# Patient Record
Sex: MALE | Race: WHITE | NOT HISPANIC OR LATINO | Employment: FULL TIME | URBAN - METROPOLITAN AREA
[De-identification: names, ages, dates, MRNs, and addresses within clinical notes are randomized per-mention and may not be internally consistent; named-entity substitution may affect disease eponyms.]

---

## 2017-01-23 ENCOUNTER — GENERIC CONVERSION - ENCOUNTER (OUTPATIENT)
Dept: OTHER | Facility: OTHER | Age: 57
End: 2017-01-23

## 2017-02-21 ENCOUNTER — ALLSCRIPTS OFFICE VISIT (OUTPATIENT)
Dept: OTHER | Facility: OTHER | Age: 57
End: 2017-02-21

## 2017-03-13 ENCOUNTER — ALLSCRIPTS OFFICE VISIT (OUTPATIENT)
Dept: OTHER | Facility: OTHER | Age: 57
End: 2017-03-13

## 2017-03-20 ENCOUNTER — ALLSCRIPTS OFFICE VISIT (OUTPATIENT)
Dept: OTHER | Facility: OTHER | Age: 57
End: 2017-03-20

## 2017-03-20 ENCOUNTER — TRANSCRIBE ORDERS (OUTPATIENT)
Dept: ADMINISTRATIVE | Facility: HOSPITAL | Age: 57
End: 2017-03-20

## 2017-03-20 ENCOUNTER — HOSPITAL ENCOUNTER (OUTPATIENT)
Dept: RADIOLOGY | Facility: MEDICAL CENTER | Age: 57
Discharge: HOME | End: 2017-03-20
Payer: COMMERCIAL

## 2017-03-20 DIAGNOSIS — J20.9 ACUTE BRONCHITIS: ICD-10-CM

## 2017-03-20 PROCEDURE — 71020 HB CHEST X-RAY 2VW FRONTAL&LATL: CPT

## 2017-03-23 ENCOUNTER — GENERIC CONVERSION - ENCOUNTER (OUTPATIENT)
Dept: OTHER | Facility: OTHER | Age: 57
End: 2017-03-23

## 2017-07-11 ENCOUNTER — ALLSCRIPTS OFFICE VISIT (OUTPATIENT)
Dept: OTHER | Facility: OTHER | Age: 57
End: 2017-07-11

## 2017-09-11 ENCOUNTER — GENERIC CONVERSION - ENCOUNTER (OUTPATIENT)
Dept: OTHER | Facility: OTHER | Age: 57
End: 2017-09-11

## 2017-10-05 ENCOUNTER — GENERIC CONVERSION - ENCOUNTER (OUTPATIENT)
Dept: OTHER | Facility: OTHER | Age: 57
End: 2017-10-05

## 2017-11-07 ENCOUNTER — GENERIC CONVERSION - ENCOUNTER (OUTPATIENT)
Dept: OTHER | Facility: OTHER | Age: 57
End: 2017-11-07

## 2018-01-12 VITALS
HEART RATE: 63 BPM | DIASTOLIC BLOOD PRESSURE: 78 MMHG | RESPIRATION RATE: 18 BRPM | OXYGEN SATURATION: 95 % | HEIGHT: 69 IN | BODY MASS INDEX: 32.9 KG/M2 | TEMPERATURE: 96.9 F | WEIGHT: 222.13 LBS | SYSTOLIC BLOOD PRESSURE: 138 MMHG

## 2018-01-12 VITALS
BODY MASS INDEX: 32.15 KG/M2 | RESPIRATION RATE: 17 BRPM | HEART RATE: 53 BPM | SYSTOLIC BLOOD PRESSURE: 156 MMHG | HEIGHT: 69 IN | WEIGHT: 217.06 LBS | TEMPERATURE: 97.4 F | DIASTOLIC BLOOD PRESSURE: 90 MMHG | OXYGEN SATURATION: 98 %

## 2018-01-13 VITALS
HEIGHT: 70 IN | WEIGHT: 221.31 LBS | TEMPERATURE: 97.8 F | BODY MASS INDEX: 31.68 KG/M2 | SYSTOLIC BLOOD PRESSURE: 128 MMHG | OXYGEN SATURATION: 98 % | DIASTOLIC BLOOD PRESSURE: 80 MMHG | RESPIRATION RATE: 16 BRPM | HEART RATE: 68 BPM

## 2018-01-14 VITALS
WEIGHT: 221.13 LBS | HEIGHT: 69 IN | SYSTOLIC BLOOD PRESSURE: 174 MMHG | TEMPERATURE: 96.4 F | RESPIRATION RATE: 18 BRPM | OXYGEN SATURATION: 98 % | BODY MASS INDEX: 32.75 KG/M2 | HEART RATE: 71 BPM | DIASTOLIC BLOOD PRESSURE: 92 MMHG

## 2018-01-18 NOTE — RESULT NOTES
Verified Results  * XR CHEST PA & LATERAL [id number] 04:40PM Anupta Hiltonia Order Number: [id number]     Test Name Result Flag Reference   XR CHEST PA & LATERAL (Report)     CHEST DUAL ENERGY     INDICATION: Trauma cough  History of bronchitis  Smoker  COMPARISON: None     VIEWS: PA and lateral projections; 2 images     FINDINGS:        Cardiomediastinal silhouette appears unremarkable  The lungs are clear  No pneumothorax or pleural effusion  4 9 cm rounded calcified structure in the left upper quadrant likely a calcified splenic cyst      Visualized osseous structures appear within normal limits for the patient's age  IMPRESSION:     No active pulmonary disease  4 9 cm rounded calcified structure in the left upper quadrant likely a calcified splenic cyst  Findings most likely represent sequela of previous posttraumatic/infarct versus hydatid cyst        Workstation performed: [id number]     Signed by:    Shan Mcconnell MD   3/22/17

## 2018-01-24 ENCOUNTER — TELEPHONE (OUTPATIENT)
Dept: FAMILY MEDICINE CLINIC | Facility: CLINIC | Age: 58
End: 2018-01-24

## 2018-02-02 LAB
ALBUMIN SERPL-MCNC: 4.2 G/DL (ref 3.6–5.1)
ALBUMIN/GLOB SERPL: 1.6 (CALC) (ref 1–2.5)
ALP SERPL-CCNC: 62 U/L (ref 40–115)
AST SERPL-CCNC: 25 U/L (ref 10–35)
BILIRUB SERPL-MCNC: 1 MG/DL (ref 0.2–1.2)
BUN SERPL-MCNC: 16 MG/DL (ref 7–25)
BUN/CREAT SERPL: ABNORMAL (CALC) (ref 6–22)
CALCIUM SERPL-MCNC: 9.7 MG/DL (ref 8.6–10.3)
CHLORIDE SERPL-SCNC: 105 MMOL/L (ref 98–110)
CHOLEST SERPL-MCNC: 192 MG/DL
CHOLEST/HDLC SERPL: 6 (CALC)
CO2 SERPL-SCNC: 21 MMOL/L (ref 20–31)
CREAT SERPL-MCNC: 0.86 MG/DL (ref 0.7–1.33)
EST. AVERAGE GLUCOSE BLD GHB EST-MCNC: 128 (CALC)
EST. AVERAGE GLUCOSE BLD GHB EST-SCNC: 7.1 (CALC)
GLOBULIN SER CALC-MCNC: 2.7 G/DL (CALC) (ref 1.9–3.7)
GLUCOSE SERPL-MCNC: 142 MG/DL (ref 65–99)
HBA1C MFR BLD: 6.1 % OF TOTAL HGB
HDLC SERPL-MCNC: 32 MG/DL
LDLC SERPL DIRECT ASSAY-MCNC: 96 MG/DL
NONHDLC SERPL-MCNC: 160 MG/DL (CALC)
POTASSIUM SERPL-SCNC: 3.9 MMOL/L (ref 3.5–5.3)
PROT SERPL-MCNC: 6.9 G/DL (ref 6.1–8.1)
PSA SERPL-MCNC: 0.7 NG/ML
SL AMB EGFR AFRICAN AMERICAN: 112 ML/MIN/1.73M2
SL AMB EGFR NON AFRICAN AMERICAN: 96 ML/MIN/1.73M2
SODIUM SERPL-SCNC: 138 MMOL/L (ref 135–146)
TRIGL SERPL-MCNC: 414 MG/DL
TSH SERPL-ACNC: 1.73 MIU/L (ref 0.4–4.5)

## 2018-02-04 RX ORDER — FENOFIBRATE 145 MG/1
1 TABLET, COATED ORAL DAILY
COMMUNITY
Start: 2011-02-21 | End: 2018-02-07 | Stop reason: SDUPTHER

## 2018-02-04 RX ORDER — OMEGA-3-ACID ETHYL ESTERS 1 G/1
2 CAPSULE, LIQUID FILLED ORAL 2 TIMES DAILY
COMMUNITY
Start: 2011-03-04 | End: 2018-02-07 | Stop reason: SDUPTHER

## 2018-02-04 RX ORDER — BETAMETHASONE DIPROPIONATE 0.5 MG/G
CREAM TOPICAL 2 TIMES DAILY
COMMUNITY
Start: 2016-05-31 | End: 2018-10-22 | Stop reason: SDUPTHER

## 2018-02-04 RX ORDER — LISINOPRIL AND HYDROCHLOROTHIAZIDE 25; 20 MG/1; MG/1
1 TABLET ORAL DAILY
COMMUNITY
Start: 2011-02-21 | End: 2018-02-07 | Stop reason: SDUPTHER

## 2018-02-04 RX ORDER — METOPROLOL SUCCINATE 100 MG/1
1 TABLET, EXTENDED RELEASE ORAL DAILY
COMMUNITY
Start: 2011-02-21 | End: 2018-02-07 | Stop reason: SDUPTHER

## 2018-02-04 RX ORDER — OXYCODONE HYDROCHLORIDE 5 MG/1
TABLET ORAL
Refills: 0 | COMMUNITY
Start: 2018-01-18 | End: 2019-05-24

## 2018-02-07 ENCOUNTER — OFFICE VISIT (OUTPATIENT)
Dept: FAMILY MEDICINE CLINIC | Facility: CLINIC | Age: 58
End: 2018-02-07
Payer: COMMERCIAL

## 2018-02-07 VITALS
OXYGEN SATURATION: 97 % | DIASTOLIC BLOOD PRESSURE: 80 MMHG | RESPIRATION RATE: 16 BRPM | TEMPERATURE: 97.5 F | BODY MASS INDEX: 33.22 KG/M2 | WEIGHT: 224.31 LBS | HEIGHT: 69 IN | HEART RATE: 61 BPM | SYSTOLIC BLOOD PRESSURE: 156 MMHG

## 2018-02-07 DIAGNOSIS — L30.9 DERMATITIS: ICD-10-CM

## 2018-02-07 DIAGNOSIS — E78.5 HYPERLIPIDEMIA, UNSPECIFIED HYPERLIPIDEMIA TYPE: ICD-10-CM

## 2018-02-07 DIAGNOSIS — I10 HYPERTENSION, UNSPECIFIED TYPE: Primary | ICD-10-CM

## 2018-02-07 DIAGNOSIS — J45.20 MILD INTERMITTENT ASTHMA WITHOUT COMPLICATION: ICD-10-CM

## 2018-02-07 DIAGNOSIS — E11.8 TYPE 2 DIABETES MELLITUS WITH COMPLICATION, UNSPECIFIED LONG TERM INSULIN USE STATUS: ICD-10-CM

## 2018-02-07 PROBLEM — K38.8 MASS OF APPENDIX: Status: ACTIVE | Noted: 2017-11-22

## 2018-02-07 PROCEDURE — 99214 OFFICE O/P EST MOD 30 MIN: CPT | Performed by: FAMILY MEDICINE

## 2018-02-07 RX ORDER — DIPHENOXYLATE HYDROCHLORIDE AND ATROPINE SULFATE 2.5; .025 MG/1; MG/1
1 TABLET ORAL
COMMUNITY
End: 2022-02-21 | Stop reason: SDUPTHER

## 2018-02-07 RX ORDER — LISINOPRIL AND HYDROCHLOROTHIAZIDE 25; 20 MG/1; MG/1
1 TABLET ORAL DAILY
Qty: 90 TABLET | Refills: 3 | Status: SHIPPED | OUTPATIENT
Start: 2018-02-07 | End: 2018-10-31 | Stop reason: SDUPTHER

## 2018-02-07 RX ORDER — BETAMETHASONE DIPROPIONATE 0.5 MG/G
CREAM TOPICAL 2 TIMES DAILY
Qty: 30 G | Refills: 0 | Status: SHIPPED | OUTPATIENT
Start: 2018-02-07 | End: 2018-10-31 | Stop reason: SDUPTHER

## 2018-02-07 RX ORDER — OMEGA-3-ACID ETHYL ESTERS 1 G/1
2 CAPSULE, LIQUID FILLED ORAL 2 TIMES DAILY
Qty: 360 CAPSULE | Refills: 3 | Status: SHIPPED | OUTPATIENT
Start: 2018-02-07 | End: 2018-11-08 | Stop reason: SDUPTHER

## 2018-02-07 RX ORDER — METOPROLOL SUCCINATE 100 MG/1
100 TABLET, EXTENDED RELEASE ORAL DAILY
Qty: 90 TABLET | Refills: 3 | Status: SHIPPED | OUTPATIENT
Start: 2018-02-07 | End: 2018-10-31 | Stop reason: SDUPTHER

## 2018-02-07 RX ORDER — ALBUTEROL SULFATE 90 UG/1
2 AEROSOL, METERED RESPIRATORY (INHALATION) EVERY 6 HOURS PRN
Qty: 3 INHALER | Refills: 0 | Status: SHIPPED | OUTPATIENT
Start: 2018-02-07 | End: 2018-07-08 | Stop reason: SDUPTHER

## 2018-02-07 RX ORDER — FENOFIBRATE 145 MG/1
145 TABLET, COATED ORAL DAILY
Qty: 90 TABLET | Refills: 3 | Status: SHIPPED | OUTPATIENT
Start: 2018-02-07 | End: 2018-10-31 | Stop reason: SDUPTHER

## 2018-02-07 NOTE — ASSESSMENT & PLAN NOTE
Total total cholesterol acceptable though triglycerides elevated at greater than 400  Continue fenofibrate  Expect that with tighter glucose control triglycerides should improve somewhat

## 2018-02-07 NOTE — PROGRESS NOTES
Assessment/Plan:    DMII (diabetes mellitus, type 2) (Formerly [facility])  Metformin refilled at 1000 milligrams twice daily  Hemoglobin A1c 6 1  Continue with diet and exercise program     Hypertension  Well controlled on lisinopril hydrochlorothiazide  Continue current dosing    Hyperlipidemia  Total total cholesterol acceptable though triglycerides elevated at greater than 400  Continue fenofibrate  Expect that with tighter glucose control triglycerides should improve somewhat  Diagnoses and all orders for this visit:    Hypertension, unspecified type  -     lisinopril-hydrochlorothiazide (PRINZIDE,ZESTORETIC) 20-25 MG per tablet; Take 1 tablet by mouth daily  -     metoprolol succinate (TOPROL-XL) 100 mg 24 hr tablet; Take 1 tablet (100 mg total) by mouth daily  -     Comprehensive metabolic panel; Future  -     Comprehensive metabolic panel    Type 2 diabetes mellitus with complication, unspecified long term insulin use status (Formerly [facility])  -     metFORMIN (GLUCOPHAGE) 500 mg tablet; Take 2 tablets (1,000 mg total) by mouth 2 (two) times a day with meals  -     Hemoglobin A1c; Future  -     Hemoglobin A1c    Hyperlipidemia, unspecified hyperlipidemia type  -     omega-3-acid ethyl esters (LOVAZA) 1 g capsule; Take 2 capsules (2 g total) by mouth 2 (two) times a day  -     fenofibrate (TRICOR) 145 mg tablet; Take 1 tablet (145 mg total) by mouth daily  -     Lipid Panel with Direct LDL reflex; Future  -     TSH, 3rd generation with T4 reflex; Future  -     Lipid Panel with Direct LDL reflex  -     TSH, 3rd generation with T4 reflex    Mild intermittent asthma without complication  -     betamethasone dipropionate (DIPROSONE) 0 05 % cream; Apply topically 2 (two) times a day    Dermatitis  -     albuterol (PROAIR HFA) 90 mcg/act inhaler; Inhale 2 puffs every 6 (six) hours as needed for wheezing    Other orders  -     multivitamin (THERAGRAN) TABS; Take 1 tablet by mouth  -     co-enzyme Q-10 30 mg;  Take 30 mg by mouth Subjective:      Patient ID: Tere Kocher is a 62 y o  male  Patient presents for routine follow-up of chronic medical problems and review recent fasting blood work  He is being treated for type 2 diabetes, hypertension, hyperlipidemia  He is taking metformin 1000 milligrams twice daily  He increase this from 500 b i d  on his own approximately 3 weeks ago  During a routine screening colonoscopy he was found to have a lesion on his appendix which then necessitated a elective appendectomy  During that workup he was found to have blood sugar of greater than 200  Since then his blood sugars have improved with the higher dosage of metformin  He takes fenofibrate for his hyperlipidemia and lisinopril hydrochlorothiazide for his blood pressure  The following portions of the patient's history were reviewed and updated as appropriate: allergies, current medications, past family history, past medical history, past social history, past surgical history and problem list     Review of Systems   Constitutional: Negative  Respiratory: Negative  Cardiovascular: Negative  Gastrointestinal: Negative  Genitourinary: Negative  Musculoskeletal: Negative  Psychiatric/Behavioral: Negative  Objective:     Physical Exam   Constitutional: He is oriented to person, place, and time  He appears well-developed and well-nourished  No distress  HENT:   Head: Normocephalic and atraumatic  Eyes: Conjunctivae are normal  Pupils are equal, round, and reactive to light  Right eye exhibits no discharge  Neck: Normal range of motion  No thyromegaly present  Cardiovascular: Normal rate and regular rhythm  Pulmonary/Chest: Effort normal and breath sounds normal  No respiratory distress  Lymphadenopathy:     He has no cervical adenopathy  Neurological: He is alert and oriented to person, place, and time  Skin: Skin is warm and dry  He is not diaphoretic     Psychiatric: He has a normal mood and affect  His behavior is normal  Judgment and thought content normal    Nursing note and vitals reviewed

## 2018-02-07 NOTE — ASSESSMENT & PLAN NOTE
Metformin refilled at 1000 milligrams twice daily  Hemoglobin A1c 6 1    Continue with diet and exercise program

## 2018-02-15 DIAGNOSIS — E11.8 TYPE 2 DIABETES MELLITUS WITH COMPLICATION, UNSPECIFIED LONG TERM INSULIN USE STATUS: ICD-10-CM

## 2018-04-14 LAB
LEFT EYE DIABETIC RETINOPATHY: NORMAL
RIGHT EYE DIABETIC RETINOPATHY: NORMAL
SEVERITY (EYE EXAM): NORMAL

## 2018-05-14 DIAGNOSIS — E11.9 TYPE 2 DIABETES MELLITUS WITHOUT COMPLICATION, WITHOUT LONG-TERM CURRENT USE OF INSULIN (HCC): Primary | ICD-10-CM

## 2018-07-06 DIAGNOSIS — L30.9 DERMATITIS: ICD-10-CM

## 2018-07-06 DIAGNOSIS — E11.9 TYPE 2 DIABETES MELLITUS WITHOUT COMPLICATION, WITHOUT LONG-TERM CURRENT USE OF INSULIN (HCC): ICD-10-CM

## 2018-10-22 ENCOUNTER — OFFICE VISIT (OUTPATIENT)
Dept: FAMILY MEDICINE CLINIC | Facility: CLINIC | Age: 58
End: 2018-10-22
Payer: COMMERCIAL

## 2018-10-22 VITALS
OXYGEN SATURATION: 96 % | BODY MASS INDEX: 31.96 KG/M2 | SYSTOLIC BLOOD PRESSURE: 140 MMHG | WEIGHT: 216.4 LBS | TEMPERATURE: 98 F | HEART RATE: 84 BPM | DIASTOLIC BLOOD PRESSURE: 82 MMHG

## 2018-10-22 DIAGNOSIS — J40 BRONCHITIS: Primary | ICD-10-CM

## 2018-10-22 PROCEDURE — 99213 OFFICE O/P EST LOW 20 MIN: CPT | Performed by: FAMILY MEDICINE

## 2018-10-22 RX ORDER — PREDNISONE 10 MG/1
TABLET ORAL
Qty: 30 TABLET | Refills: 0 | Status: SHIPPED | OUTPATIENT
Start: 2018-10-22 | End: 2018-11-08 | Stop reason: SDUPTHER

## 2018-10-22 RX ORDER — LEVOFLOXACIN 500 MG/1
500 TABLET, FILM COATED ORAL EVERY 24 HOURS
Qty: 10 TABLET | Refills: 0 | Status: SHIPPED | OUTPATIENT
Start: 2018-10-22 | End: 2018-11-01

## 2018-10-23 NOTE — PROGRESS NOTES
Assessment/Plan:     Diagnoses and all orders for this visit:    Bronchitis  -     levofloxacin (LEVAQUIN) 500 mg tablet; Take 1 tablet (500 mg total) by mouth every 24 hours for 10 days  -     predniSONE 10 mg tablet; Five p o  for 2 days Four p o  for 2 Three p o  for 2 days Two p o  for 2 days One p o  for 2 days          -  Continue ProAir inhaler, 2 puffs q i d  Subjective:      Patient ID: Elvia Molina is a 62 y o  male  Three-week history of progressive cough chest congestion and wheezing  Patient had been using his Symbicort inhaler that he had had from a prior infection which she felt helped a little bit but ran out of that several days ago  He has been using a ProAir inhaler since then also with some degree of improvement  He denies fever or chills        The following portions of the patient's history were reviewed and updated as appropriate: allergies, current medications, past family history, past medical history, past social history, past surgical history and problem list     Review of Systems   Constitutional: Negative  HENT: Positive for congestion, postnasal drip, rhinorrhea and sinus pressure  Respiratory: Positive for cough and wheezing  Cardiovascular: Negative  Gastrointestinal: Negative  Genitourinary: Negative  Musculoskeletal: Negative  Psychiatric/Behavioral: Negative  Objective:      /82 (BP Location: Left arm, Patient Position: Sitting)   Pulse 84   Temp 98 °F (36 7 °C) (Tympanic)   Wt 98 2 kg (216 lb 6 4 oz)   SpO2 96%   BMI 31 96 kg/m²          Physical Exam   Constitutional: He is oriented to person, place, and time  He appears well-developed and well-nourished  No distress  HENT:   Head: Normocephalic and atraumatic  Postnasal drainage with mildly injected posterior pharynx   Eyes: Pupils are equal, round, and reactive to light  Conjunctivae are normal  Right eye exhibits no discharge  Neck: Normal range of motion   No thyromegaly present  Cardiovascular: Normal rate and regular rhythm  Pulmonary/Chest: Effort normal  No respiratory distress  He has wheezes  Lymphadenopathy:     He has no cervical adenopathy  Neurological: He is alert and oriented to person, place, and time  Skin: Skin is warm and dry  He is not diaphoretic  Psychiatric: He has a normal mood and affect  His behavior is normal  Judgment and thought content normal    Nursing note and vitals reviewed

## 2018-10-24 LAB
ALBUMIN SERPL-MCNC: 4.6 G/DL (ref 3.6–5.1)
ALBUMIN/GLOB SERPL: 1.6 (CALC) (ref 1–2.5)
ALP SERPL-CCNC: 65 U/L (ref 40–115)
ALT SERPL-CCNC: 41 U/L (ref 9–46)
AST SERPL-CCNC: 23 U/L (ref 10–35)
BILIRUB SERPL-MCNC: 1.1 MG/DL (ref 0.2–1.2)
BUN SERPL-MCNC: 14 MG/DL (ref 7–25)
BUN/CREAT SERPL: ABNORMAL (CALC) (ref 6–22)
CALCIUM SERPL-MCNC: 9.7 MG/DL (ref 8.6–10.3)
CHLORIDE SERPL-SCNC: 103 MMOL/L (ref 98–110)
CHOLEST SERPL-MCNC: 194 MG/DL
CHOLEST/HDLC SERPL: 4.4 (CALC)
CO2 SERPL-SCNC: 20 MMOL/L (ref 20–32)
CREAT SERPL-MCNC: 0.99 MG/DL (ref 0.7–1.33)
GLOBULIN SER CALC-MCNC: 2.8 G/DL (CALC) (ref 1.9–3.7)
GLUCOSE SERPL-MCNC: 203 MG/DL (ref 65–99)
HBA1C MFR BLD: 6.1 % OF TOTAL HGB
HDLC SERPL-MCNC: 44 MG/DL
LDLC SERPL CALC-MCNC: 114 MG/DL (CALC)
NONHDLC SERPL-MCNC: 150 MG/DL (CALC)
POTASSIUM SERPL-SCNC: 4.5 MMOL/L (ref 3.5–5.3)
PROT SERPL-MCNC: 7.4 G/DL (ref 6.1–8.1)
SL AMB EGFR AFRICAN AMERICAN: 98 ML/MIN/1.73M2
SL AMB EGFR NON AFRICAN AMERICAN: 84 ML/MIN/1.73M2
SODIUM SERPL-SCNC: 136 MMOL/L (ref 135–146)
TRIGL SERPL-MCNC: 254 MG/DL
TSH SERPL-ACNC: 0.95 MIU/L (ref 0.4–4.5)

## 2018-10-31 ENCOUNTER — OFFICE VISIT (OUTPATIENT)
Dept: FAMILY MEDICINE CLINIC | Facility: CLINIC | Age: 58
End: 2018-10-31
Payer: COMMERCIAL

## 2018-10-31 VITALS
OXYGEN SATURATION: 98 % | WEIGHT: 215.7 LBS | HEIGHT: 69 IN | BODY MASS INDEX: 31.95 KG/M2 | SYSTOLIC BLOOD PRESSURE: 130 MMHG | TEMPERATURE: 97.8 F | HEART RATE: 69 BPM | DIASTOLIC BLOOD PRESSURE: 86 MMHG | RESPIRATION RATE: 17 BRPM

## 2018-10-31 DIAGNOSIS — I10 HYPERTENSION, UNSPECIFIED TYPE: ICD-10-CM

## 2018-10-31 DIAGNOSIS — E78.5 HYPERLIPIDEMIA, UNSPECIFIED HYPERLIPIDEMIA TYPE: ICD-10-CM

## 2018-10-31 DIAGNOSIS — J45.20 MILD INTERMITTENT ASTHMA WITHOUT COMPLICATION: ICD-10-CM

## 2018-10-31 DIAGNOSIS — L30.9 DERMATITIS: ICD-10-CM

## 2018-10-31 DIAGNOSIS — E11.9 TYPE 2 DIABETES MELLITUS WITHOUT COMPLICATION, WITHOUT LONG-TERM CURRENT USE OF INSULIN (HCC): ICD-10-CM

## 2018-10-31 PROCEDURE — 3079F DIAST BP 80-89 MM HG: CPT | Performed by: FAMILY MEDICINE

## 2018-10-31 PROCEDURE — 3075F SYST BP GE 130 - 139MM HG: CPT | Performed by: FAMILY MEDICINE

## 2018-10-31 PROCEDURE — 99214 OFFICE O/P EST MOD 30 MIN: CPT | Performed by: FAMILY MEDICINE

## 2018-10-31 RX ORDER — FENOFIBRATE 145 MG/1
145 TABLET, COATED ORAL DAILY
Qty: 90 TABLET | Refills: 0 | Status: SHIPPED | OUTPATIENT
Start: 2018-10-31 | End: 2019-01-13 | Stop reason: SDUPTHER

## 2018-10-31 RX ORDER — ALBUTEROL SULFATE 90 UG/1
2 AEROSOL, METERED RESPIRATORY (INHALATION) EVERY 6 HOURS PRN
Qty: 25.5 G | Refills: 0 | Status: SHIPPED | OUTPATIENT
Start: 2018-10-31 | End: 2019-01-24 | Stop reason: SDUPTHER

## 2018-10-31 RX ORDER — BETAMETHASONE DIPROPIONATE 0.5 MG/G
CREAM TOPICAL 2 TIMES DAILY
Qty: 30 G | Refills: 0 | Status: SHIPPED | OUTPATIENT
Start: 2018-10-31 | End: 2019-11-27 | Stop reason: SDUPTHER

## 2018-10-31 RX ORDER — METOPROLOL SUCCINATE 100 MG/1
100 TABLET, EXTENDED RELEASE ORAL DAILY
Qty: 90 TABLET | Refills: 0 | Status: SHIPPED | OUTPATIENT
Start: 2018-10-31 | End: 2019-01-13 | Stop reason: SDUPTHER

## 2018-10-31 RX ORDER — LISINOPRIL AND HYDROCHLOROTHIAZIDE 25; 20 MG/1; MG/1
1 TABLET ORAL DAILY
Qty: 90 TABLET | Refills: 0 | Status: SHIPPED | OUTPATIENT
Start: 2018-10-31 | End: 2019-01-13 | Stop reason: SDUPTHER

## 2018-10-31 NOTE — PROGRESS NOTES
Assessment/Plan:    Type 2 diabetes stable with a hemoglobin A1c of 6 1  Continue metformin  hyperlipidemia stable with a total cholesterol of 194, LDL of 114 and triglycerides which have improved from 414 down to 254  Continue fenofibrate  Hypertension stable  Continue lisinopril hydrochlorothiazide and metoprolol  Diagnoses and all orders for this visit:    Dermatitis  -     albuterol (PROAIR HFA) 90 mcg/act inhaler; Inhale 2 puffs every 6 (six) hours as needed for wheezing    Hyperlipidemia, unspecified hyperlipidemia type  -     fenofibrate (TRICOR) 145 mg tablet; Take 1 tablet (145 mg total) by mouth daily  -     Comprehensive metabolic panel; Future  -     Lipid Panel with Direct LDL reflex; Future  -     TSH, 3rd generation; Future  -     Hemoglobin A1c (w/out EAG); Future  -     PSA, Total Screen; Future  -     Comprehensive metabolic panel  -     Lipid Panel with Direct LDL reflex  -     TSH, 3rd generation  -     Hemoglobin A1c (w/out EAG)  -     PSA, Total Screen    Hypertension, unspecified type  -     lisinopril-hydrochlorothiazide (PRINZIDE,ZESTORETIC) 20-25 MG per tablet; Take 1 tablet by mouth daily  -     metoprolol succinate (TOPROL-XL) 100 mg 24 hr tablet; Take 1 tablet (100 mg total) by mouth daily  -     Comprehensive metabolic panel; Future  -     Lipid Panel with Direct LDL reflex; Future  -     TSH, 3rd generation; Future  -     Hemoglobin A1c (w/out EAG); Future  -     PSA, Total Screen; Future  -     Comprehensive metabolic panel  -     Lipid Panel with Direct LDL reflex  -     TSH, 3rd generation  -     Hemoglobin A1c (w/out EAG)  -     PSA, Total Screen    Type 2 diabetes mellitus without complication, without long-term current use of insulin (HCC)  -     metFORMIN (GLUCOPHAGE) 1000 MG tablet; Take 1 tablet (1,000 mg total) by mouth 2 (two) times a day with meals  -     Comprehensive metabolic panel; Future  -     Lipid Panel with Direct LDL reflex;  Future  -     TSH, 3rd generation; Future  -     Hemoglobin A1c (w/out EAG); Future  -     PSA, Total Screen; Future  -     Comprehensive metabolic panel  -     Lipid Panel with Direct LDL reflex  -     TSH, 3rd generation  -     Hemoglobin A1c (w/out EAG)  -     PSA, Total Screen    Mild intermittent asthma without complication  -     betamethasone dipropionate (DIPROSONE) 0 05 % cream; Apply topically 2 (two) times a day          Subjective:      Patient ID: Jocelyn Bustamante is a 62 y o  male  Patient was seen for routine follow-up chronic medical problems and to review his recent blood work  He has no new complaints  He is getting over bronchitis for which she is taking levofloxacin  His regular chronic medications include metformin for diabetes, metoprolol and lisinopril hydrochlorothiazide for high blood pressure  Takes fenofibrate for hyperlipidemia  He is compliant with his medications and has no side effects related to them  The following portions of the patient's history were reviewed and updated as appropriate: allergies, current medications, past family history, past medical history, past social history, past surgical history and problem list     Review of Systems   Constitutional: Negative  Respiratory: Negative  Cardiovascular: Negative  Gastrointestinal: Negative  Genitourinary: Negative  Musculoskeletal: Negative  Psychiatric/Behavioral: Negative  Objective:      /86 (BP Location: Right arm, Patient Position: Sitting, Cuff Size: Adult)   Pulse 69   Temp 97 8 °F (36 6 °C) (Tympanic)   Resp 17   Ht 5' 9" (1 753 m)   Wt 97 8 kg (215 lb 11 2 oz)   SpO2 98%   BMI 31 85 kg/m²          Physical Exam   Constitutional: He is oriented to person, place, and time  He appears well-developed and well-nourished  No distress  HENT:   Head: Normocephalic and atraumatic  Eyes: Pupils are equal, round, and reactive to light   Conjunctivae are normal  Right eye exhibits no discharge  Neck: Normal range of motion  No thyromegaly present  Cardiovascular: Normal rate and regular rhythm  Pulmonary/Chest: Effort normal and breath sounds normal  No respiratory distress  Lymphadenopathy:     He has no cervical adenopathy  Neurological: He is alert and oriented to person, place, and time  Skin: Skin is warm and dry  He is not diaphoretic  Psychiatric: He has a normal mood and affect  His behavior is normal  Judgment and thought content normal    Nursing note and vitals reviewed

## 2018-11-08 ENCOUNTER — OFFICE VISIT (OUTPATIENT)
Dept: FAMILY MEDICINE CLINIC | Facility: CLINIC | Age: 58
End: 2018-11-08
Payer: COMMERCIAL

## 2018-11-08 VITALS
BODY MASS INDEX: 31.84 KG/M2 | DIASTOLIC BLOOD PRESSURE: 74 MMHG | HEART RATE: 74 BPM | SYSTOLIC BLOOD PRESSURE: 132 MMHG | TEMPERATURE: 97.8 F | HEIGHT: 69 IN | OXYGEN SATURATION: 96 % | WEIGHT: 215 LBS | RESPIRATION RATE: 14 BRPM

## 2018-11-08 DIAGNOSIS — J20.9 ACUTE BRONCHITIS, UNSPECIFIED ORGANISM: Primary | ICD-10-CM

## 2018-11-08 DIAGNOSIS — E78.5 HYPERLIPIDEMIA, UNSPECIFIED HYPERLIPIDEMIA TYPE: ICD-10-CM

## 2018-11-08 DIAGNOSIS — J40 BRONCHITIS: ICD-10-CM

## 2018-11-08 PROCEDURE — 99213 OFFICE O/P EST LOW 20 MIN: CPT | Performed by: PHYSICIAN ASSISTANT

## 2018-11-08 PROCEDURE — 3008F BODY MASS INDEX DOCD: CPT | Performed by: PHYSICIAN ASSISTANT

## 2018-11-08 RX ORDER — PREDNISONE 10 MG/1
TABLET ORAL
Qty: 30 TABLET | Refills: 0 | Status: SHIPPED | OUTPATIENT
Start: 2018-11-08 | End: 2019-05-24

## 2018-11-08 RX ORDER — BUDESONIDE AND FORMOTEROL FUMARATE DIHYDRATE 160; 4.5 UG/1; UG/1
2 AEROSOL RESPIRATORY (INHALATION) 2 TIMES DAILY
Qty: 10.2 G | Refills: 0 | Status: SHIPPED | OUTPATIENT
Start: 2018-11-08 | End: 2019-02-14 | Stop reason: SDUPTHER

## 2018-11-08 RX ORDER — OMEGA-3-ACID ETHYL ESTERS 1 G/1
2 CAPSULE, LIQUID FILLED ORAL 2 TIMES DAILY
Qty: 360 CAPSULE | Refills: 1 | Status: SHIPPED | OUTPATIENT
Start: 2018-11-08 | End: 2019-05-24 | Stop reason: SDUPTHER

## 2018-11-08 NOTE — PROGRESS NOTES
Assessment/Plan:         Diagnoses and all orders for this visit:    Acute bronchitis, unspecified organism  -     budesonide-formoterol (SYMBICORT) 160-4 5 mcg/act inhaler; Inhale 2 puffs 2 (two) times a day Rinse mouth after use  Bronchitis  -     predniSONE 10 mg tablet; Five p o  for 2 days Four p o  for 2 Three p o  for 2 days Two p o  for 2 days One p o  for 2 days    Hyperlipidemia, unspecified hyperlipidemia type  -     omega-3-acid ethyl esters (LOVAZA) 1 g capsule; Take 2 capsules (2 g total) by mouth 2 (two) times a day      discussed condition with patient  He appears to have persistent bronchitis but I do not suspect that he has any further bacterial infection  I will treat the residual inflammation of the airway with a 2nd 10 day course of prednisone and I will also give him a trial of Symbicort and recommended he continue with Mucinex and Robitussin DM  He is to follow up if symptoms continue to persist   I refilled his Lovaza for him today as well  Chief Complaint   Patient presents with    Follow-up     Pt presents for a f/u due to ongoing persistent coughing  Pt states that he has completed his rounds of abx and steriods  He states a few days after Tx all sxs returned but no new sxs  Subjective:      Patient ID: Samir Ortiz is a 62 y o  male  Pt presents for F/U from 10/22/18  He finished Levaquin and Prednisone and reports his symptoms were improving but once he stopped taking the medication, the symptoms came back  He reports cough, chest tightness, wheezing, SOB  He denies any other significant symptoms  He has also tried ProAir but reports [facility] was more effective for him in the past  He has also taken Mucinex and Robitussn DM           The following portions of the patient's history were reviewed and updated as appropriate: allergies, current medications, past family history, past medical history, past social history, past surgical history and problem list     Review of Systems   Constitutional: Negative  HENT: Negative  Respiratory: Positive for cough, chest tightness, shortness of breath and wheezing  Cardiovascular: Negative  Gastrointestinal: Negative  Genitourinary: Negative  Objective:      /74 (BP Location: Left arm, Patient Position: Sitting, Cuff Size: Adult)   Pulse 74   Temp 97 8 °F (36 6 °C) (Tympanic)   Resp 14   Ht 5' 9" (1 753 m)   Wt 97 5 kg (215 lb)   SpO2 96%   BMI 31 75 kg/m²          Physical Exam   Constitutional: He is oriented to person, place, and time  He appears well-developed and well-nourished  No distress  HENT:   Right Ear: Hearing, tympanic membrane, external ear and ear canal normal    Left Ear: Hearing, tympanic membrane, external ear and ear canal normal    Nose: Nose normal    Mouth/Throat: Mucous membranes are normal  Posterior oropharyngeal erythema (PND) present  No oropharyngeal exudate  Neck: Neck supple  Cardiovascular: Normal rate, regular rhythm and normal heart sounds  Pulmonary/Chest: Effort normal  He has no wheezes  He has rhonchi (B/L diffuse coarse rhonchi heard throughout)  Lymphadenopathy:     He has no cervical adenopathy  Neurological: He is alert and oriented to person, place, and time  Psychiatric: He has a normal mood and affect  Vitals reviewed

## 2019-01-13 DIAGNOSIS — I10 HYPERTENSION, UNSPECIFIED TYPE: ICD-10-CM

## 2019-01-13 DIAGNOSIS — E78.5 HYPERLIPIDEMIA, UNSPECIFIED HYPERLIPIDEMIA TYPE: ICD-10-CM

## 2019-01-13 DIAGNOSIS — E11.9 TYPE 2 DIABETES MELLITUS WITHOUT COMPLICATION, WITHOUT LONG-TERM CURRENT USE OF INSULIN (HCC): ICD-10-CM

## 2019-01-14 RX ORDER — FENOFIBRATE 145 MG/1
TABLET, COATED ORAL
Qty: 90 TABLET | Refills: 0 | Status: SHIPPED | OUTPATIENT
Start: 2019-01-14 | End: 2019-05-24 | Stop reason: SDUPTHER

## 2019-01-14 RX ORDER — METOPROLOL SUCCINATE 100 MG/1
TABLET, EXTENDED RELEASE ORAL
Qty: 90 TABLET | Refills: 0 | Status: SHIPPED | OUTPATIENT
Start: 2019-01-14 | End: 2019-05-24 | Stop reason: SDUPTHER

## 2019-01-14 RX ORDER — LISINOPRIL AND HYDROCHLOROTHIAZIDE 25; 20 MG/1; MG/1
TABLET ORAL
Qty: 90 TABLET | Refills: 0 | Status: SHIPPED | OUTPATIENT
Start: 2019-01-14 | End: 2019-05-24 | Stop reason: SDUPTHER

## 2019-01-24 DIAGNOSIS — L30.9 DERMATITIS: ICD-10-CM

## 2019-01-24 RX ORDER — ALBUTEROL SULFATE 90 UG/1
2 AEROSOL, METERED RESPIRATORY (INHALATION) EVERY 6 HOURS PRN
Qty: 5 INHALER | Refills: 5 | Status: SHIPPED | OUTPATIENT
Start: 2019-01-24 | End: 2019-11-27 | Stop reason: SDUPTHER

## 2019-02-14 ENCOUNTER — OFFICE VISIT (OUTPATIENT)
Dept: FAMILY MEDICINE CLINIC | Facility: CLINIC | Age: 59
End: 2019-02-14
Payer: COMMERCIAL

## 2019-02-14 VITALS
BODY MASS INDEX: 32.47 KG/M2 | HEART RATE: 60 BPM | HEIGHT: 69 IN | TEMPERATURE: 97 F | SYSTOLIC BLOOD PRESSURE: 142 MMHG | OXYGEN SATURATION: 95 % | WEIGHT: 219.2 LBS | RESPIRATION RATE: 18 BRPM | DIASTOLIC BLOOD PRESSURE: 80 MMHG

## 2019-02-14 DIAGNOSIS — J45.20 MILD INTERMITTENT ASTHMA WITHOUT COMPLICATION: ICD-10-CM

## 2019-02-14 DIAGNOSIS — J20.9 ACUTE BRONCHITIS, UNSPECIFIED ORGANISM: Primary | ICD-10-CM

## 2019-02-14 DIAGNOSIS — F17.200 SMOKER: ICD-10-CM

## 2019-02-14 PROCEDURE — 3008F BODY MASS INDEX DOCD: CPT | Performed by: FAMILY MEDICINE

## 2019-02-14 PROCEDURE — 99213 OFFICE O/P EST LOW 20 MIN: CPT | Performed by: FAMILY MEDICINE

## 2019-02-14 RX ORDER — VARENICLINE TARTRATE 25 MG
KIT ORAL
Qty: 53 TABLET | Refills: 0 | Status: SHIPPED | OUTPATIENT
Start: 2019-02-14 | End: 2019-06-12 | Stop reason: ALTCHOICE

## 2019-02-14 RX ORDER — LEVOFLOXACIN 500 MG/1
500 TABLET, FILM COATED ORAL EVERY 24 HOURS
Qty: 10 TABLET | Refills: 0 | Status: SHIPPED | OUTPATIENT
Start: 2019-02-14 | End: 2019-02-24

## 2019-02-14 RX ORDER — BUDESONIDE AND FORMOTEROL FUMARATE DIHYDRATE 160; 4.5 UG/1; UG/1
2 AEROSOL RESPIRATORY (INHALATION) 2 TIMES DAILY
Qty: 10.2 G | Refills: 0 | Status: SHIPPED | OUTPATIENT
Start: 2019-02-14 | End: 2019-08-09 | Stop reason: SDUPTHER

## 2019-02-14 NOTE — PROGRESS NOTES
[facility]      NAME: Aaron Monge  AGE: 62 y o  SEX: male  : 1960   MRN: [id number]    DATE: 2019  TIME: 2:35 PM    Assessment and Plan     Problem List Items Addressed This Visit     None      Visit Diagnoses     Acute bronchitis, unspecified organism    -  Primary    Relevant Medications    budesonide-formoterol (SYMBICORT) 160-4 5 mcg/act inhaler    levofloxacin (LEVAQUIN) 500 mg tablet    Mild intermittent asthma without complication        Relevant Medications    budesonide-formoterol (SYMBICORT) 160-4 5 mcg/act inhaler    Smoker        Relevant Medications    varenicline (CHANTIX ROSE) 0 5 MG X 11 & 1 MG X 42 tablet              Return to office in:  P r n  Chief Complaint     Chief Complaint   Patient presents with    URI     Pt c/o chest congestion with dry cough  Pt states he has chest tightness with pressure but denies any SOB  Pt has been taking OTC rubitussin and using a symbicort inhaler with minimal relief  History of Present Illness     One-week history of upper respiratory symptoms including productive cough chest congestion wheezing  Denies fever chills or shortness of breath  Has been using Symbicort inhaler but ran out this week  The following portions of the patient's history were reviewed and updated as appropriate: allergies, current medications, past family history, past medical history, past social history, past surgical history and problem list     Review of Systems   Review of Systems   Constitutional: Negative  HENT: Positive for postnasal drip  Respiratory: Positive for cough, chest tightness and wheezing  Cardiovascular: Negative  Gastrointestinal: Negative  Genitourinary: Negative  Musculoskeletal: Negative  Psychiatric/Behavioral: Negative          Active Problem List     Patient Active Problem List   Diagnosis    DMII (diabetes mellitus, type 2) ([facility] [address] )    Hyperlipidemia    Hypertension    Mass of appendix Objective   /80   Pulse 60   Temp (!) 97 °F (36 1 °C) (Tympanic)   Resp 18   Ht 5' 9" (1 753 m)   Wt 99 4 kg (219 lb 3 2 oz)   SpO2 95%   BMI 32 37 kg/m²     Physical Exam   Constitutional: He is oriented to person, place, and time  He appears well-developed and well-nourished  No distress  HENT:   Head: Normocephalic and atraumatic  Eyes: Pupils are equal, round, and reactive to light  Conjunctivae are normal  Right eye exhibits no discharge  Neck: Normal range of motion  No thyromegaly present  Cardiovascular: Normal rate and regular rhythm  Pulmonary/Chest: Effort normal  No respiratory distress  He has wheezes  Lymphadenopathy:     He has no cervical adenopathy  Neurological: He is alert and oriented to person, place, and time  Skin: Skin is warm and dry  He is not diaphoretic  Psychiatric: He has a normal mood and affect  His behavior is normal  Judgment and thought content normal    Nursing note and vitals reviewed          Current Medications     Current Outpatient Medications:     albuterol (PROAIR HFA) 90 mcg/act inhaler, Inhale 2 puffs every 6 (six) hours as needed for wheezing, Disp: 5 Inhaler, Rfl: 5    betamethasone dipropionate (DIPROSONE) 0 05 % cream, Apply topically 2 (two) times a day, Disp: 30 g, Rfl: 0    budesonide-formoterol (SYMBICORT) 160-4 5 mcg/act inhaler, Inhale 2 puffs 2 (two) times a day Rinse mouth after use , Disp: 10 2 g, Rfl: 0    co-enzyme Q-10 30 mg, Take 30 mg by mouth, Disp: , Rfl:     fenofibrate (TRICOR) 145 mg tablet, TAKE 1 TABLET DAILY, Disp: 90 tablet, Rfl: 0    lisinopril-hydrochlorothiazide (PRINZIDE,ZESTORETIC) 20-25 MG per tablet, TAKE 1 TABLET DAILY, Disp: 90 tablet, Rfl: 0    metFORMIN (GLUCOPHAGE) 1000 MG tablet, TAKE 1 TABLET TWICE A DAY  WITH MEALS, Disp: 180 tablet, Rfl: 0    metoprolol succinate (TOPROL-XL) 100 mg 24 hr tablet, TAKE 1 TABLET DAILY, Disp: 90 tablet, Rfl: 0    multivitamin (THERAGRAN) TABS, Take 1 tablet by mouth, Disp: , Rfl:     omega-3-acid ethyl esters (LOVAZA) 1 g capsule, Take 2 capsules (2 g total) by mouth 2 (two) times a day, Disp: 360 capsule, Rfl: 1    oxyCODONE (ROXICODONE) 5 mg immediate release tablet, TAKE 1 TABLET BY MOUTH EVERY 6 HOURS AS NEEDED FOR MODERATE OR SEVERE PAIN   MAX DAILY DOSE: 20MG, Disp: , Rfl: 0    levofloxacin (LEVAQUIN) 500 mg tablet, Take 1 tablet (500 mg total) by mouth every 24 hours for 10 days, Disp: 10 tablet, Rfl: 0    predniSONE 10 mg tablet, Five p o  for 2 days Four p o  for 2 Three p o  for 2 days Two p o  for 2 days One p o  for 2 days (Patient not taking: Reported on 2/14/2019), Disp: 30 tablet, Rfl: 0    varenicline (CHANTIX ROSE) 0 5 MG X 11 & 1 MG X 42 tablet, Take one 0 5mg tab by mouth 1x daily for 3 days, then increase to one 0 5mg tab 2x daily for 3 days, then increase to one 1mg tab 2x daily, Disp: 53 tablet, Rfl: 0    Health Maintenance     Health Maintenance   Topic Date Due    Hepatitis C Screening  1960    URINE MICROALBUMIN  11/21/1970    BMI: Followup Plan  11/21/1978    HEPATITIS B VACCINES (1 of 3 - Risk 3-dose series) 11/21/1979    DTaP,Tdap,and Td Vaccines (1 - Tdap) 11/21/1981    Diabetic Foot Exam  02/07/2019    DM Eye Exam  04/14/2019    HEMOGLOBIN A1C  04/23/2019    Depression Screening PHQ  10/22/2019    BMI: Adult  11/08/2019    CRC Screening: Colonoscopy  10/05/2022    INFLUENZA VACCINE  Completed    Pneumococcal PPSV23 Medium Risk Adult  Completed     Immunization History   Administered Date(s) Administered    INFLUENZA 11/01/2007, 10/20/2008, 10/15/2009, 10/30/2017, 10/17/2018    Influenza Quadrivalent, 6-35 Months IM 11/17/2015, 11/05/2016    Influenza TIV (IM) 11/20/2012, 10/01/2014    Pneumococcal Polysaccharide PPV23 07/31/2014       Max Maria DO  [facility]

## 2019-05-16 LAB
ALBUMIN SERPL-MCNC: 4.4 G/DL (ref 3.6–5.1)
ALBUMIN/GLOB SERPL: 1.8 (CALC) (ref 1–2.5)
ALP SERPL-CCNC: 59 U/L (ref 40–115)
ALT SERPL-CCNC: 45 U/L (ref 9–46)
AST SERPL-CCNC: 27 U/L (ref 10–35)
BILIRUB SERPL-MCNC: 1.3 MG/DL (ref 0.2–1.2)
BUN SERPL-MCNC: 16 MG/DL (ref 7–25)
BUN/CREAT SERPL: ABNORMAL (CALC) (ref 6–22)
CALCIUM SERPL-MCNC: 9.9 MG/DL (ref 8.6–10.3)
CHLORIDE SERPL-SCNC: 105 MMOL/L (ref 98–110)
CHOLEST SERPL-MCNC: 164 MG/DL
CHOLEST/HDLC SERPL: 4.6 (CALC)
CO2 SERPL-SCNC: 26 MMOL/L (ref 20–32)
CREAT SERPL-MCNC: 1 MG/DL (ref 0.7–1.33)
GLOBULIN SER CALC-MCNC: 2.5 G/DL (CALC) (ref 1.9–3.7)
GLUCOSE SERPL-MCNC: 128 MG/DL (ref 65–99)
HBA1C MFR BLD: 6.3 % OF TOTAL HGB
HDLC SERPL-MCNC: 36 MG/DL
LDLC SERPL CALC-MCNC: 82 MG/DL (CALC)
NONHDLC SERPL-MCNC: 128 MG/DL (CALC)
POTASSIUM SERPL-SCNC: 4.4 MMOL/L (ref 3.5–5.3)
PROT SERPL-MCNC: 6.9 G/DL (ref 6.1–8.1)
PSA SERPL-MCNC: 0.7 NG/ML
SL AMB EGFR AFRICAN AMERICAN: 96 ML/MIN/1.73M2
SL AMB EGFR NON AFRICAN AMERICAN: 83 ML/MIN/1.73M2
SODIUM SERPL-SCNC: 140 MMOL/L (ref 135–146)
TRIGL SERPL-MCNC: 383 MG/DL
TSH SERPL-ACNC: 2.01 MIU/L (ref 0.4–4.5)

## 2019-05-16 PROCEDURE — 3044F HG A1C LEVEL LT 7.0%: CPT | Performed by: FAMILY MEDICINE

## 2019-05-24 ENCOUNTER — OFFICE VISIT (OUTPATIENT)
Dept: FAMILY MEDICINE CLINIC | Facility: CLINIC | Age: 59
End: 2019-05-24
Payer: COMMERCIAL

## 2019-05-24 VITALS
SYSTOLIC BLOOD PRESSURE: 130 MMHG | BODY MASS INDEX: 31.58 KG/M2 | TEMPERATURE: 98.4 F | OXYGEN SATURATION: 97 % | WEIGHT: 220.6 LBS | HEIGHT: 70 IN | DIASTOLIC BLOOD PRESSURE: 70 MMHG | HEART RATE: 67 BPM

## 2019-05-24 DIAGNOSIS — I10 HYPERTENSION, UNSPECIFIED TYPE: ICD-10-CM

## 2019-05-24 DIAGNOSIS — E11.9 TYPE 2 DIABETES MELLITUS WITHOUT COMPLICATION, WITHOUT LONG-TERM CURRENT USE OF INSULIN (HCC): ICD-10-CM

## 2019-05-24 DIAGNOSIS — J20.9 ACUTE BRONCHITIS, UNSPECIFIED ORGANISM: ICD-10-CM

## 2019-05-24 DIAGNOSIS — E78.5 HYPERLIPIDEMIA, UNSPECIFIED HYPERLIPIDEMIA TYPE: ICD-10-CM

## 2019-05-24 PROCEDURE — 3078F DIAST BP <80 MM HG: CPT | Performed by: FAMILY MEDICINE

## 2019-05-24 PROCEDURE — 3008F BODY MASS INDEX DOCD: CPT | Performed by: FAMILY MEDICINE

## 2019-05-24 PROCEDURE — 3075F SYST BP GE 130 - 139MM HG: CPT | Performed by: FAMILY MEDICINE

## 2019-05-24 PROCEDURE — 99214 OFFICE O/P EST MOD 30 MIN: CPT | Performed by: FAMILY MEDICINE

## 2019-05-24 RX ORDER — OMEGA-3-ACID ETHYL ESTERS 1 G/1
2 CAPSULE, LIQUID FILLED ORAL 2 TIMES DAILY
Qty: 360 CAPSULE | Refills: 1 | Status: SHIPPED | OUTPATIENT
Start: 2019-05-24 | End: 2019-11-27 | Stop reason: SDUPTHER

## 2019-05-24 RX ORDER — METOPROLOL SUCCINATE 100 MG/1
100 TABLET, EXTENDED RELEASE ORAL DAILY
Qty: 90 TABLET | Refills: 1 | Status: SHIPPED | OUTPATIENT
Start: 2019-05-24 | End: 2019-11-27 | Stop reason: SDUPTHER

## 2019-05-24 RX ORDER — LISINOPRIL AND HYDROCHLOROTHIAZIDE 25; 20 MG/1; MG/1
1 TABLET ORAL DAILY
Qty: 90 TABLET | Refills: 1 | Status: SHIPPED | OUTPATIENT
Start: 2019-05-24 | End: 2019-11-27 | Stop reason: SDUPTHER

## 2019-05-24 RX ORDER — FENOFIBRATE 145 MG/1
145 TABLET, COATED ORAL DAILY
Qty: 90 TABLET | Refills: 1 | Status: SHIPPED | OUTPATIENT
Start: 2019-05-24 | End: 2019-11-27 | Stop reason: SDUPTHER

## 2019-06-12 DIAGNOSIS — F17.200 SMOKER: Primary | ICD-10-CM

## 2019-06-12 DIAGNOSIS — F17.200 SMOKER: ICD-10-CM

## 2019-06-12 RX ORDER — VARENICLINE TARTRATE 1 MG/1
1 TABLET, FILM COATED ORAL 2 TIMES DAILY
Qty: 60 TABLET | Refills: 4 | Status: SHIPPED | OUTPATIENT
Start: 2019-06-12 | End: 2019-07-16 | Stop reason: SDUPTHER

## 2019-06-12 RX ORDER — VARENICLINE TARTRATE 25 MG
KIT ORAL
Qty: 53 TABLET | Refills: 0 | OUTPATIENT
Start: 2019-06-12

## 2019-07-16 ENCOUNTER — TELEPHONE (OUTPATIENT)
Dept: OTHER | Facility: OTHER | Age: 59
End: 2019-07-16

## 2019-07-16 DIAGNOSIS — F17.200 SMOKER: ICD-10-CM

## 2019-07-21 RX ORDER — VARENICLINE TARTRATE 1 MG/1
1 TABLET, FILM COATED ORAL 2 TIMES DAILY
Qty: 60 TABLET | Refills: 0 | Status: SHIPPED | OUTPATIENT
Start: 2019-07-21 | End: 2019-11-14 | Stop reason: SDUPTHER

## 2019-08-09 DIAGNOSIS — J20.9 ACUTE BRONCHITIS, UNSPECIFIED ORGANISM: ICD-10-CM

## 2019-08-13 RX ORDER — BUDESONIDE AND FORMOTEROL FUMARATE DIHYDRATE 160; 4.5 UG/1; UG/1
AEROSOL RESPIRATORY (INHALATION)
Qty: 10.2 INHALER | Refills: 0 | Status: SHIPPED | OUTPATIENT
Start: 2019-08-13 | End: 2019-11-27 | Stop reason: SDUPTHER

## 2019-11-14 DIAGNOSIS — F17.200 SMOKER: ICD-10-CM

## 2019-11-14 RX ORDER — VARENICLINE TARTRATE 1 MG/1
1 TABLET, FILM COATED ORAL 2 TIMES DAILY
Qty: 60 TABLET | Refills: 0 | Status: CANCELLED | OUTPATIENT
Start: 2019-11-14

## 2019-11-14 RX ORDER — VARENICLINE TARTRATE 1 MG/1
1 TABLET, FILM COATED ORAL 2 TIMES DAILY
Qty: 60 TABLET | Refills: 3 | Status: SHIPPED | OUTPATIENT
Start: 2019-11-14 | End: 2021-08-09 | Stop reason: SDUPTHER

## 2019-11-20 LAB
ALBUMIN SERPL-MCNC: 4.5 G/DL (ref 3.6–5.1)
ALBUMIN/GLOB SERPL: 1.7 (CALC) (ref 1–2.5)
ALP SERPL-CCNC: 58 U/L (ref 40–115)
ALT SERPL-CCNC: 47 U/L (ref 9–46)
AST SERPL-CCNC: 32 U/L (ref 10–35)
BILIRUB SERPL-MCNC: 1.2 MG/DL (ref 0.2–1.2)
BUN SERPL-MCNC: 15 MG/DL (ref 7–25)
BUN/CREAT SERPL: ABNORMAL (CALC) (ref 6–22)
CALCIUM SERPL-MCNC: 10.1 MG/DL (ref 8.6–10.3)
CHLORIDE SERPL-SCNC: 104 MMOL/L (ref 98–110)
CHOLEST SERPL-MCNC: 185 MG/DL
CHOLEST/HDLC SERPL: 5 (CALC)
CO2 SERPL-SCNC: 26 MMOL/L (ref 20–32)
CREAT SERPL-MCNC: 0.92 MG/DL (ref 0.7–1.33)
GLOBULIN SER CALC-MCNC: 2.6 G/DL (CALC) (ref 1.9–3.7)
GLUCOSE SERPL-MCNC: 137 MG/DL (ref 65–99)
HBA1C MFR BLD: 6.7 % OF TOTAL HGB
HDLC SERPL-MCNC: 37 MG/DL
LDLC SERPL DIRECT ASSAY-MCNC: 96 MG/DL
NONHDLC SERPL-MCNC: 148 MG/DL (CALC)
POTASSIUM SERPL-SCNC: 4.4 MMOL/L (ref 3.5–5.3)
PROT SERPL-MCNC: 7.1 G/DL (ref 6.1–8.1)
SL AMB EGFR AFRICAN AMERICAN: 106 ML/MIN/1.73M2
SL AMB EGFR NON AFRICAN AMERICAN: 91 ML/MIN/1.73M2
SODIUM SERPL-SCNC: 142 MMOL/L (ref 135–146)
TRIGL SERPL-MCNC: 403 MG/DL
TSH SERPL-ACNC: 1.93 MIU/L (ref 0.4–4.5)

## 2019-11-26 NOTE — PROGRESS NOTES
[address] [facility]  Thelma Bustamante, PharmD      The following is per review of patient's pertinent medical/medication history:    Patient's insurance coverage: Payor: [address] [place] [address] / Plan: [id number] / Product Type: Blue Fee for Service /     Findings: Patient with diabetes however no previous documented trial with statin  Currently on fenofibrate and fish oil  The 10-year ASCVD risk score (Héctor Roy et al , 2013) is: 31 5%    Values used to calculate the score:      Age: 61 years      Sex: Male      Is Non- : No      Diabetic: Yes      Tobacco smoker: Yes      Systolic Blood Pressure: 080 mmHg      Is BP treated: Yes      HDL Cholesterol: 37 mg/dL      Total Cholesterol: 185 mg/dL    Recommendations: Would recommend switching fenofibrate to pravastatin 40mg daily (mod intensity) d/t increased risk for myalgia with concomitant fibrate/statin         Demographics  Interaction Method: Virtual    Topic(s) Addressed  Statin Use    Intervention(s) Made  Pharmacologic: Medication Initiation and Medication Discontinuation    Tool(s) Used  Other    Time Spent in Direct Patient Care Activities and Care Coordination: 16-30 Minutes    Recommendation(s) Accepted by the Patient/Caregiver: Not Applicable

## 2019-11-27 ENCOUNTER — OFFICE VISIT (OUTPATIENT)
Dept: FAMILY MEDICINE CLINIC | Facility: CLINIC | Age: 59
End: 2019-11-27
Payer: COMMERCIAL

## 2019-11-27 VITALS
SYSTOLIC BLOOD PRESSURE: 130 MMHG | RESPIRATION RATE: 16 BRPM | BODY MASS INDEX: 28.36 KG/M2 | WEIGHT: 202.6 LBS | TEMPERATURE: 97.1 F | DIASTOLIC BLOOD PRESSURE: 76 MMHG | HEIGHT: 71 IN | HEART RATE: 58 BPM | OXYGEN SATURATION: 97 %

## 2019-11-27 DIAGNOSIS — E78.5 HYPERLIPIDEMIA, UNSPECIFIED HYPERLIPIDEMIA TYPE: ICD-10-CM

## 2019-11-27 DIAGNOSIS — L30.9 DERMATITIS: ICD-10-CM

## 2019-11-27 DIAGNOSIS — I10 HYPERTENSION, UNSPECIFIED TYPE: ICD-10-CM

## 2019-11-27 DIAGNOSIS — J20.9 ACUTE BRONCHITIS, UNSPECIFIED ORGANISM: ICD-10-CM

## 2019-11-27 DIAGNOSIS — J45.20 MILD INTERMITTENT ASTHMA WITHOUT COMPLICATION: ICD-10-CM

## 2019-11-27 DIAGNOSIS — Z23 NEED FOR IMMUNIZATION AGAINST INFLUENZA: Primary | ICD-10-CM

## 2019-11-27 DIAGNOSIS — Z11.59 ENCOUNTER FOR HEPATITIS C SCREENING TEST FOR LOW RISK PATIENT: ICD-10-CM

## 2019-11-27 DIAGNOSIS — Z11.4 SCREENING FOR HIV (HUMAN IMMUNODEFICIENCY VIRUS): ICD-10-CM

## 2019-11-27 DIAGNOSIS — E11.9 TYPE 2 DIABETES MELLITUS WITHOUT COMPLICATION, WITHOUT LONG-TERM CURRENT USE OF INSULIN (HCC): ICD-10-CM

## 2019-11-27 DIAGNOSIS — Z12.5 SCREENING FOR PROSTATE CANCER: ICD-10-CM

## 2019-11-27 PROCEDURE — 90471 IMMUNIZATION ADMIN: CPT | Performed by: FAMILY MEDICINE

## 2019-11-27 PROCEDURE — 99214 OFFICE O/P EST MOD 30 MIN: CPT | Performed by: FAMILY MEDICINE

## 2019-11-27 PROCEDURE — 90682 RIV4 VACC RECOMBINANT DNA IM: CPT | Performed by: FAMILY MEDICINE

## 2019-11-27 RX ORDER — FENOFIBRATE 145 MG/1
145 TABLET, COATED ORAL DAILY
Qty: 90 TABLET | Refills: 1 | Status: SHIPPED | OUTPATIENT
Start: 2019-11-27 | End: 2020-04-22

## 2019-11-27 RX ORDER — BETAMETHASONE DIPROPIONATE 0.5 MG/G
CREAM TOPICAL 2 TIMES DAILY
Qty: 30 G | Refills: 0 | Status: SHIPPED | OUTPATIENT
Start: 2019-11-27 | End: 2022-03-21

## 2019-11-27 RX ORDER — METOPROLOL SUCCINATE 100 MG/1
100 TABLET, EXTENDED RELEASE ORAL DAILY
Qty: 90 TABLET | Refills: 1 | Status: SHIPPED | OUTPATIENT
Start: 2019-11-27 | End: 2020-04-22

## 2019-11-27 RX ORDER — BUDESONIDE AND FORMOTEROL FUMARATE DIHYDRATE 160; 4.5 UG/1; UG/1
2 AEROSOL RESPIRATORY (INHALATION) 2 TIMES DAILY
Qty: 3 INHALER | Refills: 1 | Status: SHIPPED | OUTPATIENT
Start: 2019-11-27 | End: 2020-07-22 | Stop reason: SDUPTHER

## 2019-11-27 RX ORDER — OMEGA-3-ACID ETHYL ESTERS 1 G/1
2 CAPSULE, LIQUID FILLED ORAL 2 TIMES DAILY
Qty: 360 CAPSULE | Refills: 1 | Status: SHIPPED | OUTPATIENT
Start: 2019-11-27 | End: 2020-04-22

## 2019-11-27 RX ORDER — LISINOPRIL AND HYDROCHLOROTHIAZIDE 25; 20 MG/1; MG/1
1 TABLET ORAL DAILY
Qty: 90 TABLET | Refills: 1 | Status: SHIPPED | OUTPATIENT
Start: 2019-11-27 | End: 2020-04-22

## 2019-11-27 RX ORDER — ALBUTEROL SULFATE 90 UG/1
2 AEROSOL, METERED RESPIRATORY (INHALATION) EVERY 6 HOURS PRN
Qty: 5 INHALER | Refills: 5 | Status: SHIPPED | OUTPATIENT
Start: 2019-11-27

## 2019-11-27 NOTE — ASSESSMENT & PLAN NOTE
Blood pressure well controlled on lisinopril hydrochlorothiazide and metoprolol  Continue current treatment

## 2019-11-27 NOTE — ASSESSMENT & PLAN NOTE
Lipids with total cholesterol less than 200 however HDL is low at 37 and triglycerides elevated at greater than 400 likely due to decrease in diabetic control  Continue fenofibrate    Will need to consider addition of statin or switch to statin

## 2019-11-27 NOTE — PROGRESS NOTES
[facility]      NAME: Lauryn Marks  AGE: 61 y o  SEX: male  : 1960   MRN: [id number]    DATE: 2019  TIME: 4:29 PM    Assessment and Plan     Problem List Items Addressed This Visit     Hypertension     Blood pressure well controlled on lisinopril hydrochlorothiazide and metoprolol  Continue current treatment  Relevant Medications    lisinopril-hydrochlorothiazide (PRINZIDE,ZESTORETIC) 20-25 MG per tablet    metoprolol succinate (TOPROL-XL) 100 mg 24 hr tablet    Hyperlipidemia     Lipids with total cholesterol less than 200 however HDL is low at 37 and triglycerides elevated at greater than 400 likely due to decrease in diabetic control  Continue fenofibrate  Will need to consider addition of statin or switch to statin         Relevant Medications    fenofibrate (TRICOR) 145 mg tablet    omega-3-acid ethyl esters (LOVAZA) 1 g capsule    Other Relevant Orders    Comprehensive metabolic panel    Lipid Panel with Direct LDL reflex    TSH, 3rd generation    DMII (diabetes mellitus, type 2) (HCC)       Lab Results   Component Value Date    HGBA1C 6 7 (H) 2019   Hemoglobin A1c up to 6 7  Discussed the need for tighter control with diet and improved exercise habits  Continue metformin 1000 mg b i d           Relevant Medications    metFORMIN (GLUCOPHAGE) 1000 MG tablet    Other Relevant Orders    Hemoglobin A1C      Other Visit Diagnoses     Need for immunization against influenza    -  Primary    Relevant Orders    influenza vaccine, 6699-2132, quadrivalent, recombinant, PF, 0 5 mL, for patients 18 yr+ (FLUBLOK)    Acute bronchitis, unspecified organism        Relevant Medications    budesonide-formoterol (SYMBICORT) 160-4 5 mcg/act inhaler    albuterol (PROAIR HFA) 90 mcg/act inhaler    Dermatitis        Relevant Medications    albuterol (PROAIR HFA) 90 mcg/act inhaler    betamethasone dipropionate (DIPROSONE) 0 05 % cream    Mild intermittent asthma without complication        Relevant Medications    budesonide-formoterol (SYMBICORT) 160-4 5 mcg/act inhaler    albuterol (PROAIR HFA) 90 mcg/act inhaler    betamethasone dipropionate (DIPROSONE) 0 05 % cream    Screening for prostate cancer        Relevant Orders    PSA, Total Screen    Encounter for hepatitis C screening test for low risk patient        Relevant Orders    Hepatitis C antibody    Screening for HIV (human immunodeficiency virus)        Relevant Orders    HIV 1/2 AG-AB combo        BMI Counseling: Body mass index is 28 66 kg/m²  The BMI is above normal  Nutrition recommendations include decreasing portion sizes, decreasing fast food intake, consuming healthier snacks, limiting drinks that contain sugar, moderation in carbohydrate intake, increasing intake of lean protein, reducing intake of saturated and trans fat and reducing intake of cholesterol  Exercise recommendations include exercising 3-5 times per week  No pharmacotherapy was ordered  Return to office in:  6 months    Chief Complaint     Chief Complaint   Patient presents with    Follow-up     6M & BW       History of Present Illness     Patient presents for routine follow-up chronic medical problems and review recent fasting blood work  He is being treated for type 2 diabetes, hypertension, hyperlipidemia  He is also being treated for COPD  He uses Symbicort inhaler and albuterol for rescue  He takes lisinopril hydrochlorothiazide for hypertension along with metoprolol  For his diabetes he takes metformin 1000 mg twice daily  He takes fenofibrate for his lipids  The following portions of the patient's history were reviewed and updated as appropriate: allergies, current medications, past family history, past medical history, past social history, past surgical history and problem list     Review of Systems   Review of Systems   Constitutional: Negative  Respiratory: Negative  Cardiovascular: Negative  Gastrointestinal: Negative  Genitourinary: Negative  Musculoskeletal: Negative  Psychiatric/Behavioral: Negative  Active Problem List     Patient Active Problem List   Diagnosis    DMII (diabetes mellitus, type 2) ([address] )    Hyperlipidemia    Hypertension    Mass of appendix       Objective   /76 (BP Location: Left arm, Patient Position: Sitting, Cuff Size: Large)   Pulse 58   Temp (!) 97 1 °F (36 2 °C) (Tympanic)   Resp 16   Ht 5' 10 5" (1 791 m)   Wt 91 9 kg (202 lb 9 6 oz)   SpO2 97%   BMI 28 66 kg/m²     Physical Exam   Constitutional: He is oriented to person, place, and time  He appears well-developed and well-nourished  No distress  HENT:   Head: Normocephalic and atraumatic  Eyes: Pupils are equal, round, and reactive to light  Conjunctivae are normal  Right eye exhibits no discharge  Neck: Normal range of motion  No thyromegaly present  Cardiovascular: Normal rate and regular rhythm  Pulses are no weak pulses  Pulses:       Dorsalis pedis pulses are 2+ on the right side, and 2+ on the left side  Posterior tibial pulses are 2+ on the right side, and 2+ on the left side  Pulmonary/Chest: Effort normal and breath sounds normal  No respiratory distress  Feet:   Right Foot:   Skin Integrity: Negative for ulcer, skin breakdown, erythema, warmth, callus or dry skin  Left Foot:   Skin Integrity: Negative for ulcer, skin breakdown, erythema, warmth, callus or dry skin  Lymphadenopathy:     He has no cervical adenopathy  Neurological: He is alert and oriented to person, place, and time  Skin: Skin is warm and dry  He is not diaphoretic  Psychiatric: He has a normal mood and affect  His behavior is normal  Judgment and thought content normal    Nursing note and vitals reviewed  Patient's shoes and socks removed  Right Foot/Ankle   Right Foot Inspection  Skin Exam: skin normal and skin intact no dry skin, no warmth, no callus, no erythema, no maceration, no abnormal color, no pre-ulcer, no ulcer and no callus                          Toe Exam: ROM and strength within normal limits  Sensory   Vibration: intact  Proprioception: intact   Monofilament testing: intact  Vascular  Capillary refills: < 3 seconds  The right DP pulse is 2+  The right PT pulse is 2+  Left Foot/Ankle  Left Foot Inspection  Skin Exam: skin normal and skin intactno dry skin, no warmth, no erythema, no maceration, normal color, no pre-ulcer, no ulcer and no callus                         Toe Exam: ROM and strength within normal limits                   Sensory   Vibration: intact  Proprioception: intact  Monofilament: intact  Vascular  Capillary refills: < 3 seconds  The left DP pulse is 2+  The left PT pulse is 2+  Assign Risk Category:  No deformity present; No loss of protective sensation;  No weak pulses       Risk: 0      Current Medications     Current Outpatient Medications:     albuterol (PROAIR HFA) 90 mcg/act inhaler, Inhale 2 puffs every 6 (six) hours as needed for wheezing, Disp: 5 Inhaler, Rfl: 5    betamethasone dipropionate (DIPROSONE) 0 05 % cream, Apply topically 2 (two) times a day, Disp: 30 g, Rfl: 0    budesonide-formoterol (SYMBICORT) 160-4 5 mcg/act inhaler, Inhale 2 puffs 2 (two) times a day Rinse mouth after use , Disp: 3 Inhaler, Rfl: 1    co-enzyme Q-10 30 mg, Take 30 mg by mouth, Disp: , Rfl:     fenofibrate (TRICOR) 145 mg tablet, Take 1 tablet (145 mg total) by mouth daily, Disp: 90 tablet, Rfl: 1    lisinopril-hydrochlorothiazide (PRINZIDE,ZESTORETIC) 20-25 MG per tablet, Take 1 tablet by mouth daily, Disp: 90 tablet, Rfl: 1    metFORMIN (GLUCOPHAGE) 1000 MG tablet, Take 1 tablet (1,000 mg total) by mouth 2 (two) times a day with meals, Disp: 180 tablet, Rfl: 1    metoprolol succinate (TOPROL-XL) 100 mg 24 hr tablet, Take 1 tablet (100 mg total) by mouth daily, Disp: 90 tablet, Rfl: 1    multivitamin (THERAGRAN) TABS, Take 1 tablet by mouth, Disp: , Rfl:   omega-3-acid ethyl esters (LOVAZA) 1 g capsule, Take 2 capsules (2 g total) by mouth 2 (two) times a day, Disp: 360 capsule, Rfl: 1    varenicline (CHANTIX) 1 mg tablet, Take 1 tablet (1 mg total) by mouth 2 (two) times a day, Disp: 60 tablet, Rfl: 3    Health Maintenance     Health Maintenance   Topic Date Due    Hepatitis C Screening  1960    DTaP,Tdap,and Td Vaccines (1 - Tdap) 11/21/1971    HIV Screening  11/21/1975    BMI: Followup Plan  11/21/1978    Diabetic Foot Exam  02/07/2019    DM Eye Exam  04/14/2019    Influenza Vaccine  07/01/2019    Hepatitis B Vaccine (1 of 3 - Risk 3-dose series) 12/27/2019 (Originally 11/21/1979)    HEMOGLOBIN A1C  05/19/2020    Depression Screening PHQ  11/27/2020    BMI: Adult  11/27/2020    CRC Screening: Colonoscopy  10/05/2022    Pneumococcal Vaccine: 65+ Years (1 of 2 - PCV13) 11/21/2025    Pneumococcal Vaccine: Pediatrics (0 to 5 Years) and At-Risk Patients (6 to 59 Years)  Completed    HIB Vaccine  Aged Out    IPV Vaccine  Aged Out    Hepatitis A Vaccine  Aged Out    Meningococcal ACWY Vaccine  Aged Out    HPV Vaccine  Aged [employer] History   Administered Date(s) Administered    INFLUENZA 11/01/2007, 10/20/2008, 10/15/2009, 10/30/2017, 10/17/2018    Influenza Quadrivalent, 6-35 Months IM 11/17/2015, 11/05/2016    Influenza TIV (IM) 11/20/2012, 10/01/2014    Pneumococcal Polysaccharide PPV23 07/31/2014       Jenn Herman DO  [facility]

## 2019-11-27 NOTE — ASSESSMENT & PLAN NOTE
Lab Results   Component Value Date    HGBA1C 6 7 (H) 11/19/2019   Hemoglobin A1c up to 6 7  Discussed the need for tighter control with diet and improved exercise habits  Continue metformin 1000 mg b i d

## 2020-02-25 ENCOUNTER — OFFICE VISIT (OUTPATIENT)
Dept: FAMILY MEDICINE CLINIC | Facility: CLINIC | Age: 60
End: 2020-02-25
Payer: COMMERCIAL

## 2020-02-25 VITALS
DIASTOLIC BLOOD PRESSURE: 80 MMHG | HEIGHT: 71 IN | RESPIRATION RATE: 16 BRPM | WEIGHT: 223 LBS | TEMPERATURE: 97.9 F | BODY MASS INDEX: 31.22 KG/M2 | OXYGEN SATURATION: 97 % | HEART RATE: 72 BPM | SYSTOLIC BLOOD PRESSURE: 158 MMHG

## 2020-02-25 DIAGNOSIS — N52.9 MALE ERECTILE DISORDER: Primary | ICD-10-CM

## 2020-02-25 DIAGNOSIS — N52.9 MALE ERECTILE DISORDER: ICD-10-CM

## 2020-02-25 PROCEDURE — 99213 OFFICE O/P EST LOW 20 MIN: CPT | Performed by: PHYSICIAN ASSISTANT

## 2020-02-25 PROCEDURE — 3079F DIAST BP 80-89 MM HG: CPT | Performed by: PHYSICIAN ASSISTANT

## 2020-02-25 PROCEDURE — 3077F SYST BP >= 140 MM HG: CPT | Performed by: PHYSICIAN ASSISTANT

## 2020-02-25 RX ORDER — VARDENAFIL HYDROCHLORIDE 5 MG/1
5 TABLET ORAL DAILY PRN
Qty: 10 TABLET | Refills: 0 | Status: SHIPPED | OUTPATIENT
Start: 2020-02-25 | End: 2021-09-14 | Stop reason: ALTCHOICE

## 2020-02-25 RX ORDER — SILDENAFIL 25 MG/1
25 TABLET, FILM COATED ORAL DAILY PRN
Qty: 10 TABLET | Refills: 0 | Status: SHIPPED | OUTPATIENT
Start: 2020-02-25 | End: 2020-02-25

## 2020-02-25 NOTE — PROGRESS NOTES
Assessment/Plan:    1  Male erectile disorder  Patient has several risk factors for erectile dysfunction  Patient has been a long time smoker  He is also a diabetic  Patient is also on blood pressure medications that may be contributing  Discussed with patient controlling his hypertension and diabetes as best as possible to prevent further issues  Discussed treatment options  Patient was agreeable to try Viagra  Discussed possible side effects  ER precaution for priapism  Patient's blood pressure is mildly elevated today  Follow-up in 1 month to recheck this and discussed any issues with Viagra  Patient Active Problem List   Diagnosis    DMII (diabetes mellitus, type 2) ([facility] [address] )    Hyperlipidemia    Hypertension    Mass of appendix       Subjective:      Patient ID: Jaylene Flanagan is a 61 y o  male  Patient is here complaining of difficulty getting an erection  It has been occurring for approximately 1 year  He did not want to admitted at first, but it would like to seek treatment options  States he is sometimes able to get partially erect, but not fully  Not able to penetrate  He is also not able to maintain an erection  Denies any possible causes  Denies any associated symptoms  Denies any scrotum or penile pain  Denies rashes  Denies penile discharge  Denies any urinary symptoms  The following portions of the patient's history were reviewed and updated as appropriate: allergies, current medications, past family history, past medical history, past social history, past surgical history and problem list     Review of Systems   Constitutional: Negative for chills, fatigue and fever  Eyes: Negative for visual disturbance  Respiratory: Negative for cough, chest tightness, shortness of breath and wheezing  Cardiovascular: Negative for chest pain, palpitations and leg swelling  Gastrointestinal: Negative for abdominal pain, constipation, diarrhea, nausea and vomiting  Genitourinary: Negative for decreased urine volume, difficulty urinating, discharge, dysuria, frequency, genital sores, hematuria, penile pain, penile swelling, scrotal swelling, testicular pain and urgency  Musculoskeletal: Negative for myalgias  Skin: Negative for rash  Neurological: Negative for dizziness, tremors, syncope, weakness, light-headedness, numbness and headaches  Hematological: Negative for adenopathy  Objective:      /80 (BP Location: Left arm, Patient Position: Sitting, Cuff Size: Large)   Pulse 72   Temp 97 9 °F (36 6 °C) (Tympanic)   Resp 16   Ht 5' 10 5" (1 791 m)   Wt 101 kg (223 lb)   SpO2 97%   BMI 31 54 kg/m²          Physical Exam   Constitutional: He is oriented to person, place, and time  He appears well-developed and well-nourished  HENT:   Head: Normocephalic and atraumatic  Eyes: Pupils are equal, round, and reactive to light  Conjunctivae are normal    Neck: Normal range of motion  Neck supple  Cardiovascular: Normal rate, regular rhythm, S1 normal, S2 normal, normal heart sounds and intact distal pulses  Pulmonary/Chest: Effort normal and breath sounds normal    Abdominal: Soft  Normal appearance and bowel sounds are normal  He exhibits no mass  There is no hepatosplenomegaly  There is no tenderness  Genitourinary:   Genitourinary Comments: Patient refuses exam   Musculoskeletal: Normal range of motion  Lymphadenopathy:     He has no cervical adenopathy  Neurological: He is alert and oriented to person, place, and time  Skin: Skin is warm, dry and intact  No rash noted  Psychiatric: He has a normal mood and affect  His behavior is normal  Judgment and thought content normal    Nursing note and vitals reviewed          Current Outpatient Medications on File Prior to Visit   Medication Sig Dispense Refill    albuterol (PROAIR HFA) 90 mcg/act inhaler Inhale 2 puffs every 6 (six) hours as needed for wheezing 5 Inhaler 5    betamethasone dipropionate (DIPROSONE) 0 05 % cream Apply topically 2 (two) times a day 30 g 0    budesonide-formoterol (SYMBICORT) 160-4 5 mcg/act inhaler Inhale 2 puffs 2 (two) times a day Rinse mouth after use  3 Inhaler 1    co-enzyme Q-10 30 mg Take 30 mg by mouth      fenofibrate (TRICOR) 145 mg tablet Take 1 tablet (145 mg total) by mouth daily 90 tablet 1    lisinopril-hydrochlorothiazide (PRINZIDE,ZESTORETIC) 20-25 MG per tablet Take 1 tablet by mouth daily 90 tablet 1    metFORMIN (GLUCOPHAGE) 1000 MG tablet Take 1 tablet (1,000 mg total) by mouth 2 (two) times a day with meals 180 tablet 1    metoprolol succinate (TOPROL-XL) 100 mg 24 hr tablet Take 1 tablet (100 mg total) by mouth daily 90 tablet 1    multivitamin (THERAGRAN) TABS Take 1 tablet by mouth      omega-3-acid ethyl esters (LOVAZA) 1 g capsule Take 2 capsules (2 g total) by mouth 2 (two) times a day 360 capsule 1    varenicline (CHANTIX) 1 mg tablet Take 1 tablet (1 mg total) by mouth 2 (two) times a day 60 tablet 3     No current facility-administered medications on file prior to visit

## 2020-02-26 DIAGNOSIS — N52.9 MALE ERECTILE DISORDER: Primary | ICD-10-CM

## 2020-02-26 RX ORDER — SILDENAFIL 50 MG/1
TABLET, FILM COATED ORAL
Qty: 20 TABLET | Refills: 0 | Status: SHIPPED | OUTPATIENT
Start: 2020-02-26 | End: 2020-07-06 | Stop reason: SDUPTHER

## 2020-02-27 RX ORDER — SILDENAFIL 25 MG/1
TABLET, FILM COATED ORAL
Refills: 0 | OUTPATIENT
Start: 2020-02-27

## 2020-04-21 DIAGNOSIS — I10 HYPERTENSION, UNSPECIFIED TYPE: ICD-10-CM

## 2020-04-21 DIAGNOSIS — E78.5 HYPERLIPIDEMIA, UNSPECIFIED HYPERLIPIDEMIA TYPE: ICD-10-CM

## 2020-04-21 DIAGNOSIS — E11.9 TYPE 2 DIABETES MELLITUS WITHOUT COMPLICATION, WITHOUT LONG-TERM CURRENT USE OF INSULIN (HCC): ICD-10-CM

## 2020-04-22 RX ORDER — METOPROLOL SUCCINATE 100 MG/1
TABLET, EXTENDED RELEASE ORAL
Qty: 90 TABLET | Refills: 1 | Status: SHIPPED | OUTPATIENT
Start: 2020-04-22 | End: 2020-11-15 | Stop reason: SDUPTHER

## 2020-04-22 RX ORDER — LISINOPRIL AND HYDROCHLOROTHIAZIDE 25; 20 MG/1; MG/1
TABLET ORAL
Qty: 90 TABLET | Refills: 1 | Status: SHIPPED | OUTPATIENT
Start: 2020-04-22 | End: 2020-11-15 | Stop reason: SDUPTHER

## 2020-04-22 RX ORDER — OMEGA-3-ACID ETHYL ESTERS 1 G/1
CAPSULE, LIQUID FILLED ORAL
Qty: 360 CAPSULE | Refills: 1 | Status: SHIPPED | OUTPATIENT
Start: 2020-04-22 | End: 2021-03-02 | Stop reason: SDUPTHER

## 2020-04-22 RX ORDER — FENOFIBRATE 145 MG/1
TABLET, COATED ORAL
Qty: 90 TABLET | Refills: 1 | Status: SHIPPED | OUTPATIENT
Start: 2020-04-22 | End: 2020-11-15 | Stop reason: SDUPTHER

## 2020-06-18 ENCOUNTER — APPOINTMENT (OUTPATIENT)
Dept: LAB | Facility: CLINIC | Age: 60
End: 2020-06-18
Payer: COMMERCIAL

## 2020-06-18 DIAGNOSIS — E11.9 TYPE 2 DIABETES MELLITUS WITHOUT COMPLICATION, WITHOUT LONG-TERM CURRENT USE OF INSULIN (HCC): ICD-10-CM

## 2020-06-18 DIAGNOSIS — E78.5 HYPERLIPIDEMIA, UNSPECIFIED HYPERLIPIDEMIA TYPE: ICD-10-CM

## 2020-06-18 DIAGNOSIS — Z11.59 ENCOUNTER FOR HEPATITIS C SCREENING TEST FOR LOW RISK PATIENT: ICD-10-CM

## 2020-06-18 DIAGNOSIS — Z12.5 SCREENING FOR PROSTATE CANCER: ICD-10-CM

## 2020-06-18 DIAGNOSIS — Z11.4 SCREENING FOR HIV (HUMAN IMMUNODEFICIENCY VIRUS): ICD-10-CM

## 2020-06-18 LAB
ALBUMIN SERPL BCP-MCNC: 4.1 G/DL (ref 3.5–5)
ALP SERPL-CCNC: 64 U/L (ref 46–116)
ALT SERPL W P-5'-P-CCNC: 38 U/L (ref 12–78)
ANION GAP SERPL CALCULATED.3IONS-SCNC: 7 MMOL/L (ref 4–13)
AST SERPL W P-5'-P-CCNC: 22 U/L (ref 5–45)
BILIRUB SERPL-MCNC: 1.17 MG/DL (ref 0.2–1)
BUN SERPL-MCNC: 15 MG/DL (ref 5–25)
CALCIUM SERPL-MCNC: 10 MG/DL (ref 8.3–10.1)
CHLORIDE SERPL-SCNC: 111 MMOL/L (ref 100–108)
CHOLEST SERPL-MCNC: 179 MG/DL (ref 50–200)
CO2 SERPL-SCNC: 22 MMOL/L (ref 21–32)
CREAT SERPL-MCNC: 0.9 MG/DL (ref 0.6–1.3)
EST. AVERAGE GLUCOSE BLD GHB EST-MCNC: 120 MG/DL
GFR SERPL CREATININE-BSD FRML MDRD: 93 ML/MIN/1.73SQ M
GLUCOSE P FAST SERPL-MCNC: 114 MG/DL (ref 65–99)
HBA1C MFR BLD: 5.8 %
HCV AB SER QL: NORMAL
HDLC SERPL-MCNC: 40 MG/DL
LDLC SERPL CALC-MCNC: 90 MG/DL (ref 0–100)
POTASSIUM SERPL-SCNC: 4.1 MMOL/L (ref 3.5–5.3)
PROT SERPL-MCNC: 7.8 G/DL (ref 6.4–8.2)
PSA SERPL-MCNC: 0.6 NG/ML (ref 0–4)
SODIUM SERPL-SCNC: 140 MMOL/L (ref 136–145)
TRIGL SERPL-MCNC: 244 MG/DL
TSH SERPL DL<=0.05 MIU/L-ACNC: 1.99 UIU/ML (ref 0.36–3.74)

## 2020-06-18 PROCEDURE — 87389 HIV-1 AG W/HIV-1&-2 AB AG IA: CPT

## 2020-06-18 PROCEDURE — 3044F HG A1C LEVEL LT 7.0%: CPT | Performed by: FAMILY MEDICINE

## 2020-06-18 PROCEDURE — 80053 COMPREHEN METABOLIC PANEL: CPT

## 2020-06-18 PROCEDURE — 80061 LIPID PANEL: CPT

## 2020-06-18 PROCEDURE — 83036 HEMOGLOBIN GLYCOSYLATED A1C: CPT

## 2020-06-18 PROCEDURE — 84443 ASSAY THYROID STIM HORMONE: CPT

## 2020-06-18 PROCEDURE — 86803 HEPATITIS C AB TEST: CPT

## 2020-06-18 PROCEDURE — G0103 PSA SCREENING: HCPCS

## 2020-06-18 PROCEDURE — 36415 COLL VENOUS BLD VENIPUNCTURE: CPT

## 2020-06-19 LAB — HIV 1+2 AB+HIV1 P24 AG SERPL QL IA: NORMAL

## 2020-07-06 DIAGNOSIS — N52.9 MALE ERECTILE DISORDER: ICD-10-CM

## 2020-07-06 RX ORDER — SILDENAFIL 50 MG/1
TABLET, FILM COATED ORAL
Qty: 20 TABLET | Refills: 0 | Status: SHIPPED | OUTPATIENT
Start: 2020-07-06 | End: 2020-07-22 | Stop reason: SDUPTHER

## 2020-07-22 ENCOUNTER — OFFICE VISIT (OUTPATIENT)
Dept: FAMILY MEDICINE CLINIC | Facility: CLINIC | Age: 60
End: 2020-07-22
Payer: COMMERCIAL

## 2020-07-22 VITALS
WEIGHT: 214.8 LBS | OXYGEN SATURATION: 96 % | BODY MASS INDEX: 31.81 KG/M2 | DIASTOLIC BLOOD PRESSURE: 78 MMHG | HEART RATE: 62 BPM | SYSTOLIC BLOOD PRESSURE: 136 MMHG | TEMPERATURE: 97.6 F | HEIGHT: 69 IN

## 2020-07-22 DIAGNOSIS — J41.0 SIMPLE CHRONIC BRONCHITIS (HCC): ICD-10-CM

## 2020-07-22 DIAGNOSIS — N52.9 MALE ERECTILE DISORDER: ICD-10-CM

## 2020-07-22 DIAGNOSIS — E78.5 HYPERLIPIDEMIA, UNSPECIFIED HYPERLIPIDEMIA TYPE: ICD-10-CM

## 2020-07-22 DIAGNOSIS — I10 ESSENTIAL HYPERTENSION: ICD-10-CM

## 2020-07-22 DIAGNOSIS — E11.9 TYPE 2 DIABETES MELLITUS WITHOUT COMPLICATION, WITHOUT LONG-TERM CURRENT USE OF INSULIN (HCC): Primary | ICD-10-CM

## 2020-07-22 PROCEDURE — 3044F HG A1C LEVEL LT 7.0%: CPT | Performed by: FAMILY MEDICINE

## 2020-07-22 PROCEDURE — 3075F SYST BP GE 130 - 139MM HG: CPT | Performed by: FAMILY MEDICINE

## 2020-07-22 PROCEDURE — 3078F DIAST BP <80 MM HG: CPT | Performed by: FAMILY MEDICINE

## 2020-07-22 PROCEDURE — 3008F BODY MASS INDEX DOCD: CPT | Performed by: FAMILY MEDICINE

## 2020-07-22 PROCEDURE — 99214 OFFICE O/P EST MOD 30 MIN: CPT | Performed by: FAMILY MEDICINE

## 2020-07-22 RX ORDER — SILDENAFIL 100 MG/1
TABLET, FILM COATED ORAL
Qty: 20 TABLET | Refills: 2 | Status: SHIPPED | OUTPATIENT
Start: 2020-07-22 | End: 2021-03-02 | Stop reason: SDUPTHER

## 2020-07-22 RX ORDER — BUDESONIDE AND FORMOTEROL FUMARATE DIHYDRATE 160; 4.5 UG/1; UG/1
2 AEROSOL RESPIRATORY (INHALATION) 2 TIMES DAILY
Qty: 3 INHALER | Refills: 1 | Status: SHIPPED | OUTPATIENT
Start: 2020-07-22 | End: 2021-03-02 | Stop reason: SDUPTHER

## 2020-07-22 NOTE — PROGRESS NOTES
[facility]      NAME: Westley Deutsch  AGE: 61 y o  SEX: male  : 1960   MRN: [id number]    DATE: 2020  TIME: 11:56 AM    Assessment and Plan     Problem List Items Addressed This Visit     Hypertension       Well controlled on lisinopril hydrochlorothiazide and metoprolol  Hyperlipidemia       Lipids stable with LDL less than 100 and HDL greater than 40  Continue fenofibrate  Relevant Orders    Comprehensive metabolic panel    Lipid Panel with Direct LDL reflex    TSH, 3rd generation    DMII (diabetes mellitus, type 2) ([facility] [address] ) - Primary       Lab Results   Component Value Date    HGBA1C 5 8 (H) 2020     Improvement in diabetic control with hemoglobin A1c down to 5 8  Continue current regimen of metformin  Relevant Orders    Hemoglobin A1c (w/out EAG)      Other Visit Diagnoses     Simple chronic bronchitis (HCC)        Relevant Medications    budesonide-formoterol (Symbicort) 160-4 5 mcg/act inhaler    Male erectile disorder        Relevant Medications    sildenafil (VIAGRA) 100 mg tablet              Return to office in:   Six-month    Chief Complaint     Chief Complaint   Patient presents with    Follow-up     DM2, hypertension, hyperlipidemia       History of Present Illness     Patient was seen for routine follow-up of chronic medical problems  He is being treated for hypertension, hyperlipidemia and type 2 diabetes  Medications include metformin for his diabetes  He takes fenofibrate for his lipids  He is on lisinopril hydrochlorothiazide and metoprolol for his blood pressure  He also uses Symbicort inhaler and albuterol for chronic bronchitis  The following portions of the patient's history were reviewed and updated as appropriate: allergies, current medications, past family history, past medical history, past social history, past surgical history and problem list     Review of Systems   Review of Systems   Constitutional: Negative  Respiratory: Negative  Cardiovascular: Negative  Gastrointestinal: Negative  Genitourinary: Negative  Musculoskeletal: Negative  Psychiatric/Behavioral: Negative  Active Problem List     Patient Active Problem List   Diagnosis    DMII (diabetes mellitus, type 2) ([facility] [address] )    Hyperlipidemia    Hypertension    Mass of appendix       Objective   /78 (BP Location: Left arm, Patient Position: Sitting, Cuff Size: Adult)   Pulse 62   Temp 97 6 °F (36 4 °C)   Ht 5' 9 29" (1 76 m)   Wt 97 4 kg (214 lb 12 8 oz)   SpO2 96%   BMI 31 45 kg/m²     Physical Exam   Constitutional: He is oriented to person, place, and time  He appears well-developed and well-nourished  No distress  HENT:   Head: Normocephalic and atraumatic  Eyes: Pupils are equal, round, and reactive to light  Conjunctivae are normal  Right eye exhibits no discharge  Neck: Normal range of motion  No thyromegaly present  Cardiovascular: Normal rate and regular rhythm  Pulmonary/Chest: Effort normal and breath sounds normal  No respiratory distress  Lymphadenopathy:     He has no cervical adenopathy  Neurological: He is alert and oriented to person, place, and time  Skin: Skin is warm and dry  He is not diaphoretic  Psychiatric: He has a normal mood and affect  His behavior is normal  Judgment and thought content normal    Nursing note and vitals reviewed          Current Medications     Current Outpatient Medications:     albuterol (PROAIR HFA) 90 mcg/act inhaler, Inhale 2 puffs every 6 (six) hours as needed for wheezing, Disp: 5 Inhaler, Rfl: 5    betamethasone dipropionate (DIPROSONE) 0 05 % cream, Apply topically 2 (two) times a day, Disp: 30 g, Rfl: 0    budesonide-formoterol (Symbicort) 160-4 5 mcg/act inhaler, Inhale 2 puffs 2 (two) times a day Rinse mouth after use , Disp: 3 Inhaler, Rfl: 1    co-enzyme Q-10 30 mg, Take 30 mg by mouth, Disp: , Rfl:     fenofibrate (TRICOR) 145 mg tablet, TAKE 1 TABLET DAILY, Disp: 90 tablet, Rfl: 1    lisinopril-hydrochlorothiazide (PRINZIDE,ZESTORETIC) 20-25 MG per tablet, TAKE 1 TABLET DAILY, Disp: 90 tablet, Rfl: 1    metFORMIN (GLUCOPHAGE) 1000 MG tablet, TAKE 1 TABLET TWICE DAILY  WITH MEALS, Disp: 180 tablet, Rfl: 1    metoprolol succinate (TOPROL-XL) 100 mg 24 hr tablet, TAKE 1 TABLET DAILY, Disp: 90 tablet, Rfl: 1    multivitamin (THERAGRAN) TABS, Take 1 tablet by mouth, Disp: , Rfl:     omega-3-acid ethyl esters (LOVAZA) 1 g capsule, TAKE 2 CAPSULES (2 GRAMS   TOTAL) TWICE DAILY, Disp: 360 capsule, Rfl: 1    sildenafil (VIAGRA) 100 mg tablet, Take one tablet daily as needed, Disp: 20 tablet, Rfl: 2    vardenafil (LEVITRA) 5 MG tablet, Take 1 tablet (5 mg total) by mouth daily as needed for erectile dysfunction (Take approximately 1 hour before activity) (Patient not taking: Reported on 7/22/2020), Disp: 10 tablet, Rfl: 0    varenicline (CHANTIX) 1 mg tablet, Take 1 tablet (1 mg total) by mouth 2 (two) times a day (Patient not taking: Reported on 7/22/2020), Disp: 60 tablet, Rfl: 3    Health Maintenance     Health Maintenance   Topic Date Due    DTaP,Tdap,and Td Vaccines (1 - Tdap) 11/21/1971    Annual Physical  11/21/1978    DM Eye Exam  04/14/2019    Influenza Vaccine  07/01/2020    Depression Screening PHQ  11/27/2020    BMI: Followup Plan  11/27/2020    Diabetic Foot Exam  11/27/2020    HEMOGLOBIN A1C  12/18/2020    BMI: Adult  07/22/2021    CRC Screening: Colonoscopy  10/05/2022    Pneumococcal Vaccine: 65+ Years (1 of 2 - PCV13) 11/21/2025    HIV Screening  Completed    Hepatitis C Screening  Completed    Pneumococcal Vaccine: Pediatrics (0 to 5 Years) and At-Risk Patients (6 to 59 Years)  Completed    HIB Vaccine  Aged Out    Hepatitis B Vaccine  Aged Out    IPV Vaccine  Aged Out    Hepatitis A Vaccine  Aged Out    Meningococcal ACWY Vaccine  Aged Out    HPV Vaccine  Aged [employer] History   Administered Date(s) Administered  INFLUENZA 11/01/2007, 10/20/2008, 10/15/2009, 10/30/2017, 10/17/2018    Influenza Quadrivalent, 6-35 Months IM 11/17/2015, 11/05/2016    Influenza TIV (IM) 11/20/2012, 10/01/2014    Influenza, recombinant, quadrivalent,injectable, preservative free 11/27/2019    Pneumococcal Polysaccharide PPV23 07/31/2014       Enriqueta Healy DO  [facility]

## 2020-07-22 NOTE — ASSESSMENT & PLAN NOTE
Lab Results   Component Value Date    HGBA1C 5 8 (H) 06/18/2020     Improvement in diabetic control with hemoglobin A1c down to 5 8  Continue current regimen of metformin

## 2020-11-14 DIAGNOSIS — I10 HYPERTENSION, UNSPECIFIED TYPE: ICD-10-CM

## 2020-11-14 DIAGNOSIS — E78.5 HYPERLIPIDEMIA, UNSPECIFIED HYPERLIPIDEMIA TYPE: ICD-10-CM

## 2020-11-14 DIAGNOSIS — E11.9 TYPE 2 DIABETES MELLITUS WITHOUT COMPLICATION, WITHOUT LONG-TERM CURRENT USE OF INSULIN (HCC): ICD-10-CM

## 2020-11-15 RX ORDER — LISINOPRIL AND HYDROCHLOROTHIAZIDE 25; 20 MG/1; MG/1
TABLET ORAL
Qty: 90 TABLET | Refills: 1 | Status: SHIPPED | OUTPATIENT
Start: 2020-11-15 | End: 2021-03-02 | Stop reason: SDUPTHER

## 2020-11-15 RX ORDER — FENOFIBRATE 145 MG/1
TABLET, COATED ORAL
Qty: 90 TABLET | Refills: 1 | Status: SHIPPED | OUTPATIENT
Start: 2020-11-15 | End: 2021-03-02 | Stop reason: SDUPTHER

## 2020-11-15 RX ORDER — METOPROLOL SUCCINATE 100 MG/1
TABLET, EXTENDED RELEASE ORAL
Qty: 90 TABLET | Refills: 1 | Status: SHIPPED | OUTPATIENT
Start: 2020-11-15 | End: 2021-03-02 | Stop reason: SDUPTHER

## 2020-11-16 ENCOUNTER — OFFICE VISIT (OUTPATIENT)
Dept: OBGYN CLINIC | Facility: MEDICAL CENTER | Age: 60
End: 2020-11-16
Payer: COMMERCIAL

## 2020-11-16 VITALS
HEIGHT: 70 IN | TEMPERATURE: 98.1 F | HEART RATE: 60 BPM | DIASTOLIC BLOOD PRESSURE: 79 MMHG | WEIGHT: 220 LBS | BODY MASS INDEX: 31.5 KG/M2 | SYSTOLIC BLOOD PRESSURE: 145 MMHG

## 2020-11-16 DIAGNOSIS — S83.242A OTHER TEAR OF MEDIAL MENISCUS, CURRENT INJURY, LEFT KNEE, INITIAL ENCOUNTER: Primary | ICD-10-CM

## 2020-11-16 PROCEDURE — 99243 OFF/OP CNSLTJ NEW/EST LOW 30: CPT | Performed by: ORTHOPAEDIC SURGERY

## 2020-11-23 ENCOUNTER — HOSPITAL ENCOUNTER (OUTPATIENT)
Dept: MRI IMAGING | Facility: HOSPITAL | Age: 60
Discharge: HOME | End: 2020-11-23
Payer: COMMERCIAL

## 2020-11-23 DIAGNOSIS — S83.242A OTHER TEAR OF MEDIAL MENISCUS, CURRENT INJURY, LEFT KNEE, INITIAL ENCOUNTER: ICD-10-CM

## 2020-11-23 PROCEDURE — 73721 MRI JNT OF LWR EXTRE W/O DYE: CPT

## 2020-11-23 PROCEDURE — G1004 CDSM NDSC: HCPCS

## 2020-11-30 ENCOUNTER — OFFICE VISIT (OUTPATIENT)
Dept: OBGYN CLINIC | Facility: MEDICAL CENTER | Age: 60
End: 2020-11-30
Payer: COMMERCIAL

## 2020-11-30 ENCOUNTER — APPOINTMENT (OUTPATIENT)
Dept: RADIOLOGY | Facility: MEDICAL CENTER | Age: 60
End: 2020-11-30
Payer: COMMERCIAL

## 2020-11-30 VITALS
HEIGHT: 70 IN | HEART RATE: 69 BPM | DIASTOLIC BLOOD PRESSURE: 69 MMHG | BODY MASS INDEX: 31.5 KG/M2 | SYSTOLIC BLOOD PRESSURE: 144 MMHG | WEIGHT: 220 LBS | TEMPERATURE: 98 F

## 2020-11-30 DIAGNOSIS — S83.242A OTHER TEAR OF MEDIAL MENISCUS, CURRENT INJURY, LEFT KNEE, INITIAL ENCOUNTER: ICD-10-CM

## 2020-11-30 DIAGNOSIS — Z01.89 ENCOUNTER FOR LOWER EXTREMITY COMPARISON IMAGING STUDY: ICD-10-CM

## 2020-11-30 DIAGNOSIS — M17.12 PRIMARY OSTEOARTHRITIS OF LEFT KNEE: Primary | ICD-10-CM

## 2020-11-30 PROCEDURE — 73564 X-RAY EXAM KNEE 4 OR MORE: CPT

## 2020-11-30 PROCEDURE — 3077F SYST BP >= 140 MM HG: CPT | Performed by: ORTHOPAEDIC SURGERY

## 2020-11-30 PROCEDURE — 99213 OFFICE O/P EST LOW 20 MIN: CPT | Performed by: ORTHOPAEDIC SURGERY

## 2020-11-30 PROCEDURE — 3078F DIAST BP <80 MM HG: CPT | Performed by: ORTHOPAEDIC SURGERY

## 2020-11-30 PROCEDURE — 20610 DRAIN/INJ JOINT/BURSA W/O US: CPT | Performed by: ORTHOPAEDIC SURGERY

## 2020-11-30 PROCEDURE — 3008F BODY MASS INDEX DOCD: CPT | Performed by: ORTHOPAEDIC SURGERY

## 2020-11-30 PROCEDURE — 73562 X-RAY EXAM OF KNEE 3: CPT

## 2020-11-30 RX ORDER — METHYLPREDNISOLONE ACETATE 40 MG/ML
1 INJECTION, SUSPENSION INTRA-ARTICULAR; INTRALESIONAL; INTRAMUSCULAR; SOFT TISSUE
Status: COMPLETED | OUTPATIENT
Start: 2020-11-30 | End: 2020-11-30

## 2020-11-30 RX ORDER — LIDOCAINE HYDROCHLORIDE 10 MG/ML
3 INJECTION, SOLUTION INFILTRATION; PERINEURAL
Status: COMPLETED | OUTPATIENT
Start: 2020-11-30 | End: 2020-11-30

## 2020-11-30 RX ADMIN — METHYLPREDNISOLONE ACETATE 1 ML: 40 INJECTION, SUSPENSION INTRA-ARTICULAR; INTRALESIONAL; INTRAMUSCULAR; SOFT TISSUE at 13:36

## 2020-11-30 RX ADMIN — LIDOCAINE HYDROCHLORIDE 3 ML: 10 INJECTION, SOLUTION INFILTRATION; PERINEURAL at 13:36

## 2020-12-01 ENCOUNTER — TELEPHONE (OUTPATIENT)
Dept: OBGYN CLINIC | Facility: HOSPITAL | Age: 60
End: 2020-12-01

## 2021-01-18 ENCOUNTER — OFFICE VISIT (OUTPATIENT)
Dept: OBGYN CLINIC | Facility: MEDICAL CENTER | Age: 61
End: 2021-01-18
Payer: COMMERCIAL

## 2021-01-18 VITALS
SYSTOLIC BLOOD PRESSURE: 158 MMHG | DIASTOLIC BLOOD PRESSURE: 84 MMHG | BODY MASS INDEX: 31.58 KG/M2 | HEIGHT: 70 IN | HEART RATE: 96 BPM | WEIGHT: 220.6 LBS

## 2021-01-18 DIAGNOSIS — S83.242A OTHER TEAR OF MEDIAL MENISCUS, CURRENT INJURY, LEFT KNEE, INITIAL ENCOUNTER: ICD-10-CM

## 2021-01-18 DIAGNOSIS — M17.12 PRIMARY OSTEOARTHRITIS OF LEFT KNEE: Primary | ICD-10-CM

## 2021-01-18 PROCEDURE — 3008F BODY MASS INDEX DOCD: CPT | Performed by: ORTHOPAEDIC SURGERY

## 2021-01-18 PROCEDURE — 3079F DIAST BP 80-89 MM HG: CPT | Performed by: ORTHOPAEDIC SURGERY

## 2021-01-18 PROCEDURE — 3077F SYST BP >= 140 MM HG: CPT | Performed by: ORTHOPAEDIC SURGERY

## 2021-01-18 PROCEDURE — 99213 OFFICE O/P EST LOW 20 MIN: CPT | Performed by: ORTHOPAEDIC SURGERY

## 2021-01-18 NOTE — PROGRESS NOTES
Ortho Sports Medicine Knee Follow Up Visit     Assesment:     61 y o  male left knee severe medial compartment OA    Plan:    Conservative treatment:    Ice to knee for 20 minutes at least 1-2 times daily  PT for left knee   Work note written to allow him to wear knee brace    Imaging: All imaging from today was reviewed by myself and explained to the patient  Injection:    A viscosupplementation injection was ordered and will be given at a future visit  Surgery:     No surgery is recommended at this point, continue with conservative treatment plan as noted  Follow up:    No follow-ups on file  Chief Complaint   Patient presents with    Left Knee - Follow-up     History of Present Illness: The patient is returns for follow up of  left knee severe medial compartment OA  Since the prior visit, He reports minimal improvement  He states that injections give him some relief and the un- brace helps him  Pain is located anterior, medial      Pain is improved by rest, ice, NSAIDS and injection  Pain is aggravated by stairs, squatting, weight bearing and walking  Symptoms include clicking, catching, popping, cracking and swelling  The patient has tried rest, ice, NSAIDS, bracing and injection          Knee Surgical History:  None    Past Medical, Social and Family History:  Past Medical History:   Diagnosis Date    Atopic dermatitis     Condition not found     Neoplasm of [place] D[place]    Hyperlipemia     Hypertension     Lateral epicondylitis, left elbow      Past Surgical History:   Procedure Laterality Date    KNEE ARTHROSCOPY       Allergies   Allergen Reactions    Sulfamethoxazole-Trimethoprim Nausea Only and GI Intolerance     Current Outpatient Medications on File Prior to Visit   Medication Sig Dispense Refill    albuterol (PROAIR HFA) 90 mcg/act inhaler Inhale 2 puffs every 6 (six) hours as needed for wheezing 5 Inhaler 5    betamethasone dipropionate (DIPROSONE) 0 05 % cream Apply topically 2 (two) times a day 30 g 0    budesonide-formoterol (Symbicort) 160-4 5 mcg/act inhaler Inhale 2 puffs 2 (two) times a day Rinse mouth after use  3 Inhaler 1    co-enzyme Q-10 30 mg Take 30 mg by mouth      fenofibrate (TRICOR) 145 mg tablet TAKE 1 TABLET DAILY 90 tablet 1    lisinopril-hydrochlorothiazide (PRINZIDE,ZESTORETIC) 20-25 MG per tablet TAKE 1 TABLET DAILY 90 tablet 1    metFORMIN (GLUCOPHAGE) 1000 MG tablet TAKE 1 TABLET TWICE DAILY  WITH MEALS 180 tablet 1    metoprolol succinate (TOPROL-XL) 100 mg 24 hr tablet TAKE 1 TABLET DAILY 90 tablet 1    multivitamin (THERAGRAN) TABS Take 1 tablet by mouth      omega-3-acid ethyl esters (LOVAZA) 1 g capsule TAKE 2 CAPSULES (2 GRAMS   TOTAL) TWICE DAILY 360 capsule 1    sildenafil (VIAGRA) 100 mg tablet Take one tablet daily as needed 20 tablet 2    vardenafil (LEVITRA) 5 MG tablet Take 1 tablet (5 mg total) by mouth daily as needed for erectile dysfunction (Take approximately 1 hour before activity) 10 tablet 0    varenicline (CHANTIX) 1 mg tablet Take 1 tablet (1 mg total) by mouth 2 (two) times a day 60 tablet 3    [DISCONTINUED] fenofibrate (TRICOR) 145 mg tablet TAKE 1 TABLET DAILY 90 tablet 1    [DISCONTINUED] lisinopril-hydrochlorothiazide (PRINZIDE,ZESTORETIC) 20-25 MG per tablet TAKE 1 TABLET DAILY 90 tablet 1    [DISCONTINUED] metFORMIN (GLUCOPHAGE) 1000 MG tablet TAKE 1 TABLET TWICE DAILY  WITH MEALS 180 tablet 1    [DISCONTINUED] metoprolol succinate (TOPROL-XL) 100 mg 24 hr tablet TAKE 1 TABLET DAILY 90 tablet 1     No current facility-administered medications on file prior to visit        Social History     Socioeconomic History    Marital status: /Civil Union     Spouse name: Not on file    Number of children: Not on file    Years of education: Not on file    Highest education level: Not on file   Occupational History    Not on file   Social Needs    Financial resource strain: Not on file   [place] insecurity     Worry: Not on file     Inability: Not on file    Transportation needs     Medical: Not on file     Non-medical: Not on file   Tobacco Use    Smoking status: Current Every Day Smoker     Packs/day: 0 25    Smokeless tobacco: Never Used   Substance and Sexual Activity    Alcohol use: Yes     Alcohol/week: 2 0 standard drinks     Types: 2 Cans of beer per week     Frequency: 4 or more times a week     Drinks per session: 1 or 2     Comment: Social    Drug use: Not on file    Sexual activity: Not on file   Lifestyle    Physical activity     Days per week: Not on file     Minutes per session: Not on file    Stress: Not on file   Relationships    Social connections     Talks on phone: Not on file     Gets together: Not on file     Attends [employer]ism service: Not on file     Active member of club or organization: Not on file     Attends meetings of clubs or organizations: Not on file     Relationship status: Not on file    Intimate partner violence     Fear of current or ex partner: Not on file     Emotionally abused: Not on file     Physically abused: Not on file     Forced sexual activity: Not on file   Other Topics Concern    Not on file   Social History Narrative    Not on file     I have reviewed the past medical, surgical, social and family history, medications and allergies as documented in the EMR  Review of systems: ROS is negative other than that noted in the HPI  Constitutional: Negative for fatigue and fever  Physical Exam:    Blood pressure 158/84, pulse 96, height 5' 10" (1 778 m), weight 100 kg (220 lb 9 6 oz)      General/Constitutional: NAD, well developed, well nourished  HENT: Normocephalic, atraumatic  CV: Intact distal pulses, regular rate  Resp: No respiratory distress or labored breathing  Lymphatic: No lymphadenopathy palpated  Neuro: Alert and Oriented x 3, no focal deficits  Psych: Normal mood, normal affect, normal judgement, normal behavior  Skin: Warm, dry, no rashes, no erythema    Knee Exam (focused): RIGHT LEFT   ROM:   0-130 0-130   Palpation: Effusion negative negative     MJL tenderness Negative Positive     LJL tenderness Negative Negative   Meniscus:  Mitesh Negative Negative    Apley's Compression Negative Negative   Instability: Varus stable stable     Valgus stable stable   Special Tests: Lachman Negative Negative     Posterior drawer Negative Negative     Anterior drawer Negative Negative     Pivot shift not tested not tested     Dial not tested not tested   Patella: Palpation no tenderness no tenderness     Mobility 1/4 1/4     Apprehension Negative Negative   Other: Single leg 1/4 squat not tested not tested      LE NV Exam: +2 DP/PT pulses bilaterally  Sensation intact to light touch L2-S1 bilaterally    No calf tenderness to palpation bilaterally      Knee Imaging    No imaging was performed today      Scribe Attestation    I,:  Nathaniel Wheat am acting as a scribe while in the presence of the attending physician :       I,:  Shantelle Mcconnell DO personally performed the services described in this documentation    as scribed in my presence :

## 2021-01-18 NOTE — LETTER
January 18, 2021     Patient: Krystina Kaye   YOB: 1960   Date of Visit: 1/18/2021       To Whom it May Concern: Flaco Sauer is under my professional care  He was seen in my office on 1/18/2021  Please let patient starting today 01/18/2021 to wear a knee brace on the left knee for the next year until 01/18/2022  If you have any questions or concerns, please don't hesitate to call           Sincerely,          Wendy Dupree DO        CC: Krystina Kaye

## 2021-01-18 NOTE — LETTER
January 18, 2021     Patient: Mirtha Rivera   YOB: 1960   Date of Visit: 1/18/2021       To Whom it May Concern: Jennifer Escamilla is under my professional care  He was seen in my office on 1/18/2021  Please allow patient to wear the knee brace on the left knee until 1/18/2022  If you have any questions or concerns, please don't hesitate to call           Sincerely,          Mick Mcgovern DO        CC: No Recipients

## 2021-01-25 ENCOUNTER — VBI (OUTPATIENT)
Dept: ADMINISTRATIVE | Facility: OTHER | Age: 61
End: 2021-01-25

## 2021-02-11 ENCOUNTER — TELEPHONE (OUTPATIENT)
Dept: FAMILY MEDICINE CLINIC | Facility: CLINIC | Age: 61
End: 2021-02-11

## 2021-02-11 NOTE — TELEPHONE ENCOUNTER
LMOM reminding him he is overdue for diabetic check up and physical   Asked him to let me know if he has had a diabetic eye exam since 11/11/19- last done at [facility]  Reminded him to have fasting labs done prior to appt  Let us know if he needs lab orders sent to him or faxed to [facility]  He will be due for diabetic foot exam at next OV  Ok per CC

## 2021-02-24 LAB
ALBUMIN SERPL-MCNC: 4.5 G/DL (ref 3.6–5.1)
ALBUMIN/GLOB SERPL: 1.7 (CALC) (ref 1–2.5)
ALP SERPL-CCNC: 55 U/L (ref 35–144)
ALT SERPL-CCNC: 24 U/L (ref 9–46)
AST SERPL-CCNC: 20 U/L (ref 10–35)
BILIRUB SERPL-MCNC: 1.2 MG/DL (ref 0.2–1.2)
BUN SERPL-MCNC: 20 MG/DL (ref 7–25)
BUN/CREAT SERPL: ABNORMAL (CALC) (ref 6–22)
CALCIUM SERPL-MCNC: 10.3 MG/DL (ref 8.6–10.3)
CHLORIDE SERPL-SCNC: 103 MMOL/L (ref 98–110)
CHOLEST SERPL-MCNC: 187 MG/DL
CHOLEST/HDLC SERPL: 4.5 (CALC)
CO2 SERPL-SCNC: 24 MMOL/L (ref 20–32)
CREAT SERPL-MCNC: 0.94 MG/DL (ref 0.7–1.25)
GLOBULIN SER CALC-MCNC: 2.6 G/DL (CALC) (ref 1.9–3.7)
GLUCOSE SERPL-MCNC: 141 MG/DL (ref 65–99)
HBA1C MFR BLD: 5.8 % OF TOTAL HGB
HDLC SERPL-MCNC: 42 MG/DL
LDLC SERPL CALC-MCNC: 105 MG/DL (CALC)
NONHDLC SERPL-MCNC: 145 MG/DL (CALC)
POTASSIUM SERPL-SCNC: 4 MMOL/L (ref 3.5–5.3)
PROT SERPL-MCNC: 7.1 G/DL (ref 6.1–8.1)
SL AMB EGFR AFRICAN AMERICAN: 102 ML/MIN/1.73M2
SL AMB EGFR NON AFRICAN AMERICAN: 88 ML/MIN/1.73M2
SODIUM SERPL-SCNC: 138 MMOL/L (ref 135–146)
TRIGL SERPL-MCNC: 298 MG/DL
TSH SERPL-ACNC: 1.61 MIU/L (ref 0.4–4.5)

## 2021-02-24 PROCEDURE — 3044F HG A1C LEVEL LT 7.0%: CPT | Performed by: FAMILY MEDICINE

## 2021-03-02 ENCOUNTER — OFFICE VISIT (OUTPATIENT)
Dept: FAMILY MEDICINE CLINIC | Facility: CLINIC | Age: 61
End: 2021-03-02
Payer: COMMERCIAL

## 2021-03-02 VITALS
WEIGHT: 215.2 LBS | SYSTOLIC BLOOD PRESSURE: 144 MMHG | HEART RATE: 58 BPM | TEMPERATURE: 96.9 F | BODY MASS INDEX: 30.81 KG/M2 | OXYGEN SATURATION: 98 % | DIASTOLIC BLOOD PRESSURE: 82 MMHG | HEIGHT: 70 IN

## 2021-03-02 DIAGNOSIS — E78.5 HYPERLIPIDEMIA, UNSPECIFIED HYPERLIPIDEMIA TYPE: ICD-10-CM

## 2021-03-02 DIAGNOSIS — N52.9 MALE ERECTILE DISORDER: ICD-10-CM

## 2021-03-02 DIAGNOSIS — I10 HYPERTENSION, UNSPECIFIED TYPE: ICD-10-CM

## 2021-03-02 DIAGNOSIS — J41.0 SIMPLE CHRONIC BRONCHITIS (HCC): ICD-10-CM

## 2021-03-02 DIAGNOSIS — E11.9 TYPE 2 DIABETES MELLITUS WITHOUT COMPLICATION, WITHOUT LONG-TERM CURRENT USE OF INSULIN (HCC): ICD-10-CM

## 2021-03-02 PROCEDURE — 3079F DIAST BP 80-89 MM HG: CPT | Performed by: FAMILY MEDICINE

## 2021-03-02 PROCEDURE — 99214 OFFICE O/P EST MOD 30 MIN: CPT | Performed by: FAMILY MEDICINE

## 2021-03-02 PROCEDURE — 3725F SCREEN DEPRESSION PERFORMED: CPT | Performed by: FAMILY MEDICINE

## 2021-03-02 PROCEDURE — 3077F SYST BP >= 140 MM HG: CPT | Performed by: FAMILY MEDICINE

## 2021-03-02 RX ORDER — OMEGA-3-ACID ETHYL ESTERS 1 G/1
2 CAPSULE, LIQUID FILLED ORAL 2 TIMES DAILY
Qty: 360 CAPSULE | Refills: 1 | Status: SHIPPED | OUTPATIENT
Start: 2021-03-02 | End: 2021-11-29

## 2021-03-02 RX ORDER — SILDENAFIL 100 MG/1
TABLET, FILM COATED ORAL
Qty: 20 TABLET | Refills: 2 | Status: SHIPPED | OUTPATIENT
Start: 2021-03-02 | End: 2021-03-02 | Stop reason: SDUPTHER

## 2021-03-02 RX ORDER — SILDENAFIL 100 MG/1
TABLET, FILM COATED ORAL
Qty: 20 TABLET | Refills: 2 | Status: SHIPPED | OUTPATIENT
Start: 2021-03-02 | End: 2021-03-03 | Stop reason: SDUPTHER

## 2021-03-02 RX ORDER — METOPROLOL SUCCINATE 100 MG/1
100 TABLET, EXTENDED RELEASE ORAL DAILY
Qty: 90 TABLET | Refills: 1 | Status: SHIPPED | OUTPATIENT
Start: 2021-03-02 | End: 2021-09-14 | Stop reason: SDUPTHER

## 2021-03-02 RX ORDER — BUDESONIDE AND FORMOTEROL FUMARATE DIHYDRATE 160; 4.5 UG/1; UG/1
2 AEROSOL RESPIRATORY (INHALATION) 2 TIMES DAILY
Qty: 3 INHALER | Refills: 1 | Status: SHIPPED | OUTPATIENT
Start: 2021-03-02 | End: 2021-09-14 | Stop reason: SDUPTHER

## 2021-03-02 RX ORDER — FENOFIBRATE 145 MG/1
145 TABLET, COATED ORAL DAILY
Qty: 90 TABLET | Refills: 1 | Status: SHIPPED | OUTPATIENT
Start: 2021-03-02 | End: 2021-09-14 | Stop reason: SDUPTHER

## 2021-03-02 RX ORDER — LISINOPRIL AND HYDROCHLOROTHIAZIDE 25; 20 MG/1; MG/1
1 TABLET ORAL DAILY
Qty: 90 TABLET | Refills: 1 | Status: SHIPPED | OUTPATIENT
Start: 2021-03-02 | End: 2021-09-14 | Stop reason: SDUPTHER

## 2021-03-02 NOTE — ASSESSMENT & PLAN NOTE
Lipids fairly well controlled with LDL of 101  Triglycerides elevated at greater than 200  Continue fenofibrate

## 2021-03-02 NOTE — ASSESSMENT & PLAN NOTE
Lab Results   Component Value Date    HGBA1C 5 8 (H) 02/23/2021   Diabetes well controlled with hemoglobin A1c of 5 8  Continue metformin 1000 mg b i d

## 2021-03-02 NOTE — PROGRESS NOTES
[facility]      NAME: Kaleb Reardon  AGE: 61 y o  SEX: male  : 1960   MRN: [id number]    DATE: 3/2/2021  TIME: 3:00 PM    Assessment and Plan     Problem List Items Addressed This Visit     Hypertension     Blood pressure well controlled on lisinopril hydrochlorothiazide and metoprolol  Relevant Medications    lisinopril-hydrochlorothiazide (PRINZIDE,ZESTORETIC) 20-25 MG per tablet    metoprolol succinate (TOPROL-XL) 100 mg 24 hr tablet    Hyperlipidemia     Lipids fairly well controlled with LDL of 101  Triglycerides elevated at greater than 200  Continue fenofibrate  Relevant Medications    fenofibrate (TRICOR) 145 mg tablet    Other Relevant Orders    Comprehensive metabolic panel    Lipid Panel with Direct LDL reflex    TSH, 3rd generation    DMII (diabetes mellitus, type 2) ([facility])       Lab Results   Component Value Date    HGBA1C 5 8 (H) 2021   Diabetes well controlled with hemoglobin A1c of 5 8  Continue metformin 1000 mg b i d  Relevant Medications    metFORMIN (GLUCOPHAGE) 1000 MG tablet    Other Relevant Orders    Hemoglobin A1c (w/out EAG)      Other Visit Diagnoses     Simple chronic bronchitis (HCC)        Relevant Medications    budesonide-formoterol (Symbicort) 160-4 5 mcg/act inhaler    Male erectile disorder        Relevant Medications    sildenafil (VIAGRA) 100 mg tablet              Return to office in:  6 months    Chief Complaint     Chief Complaint   Patient presents with    TB Test     lab review        History of Present Illness     Patient was seen for routine follow-up chronic medical problems  He is being treated for type 2 diabetes, hyperlipidemia and hypertension  Patient takes lisinopril hydrochlorothiazide and metoprolol for his blood pressure  He takes fenofibrate for lipids  He also has Symbicort inhaler for asthma COPD  Fortino Acosta His wife just passed away within the last 2 weeks due to metastatic bladder cancer    He is dealing with that fairly well  His sleep and eating habits are fine  The following portions of the patient's history were reviewed and updated as appropriate: allergies, current medications, past family history, past medical history, past social history, past surgical history and problem list     Review of Systems   Review of Systems   Constitutional: Negative  Respiratory: Negative  Cardiovascular: Negative  Gastrointestinal: Negative  Genitourinary: Negative  Musculoskeletal: Negative  Psychiatric/Behavioral: Negative  Active Problem List     Patient Active Problem List   Diagnosis    DMII (diabetes mellitus, type 2) ([address] )    Hyperlipidemia    Hypertension    Mass of appendix       Objective   /82 (BP Location: Right arm, Patient Position: Sitting, Cuff Size: Standard)   Pulse 58   Temp (!) 96 9 °F (36 1 °C) (Temporal)   Ht 5' 10" (1 778 m)   Wt 97 6 kg (215 lb 3 2 oz)   SpO2 98%   BMI 30 88 kg/m²     Physical Exam  Vitals signs and nursing note reviewed  Constitutional:       General: He is not in acute distress  Appearance: He is well-developed  He is not diaphoretic  HENT:      Head: Normocephalic and atraumatic  Eyes:      General:         Right eye: No discharge  Conjunctiva/sclera: Conjunctivae normal       Pupils: Pupils are equal, round, and reactive to light  Neck:      Musculoskeletal: Normal range of motion  Thyroid: No thyromegaly  Cardiovascular:      Rate and Rhythm: Normal rate and regular rhythm  Pulmonary:      Effort: Pulmonary effort is normal  No respiratory distress  Breath sounds: Normal breath sounds  Lymphadenopathy:      Cervical: No cervical adenopathy  Skin:     General: Skin is warm and dry  Neurological:      Mental Status: He is alert and oriented to person, place, and time  Psychiatric:         Behavior: Behavior normal          Thought Content:  Thought content normal          Judgment: Judgment normal            Current Medications     Current Outpatient Medications:     albuterol (PROAIR HFA) 90 mcg/act inhaler, Inhale 2 puffs every 6 (six) hours as needed for wheezing, Disp: 5 Inhaler, Rfl: 5    betamethasone dipropionate (DIPROSONE) 0 05 % cream, Apply topically 2 (two) times a day, Disp: 30 g, Rfl: 0    budesonide-formoterol (Symbicort) 160-4 5 mcg/act inhaler, Inhale 2 puffs 2 (two) times a day Rinse mouth after use , Disp: 3 Inhaler, Rfl: 1    co-enzyme Q-10 30 mg, Take 30 mg by mouth, Disp: , Rfl:     fenofibrate (TRICOR) 145 mg tablet, Take 1 tablet (145 mg total) by mouth daily, Disp: 90 tablet, Rfl: 1    lisinopril-hydrochlorothiazide (PRINZIDE,ZESTORETIC) 20-25 MG per tablet, Take 1 tablet by mouth daily, Disp: 90 tablet, Rfl: 1    metFORMIN (GLUCOPHAGE) 1000 MG tablet, Take 1 tablet (1,000 mg total) by mouth 2 (two) times a day with meals, Disp: 180 tablet, Rfl: 1    metoprolol succinate (TOPROL-XL) 100 mg 24 hr tablet, Take 1 tablet (100 mg total) by mouth daily, Disp: 90 tablet, Rfl: 1    multivitamin (THERAGRAN) TABS, Take 1 tablet by mouth, Disp: , Rfl:     omega-3-acid ethyl esters (LOVAZA) 1 g capsule, TAKE 2 CAPSULES (2 GRAMS   TOTAL) TWICE DAILY, Disp: 360 capsule, Rfl: 1    sildenafil (VIAGRA) 100 mg tablet, Take one tablet daily as needed, Disp: 20 tablet, Rfl: 2    vardenafil (LEVITRA) 5 MG tablet, Take 1 tablet (5 mg total) by mouth daily as needed for erectile dysfunction (Take approximately 1 hour before activity) (Patient not taking: Reported on 3/2/2021), Disp: 10 tablet, Rfl: 0    varenicline (CHANTIX) 1 mg tablet, Take 1 tablet (1 mg total) by mouth 2 (two) times a day (Patient not taking: Reported on 3/2/2021), Disp: 60 tablet, Rfl: 3    Health Maintenance     Health Maintenance   Topic Date Due    Annual Physical  11/21/1978    DTaP,Tdap,and Td Vaccines (1 - Tdap) 11/21/1981    DM Eye Exam  04/14/2019    Depression Screening PHQ  11/27/2020    BMI: Followup Plan  11/27/2020    Diabetic Foot Exam  11/27/2020    HEMOGLOBIN A1C  08/23/2021    BMI: Adult  03/02/2022    Colonoscopy Surveillance  10/05/2022    Colorectal Cancer Screening  10/05/2027    HIV Screening  Completed    Hepatitis C Screening  Completed    Pneumococcal Vaccine: Pediatrics (0 to 5 Years) and At-Risk Patients (6 to 59 Years)  Completed    Influenza Vaccine  Completed    HIB Vaccine  Aged Out    Hepatitis B Vaccine  Aged Out    IPV Vaccine  Aged Out    Hepatitis A Vaccine  Aged Out    Meningococcal ACWY Vaccine  Aged Out    HPV Vaccine  Aged Out     Immunization History   Administered Date(s) Administered    INFLUENZA 11/01/2007, 10/20/2008, 10/15/2009, 10/30/2017, 10/17/2018    Influenza Quadrivalent, 6-35 Months IM 11/17/2015, 11/05/2016    Influenza, injectable, quadrivalent, preservative free 0 5 mL 10/02/2020    Influenza, recombinant, quadrivalent,injectable, preservative free 11/27/2019    Influenza, seasonal, injectable 11/20/2012, 10/01/2014    Pneumococcal Polysaccharide PPV23 07/31/2014       Lulu Cesar DO  [facility]

## 2021-03-03 DIAGNOSIS — N52.9 MALE ERECTILE DISORDER: ICD-10-CM

## 2021-03-03 RX ORDER — SILDENAFIL 100 MG/1
TABLET, FILM COATED ORAL
Qty: 20 TABLET | Refills: 2 | Status: SHIPPED | OUTPATIENT
Start: 2021-03-03 | End: 2021-09-14 | Stop reason: SDUPTHER

## 2021-03-10 DIAGNOSIS — Z23 ENCOUNTER FOR IMMUNIZATION: ICD-10-CM

## 2021-03-24 ENCOUNTER — IMMUNIZATIONS (OUTPATIENT)
Dept: FAMILY MEDICINE CLINIC | Facility: HOSPITAL | Age: 61
End: 2021-03-24

## 2021-03-24 DIAGNOSIS — Z23 ENCOUNTER FOR IMMUNIZATION: Primary | ICD-10-CM

## 2021-03-24 PROCEDURE — 0001A SARS-COV-2 / COVID-19 MRNA VACCINE (PFIZER-BIONTECH) 30 MCG: CPT

## 2021-03-24 PROCEDURE — 91300 SARS-COV-2 / COVID-19 MRNA VACCINE (PFIZER-BIONTECH) 30 MCG: CPT

## 2021-04-01 ENCOUNTER — OFFICE VISIT (OUTPATIENT)
Dept: OBGYN CLINIC | Facility: MEDICAL CENTER | Age: 61
End: 2021-04-01
Payer: COMMERCIAL

## 2021-04-01 VITALS — HEIGHT: 70 IN | WEIGHT: 215 LBS | BODY MASS INDEX: 30.78 KG/M2

## 2021-04-01 DIAGNOSIS — M17.12 PRIMARY OSTEOARTHRITIS OF LEFT KNEE: Primary | ICD-10-CM

## 2021-04-01 PROCEDURE — 3008F BODY MASS INDEX DOCD: CPT | Performed by: FAMILY MEDICINE

## 2021-04-01 PROCEDURE — 20610 DRAIN/INJ JOINT/BURSA W/O US: CPT | Performed by: ORTHOPAEDIC SURGERY

## 2021-04-01 RX ORDER — HYALURONATE SODIUM 10 MG/ML
20 SYRINGE (ML) INTRAARTICULAR
Status: COMPLETED | OUTPATIENT
Start: 2021-04-01 | End: 2021-04-01

## 2021-04-01 RX ADMIN — Medication 20 MG: at 11:55

## 2021-04-01 NOTE — PROGRESS NOTES
Ortho Sports Medicine Knee Visco Injection Visit     Assesment:   61 y o  male left knee OA    Plan:    Injection:  The risks and benefits of the injection (which include but are not limited to: infection, bleeding,damage to nerve/artery, need for further intervention), as well as the risks and benefits of all alternative treatments were explained and understood  The patient elected to proceed with injection  The procedure was done with aseptic technique, and the patient tolerated the procedure well with no complications  A viscosupplementation injection was performed  Ice to the knee 1-2 times daily for 20 minutes, for next 24-48 hrs  Follow up:  Return for next week for euflexxa 2/3  Chief Complaint   Patient presents with    Left Knee - Follow-up       History of Present Illness: The patient is returns for  Euflexxa 1/3  visco supplementation injection  Since the prior visit, He reports no improvement  Patient denies fevers, chills, and sweats  Denies numbness and tingling  Denies chest pain, shortness of breath, calf pain, and warmth to the knee  I have reviewed the past medical, surgical, social and family history, medications and allergies as documented in the EMR  Review of systems: ROS is negative other than that noted in the HPI  Constitutional: Negative for fatigue and fever  Physical Exam:    Height 5' 10" (1 778 m), weight 97 5 kg (215 lb)      General/Constitutional: NAD, well developed, well nourished  HENT: Normocephalic, atraumatic  CV: Intact distal pulses, regular rate  Resp: No respiratory distress or labored breathing  Lymphatic: No lymphadenopathy palpated  Neuro: Alert and Oriented x 3, no focal deficits  Psych: Normal mood, normal affect, normal judgement, normal behavior  Skin: Warm, dry, no rashes, no erythema    Large joint arthrocentesis: L knee  Universal Protocol:  Consent given by: patient  Time out: Immediately prior to procedure a "time out" was called to verify the correct patient, procedure, equipment, support staff and site/side marked as required    Timeout called at: 4/1/2021 11:52 AM   Patient understanding: patient states understanding of the procedure being performed  Site marked: the operative site was marked  Patient identity confirmed: verbally with patient    Supporting Documentation  Indications: pain   Procedure Details  Location: knee - L knee  Preparation: Patient was prepped and draped in the usual sterile fashion  Needle size: 22 G  Ultrasound guidance: no  Approach: anterolateral  Medications administered: 20 mg Sodium Hyaluronate 20 MG/2ML    Patient tolerance: patient tolerated the procedure well with no immediate complications  Dressing:  Sterile dressing applied

## 2021-04-08 ENCOUNTER — OFFICE VISIT (OUTPATIENT)
Dept: OBGYN CLINIC | Facility: MEDICAL CENTER | Age: 61
End: 2021-04-08
Payer: COMMERCIAL

## 2021-04-08 VITALS
HEART RATE: 61 BPM | SYSTOLIC BLOOD PRESSURE: 160 MMHG | WEIGHT: 215 LBS | DIASTOLIC BLOOD PRESSURE: 79 MMHG | BODY MASS INDEX: 30.78 KG/M2 | HEIGHT: 70 IN

## 2021-04-08 DIAGNOSIS — M17.12 PRIMARY OSTEOARTHRITIS OF LEFT KNEE: Primary | ICD-10-CM

## 2021-04-08 PROCEDURE — 20610 DRAIN/INJ JOINT/BURSA W/O US: CPT | Performed by: FAMILY MEDICINE

## 2021-04-08 RX ORDER — HYALURONATE SODIUM 10 MG/ML
20 SYRINGE (ML) INTRAARTICULAR
Status: COMPLETED | OUTPATIENT
Start: 2021-04-08 | End: 2021-04-08

## 2021-04-08 RX ADMIN — Medication 20 MG: at 11:47

## 2021-04-08 NOTE — PROGRESS NOTES
Large joint arthrocentesis: L knee  Universal Protocol:  Consent: Verbal consent obtained  Risks and benefits: risks, benefits and alternatives were discussed  Consent given by: patient  Time out: Immediately prior to procedure a "time out" was called to verify the correct patient, procedure, equipment, support staff and site/side marked as required    Timeout called at: 4/8/2021 11:45 AM   Patient understanding: patient states understanding of the procedure being performed  Test results: test results available and properly labeled  Site marked: the operative site was marked  Patient identity confirmed: verbally with patient    Supporting Documentation  Indications: pain   Procedure Details  Location: knee - L knee  Preparation: Patient was prepped and draped in the usual sterile fashion  Needle size: 22 G  Ultrasound guidance: no  Approach: anterolateral  Medications administered: 20 mg Sodium Hyaluronate 20 MG/2ML    Aspirate amount: 0 mL    Patient tolerance: patient tolerated the procedure well with no immediate complications  Dressing:  Sterile dressing applied

## 2021-04-15 ENCOUNTER — OFFICE VISIT (OUTPATIENT)
Dept: OBGYN CLINIC | Facility: MEDICAL CENTER | Age: 61
End: 2021-04-15
Payer: COMMERCIAL

## 2021-04-15 VITALS
HEIGHT: 70 IN | BODY MASS INDEX: 30.78 KG/M2 | WEIGHT: 215 LBS | HEART RATE: 68 BPM | SYSTOLIC BLOOD PRESSURE: 183 MMHG | DIASTOLIC BLOOD PRESSURE: 71 MMHG

## 2021-04-15 DIAGNOSIS — M17.12 PRIMARY OSTEOARTHRITIS OF LEFT KNEE: Primary | ICD-10-CM

## 2021-04-15 PROCEDURE — 20610 DRAIN/INJ JOINT/BURSA W/O US: CPT | Performed by: ORTHOPAEDIC SURGERY

## 2021-04-15 PROCEDURE — 3008F BODY MASS INDEX DOCD: CPT | Performed by: FAMILY MEDICINE

## 2021-04-15 RX ORDER — HYALURONATE SODIUM 10 MG/ML
20 SYRINGE (ML) INTRAARTICULAR
Status: COMPLETED | OUTPATIENT
Start: 2021-04-15 | End: 2021-04-15

## 2021-04-15 RX ORDER — LIDOCAINE HYDROCHLORIDE 10 MG/ML
3 INJECTION, SOLUTION INFILTRATION; PERINEURAL
Status: COMPLETED | OUTPATIENT
Start: 2021-04-15 | End: 2021-04-15

## 2021-04-15 RX ADMIN — LIDOCAINE HYDROCHLORIDE 3 ML: 10 INJECTION, SOLUTION INFILTRATION; PERINEURAL at 11:47

## 2021-04-15 RX ADMIN — Medication 20 MG: at 11:47

## 2021-04-15 NOTE — PROGRESS NOTES
Ortho Sports Medicine Knee Visco Injection Visit     Assesment:   61 y o  male left knee OA    Plan:    Injection:  The risks and benefits of the injection (which include but are not limited to: infection, bleeding,damage to nerve/artery, need for further intervention), as well as the risks and benefits of all alternative treatments were explained and understood  The patient elected to proceed with injection  The procedure was done with aseptic technique, and the patient tolerated the procedure well with no complications  A viscosupplementation injection was performed  Ice to the knee 1-2 times daily for 20 minutes, for next 24-48 hrs  Follow up:  Return in about 3 months (around 7/15/2021) for Recheck  Chief Complaint   Patient presents with    Left Knee - Follow-up       History of Present Illness: The patient is returns for  Euflexxa 3/3 visco supplementation injection  Since the prior visit, He reports minimal improvement  Patient denies fevers, chills, and sweats  Denies numbness and tingling  Denies chest pain, shortness of breath, calf pain, and warmth to the knee  I have reviewed the past medical, surgical, social and family history, medications and allergies as documented in the EMR  Review of systems: ROS is negative other than that noted in the HPI  Constitutional: Negative for fatigue and fever  Physical Exam:    Blood pressure (!) 183/71, pulse 68, height 5' 10" (1 778 m), weight 97 5 kg (215 lb)      General/Constitutional: NAD, well developed, well nourished  HENT: Normocephalic, atraumatic  CV: Intact distal pulses, regular rate  Resp: No respiratory distress or labored breathing  Lymphatic: No lymphadenopathy palpated  Neuro: Alert and Oriented x 3, no focal deficits  Psych: Normal mood, normal affect, normal judgement, normal behavior  Skin: Warm, dry, no rashes, no erythema    Large joint arthrocentesis: L knee  Universal Protocol:  Consent given by: patient  Time out: Immediately prior to procedure a "time out" was called to verify the correct patient, procedure, equipment, support staff and site/side marked as required    Timeout called at: 4/15/2021 11:46 AM   Patient understanding: patient states understanding of the procedure being performed  Site marked: the operative site was marked  Patient identity confirmed: verbally with patient    Supporting Documentation  Indications: pain   Procedure Details  Location: knee - L knee  Preparation: Patient was prepped and draped in the usual sterile fashion  Needle size: 22 G  Ultrasound guidance: no  Approach: anterolateral  Medications administered: 3 mL lidocaine 1 %; 20 mg Sodium Hyaluronate 20 MG/2ML    Patient tolerance: patient tolerated the procedure well with no immediate complications  Dressing:  Sterile dressing applied

## 2021-04-18 ENCOUNTER — IMMUNIZATIONS (OUTPATIENT)
Dept: FAMILY MEDICINE CLINIC | Facility: HOSPITAL | Age: 61
End: 2021-04-18

## 2021-04-18 DIAGNOSIS — Z23 ENCOUNTER FOR IMMUNIZATION: Primary | ICD-10-CM

## 2021-04-18 PROCEDURE — 0002A SARS-COV-2 / COVID-19 MRNA VACCINE (PFIZER-BIONTECH) 30 MCG: CPT

## 2021-04-18 PROCEDURE — 91300 SARS-COV-2 / COVID-19 MRNA VACCINE (PFIZER-BIONTECH) 30 MCG: CPT

## 2021-05-06 ENCOUNTER — TELEPHONE (OUTPATIENT)
Dept: FAMILY MEDICINE CLINIC | Facility: CLINIC | Age: 61
End: 2021-05-06

## 2021-05-06 NOTE — TELEPHONE ENCOUNTER
LMOM for patient to call back to let me know if he has been to the eye doctor recently, and to set up 6 month follow up for September 2021  Please schedule patient for a diabetic foot exam at that time

## 2021-06-17 ENCOUNTER — OFFICE VISIT (OUTPATIENT)
Dept: OBGYN CLINIC | Facility: MEDICAL CENTER | Age: 61
End: 2021-06-17
Payer: COMMERCIAL

## 2021-06-17 VITALS
HEART RATE: 66 BPM | DIASTOLIC BLOOD PRESSURE: 88 MMHG | SYSTOLIC BLOOD PRESSURE: 174 MMHG | WEIGHT: 215 LBS | BODY MASS INDEX: 30.78 KG/M2 | HEIGHT: 70 IN

## 2021-06-17 DIAGNOSIS — M17.12 PRIMARY OSTEOARTHRITIS OF LEFT KNEE: Primary | ICD-10-CM

## 2021-06-17 PROCEDURE — 3077F SYST BP >= 140 MM HG: CPT | Performed by: ORTHOPAEDIC SURGERY

## 2021-06-17 PROCEDURE — 3008F BODY MASS INDEX DOCD: CPT | Performed by: ORTHOPAEDIC SURGERY

## 2021-06-17 PROCEDURE — 20610 DRAIN/INJ JOINT/BURSA W/O US: CPT | Performed by: ORTHOPAEDIC SURGERY

## 2021-06-17 PROCEDURE — 3079F DIAST BP 80-89 MM HG: CPT | Performed by: ORTHOPAEDIC SURGERY

## 2021-06-17 PROCEDURE — 99213 OFFICE O/P EST LOW 20 MIN: CPT | Performed by: ORTHOPAEDIC SURGERY

## 2021-06-17 RX ORDER — METHYLPREDNISOLONE ACETATE 40 MG/ML
2 INJECTION, SUSPENSION INTRA-ARTICULAR; INTRALESIONAL; INTRAMUSCULAR; SOFT TISSUE
Status: COMPLETED | OUTPATIENT
Start: 2021-06-17 | End: 2021-06-17

## 2021-06-17 RX ORDER — LIDOCAINE HYDROCHLORIDE 10 MG/ML
3 INJECTION, SOLUTION INFILTRATION; PERINEURAL
Status: COMPLETED | OUTPATIENT
Start: 2021-06-17 | End: 2021-06-17

## 2021-06-17 RX ADMIN — METHYLPREDNISOLONE ACETATE 2 ML: 40 INJECTION, SUSPENSION INTRA-ARTICULAR; INTRALESIONAL; INTRAMUSCULAR; SOFT TISSUE at 12:19

## 2021-06-17 RX ADMIN — LIDOCAINE HYDROCHLORIDE 3 ML: 10 INJECTION, SOLUTION INFILTRATION; PERINEURAL at 12:19

## 2021-08-09 ENCOUNTER — OFFICE VISIT (OUTPATIENT)
Dept: OBGYN CLINIC | Facility: MEDICAL CENTER | Age: 61
End: 2021-08-09
Payer: COMMERCIAL

## 2021-08-09 ENCOUNTER — APPOINTMENT (OUTPATIENT)
Dept: RADIOLOGY | Facility: MEDICAL CENTER | Age: 61
End: 2021-08-09
Payer: COMMERCIAL

## 2021-08-09 VITALS
WEIGHT: 220.8 LBS | HEIGHT: 70 IN | HEART RATE: 68 BPM | SYSTOLIC BLOOD PRESSURE: 129 MMHG | DIASTOLIC BLOOD PRESSURE: 69 MMHG | BODY MASS INDEX: 31.61 KG/M2

## 2021-08-09 DIAGNOSIS — M17.12 PRIMARY OSTEOARTHRITIS OF LEFT KNEE: ICD-10-CM

## 2021-08-09 DIAGNOSIS — F17.200 CURRENT EVERY DAY SMOKER: ICD-10-CM

## 2021-08-09 DIAGNOSIS — Z01.89 ENCOUNTER FOR LOWER EXTREMITY COMPARISON IMAGING STUDY: ICD-10-CM

## 2021-08-09 DIAGNOSIS — F17.200 SMOKER: ICD-10-CM

## 2021-08-09 DIAGNOSIS — M17.12 PRIMARY OSTEOARTHRITIS OF LEFT KNEE: Primary | ICD-10-CM

## 2021-08-09 PROCEDURE — 99214 OFFICE O/P EST MOD 30 MIN: CPT | Performed by: ORTHOPAEDIC SURGERY

## 2021-08-09 PROCEDURE — 73564 X-RAY EXAM KNEE 4 OR MORE: CPT

## 2021-08-09 PROCEDURE — 73560 X-RAY EXAM OF KNEE 1 OR 2: CPT

## 2021-08-09 PROCEDURE — 3008F BODY MASS INDEX DOCD: CPT | Performed by: ORTHOPAEDIC SURGERY

## 2021-08-09 RX ORDER — VARENICLINE TARTRATE 1 MG/1
1 TABLET, FILM COATED ORAL 2 TIMES DAILY
Qty: 60 TABLET | Refills: 3 | Status: SHIPPED | OUTPATIENT
Start: 2021-08-09 | End: 2021-09-14 | Stop reason: SDUPTHER

## 2021-08-09 NOTE — PROGRESS NOTES
Assessment/Plan     1  Primary osteoarthritis of left knee    2  Encounter for lower extremity comparison imaging study    3  Current every day smoker      Orders Placed This Encounter   Procedures    XR knee 4+ vw left injury    XR knee 1 or 2 vw right    Nicotine, Blood     Xrays of the left knee were obtained today and reviewed  Today the patient's risk factors of being a diabetic and a smoker were discussed in depth noting that he needs to be smoke- free prior to surgery  Explained in depth that the day the patient quits, he is to call for a 4 week follow up to discuss the results of his nicotine serum blood test  He is to have the blood serum blood test 2 weeks after his quits  These instructions were placed in the patient's AVS  Reviewed the risks, benefits and recovery of a left total knee and briefly reviewed the knee model  Patient shows understanding of this process and will follow up as instructed to schedule his total knee  Return for Patient is to call in the day he quits smoking to be seen 4 weeks after   I answered all of the patient's questions during the visit and provided education of the patient's condition during the visit  The patient verbalized understanding of the information given and agrees with the plan  This note was dictated using [employer] software  It may contain errors including improperly dictated words  Please contact physician directly for any questions  History of Present Illness   Chief complaint:   Chief Complaint   Patient presents with    Left Knee - Pain       HPI: Nikko Dear is a 61 y o  male that c/o left knee pain  Patient has treated in the past with Dr Vishal Marx for his left knee osteoarthritis  Patient has treated with a number of cortisone injections along with a  Euflexxa series completed on, 04/15/2021 with no benefit  He notes that all activities aggravate the knee  It is affecting him with sleep and with his ADLs    Most of his pain is when he is weight-bearing  He has a history of a meniscal surgery 10+ years ago  Patient states that he has tried an  brace with little relief  He notes that the brace does give him increased pain  Patient has been taking Aleve 3 tablets twice a day for his pain discomfort  Patient is a diabetic:  Last hemoglobin A1c 5 8 on 02/23/2021, current every day smoker quarter pack a day: currently trying to stop smoking  He is on Chantax, no history of DVT, MRSA  No hx of HIV or hep C, no hx of cardiac issues  Seen a dentist in the last 3 months  He has had his Covid vaccine     ROS:    See HPI for musculoskeletal review  All other systems reviewed are negative     Historical Information   Past Medical History:   Diagnosis Date    Atopic dermatitis     Condition not found     Neoplasm of [place] D[place]    Hyperlipemia     Hypertension     Lateral epicondylitis, left elbow      Past Surgical History:   Procedure Laterality Date    KNEE ARTHROSCOPY       Social History   Social History     Substance and Sexual Activity   Alcohol Use Yes    Alcohol/week: 2 0 standard drinks    Types: 2 Cans of beer per week    Comment: Social     Social History     Substance and Sexual Activity   Drug Use Not on file     Social History     Tobacco Use   Smoking Status Current Every Day Smoker    Packs/day: 0 25   Smokeless Tobacco Never Used     Family History:   Family History   Problem Relation Age of Onset    Colon polyps Mother         Inflammatory    Diabetes Father        Current Outpatient Medications on File Prior to Visit   Medication Sig Dispense Refill    albuterol (PROAIR HFA) 90 mcg/act inhaler Inhale 2 puffs every 6 (six) hours as needed for wheezing 5 Inhaler 5    betamethasone dipropionate (DIPROSONE) 0 05 % cream Apply topically 2 (two) times a day 30 g 0    budesonide-formoterol (Symbicort) 160-4 5 mcg/act inhaler Inhale 2 puffs 2 (two) times a day Rinse mouth after use   3 Inhaler 1    co-enzyme Q-10 30 mg Take 30 mg by mouth      fenofibrate (TRICOR) 145 mg tablet Take 1 tablet (145 mg total) by mouth daily 90 tablet 1    lisinopril-hydrochlorothiazide (PRINZIDE,ZESTORETIC) 20-25 MG per tablet Take 1 tablet by mouth daily 90 tablet 1    metFORMIN (GLUCOPHAGE) 1000 MG tablet Take 1 tablet (1,000 mg total) by mouth 2 (two) times a day with meals 180 tablet 1    metoprolol succinate (TOPROL-XL) 100 mg 24 hr tablet Take 1 tablet (100 mg total) by mouth daily 90 tablet 1    multivitamin (THERAGRAN) TABS Take 1 tablet by mouth      omega-3-acid ethyl esters (LOVAZA) 1 g capsule Take 2 capsules (2 g total) by mouth 2 (two) times a day 360 capsule 1    sildenafil (VIAGRA) 100 mg tablet Take one tablet daily as needed 20 tablet 2    vardenafil (LEVITRA) 5 MG tablet Take 1 tablet (5 mg total) by mouth daily as needed for erectile dysfunction (Take approximately 1 hour before activity) 10 tablet 0    varenicline (CHANTIX) 1 mg tablet Take 1 tablet (1 mg total) by mouth 2 (two) times a day 60 tablet 3    [DISCONTINUED] fenofibrate (TRICOR) 145 mg tablet TAKE 1 TABLET DAILY 90 tablet 1    [DISCONTINUED] lisinopril-hydrochlorothiazide (PRINZIDE,ZESTORETIC) 20-25 MG per tablet TAKE 1 TABLET DAILY 90 tablet 1    [DISCONTINUED] metFORMIN (GLUCOPHAGE) 1000 MG tablet TAKE 1 TABLET TWICE DAILY  WITH MEALS 180 tablet 1    [DISCONTINUED] metoprolol succinate (TOPROL-XL) 100 mg 24 hr tablet TAKE 1 TABLET DAILY 90 tablet 1     No current facility-administered medications on file prior to visit       Allergies   Allergen Reactions    Sulfamethoxazole-Trimethoprim Nausea Only and GI Intolerance       Current Outpatient Medications on File Prior to Visit   Medication Sig Dispense Refill    albuterol (PROAIR HFA) 90 mcg/act inhaler Inhale 2 puffs every 6 (six) hours as needed for wheezing 5 Inhaler 5    betamethasone dipropionate (DIPROSONE) 0 05 % cream Apply topically 2 (two) times a day 30 g 0    budesonide-formoterol (Symbicort) 160-4 5 mcg/act inhaler Inhale 2 puffs 2 (two) times a day Rinse mouth after use  3 Inhaler 1    co-enzyme Q-10 30 mg Take 30 mg by mouth      fenofibrate (TRICOR) 145 mg tablet Take 1 tablet (145 mg total) by mouth daily 90 tablet 1    lisinopril-hydrochlorothiazide (PRINZIDE,ZESTORETIC) 20-25 MG per tablet Take 1 tablet by mouth daily 90 tablet 1    metFORMIN (GLUCOPHAGE) 1000 MG tablet Take 1 tablet (1,000 mg total) by mouth 2 (two) times a day with meals 180 tablet 1    metoprolol succinate (TOPROL-XL) 100 mg 24 hr tablet Take 1 tablet (100 mg total) by mouth daily 90 tablet 1    multivitamin (THERAGRAN) TABS Take 1 tablet by mouth      omega-3-acid ethyl esters (LOVAZA) 1 g capsule Take 2 capsules (2 g total) by mouth 2 (two) times a day 360 capsule 1    sildenafil (VIAGRA) 100 mg tablet Take one tablet daily as needed 20 tablet 2    vardenafil (LEVITRA) 5 MG tablet Take 1 tablet (5 mg total) by mouth daily as needed for erectile dysfunction (Take approximately 1 hour before activity) 10 tablet 0    varenicline (CHANTIX) 1 mg tablet Take 1 tablet (1 mg total) by mouth 2 (two) times a day 60 tablet 3    [DISCONTINUED] fenofibrate (TRICOR) 145 mg tablet TAKE 1 TABLET DAILY 90 tablet 1    [DISCONTINUED] lisinopril-hydrochlorothiazide (PRINZIDE,ZESTORETIC) 20-25 MG per tablet TAKE 1 TABLET DAILY 90 tablet 1    [DISCONTINUED] metFORMIN (GLUCOPHAGE) 1000 MG tablet TAKE 1 TABLET TWICE DAILY  WITH MEALS 180 tablet 1    [DISCONTINUED] metoprolol succinate (TOPROL-XL) 100 mg 24 hr tablet TAKE 1 TABLET DAILY 90 tablet 1     No current facility-administered medications on file prior to visit  Objective   Vitals: Blood pressure 129/69, pulse 68, height 5' 10" (1 778 m), weight 100 kg (220 lb 12 8 oz)  ,Body mass index is 31 68 kg/m²      PE:  AAOx 3  WDWN  Hearing intact, no drainage from eyes  Regular rate  no audible wheezing  no abdominal distension  LE compartments soft, skin intact    leftknee:    Appearance:  Mild soft tissue swelling   No ecchymosis  no obvious joint deformity   No effusion  Palpation/Tenderness:  +TTP over medial joint line  No TTP over lateral joint line   No TTP over patella  No TTP over patellar tendon  No TTP over pes anserine bursa  Active Range of Motion:  AROM: full -3 -120 with a varus deformity    Special Tests:  Medial Mitesh's Test:  Negative  Lateral Mitesh's Test:  Negative  Apley's compression test:  Negative  Lachman's Test:  negative  Anterior Drawer Test:  Negative  Patellar grind:  Negative  Valgus Stress Test:  negative  Varus Stress Test:  negative     No ipsilateral hip pain with ROM    leftLE:    Sensation grossly intact  Palpable dorsal pedal pulse  AT/GS/EHL intact     Imaging Studies: I have personally reviewed pertinent films in PACS  leftknee:        Scribe Attestation    I,:  Simón Meraz am acting as a scribe while in the presence of the attending physician :       I,:  uDane Fernández DO personally performed the services described in this documentation    as scribed in my presence :

## 2021-08-09 NOTE — PATIENT INSTRUCTIONS
Take Tylenol 1,000mg every 8 hours with the Diclofenac as prescribed  Day he quits smoking (Call for a 4 week follow up), 2 weeks later take the nicotine serum blood test, then 2 weeks after the test follow up in the office  Then we can schedule surgery

## 2021-08-10 ENCOUNTER — TELEPHONE (OUTPATIENT)
Dept: OBGYN CLINIC | Facility: HOSPITAL | Age: 61
End: 2021-08-10

## 2021-08-10 RX ORDER — DICLOFENAC SODIUM 75 MG/1
75 TABLET, DELAYED RELEASE ORAL 2 TIMES DAILY
Qty: 30 TABLET | Refills: 1 | Status: SHIPPED | OUTPATIENT
Start: 2021-08-10 | End: 2022-02-21

## 2021-08-10 NOTE — TELEPHONE ENCOUNTER
Patient looking for update on Diclofenac discussed at yesterdays office visit  [facility] in [place]

## 2021-09-11 LAB
ALBUMIN SERPL-MCNC: 4.6 G/DL (ref 3.6–5.1)
ALBUMIN/GLOB SERPL: 1.8 (CALC) (ref 1–2.5)
ALP SERPL-CCNC: 62 U/L (ref 35–144)
ALT SERPL-CCNC: 26 U/L (ref 9–46)
AST SERPL-CCNC: 19 U/L (ref 10–35)
BILIRUB SERPL-MCNC: 1.1 MG/DL (ref 0.2–1.2)
BUN SERPL-MCNC: 23 MG/DL (ref 7–25)
BUN/CREAT SERPL: ABNORMAL (CALC) (ref 6–22)
CALCIUM SERPL-MCNC: 10.1 MG/DL (ref 8.6–10.3)
CHLORIDE SERPL-SCNC: 102 MMOL/L (ref 98–110)
CHOLEST SERPL-MCNC: 197 MG/DL
CHOLEST/HDLC SERPL: 5.2 (CALC)
CO2 SERPL-SCNC: 21 MMOL/L (ref 20–32)
CREAT SERPL-MCNC: 0.94 MG/DL (ref 0.7–1.25)
GLOBULIN SER CALC-MCNC: 2.5 G/DL (CALC) (ref 1.9–3.7)
GLUCOSE SERPL-MCNC: 135 MG/DL (ref 65–99)
HBA1C MFR BLD: 6.3 % OF TOTAL HGB
HDLC SERPL-MCNC: 38 MG/DL
LDLC SERPL DIRECT ASSAY-MCNC: 86 MG/DL
NONHDLC SERPL-MCNC: 159 MG/DL (CALC)
POTASSIUM SERPL-SCNC: 4.3 MMOL/L (ref 3.5–5.3)
PROT SERPL-MCNC: 7.1 G/DL (ref 6.1–8.1)
SL AMB EGFR AFRICAN AMERICAN: 102 ML/MIN/1.73M2
SL AMB EGFR NON AFRICAN AMERICAN: 88 ML/MIN/1.73M2
SODIUM SERPL-SCNC: 135 MMOL/L (ref 135–146)
TRIGL SERPL-MCNC: 624 MG/DL
TSH SERPL-ACNC: 1.46 MIU/L (ref 0.4–4.5)

## 2021-09-11 PROCEDURE — 3044F HG A1C LEVEL LT 7.0%: CPT | Performed by: ORTHOPAEDIC SURGERY

## 2021-09-14 ENCOUNTER — OFFICE VISIT (OUTPATIENT)
Dept: FAMILY MEDICINE CLINIC | Facility: CLINIC | Age: 61
End: 2021-09-14
Payer: COMMERCIAL

## 2021-09-14 VITALS
BODY MASS INDEX: 31.35 KG/M2 | HEIGHT: 70 IN | OXYGEN SATURATION: 98 % | DIASTOLIC BLOOD PRESSURE: 88 MMHG | SYSTOLIC BLOOD PRESSURE: 150 MMHG | TEMPERATURE: 97.6 F | HEART RATE: 64 BPM | WEIGHT: 219 LBS

## 2021-09-14 DIAGNOSIS — I10 HYPERTENSION, UNSPECIFIED TYPE: ICD-10-CM

## 2021-09-14 DIAGNOSIS — J41.0 SIMPLE CHRONIC BRONCHITIS (HCC): ICD-10-CM

## 2021-09-14 DIAGNOSIS — N52.9 MALE ERECTILE DISORDER: ICD-10-CM

## 2021-09-14 DIAGNOSIS — F17.200 SMOKER: ICD-10-CM

## 2021-09-14 DIAGNOSIS — F41.9 ANXIETY: Primary | ICD-10-CM

## 2021-09-14 DIAGNOSIS — Z23 NEED FOR VACCINATION: ICD-10-CM

## 2021-09-14 DIAGNOSIS — E78.5 HYPERLIPIDEMIA, UNSPECIFIED HYPERLIPIDEMIA TYPE: ICD-10-CM

## 2021-09-14 DIAGNOSIS — E11.9 TYPE 2 DIABETES MELLITUS WITHOUT COMPLICATION, WITHOUT LONG-TERM CURRENT USE OF INSULIN (HCC): ICD-10-CM

## 2021-09-14 PROCEDURE — 90682 RIV4 VACC RECOMBINANT DNA IM: CPT | Performed by: FAMILY MEDICINE

## 2021-09-14 PROCEDURE — 99214 OFFICE O/P EST MOD 30 MIN: CPT | Performed by: FAMILY MEDICINE

## 2021-09-14 PROCEDURE — 90471 IMMUNIZATION ADMIN: CPT | Performed by: FAMILY MEDICINE

## 2021-09-14 PROCEDURE — 3077F SYST BP >= 140 MM HG: CPT | Performed by: FAMILY MEDICINE

## 2021-09-14 PROCEDURE — 3079F DIAST BP 80-89 MM HG: CPT | Performed by: FAMILY MEDICINE

## 2021-09-14 RX ORDER — BUDESONIDE AND FORMOTEROL FUMARATE DIHYDRATE 160; 4.5 UG/1; UG/1
2 AEROSOL RESPIRATORY (INHALATION) 2 TIMES DAILY
Qty: 10.2 G | Refills: 5 | Status: SHIPPED | OUTPATIENT
Start: 2021-09-14

## 2021-09-14 RX ORDER — ALPRAZOLAM 0.5 MG/1
TABLET ORAL
Qty: 4 TABLET | Refills: 0 | Status: SHIPPED | OUTPATIENT
Start: 2021-09-14 | End: 2022-06-07

## 2021-09-14 RX ORDER — FENOFIBRATE 145 MG/1
145 TABLET, COATED ORAL DAILY
Qty: 90 TABLET | Refills: 1 | Status: SHIPPED | OUTPATIENT
Start: 2021-09-14 | End: 2022-03-07

## 2021-09-14 RX ORDER — LISINOPRIL AND HYDROCHLOROTHIAZIDE 25; 20 MG/1; MG/1
1 TABLET ORAL DAILY
Qty: 90 TABLET | Refills: 1 | Status: SHIPPED | OUTPATIENT
Start: 2021-09-14 | End: 2022-03-07

## 2021-09-14 RX ORDER — SILDENAFIL 100 MG/1
TABLET, FILM COATED ORAL
Qty: 20 TABLET | Refills: 2 | Status: SHIPPED | OUTPATIENT
Start: 2021-09-14 | End: 2022-06-07 | Stop reason: SDUPTHER

## 2021-09-14 RX ORDER — METOPROLOL SUCCINATE 100 MG/1
100 TABLET, EXTENDED RELEASE ORAL DAILY
Qty: 90 TABLET | Refills: 1 | Status: SHIPPED | OUTPATIENT
Start: 2021-09-14 | End: 2022-03-07

## 2021-09-14 RX ORDER — VARENICLINE TARTRATE 1 MG/1
1 TABLET, FILM COATED ORAL 2 TIMES DAILY
Qty: 60 TABLET | Refills: 3 | Status: SHIPPED | OUTPATIENT
Start: 2021-09-14 | End: 2022-02-21

## 2021-09-14 NOTE — PROGRESS NOTES
[facility]      NAME: Prem Nguyen  AGE: 61 y o  SEX: male  : 1960   MRN: [id number]    DATE: 2021  TIME: 7:23 PM    Assessment and Plan     Problem List Items Addressed This Visit     Hypertension       Blood pressure stable on lisinopril hydrochlorothiazide and metoprolol  Relevant Medications    metoprolol succinate (TOPROL-XL) 100 mg 24 hr tablet    lisinopril-hydrochlorothiazide (PRINZIDE,ZESTORETIC) 20-25 MG per tablet    Hyperlipidemia       Total cholesterol stable however triglycerides significantly elevated at greater than 600  Continue fenofibrate  Need to be sure to take full dose of Lovaza and control blood sugar, watch diet and get more exercise  Relevant Medications    fenofibrate (TRICOR) 145 mg tablet    Other Relevant Orders    Comprehensive metabolic panel    Lipid Panel with Direct LDL reflex    TSH, 3rd generation    DMII (diabetes mellitus, type 2) ([facility])       Lab Results   Component Value Date    HGBA1C 6 3 (H) 09/10/2021     Hemoglobin A1c has risen from 5 7-6 3  Likely due to noncompliance with metformin evening dosage  Discussed need to be vigilant about taking his medications as prescribed and to continue with diet and exercise           Relevant Medications    metFORMIN (GLUCOPHAGE) 1000 MG tablet    Other Relevant Orders    Hemoglobin A1c (w/out EAG)      Other Visit Diagnoses     Anxiety    -  Primary    Relevant Medications    ALPRAZolam (XANAX) 0 5 mg tablet    Simple chronic bronchitis (HCC)        Relevant Medications    budesonide-formoterol (Symbicort) 160-4 5 mcg/act inhaler    Smoker        Relevant Medications    varenicline (CHANTIX) 1 mg tablet    Male erectile disorder        Relevant Medications    sildenafil (VIAGRA) 100 mg tablet    Need for vaccination        Relevant Orders    influenza vaccine, quadrivalent, recombinant, PF, 0 5 mL, for patients 18 yr+ (FLUBLOK) (Completed)              Return to office in: P r n  /6 months    Chief Complaint     Chief Complaint   Patient presents with    Follow-up       History of Present Illness      Patient presented for for follow-up of chronic medical problems  He is being treated for hypertension, hyperlipidemia type 2 diabetes  Medications include fenofibrate and Lovaza for lipid  He takes lisinopril hydrochlorothiazide and metoprolol for high blood pressure  He is on metformin 1000 mg b i d  for his diabetes  He notes however that he frequently misses his 2nd dose of medication in the evening  He misses approximately 5/7 days of his evening metformin and Lovaza  The following portions of the patient's history were reviewed and updated as appropriate: allergies, current medications, past family history, past medical history, past social history, past surgical history and problem list     Review of Systems   Review of Systems   Constitutional: Negative  Respiratory: Negative  Cardiovascular: Negative  Gastrointestinal: Negative  Genitourinary: Negative  Musculoskeletal: Negative  Psychiatric/Behavioral: Negative  Active Problem List     Patient Active Problem List   Diagnosis    DMII (diabetes mellitus, type 2) ([facility] [address] )    Hyperlipidemia    Hypertension    Mass of appendix       Objective   /88 (BP Location: Right arm, Patient Position: Sitting, Cuff Size: Standard)   Pulse 64   Temp 97 6 °F (36 4 °C) (Temporal)   Ht 5' 10" (1 778 m)   Wt 99 3 kg (219 lb)   SpO2 98%   BMI 31 42 kg/m²     Physical Exam  Vitals and nursing note reviewed  Constitutional:       General: He is not in acute distress  Appearance: He is well-developed  He is not diaphoretic  HENT:      Head: Normocephalic and atraumatic  Eyes:      General:         Right eye: No discharge  Conjunctiva/sclera: Conjunctivae normal       Pupils: Pupils are equal, round, and reactive to light  Neck:      Thyroid: No thyromegaly     Cardiovascular:      Rate and Rhythm: Normal rate and regular rhythm  Pulmonary:      Effort: Pulmonary effort is normal  No respiratory distress  Breath sounds: Normal breath sounds  Musculoskeletal:      Cervical back: Normal range of motion  Lymphadenopathy:      Cervical: No cervical adenopathy  Skin:     General: Skin is warm and dry  Neurological:      Mental Status: He is alert and oriented to person, place, and time  Psychiatric:         Behavior: Behavior normal          Thought Content:  Thought content normal          Judgment: Judgment normal            Current Medications     Current Outpatient Medications:     albuterol (PROAIR HFA) 90 mcg/act inhaler, Inhale 2 puffs every 6 (six) hours as needed for wheezing, Disp: 5 Inhaler, Rfl: 5    betamethasone dipropionate (DIPROSONE) 0 05 % cream, Apply topically 2 (two) times a day, Disp: 30 g, Rfl: 0    budesonide-formoterol (Symbicort) 160-4 5 mcg/act inhaler, Inhale 2 puffs 2 (two) times a day Rinse mouth after use , Disp: 10 2 g, Rfl: 5    co-enzyme Q-10 30 mg, Take 30 mg by mouth, Disp: , Rfl:     diclofenac (VOLTAREN) 75 mg EC tablet, Take 1 tablet (75 mg total) by mouth 2 (two) times a day With food as needed, Disp: 30 tablet, Rfl: 1    fenofibrate (TRICOR) 145 mg tablet, Take 1 tablet (145 mg total) by mouth daily, Disp: 90 tablet, Rfl: 1    lisinopril-hydrochlorothiazide (PRINZIDE,ZESTORETIC) 20-25 MG per tablet, Take 1 tablet by mouth daily, Disp: 90 tablet, Rfl: 1    metoprolol succinate (TOPROL-XL) 100 mg 24 hr tablet, Take 1 tablet (100 mg total) by mouth daily, Disp: 90 tablet, Rfl: 1    multivitamin (THERAGRAN) TABS, Take 1 tablet by mouth, Disp: , Rfl:     omega-3-acid ethyl esters (LOVAZA) 1 g capsule, Take 2 capsules (2 g total) by mouth 2 (two) times a day, Disp: 360 capsule, Rfl: 1    sildenafil (VIAGRA) 100 mg tablet, Take one tablet daily as needed, Disp: 20 tablet, Rfl: 2    varenicline (CHANTIX) 1 mg tablet, Take 1 tablet (1 mg total) by mouth 2 (two) times a day, Disp: 60 tablet, Rfl: 3    ALPRAZolam (XANAX) 0 5 mg tablet, Take 1 tablet prior to flying as needed  , Disp: 4 tablet, Rfl: 0    metFORMIN (GLUCOPHAGE) 1000 MG tablet, Take 1 tablet (1,000 mg total) by mouth 2 (two) times a day with meals, Disp: 180 tablet, Rfl: 1    Health Maintenance     Health Maintenance   Topic Date Due    Annual Physical  Never done    DTaP,Tdap,and Td Vaccines (1 - Tdap) Never done    DM Eye Exam  04/14/2019    BMI: Followup Plan  11/27/2020    Diabetic Foot Exam  11/27/2020    Depression Screening  03/02/2022    HEMOGLOBIN A1C  03/10/2022    BMI: Adult  08/09/2022    Colorectal Cancer Screening  10/05/2022    Pneumococcal Vaccine: Pediatrics (0 to 5 Years) and At-Risk Patients (6 to 59 Years) (2 of 2 - PPSV23) 11/21/2025    HIV Screening  Completed    Hepatitis C Screening  Completed    Influenza Vaccine  Completed    COVID-19 Vaccine  Completed    HIB Vaccine  Aged Out    Hepatitis B Vaccine  Aged Out    IPV Vaccine  Aged Out    Hepatitis A Vaccine  Aged Out    Meningococcal ACWY Vaccine  Aged Out    HPV Vaccine  Aged Out     Immunization History   Administered Date(s) Administered    INFLUENZA 11/01/2007, 10/20/2008, 10/15/2009, 10/30/2017, 10/17/2018    Influenza Quadrivalent, 6-35 Months IM 11/17/2015, 11/05/2016    Influenza, injectable, quadrivalent, preservative free 0 5 mL 10/02/2020    Influenza, recombinant, quadrivalent,injectable, preservative free 11/27/2019, 09/14/2021    Influenza, seasonal, injectable 11/20/2012, 10/01/2014    Pneumococcal Polysaccharide PPV23 07/31/2014    SARS-CoV-2 / COVID-19 mRNA IM (Pfizer-BioNTech) 03/24/2021, 04/18/2021       Ibeth Allen DO  [facility]

## 2021-09-14 NOTE — ASSESSMENT & PLAN NOTE
Total cholesterol stable however triglycerides significantly elevated at greater than 600  Continue fenofibrate  Need to be sure to take full dose of Lovaza and control blood sugar, watch diet and get more exercise

## 2021-09-14 NOTE — ASSESSMENT & PLAN NOTE
Lab Results   Component Value Date    HGBA1C 6 3 (H) 09/10/2021     Hemoglobin A1c has risen from 5 7-6 3  Likely due to noncompliance with metformin evening dosage  Discussed need to be vigilant about taking his medications as prescribed and to continue with diet and exercise

## 2021-09-27 ENCOUNTER — OFFICE VISIT (OUTPATIENT)
Dept: OBGYN CLINIC | Facility: MEDICAL CENTER | Age: 61
End: 2021-09-27
Payer: COMMERCIAL

## 2021-09-27 VITALS
BODY MASS INDEX: 31.64 KG/M2 | WEIGHT: 221 LBS | SYSTOLIC BLOOD PRESSURE: 154 MMHG | DIASTOLIC BLOOD PRESSURE: 80 MMHG | HEIGHT: 70 IN | HEART RATE: 57 BPM

## 2021-09-27 DIAGNOSIS — M17.12 PRIMARY OSTEOARTHRITIS OF LEFT KNEE: Primary | ICD-10-CM

## 2021-09-27 PROCEDURE — 99213 OFFICE O/P EST LOW 20 MIN: CPT | Performed by: ORTHOPAEDIC SURGERY

## 2021-09-27 PROCEDURE — 3008F BODY MASS INDEX DOCD: CPT | Performed by: ORTHOPAEDIC SURGERY

## 2021-09-27 PROCEDURE — 20610 DRAIN/INJ JOINT/BURSA W/O US: CPT | Performed by: ORTHOPAEDIC SURGERY

## 2021-09-27 RX ORDER — METHYLPREDNISOLONE ACETATE 40 MG/ML
1 INJECTION, SUSPENSION INTRA-ARTICULAR; INTRALESIONAL; INTRAMUSCULAR; SOFT TISSUE
Status: COMPLETED | OUTPATIENT
Start: 2021-09-27 | End: 2021-09-27

## 2021-09-27 RX ORDER — LIDOCAINE HYDROCHLORIDE 10 MG/ML
3 INJECTION, SOLUTION EPIDURAL; INFILTRATION; INTRACAUDAL; PERINEURAL
Status: COMPLETED | OUTPATIENT
Start: 2021-09-27 | End: 2021-09-27

## 2021-09-27 RX ADMIN — METHYLPREDNISOLONE ACETATE 1 ML: 40 INJECTION, SUSPENSION INTRA-ARTICULAR; INTRALESIONAL; INTRAMUSCULAR; SOFT TISSUE at 12:37

## 2021-09-27 RX ADMIN — LIDOCAINE HYDROCHLORIDE 3 ML: 10 INJECTION, SOLUTION EPIDURAL; INFILTRATION; INTRACAUDAL; PERINEURAL at 12:37

## 2021-09-27 NOTE — PROGRESS NOTES
Ortho Sports Medicine Knee Follow Up Visit     Assesment:     61 y o  male left knee medial compartment osteoarthritis    Plan:    Conservative treatment:    Ice to knee for 20 minutes at least 1-2 times daily  PT for ROM/strengthening to knee, hip and core has been ordered  OTC NSAIDS prn for pain  Kev Nguyen will follow up in about 3 months for a possible cortisone injection again  Imaging:    No imaging was available for review today  Injection:    The risks and benefits of the injection (which include but are not limited to: infection, bleeding,damage to nerve/artery, need for further intervention), as well as the risks and benefits of all alternative treatments were explained and understood  The patient elected to proceed with injection  The procedure was done with aseptic technique, and the patient tolerated the procedure well with no complications  A corticosteroid injection was performed  The patient should take 1-2 days off of activity, with gradual return to activity as tolerated  Ice to the knee 1-2 times daily for 20 minutes, for next 24-48 hrs  May consider future corticosteroid injection depending on clinical exam/imaging  Surgery:     No surgery is recommended at this point, continue with conservative treatment plan as noted  Follow up:    Return in about 3 months (around 12/27/2021) for Recheck  Chief Complaint   Patient presents with    Left Knee - Follow-up       History of Present Illness: The patient is returns for follow up of left knee medial compartment osteoarthritis  Since the prior visit, He reports minimal improvement  He states that he received 8-9 weeks of pain relief from the last injection received on 6/17/2021, and no relief from the visco injections received on 4/15/2021  He is interested in another cortisone injection today  Pain is located medial      Pain is improved by rest, ice, NSAIDS and injection    Pain is aggravated by stairs, squatting, weight bearing and walking  Symptoms include clicking, catching, popping and swelling  The patient has tried rest, ice, NSAIDS, bracing and injection  He stopped wearing the brace after noticing it caused him more pain than relief  He has not yet attended physical therapy but is willing to go with a new script given today  Knee Surgical History:  None    Past Medical, Social and Family History:  Past Medical History:   Diagnosis Date    Atopic dermatitis     Condition not found     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    Hyperlipemia     Hypertension     Lateral epicondylitis, left elbow      Past Surgical History:   Procedure Laterality Date    KNEE ARTHROSCOPY       Allergies   Allergen Reactions    Sulfamethoxazole-Trimethoprim Nausea Only and GI Intolerance     Current Outpatient Medications on File Prior to Visit   Medication Sig Dispense Refill    albuterol (PROAIR HFA) 90 mcg/act inhaler Inhale 2 puffs every 6 (six) hours as needed for wheezing 5 Inhaler 5    ALPRAZolam (XANAX) 0 5 mg tablet Take 1 tablet prior to flying as needed  4 tablet 0    betamethasone dipropionate (DIPROSONE) 0 05 % cream Apply topically 2 (two) times a day 30 g 0    budesonide-formoterol (Symbicort) 160-4 5 mcg/act inhaler Inhale 2 puffs 2 (two) times a day Rinse mouth after use   10 2 g 5    co-enzyme Q-10 30 mg Take 30 mg by mouth      diclofenac (VOLTAREN) 75 mg EC tablet Take 1 tablet (75 mg total) by mouth 2 (two) times a day With food as needed 30 tablet 1    fenofibrate (TRICOR) 145 mg tablet Take 1 tablet (145 mg total) by mouth daily 90 tablet 1    lisinopril-hydrochlorothiazide (PRINZIDE,ZESTORETIC) 20-25 MG per tablet Take 1 tablet by mouth daily 90 tablet 1    metFORMIN (GLUCOPHAGE) 1000 MG tablet Take 1 tablet (1,000 mg total) by mouth 2 (two) times a day with meals 180 tablet 1    metoprolol succinate (TOPROL-XL) 100 mg 24 hr tablet Take 1 tablet (100 mg total) by mouth daily 90 tablet 1    multivitamin (THERAGRAN) TABS Take 1 tablet by mouth      omega-3-acid ethyl esters (LOVAZA) 1 g capsule Take 2 capsules (2 g total) by mouth 2 (two) times a day 360 capsule 1    sildenafil (VIAGRA) 100 mg tablet Take one tablet daily as needed 20 tablet 2    varenicline (CHANTIX) 1 mg tablet Take 1 tablet (1 mg total) by mouth 2 (two) times a day 60 tablet 3    [DISCONTINUED] fenofibrate (TRICOR) 145 mg tablet TAKE 1 TABLET DAILY 90 tablet 1    [DISCONTINUED] lisinopril-hydrochlorothiazide (PRINZIDE,ZESTORETIC) 20-25 MG per tablet TAKE 1 TABLET DAILY 90 tablet 1    [DISCONTINUED] metFORMIN (GLUCOPHAGE) 1000 MG tablet TAKE 1 TABLET TWICE DAILY  WITH MEALS 180 tablet 1    [DISCONTINUED] metoprolol succinate (TOPROL-XL) 100 mg 24 hr tablet TAKE 1 TABLET DAILY 90 tablet 1     No current facility-administered medications on file prior to visit  Social History     Socioeconomic History    Marital status:      Spouse name: Not on file    Number of children: Not on file    Years of education: Not on file    Highest education level: Not on file   Occupational History    Not on file   Tobacco Use    Smoking status: Current Every Day Smoker     Packs/day: 0 25    Smokeless tobacco: Never Used   Vaping Use    Vaping Use: Never used   Substance and Sexual Activity    Alcohol use: Yes     Alcohol/week: 2 0 standard drinks     Types: 2 Cans of beer per week     Comment: Social    Drug use: Not on file    Sexual activity: Not on file   Other Topics Concern    Not on file   Social History Narrative    Not on file     Social Determinants of Health     Financial Resource Strain:     Difficulty of Paying Living Expenses:    Food Insecurity:     Worried About Running Out of Food in the Last Year:     [address] in the Last Year:    Transportation Needs:     Lack of Transportation (Medical):      Lack of Transportation (Non-Medical):    Physical Activity:     Days of Exercise per Week:     Minutes of Exercise per Session:    Stress:     Feeling of Stress :    Social Connections:     Frequency of Communication with Friends and Family:     Frequency of Social Gatherings with Friends and Family:     Attends [employer] Services:     Active Member of Clubs or Organizations:     Attends Club or Organization Meetings:     Marital Status:    Intimate Partner Violence:     Fear of Current or Ex-Partner:     Emotionally Abused:     Physically Abused:     Sexually Abused:          I have reviewed the past medical, surgical, social and family history, medications and allergies as documented in the EMR  Review of systems: ROS is negative other than that noted in the HPI  Constitutional: Negative for fatigue and fever  Physical Exam:    Blood pressure 154/80, pulse 57, height 5' 10" (1 778 m), weight 100 kg (221 lb)  General/Constitutional: NAD, well developed, well nourished  HENT: Normocephalic, atraumatic  CV: Intact distal pulses, regular rate  Resp: No respiratory distress or labored breathing  Lymphatic: No lymphadenopathy palpated  Neuro: Alert and Oriented x 3, no focal deficits  Psych: Normal mood, normal affect, normal judgement, normal behavior  Skin: Warm, dry, no rashes, no erythema      Knee Exam (focused): RIGHT LEFT   ROM:   0-130 0-130   Palpation: Effusion negative negative     MJL tenderness Negative Negative     LJL tenderness Negative Negative   Meniscus:  Mitesh Negative Negative    Apley's Compression Negative Negative   Instability: Varus stable stable     Valgus stable stable   Special Tests: Lachman Negative Negative     Posterior drawer Negative Negative     Anterior drawer Negative Negative     Pivot shift not tested not tested     Dial not tested not tested   Patella: Palpation no tenderness no tenderness     Mobility 1/4 1/4     Apprehension Negative Negative   Other: Single leg 1/4 squat not tested not tested   Large joint arthrocentesis: L knee  Universal Protocol:  Consent: Verbal consent obtained    Risks and benefits: risks, benefits and alternatives were discussed  Consent given by: patient  Patient understanding: patient states understanding of the procedure being performed  Patient consent: the patient's understanding of the procedure matches consent given  Patient identity confirmed: verbally with patient    Supporting Documentation  Indications: pain and joint swelling   Procedure Details  Location: knee - L knee  Needle size: 22 G  Ultrasound guidance: no  Approach: anterolateral  Medications administered: 3 mL lidocaine (PF) 1 %; 1 mL methylPREDNISolone acetate 40 mg/mL    Aspirate amount: 50 mL  Aspirate: blood-tinged    Patient tolerance: patient tolerated the procedure well with no immediate complications  Dressing:  Sterile dressing applied              LE NV Exam: +2 DP/PT pulses bilaterally  Sensation intact to light touch L2-S1 bilaterally    No calf tenderness to palpation bilaterally      Knee Imaging    No imaging was performed today      Scribe Attestation    I,:  Neo Gonzalez am acting as a scribe while in the presence of the attending physician :       I,:  Chirag Mcconnell DO personally performed the services described in this documentation    as scribed in my presence :

## 2021-09-28 ENCOUNTER — TELEPHONE (OUTPATIENT)
Dept: FAMILY MEDICINE CLINIC | Facility: CLINIC | Age: 61
End: 2021-09-28

## 2021-09-28 NOTE — TELEPHONE ENCOUNTER
PT is 4 days early from getting his booster vaccine  Per Provider it is ok that is not exactly 6 months   PT can't wait to get the booster, he is going to [facility]

## 2021-10-06 ENCOUNTER — EVALUATION (OUTPATIENT)
Dept: PHYSICAL THERAPY | Facility: CLINIC | Age: 61
End: 2021-10-06
Payer: COMMERCIAL

## 2021-10-06 DIAGNOSIS — M25.562 LEFT MEDIAL KNEE PAIN: ICD-10-CM

## 2021-10-06 DIAGNOSIS — M17.12 PRIMARY OSTEOARTHRITIS OF LEFT KNEE: ICD-10-CM

## 2021-10-06 DIAGNOSIS — M17.12 ARTHRITIS OF LEFT KNEE: Primary | ICD-10-CM

## 2021-10-06 PROCEDURE — 97161 PT EVAL LOW COMPLEX 20 MIN: CPT | Performed by: PHYSICAL THERAPIST

## 2021-10-06 PROCEDURE — 97110 THERAPEUTIC EXERCISES: CPT | Performed by: PHYSICAL THERAPIST

## 2021-10-13 ENCOUNTER — OFFICE VISIT (OUTPATIENT)
Dept: PHYSICAL THERAPY | Facility: CLINIC | Age: 61
End: 2021-10-13
Payer: COMMERCIAL

## 2021-10-13 DIAGNOSIS — M25.562 LEFT MEDIAL KNEE PAIN: ICD-10-CM

## 2021-10-13 DIAGNOSIS — M17.12 ARTHRITIS OF LEFT KNEE: Primary | ICD-10-CM

## 2021-10-13 DIAGNOSIS — M17.12 PRIMARY OSTEOARTHRITIS OF LEFT KNEE: ICD-10-CM

## 2021-10-13 PROCEDURE — 97140 MANUAL THERAPY 1/> REGIONS: CPT | Performed by: PHYSICAL THERAPIST

## 2021-10-13 PROCEDURE — 97110 THERAPEUTIC EXERCISES: CPT | Performed by: PHYSICAL THERAPIST

## 2021-10-13 PROCEDURE — 97530 THERAPEUTIC ACTIVITIES: CPT | Performed by: PHYSICAL THERAPIST

## 2021-10-14 ENCOUNTER — OFFICE VISIT (OUTPATIENT)
Dept: PHYSICAL THERAPY | Facility: CLINIC | Age: 61
End: 2021-10-14
Payer: COMMERCIAL

## 2021-10-14 ENCOUNTER — IMMUNIZATIONS (OUTPATIENT)
Dept: FAMILY MEDICINE CLINIC | Facility: HOSPITAL | Age: 61
End: 2021-10-14

## 2021-10-14 DIAGNOSIS — M17.12 PRIMARY OSTEOARTHRITIS OF LEFT KNEE: ICD-10-CM

## 2021-10-14 DIAGNOSIS — M25.562 LEFT MEDIAL KNEE PAIN: ICD-10-CM

## 2021-10-14 DIAGNOSIS — Z23 ENCOUNTER FOR IMMUNIZATION: Primary | ICD-10-CM

## 2021-10-14 DIAGNOSIS — M17.12 ARTHRITIS OF LEFT KNEE: Primary | ICD-10-CM

## 2021-10-14 PROCEDURE — 97110 THERAPEUTIC EXERCISES: CPT | Performed by: PHYSICAL THERAPIST

## 2021-10-14 PROCEDURE — 0001A SARS-COV-2 / COVID-19 MRNA VACCINE (PFIZER-BIONTECH) 30 MCG: CPT

## 2021-10-14 PROCEDURE — 97530 THERAPEUTIC ACTIVITIES: CPT | Performed by: PHYSICAL THERAPIST

## 2021-10-14 PROCEDURE — 97140 MANUAL THERAPY 1/> REGIONS: CPT | Performed by: PHYSICAL THERAPIST

## 2021-10-14 PROCEDURE — 91300 SARS-COV-2 / COVID-19 MRNA VACCINE (PFIZER-BIONTECH) 30 MCG: CPT

## 2021-11-27 DIAGNOSIS — E78.5 HYPERLIPIDEMIA, UNSPECIFIED HYPERLIPIDEMIA TYPE: ICD-10-CM

## 2021-11-29 RX ORDER — OMEGA-3-ACID ETHYL ESTERS 1 G/1
CAPSULE, LIQUID FILLED ORAL
Qty: 360 CAPSULE | Refills: 1 | Status: SHIPPED | OUTPATIENT
Start: 2021-11-29 | End: 2022-05-05

## 2021-12-17 ENCOUNTER — OFFICE VISIT (OUTPATIENT)
Dept: OBGYN CLINIC | Facility: MEDICAL CENTER | Age: 61
End: 2021-12-17
Payer: COMMERCIAL

## 2021-12-17 ENCOUNTER — APPOINTMENT (OUTPATIENT)
Dept: LAB | Facility: MEDICAL CENTER | Age: 61
End: 2021-12-17
Payer: COMMERCIAL

## 2021-12-17 VITALS
SYSTOLIC BLOOD PRESSURE: 149 MMHG | HEIGHT: 70 IN | TEMPERATURE: 97.7 F | DIASTOLIC BLOOD PRESSURE: 71 MMHG | BODY MASS INDEX: 33.18 KG/M2 | HEART RATE: 64 BPM | WEIGHT: 231.8 LBS

## 2021-12-17 DIAGNOSIS — M17.12 PRIMARY OSTEOARTHRITIS OF LEFT KNEE: Primary | ICD-10-CM

## 2021-12-17 DIAGNOSIS — F17.200 CURRENT EVERY DAY SMOKER: ICD-10-CM

## 2021-12-17 PROCEDURE — 3078F DIAST BP <80 MM HG: CPT | Performed by: ORTHOPAEDIC SURGERY

## 2021-12-17 PROCEDURE — G0480 DRUG TEST DEF 1-7 CLASSES: HCPCS

## 2021-12-17 PROCEDURE — 3077F SYST BP >= 140 MM HG: CPT | Performed by: ORTHOPAEDIC SURGERY

## 2021-12-17 PROCEDURE — 99213 OFFICE O/P EST LOW 20 MIN: CPT | Performed by: ORTHOPAEDIC SURGERY

## 2021-12-17 PROCEDURE — 3008F BODY MASS INDEX DOCD: CPT | Performed by: ORTHOPAEDIC SURGERY

## 2021-12-17 PROCEDURE — 80323 ALKALOIDS NOS: CPT

## 2021-12-22 ENCOUNTER — VBI (OUTPATIENT)
Dept: ADMINISTRATIVE | Facility: OTHER | Age: 61
End: 2021-12-22

## 2021-12-23 LAB
COTININE SERPL-MCNC: <1 NG/ML
NICOTINE SERPL-MCNC: <1 NG/ML

## 2022-01-17 ENCOUNTER — OFFICE VISIT (OUTPATIENT)
Dept: OBGYN CLINIC | Facility: MEDICAL CENTER | Age: 62
End: 2022-01-17
Payer: COMMERCIAL

## 2022-01-17 ENCOUNTER — APPOINTMENT (OUTPATIENT)
Dept: RADIOLOGY | Facility: MEDICAL CENTER | Age: 62
End: 2022-01-17
Payer: COMMERCIAL

## 2022-01-17 VITALS
HEIGHT: 70 IN | TEMPERATURE: 97.6 F | BODY MASS INDEX: 33.96 KG/M2 | SYSTOLIC BLOOD PRESSURE: 150 MMHG | WEIGHT: 237.2 LBS | DIASTOLIC BLOOD PRESSURE: 80 MMHG | HEART RATE: 82 BPM

## 2022-01-17 DIAGNOSIS — M17.12 PRIMARY OSTEOARTHRITIS OF LEFT KNEE: ICD-10-CM

## 2022-01-17 DIAGNOSIS — M17.12 PRIMARY OSTEOARTHRITIS OF LEFT KNEE: Primary | ICD-10-CM

## 2022-01-17 DIAGNOSIS — Z01.818 PREOPERATIVE TESTING: ICD-10-CM

## 2022-01-17 DIAGNOSIS — Z01.818 ENCOUNTER FOR PREADMISSION TESTING: ICD-10-CM

## 2022-01-17 PROCEDURE — 3077F SYST BP >= 140 MM HG: CPT | Performed by: ORTHOPAEDIC SURGERY

## 2022-01-17 PROCEDURE — 3079F DIAST BP 80-89 MM HG: CPT | Performed by: ORTHOPAEDIC SURGERY

## 2022-01-17 PROCEDURE — 3008F BODY MASS INDEX DOCD: CPT | Performed by: ORTHOPAEDIC SURGERY

## 2022-01-17 PROCEDURE — 77073 BONE LENGTH STUDIES: CPT

## 2022-01-17 PROCEDURE — 99213 OFFICE O/P EST LOW 20 MIN: CPT | Performed by: ORTHOPAEDIC SURGERY

## 2022-01-17 RX ORDER — MULTIVITAMIN
1 TABLET ORAL DAILY
Qty: 30 TABLET | Refills: 1 | Status: SHIPPED | OUTPATIENT
Start: 2022-01-17

## 2022-01-17 RX ORDER — SODIUM CHLORIDE 9 MG/ML
75 INJECTION, SOLUTION INTRAVENOUS CONTINUOUS
Status: CANCELLED | OUTPATIENT
Start: 2022-06-28

## 2022-01-17 RX ORDER — FOLIC ACID 1 MG/1
1 TABLET ORAL DAILY
Qty: 30 TABLET | Refills: 1 | Status: SHIPPED | OUTPATIENT
Start: 2022-01-17 | End: 2022-03-21 | Stop reason: ALTCHOICE

## 2022-01-17 RX ORDER — ACETAMINOPHEN 325 MG/1
975 TABLET ORAL ONCE
Status: CANCELLED | OUTPATIENT
Start: 2022-06-28 | End: 2022-01-17

## 2022-01-17 RX ORDER — CHLORHEXIDINE GLUCONATE 0.12 MG/ML
15 RINSE ORAL ONCE
Status: CANCELLED | OUTPATIENT
Start: 2022-06-28 | End: 2022-01-17

## 2022-01-17 RX ORDER — CEFAZOLIN SODIUM 2 G/50ML
2000 SOLUTION INTRAVENOUS ONCE
Status: CANCELLED | OUTPATIENT
Start: 2022-06-28 | End: 2022-01-17

## 2022-01-17 RX ORDER — FERROUS SULFATE TAB EC 324 MG (65 MG FE EQUIVALENT) 324 (65 FE) MG
324 TABLET DELAYED RESPONSE ORAL DAILY
Qty: 30 TABLET | Refills: 1 | Status: SHIPPED | OUTPATIENT
Start: 2022-01-17

## 2022-01-17 RX ORDER — CHLORHEXIDINE GLUCONATE 4 G/100ML
SOLUTION TOPICAL DAILY PRN
Status: CANCELLED | OUTPATIENT
Start: 2022-06-28

## 2022-01-17 RX ORDER — ASCORBIC ACID 500 MG
500 TABLET ORAL DAILY
Qty: 30 TABLET | Refills: 1 | Status: SHIPPED | OUTPATIENT
Start: 2022-01-17 | End: 2022-02-21

## 2022-01-17 NOTE — PROGRESS NOTES
Assessment/Plan:  1  Primary osteoarthritis of left knee    2  Encounter for preadmission testing    3  Preoperative testing      Orders Placed This Encounter   Procedures    PAT Covid Screening    XR bone length (scanogram)    Comprehensive metabolic panel    Hemoglobin A1C W/EAG Estimation    CBC and differential    C-reactive protein    Anemia Panel w/Reflex    Protime-INR    APTT    Ambulatory Referral to Physical Therapy    Ambulatory Referral to Cardiology    [facility] Ambulatory referral to Physical Therapy    Ambulatory referral to Cardiology    Type and screen       Return for post operative visit after left TKA  Patient has severe left knee arthritis  he has tried conservative treatment without adequate relief  We discussed treatment options as well as risks and benefits of treatment options  The patient would like to proceed with a lefttotal knee arthroplasty  The risks of surgery include, but are not limited to infection, blood clot, wound healing problems, blood loss, damage to blood vessels and nerves, persistent pain and stiffness, fracture, need for additional surgery, need for revision surgery, failure of hardware, heart attack, stroke, death  The patient understood and agreed to by oral and written consent  I answered all questions regarding surgery  The patient will be on Lovenox for DVT prophylaxis  The patient will obtain clearance from their primary care doctor and cardiology due to past smoking history    History of MRSA: no  History of blood clots: no  Family history of blood clots: no  History of Hepatitis C:no  History of HIV: no  Are you a smoker:no  Quit 3 months ago  Negative nicotine test   Are you diabetic:yes   Last A1c was 6 3 on 9/10/2021  Do you see a cardiologist: no  Have you been vaccinated for COVID:yes  Have you seen a dentist in the past year: yes    Subjective   Chief Complaint:   Chief Complaint   Patient presents with   Pamela Potts is a 64 y o  male who presents for follow up due to continued left knee pain because of severe osteoarthritis  Patient reports that his pain is present on a daily basis, worse with ambulating and standing  He localizes his pain about the medial and lateral aspect of the knee  Patient received a cortisone injection on 9/27/2021 with minimal benefit  He continues to take Advil prn for pain relief  He states that he quit smoking about 3 months ago and had a nicotine test  No numbness or tingling  No fevers or chills  Review of Systems  ROS:    See HPI for musculoskeletal review     All other systems reviewed are negative     History:  Past Medical History:   Diagnosis Date    Atopic dermatitis     Condition not found     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    Hyperlipemia     Hypertension     Lateral epicondylitis, left elbow      Past Surgical History:   Procedure Laterality Date    KNEE ARTHROSCOPY       Social History   Social History     Substance and Sexual Activity   Alcohol Use Yes    Alcohol/week: 2 0 standard drinks    Types: 2 Cans of beer per week    Comment: Social     Social History     Substance and Sexual Activity   Drug Use Not on file     Social History     Tobacco Use   Smoking Status Current Every Day Smoker    Packs/day: 0 25   Smokeless Tobacco Never Used     Family History:   Family History   Problem Relation Age of Onset    Colon polyps Mother         Inflammatory    Diabetes Father        Meds/Allergies   (Not in a hospital admission)    Allergies   Allergen Reactions    Sulfamethoxazole-Trimethoprim Nausea Only and GI Intolerance          Objective     /80   Pulse 82   Temp 97 6 °F (36 4 °C)   Ht 5' 10" (1 778 m)   Wt 108 kg (237 lb 3 2 oz)   BMI 34 03 kg/m²      PE:  AAOx 3  WDWN  Hearing intact, no drainage from eyes  no audible wheezing  no abdominal distension  LE compartments soft, skin intact    Ortho Exam:  left knee:    No erythema, no warmth  +crepitus  AROM:  5-110  PROM: 5-120  No laxity with varus/valgus stress  Medial/lateral joint line tenderness  AT/GS intact    No ipsilateral hip pain with ROM      Imaging Studies: I have personally reviewed pertinent films in PACS     X Ray Left Knee 8/9/2021: X-ray demonstrates joint space narrowing, sclerosis, and osteophytes consistent with severe arthritis (Grade 4 changes)      Scribe Attestation    I,:  Byron Pollock am acting as a scribe while in the presence of the attending physician :       I,:  Precious Leal, DO personally performed the services described in this documentation    as scribed in my presence :

## 2022-02-08 ENCOUNTER — TELEPHONE (OUTPATIENT)
Dept: OBGYN CLINIC | Facility: HOSPITAL | Age: 62
End: 2022-02-08

## 2022-02-14 ENCOUNTER — APPOINTMENT (OUTPATIENT)
Dept: LAB | Facility: CLINIC | Age: 62
End: 2022-02-14
Payer: COMMERCIAL

## 2022-02-14 DIAGNOSIS — M17.12 PRIMARY OSTEOARTHRITIS OF LEFT KNEE: ICD-10-CM

## 2022-02-14 DIAGNOSIS — Z01.818 PREOPERATIVE TESTING: ICD-10-CM

## 2022-02-14 DIAGNOSIS — Z01.818 ENCOUNTER FOR PREADMISSION TESTING: ICD-10-CM

## 2022-02-14 LAB
ABO GROUP BLD: NORMAL
ALBUMIN SERPL BCP-MCNC: 3.8 G/DL (ref 3.5–5)
ALP SERPL-CCNC: 75 U/L (ref 46–116)
ALT SERPL W P-5'-P-CCNC: 48 U/L (ref 12–78)
ANION GAP SERPL CALCULATED.3IONS-SCNC: 9 MMOL/L (ref 4–13)
APTT PPP: 27 SECONDS (ref 23–37)
AST SERPL W P-5'-P-CCNC: 23 U/L (ref 5–45)
BASOPHILS # BLD AUTO: 0.04 THOUSANDS/ΜL (ref 0–0.1)
BASOPHILS NFR BLD AUTO: 1 % (ref 0–1)
BILIRUB SERPL-MCNC: 1.33 MG/DL (ref 0.2–1)
BLD GP AB SCN SERPL QL: NEGATIVE
BUN SERPL-MCNC: 24 MG/DL (ref 5–25)
CALCIUM SERPL-MCNC: 9.6 MG/DL (ref 8.3–10.1)
CHLORIDE SERPL-SCNC: 103 MMOL/L (ref 100–108)
CO2 SERPL-SCNC: 22 MMOL/L (ref 21–32)
CREAT SERPL-MCNC: 1 MG/DL (ref 0.6–1.3)
CRP SERPL QL: 62 MG/L
EOSINOPHIL # BLD AUTO: 0.4 THOUSAND/ΜL (ref 0–0.61)
EOSINOPHIL NFR BLD AUTO: 5 % (ref 0–6)
ERYTHROCYTE [DISTWIDTH] IN BLOOD BY AUTOMATED COUNT: 12.4 % (ref 11.6–15.1)
EST. AVERAGE GLUCOSE BLD GHB EST-MCNC: 180 MG/DL
FERRITIN SERPL-MCNC: 1012 NG/ML (ref 8–388)
GFR SERPL CREATININE-BSD FRML MDRD: 80 ML/MIN/1.73SQ M
GLUCOSE P FAST SERPL-MCNC: 183 MG/DL (ref 65–99)
HBA1C MFR BLD: 7.9 %
HCT VFR BLD AUTO: 46.4 % (ref 36.5–49.3)
HGB BLD-MCNC: 16 G/DL (ref 12–17)
IMM GRANULOCYTES # BLD AUTO: 0.04 THOUSAND/UL (ref 0–0.2)
IMM GRANULOCYTES NFR BLD AUTO: 1 % (ref 0–2)
INR PPP: 1 (ref 0.84–1.19)
IRON SATN MFR SERPL: 14 % (ref 20–50)
IRON SERPL-MCNC: 45 UG/DL (ref 65–175)
LYMPHOCYTES # BLD AUTO: 1.29 THOUSANDS/ΜL (ref 0.6–4.47)
LYMPHOCYTES NFR BLD AUTO: 15 % (ref 14–44)
MCH RBC QN AUTO: 32.7 PG (ref 26.8–34.3)
MCHC RBC AUTO-ENTMCNC: 34.5 G/DL (ref 31.4–37.4)
MCV RBC AUTO: 95 FL (ref 82–98)
MONOCYTES # BLD AUTO: 1.3 THOUSAND/ΜL (ref 0.17–1.22)
MONOCYTES NFR BLD AUTO: 15 % (ref 4–12)
NEUTROPHILS # BLD AUTO: 5.75 THOUSANDS/ΜL (ref 1.85–7.62)
NEUTS SEG NFR BLD AUTO: 63 % (ref 43–75)
NRBC BLD AUTO-RTO: 0 /100 WBCS
PLATELET # BLD AUTO: 189 THOUSANDS/UL (ref 149–390)
PMV BLD AUTO: 9.8 FL (ref 8.9–12.7)
POTASSIUM SERPL-SCNC: 3.8 MMOL/L (ref 3.5–5.3)
PROT SERPL-MCNC: 8 G/DL (ref 6.4–8.2)
PROTHROMBIN TIME: 12.8 SECONDS (ref 11.6–14.5)
RBC # BLD AUTO: 4.89 MILLION/UL (ref 3.88–5.62)
RH BLD: NEGATIVE
SODIUM SERPL-SCNC: 134 MMOL/L (ref 136–145)
SPECIMEN EXPIRATION DATE: NORMAL
TIBC SERPL-MCNC: 322 UG/DL (ref 250–450)
WBC # BLD AUTO: 8.82 THOUSAND/UL (ref 4.31–10.16)

## 2022-02-14 PROCEDURE — 86900 BLOOD TYPING SEROLOGIC ABO: CPT

## 2022-02-14 PROCEDURE — 83036 HEMOGLOBIN GLYCOSYLATED A1C: CPT

## 2022-02-14 PROCEDURE — 85610 PROTHROMBIN TIME: CPT

## 2022-02-14 PROCEDURE — 86901 BLOOD TYPING SEROLOGIC RH(D): CPT

## 2022-02-14 PROCEDURE — 36415 COLL VENOUS BLD VENIPUNCTURE: CPT

## 2022-02-14 PROCEDURE — 82728 ASSAY OF FERRITIN: CPT

## 2022-02-14 PROCEDURE — 80053 COMPREHEN METABOLIC PANEL: CPT

## 2022-02-14 PROCEDURE — 86850 RBC ANTIBODY SCREEN: CPT

## 2022-02-14 PROCEDURE — 83550 IRON BINDING TEST: CPT

## 2022-02-14 PROCEDURE — 3051F HG A1C>EQUAL 7.0%<8.0%: CPT | Performed by: INTERNAL MEDICINE

## 2022-02-14 PROCEDURE — 83540 ASSAY OF IRON: CPT

## 2022-02-14 PROCEDURE — 86140 C-REACTIVE PROTEIN: CPT

## 2022-02-14 PROCEDURE — 85730 THROMBOPLASTIN TIME PARTIAL: CPT

## 2022-02-14 PROCEDURE — 85025 COMPLETE CBC W/AUTO DIFF WBC: CPT

## 2022-02-16 NOTE — TELEPHONE ENCOUNTER
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

## 2022-02-21 ENCOUNTER — OFFICE VISIT (OUTPATIENT)
Dept: FAMILY MEDICINE CLINIC | Facility: CLINIC | Age: 62
End: 2022-02-21
Payer: COMMERCIAL

## 2022-02-21 ENCOUNTER — APPOINTMENT (OUTPATIENT)
Dept: PREADMISSION TESTING | Facility: HOSPITAL | Age: 62
End: 2022-02-21
Payer: COMMERCIAL

## 2022-02-21 VITALS
SYSTOLIC BLOOD PRESSURE: 140 MMHG | TEMPERATURE: 97.6 F | BODY MASS INDEX: 31.3 KG/M2 | DIASTOLIC BLOOD PRESSURE: 84 MMHG | WEIGHT: 218.6 LBS | HEART RATE: 64 BPM | RESPIRATION RATE: 18 BRPM | HEIGHT: 70 IN | OXYGEN SATURATION: 96 %

## 2022-02-21 DIAGNOSIS — I10 PRIMARY HYPERTENSION: ICD-10-CM

## 2022-02-21 DIAGNOSIS — E11.65 TYPE 2 DIABETES MELLITUS WITH HYPERGLYCEMIA, WITHOUT LONG-TERM CURRENT USE OF INSULIN (HCC): Primary | ICD-10-CM

## 2022-02-21 DIAGNOSIS — R01.1 MURMUR: ICD-10-CM

## 2022-02-21 DIAGNOSIS — F41.9 ANXIETY: ICD-10-CM

## 2022-02-21 PROCEDURE — 3079F DIAST BP 80-89 MM HG: CPT | Performed by: FAMILY MEDICINE

## 2022-02-21 PROCEDURE — 99214 OFFICE O/P EST MOD 30 MIN: CPT | Performed by: FAMILY MEDICINE

## 2022-02-21 PROCEDURE — 3077F SYST BP >= 140 MM HG: CPT | Performed by: FAMILY MEDICINE

## 2022-02-21 RX ORDER — LANCETS 33 GAUGE
EACH MISCELLANEOUS
Qty: 100 EACH | Refills: 3 | Status: SHIPPED | OUTPATIENT
Start: 2022-02-21

## 2022-02-21 RX ORDER — BLOOD-GLUCOSE METER
KIT MISCELLANEOUS
Qty: 1 KIT | Refills: 0 | Status: SHIPPED | OUTPATIENT
Start: 2022-02-21 | End: 2022-02-21 | Stop reason: SDUPTHER

## 2022-02-21 RX ORDER — BLOOD SUGAR DIAGNOSTIC
STRIP MISCELLANEOUS
Qty: 100 EACH | Refills: 3 | Status: SHIPPED | OUTPATIENT
Start: 2022-02-21

## 2022-02-21 RX ORDER — LANCETS 33 GAUGE
EACH MISCELLANEOUS
Qty: 100 EACH | Refills: 3 | Status: SHIPPED | OUTPATIENT
Start: 2022-02-21 | End: 2022-02-21 | Stop reason: SDUPTHER

## 2022-02-21 RX ORDER — BLOOD-GLUCOSE METER
KIT MISCELLANEOUS
Qty: 1 KIT | Refills: 0 | Status: SHIPPED | OUTPATIENT
Start: 2022-02-21

## 2022-02-21 NOTE — ASSESSMENT & PLAN NOTE
Advised need to change diet; I believe he would benefit from diabetes education to help better manage his diet on his own; add jardiance, reviewed medication and potential SE; recheck in 1 month; advised home monitoring; advised on diet modifications; pt to set up nurse visit for glucometer use  Lab Results   Component Value Date    HGBA1C 7 9 (H) 02/14/2022

## 2022-02-21 NOTE — PROGRESS NOTES
Assessment/Plan:     1  Type 2 diabetes mellitus with hyperglycemia, without long-term current use of insulin (HCC)  Assessment & Plan:  Advised need to change diet; I believe he would benefit from diabetes education to help better manage his diet on his own; add jardiance, reviewed medication and potential SE; recheck in 1 month; advised home monitoring; advised on diet modifications; pt to set up nurse visit for glucometer use  Lab Results   Component Value Date    HGBA1C 7 9 (H) 02/14/2022       Orders:  -     Empagliflozin 10 MG TABS; Take 1 tablet (10 mg total) by mouth every morning  -     Ambulatory referral to Diabetic Education; Future; Expected date: 02/21/2022  -     glucose blood (OneTouch Verio) test strip; Check blood sugars once daily  Please substitute with appropriate alternative as covered by patient's insurance  Dx: E11 65  -     Comprehensive metabolic panel; Future  -     OneTouch Delica Lancets 10U MISC; Check blood sugars once daily  Please substitute with appropriate alternative as covered by patient's insurance  Dx: E11 65  -     Blood Glucose Monitoring Suppl (OneTouch Verio Reflect) w/Device KIT; Check blood sugars once daily  Please substitute with appropriate alternative as covered by patient's insurance  Dx: E11 65    2  Murmur  Assessment & Plan:  No prior Echo; pt has f/u with cardiology tomorrow; he is asymptomatic      3  Primary hypertension  Assessment & Plan:  Improved from last check; if remains above goal will need to increase dosage; advised on lifestyle modifications including weight loss; recheck in 1 month      4  BMI 31 0-31 9,adult  Assessment & Plan:  Advised on diet modifications and weight loss      5  Anxiety  Assessment & Plan:  Declines counseling services at this time for loss of wife; advised to call if he changes his mind          Subjective:      Patient ID: Miguel A Cooney is a 64 y o  male  Patient states his knee surgery was delayed due to elevated A1C  DM: Pt admits to poor diet  He does take metformin every day and has been compliant with that  Sometimes will eat things like donuts for dinner or grab things he knows he should not  Admits diet has been worse since wife passed away and has been managing on his own  Anxiety: Denies wanting counseling services  Feels he is managing okay  HTN: Patient states BP is usually lower at home  No known history of murmur  The following portions of the patient's history were reviewed and updated as appropriate: allergies, current medications, past family history, past medical history, past social history, past surgical history, and problem list     Review of Systems   Constitutional: Negative for chills and fever  Respiratory: Negative  Cardiovascular: Negative  Musculoskeletal: Positive for arthralgias  Objective:      /84 (BP Location: Right arm, Patient Position: Sitting, Cuff Size: Adult)   Pulse 64   Temp 97 6 °F (36 4 °C) (Temporal)   Resp 18   Ht 5' 10" (1 778 m)   Wt 99 2 kg (218 lb 9 6 oz)   SpO2 96%   BMI 31 37 kg/m²          Physical Exam  Vitals reviewed  Constitutional:       General: He is not in acute distress  Appearance: Normal appearance  He is not ill-appearing, toxic-appearing or diaphoretic  HENT:      Head: Normocephalic and atraumatic  Eyes:      General: No scleral icterus  Right eye: No discharge  Left eye: No discharge  Conjunctiva/sclera: Conjunctivae normal    Cardiovascular:      Rate and Rhythm: Normal rate and regular rhythm  Pulses: Normal pulses  Heart sounds: Murmur heard  No gallop  Pulmonary:      Effort: Pulmonary effort is normal  No respiratory distress  Breath sounds: Normal breath sounds  No stridor  No wheezing, rhonchi or rales  Musculoskeletal:      Right lower leg: No edema  Left lower leg: No edema  Neurological:      General: No focal deficit present        Mental Status: He is alert and oriented to person, place, and time  Psychiatric:         Mood and Affect: Mood normal          Behavior: Behavior normal          Thought Content:  Thought content normal          Judgment: Judgment normal

## 2022-02-21 NOTE — TELEPHONE ENCOUNTER
Pt called NN this AM  Did make him aware of his   HbA1c result of 7 9 being above the protocol  He   reports he is taking metformin currently as   prescribed by his PCP, he is not established with   an endocrinologist but would be agreeable to seeing   one if Dr Dumont or PCP felt it would be helpful    He was educated goal to proceed to surgery is   generally HbA1c 7 or less but second threshold is   7 5 if tries to optimize, discussed HbA1c   optimization typically requires a 90 day   postponement  Pt is understanding of an OR date   postponement to allow him time to optimize his   HbA1c and blood sugar control  He plans to keep   his PCP appt today to discuss BG management,   potentially if endo needed etc   NN will send   message to PAT that will need to postpone appt   until closer to new OR date   He was advised to   keep PT and cardiology appts tomorrow   He is   expecting call back to discuss new OR date and   will update NN after his PCP appt today      AL Surgery coordinator-   are there any dates mid June we can move Carlton's   OR date to, to accommodate his HbA1c optimization  His post-op appts will also need to be moved

## 2022-02-21 NOTE — ASSESSMENT & PLAN NOTE
Improved from last check; if remains above goal will need to increase dosage; advised on lifestyle modifications including weight loss; recheck in 1 month

## 2022-02-21 NOTE — PROGRESS NOTES
PT Evaluation     Today's date: 2022  Patient name: Jaime Diop  : 1960  MRN: [id number]  Referring provider: Sylvester Lambert  Dx:   Encounter Diagnosis     ICD-10-CM    1  Primary osteoarthritis of left knee  M17 12 Ambulatory Referral to Physical Therapy     Ambulatory referral to Physical Therapy   2  Encounter for preadmission testing  Z01 [id number] Ambulatory referral to Physical Therapy   3  Preoperative testing  Z01 [id number] Ambulatory referral to Physical Therapy       Start Time: 0900  Stop Time: 1000  Total time in clinic (min): 60 minutes    Assessment  Assessment details: 65 yo male referred to PT with dx of L knee OA for Pre-hab PT prior to L TKR scheduled for ~ May 2022  Pt presents with 8-10/10 pain scale range, L knee deformity and gait deviations that impact his WBing mari with daily activities:  He has L knee -5 deg ext and 122 flex (<R knee)  He would benefit from 1-2 sessions of PT prior to surgery in order to educate pt about TKR and educate/ initiate HEP  Impairments: abnormal gait, abnormal or restricted ROM, activity intolerance, impaired physical strength, lacks appropriate home exercise program and weight-bearing intolerance    Symptom irritability: moderateUnderstanding of Dx/Px/POC: excellent  Goals  Pt will achieve the following goals in 2 weeks  STG/LTG  1  indep with HEP in order to maximize current function prior to L TKR  Surgery (~May 2022) and familiarize pt with exer to be performed following surgery    LTG to be established upon pt's post-op return    Plan  Plan details: POC including interventions and duration to be further assessed upon pt's return to PT post-op    Patient would benefit from: skilled physical therapy  Referral necessary: No  Planned therapy interventions: home exercise program and therapeutic exercise  Frequency: 1x week  Duration in visits: 2  Duration in weeks: 2  Plan of Care beginning date: 2022  Plan of Care expiration date: 3/8/2022  Treatment plan discussed with: patient        Subjective Evaluation    History of Present Illness  Onset date: ~ Oct  2020  Mechanism of injury: 2022 Eval-Pt reports onset of L knee pain ~Oct of 2020  Pt had x-rays L knee at that time and diagnosed with OA L knee  Pt has been getting cortisone  injections every 6 months and had course of Euflexa and had a short course of PT Fall   He is now scheduled for L TKR May 2022 and is referred to PT for pre-op visit  Sleep ok; able to work; avoids activities involving deep squat  Doesn't mari meds, so doesn't take; Not using ice/heat-no change  Avoids long distance walking  LEFS score 49/80  Quality of life: fair    Pain  Current pain ratin  At best pain ratin  At worst pain rating: 10  Location: L knee-all over  Aggravating factors: standing and walking  Progression: worsening    Social Support  Steps to enter house: yes  3  Stairs in house: yes (up-crawls or step to step;  down reciprocal; with railing)   Lives in: multiple-level home  Lives with: adult children (grown son)    Employment status: working ([employer]  sits-still working)  Patient Goals  Patient goal: establish HEP/TKR education (pt goals to be further assessed following TKR surgery)        Objective     Observations     Additional Observation Details  L knee varus deformity with enlarged bony prominences (femoral condyles) and edema present    Neurological Testing     Sensation     Knee   Left Knee   Intact: light touch    Right Knee   Intact: light touch     Active Range of Motion   Left Knee   Flexion: 122 (supine/KTC) degrees   Extension: -5 (supine) degrees     Right Knee   Flexion: 127 (supine/KTC) degrees   Extension: 0 (supine) degrees     Additional Active Range of Motion Details  No pain to palp of L thigh/knee/calf regions;   Pat mobility intact with some minor restriction with lat glides    Passive Range of Motion   Left Hip   Flexion: 65 (SLR) degrees     Right Hip   Flexion: 65 (SLR) degrees     Strength/Myotome Testing     Left Hip   Planes of Motion   Flexion: 4  Extension: 4  Abduction: 4+    Right Hip   Planes of Motion   Flexion: 4  Extension: 4  Abduction: 4    Left Knee   Flexion: 4  Prone flexion: 4  Extension: 4    Right Knee   Flexion: 4  Prone flexion: 4  Extension: 4+    Additional Strength Details  HT raise intact unsupported  Deep squat partial unsupported-weightshift R  SLS R 10"  L <2"    Ambulation     Comments   Amb indep with spc x clinic distances and parking lot to clinic  LLE limp:  L knee in flexed position, though with dec flex movement through gait cycle, pt with weight shift R and short RLE step length;   Pt instructed with proper use of spc (to R hand v  L)      Flowsheet Rows      Most Recent Value   PT/OT G-Codes    Current Score 50   Projected Score 67   Assessment Type Evaluation             Precautions: HTN, DM       2/22/20            Manuals #1 change 3/11 PO visit? Neuro Re-Ed             Pt educ (TKR; post op) AE                                                                                          Ther Ex             Stand:  -H/T raises  -Squats  -Knee flex (butt kicks)  -march -            Stand:   Alt hip AROM (flex, abd, ext)                                                    Supine:  QS/SAQ  SLR  bridging  HS flex (KTC)   x10ea            S/l hip abd  Prone hip ext            Knee flex x10  x10  -            Self SLR with strap (I/ER bias) 3x20"                                      Ther Activity                                       Gait Training                                       Modalities

## 2022-02-22 ENCOUNTER — EVALUATION (OUTPATIENT)
Dept: PHYSICAL THERAPY | Facility: CLINIC | Age: 62
End: 2022-02-22
Payer: COMMERCIAL

## 2022-02-22 ENCOUNTER — TELEPHONE (OUTPATIENT)
Dept: OBGYN CLINIC | Facility: MEDICAL CENTER | Age: 62
End: 2022-02-22

## 2022-02-22 ENCOUNTER — CONSULT (OUTPATIENT)
Dept: CARDIOLOGY CLINIC | Facility: CLINIC | Age: 62
End: 2022-02-22
Payer: COMMERCIAL

## 2022-02-22 VITALS
DIASTOLIC BLOOD PRESSURE: 70 MMHG | BODY MASS INDEX: 32.61 KG/M2 | WEIGHT: 227.8 LBS | HEIGHT: 70 IN | SYSTOLIC BLOOD PRESSURE: 124 MMHG | HEART RATE: 62 BPM

## 2022-02-22 DIAGNOSIS — E78.5 DYSLIPIDEMIA: ICD-10-CM

## 2022-02-22 DIAGNOSIS — M17.12 PRIMARY OSTEOARTHRITIS OF LEFT KNEE: Primary | ICD-10-CM

## 2022-02-22 DIAGNOSIS — M17.12 PRIMARY OSTEOARTHRITIS OF LEFT KNEE: ICD-10-CM

## 2022-02-22 DIAGNOSIS — I49.3 ASYMPTOMATIC PVCS: ICD-10-CM

## 2022-02-22 DIAGNOSIS — E66.9 OBESITY (BMI 30.0-34.9): ICD-10-CM

## 2022-02-22 DIAGNOSIS — Z01.818 PREOPERATIVE TESTING: ICD-10-CM

## 2022-02-22 DIAGNOSIS — Z01.810 PREOPERATIVE CARDIOVASCULAR EXAMINATION: Primary | ICD-10-CM

## 2022-02-22 DIAGNOSIS — Z01.818 ENCOUNTER FOR PREADMISSION TESTING: ICD-10-CM

## 2022-02-22 DIAGNOSIS — E11.65 TYPE 2 DIABETES MELLITUS WITH HYPERGLYCEMIA, WITHOUT LONG-TERM CURRENT USE OF INSULIN (HCC): ICD-10-CM

## 2022-02-22 DIAGNOSIS — I10 HYPERTENSION, ESSENTIAL: ICD-10-CM

## 2022-02-22 PROCEDURE — 97161 PT EVAL LOW COMPLEX 20 MIN: CPT

## 2022-02-22 PROCEDURE — 1036F TOBACCO NON-USER: CPT | Performed by: INTERNAL MEDICINE

## 2022-02-22 PROCEDURE — 97110 THERAPEUTIC EXERCISES: CPT

## 2022-02-22 PROCEDURE — 3008F BODY MASS INDEX DOCD: CPT | Performed by: INTERNAL MEDICINE

## 2022-02-22 PROCEDURE — 99204 OFFICE O/P NEW MOD 45 MIN: CPT | Performed by: INTERNAL MEDICINE

## 2022-02-22 PROCEDURE — 93000 ELECTROCARDIOGRAM COMPLETE: CPT | Performed by: INTERNAL MEDICINE

## 2022-02-22 NOTE — TELEPHONE ENCOUNTER
Left message for patient to call me  His A1C is high, we are changing his surgery date, June 14 is available  Asked for a call to my direct extension to discuss

## 2022-02-22 NOTE — PROGRESS NOTES
Cardiology Office Note  MD Lalita Fall MD Marvel Riis, DO, MD Luma Partida DO, Nata John DO, [facility] [place]  ----------------------------------------------------------------  [address] [place], [address] [place] [address] 64 y o  male MRN: [id number]  Unit/Bed#:  Encounter: [id number]      History of Present Illness: It was a pleasure to see Elvia Molina in the office today for initial CV evaluation  He has a past medical history of hypertension, dyslipidemia, type 2 diabetes mellitus and obesity  He has a family history of cardiovascular disease with his father having had coronary disease in his late 62s  He established care with us in February 2022  The patient was plan for surgical intervention in June 2022  He came to  in the office for preoperative CV risk assessment prior to the surgery  On a regular basis, the patient reported that he could climb greater than 2 flights of stairs without any exertional symptoms, but when he does this he walks stairs fairly slowly due to his knee discomfort  When seen in the office by his primary care provider in February 2022, he was noted to have a murmur  Overall, the patient had not been having any symptoms of chest pain, pressure, tightness or squeezing  Denies any lightheadedness, dizziness or palpitations  Denies lower extremity swelling, orthopnea or paroxysmal nocturnal dyspnea  Overall, he feels to be in his usual state of health aside from his left knee pain  Review of Systems:  Review of Systems   Constitutional: Negative for decreased appetite, fever, weight gain and weight loss  HENT: Negative for congestion and sore throat  Eyes: Negative for visual disturbance  Cardiovascular: Negative for chest pain, dyspnea on exertion, leg swelling, near-syncope and palpitations  Respiratory: Negative for cough and shortness of breath      Hematologic/Lymphatic: Negative for bleeding problem  Skin: Negative for rash  Musculoskeletal: Positive for joint pain  Negative for myalgias and neck pain  Gastrointestinal: Negative for abdominal pain and nausea  Neurological: Negative for light-headedness and weakness  Psychiatric/Behavioral: Negative for depression  Past Medical History:   Diagnosis Date    Atopic dermatitis     Condition not found     Neoplasm of Livingston's Dut    Diabetes mellitus ([address] )     Hyperlipemia     Hypertension     Lateral epicondylitis, left elbow        Past Surgical History:   Procedure Laterality Date    KNEE ARTHROSCOPY      menisectomy       Social History     Socioeconomic History    Marital status:      Spouse name: None    Number of children: None    Years of education: None    Highest education level: None   Occupational History    None   Tobacco Use    Smoking status: Former Smoker     Packs/day: 0 25    Smokeless tobacco: Never Used    Tobacco comment: per pt quit 4 mths ago   Vaping Use    Vaping Use: Never used   Substance and Sexual Activity    Alcohol use:  Yes     Alcohol/week: 2 0 standard drinks     Types: 2 Cans of beer per week     Comment: Social    Drug use: Never    Sexual activity: None     Comment: defer   Other Topics Concern    None   Social History Narrative    None     Social Determinants of Health     Financial Resource Strain: Not on file   Food Insecurity: Not on file   Transportation Needs: Not on file   Physical Activity: Not on file   Stress: Not on file   Social Connections: Not on file   Intimate Partner Violence: Not on file   Housing Stability: Not on file       Family History   Problem Relation Age of Onset    Colon polyps Mother         Inflammatory    Diabetes Father        Allergies   Allergen Reactions    Sulfamethoxazole-Trimethoprim Nausea Only and GI Intolerance         Current Outpatient Medications:     albuterol (PROAIR HFA) 90 mcg/act inhaler, Inhale 2 puffs every 6 (six) hours as needed for wheezing, Disp: 5 Inhaler, Rfl: 5    Blood Glucose Monitoring Suppl (OneTouch Verio Reflect) w/Device KIT, Check blood sugars once daily  Please substitute with appropriate alternative as covered by patient's insurance  Dx: E11 65, Disp: 1 kit, Rfl: 0    budesonide-formoterol (Symbicort) 160-4 5 mcg/act inhaler, Inhale 2 puffs 2 (two) times a day Rinse mouth after use , Disp: 10 2 g, Rfl: 5    co-enzyme Q-10 30 mg, Take 30 mg by mouth, Disp: , Rfl:     fenofibrate (TRICOR) 145 mg tablet, Take 1 tablet (145 mg total) by mouth daily, Disp: 90 tablet, Rfl: 1    ferrous sulfate 324 (65 Fe) mg, Take 1 tablet (324 mg total) by mouth in the morning, Disp: 30 tablet, Rfl: 1    glucose blood (OneTouch Verio) test strip, Check blood sugars once daily  Please substitute with appropriate alternative as covered by patient's insurance  Dx: E11 65, Disp: 100 each, Rfl: 3    lisinopril-hydrochlorothiazide (PRINZIDE,ZESTORETIC) 20-25 MG per tablet, Take 1 tablet by mouth daily, Disp: 90 tablet, Rfl: 1    metFORMIN (GLUCOPHAGE) 1000 MG tablet, Take 1 tablet (1,000 mg total) by mouth 2 (two) times a day with meals, Disp: 180 tablet, Rfl: 1    metoprolol succinate (TOPROL-XL) 100 mg 24 hr tablet, Take 1 tablet (100 mg total) by mouth daily, Disp: 90 tablet, Rfl: 1    Multiple Vitamin (multivitamin) tablet, Take 1 tablet by mouth daily, Disp: 30 tablet, Rfl: 1    omega-3-acid ethyl esters (LOVAZA) 1 g capsule, TAKE 2 CAPSULES TWICE DAILY, Disp: 360 capsule, Rfl: 1    OneTouch Delica Lancets 25Y MISC, Check blood sugars once daily  Please substitute with appropriate alternative as covered by patient's insurance  Dx: E11 65, Disp: 100 each, Rfl: 3    sildenafil (VIAGRA) 100 mg tablet, Take one tablet daily as needed, Disp: 20 tablet, Rfl: 2    ALPRAZolam (XANAX) 0 5 mg tablet, Take 1 tablet prior to flying as needed   (Patient not taking: Reported on 10/6/2021), Disp: 4 tablet, Rfl: 0   betamethasone dipropionate (DIPROSONE) 0 05 % cream, Apply topically 2 (two) times a day (Patient not taking: Reported on 2/22/2022 ), Disp: 30 g, Rfl: 0    Empagliflozin 10 MG TABS, Take 1 tablet (10 mg total) by mouth every morning (Patient not taking: Reported on 2/22/2022 ), Disp: 90 tablet, Rfl: 1    folic acid (FOLVITE) 1 mg tablet, Take 1 tablet (1 mg total) by mouth daily (Patient not taking: Reported on 2/22/2022 ), Disp: 30 tablet, Rfl: 1    Vitals:    02/22/22 1353   BP: 124/70   Pulse: 62   Weight: 103 kg (227 lb 12 8 oz)   Height: 5' 10" (1 778 m)       PHYSICAL EXAMINATION:  Gen: Awake, Alert, NAD   Head/eyes: AT/NC, pupils equal and round, Anicteric  ENT: mmm  Neck: Supple, No elevated JVP, trachea midline  Resp: CTA bilaterally no w/r/r  CV: RRR +S1, S2, II/VI EMRE @ RUSB  Abd: Soft, obese, NT/ND + BS  Ext: no LE edema bilaterally  Neuro: Follows commands, moves all extermities  Psych: Appropriate affect, normal mood, pleasant attitude, non-combative  Skin: warm; no rash, erythema or venous stasis changes on exposed skin    --------------------------------------------------------------------------------  TREADMILL STRESS  No results found for this or any previous visit      --------------------------------------------------------------------------------  NUCLEAR STRESS TEST: No results found for this or any previous visit  No results found for this or any previous visit       --------------------------------------------------------------------------------  CATH:  No results found for this or any previous visit     --------------------------------------------------------------------------------  ECHO:   No results found for this or any previous visit  No results found for this or any previous visit     --------------------------------------------------------------------------------  HOLTER  No results found for this or any previous visit      No results found for this or any previous visit     --------------------------------------------------------------------------------  CAROTIDS  No results found for this or any previous visit      --------------------------------------------------------------------------------  ECGs:  Results for orders placed or performed in visit on 02/22/22   POCT ECG    Impression    Sinus rhythm 62 bpm, frequent VPCs, iRBBB        Lab Results   Component Value Date    WBC 8 82 02/14/2022    HGB 16 0 02/14/2022    HCT 46 4 02/14/2022    MCV 95 02/14/2022     02/14/2022      Lab Results   Component Value Date    SODIUM 134 (L) 02/14/2022    K 3 8 02/14/2022     02/14/2022    CO2 22 02/14/2022    BUN 24 02/14/2022    CREATININE 1 00 02/14/2022    GLUC 135 (H) 09/10/2021    CALCIUM 9 6 02/14/2022      Lab Results   Component Value Date    HGBA1C 7 9 (H) 02/14/2022      Lab Results   Component Value Date    CHOL 179 07/03/2017    CHOL 189 11/18/2016    CHOL 186 05/14/2016     Lab Results   Component Value Date    HDL 38 (L) 09/10/2021    HDL 42 02/23/2021    HDL 40 06/18/2020     Lab Results   Component Value Date    LDLCALC  09/10/2021      Comment:         LDL cholesterol not calculated  Triglyceride levels  greater than 400 mg/dL invalidate calculated LDL results  Reference range: <100     Desirable range <100 mg/dL for primary prevention;    <70 mg/dL for patients with CHD or diabetic patients   with > or = 2 CHD risk factors  LDL-C is now calculated using the Lm-Lara   calculation, which is a validated novel method providing   better accuracy than the Friedewald equation in the   estimation of LDL-C  [facility]  7347;979(02): 0115-3163   (http://Milo/faq/OJI531)      LDLCALC 105 (H) 02/23/2021    LDLCALC 90 06/18/2020     Lab Results   Component Value Date    TRIG 624 (H) 09/10/2021    TRIG 298 (H) 02/23/2021    TRIG 244 (H) 06/18/2020     No results found for: [place], [place]   Lab Results   Component Value Date    INR 1 00 02/14/2022    PROTIME 12 8 02/14/2022        1  Preoperative cardiovascular examination  -     POCT ECG  -     NM myocardial perfusion spect (rx stress and/or rest); Future; Expected date: 02/22/2022    2  Primary osteoarthritis of left knee  -     Ambulatory Referral to Cardiology    3  Asymptomatic PVCs  -     Echo complete w/ contrast if indicated; Future; Expected date: 02/22/2022  -     NM myocardial perfusion spect (rx stress and/or rest); Future; Expected date: 02/22/2022  -     Holter monitor; Future; Expected date: 02/22/2022    4  Hypertension, essential  -     Echo complete w/ contrast if indicated; Future; Expected date: 02/22/2022    5  Dyslipidemia    6  Type 2 diabetes mellitus with hyperglycemia, without long-term current use of insulin (HCC)  -     NM myocardial perfusion spect (rx stress and/or rest); Future; Expected date: 02/22/2022    7  Obesity (BMI 30 0-34 9)        IMPRESSION:   Preoperative CV risk assessment for orthopedic surgery   Hypertension   Dyslipidemia   Type 2 diabetes mellitus   Frequent asymptomatic PVCs   Obesity   Former tobacco use, quit October 2021   Family history of CAD at age 77     PLAN:  It was a pleasure to see Gauravroya Miguel in the office today for initial CV evaluation  He is here today for preoperative CV risk assessment prior to his upcoming orthopedic knee surgery  He has no symptoms concerning for angina and no signs or symptoms of heart failure  He examines to be euvolemic in the office today  Blood pressure and heart rate are currently stable  ECG shows frequent PVCs  He also has a family history of cardiovascular disease with his father having had CAD in his 62s  He has multiple risk factors including hypertension, dyslipidemia and diabetes  He can climb 2 flights of stairs, but does this at a slower pace due to his significant knee discomfort for which she is undergoing surgical intervention in the near future    Physical exam demonstrates a murmur at the right upper sternal border  Based on his clinical presentation, I have the following recommendations:    1  Recommend checking a pharmacologic nuclear stress test to assess for any evidence of underlying myocardial ischemia  I would perform this as a pharmacologic test due to his frequent VPCs, history of diabetes and inability to ambulate from his significant left knee pain  2  Would check 2D echocardiogram to assess cardiac structure and function  3  Would obtain a 24 hour Holter monitor to assess for his burden of PVCs  4  Continue current antihypertensive regimen including lisinopril, hydrochlorothiazide and metoprolol  5  Continue Jardiance for cardioprotective effect with diabetes and other diabetic medications as prescribed by primary care  Follow-up with primary care for blood glucose management  6  Continue fenofibrate  Will discuss statin medication at our follow-up encounter  7  As always, I recommend a heart healthy diet low in sodium and carbohydrate  8  We will follow up with him after testing for further risk stratification prior to his upcoming surgery  As always, please do not hesitate to call with any questions  Portions of the record may have been created with voice recognition software  Occasional wrong word or "sound a like" substitutions may have occurred due to the inherent limitations of voice recognition software  Read the chart carefully and recognize, using context, where substitutions have occurred        Signed: Juliana Ritter DO, [facility]

## 2022-03-01 ENCOUNTER — OFFICE VISIT (OUTPATIENT)
Dept: PHYSICAL THERAPY | Facility: CLINIC | Age: 62
End: 2022-03-01
Payer: COMMERCIAL

## 2022-03-01 DIAGNOSIS — Z01.818 ENCOUNTER FOR PREADMISSION TESTING: ICD-10-CM

## 2022-03-01 DIAGNOSIS — M17.12 PRIMARY OSTEOARTHRITIS OF LEFT KNEE: Primary | ICD-10-CM

## 2022-03-01 PROCEDURE — 97110 THERAPEUTIC EXERCISES: CPT

## 2022-03-01 NOTE — PROGRESS NOTES
Daily Note     Today's date: 3/1/2022  Patient name: Tere Kocher  : 1960  MRN: [id number]  Referring provider: Kong Valverde  Dx:   Encounter Diagnosis     ICD-10-CM    1  Primary osteoarthritis of left knee  M17 12    2  Encounter for preadmission testing  Z01 818        Start Time: 1109  Stop Time: 1132  Total time in clinic (min): 23 minutes    Subjective: pt reports cont pain L knee-has surgery scheduled now for 22  Has to get more cardiology tests (echocardiogram, halter monitor and stress test)      Objective: See treatment diary below      Assessment: Reviewed previous HEP (primarily mat exercises) and introduced standing exer  Patient exhibited good technique with therapeutic exercises  Pt unable to mari LLE unistand to perform RLE reps and, therefore, only performed with LLE (RLE unistand)  Pt's L TKR surgery not scheduled until end of 2022 now (pending further cardiology testing)  Will resume PT following that pending no complications  Plan: pt to cont with HEP until L TKR surgery  D/c from formal PT     Precautions: HTN, DM       2/22/20 3/1/22           Manuals #1 #2                                                               Neuro Re-Ed             Pt educ (TKR; post op) AE AE                                                                                         Ther Ex             Stand:  -H/T raises  -Squats  -Knee flex (butt kicks)  -march -   x10 ea           Stand:   Alt hip AROM (flex, abd, ext)  No alt, only performed with LLE (due topain with LLE unistand)                                                  Supine:  QS/SAQ  SLR  bridging  HS flex (KTC)   x10ea review           S/l hip abd  Prone hip ext            Knee flex x10  x10  - review           Self SLR with strap (I/ER bias) 3x20" review                                     Ther Activity                                       Gait Training                                       Modalities

## 2022-03-05 DIAGNOSIS — I10 HYPERTENSION, UNSPECIFIED TYPE: ICD-10-CM

## 2022-03-05 DIAGNOSIS — E11.9 TYPE 2 DIABETES MELLITUS WITHOUT COMPLICATION, WITHOUT LONG-TERM CURRENT USE OF INSULIN (HCC): ICD-10-CM

## 2022-03-05 DIAGNOSIS — E78.5 HYPERLIPIDEMIA, UNSPECIFIED HYPERLIPIDEMIA TYPE: ICD-10-CM

## 2022-03-07 RX ORDER — METOPROLOL SUCCINATE 100 MG/1
TABLET, EXTENDED RELEASE ORAL
Qty: 90 TABLET | Refills: 1 | Status: SHIPPED | OUTPATIENT
Start: 2022-03-07 | End: 2022-08-08

## 2022-03-07 RX ORDER — LISINOPRIL AND HYDROCHLOROTHIAZIDE 25; 20 MG/1; MG/1
TABLET ORAL
Qty: 90 TABLET | Refills: 1 | Status: SHIPPED | OUTPATIENT
Start: 2022-03-07 | End: 2022-08-08

## 2022-03-07 RX ORDER — FENOFIBRATE 145 MG/1
TABLET, COATED ORAL
Qty: 90 TABLET | Refills: 1 | Status: SHIPPED | OUTPATIENT
Start: 2022-03-07 | End: 2022-08-08

## 2022-03-11 ENCOUNTER — APPOINTMENT (OUTPATIENT)
Dept: PHYSICAL THERAPY | Facility: CLINIC | Age: 62
End: 2022-03-11
Payer: COMMERCIAL

## 2022-03-17 ENCOUNTER — OFFICE VISIT (OUTPATIENT)
Dept: DIABETES SERVICES | Facility: HOSPITAL | Age: 62
End: 2022-03-17
Payer: COMMERCIAL

## 2022-03-17 VITALS — HEIGHT: 70 IN | WEIGHT: 227 LBS | BODY MASS INDEX: 32.5 KG/M2

## 2022-03-17 DIAGNOSIS — E11.65 TYPE 2 DIABETES MELLITUS WITH HYPERGLYCEMIA, WITHOUT LONG-TERM CURRENT USE OF INSULIN (HCC): Primary | ICD-10-CM

## 2022-03-17 PROCEDURE — 97803 MED NUTRITION INDIV SUBSEQ: CPT | Performed by: DIETITIAN, REGISTERED

## 2022-03-17 NOTE — PROGRESS NOTES
Medical Nutrition Therapy     Assessment    Visit Type: Initial visit  Chief complaint:  Type 2 Diabetes     HPI:  Met with Carlton for initial medical nutrition therapy  States diabetes about 7 years, states blood sugars had been pretty good but A1c has been rising over the past year  not testing, just got his meter, states his doctor told him that really only insulin users need to check blood sugars  Food recall shows primary issues:  Since getting his blood work done he has made significant changes  Primary change is he has stopped drinking  He admits to larger amounts of alcohol consumption since his wife  earlier this year  He recognizes that this has not been a good behavior for him and stopped drinking after his doctor's appointment  He also quit smoking a few months ago  These are both wonderful changes  In terms of diet, he has been working on reduced portions  Carbohydrate intake varies throughout the day, discussed the importance of consistent carbohydrate intake throughout the day for steady or blood sugars, he eats for large pieces of fruit for breakfast which has replaced his cigarettes on the drive to work, discussed this is too much fruit and came up with other examples of good breakfast options  Made adjustments to his other meals as well, sample meal plan created and given to him  Together we discussed what foods contain CHO, reading a food label, serving sizes, and set a carb goal of 30-45g CHO/meal to promote weight loss with 15g snacks  Put together sample meals for Carlton's reference and evaluated Carlton's current eating plan  Good understanding, Ju Molina will call with questions or for more education  Follow up as needed if not improving          Ht Readings from Last 1 Encounters:   22 5' 10" (1 778 m)     Wt Readings from Last 3 Encounters:   22 103 kg (227 lb 12 8 oz)   22 99 2 kg (218 lb 9 6 oz)   22 108 kg (237 lb 3 2 oz)        There is no height or weight on file to calculate BMI  Lab Results   Component Value Date    HGBA1C 7 9 (H) 02/14/2022    HGBA1C 6 3 (H) 09/10/2021    HGBA1C 5 8 (H) 02/23/2021       Lab Results   Component Value Date    CHOL 179 07/03/2017    CHOL 189 11/18/2016    CHOL 186 05/14/2016     Lab Results   Component Value Date    HDL 38 (L) 09/10/2021    HDL 42 02/23/2021    HDL 40 06/18/2020     Lab Results   Component Value Date    LDLCALC  09/10/2021      Comment:         LDL cholesterol not calculated  Triglyceride levels  greater than 400 mg/dL invalidate calculated LDL results  Reference range: <100     Desirable range <100 mg/dL for primary prevention;    <70 mg/dL for patients with CHD or diabetic patients   with > or = 2 CHD risk factors  LDL-C is now calculated using the Lm-Lara   calculation, which is a validated novel method providing   better accuracy than the Friedewald equation in the   estimation of LDL-C  Jaya Bradley al  [facility]  4441;786(90): 0550-8832   (http://[employer]/faq/MAJ130)      LDLCALC 105 (H) 02/23/2021    LDLCALC 90 06/18/2020     Lab Results   Component Value Date    TRIG 624 (H) 09/10/2021    TRIG 298 (H) 02/23/2021    TRIG 244 (H) 06/18/2020     No results found for: CHOLHDL    Weight Change: No    Medical Diagnosis/ICD 10 Code:  E11 65    Barriers to Learning: no barriers    Do you follow any special diet presently?: No  Who shops: patient  Who cooks: patient    Food Log: Completed via the method of food recall- works 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-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    Breakfast: up 5:30am, no breakfast, just coffee, takes something for the road  Apple, banana, 2 oranges sometimes PB bread  Days off usually nothing  Morning Snack:pistachios, PB crackers   Lunch:12pm: meal from work- salad and main dish  Before would be bigger portion     Afternoon Snack: none   Dinner:5:30/6: simple things: spaghetti, mom cooks once a week, meat starch veggie hot meal, out a few times a week sit down meal    Evening Snack:none used to drink, now doing lemon water  Bed 9:30pm was drinking a lot- beer 8, vodka 12oz  Beverages: water a lot, lemon in the water, no soda, seltzer, coffee black  Eating out/Take out:often   Exercise ADL, needs a knee replacement     Nutrition Diagnosis:  Food and nutrition related knowledge deficit  related to Lack of prior exposure to accurate nutrition related information as evidenced by Verbalizes inaccurate or incomplete information    Intervention: plate method, behavior modification strategies, carbohydrate counting, increased protein intake, individualized meal plan and monitoring portion control     Treatment Goals: Patient understands education and recommendations    Education Material Given  Vanessa Woodward was provided the Portion Booklet and Planning Healthy Meals     Monitoring and evaluation:    Term code indicator   1 6 3 Carbohydrate Intake Criteria: 30-45g CHO per meal, 15g CHO snacks    Patients Response to Instruction:  Zigmund Flow  Expected Compliancegood    Thank you for coming to the [facility] for education today  Please feel free to call with any questions or concerns      Leslye Jasmine, [address] [place] [address]  [address] [place] [address] 20  29 [facility] [id number]

## 2022-03-21 ENCOUNTER — OFFICE VISIT (OUTPATIENT)
Dept: FAMILY MEDICINE CLINIC | Facility: CLINIC | Age: 62
End: 2022-03-21
Payer: COMMERCIAL

## 2022-03-21 VITALS
OXYGEN SATURATION: 96 % | HEART RATE: 68 BPM | BODY MASS INDEX: 32.24 KG/M2 | TEMPERATURE: 98.1 F | DIASTOLIC BLOOD PRESSURE: 74 MMHG | RESPIRATION RATE: 16 BRPM | WEIGHT: 225.2 LBS | HEIGHT: 70 IN | SYSTOLIC BLOOD PRESSURE: 132 MMHG

## 2022-03-21 DIAGNOSIS — I10 PRIMARY HYPERTENSION: ICD-10-CM

## 2022-03-21 DIAGNOSIS — Z12.5 SCREENING PSA (PROSTATE SPECIFIC ANTIGEN): ICD-10-CM

## 2022-03-21 DIAGNOSIS — R21 RASH: ICD-10-CM

## 2022-03-21 DIAGNOSIS — E11.65 TYPE 2 DIABETES MELLITUS WITH HYPERGLYCEMIA, WITHOUT LONG-TERM CURRENT USE OF INSULIN (HCC): Primary | ICD-10-CM

## 2022-03-21 DIAGNOSIS — E78.5 HYPERLIPIDEMIA, UNSPECIFIED HYPERLIPIDEMIA TYPE: ICD-10-CM

## 2022-03-21 LAB
CREAT UR-MCNC: 46.7 MG/DL
MICROALBUMIN UR-MCNC: 9.8 MG/L (ref 0–20)
MICROALBUMIN/CREAT 24H UR: 21 MG/G CREATININE (ref 0–30)

## 2022-03-21 PROCEDURE — 3061F NEG MICROALBUMINURIA REV: CPT | Performed by: FAMILY MEDICINE

## 2022-03-21 PROCEDURE — 3078F DIAST BP <80 MM HG: CPT | Performed by: FAMILY MEDICINE

## 2022-03-21 PROCEDURE — 99214 OFFICE O/P EST MOD 30 MIN: CPT | Performed by: FAMILY MEDICINE

## 2022-03-21 PROCEDURE — 82570 ASSAY OF URINE CREATININE: CPT | Performed by: FAMILY MEDICINE

## 2022-03-21 PROCEDURE — 1036F TOBACCO NON-USER: CPT | Performed by: FAMILY MEDICINE

## 2022-03-21 PROCEDURE — 82043 UR ALBUMIN QUANTITATIVE: CPT | Performed by: FAMILY MEDICINE

## 2022-03-21 PROCEDURE — 3075F SYST BP GE 130 - 139MM HG: CPT | Performed by: FAMILY MEDICINE

## 2022-03-21 PROCEDURE — 3008F BODY MASS INDEX DOCD: CPT | Performed by: FAMILY MEDICINE

## 2022-03-21 RX ORDER — BETAMETHASONE DIPROPIONATE 0.5 MG/G
CREAM TOPICAL 2 TIMES DAILY
Qty: 30 G | Refills: 0 | Status: SHIPPED | OUTPATIENT
Start: 2022-03-21

## 2022-03-21 NOTE — PROGRESS NOTES
Assessment/Plan:     1  Type 2 diabetes mellitus with hyperglycemia, without long-term current use of insulin (HCC)  Assessment & Plan:  Recently added jardiance; pt now following with DM nutritionist and working on diet; repeat labs in 2 months; f/u guidance given; advised home monitoring of BS to help pt understand how diet choices affect his BS  Lab Results   Component Value Date    HGBA1C 7 9 (H) 02/14/2022       Orders:  -     CBC; Future; Expected date: 03/21/2022  -     Comprehensive metabolic panel; Future; Expected date: 04/21/2022  -     Lipid panel; Future; Expected date: 04/21/2022  -     TSH, 3rd generation; Future  -     Hemoglobin A1C With EAG; Future  -     Microalbumin,Urine  -     CBC  -     Comprehensive metabolic panel  -     Lipid panel  -     TSH, 3rd generation    2  Rash  -     betamethasone dipropionate (DIPROSONE) 0 05 % cream; Apply topically 2 (two) times a day    3  Hyperlipidemia, unspecified hyperlipidemia type  -     Comprehensive metabolic panel; Future; Expected date: 04/21/2022  -     Lipid panel; Future; Expected date: 04/21/2022  -     Comprehensive metabolic panel  -     Lipid panel    4  Primary hypertension  Assessment & Plan:  Stable; c/w current tx; complete testing and f/u up as per cardiology recommendations; no current sx      5  Screening PSA (prostate specific antigen)  -     PSA, Total Screen; Future        Subjective:      Patient ID: Lavern Garcia is a 64 y o  male  DM: Patient here for follow up on DM  Has not yet started to check his BS at home  Patient just started with meeting with nutritionist  Recently started [place]le [place] without any known SE  Plans on starting to check BS to help with diet changes  HTN: No CP or SOB  Has Echocardiogram and stress test scheduled as well as Holter  Following with cardiology  Pt requesting refill of steroid cream  Occasionally gets rash on back that resolves with use         The following portions of the patient's history were reviewed and updated as appropriate: allergies, current medications, past family history, past medical history, past social history, past surgical history, and problem list     Review of Systems   Constitutional: Negative for chills and fever  Respiratory: Negative for shortness of breath  Cardiovascular: Negative for chest pain  Skin: Negative for rash  Objective:      /74 (BP Location: Right arm, Patient Position: Sitting, Cuff Size: Adult)   Pulse 68   Temp 98 1 °F (36 7 °C) (Temporal)   Resp 16   Ht 5' 10" (1 778 m)   Wt 102 kg (225 lb 3 2 oz)   SpO2 96%   BMI 32 31 kg/m²          Physical Exam  Vitals reviewed  Constitutional:       General: He is not in acute distress  Appearance: Normal appearance  He is not ill-appearing, toxic-appearing or diaphoretic  HENT:      Head: Normocephalic and atraumatic  Eyes:      General: No scleral icterus  Right eye: No discharge  Left eye: No discharge  Conjunctiva/sclera: Conjunctivae normal    Cardiovascular:      Rate and Rhythm: Normal rate and regular rhythm  Pulses: Normal pulses  Heart sounds: Normal heart sounds  No murmur heard  No gallop  Pulmonary:      Effort: Pulmonary effort is normal  No respiratory distress  Breath sounds: Normal breath sounds  No stridor  No wheezing, rhonchi or rales  Musculoskeletal:      Right lower leg: No edema  Left lower leg: No edema  Neurological:      General: No focal deficit present  Mental Status: He is alert and oriented to person, place, and time  Psychiatric:         Mood and Affect: Mood normal          Behavior: Behavior normal          Thought Content:  Thought content normal          Judgment: Judgment normal

## 2022-03-21 NOTE — PROGRESS NOTES
Patient's shoes and socks removed  Right Foot/Ankle   Right Foot Inspection  Skin Exam: skin normal, skin intact, callus and callus  No dry skin, no warmth, no erythema, no maceration, no abnormal color, no pre-ulcer and no ulcer  Toe Exam: ROM and strength within normal limits  Sensory   Monofilament testing: intact    Vascular  Capillary refills: < 3 seconds  The right DP pulse is 2+  The right PT pulse is 2+  Left Foot/Ankle  Left Foot Inspection  Skin Exam: skin normal and skin intact  No dry skin, no warmth, no erythema, no maceration, normal color, no pre-ulcer, no ulcer and no callus  Toe Exam: ROM and strength within normal limits  Sensory   Monofilament testing: intact    Vascular  Capillary refills: < 3 seconds  The left DP pulse is 2+  The left PT pulse is 2+       Assign Risk Category  No deformity present  No loss of protective sensation  No weak pulses  Risk: 0

## 2022-03-22 NOTE — ASSESSMENT & PLAN NOTE
Stable; c/w current tx; complete testing and f/u up as per cardiology recommendations; no current sx

## 2022-03-22 NOTE — ASSESSMENT & PLAN NOTE
Recently added jardiance; pt now following with DM nutritionist and working on diet; repeat labs in 2 months; f/u guidance given; advised home monitoring of BS to help pt understand how diet choices affect his BS  Lab Results   Component Value Date    HGBA1C 7 9 (H) 02/14/2022

## 2022-03-24 NOTE — PROGRESS NOTES
Patient's shoes and socks removed  Right Foot/Ankle   Right Foot Inspection  Skin Exam: skin normal and skin intact  No dry skin, no warmth, no callus, no erythema, no maceration, no abnormal color, no pre-ulcer, no ulcer and no callus  Toe Exam: ROM and strength within normal limits  Sensory   Monofilament testing: intact    Vascular  Capillary refills: < 3 seconds  The right DP pulse is 2+  The right PT pulse is 2+  Left Foot/Ankle  Left Foot Inspection  Skin Exam: skin normal and skin intact  No dry skin, no warmth, no erythema, no maceration, normal color, no pre-ulcer, no ulcer and no callus  Toe Exam: ROM and strength within normal limits  Sensory   Monofilament testing: intact    Vascular  Capillary refills: < 3 seconds  The left DP pulse is 2+  The left PT pulse is 2+

## 2022-03-31 ENCOUNTER — HOSPITAL ENCOUNTER (OUTPATIENT)
Dept: NUCLEAR MEDICINE | Facility: HOSPITAL | Age: 62
Discharge: HOME | End: 2022-03-31
Attending: INTERNAL MEDICINE
Payer: COMMERCIAL

## 2022-03-31 ENCOUNTER — HOSPITAL ENCOUNTER (OUTPATIENT)
Dept: NON INVASIVE DIAGNOSTICS | Facility: HOSPITAL | Age: 62
Discharge: HOME | End: 2022-03-31
Attending: INTERNAL MEDICINE
Payer: COMMERCIAL

## 2022-03-31 VITALS — BODY MASS INDEX: 32.21 KG/M2 | WEIGHT: 225 LBS | HEIGHT: 70 IN

## 2022-03-31 VITALS
DIASTOLIC BLOOD PRESSURE: 74 MMHG | BODY MASS INDEX: 32.21 KG/M2 | HEIGHT: 70 IN | HEART RATE: 68 BPM | SYSTOLIC BLOOD PRESSURE: 132 MMHG | WEIGHT: 225 LBS

## 2022-03-31 DIAGNOSIS — Z01.810 PREOPERATIVE CARDIOVASCULAR EXAMINATION: ICD-10-CM

## 2022-03-31 DIAGNOSIS — I49.3 ASYMPTOMATIC PVCS: ICD-10-CM

## 2022-03-31 DIAGNOSIS — I10 HYPERTENSION, ESSENTIAL: ICD-10-CM

## 2022-03-31 DIAGNOSIS — E11.65 TYPE 2 DIABETES MELLITUS WITH HYPERGLYCEMIA, WITHOUT LONG-TERM CURRENT USE OF INSULIN (HCC): ICD-10-CM

## 2022-03-31 LAB
CHEST PAIN STATEMENT: NORMAL
MAX DIASTOLIC BP: 70 MMHG
MAX HEART RATE: 72 BPM
MAX PREDICTED HEART RATE: 159 BPM
MAX. SYSTOLIC BP: 108 MMHG
NUC STRESS EJECTION FRACTION: 65 %
PROTOCOL NAME: NORMAL
RATE PRESSURE PRODUCT: 6624
REASON FOR TERMINATION: NORMAL
SL CV REST NUCLEAR ISOTOPE DOSE: 10.2 MCI
SL CV STRESS NUCLEAR ISOTOPE DOSE: 31.5 MCI
SL CV STRESS RECOVERY BP: NORMAL MMHG
SL CV STRESS RECOVERY HR: 57 BPM
SL CV STRESS RECOVERY O2 SAT: 97 %
STRESS BASELINE BP: NORMAL MMHG
STRESS BASELINE HR: 59 BPM
STRESS O2 SAT REST: 96 %
STRESS PEAK HR: 72 BPM
STRESS POST O2 SAT PEAK: 98 %
STRESS POST PEAK BP: 92 MMHG
STRESS/REST PERFUSION RATIO: 1.08
TARGET HR FORMULA: NORMAL
TEST INDICATION: NORMAL
TIME IN EXERCISE PHASE: NORMAL

## 2022-03-31 PROCEDURE — 93016 CV STRESS TEST SUPVJ ONLY: CPT | Performed by: INTERNAL MEDICINE

## 2022-03-31 PROCEDURE — A9502 TC99M TETROFOSMIN: HCPCS

## 2022-03-31 PROCEDURE — 78452 HT MUSCLE IMAGE SPECT MULT: CPT | Performed by: INTERNAL MEDICINE

## 2022-03-31 PROCEDURE — 93306 TTE W/DOPPLER COMPLETE: CPT

## 2022-03-31 PROCEDURE — G1004 CDSM NDSC: HCPCS

## 2022-03-31 PROCEDURE — 93225 XTRNL ECG REC<48 HRS REC: CPT

## 2022-03-31 PROCEDURE — 78452 HT MUSCLE IMAGE SPECT MULT: CPT

## 2022-03-31 PROCEDURE — 93018 CV STRESS TEST I&R ONLY: CPT | Performed by: INTERNAL MEDICINE

## 2022-03-31 PROCEDURE — 93017 CV STRESS TEST TRACING ONLY: CPT

## 2022-03-31 PROCEDURE — 93226 XTRNL ECG REC<48 HR SCAN A/R: CPT

## 2022-03-31 RX ADMIN — REGADENOSON 0.4 MG: 0.08 INJECTION, SOLUTION INTRAVENOUS at 10:39

## 2022-04-01 LAB
AORTIC ROOT: 4 CM
AORTIC VALVE MEAN VELOCITY: 15.8 M/S
APICAL FOUR CHAMBER EJECTION FRACTION: 72 %
ASCENDING AORTA: 3.9 CM (ref 2.16–3.24)
AV AREA BY CONTINUOUS VTI: 2 CM2
AV AREA PEAK VELOCITY: 2 CM2
AV LVOT MEAN GRADIENT: 5 MMHG
AV LVOT PEAK GRADIENT: 9 MMHG
AV MEAN GRADIENT: 11 MMHG
AV PEAK GRADIENT: 22 MMHG
AV VALVE AREA: 2.01 CM2
AV VELOCITY RATIO: 0.65
DOP CALC AO PEAK VEL: 2.33 M/S
DOP CALC AO VTI: 50.61 CM
DOP CALC LVOT AREA: 3.14 CM2
DOP CALC LVOT DIAMETER: 2 CM
DOP CALC LVOT PEAK VEL VTI: 32.4 CM
DOP CALC LVOT PEAK VEL: 1.52 M/S
DOP CALC LVOT STROKE INDEX: 44.5 ML/M2
DOP CALC LVOT STROKE VOLUME: 101.74 CM3
E WAVE DECELERATION TIME: 329 MS
FRACTIONAL SHORTENING: 43 % (ref 28–44)
INTERVENTRICULAR SEPTUM IN DIASTOLE (PARASTERNAL SHORT AXIS VIEW): 1.2 CM
INTERVENTRICULAR SEPTUM: 1.2 CM (ref 0.56–1.05)
LAAS-AP4: 21.1 CM2
LEFT ATRIUM SIZE: 4.1 CM
LEFT INTERNAL DIMENSION IN SYSTOLE: 3.2 CM (ref 4.22–6.39)
LEFT VENTRICULAR INTERNAL DIMENSION IN DIASTOLE: 5.6 CM (ref 7.04–10.49)
LEFT VENTRICULAR POSTERIOR WALL IN END DIASTOLE: 1.1 CM (ref 0.55–1.04)
LEFT VENTRICULAR STROKE VOLUME: 111 ML
LVSV (TEICH): 111 ML
MV E'TISSUE VEL-SEP: 8 CM/S
MV PEAK A VEL: 1.08 M/S
MV PEAK E VEL: 63 CM/S
MV STENOSIS PRESSURE HALF TIME: 95 MS
MV VALVE AREA P 1/2 METHOD: 2.32 CM2
RIGHT VENTRICLE ID DIMENSION: 3.9 CM
SL CV LV EF: 68
SL CV PED ECHO LEFT VENTRICLE DIASTOLIC VOLUME (MOD BIPLANE) 2D: 153 ML
SL CV PED ECHO LEFT VENTRICLE SYSTOLIC VOLUME (MOD BIPLANE) 2D: 41 ML
Z-SCORE OF ASCENDING AORTA: 4.4
Z-SCORE OF INTERVENTRICULAR SEPTUM IN END DIASTOLE: 3.14
Z-SCORE OF LEFT VENTRICULAR DIMENSION IN END DIASTOLE: -4.28
Z-SCORE OF LEFT VENTRICULAR DIMENSION IN END SYSTOLE: -3.82
Z-SCORE OF LEFT VENTRICULAR POSTERIOR WALL IN END DIASTOLE: 2.46

## 2022-04-01 PROCEDURE — 93306 TTE W/DOPPLER COMPLETE: CPT | Performed by: INTERNAL MEDICINE

## 2022-04-07 ENCOUNTER — OFFICE VISIT (OUTPATIENT)
Dept: CARDIOLOGY CLINIC | Facility: CLINIC | Age: 62
End: 2022-04-07
Payer: COMMERCIAL

## 2022-04-07 VITALS
BODY MASS INDEX: 30.78 KG/M2 | WEIGHT: 215 LBS | HEART RATE: 62 BPM | DIASTOLIC BLOOD PRESSURE: 70 MMHG | SYSTOLIC BLOOD PRESSURE: 130 MMHG | HEIGHT: 70 IN

## 2022-04-07 DIAGNOSIS — M17.12 PRIMARY OSTEOARTHRITIS OF LEFT KNEE: ICD-10-CM

## 2022-04-07 DIAGNOSIS — I49.3 ASYMPTOMATIC PVCS: ICD-10-CM

## 2022-04-07 DIAGNOSIS — E78.5 DYSLIPIDEMIA: ICD-10-CM

## 2022-04-07 DIAGNOSIS — I10 HYPERTENSION, ESSENTIAL: ICD-10-CM

## 2022-04-07 DIAGNOSIS — E11.65 TYPE 2 DIABETES MELLITUS WITH HYPERGLYCEMIA, WITHOUT LONG-TERM CURRENT USE OF INSULIN (HCC): ICD-10-CM

## 2022-04-07 DIAGNOSIS — E66.9 OBESITY (BMI 30.0-34.9): ICD-10-CM

## 2022-04-07 DIAGNOSIS — Z01.810 PREOPERATIVE CARDIOVASCULAR EXAMINATION: Primary | ICD-10-CM

## 2022-04-07 PROCEDURE — 3078F DIAST BP <80 MM HG: CPT | Performed by: INTERNAL MEDICINE

## 2022-04-07 PROCEDURE — 3008F BODY MASS INDEX DOCD: CPT | Performed by: INTERNAL MEDICINE

## 2022-04-07 PROCEDURE — 1036F TOBACCO NON-USER: CPT | Performed by: INTERNAL MEDICINE

## 2022-04-07 PROCEDURE — 93227 XTRNL ECG REC<48 HR R&I: CPT | Performed by: INTERNAL MEDICINE

## 2022-04-07 PROCEDURE — 99214 OFFICE O/P EST MOD 30 MIN: CPT | Performed by: INTERNAL MEDICINE

## 2022-04-07 PROCEDURE — 3075F SYST BP GE 130 - 139MM HG: CPT | Performed by: INTERNAL MEDICINE

## 2022-04-07 RX ORDER — ATORVASTATIN CALCIUM 20 MG/1
20 TABLET, FILM COATED ORAL DAILY
Qty: 90 TABLET | Refills: 1 | Status: SHIPPED | OUTPATIENT
Start: 2022-04-07 | End: 2022-06-07 | Stop reason: ALTCHOICE

## 2022-04-07 NOTE — PROGRESS NOTES
Cardiology Office Note  Derry Bitters, MD Geneva Sires, MD Rockland Hatchet, DO, MD Jenn Nelson DO, Anabel Rhodes DO, [facility] [place]  ----------------------------------------------------------------  [address] [place], [address] [place] [address] 64 y o  male MRN: [id number]  Unit/Bed#:  Encounter: [id number]      History of Present Illness: It was a pleasure to see Jose J Graves in the office today for follow-up CV evaluation  He has a past medical history of hypertension, dyslipidemia, type 2 diabetes mellitus and obesity  He has a family history of cardiovascular disease with his father having had coronary disease in his late 62s  He established care with us in February 2022  The patient was plan for surgical intervention in June 2022  He came to see us in the office for preoperative CV risk assessment prior to the surgery  On a regular basis, the patient reported that he could climb greater than 2 flights of stairs without any exertional symptoms, but when he does this he walks stairs fairly slowly due to his knee discomfort  When seen in the office by his primary care provider in February 2022, he was noted to have a murmur  Due to his difficulty with climbing stairs and family history, he was sent for stress test, echocardiogram and due to his PVC burden, he was sent for Holter monitor  He is here today for further evaluation of his preoperative CV risk prior to his upcoming orthopedic surgery  The patient had not been having any symptoms of chest pain, pressure, tightness or squeezing  Denies any lightheadedness, dizziness or palpitations  Denies lower extremity swelling, orthopnea or paroxysmal nocturnal dyspnea  Review of Systems:  Review of Systems   Constitutional: Negative for decreased appetite, fever, weight gain and weight loss  HENT: Negative for congestion and sore throat  Eyes: Negative for visual disturbance  Cardiovascular: Negative for chest pain, dyspnea on exertion, leg swelling, near-syncope and palpitations  Respiratory: Negative for cough and shortness of breath  Hematologic/Lymphatic: Negative for bleeding problem  Skin: Negative for rash  Musculoskeletal: Positive for joint pain  Negative for myalgias and neck pain  Gastrointestinal: Negative for abdominal pain and nausea  Neurological: Negative for light-headedness and weakness  Psychiatric/Behavioral: Negative for depression  Past Medical History:   Diagnosis Date    Atopic dermatitis     Condition not found     Neoplasm of Archer's Dut    Diabetes mellitus ([facility] [address] )     Hyperlipemia     Hypertension     Lateral epicondylitis, left elbow        Past Surgical History:   Procedure Laterality Date    KNEE ARTHROSCOPY      menisectomy       Social History     Socioeconomic History    Marital status:       Spouse name: Not on file    Number of children: Not on file    Years of education: Not on file    Highest education level: Not on file   Occupational History    Not on file   Tobacco Use    Smoking status: Former Smoker     Packs/day: 0 25    Smokeless tobacco: Never Used    Tobacco comment: per pt quit 4 mths ago   Vaping Use    Vaping Use: Never used   Substance and Sexual Activity    Alcohol use: Not Currently     Alcohol/week: 2 0 standard drinks     Types: 2 Cans of beer per week     Comment: Social    Drug use: Never    Sexual activity: Not on file     Comment: defer   Other Topics Concern    Not on file   Social History Narrative    Not on file     Social Determinants of Health     Financial Resource Strain: Not on file   Food Insecurity: Not on file   Transportation Needs: Not on file   Physical Activity: Not on file   Stress: Not on file   Social Connections: Not on file   Intimate Partner Violence: Not on file   Housing Stability: Not on file       Family History   Problem Relation Age of Onset    Colon polyps Mother         Inflammatory    Diabetes Father        Allergies   Allergen Reactions    Sulfamethoxazole-Trimethoprim Nausea Only and GI Intolerance         Current Outpatient Medications:     albuterol (PROAIR HFA) 90 mcg/act inhaler, Inhale 2 puffs every 6 (six) hours as needed for wheezing, Disp: 5 Inhaler, Rfl: 5    betamethasone dipropionate (DIPROSONE) 0 05 % cream, Apply topically 2 (two) times a day, Disp: 30 g, Rfl: 0    Blood Glucose Monitoring Suppl (OneTouch Verio Reflect) w/Device KIT, Check blood sugars once daily  Please substitute with appropriate alternative as covered by patient's insurance  Dx: E11 65, Disp: 1 kit, Rfl: 0    budesonide-formoterol (Symbicort) 160-4 5 mcg/act inhaler, Inhale 2 puffs 2 (two) times a day Rinse mouth after use , Disp: 10 2 g, Rfl: 5    co-enzyme Q-10 30 mg, Take 30 mg by mouth, Disp: , Rfl:     Empagliflozin 10 MG TABS, Take 1 tablet (10 mg total) by mouth every morning, Disp: 90 tablet, Rfl: 1    fenofibrate (TRICOR) 145 mg tablet, TAKE 1 TABLET DAILY, Disp: 90 tablet, Rfl: 1    ferrous sulfate 324 (65 Fe) mg, Take 1 tablet (324 mg total) by mouth in the morning, Disp: 30 tablet, Rfl: 1    glucose blood (OneTouch Verio) test strip, Check blood sugars once daily  Please substitute with appropriate alternative as covered by patient's insurance   Dx: E11 65, Disp: 100 each, Rfl: 3    lisinopril-hydrochlorothiazide (PRINZIDE,ZESTORETIC) 20-25 MG per tablet, TAKE 1 TABLET DAILY, Disp: 90 tablet, Rfl: 1    metFORMIN (GLUCOPHAGE) 1000 MG tablet, TAKE 1 TABLET TWICE DAILY  WITH MEALS, Disp: 180 tablet, Rfl: 1    metoprolol succinate (TOPROL-XL) 100 mg 24 hr tablet, TAKE 1 TABLET DAILY, Disp: 90 tablet, Rfl: 1    Multiple Vitamin (multivitamin) tablet, Take 1 tablet by mouth daily, Disp: 30 tablet, Rfl: 1    omega-3-acid ethyl esters (LOVAZA) 1 g capsule, TAKE 2 CAPSULES TWICE DAILY, Disp: 360 capsule, Rfl: 1    OneTouch Delica Lancets A MISC, Check blood sugars once daily  Please substitute with appropriate alternative as covered by patient's insurance  Dx: E11 65, Disp: 100 each, Rfl: 3    sildenafil (VIAGRA) 100 mg tablet, Take one tablet daily as needed, Disp: 20 tablet, Rfl: 2    ALPRAZolam (XANAX) 0 5 mg tablet, Take 1 tablet prior to flying as needed  (Patient not taking: Reported on 10/6/2021), Disp: 4 tablet, Rfl: 0    Vitals:    04/07/22 1520   BP: 130/70   BP Location: Right arm   Patient Position: Sitting   Cuff Size: Adult   Pulse: 62   Weight: 97 5 kg (215 lb)   Height: 5' 10" (1 778 m)       PHYSICAL EXAMINATION:  Gen: Awake, Alert, NAD   Head/eyes: AT/NC, pupils equal and round, Anicteric  ENT: mmm  Neck: Supple, No elevated JVP, trachea midline  Resp: CTA bilaterally no w/r/r  CV: RRR +S1, S2, II/VI EMRE @ RUSB  Abd: Soft, obese, NT/ND + BS  Ext: no LE edema bilaterally  Neuro: Follows commands, moves all extermities  Psych: Appropriate affect, happy mood, pleasant attitude, non-combative  Skin: warm; no rash, erythema or venous stasis changes on exposed skin    --------------------------------------------------------------------------------  TREADMILL STRESS  No results found for this or any previous visit      --------------------------------------------------------------------------------  NUCLEAR STRESS TEST: No results found for this or any previous visit  No results found for this or any previous visit       --------------------------------------------------------------------------------  CATH:  No results found for this or any previous visit     --------------------------------------------------------------------------------  ECHO:   No results found for this or any previous visit  No results found for this or any previous visit     --------------------------------------------------------------------------------  HOLTER  No results found for this or any previous visit      No results found for this or any previous visit     --------------------------------------------------------------------------------  CAROTIDS  No results found for this or any previous visit      --------------------------------------------------------------------------------  ECGs:  No results found for this visit on 04/07/22  Lab Results   Component Value Date    WBC 8 82 02/14/2022    HGB 16 0 02/14/2022    HCT 46 4 02/14/2022    MCV 95 02/14/2022     02/14/2022      Lab Results   Component Value Date    SODIUM 134 (L) 02/14/2022    K 3 8 02/14/2022     02/14/2022    CO2 22 02/14/2022    BUN 24 02/14/2022    CREATININE 1 00 02/14/2022    GLUC 135 (H) 09/10/2021    CALCIUM 9 6 02/14/2022      Lab Results   Component Value Date    HGBA1C 7 9 (H) 02/14/2022      Lab Results   Component Value Date    CHOL 179 07/03/2017    CHOL 189 11/18/2016    CHOL 186 05/14/2016     Lab Results   Component Value Date    HDL 38 (L) 09/10/2021    HDL 42 02/23/2021    HDL 40 06/18/2020     Lab Results   Component Value Date    LDLCALC  09/10/2021      Comment:         LDL cholesterol not calculated  Triglyceride levels  greater than 400 mg/dL invalidate calculated LDL results  Reference range: <100     Desirable range <100 mg/dL for primary prevention;    <70 mg/dL for patients with CHD or diabetic patients   with > or = 2 CHD risk factors  LDL-C is now calculated using the Lm-Lara   calculation, which is a validated novel method providing   better accuracy than the Friedewald equation in the   estimation of LDL-C  Serafin Shankar al  [facility]  2739;053(64): 0508-8083   (http://[employer]/faq/IQH854)      LDLCALC 105 (H) 02/23/2021    LDLCALC 90 06/18/2020     Lab Results   Component Value Date    TRIG 624 (H) 09/10/2021    TRIG 298 (H) 02/23/2021    TRIG 244 (H) 06/18/2020     No results found for: [place], [place]   Lab Results   Component Value Date    INR 1 00 02/14/2022    PROTIME 12 8 02/14/2022        1   Preoperative cardiovascular examination    2  Primary osteoarthritis of left knee    3  Hypertension, essential    4  Dyslipidemia    5  Type 2 diabetes mellitus with hyperglycemia, without long-term current use of insulin (HCC)    6  Obesity (BMI 30 0-34 9)    7  Asymptomatic PVCs        IMPRESSION:   Preoperative CV risk assessment for orthopedic surgery   Hypertension   Dyslipidemia   Type 2 diabetes mellitus   Occasional asymptomatic PVCs   LVEF 70%, grade 1 diastolic dysfunction, mild LVH, mild LA dilatation, trace AR, very mild AS, mild MAC, trace MR, mild aortic root dilatation 4 0 cm, ascending aortic ectasia 3 9 cm, March 2022   Pharmacologic nuclear stress test appears negative for myocardial ischemia, gated EF 65%, March 2022   Holter w/ SR avg HR 62 bpm, occasional VPCs w/ 2 2% burden without VT, rare APCs, March 2022   Obesity   Former tobacco use, quit October 2021   Family history of CAD at age 77     PLAN:  It was a pleasure to see Al Fortune in the office today for follow up CV evaluation  He is here today for further evaluation of his preoperative CV risk and to review the results of his testing  His stress test was found to be negative for myocardial ischemia  Echocardiogram demonstrated normal left ventricular function with mild aortic stenosis and no severe valvular heart disease  Aortic root was found to be mildly dilated  Holter monitor showed sinus rhythm with occasional VPCs with a 2 2% burden  I have shared with him the stable news  He has no symptoms concerning for angina and no signs or symptoms of heart failure  He examines to be euvolemic in the office today  ECG previously showed frequent PVCs, but his burden has been further delineated based on his testing  Based on his clinical presentation, I have the following recommendations:    1   Due to his stress test that is negative for myocardial ischemia, lack of symptoms, good overall functional capacity, he is at a low CV risk for his upcoming left knee surgery  No further CV testing prior to the OR as it would not change our management  2  Would encourage a repeat echocardiogram in 3 years to reassess his mild aortic stenosis  3  Recommend 30 minutes a day, 5 days a week of moderate intensity activity to build cardiovascular endurance  4  Continue current antihypertensive regimen including lisinopril, hydrochlorothiazide and metoprolol  5  Continue Jardiance as per primary care  6  Continue fenofibrate  Start statin with atorvastatin 20 mg daily due to his elevated ASCVD risk score and history of diabetes  Repeat lipid panel in 6 months  7  Follow-up with primary care regarding blood glucose management  8  Would recommend a heart healthy diet low in sodium and carbohydrate  9  We will follow up with him in 6 months to reassess his progress  As always, please do not hesitate to call with any questions  Portions of the record may have been created with voice recognition software  Occasional wrong word or "sound a like" substitutions may have occurred due to the inherent limitations of voice recognition software  Read the chart carefully and recognize, using context, where substitutions have occurred        Signed: Nataliia Ward DO, Morrison Batten

## 2022-05-05 DIAGNOSIS — E78.5 HYPERLIPIDEMIA, UNSPECIFIED HYPERLIPIDEMIA TYPE: ICD-10-CM

## 2022-05-05 RX ORDER — OMEGA-3-ACID ETHYL ESTERS 1 G/1
CAPSULE, LIQUID FILLED ORAL
Qty: 360 CAPSULE | Refills: 1 | Status: SHIPPED | OUTPATIENT
Start: 2022-05-05

## 2022-05-16 ENCOUNTER — TELEPHONE (OUTPATIENT)
Dept: OBGYN CLINIC | Facility: HOSPITAL | Age: 62
End: 2022-05-16

## 2022-05-16 NOTE — TELEPHONE ENCOUNTER
Pt sees Dr Gretchen Kennedy is calling stating that he has surgery on 6-28 and he has a few questions about what needs to be done prior to surgery such as lab work    CB#[phone]

## 2022-05-26 ENCOUNTER — TELEPHONE (OUTPATIENT)
Dept: OBGYN CLINIC | Facility: HOSPITAL | Age: 62
End: 2022-05-26

## 2022-05-26 NOTE — TELEPHONE ENCOUNTER
Pt contacted to discuss PCP mgmt of DM, if repeat labs expected soon as PCP noted 3/21/22 med changes with repeat labs expected in 2 months- No PCP f/u appt noted in appt desk, no recent completed labs noted  Pt will need labs ordered from surgical team   Previously postponed for HbA1c 7 9  Will await pt call back to discuss pre-op needs

## 2022-05-27 NOTE — TELEPHONE ENCOUNTER
NN spoke to pt  Pt plans to get the labs ordered by his PCP drawn on 5/31  He plans to call PCP today to schedule a follow up with them to review his blood work and blood sugar control  NN discussed with Chris Cleveland that HbA1c goal is ultimately <7 if possible, but protocol does allow for a review of pts that fall <7 5  He expresses difficulties with exercise due to pain, but reports he has been dieting  He is aware our team will be looking for the lab results(especially HbA1c) and PCP note, to ensure blood sugars are optimized  He was advised that after new HbA1c result and PCP note is obtained that he will likely need additional pre-op labs ordered by surgeon (depending on results etc  )  Will await pt to get PCP ordered labs drawn, see PCP and evaluate readiness for surgery

## 2022-05-31 ENCOUNTER — TELEPHONE (OUTPATIENT)
Dept: OBGYN CLINIC | Facility: HOSPITAL | Age: 62
End: 2022-05-31

## 2022-05-31 ENCOUNTER — APPOINTMENT (OUTPATIENT)
Dept: LAB | Facility: CLINIC | Age: 62
End: 2022-05-31
Payer: COMMERCIAL

## 2022-05-31 DIAGNOSIS — Z12.5 SCREENING PSA (PROSTATE SPECIFIC ANTIGEN): ICD-10-CM

## 2022-05-31 DIAGNOSIS — E11.65 TYPE 2 DIABETES MELLITUS WITH HYPERGLYCEMIA, WITHOUT LONG-TERM CURRENT USE OF INSULIN (HCC): ICD-10-CM

## 2022-05-31 LAB
ALBUMIN SERPL BCP-MCNC: 3.8 G/DL (ref 3.5–5)
ALP SERPL-CCNC: 64 U/L (ref 46–116)
ALT SERPL W P-5'-P-CCNC: 34 U/L (ref 12–78)
ANION GAP SERPL CALCULATED.3IONS-SCNC: 7 MMOL/L (ref 4–13)
AST SERPL W P-5'-P-CCNC: 20 U/L (ref 5–45)
BILIRUB SERPL-MCNC: 1.8 MG/DL (ref 0.2–1)
BUN SERPL-MCNC: 26 MG/DL (ref 5–25)
CALCIUM SERPL-MCNC: 10.2 MG/DL (ref 8.3–10.1)
CHLORIDE SERPL-SCNC: 106 MMOL/L (ref 100–108)
CHOLEST SERPL-MCNC: 125 MG/DL
CO2 SERPL-SCNC: 24 MMOL/L (ref 21–32)
CREAT SERPL-MCNC: 1.24 MG/DL (ref 0.6–1.3)
ERYTHROCYTE [DISTWIDTH] IN BLOOD BY AUTOMATED COUNT: 12.6 % (ref 11.6–15.1)
EST. AVERAGE GLUCOSE BLD GHB EST-MCNC: 128 MG/DL
GFR SERPL CREATININE-BSD FRML MDRD: 62 ML/MIN/1.73SQ M
GLUCOSE P FAST SERPL-MCNC: 112 MG/DL (ref 65–99)
HBA1C MFR BLD: 6.1 %
HCT VFR BLD AUTO: 51.3 % (ref 36.5–49.3)
HDLC SERPL-MCNC: 40 MG/DL
HGB BLD-MCNC: 17.7 G/DL (ref 12–17)
LDLC SERPL CALC-MCNC: 49 MG/DL (ref 0–100)
MCH RBC QN AUTO: 31.6 PG (ref 26.8–34.3)
MCHC RBC AUTO-ENTMCNC: 34.5 G/DL (ref 31.4–37.4)
MCV RBC AUTO: 91 FL (ref 82–98)
NONHDLC SERPL-MCNC: 85 MG/DL
PLATELET # BLD AUTO: 194 THOUSANDS/UL (ref 149–390)
PMV BLD AUTO: 9.7 FL (ref 8.9–12.7)
POTASSIUM SERPL-SCNC: 4.1 MMOL/L (ref 3.5–5.3)
PROT SERPL-MCNC: 7.8 G/DL (ref 6.4–8.2)
PSA SERPL-MCNC: 0.6 NG/ML (ref 0–4)
RBC # BLD AUTO: 5.61 MILLION/UL (ref 3.88–5.62)
SODIUM SERPL-SCNC: 137 MMOL/L (ref 136–145)
TRIGL SERPL-MCNC: 180 MG/DL
TSH SERPL DL<=0.05 MIU/L-ACNC: 1.75 UIU/ML (ref 0.45–4.5)
WBC # BLD AUTO: 7.5 THOUSAND/UL (ref 4.31–10.16)

## 2022-05-31 PROCEDURE — 3044F HG A1C LEVEL LT 7.0%: CPT | Performed by: INTERNAL MEDICINE

## 2022-05-31 PROCEDURE — 80061 LIPID PANEL: CPT

## 2022-05-31 PROCEDURE — 85027 COMPLETE CBC AUTOMATED: CPT

## 2022-05-31 PROCEDURE — 80053 COMPREHEN METABOLIC PANEL: CPT

## 2022-05-31 PROCEDURE — G0103 PSA SCREENING: HCPCS

## 2022-05-31 PROCEDURE — 83036 HEMOGLOBIN GLYCOSYLATED A1C: CPT

## 2022-05-31 PROCEDURE — 36415 COLL VENOUS BLD VENIPUNCTURE: CPT

## 2022-05-31 PROCEDURE — 84443 ASSAY THYROID STIM HORMONE: CPT

## 2022-05-31 NOTE — TELEPHONE ENCOUNTER
Preoperative Elective Admission Assessment- spoke to patient    [facility] Situation:    Who does pt live with: Son   What kind of home: multi-level  How do they enter the home: front  How many levels in home: 2    # of steps to enter home: 4 to enter    # of steps to second floor: 12 to 2nd floor   Are there handrails: Yes  Are there landings: Yes  Sleeping arrangement: first/entry floor  Where is Bathroom: Second floor and basement  Where is the tub or shower:  Basement step in tub  First Floor Setup:   Is there a bathroom: No -  NEEDS BSC   Where would pt sleep: couch     DME: frame walker and cane     Patient's Current Level of Function: Ambulates: Independently and ADLs: Independent    Post-op Caregiver: child, mother and father  Caregiver Name and phone number for Inpatient discharge needs: Esequiel Amy, [phone]  Currently receive any HHC/aides/community supports: No     Post-op Transport: child  To/from hospital: child, mother and father   To/from PT 2-3x/week: child, and mother and father   Uses community transport now: No     Outpatient Physical Therapy Site:  Site: [facility]   pre and post-op appts scheduled? No- will send to PT team       Medication Management: self and out of bottle  Preferred Pharmacy for Post-op Medications: [facility]   Blood Management Vitamin Regimen: Pt confirms taking as prescribed  (OTC he states)  Post-op anticoagulant: to be determined by surgical team postoperatively     DC Plan: Pt plans to be discharged home  Pt educated that our goal, if at all possible, is to appropriately discharge patient based off their post-op function while striving to maintain maximal independence  If possible, the goal is to discharge patient to home and for them to attend outpatient physical therapy      Barriers to DC identified preoperatively: none identified    BMI: 30 85     [employer]: declined enrollment, enrolled in surveys     Patient Education:  Pt educated on post-op pain, early mobilization (POD0), Average inpt LOS, OP PT goal   Patient educated that our goal is to appropriately discharge patient based off their post-op function while striving to maintain maximal independence  The goal is to discharge patient to home and for them to attend outpatient physical therapy

## 2022-06-03 ENCOUNTER — ANESTHESIA EVENT (OUTPATIENT)
Dept: PERIOP | Facility: HOSPITAL | Age: 62
End: 2022-06-03
Payer: COMMERCIAL

## 2022-06-03 RX ORDER — LIDOCAINE HYDROCHLORIDE 10 MG/ML
0.5 INJECTION, SOLUTION EPIDURAL; INFILTRATION; INTRACAUDAL; PERINEURAL ONCE AS NEEDED
Status: CANCELLED | OUTPATIENT
Start: 2022-06-28

## 2022-06-06 ENCOUNTER — TELEPHONE (OUTPATIENT)
Dept: OBGYN CLINIC | Facility: HOSPITAL | Age: 62
End: 2022-06-06

## 2022-06-06 DIAGNOSIS — M17.12 PRIMARY OSTEOARTHRITIS OF LEFT KNEE: Primary | ICD-10-CM

## 2022-06-06 NOTE — TELEPHONE ENCOUNTER
Marianela Carrier, there is a PT referral placed on 2/22/22 with appropriate diagnosis  That should work for them  Let me know if not  Thanks!

## 2022-06-06 NOTE — TELEPHONE ENCOUNTER
Patient sees Dr Fabiola Kinney He is calling in from  Physical therapy wanting to get the script for PT for this patient as he is scheduled for surgery on 6/28 for a total left knee  She is asking if the Dr is able to place the order for PT for this patient in the chart as she had just scheduled him to be seen with them            Call back if needed# [phone]

## 2022-06-07 ENCOUNTER — OFFICE VISIT (OUTPATIENT)
Dept: FAMILY MEDICINE CLINIC | Facility: CLINIC | Age: 62
End: 2022-06-07
Payer: COMMERCIAL

## 2022-06-07 VITALS
DIASTOLIC BLOOD PRESSURE: 74 MMHG | SYSTOLIC BLOOD PRESSURE: 122 MMHG | HEART RATE: 61 BPM | OXYGEN SATURATION: 97 % | WEIGHT: 217 LBS | TEMPERATURE: 98 F | BODY MASS INDEX: 31.07 KG/M2 | HEIGHT: 70 IN

## 2022-06-07 DIAGNOSIS — M17.12 ARTHRITIS OF LEFT KNEE: ICD-10-CM

## 2022-06-07 DIAGNOSIS — E11.65 TYPE 2 DIABETES MELLITUS WITH HYPERGLYCEMIA, WITHOUT LONG-TERM CURRENT USE OF INSULIN (HCC): ICD-10-CM

## 2022-06-07 DIAGNOSIS — N52.9 MALE ERECTILE DISORDER: ICD-10-CM

## 2022-06-07 DIAGNOSIS — E78.5 HYPERLIPIDEMIA, UNSPECIFIED HYPERLIPIDEMIA TYPE: ICD-10-CM

## 2022-06-07 DIAGNOSIS — Z01.818 PRE-OP EXAMINATION: Primary | ICD-10-CM

## 2022-06-07 PROCEDURE — 3074F SYST BP LT 130 MM HG: CPT | Performed by: FAMILY MEDICINE

## 2022-06-07 PROCEDURE — 3078F DIAST BP <80 MM HG: CPT | Performed by: FAMILY MEDICINE

## 2022-06-07 PROCEDURE — 3725F SCREEN DEPRESSION PERFORMED: CPT | Performed by: FAMILY MEDICINE

## 2022-06-07 PROCEDURE — 99213 OFFICE O/P EST LOW 20 MIN: CPT | Performed by: FAMILY MEDICINE

## 2022-06-07 PROCEDURE — 1036F TOBACCO NON-USER: CPT | Performed by: FAMILY MEDICINE

## 2022-06-07 PROCEDURE — 3008F BODY MASS INDEX DOCD: CPT | Performed by: FAMILY MEDICINE

## 2022-06-07 RX ORDER — ROSUVASTATIN CALCIUM 10 MG/1
10 TABLET, COATED ORAL DAILY
Qty: 90 TABLET | Refills: 3 | Status: SHIPPED | OUTPATIENT
Start: 2022-06-07

## 2022-06-07 RX ORDER — SILDENAFIL 100 MG/1
TABLET, FILM COATED ORAL
Qty: 20 TABLET | Refills: 2 | Status: SHIPPED | OUTPATIENT
Start: 2022-06-07

## 2022-06-07 NOTE — PRE-PROCEDURE INSTRUCTIONS
Pre-Surgery Instructions:   Medication Instructions    albuterol (PROAIR HFA) 90 mcg/act inhaler Uses PRN- OK to take day of surgery    Ascorbic Acid (VITAMIN C PO) Hold day of surgery  Instructions provided by surgeon    betamethasone dipropionate (DIPROSONE) 0 05 % cream Hold day of surgery   budesonide-formoterol (Symbicort) 160-4 5 mcg/act inhaler Take day of surgery   co-enzyme Q-10 30 mg Stop taking 7 days prior to surgery   Empagliflozin 10 MG TABS Hold day of surgery   fenofibrate (TRICOR) 145 mg tablet Take day of surgery  with sips water only    ferrous sulfate 324 (65 Fe) mg Hold day of surgery  Instructions provided by surgeon    FOLIC ACID PO Hold day of surgery  Instructions provided by surgeon    lisinopril-hydrochlorothiazide (PRINZIDE,ZESTORETIC) 20-25 MG per tablet Hold day of surgery   metFORMIN (GLUCOPHAGE) 1000 MG tablet Hold day of surgery   metoprolol succinate (TOPROL-XL) 100 mg 24 hr tablet Take day of surgery  with sips water only    Multiple Vitamin (multivitamin) tablet Hold day of surgery  Instructions provided by surgeon    omega-3-acid ethyl esters (LOVAZA) 1 g capsule Stop taking 7 days prior to surgery   rosuvastatin (CRESTOR) 10 MG tablet Take day of surgery  with sips water only    sildenafil (VIAGRA) 100 mg tablet Hold day of surgery   [DISCONTINUED] atorvastatin (LIPITOR) 20 mg tablet D/c'd    [DISCONTINUED] sildenafil (VIAGRA) 100 mg tablet D/c'd    TML[facility] Patient Education reviewed with all questions answered  Pre op instructions per [facility] protocol,medications per surgeon/anesthesia guidelines and showering instructions per [facility] protocol reviewed-Patient has hibiclens soap and wipes  As of   1/17/22 patient taking Folic Acid,Vitamin C,Iron and a Multivitamin and will continue until 6/27/22  Pt   Verbalized understanding of current visitor restrictions due to Covid and will clarify with nurse DOS  Instructed to call surgeon with any illness or covid exposure 1 week prior to procedure till DOS  Instructed to avoid all ASA and OTC Vit/Supp 1 week prior to surgery and to avoid NSAIDs 3 days prior to surgery per anesthesia guidelines  Tylenol ok to take prn  No Alcohol 24 hours prior to surgery  Patient aware Lovenox or other Blood Thinner prescribed is for POST OP ONLY  Pt  Verbalized an understanding of all instructions reviewed and offers no concerns at this time

## 2022-06-07 NOTE — ASSESSMENT & PLAN NOTE
Lab Results   Component Value Date    HGBA1C 6 1 (H) 05/31/2022   Much improved with addition of Jardiance  Hemoglobin A1c down to 6 1    Continue metformin also

## 2022-06-07 NOTE — H&P (VIEW-ONLY)
[facility]      NAME: Miguel A Cooney  AGE: 64 y o  SEX: male  : 1960   MRN: [id number]    DATE: 2022  TIME: 2:52 PM    Assessment and Plan     Patient was seen for preop clearance for upcoming left total knee arthroplasty  Preop testing reviewed including blood work as well as recent cardiovascular clearance which included nuclear stress testing  Patient is medically stable and at low risk for the proposed procedure  He is medically cleared to proceed  Problem List Items Addressed This Visit     Hyperlipidemia     Significant improvement with addition of atorvastatin  Triglycerides which previously had been greater than 600 are now down to 180  LDL cholesterol 45  Patient is having some GI intolerance to his atorvastatin  Will switch to rosuvastatin 10 mg to see if he can tolerate that better  Relevant Medications    rosuvastatin (CRESTOR) 10 MG tablet    Other Relevant Orders    Comprehensive metabolic panel    Lipid Panel with Direct LDL reflex    TSH, 3rd generation    DMII (diabetes mellitus, type 2) (HCC)       Lab Results   Component Value Date    HGBA1C 6 1 (H) 2022   Much improved with addition of Jardiance  Hemoglobin A1c down to 6 1    Continue metformin also           Relevant Orders    Hemoglobin A1C      Other Visit Diagnoses     Pre-op examination    -  Primary    Arthritis of left knee        Male erectile disorder        Relevant Medications    sildenafil (VIAGRA) 100 mg tablet              Return to office in:  6 months    Chief Complaint     Chief Complaint   Patient presents with    Follow-up       History of Present Illness     Miguel A Cooney  64 y o   male    SURGEON:Julia    SURGERY/PROCEDURE:  Left total knee arthroplasty    DATE OF SURGERY:      PRIOR ANESTHESIA:yes    COMPLICATION: no    BLEEDING PROBLEM: no    PERTINENT PMH:  Type 2 diabetes, hypertension, hyperlipidemia, chronic bronchitis    EXERCISE CAPACITY:   CAN WALK 4 BLOCKS AND OR CLIMB 2 FLIGHTS: Yes    HOME LIVING SITUATION SAFE AND SECURE: Yes      TOBACCO: no     ETOH: yes, occasionally     ILLEGAL DRUGS: no    Patient was seen for routine follow-up of chronic medical problems  He was also seen for preop clearance for upcoming left total knee arthroplasty  He feels well and has no complaints  His chronic medical problems are listed above  Medications include metoprolol and lisinopril hydrochlorothiazide for his blood pressure  He takes metformin and Jardiance for his diabetes  He takes atorvastatin for his lipids along with fenofibrate though he notes that the atorvastatin seems to upset his stomach and he would like to switch medication  The following portions of the patient's history were reviewed and updated as appropriate: allergies, current medications, past family history, past medical history, past social history, past surgical history and problem list     Review of Systems   Review of Systems   Constitutional: Negative  Respiratory: Negative  Cardiovascular: Negative  Gastrointestinal: Negative  Genitourinary: Negative  Musculoskeletal: Positive for arthralgias and joint swelling  Psychiatric/Behavioral: Negative  Active Problem List     Patient Active Problem List   Diagnosis    DMII (diabetes mellitus, type 2) ([facility] [address] )    Hyperlipidemia    Primary hypertension    Mass of appendix    Murmur    BMI 31 0-31 9,adult    Anxiety    Rash       Objective   /74 (BP Location: Left arm, Patient Position: Sitting, Cuff Size: Standard)   Pulse 61   Temp 98 °F (36 7 °C) (Tympanic)   Ht 5' 10" (1 778 m)   Wt 98 4 kg (217 lb)   SpO2 97%   BMI 31 14 kg/m²     Physical Exam  Vitals and nursing note reviewed  Constitutional:       General: He is not in acute distress  Appearance: He is well-developed  He is not diaphoretic  HENT:      Head: Normocephalic and atraumatic  Eyes:      General:         Right eye: No discharge  Conjunctiva/sclera: Conjunctivae normal       Pupils: Pupils are equal, round, and reactive to light  Neck:      Thyroid: No thyromegaly  Cardiovascular:      Rate and Rhythm: Normal rate and regular rhythm  Pulmonary:      Effort: Pulmonary effort is normal  No respiratory distress  Breath sounds: Normal breath sounds  Musculoskeletal:      Cervical back: Normal range of motion  Lymphadenopathy:      Cervical: No cervical adenopathy  Skin:     General: Skin is warm and dry  Neurological:      Mental Status: He is alert and oriented to person, place, and time  Psychiatric:         Behavior: Behavior normal          Thought Content: Thought content normal          Judgment: Judgment normal            Current Medications     Current Outpatient Medications:     albuterol (PROAIR HFA) 90 mcg/act inhaler, Inhale 2 puffs every 6 (six) hours as needed for wheezing, Disp: 5 Inhaler, Rfl: 5    Ascorbic Acid (VITAMIN C PO), Take 1 capsule by mouth daily, Disp: , Rfl:     Blood Glucose Monitoring Suppl (OneTouch Verio Reflect) w/Device KIT, Check blood sugars once daily  Please substitute with appropriate alternative as covered by patient's insurance  Dx: E11 65, Disp: 1 kit, Rfl: 0    budesonide-formoterol (Symbicort) 160-4 5 mcg/act inhaler, Inhale 2 puffs 2 (two) times a day Rinse mouth after use , Disp: 10 2 g, Rfl: 5    co-enzyme Q-10 30 mg, Take 30 mg by mouth daily, Disp: , Rfl:     Empagliflozin 10 MG TABS, Take 1 tablet (10 mg total) by mouth every morning, Disp: 90 tablet, Rfl: 1    ferrous sulfate 324 (65 Fe) mg, Take 1 tablet (324 mg total) by mouth in the morning, Disp: 30 tablet, Rfl: 1    FOLIC ACID PO, Take 1 capsule by mouth daily, Disp: , Rfl:     glucose blood (OneTouch Verio) test strip, Check blood sugars once daily  Please substitute with appropriate alternative as covered by patient's insurance   Dx: E11 65, Disp: 100 each, Rfl: 3    lisinopril-hydrochlorothiazide (PRINZIDE,ZESTORETIC) 20-25 MG per tablet, TAKE 1 TABLET DAILY (Patient taking differently: Take 1 tablet by mouth daily in the early morning), Disp: 90 tablet, Rfl: 1    metFORMIN (GLUCOPHAGE) 1000 MG tablet, TAKE 1 TABLET TWICE DAILY  WITH MEALS (Patient taking differently: Take 1,000 mg by mouth 2 (two) times a day with meals), Disp: 180 tablet, Rfl: 1    metoprolol succinate (TOPROL-XL) 100 mg 24 hr tablet, TAKE 1 TABLET DAILY (Patient taking differently: Take 100 mg by mouth daily in the early morning), Disp: 90 tablet, Rfl: 1    Multiple Vitamin (multivitamin) tablet, Take 1 tablet by mouth daily, Disp: 30 tablet, Rfl: 1    omega-3-acid ethyl esters (LOVAZA) 1 g capsule, TAKE 2 CAPSULES TWICE DAILY (Patient taking differently: Take 2 g by mouth 2 (two) times a day), Disp: 360 capsule, Rfl: 1    OneTouch Delica Lancets T MISC, Check blood sugars once daily  Please substitute with appropriate alternative as covered by patient's insurance   Dx: E11 65, Disp: 100 each, Rfl: 3    rosuvastatin (CRESTOR) 10 MG tablet, Take 1 tablet (10 mg total) by mouth daily, Disp: 90 tablet, Rfl: 3    sildenafil (VIAGRA) 100 mg tablet, Take one tablet daily as needed, Disp: 20 tablet, Rfl: 2    betamethasone dipropionate (DIPROSONE) 0 05 % cream, Apply topically 2 (two) times a day (Patient taking differently: Apply 1 application topically 2 (two) times a day), Disp: 30 g, Rfl: 0    fenofibrate (TRICOR) 145 mg tablet, TAKE 1 TABLET DAILY (Patient taking differently: Take 145 mg by mouth daily in the early morning), Disp: 90 tablet, Rfl: 1    Health Maintenance     Health Maintenance   Topic Date Due    Annual Physical  Never done    DTaP,Tdap,and Td Vaccines (1 - Tdap) Never done    Pneumococcal Vaccine: Pediatrics (0 to 5 Years) and At-Risk Patients (6 to 64 Years) (2 - PCV) 07/31/2015    DM Eye Exam  04/14/2019    BMI: Followup Plan  11/27/2020    COVID-19 Vaccine (4 - Booster for Pfizer series) 02/14/2022    Colorectal Cancer Screening  10/05/2022    HEMOGLOBIN A1C  11/30/2022    Diabetic Foot Exam  03/28/2023    Depression Screening  06/07/2023    BMI: Adult  06/07/2023    HIV Screening  Completed    Hepatitis C Screening  Completed    Influenza Vaccine  Completed    HIB Vaccine  Aged Out    Hepatitis B Vaccine  Aged Out    IPV Vaccine  Aged Out    Hepatitis A Vaccine  Aged Out    Meningococcal ACWY Vaccine  Aged Out    HPV Vaccine  Aged Out     Immunization History   Administered Date(s) Administered    COVID-19 PFIZER VACCINE 0 3 ML IM 03/24/2021, 04/18/2021, 10/14/2021    INFLUENZA 11/01/2007, 10/20/2008, 10/15/2009, 10/30/2017, 10/17/2018    Influenza Quadrivalent, 6-35 Months IM 11/17/2015, 11/05/2016    Influenza, injectable, quadrivalent, preservative free 0 5 mL 10/02/2020    Influenza, recombinant, quadrivalent,injectable, preservative free 11/27/2019, 09/14/2021    Influenza, seasonal, injectable 11/20/2012, 10/01/2014    Pneumococcal Polysaccharide PPV23 07/31/2014       Chester Bower DO  [facility]

## 2022-06-07 NOTE — ASSESSMENT & PLAN NOTE
Significant improvement with addition of atorvastatin  Triglycerides which previously had been greater than 600 are now down to 180  LDL cholesterol 45  Patient is having some GI intolerance to his atorvastatin  Will switch to rosuvastatin 10 mg to see if he can tolerate that better

## 2022-06-07 NOTE — PROGRESS NOTES
[facility]      NAME: Vira Church  AGE: 64 y o  SEX: male  : 1960   MRN: [id number]    DATE: 2022  TIME: 2:52 PM    Assessment and Plan     Patient was seen for preop clearance for upcoming left total knee arthroplasty  Preop testing reviewed including blood work as well as recent cardiovascular clearance which included nuclear stress testing  Patient is medically stable and at low risk for the proposed procedure  He is medically cleared to proceed  Problem List Items Addressed This Visit     Hyperlipidemia     Significant improvement with addition of atorvastatin  Triglycerides which previously had been greater than 600 are now down to 180  LDL cholesterol 45  Patient is having some GI intolerance to his atorvastatin  Will switch to rosuvastatin 10 mg to see if he can tolerate that better  Relevant Medications    rosuvastatin (CRESTOR) 10 MG tablet    Other Relevant Orders    Comprehensive metabolic panel    Lipid Panel with Direct LDL reflex    TSH, 3rd generation    DMII (diabetes mellitus, type 2) (HCC)       Lab Results   Component Value Date    HGBA1C 6 1 (H) 2022   Much improved with addition of Jardiance  Hemoglobin A1c down to 6 1    Continue metformin also           Relevant Orders    Hemoglobin A1C      Other Visit Diagnoses     Pre-op examination    -  Primary    Arthritis of left knee        Male erectile disorder        Relevant Medications    sildenafil (VIAGRA) 100 mg tablet              Return to office in:  6 months    Chief Complaint     Chief Complaint   Patient presents with    Follow-up       History of Present Illness     Vira Church  64 y o   male    SURGEON:Julia    SURGERY/PROCEDURE:  Left total knee arthroplasty    DATE OF SURGERY:      PRIOR ANESTHESIA:yes    COMPLICATION: no    BLEEDING PROBLEM: no    PERTINENT PMH:  Type 2 diabetes, hypertension, hyperlipidemia, chronic bronchitis    EXERCISE CAPACITY:   CAN WALK 4 BLOCKS AND OR CLIMB 2 FLIGHTS: Yes    HOME LIVING SITUATION SAFE AND SECURE: Yes      TOBACCO: no     ETOH: yes, occasionally     ILLEGAL DRUGS: no    Patient was seen for routine follow-up of chronic medical problems  He was also seen for preop clearance for upcoming left total knee arthroplasty  He feels well and has no complaints  His chronic medical problems are listed above  Medications include metoprolol and lisinopril hydrochlorothiazide for his blood pressure  He takes metformin and Jardiance for his diabetes  He takes atorvastatin for his lipids along with fenofibrate though he notes that the atorvastatin seems to upset his stomach and he would like to switch medication  The following portions of the patient's history were reviewed and updated as appropriate: allergies, current medications, past family history, past medical history, past social history, past surgical history and problem list     Review of Systems   Review of Systems   Constitutional: Negative  Respiratory: Negative  Cardiovascular: Negative  Gastrointestinal: Negative  Genitourinary: Negative  Musculoskeletal: Positive for arthralgias and joint swelling  Psychiatric/Behavioral: Negative  Active Problem List     Patient Active Problem List   Diagnosis    DMII (diabetes mellitus, type 2) ([address] )    Hyperlipidemia    Primary hypertension    Mass of appendix    Murmur    BMI 31 0-31 9,adult    Anxiety    Rash       Objective   /74 (BP Location: Left arm, Patient Position: Sitting, Cuff Size: Standard)   Pulse 61   Temp 98 °F (36 7 °C) (Tympanic)   Ht 5' 10" (1 778 m)   Wt 98 4 kg (217 lb)   SpO2 97%   BMI 31 14 kg/m²     Physical Exam  Vitals and nursing note reviewed  Constitutional:       General: He is not in acute distress  Appearance: He is well-developed  He is not diaphoretic  HENT:      Head: Normocephalic and atraumatic  Eyes:      General:         Right eye: No discharge  Conjunctiva/sclera: Conjunctivae normal       Pupils: Pupils are equal, round, and reactive to light  Neck:      Thyroid: No thyromegaly  Cardiovascular:      Rate and Rhythm: Normal rate and regular rhythm  Pulmonary:      Effort: Pulmonary effort is normal  No respiratory distress  Breath sounds: Normal breath sounds  Musculoskeletal:      Cervical back: Normal range of motion  Lymphadenopathy:      Cervical: No cervical adenopathy  Skin:     General: Skin is warm and dry  Neurological:      Mental Status: He is alert and oriented to person, place, and time  Psychiatric:         Behavior: Behavior normal          Thought Content: Thought content normal          Judgment: Judgment normal            Current Medications     Current Outpatient Medications:     albuterol (PROAIR HFA) 90 mcg/act inhaler, Inhale 2 puffs every 6 (six) hours as needed for wheezing, Disp: 5 Inhaler, Rfl: 5    Ascorbic Acid (VITAMIN C PO), Take 1 capsule by mouth daily, Disp: , Rfl:     Blood Glucose Monitoring Suppl (OneTouch Verio Reflect) w/Device KIT, Check blood sugars once daily  Please substitute with appropriate alternative as covered by patient's insurance  Dx: E11 65, Disp: 1 kit, Rfl: 0    budesonide-formoterol (Symbicort) 160-4 5 mcg/act inhaler, Inhale 2 puffs 2 (two) times a day Rinse mouth after use , Disp: 10 2 g, Rfl: 5    co-enzyme Q-10 30 mg, Take 30 mg by mouth daily, Disp: , Rfl:     Empagliflozin 10 MG TABS, Take 1 tablet (10 mg total) by mouth every morning, Disp: 90 tablet, Rfl: 1    ferrous sulfate 324 (65 Fe) mg, Take 1 tablet (324 mg total) by mouth in the morning, Disp: 30 tablet, Rfl: 1    FOLIC ACID PO, Take 1 capsule by mouth daily, Disp: , Rfl:     glucose blood (OneTouch Verio) test strip, Check blood sugars once daily  Please substitute with appropriate alternative as covered by patient's insurance   Dx: E11 65, Disp: 100 each, Rfl: 3    lisinopril-hydrochlorothiazide (PRINZIDE,ZESTORETIC) 20-25 MG per tablet, TAKE 1 TABLET DAILY (Patient taking differently: Take 1 tablet by mouth daily in the early morning), Disp: 90 tablet, Rfl: 1    metFORMIN (GLUCOPHAGE) 1000 MG tablet, TAKE 1 TABLET TWICE DAILY  WITH MEALS (Patient taking differently: Take 1,000 mg by mouth 2 (two) times a day with meals), Disp: 180 tablet, Rfl: 1    metoprolol succinate (TOPROL-XL) 100 mg 24 hr tablet, TAKE 1 TABLET DAILY (Patient taking differently: Take 100 mg by mouth daily in the early morning), Disp: 90 tablet, Rfl: 1    Multiple Vitamin (multivitamin) tablet, Take 1 tablet by mouth daily, Disp: 30 tablet, Rfl: 1    omega-3-acid ethyl esters (LOVAZA) 1 g capsule, TAKE 2 CAPSULES TWICE DAILY (Patient taking differently: Take 2 g by mouth 2 (two) times a day), Disp: 360 capsule, Rfl: 1    OneTouch Delica Lancets 35P MISC, Check blood sugars once daily  Please substitute with appropriate alternative as covered by patient's insurance   Dx: E11 65, Disp: 100 each, Rfl: 3    rosuvastatin (CRESTOR) 10 MG tablet, Take 1 tablet (10 mg total) by mouth daily, Disp: 90 tablet, Rfl: 3    sildenafil (VIAGRA) 100 mg tablet, Take one tablet daily as needed, Disp: 20 tablet, Rfl: 2    betamethasone dipropionate (DIPROSONE) 0 05 % cream, Apply topically 2 (two) times a day (Patient taking differently: Apply 1 application topically 2 (two) times a day), Disp: 30 g, Rfl: 0    fenofibrate (TRICOR) 145 mg tablet, TAKE 1 TABLET DAILY (Patient taking differently: Take 145 mg by mouth daily in the early morning), Disp: 90 tablet, Rfl: 1    Health Maintenance     Health Maintenance   Topic Date Due    Annual Physical  Never done    DTaP,Tdap,and Td Vaccines (1 - Tdap) Never done    Pneumococcal Vaccine: Pediatrics (0 to 5 Years) and At-Risk Patients (6 to 64 Years) (2 - PCV) 07/31/2015    DM Eye Exam  04/14/2019    BMI: Followup Plan  11/27/2020    COVID-19 Vaccine (4 - Booster for Pfizer series) 02/14/2022    Colorectal Cancer Screening  10/05/2022    HEMOGLOBIN A1C  11/30/2022    Diabetic Foot Exam  03/28/2023    Depression Screening  06/07/2023    BMI: Adult  06/07/2023    HIV Screening  Completed    Hepatitis C Screening  Completed    Influenza Vaccine  Completed    HIB Vaccine  Aged Out    Hepatitis B Vaccine  Aged Out    IPV Vaccine  Aged Out    Hepatitis A Vaccine  Aged Out    Meningococcal ACWY Vaccine  Aged Out    HPV Vaccine  Aged Out     Immunization History   Administered Date(s) Administered    COVID-19 PFIZER VACCINE 0 3 ML IM 03/24/2021, 04/18/2021, 10/14/2021    INFLUENZA 11/01/2007, 10/20/2008, 10/15/2009, 10/30/2017, 10/17/2018    Influenza Quadrivalent, 6-35 Months IM 11/17/2015, 11/05/2016    Influenza, injectable, quadrivalent, preservative free 0 5 mL 10/02/2020    Influenza, recombinant, quadrivalent,injectable, preservative free 11/27/2019, 09/14/2021    Influenza, seasonal, injectable 11/20/2012, 10/01/2014    Pneumococcal Polysaccharide PPV23 07/31/2014       Guera Babcock DO  [facility]

## 2022-06-09 ENCOUNTER — TELEPHONE (OUTPATIENT)
Dept: OBGYN CLINIC | Facility: CLINIC | Age: 62
End: 2022-06-09

## 2022-06-09 NOTE — TELEPHONE ENCOUNTER
Pt was scheduled w/incorrect provider/lmom for pt that this appt is cancelled and to call in to reschedule w/correct provider

## 2022-06-21 ENCOUNTER — TELEPHONE (OUTPATIENT)
Dept: FAMILY MEDICINE CLINIC | Facility: CLINIC | Age: 62
End: 2022-06-21

## 2022-06-21 NOTE — TELEPHONE ENCOUNTER
Since his last blood work was so good, we can keep him off of the [facility] and just take metformin for his diabetes  We will recheck blood work with his next labs to see if I need to add a medication at that time

## 2022-06-21 NOTE — TELEPHONE ENCOUNTER
Pt left  regarding Jardiance 10 mg   He stated he took it for 90 days but had terrible stomach pains   Stop medication about 5 days ago and wanted to know if there is something else  He is also schedule for Knee surgery on 6/28/2022

## 2022-06-28 ENCOUNTER — APPOINTMENT (OUTPATIENT)
Dept: RADIOLOGY | Facility: HOSPITAL | Age: 62
End: 2022-06-28
Payer: COMMERCIAL

## 2022-06-28 ENCOUNTER — ANESTHESIA (OUTPATIENT)
Dept: PERIOP | Facility: HOSPITAL | Age: 62
End: 2022-06-28
Payer: COMMERCIAL

## 2022-06-28 ENCOUNTER — HOSPITAL ENCOUNTER (OUTPATIENT)
Facility: HOSPITAL | Age: 62
Setting detail: OUTPATIENT SURGERY
Discharge: HOME | End: 2022-06-29
Attending: ORTHOPAEDIC SURGERY | Admitting: ORTHOPAEDIC SURGERY
Payer: COMMERCIAL

## 2022-06-28 DIAGNOSIS — I10 PRIMARY HYPERTENSION: ICD-10-CM

## 2022-06-28 DIAGNOSIS — E78.5 HYPERLIPIDEMIA, UNSPECIFIED HYPERLIPIDEMIA TYPE: Primary | ICD-10-CM

## 2022-06-28 DIAGNOSIS — F41.9 ANXIETY: ICD-10-CM

## 2022-06-28 DIAGNOSIS — E11.65 TYPE 2 DIABETES MELLITUS WITH HYPERGLYCEMIA, WITHOUT LONG-TERM CURRENT USE OF INSULIN (HCC): ICD-10-CM

## 2022-06-28 DIAGNOSIS — Z96.652 STATUS POST TOTAL KNEE REPLACEMENT, LEFT: ICD-10-CM

## 2022-06-28 LAB
ABO GROUP BLD: NORMAL
BLD GP AB SCN SERPL QL: NEGATIVE
FLUAV RNA RESP QL NAA+PROBE: NEGATIVE
FLUBV RNA RESP QL NAA+PROBE: NEGATIVE
GLUCOSE SERPL-MCNC: 124 MG/DL (ref 65–140)
GLUCOSE SERPL-MCNC: 153 MG/DL (ref 65–140)
GLUCOSE SERPL-MCNC: 196 MG/DL (ref 65–140)
GLUCOSE SERPL-MCNC: 231 MG/DL (ref 65–140)
RH BLD: NEGATIVE
RSV RNA RESP QL NAA+PROBE: NEGATIVE
SARS-COV-2 RNA RESP QL NAA+PROBE: NEGATIVE
SPECIMEN EXPIRATION DATE: NORMAL

## 2022-06-28 PROCEDURE — C1776 JOINT DEVICE (IMPLANTABLE): HCPCS | Performed by: ORTHOPAEDIC SURGERY

## 2022-06-28 PROCEDURE — 97163 PT EVAL HIGH COMPLEX 45 MIN: CPT

## 2022-06-28 PROCEDURE — 86901 BLOOD TYPING SEROLOGIC RH(D): CPT | Performed by: ORTHOPAEDIC SURGERY

## 2022-06-28 PROCEDURE — 27447 TOTAL KNEE ARTHROPLASTY: CPT | Performed by: PHYSICIAN ASSISTANT

## 2022-06-28 PROCEDURE — 73560 X-RAY EXAM OF KNEE 1 OR 2: CPT

## 2022-06-28 PROCEDURE — C1713 ANCHOR/SCREW BN/BN,TIS/BN: HCPCS | Performed by: ORTHOPAEDIC SURGERY

## 2022-06-28 PROCEDURE — 86850 RBC ANTIBODY SCREEN: CPT | Performed by: ORTHOPAEDIC SURGERY

## 2022-06-28 PROCEDURE — 86900 BLOOD TYPING SEROLOGIC ABO: CPT | Performed by: ORTHOPAEDIC SURGERY

## 2022-06-28 PROCEDURE — 82948 REAGENT STRIP/BLOOD GLUCOSE: CPT

## 2022-06-28 PROCEDURE — 27447 TOTAL KNEE ARTHROPLASTY: CPT | Performed by: ORTHOPAEDIC SURGERY

## 2022-06-28 PROCEDURE — 0241U HB NFCT DS VIR RESP RNA 4 TRGT: CPT | Performed by: ORTHOPAEDIC SURGERY

## 2022-06-28 DEVICE — SMARTSET GMV HIGH PERFORMANCE GENTAMICIN MEDIUM VISCOSITY BONE CEMENT 40G
Type: IMPLANTABLE DEVICE | Site: KNEE | Status: FUNCTIONAL
Brand: SMARTSET

## 2022-06-28 DEVICE — ATTUNE KNEE SYSTEM FEMORAL CRUCIATE RETAINING SIZE 7 LEFT CEMENTED
Type: IMPLANTABLE DEVICE | Site: KNEE | Status: FUNCTIONAL
Brand: ATTUNE

## 2022-06-28 DEVICE — ATTUNE PATELLA MEDIALIZED DOME 38MM CEMENTED AOX
Type: IMPLANTABLE DEVICE | Site: KNEE | Status: FUNCTIONAL
Brand: ATTUNE

## 2022-06-28 DEVICE — ATTUNE KNEE SYSTEM TIBIAL INSERT FIXED BEARING CRUCIATE RETAINING 7 10MM AOX
Type: IMPLANTABLE DEVICE | Site: KNEE | Status: FUNCTIONAL
Brand: ATTUNE

## 2022-06-28 DEVICE — ATTUNE KNEE SYSTEM TIBIAL BASE FIXED BEARING SIZE 6 CEMENTED
Type: IMPLANTABLE DEVICE | Site: KNEE | Status: FUNCTIONAL
Brand: ATTUNE

## 2022-06-28 RX ORDER — PRAVASTATIN SODIUM 80 MG/1
80 TABLET ORAL DAILY
Status: DISCONTINUED | OUTPATIENT
Start: 2022-06-29 | End: 2022-06-29 | Stop reason: HOSPADM

## 2022-06-28 RX ORDER — FENTANYL CITRATE 50 UG/ML
INJECTION, SOLUTION INTRAMUSCULAR; INTRAVENOUS AS NEEDED
Status: DISCONTINUED | OUTPATIENT
Start: 2022-06-28 | End: 2022-06-28

## 2022-06-28 RX ORDER — CEFAZOLIN SODIUM 2 G/50ML
2000 SOLUTION INTRAVENOUS ONCE
Status: COMPLETED | OUTPATIENT
Start: 2022-06-28 | End: 2022-06-28

## 2022-06-28 RX ORDER — BUDESONIDE AND FORMOTEROL FUMARATE DIHYDRATE 160; 4.5 UG/1; UG/1
2 AEROSOL RESPIRATORY (INHALATION) 2 TIMES DAILY
Status: DISCONTINUED | OUTPATIENT
Start: 2022-06-28 | End: 2022-06-29 | Stop reason: HOSPADM

## 2022-06-28 RX ORDER — SENNOSIDES 8.6 MG
1 TABLET ORAL
Status: DISCONTINUED | OUTPATIENT
Start: 2022-06-28 | End: 2022-06-29 | Stop reason: HOSPADM

## 2022-06-28 RX ORDER — CHLORHEXIDINE GLUCONATE 4 G/100ML
SOLUTION TOPICAL DAILY PRN
Status: DISCONTINUED | OUTPATIENT
Start: 2022-06-28 | End: 2022-06-28 | Stop reason: HOSPADM

## 2022-06-28 RX ORDER — PROPOFOL 10 MG/ML
INJECTION, EMULSION INTRAVENOUS CONTINUOUS PRN
Status: DISCONTINUED | OUTPATIENT
Start: 2022-06-28 | End: 2022-06-28

## 2022-06-28 RX ORDER — PROPOFOL 10 MG/ML
INJECTION, EMULSION INTRAVENOUS AS NEEDED
Status: DISCONTINUED | OUTPATIENT
Start: 2022-06-28 | End: 2022-06-28

## 2022-06-28 RX ORDER — ALBUTEROL SULFATE 90 UG/1
2 AEROSOL, METERED RESPIRATORY (INHALATION) EVERY 6 HOURS PRN
Status: DISCONTINUED | OUTPATIENT
Start: 2022-06-28 | End: 2022-06-29 | Stop reason: HOSPADM

## 2022-06-28 RX ORDER — TRANEXAMIC ACID 100 MG/ML
INJECTION, SOLUTION INTRAVENOUS AS NEEDED
Status: DISCONTINUED | OUTPATIENT
Start: 2022-06-28 | End: 2022-06-28

## 2022-06-28 RX ORDER — GABAPENTIN 100 MG/1
100 CAPSULE ORAL EVERY 8 HOURS
Status: DISCONTINUED | OUTPATIENT
Start: 2022-06-28 | End: 2022-06-29 | Stop reason: HOSPADM

## 2022-06-28 RX ORDER — EPHEDRINE SULFATE 50 MG/ML
INJECTION INTRAVENOUS AS NEEDED
Status: DISCONTINUED | OUTPATIENT
Start: 2022-06-28 | End: 2022-06-28

## 2022-06-28 RX ORDER — ENOXAPARIN SODIUM 100 MG/ML
40 INJECTION SUBCUTANEOUS DAILY
Status: DISCONTINUED | OUTPATIENT
Start: 2022-06-28 | End: 2022-06-29 | Stop reason: HOSPADM

## 2022-06-28 RX ORDER — BUPIVACAINE HYDROCHLORIDE 7.5 MG/ML
INJECTION, SOLUTION INTRASPINAL AS NEEDED
Status: DISCONTINUED | OUTPATIENT
Start: 2022-06-28 | End: 2022-06-28

## 2022-06-28 RX ORDER — HYDROMORPHONE HCL/PF 1 MG/ML
0.5 SYRINGE (ML) INJECTION
Status: DISCONTINUED | OUTPATIENT
Start: 2022-06-28 | End: 2022-06-28 | Stop reason: HOSPADM

## 2022-06-28 RX ORDER — CEFAZOLIN SODIUM 2 G/50ML
2000 SOLUTION INTRAVENOUS EVERY 8 HOURS
Status: COMPLETED | OUTPATIENT
Start: 2022-06-28 | End: 2022-06-28

## 2022-06-28 RX ORDER — HYDROMORPHONE HCL/PF 1 MG/ML
0.5 SYRINGE (ML) INJECTION EVERY 4 HOURS PRN
Status: DISCONTINUED | OUTPATIENT
Start: 2022-06-28 | End: 2022-06-29 | Stop reason: HOSPADM

## 2022-06-28 RX ORDER — FENOFIBRATE 145 MG/1
145 TABLET, COATED ORAL DAILY
Status: DISCONTINUED | OUTPATIENT
Start: 2022-06-28 | End: 2022-06-29 | Stop reason: HOSPADM

## 2022-06-28 RX ORDER — OXYCODONE HYDROCHLORIDE 10 MG/1
10 TABLET ORAL EVERY 4 HOURS PRN
Status: DISCONTINUED | OUTPATIENT
Start: 2022-06-28 | End: 2022-06-29 | Stop reason: HOSPADM

## 2022-06-28 RX ORDER — MEPERIDINE HYDROCHLORIDE 25 MG/ML
12.5 INJECTION INTRAMUSCULAR; INTRAVENOUS; SUBCUTANEOUS
Status: DISCONTINUED | OUTPATIENT
Start: 2022-06-28 | End: 2022-06-28 | Stop reason: HOSPADM

## 2022-06-28 RX ORDER — PRAVASTATIN SODIUM 80 MG/1
80 TABLET ORAL
Status: DISCONTINUED | OUTPATIENT
Start: 2022-06-28 | End: 2022-06-28

## 2022-06-28 RX ORDER — ROPIVACAINE HYDROCHLORIDE 5 MG/ML
INJECTION, SOLUTION EPIDURAL; INFILTRATION; PERINEURAL AS NEEDED
Status: DISCONTINUED | OUTPATIENT
Start: 2022-06-28 | End: 2022-06-28

## 2022-06-28 RX ORDER — ONDANSETRON 2 MG/ML
4 INJECTION INTRAMUSCULAR; INTRAVENOUS EVERY 6 HOURS PRN
Status: DISCONTINUED | OUTPATIENT
Start: 2022-06-28 | End: 2022-06-29 | Stop reason: HOSPADM

## 2022-06-28 RX ORDER — CHLORHEXIDINE GLUCONATE 0.12 MG/ML
15 RINSE ORAL ONCE
Status: COMPLETED | OUTPATIENT
Start: 2022-06-28 | End: 2022-06-28

## 2022-06-28 RX ORDER — SODIUM CHLORIDE, SODIUM LACTATE, POTASSIUM CHLORIDE, CALCIUM CHLORIDE 600; 310; 30; 20 MG/100ML; MG/100ML; MG/100ML; MG/100ML
50 INJECTION, SOLUTION INTRAVENOUS CONTINUOUS
Status: DISCONTINUED | OUTPATIENT
Start: 2022-06-28 | End: 2022-06-28

## 2022-06-28 RX ORDER — ENOXAPARIN SODIUM 100 MG/ML
40 INJECTION SUBCUTANEOUS DAILY
Qty: 16.8 ML | Refills: 0 | Status: CANCELLED | OUTPATIENT
Start: 2022-06-28

## 2022-06-28 RX ORDER — OXYCODONE HYDROCHLORIDE 5 MG/1
5 TABLET ORAL EVERY 4 HOURS PRN
Status: DISCONTINUED | OUTPATIENT
Start: 2022-06-28 | End: 2022-06-29 | Stop reason: HOSPADM

## 2022-06-28 RX ORDER — DEXAMETHASONE SODIUM PHOSPHATE 10 MG/ML
INJECTION, SOLUTION INTRAMUSCULAR; INTRAVENOUS AS NEEDED
Status: DISCONTINUED | OUTPATIENT
Start: 2022-06-28 | End: 2022-06-28

## 2022-06-28 RX ORDER — ACETAMINOPHEN 325 MG/1
975 TABLET ORAL ONCE
Status: COMPLETED | OUTPATIENT
Start: 2022-06-28 | End: 2022-06-28

## 2022-06-28 RX ORDER — MIDAZOLAM HYDROCHLORIDE 2 MG/2ML
INJECTION, SOLUTION INTRAMUSCULAR; INTRAVENOUS AS NEEDED
Status: DISCONTINUED | OUTPATIENT
Start: 2022-06-28 | End: 2022-06-28

## 2022-06-28 RX ORDER — ONDANSETRON 2 MG/ML
INJECTION INTRAMUSCULAR; INTRAVENOUS AS NEEDED
Status: DISCONTINUED | OUTPATIENT
Start: 2022-06-28 | End: 2022-06-28

## 2022-06-28 RX ORDER — TRIAMCINOLONE ACETONIDE 1 MG/G
CREAM TOPICAL 2 TIMES DAILY
Status: DISCONTINUED | OUTPATIENT
Start: 2022-06-28 | End: 2022-06-29 | Stop reason: HOSPADM

## 2022-06-28 RX ORDER — ACETAMINOPHEN 325 MG/1
975 TABLET ORAL EVERY 8 HOURS SCHEDULED
Status: DISCONTINUED | OUTPATIENT
Start: 2022-06-28 | End: 2022-06-29 | Stop reason: HOSPADM

## 2022-06-28 RX ORDER — DOCUSATE SODIUM 100 MG/1
100 CAPSULE, LIQUID FILLED ORAL 2 TIMES DAILY
Status: DISCONTINUED | OUTPATIENT
Start: 2022-06-28 | End: 2022-06-29 | Stop reason: HOSPADM

## 2022-06-28 RX ORDER — SODIUM CHLORIDE 9 MG/ML
75 INJECTION, SOLUTION INTRAVENOUS CONTINUOUS
Status: DISCONTINUED | OUTPATIENT
Start: 2022-06-28 | End: 2022-06-28

## 2022-06-28 RX ORDER — CALCIUM CARBONATE 200(500)MG
1000 TABLET,CHEWABLE ORAL DAILY PRN
Status: DISCONTINUED | OUTPATIENT
Start: 2022-06-28 | End: 2022-06-29 | Stop reason: HOSPADM

## 2022-06-28 RX ORDER — MAGNESIUM HYDROXIDE 1200 MG/15ML
LIQUID ORAL AS NEEDED
Status: DISCONTINUED | OUTPATIENT
Start: 2022-06-28 | End: 2022-06-28 | Stop reason: HOSPADM

## 2022-06-28 RX ORDER — ONDANSETRON 2 MG/ML
4 INJECTION INTRAMUSCULAR; INTRAVENOUS ONCE AS NEEDED
Status: DISCONTINUED | OUTPATIENT
Start: 2022-06-28 | End: 2022-06-28 | Stop reason: HOSPADM

## 2022-06-28 RX ORDER — ASCORBIC ACID 500 MG
500 TABLET ORAL DAILY
Status: DISCONTINUED | OUTPATIENT
Start: 2022-06-28 | End: 2022-06-29 | Stop reason: HOSPADM

## 2022-06-28 RX ORDER — POLYETHYLENE GLYCOL 3350 17 G/17G
17 POWDER, FOR SOLUTION ORAL DAILY PRN
Status: DISCONTINUED | OUTPATIENT
Start: 2022-06-28 | End: 2022-06-29 | Stop reason: HOSPADM

## 2022-06-28 RX ORDER — FENTANYL CITRATE/PF 50 MCG/ML
25 SYRINGE (ML) INJECTION
Status: DISCONTINUED | OUTPATIENT
Start: 2022-06-28 | End: 2022-06-28 | Stop reason: HOSPADM

## 2022-06-28 RX ORDER — INSULIN LISPRO 100 [IU]/ML
1-5 INJECTION, SOLUTION INTRAVENOUS; SUBCUTANEOUS
Status: DISCONTINUED | OUTPATIENT
Start: 2022-06-28 | End: 2022-06-29 | Stop reason: HOSPADM

## 2022-06-28 RX ORDER — METOPROLOL SUCCINATE 100 MG/1
100 TABLET, EXTENDED RELEASE ORAL DAILY
Status: DISCONTINUED | OUTPATIENT
Start: 2022-06-29 | End: 2022-06-29 | Stop reason: HOSPADM

## 2022-06-28 RX ORDER — FOLIC ACID 1 MG/1
1 TABLET ORAL DAILY
Status: DISCONTINUED | OUTPATIENT
Start: 2022-06-28 | End: 2022-06-29 | Stop reason: HOSPADM

## 2022-06-28 RX ORDER — SODIUM CHLORIDE, SODIUM LACTATE, POTASSIUM CHLORIDE, CALCIUM CHLORIDE 600; 310; 30; 20 MG/100ML; MG/100ML; MG/100ML; MG/100ML
125 INJECTION, SOLUTION INTRAVENOUS CONTINUOUS
Status: DISCONTINUED | OUTPATIENT
Start: 2022-06-28 | End: 2022-06-29 | Stop reason: HOSPADM

## 2022-06-28 RX ORDER — CHLORAL HYDRATE 500 MG
1000 CAPSULE ORAL 2 TIMES DAILY
Status: DISCONTINUED | OUTPATIENT
Start: 2022-06-28 | End: 2022-06-29 | Stop reason: HOSPADM

## 2022-06-28 RX ADMIN — BUPIVACAINE HYDROCHLORIDE IN DEXTROSE 2 ML: 7.5 INJECTION, SOLUTION SUBARACHNOID at 07:50

## 2022-06-28 RX ADMIN — ONDANSETRON 4 MG: 2 INJECTION INTRAMUSCULAR; INTRAVENOUS at 09:35

## 2022-06-28 RX ADMIN — BUDESONIDE AND FORMOTEROL FUMARATE DIHYDRATE 2 PUFF: 160; 4.5 AEROSOL RESPIRATORY (INHALATION) at 12:05

## 2022-06-28 RX ADMIN — ACETAMINOPHEN 325MG 975 MG: 325 TABLET ORAL at 13:00

## 2022-06-28 RX ADMIN — EPHEDRINE SULFATE 10 MG: 50 INJECTION, SOLUTION INTRAVENOUS at 08:35

## 2022-06-28 RX ADMIN — DOCUSATE SODIUM 100 MG: 100 CAPSULE, LIQUID FILLED ORAL at 12:06

## 2022-06-28 RX ADMIN — CEFAZOLIN SODIUM 2000 MG: 2 SOLUTION INTRAVENOUS at 22:15

## 2022-06-28 RX ADMIN — HYDROMORPHONE HYDROCHLORIDE 0.5 MG: 1 INJECTION, SOLUTION INTRAMUSCULAR; INTRAVENOUS; SUBCUTANEOUS at 20:24

## 2022-06-28 RX ADMIN — DEXAMETHASONE SODIUM PHOSPHATE 5 MG: 10 INJECTION, SOLUTION INTRAMUSCULAR; INTRAVENOUS at 08:49

## 2022-06-28 RX ADMIN — OXYCODONE HYDROCHLORIDE 10 MG: 10 TABLET ORAL at 14:35

## 2022-06-28 RX ADMIN — SODIUM CHLORIDE: 0.9 INJECTION, SOLUTION INTRAVENOUS at 08:17

## 2022-06-28 RX ADMIN — GABAPENTIN 100 MG: 100 CAPSULE ORAL at 22:15

## 2022-06-28 RX ADMIN — ACETAMINOPHEN 325MG 975 MG: 325 TABLET ORAL at 22:15

## 2022-06-28 RX ADMIN — OXYCODONE HYDROCHLORIDE AND ACETAMINOPHEN 500 MG: 500 TABLET ORAL at 11:51

## 2022-06-28 RX ADMIN — ROPIVACAINE HYDROCHLORIDE 30 ML: 5 INJECTION, SOLUTION EPIDURAL; INFILTRATION; PERINEURAL at 07:18

## 2022-06-28 RX ADMIN — SENNOSIDES 8.6 MG: 8.6 TABLET, FILM COATED ORAL at 22:15

## 2022-06-28 RX ADMIN — OMEGA-3 FATTY ACIDS CAP 1000 MG 1000 MG: 1000 CAP at 12:06

## 2022-06-28 RX ADMIN — IRON SUCROSE 300 MG: 20 INJECTION, SOLUTION INTRAVENOUS at 12:31

## 2022-06-28 RX ADMIN — MIDAZOLAM 4 MG: 1 INJECTION INTRAMUSCULAR; INTRAVENOUS at 07:14

## 2022-06-28 RX ADMIN — BUDESONIDE AND FORMOTEROL FUMARATE DIHYDRATE 2 PUFF: 160; 4.5 AEROSOL RESPIRATORY (INHALATION) at 17:25

## 2022-06-28 RX ADMIN — SODIUM CHLORIDE: 0.9 INJECTION, SOLUTION INTRAVENOUS at 09:45

## 2022-06-28 RX ADMIN — ACETAMINOPHEN 325MG 975 MG: 325 TABLET ORAL at 05:46

## 2022-06-28 RX ADMIN — Medication 30 MG: at 12:06

## 2022-06-28 RX ADMIN — TRANEXAMIC ACID 1000 MG: 1 INJECTION, SOLUTION INTRAVENOUS at 08:01

## 2022-06-28 RX ADMIN — DOCUSATE SODIUM 100 MG: 100 CAPSULE, LIQUID FILLED ORAL at 17:25

## 2022-06-28 RX ADMIN — SODIUM CHLORIDE, SODIUM LACTATE, POTASSIUM CHLORIDE, AND CALCIUM CHLORIDE 125 ML/HR: .6; .31; .03; .02 INJECTION, SOLUTION INTRAVENOUS at 11:52

## 2022-06-28 RX ADMIN — SODIUM CHLORIDE, SODIUM LACTATE, POTASSIUM CHLORIDE, AND CALCIUM CHLORIDE 125 ML/HR: .6; .31; .03; .02 INJECTION, SOLUTION INTRAVENOUS at 22:16

## 2022-06-28 RX ADMIN — ENOXAPARIN SODIUM 40 MG: 40 INJECTION SUBCUTANEOUS at 22:15

## 2022-06-28 RX ADMIN — FENTANYL CITRATE 100 MCG: 50 INJECTION INTRAMUSCULAR; INTRAVENOUS at 07:14

## 2022-06-28 RX ADMIN — OXYCODONE HYDROCHLORIDE 10 MG: 10 TABLET ORAL at 22:58

## 2022-06-28 RX ADMIN — CHLORHEXIDINE GLUCONATE 0.12% ORAL RINSE 15 ML: 1.2 LIQUID ORAL at 05:56

## 2022-06-28 RX ADMIN — OMEGA-3 FATTY ACIDS CAP 1000 MG 1000 MG: 1000 CAP at 17:25

## 2022-06-28 RX ADMIN — OXYCODONE HYDROCHLORIDE 10 MG: 10 TABLET ORAL at 18:49

## 2022-06-28 RX ADMIN — FENOFIBRATE 145 MG: 145 TABLET, FILM COATED ORAL at 11:51

## 2022-06-28 RX ADMIN — PROPOFOL 30 MG: 10 INJECTION, EMULSION INTRAVENOUS at 07:57

## 2022-06-28 RX ADMIN — SODIUM CHLORIDE 75 ML/HR: 0.9 INJECTION, SOLUTION INTRAVENOUS at 06:07

## 2022-06-28 RX ADMIN — GABAPENTIN 100 MG: 100 CAPSULE ORAL at 13:00

## 2022-06-28 RX ADMIN — INSULIN LISPRO 2 UNITS: 100 INJECTION, SOLUTION INTRAVENOUS; SUBCUTANEOUS at 16:31

## 2022-06-28 RX ADMIN — FOLIC ACID 1 MG: 1 TABLET ORAL at 12:06

## 2022-06-28 RX ADMIN — PROPOFOL 120 MCG/KG/MIN: 10 INJECTION, EMULSION INTRAVENOUS at 07:56

## 2022-06-28 RX ADMIN — CEFAZOLIN SODIUM 2000 MG: 2 SOLUTION INTRAVENOUS at 07:33

## 2022-06-28 RX ADMIN — CEFAZOLIN SODIUM 2000 MG: 2 SOLUTION INTRAVENOUS at 14:35

## 2022-06-28 NOTE — ANESTHESIA POSTPROCEDURE EVALUATION
Post-Op Assessment Note    CV Status:  Stable  Pain Score: 0    Pain management: adequate     Mental Status:  Alert and awake   Hydration Status:  Euvolemic   PONV Controlled:  Controlled   Airway Patency:  Patent   Two or more mitigation strategies used for obstructive sleep apnea   Post Op Vitals Reviewed: Yes      Staff: Anesthesiologist         No complications documented      BP      Temp      Pulse    Resp      SpO2      /75 (BP Location: Right arm)   Pulse (!) 48   Temp 97 8 °F (36 6 °C) (Temporal)   Resp 18   Ht 5' 10" (1 778 m)   Wt 88 kg (194 lb 0 1 oz)   SpO2 97%   BMI 27 84 kg/m²

## 2022-06-28 NOTE — DISCHARGE INSTRUCTIONS
TOTAL KNEE REPLACEMENT DISCHARGE INSTRUCTIONS    Surgical Dressing:  Change your dressing daily until drainage stops  Do not put any lotions or creams on your incision  If there are any signs of infection such as drainage persisting beyond 7 days, unusual looking drainage (yellow, green), increased redness around the incision, or fever/chills let your doctor know  Do not get your incision wet  Medications:  Upon discharge you will be given a prescription for an anticoagulant (i e  Ecotrin (Aspirin), Coumadin (Warfarin), Lovenox (Enoxaparin))  Take as prescribed  Do not take any over the counter NSAIDs (i e  Ibuprofen, Motrin, Advil, Naprosyn, Aleve) while on your anticoagulation medication unless otherwise specified by your surgeon  You will also be given Gabapentin if appropriate and a narcotic pain reliever for pain  Please be mindful of the number of pills you have left  You can only get a refill Monday-Friday during business hours from your surgeon or the physician assistant  You will not be given any refills on nights and weekends  Call ahead if needed  Please include tylenol in your pain regimen as well  You will also be given Colace to prevent constipation  Please include other over- the- counter medications for constipation if needed  If you are on Coumadin (Warfarin) you will need your blood drawn every Monday and Thursday once you are home  Initially your visiting nurse will draw your blood  Later you will go to an outpatient lab  You will receive a phone call the next day and your Coumadin (warfarin) will be dosed based on these results  Take this dosage daily until you hear from us next  Walking:  Use two crutches or a walker for EVERY step  Gradually increase your walking daily  You can progress to a cane as tolerated once advised by your physical therapist   Be sure to work on advancing your range of motion daily        Bathing:  Do not get your incision wet until follow up in the office  No tub baths  Do not submerge your incision  You may shower but you must cover your incision so it does NOT get wet  Gauze and Tegaderm dressing can be used to cover your incision  These can be found at the drug store  Use your crutches/walker to get in and out of the shower  Use a chair if needed  Sexual Relations:  Resume according to your comfort  Swimming:  No swimming or hot tubs until approved by your physician  Swimming will be allowed once your incision is well healed  Driving: You may ride as a passenger now  No driving until your follow-up appointment  To get into the car use the front passenger seat with the seat pushed back as far as possible  Scoot yourself back in the seat  Use your hands to assist your legs into the car  Physical therapy:  Continue with physical therapy  If you are doing home physical therapy please contact the office for a prescription for outpatient physical therapy when you are ready or at your first postoperative appointment  Special considerations: To minimize swelling, stiffness, and decrease pain use cold as needed, but not heat  Ice 20 minutes at a time with a cloth between your skin and the ice  Ice after walking, when you have pain, or after you have completed your exercises  Limit your sitting to 60 minutes at one time  Continue to wear your LIOR stockings 2 weeks after surgery  You can remove at night them to wash and reapply in the morning after sponge bath or shower (do not get your incision wet)  You can wear them as needed after that  If the stockings are too tight at the top you can cut the elastic  Follow up:  Call [phone] to make an appointment to see your surgeon within 2 weeks of your surgery if you havent done so already  If you have any questions during business hours please call the direct office phone number [phone]  Otherwise please call [phone] for any concerns        For the future:  Prior to any dental work, including routine cleanings, call the office for a prescription for antibiotics to be taken prior to your dental appointment  For any invasive procedures (i e  endoscopy, colonoscopy, etc ) inform the doctor performing the procedure that you have a total knee replacement and will antibiotics just prior to the procedure to protect it

## 2022-06-28 NOTE — PLAN OF CARE
Problem: MOBILITY - ADULT  Goal: Maintain or return to baseline ADL function  Description: INTERVENTIONS:  -  Assess patient's ability to carry out ADLs; assess patient's baseline for ADL function and identify physical deficits which impact ability to perform ADLs (bathing, care of mouth/teeth, toileting, grooming, dressing, etc )  - Assess/evaluate cause of self-care deficits   - Assess range of motion  - Assess patient's mobility; develop plan if impaired  - Assess patient's need for assistive devices and provide as appropriate  - Encourage maximum independence but intervene and supervise when necessary  - Involve family in performance of ADLs  - Assess for home care needs following discharge   - Consider OT consult to assist with ADL evaluation and planning for discharge  - Provide patient education as appropriate  6/28/2022 1337 by Birdie Sullivan RN  Outcome: Progressing    Goal: Maintains/Returns to pre admission functional level  Description: INTERVENTIONS:  - Perform BMAT or MOVE assessment daily    - Set and communicate daily mobility goal to care team and patient/family/caregiver  - Collaborate with rehabilitation services on mobility goals if consulted  - Perform Range of Motion 3 times a day  - Reposition patient every 2 hours    - Dangle patient 3 times a day  - Stand patient 3 times a day  - Ambulate patient 3 times a day  - Out of bed to chair 3 times a day   - Out of bed for meals 3 times a day  - Out of bed for toileting  - Record patient progress and toleration of activity level   6/28/2022 1337 by Birdie Sullivan RN  Outcome: Progressing       Problem: Potential for Falls  Goal: Patient will remain free of falls  Description: INTERVENTIONS:  - Educate patient/family on patient safety including physical limitations  - Instruct patient to call for assistance with activity   - Consult OT/PT to assist with strengthening/mobility   - Keep Call bell within reach  - Keep bed low and locked with side rails adjusted as appropriate  - Keep care items and personal belongings within reach  - Initiate and maintain comfort rounds  - Make Fall Risk Sign visible to staff  - Offer Toileting every 2 Hours, in advance of need  - Initiate/Maintain bed alarm  - Obtain necessary fall risk management equipment: walker  - Apply yellow socks and bracelet for high fall risk patients  - Consider moving patient to room near nurses station  6/28/2022 1337 by Zachary Núñez RN  Outcome: Progressing       Problem: PAIN - ADULT  Goal: Verbalizes/displays adequate comfort level or baseline comfort level  Description: Interventions:  - Encourage patient to monitor pain and request assistance  - Assess pain using appropriate pain scale  - Administer analgesics based on type and severity of pain and evaluate response  - Implement non-pharmacological measures as appropriate and evaluate response  - Consider cultural and social influences on pain and pain management  - Notify physician/advanced practitioner if interventions unsuccessful or patient reports new pain  Outcome: Progressing     Problem: INFECTION - ADULT  Goal: Absence or prevention of progression during hospitalization  Description: INTERVENTIONS:  - Assess and monitor for signs and symptoms of infection  - Monitor lab/diagnostic results  - Monitor all insertion sites, i e  indwelling lines, tubes, and drains  - Monitor endotracheal if appropriate and nasal secretions for changes in amount and color  - [place] appropriate cooling/warming therapies per order  - Administer medications as ordered  - Instruct and encourage patient and family to use good hand hygiene technique  - Identify and instruct in appropriate isolation precautions for identified infection/condition  Outcome: Progressing  Goal: Absence of fever/infection during neutropenic period  Description: INTERVENTIONS:  - Monitor WBC    Outcome: Progressing     Problem: SAFETY ADULT  Goal: Maintain or return to baseline ADL function  Description: INTERVENTIONS:  -  Assess patient's ability to carry out ADLs; assess patient's baseline for ADL function and identify physical deficits which impact ability to perform ADLs (bathing, care of mouth/teeth, toileting, grooming, dressing, etc )  - Assess/evaluate cause of self-care deficits   - Assess range of motion  - Assess patient's mobility; develop plan if impaired  - Assess patient's need for assistive devices and provide as appropriate  - Encourage maximum independence but intervene and supervise when necessary  - Involve family in performance of ADLs  - Assess for home care needs following discharge   - Consider OT consult to assist with ADL evaluation and planning for discharge  - Provide patient education as appropriate  6/28/2022 1337 by Dannie De Paz RN  Outcome: Progressing    Goal: Maintains/Returns to pre admission functional level  Description: INTERVENTIONS:  - Perform BMAT or MOVE assessment daily    - Set and communicate daily mobility goal to care team and patient/family/caregiver     - Collaborate with rehabilitation services on mobility goals if consulted  - Record patient progress and toleration of activity level   6/28/2022 1337 by Dannie De Paz RN  Outcome: Progressing    Goal: Patient will remain free of falls  Description: INTERVENTIONS:  - Educate patient/family on patient safety including physical limitations  - Instruct patient to call for assistance with activity   - Consult OT/PT to assist with strengthening/mobility   - Keep Call bell within reach  - Keep bed low and locked with side rails adjusted as appropriate  - Keep care items and personal belongings within reach  - Initiate and maintain comfort rounds  - Make Fall Risk Sign visible to staff  - Apply yellow socks and bracelet for high fall risk patients  - Consider moving patient to room near nurses station  6/28/2022 1337 by Dannie De Paz RN  Outcome: Progressing       Problem: DISCHARGE PLANNING  Goal: Discharge to home or other facility with appropriate resources  Description: INTERVENTIONS:  - Identify barriers to discharge w/patient and caregiver  - Arrange for needed discharge resources and transportation as appropriate  - Identify discharge learning needs (meds, wound care, etc )  - Arrange for interpretive services to assist at discharge as needed  - Refer to Case Management Department for coordinating discharge planning if the patient needs post-hospital services based on physician/advanced practitioner order or complex needs related to functional status, cognitive ability, or social support system  Outcome: Progressing     Problem: Knowledge Deficit  Goal: Patient/family/caregiver demonstrates understanding of disease process, treatment plan, medications, and discharge instructions  Description: Complete learning assessment and assess knowledge base    Interventions:  - Provide teaching at level of understanding  - Provide teaching via preferred learning methods  Outcome: Progressing

## 2022-06-28 NOTE — ANESTHESIA PREPROCEDURE EVALUATION
Procedure:  ARTHROPLASTY KNEE TOTAL (Left Knee)    Relevant Problems   CARDIO   (+) Hyperlipidemia   (+) Murmur   (+) Primary hypertension      ENDO   (+) DMII (diabetes mellitus, type 2) (HCC)      NEURO/PSYCH   (+) Anxiety      ECHO 3/2022  Left Ventricle Left ventricular cavity size is normal  Wall thickness is increased  There is mild concentric hypertrophy  The left ventricular ejection fraction is 68% by visual estimation  Systolic function is normal   Wall motion is normal  Diastolic function is mildly abnormal, consistent with grade I (abnormal) relaxation  Right Ventricle Right ventricular cavity size is normal  Systolic function is normal    Left Atrium The atrium is mildly dilated  Right Atrium The atrium is normal in size  Atrial Septum The interatrial septum appears to be grossly normal without evidence of shunting by color-flow Doppler  Aortic Valve The leaflets are mildly thickened  The leaflets are mildly calcified  There is trace regurgitation  There is very mild stenosis with Vmax 2 41 m/s, RANDY 2 09 cm2, DVI 0 67  Mean PG 11 mmHg, LVOTd 2 0 cm, LVOT VTI 33 7, AV VTI 50 6  Mitral Valve There is mild calcification  The leaflets exhibit normal mobility  There is mild annular calcification  There is trace regurgitation  There is no evidence of stenosis  Tricuspid Valve Tricuspid valve structure is normal  There is no evidence of regurgitation  There is no evidence of stenosis  Pulmonary artery systolic pressures cannot be estimated due to lack of tricuspid regurgitation jet  Pulmonic Valve Pulmonic valve structure is normal  There is trace regurgitation  There is no evidence of stenosis  Ascending Aorta The aortic root is mildly dilated at 4 0 cm  The ascending aorta is ectatic at 3 9 cm  IVC/SVC The inferior vena cava is normal in size  Respirophasic changes were normal    Pericardium There is no pericardial effusion  The pericardium is normal in appearance         3/2022 Stress   Normal pharmacologic nuclear stress test    Stress ECG: The stress ECG is negative for ischemia after pharmacologic stress    Perfusion: There are no perfusion defects    Stress Function: Left ventricular function post-stress is normal  Post-stress ejection fraction is 65 %  Physical Exam    Airway    Mallampati score: II  TM Distance: >3 FB  Neck ROM: full     Dental   upper dentures,     Cardiovascular  Rhythm: regular, Rate: normal,     Pulmonary  Breath sounds clear to auscultation,     Other Findings        Anesthesia Plan  ASA Score- 2     Anesthesia Type- spinal with ASA Monitors  Additional Monitors:   Airway Plan:           Plan Factors-Exercise tolerance (METS): >4 METS  Chart reviewed  Existing labs reviewed  Patient did not smoke on day of surgery  Induction- intravenous  Postoperative Plan-     Informed Consent- Anesthetic plan and risks discussed with patient

## 2022-06-28 NOTE — PHYSICAL THERAPY NOTE
PHYSICAL THERAPY EVALUATION          Patient Name: Marion Recio  [id number] Date: 6/28/2022  PT EVALUATION    64 y o     [id number]    Primary osteoarthritis of left knee [M17 12]    Past Medical History:   Diagnosis Date    Arthritis     L knee   for L TKR today 6/28/2022    Atopic dermatitis     Condition not found     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    Diabetes mellitus ([address] )     Hyperlipemia     Hypertension     Lateral epicondylitis, left elbow     Wears partial dentures     upper     Past Surgical History:   Procedure Laterality Date    APPENDECTOMY      COLONOSCOPY      CYST REMOVAL      Neck    KNEE ARTHROSCOPY      menisectomy        06/28/22 1329   PT Last Visit   PT Visit Date 06/28/22   Note Type   Note type Evaluation   Pain Assessment   Pain Assessment Tool 0-10   Pain Score 2   Pain Location/Orientation Orientation: Left; Location: Knee   Effect of Pain on Daily Activities 3/10 post amb   Hospital Pain Intervention(s) Repositioned; Ambulation/increased activity; Emotional support; Rest   Restrictions/Precautions   LLE Weight Bearing Per Order WBAT   Other Precautions Multiple lines; Fall Risk;Pain   Home Living   Type of [address] [place] [address] Multi-level;Bed/bath upstairs;Stairs to enter without rails;Stairs to enter with rails   Bathroom Shower/Tub Walk-in shower   Bathroom Toilet Standard   Home Equipment Cane   Additional Comments MEI: 1+ 3 w midline HR + 1  FOS to second floor w RHR ascending  no bathroom main level   Prior Function   Level of [place] Independent with ADLs and functional mobility   Lives With Son   ADL Assistance Independent   IADLs Independent   Falls in the last 6 months 0   Vocational Full time employment  ([employer] at [employer])   Comments indep without an AD  +  lvies w son who works   General   Additional Pertinent History POD#0 L TKR  activity day #0 and wbat LLE per ortho   PMHx significant for arthritis, DM   Cognition   Overall Cognitive Status WFL   Arousal/Participation Cooperative   Orientation Level Oriented X4   Memory Within functional limits   Following Commands Follows all commands and directions without difficulty   Subjective   Subjective agreeable to therapy   RLE Assessment   RLE Assessment WFL   LLE Assessment   LLE Assessment WFL  (limited knee flexion ROM)   Coordination   Sensation WFL  (mild numbness in feet, improving per patient)   Bed Mobility   Supine to Sit 6  Modified independent   Additional items HOB elevated; Bedrails; Increased time required   Transfers   Sit to Stand 5  Supervision   Additional items Other;Verbal cues  (AD)   Stand to Sit 5  Supervision   Additional items Armrests; Increased time required; Other  (AD)   Ambulation/Elevation   Gait pattern Improper Weight shift; Short stride; Excessively slow   Gait Assistance 5  Supervision   Additional items Assist x 1   Assistive Device Rolling walker   Distance 180'   Stair Management Assistance 5  Supervision   Additional items Assist x 1;Verbal cues; Increased time required   Stair Management Technique Two rails; Step to pattern; Foreward   Number of Stairs 10   Ambulation/Elevation Additional Comments  steps   Balance   Static Standing Fair   Dynamic Standing Fair -   Ambulatory Fair -   Endurance Deficit   Endurance Deficit No  (vitals 168/80 EOB, 176/80 seated post amb)   Activity Tolerance   Activity Tolerance Patient tolerated treatment well   Nurse Made Aware Kamila RN   Assessment   Prognosis Good   Problem List Decreased range of motion; Impaired balance;Decreased mobility; Decreased skin integrity;Pain;Decreased safety awareness   Assessment Lavern Garcia is a 64 y o  male admitted to [facility] on 6/28/2022 for <principal problem not specified>  POD#0 L TKR  PT was consulted and pt was seen on 6/28/2022 for mobility assessment and d/c planning  Pt presents w high fall risk, multiple lines, acute pain L knee  At baseline is indep for ADLs and ambulation without an AD  Pt is currently functioning at a modified independent assistance level for bed mobility, supervision assistance x1 level for transfers, ambulation w RW and stair negotiation  Pt demonstrated ability to stand without external support  Ambulating limited community distances  Negotiate steps w ability to tolerate ascending w operative leg  Verbally reviewed stair negotiation technique w unilateral HR to simulate home environment  Educated on PT POC and dc recommendation, activity recommendations during hospital stay, optimal positioning for LLE, importance of pain management, fall risk precautions/ need for supervised mobility during hospital stay  Verbalize understanding  Pt will benefit from continued skilled IP PT to address the above mentioned impairments  in order to maximize recovery and increase functional independence when completing mobility and ADLs  Currently PT recommendations for DME include RW  At this time PT recommendations for d/c are home w OPPT as scheduled  Barriers to Discharge None   Goals   Patient Goals walk normal/without pain   STG Expiration Date 07/01/22   Short Term Goal #1 1)  Pt will perform bed mobility indep demonstrating appropriate technique 100% of the time in order to improve function  2)  Perform all transfers with Dominic demonstrating safe and appropriate technique 100% of the time in order to improve ability to negotiate safely in home environment  3) Amb with least restrictive AD > 300'x1 with mod I in order to demonstrate ability to negotiate in home environment  4)  Improve overall strength and balance 1/2 grade in order to optimize ability to perform functional tasks and reduce fall risk  5) Increase activity tolerance to 45 minutes in order to improve endurance to functional tasks  6)  Negotiate stairs using most appropriate technique and mod I in order to be able to negotiate safely in home environment   7) PT for ongoing patient and family/caregiver education, DME needs and d/c planning in order to promote highest level of function in least restrictive environment  PT Treatment Day 0   Plan   Treatment/Interventions Functional transfer training;LE strengthening/ROM; Elevations; Therapeutic exercise;Patient/family training;Equipment eval/education; Bed mobility;Gait training; Compensatory technique education;Spoke to nursing   PT Frequency Twice a day; Other (Comment)  (QID-BID)   Recommendation   PT Discharge Recommendation Home with outpatient rehabilitation  (scheduled for Thurs 6/30)   Equipment Recommended [facility] Recommended Wheeled walker   Change/add to 3LM? No   AM-PAC Basic Mobility Inpatient   Turning in Bed Without Bedrails 3   Lying on Back to Sitting on Edge of Flat Bed 3   Moving Bed to Chair 3   Standing Up From Chair 3   Walk in Room 3   Climb 3-5 Stairs 3   Basic Mobility Inpatient Raw Score 18   Basic Mobility Standardized Score 41 05   Highest Level Of Mobility   JH-HLM Goal 6: Walk 10 steps or more   JH-HLM Achieved 7: Walk 25 feet or more   End of Consult   Patient Position at End of Consult Bedside chair; All needs within reach   History: co - morbidities, social background, fall risk, multiple lines  Exam: impairments in systems including musculoskeletal (strength), neuromuscular (balance, gait, transfers, motor function and sensation), am-pac, cardiopulmonary, cognition  Clinical: unstable/unpredictable  Complexity:high      Mukund Pope, PT

## 2022-06-28 NOTE — PLAN OF CARE
Problem: PHYSICAL THERAPY ADULT  Goal: Performs mobility at highest level of function for planned discharge setting  See evaluation for individualized goals  Description: Treatment/Interventions: Functional transfer training, LE strengthening/ROM, Elevations, Therapeutic exercise, Patient/family training, Equipment eval/education, Bed mobility, Gait training, Compensatory technique education, Spoke to nursing  Equipment Recommended: Kip Méndez       See flowsheet documentation for full assessment, interventions and recommendations  6/28/2022 1417 by Annetta Vasquez, PT  Note: Prognosis: Good  Problem List: Decreased range of motion, Impaired balance, Decreased mobility, Decreased skin integrity, Pain, Decreased safety awareness  Assessment: Jocelyn Bustamante is a 64 y o  male admitted to [facility] on 6/28/2022 for <principal problem not specified>  POD#0 L TKR  PT was consulted and pt was seen on 6/28/2022 for mobility assessment and d/c planning  Pt presents w high fall risk, multiple lines, acute pain L knee  At baseline is indep for ADLs and ambulation without an AD  Pt is currently functioning at a modified independent assistance level for bed mobility, supervision assistance x1 level for transfers, ambulation w RW and stair negotiation  Pt demonstrated ability to stand without external support  Ambulating limited community distances  Negotiate steps w ability to tolerate ascending w operative leg  Verbally reviewed stair negotiation technique w unilateral HR to simulate home environment  Educated on PT POC and dc recommendation, activity recommendations during hospital stay, optimal positioning for LLE, importance of pain management, fall risk precautions/ need for supervised mobility during hospital stay  Verbalize understanding   Pt will benefit from continued skilled IP PT to address the above mentioned impairments  in order to maximize recovery and increase functional independence when completing mobility and ADLs  Currently PT recommendations for DME include RW  At this time PT recommendations for d/c are home w OPPT as scheduled  Barriers to Discharge: None        PT Discharge Recommendation: Home with outpatient rehabilitation (scheduled for Thurs 6/30)          See flowsheet documentation for full assessment  6/28/2022 1417 by Richard Bui, PT  Note: Prognosis: Good  Problem List: Decreased range of motion, Impaired balance, Decreased mobility, Decreased skin integrity, Pain, Decreased safety awareness  Assessment: Elvia Molina is a 64 y o  male admitted to [facility] on 6/28/2022 for <principal problem not specified>  POD#0 L TKR  PT was consulted and pt was seen on 6/28/2022 for mobility assessment and d/c planning  Pt presents w high fall risk, multiple lines, acute pain L knee  At baseline is indep for ADLs and ambulation without an AD  Pt is currently functioning at a modified independent assistance level for bed mobility, supervision assistance x1 level for transfers, ambulation w RW and stair negotiation  Pt demonstrated ability to stand without external support  Ambulating limited community distances  Negotiate steps w ability to tolerate ascending w operative leg  Verbally reviewed stair negotiation technique w unilateral HR to simulate home environment  Educated on PT POC and dc recommendation, activity recommendations during hospital stay, optimal positioning for LLE, importance of pain management, fall risk precautions/ need for supervised mobility during hospital stay  Verbalize understanding  Pt will benefit from continued skilled IP PT to address the above mentioned impairments  in order to maximize recovery and increase functional independence when completing mobility and ADLs  Currently PT recommendations for DME include RW  At this time PT recommendations for d/c are home w OPPT as scheduled    Barriers to Discharge: None        PT Discharge Recommendation: Home with outpatient rehabilitation (scheduled for Thurs 6/30)          See flowsheet documentation for full assessment

## 2022-06-28 NOTE — INTERVAL H&P NOTE
H&P reviewed  After examining the patient I find no changes in the patients condition since the H&P had been written      Vitals:    06/28/22 1033   BP: 118/70   Pulse: (!) 48   Resp: 18   Temp:    SpO2: 95%

## 2022-06-28 NOTE — ANESTHESIA PROCEDURE NOTES
Spinal Block    Patient location during procedure: OR  Start time: 6/28/2022 7:50 AM  Reason for block: primary anesthetic  Staffing  Performed: Anesthesiologist   Anesthesiologist: Iveth Pedroza DO  Preanesthetic Checklist  Completed: patient identified, IV checked, site marked, risks and benefits discussed, surgical consent, monitors and equipment checked, pre-op evaluation and timeout performed  Spinal Block  Patient position: sitting  Prep: Betadine and site prepped and draped  Patient monitoring: heart rate, cardiac monitor, continuous pulse ox and frequent blood pressure checks  Approach: midline  Location: L3-4  Injection technique: single-shot  Needle  Needle type: pencil-tip   Needle gauge: 24 G  Needle length: 10 cm  Assessment  Injection Assessment:  negative aspiration for heme, no paresthesia on injection and positive aspiration for clear CSF    Post-procedure:  site cleaned

## 2022-06-28 NOTE — OP NOTE
OPERATIVE REPORT  PATIENT NAME: Westley Deutsch    :  1960  MRN: [id number]  Pt Location: [facility] OR ROOM 01    SURGERY DATE: 2022    Surgeon(s) and Role:     * Reid Tobar, DO - Primary     * Abraham Sloan PA-C - Assisting     * Drew Ayon ATC - Assisting    Preop Diagnosis:  Primary osteoarthritis of left knee [M17 12]    Post-Op Diagnosis Codes:     * Primary osteoarthritis of left knee [M17 12]    Procedure(s) (LRB):  ARTHROPLASTY KNEE TOTAL (Left)    Specimen(s):  * No specimens in log *    Estimated Blood Loss:   Minimal    Drains:  Urethral Catheter Latex 16 Fr  (Active)   Site Assessment Clean;Skin intact 22 1018   Collection Container Standard drainage bag 22 1018   Securement Method Securing device (Describe) 22 1018   Number of days: 0       Anesthesia Type:   Spinal    Operative Indications:  Primary osteoarthritis of left knee [M17 12]      Operative Findings:  See below    Complications:   None    Procedure and Technique:  Implants:  Depuy Attune  CR femoral component size 7  Tibial base fixed bearing size 6  Tibial insert fixed bearing CR size 7, 10mm  Dome patella size 38mm    INDICATIONS FOR PROCEDURE:  The patients is a 64 y o  male who presented to the office with  knee OA  Conservative treatment was attempted for quite some time and ultimately failed  He has severe progressive pain, stiffness, and disability and now elects to proceed with left total knee replacement surgery  Extensive counseling in regards to the reasons for surgical intervention as well as the risks and benefits of surgery were reviewed  The risks include, but are not limited to infection, extensive blood loss, blood clots, wound healing problems, fracture, need for additional surgery, need for revision surgery, failure of hardware, persistent pain and stiffness, heart attack, stroke, death  The patient understood and agreed to by oral and written consent      OPERATIVE PROCEDURE: The patient was identified as Marion Recio by His ID bracelet by the surgical staff in the preoperative area at [address] [facility]   The patient was wheeled back to the surgical room and placed on the operative table  Anesthesia was administered  Preoperative antibiotics were given  The patient remained in the supine position and all bony prominences were carefully protected  A tourniquet was applied to the patients left upper thigh  The left leg was then prepped and draped in the usual sterile fashion  A timeout was performed where the patients name and surgical site were once again identified  The incision was marked out  The leg was elevated and the tourniquet was inflated to 300 mmHg  An incision was made over anterior aspect of the knee  Dissection was made through the skin and subcutaneous tissue  A medial parapatellar arthrotomy was made and the medial tissues were elevated while protecting the MCL  Remnants of the ACL, medial and lateral menisci were removed and retractors were placed  Severe osteoarthritis was noted  The femoral canal was opened with a drill and the contents were suctioned and the canal was irrigated  We used an intramedullary guide on the femoral side and resected 9mm of distal femur in 5 degrees of valgus  Osteophytes were removed  We then subluxated the tibia forward and opened the tibial canal with a drill  The contents of the canal were then suctioned and the canal was irrigated  We placed an intramedullary guide and resected 10mm of bone based on the highest point of the lateral tibial plateau perpendicular to the mechanical axis of the tibia  This gave us a skim cut of the tibia  Next, the flexion and extension gaps were analyzed with a spacer block and visualization  Findlays line and the epicondylar axis were then identified  The 4 in 1 cutting block was placed in 3 degrees of external rotation and resections were made    Posterior osteophytes and any remnants of the medial and lateral menisci were removed  Trials were then placed and the knee was felt to be stable and well balanced throughout the arc of motion  The patella was measured and 10mm of the articular portion was removed  The trial for the patella was placed and patellar tracking was checked and found to be adequate with the other components in place  Attention was directed back to the tibia  External rotation of the tibial guide was set and the final preparations of the tibia were performed  The components were removed and the knee was copiously irrigated with pulse lavage and then dried  The components were then cemented in place and excess cement was removed  Once the cement was dried the trial insert was trialed  A size 7, 10mm tibial insert was confirmed to be the correct size  The final polyethylene component was placed and the tourniquet was let down  Any bleeders were cauterized and then the knee was irrigated  An irrisept wash was performed and then the knee was once again copiously irrigated  Marcaine, morphine, and toradol was injected periarticularly throughout the case  The arthrotomy was closed with vicryl suture  The skin and subcutaneous tissues were closed with vicryl and then a running monocryl stitch  A sterile dressing was placed  The patient was awakened and transferred to a hospital bed and then to the recovery room in stable condition  I attest that I was present and performed this procedure  Aline Johnson PA-C and Parris Castillo ATC were present for the entire procedure and provided essential assistance with limb position, patient prepping, and retraction    A qualified resident physician was not available    Patient Disposition:  extubated and stable      SIGNATURE: Philip Black DO  DATE: June 28, 2022  TIME: 10:43 AM

## 2022-06-28 NOTE — ANESTHESIA PROCEDURE NOTES
Peripheral Block    Patient location during procedure: holding area  Start time: 6/28/2022 7:13 AM  Reason for block: at surgeon's request and post-op pain management  Staffing  Performed: Anesthesiologist   Anesthesiologist: Blank Dawson DO  Preanesthetic Checklist  Completed: patient identified, IV checked, site marked, risks and benefits discussed, surgical consent, monitors and equipment checked, pre-op evaluation and timeout performed  Peripheral Block  Patient position: supine  Prep: ChloraPrep  Patient monitoring: heart rate, continuous pulse ox, frequent blood pressure checks and cardiac monitor  Block type: adductor canal block  Laterality: left  Injection technique: single-shot  Procedures: ultrasound guided, Ultrasound guidance required for the procedure to increase accuracy and safety of medication placement and decrease risk of complications    Ultrasound permanent image saved  Needle  Needle type: Stimuplex   Needle gauge: 22 G  Needle length: 10 cm  Needle localization: anatomical landmarks and ultrasound guidance  Assessment  Injection assessment: incremental injection, local visualized surrounding nerve on ultrasound, negative aspiration for heme and no paresthesia on injection  Heart rate change: no  Slow fractionated injection: yes  Post-procedure:  site cleaned  patient tolerated the procedure well with no immediate complications

## 2022-06-29 ENCOUNTER — TELEPHONE (OUTPATIENT)
Dept: OBGYN CLINIC | Facility: HOSPITAL | Age: 62
End: 2022-06-29

## 2022-06-29 VITALS
WEIGHT: 194 LBS | HEIGHT: 70 IN | RESPIRATION RATE: 16 BRPM | OXYGEN SATURATION: 96 % | HEART RATE: 64 BPM | SYSTOLIC BLOOD PRESSURE: 162 MMHG | BODY MASS INDEX: 27.77 KG/M2 | DIASTOLIC BLOOD PRESSURE: 86 MMHG | TEMPERATURE: 98.5 F

## 2022-06-29 LAB
ANION GAP SERPL CALCULATED.3IONS-SCNC: 9 MMOL/L (ref 4–13)
BUN SERPL-MCNC: 17 MG/DL (ref 5–25)
CALCIUM SERPL-MCNC: 8.9 MG/DL (ref 8.3–10.1)
CHLORIDE SERPL-SCNC: 106 MMOL/L (ref 100–108)
CO2 SERPL-SCNC: 24 MMOL/L (ref 21–32)
CREAT SERPL-MCNC: 0.9 MG/DL (ref 0.6–1.3)
ERYTHROCYTE [DISTWIDTH] IN BLOOD BY AUTOMATED COUNT: 12 % (ref 11.6–15.1)
GFR SERPL CREATININE-BSD FRML MDRD: 91 ML/MIN/1.73SQ M
GLUCOSE SERPL-MCNC: 120 MG/DL (ref 65–140)
GLUCOSE SERPL-MCNC: 168 MG/DL (ref 65–140)
GLUCOSE SERPL-MCNC: 174 MG/DL (ref 65–140)
HCT VFR BLD AUTO: 41.7 % (ref 36.5–49.3)
HGB BLD-MCNC: 13.9 G/DL (ref 12–17)
MCH RBC QN AUTO: 30.1 PG (ref 26.8–34.3)
MCHC RBC AUTO-ENTMCNC: 33.3 G/DL (ref 31.4–37.4)
MCV RBC AUTO: 90 FL (ref 82–98)
PLATELET # BLD AUTO: 201 THOUSANDS/UL (ref 149–390)
PMV BLD AUTO: 9.8 FL (ref 8.9–12.7)
POTASSIUM SERPL-SCNC: 4 MMOL/L (ref 3.5–5.3)
RBC # BLD AUTO: 4.62 MILLION/UL (ref 3.88–5.62)
SODIUM SERPL-SCNC: 139 MMOL/L (ref 136–145)
WBC # BLD AUTO: 8.22 THOUSAND/UL (ref 4.31–10.16)

## 2022-06-29 PROCEDURE — 99219 PR INITIAL OBSERVATION CARE/DAY 50 MINUTES: CPT | Performed by: HOSPITALIST

## 2022-06-29 PROCEDURE — 85027 COMPLETE CBC AUTOMATED: CPT | Performed by: PHYSICIAN ASSISTANT

## 2022-06-29 PROCEDURE — 82948 REAGENT STRIP/BLOOD GLUCOSE: CPT

## 2022-06-29 PROCEDURE — 97530 THERAPEUTIC ACTIVITIES: CPT

## 2022-06-29 PROCEDURE — 97116 GAIT TRAINING THERAPY: CPT

## 2022-06-29 PROCEDURE — 4010F ACE/ARB THERAPY RXD/TAKEN: CPT | Performed by: FAMILY MEDICINE

## 2022-06-29 PROCEDURE — 97110 THERAPEUTIC EXERCISES: CPT

## 2022-06-29 PROCEDURE — 80048 BASIC METABOLIC PNL TOTAL CA: CPT | Performed by: PHYSICIAN ASSISTANT

## 2022-06-29 PROCEDURE — 99024 POSTOP FOLLOW-UP VISIT: CPT | Performed by: ORTHOPAEDIC SURGERY

## 2022-06-29 RX ORDER — ASPIRIN 325 MG
325 TABLET, DELAYED RELEASE (ENTERIC COATED) ORAL 2 TIMES DAILY
Qty: 84 TABLET | Refills: 0 | Status: SHIPPED | OUTPATIENT
Start: 2022-06-29

## 2022-06-29 RX ORDER — DOCUSATE SODIUM 100 MG/1
100 CAPSULE, LIQUID FILLED ORAL 2 TIMES DAILY
Qty: 20 CAPSULE | Refills: 0 | Status: SHIPPED | OUTPATIENT
Start: 2022-06-29

## 2022-06-29 RX ORDER — OXYCODONE HYDROCHLORIDE 5 MG/1
10 TABLET ORAL EVERY 4 HOURS PRN
Qty: 60 TABLET | Refills: 0 | Status: SHIPPED | OUTPATIENT
Start: 2022-06-29

## 2022-06-29 RX ORDER — ACETAMINOPHEN 325 MG/1
975 TABLET ORAL EVERY 8 HOURS SCHEDULED
Refills: 0
Start: 2022-06-29

## 2022-06-29 RX ORDER — LISINOPRIL 10 MG/1
10 TABLET ORAL ONCE
Status: COMPLETED | OUTPATIENT
Start: 2022-06-29 | End: 2022-06-29

## 2022-06-29 RX ORDER — LISINOPRIL 20 MG/1
20 TABLET ORAL DAILY
Status: DISCONTINUED | OUTPATIENT
Start: 2022-06-29 | End: 2022-06-29 | Stop reason: HOSPADM

## 2022-06-29 RX ORDER — GABAPENTIN 100 MG/1
100 CAPSULE ORAL EVERY 8 HOURS
Qty: 90 CAPSULE | Refills: 0 | Status: SHIPPED | OUTPATIENT
Start: 2022-06-29

## 2022-06-29 RX ADMIN — OXYCODONE HYDROCHLORIDE 10 MG: 10 TABLET ORAL at 12:21

## 2022-06-29 RX ADMIN — OXYCODONE HYDROCHLORIDE AND ACETAMINOPHEN 500 MG: 500 TABLET ORAL at 08:18

## 2022-06-29 RX ADMIN — FOLIC ACID 1 MG: 1 TABLET ORAL at 08:19

## 2022-06-29 RX ADMIN — GABAPENTIN 100 MG: 100 CAPSULE ORAL at 05:42

## 2022-06-29 RX ADMIN — PRAVASTATIN SODIUM 80 MG: 80 TABLET ORAL at 08:19

## 2022-06-29 RX ADMIN — ACETAMINOPHEN 325MG 975 MG: 325 TABLET ORAL at 05:41

## 2022-06-29 RX ADMIN — Medication 30 MG: at 08:19

## 2022-06-29 RX ADMIN — FENOFIBRATE 145 MG: 145 TABLET, FILM COATED ORAL at 08:19

## 2022-06-29 RX ADMIN — INSULIN LISPRO 1 UNITS: 100 INJECTION, SOLUTION INTRAVENOUS; SUBCUTANEOUS at 12:19

## 2022-06-29 RX ADMIN — LISINOPRIL 10 MG: 10 TABLET ORAL at 10:04

## 2022-06-29 RX ADMIN — HYDROMORPHONE HYDROCHLORIDE 0.5 MG: 1 INJECTION, SOLUTION INTRAMUSCULAR; INTRAVENOUS; SUBCUTANEOUS at 02:32

## 2022-06-29 RX ADMIN — OXYCODONE HYDROCHLORIDE 10 MG: 10 TABLET ORAL at 05:41

## 2022-06-29 RX ADMIN — IRON SUCROSE 300 MG: 20 INJECTION, SOLUTION INTRAVENOUS at 08:36

## 2022-06-29 RX ADMIN — METOPROLOL SUCCINATE 100 MG: 100 TABLET, EXTENDED RELEASE ORAL at 08:19

## 2022-06-29 RX ADMIN — OMEGA-3 FATTY ACIDS CAP 1000 MG 1000 MG: 1000 CAP at 08:19

## 2022-06-29 RX ADMIN — BUDESONIDE AND FORMOTEROL FUMARATE DIHYDRATE 2 PUFF: 160; 4.5 AEROSOL RESPIRATORY (INHALATION) at 08:20

## 2022-06-29 RX ADMIN — TRIAMCINOLONE ACETONIDE: 1 CREAM TOPICAL at 08:20

## 2022-06-29 RX ADMIN — DOCUSATE SODIUM 100 MG: 100 CAPSULE, LIQUID FILLED ORAL at 08:19

## 2022-06-29 NOTE — PROGRESS NOTES
PT Evaluation     Today's date: 2022  Patient name: Bharathi Gutierrez  : 1960  MRN: [id number]  Referring provider: Salbador Dakin  Dx:   Encounter Diagnosis     ICD-10-CM    1  S/P total knee arthroplasty, left  Z96 652    2  Primary osteoarthritis of left knee  M17 12 Ambulatory Referral to Physical Therapy       Start Time: 1200  Stop Time: 1300  Total time in clinic (min): 60 minutes    Assessment  Assessment details: 63 yo male referred to PT s/p L TKA  Pt presents with 6-7/10 pain scale range (taking a oxycodone, gabapentin and tylenol schedule)  Upon examination he has 3-/5 L knee/hip gross strength, ext lag of -25 and active L knee flex 91 degrees in sitting  Pt is amb with RW indep with short step length and no toe push off  He isn't driving or working at this time  He would benefit from PT intervention in order to address the above deficits and resume his daily activities & PLOF with less pain and limitation    Impairments: abnormal gait, abnormal or restricted ROM, activity intolerance, impaired physical strength, lacks appropriate home exercise program and pain with function    Symptom irritability: moderateUnderstanding of Dx/Px/POC: excellent  Goals  Pt will achieve the following goals in the next 2-4 weeks  STG 22  Indep with current HEP  Dec pain by 1-3 levels  Improve L knee AROM by 5-10 deg  Improve LLE strength by 1/2 grade      Pt will achieve the following goals by d/c (8-12 weeks, or as stated in plan)  LTG  indep and compliant with HEP in order to maximize gains achieved in therapy  0-2/10 pain scale in order to feel better and decrease/eliminate use of  pain &/or anti-inflammatory  meds  L knee/LE ROM WFL and  strength 4-5/5 in order to improve functional mobility   Amb safely level/unlevel x community distances with appropriate AD  Pt will demonstrate normalized gait quality on level surfaces as evidenced by longer step length and proper heel<-->toe strike and push off mechanics   Improve  FOTO score to 77 or more and LEFS score 65/80 to indicate inc functional ability of patient  Resume activities, including RTW, driving & biking/fitness walking, with less pain and limitation at premorbid intensity and duration    Plan  Patient would benefit from: skilled physical therapy  Planned modality interventions: cryotherapy  Planned therapy interventions: joint mobilization, manual therapy, massage, balance, neuromuscular re-education, body mechanics training, patient education, strengthening, stretching, therapeutic activities, therapeutic exercise, home exercise program, flexibility and gait training  Frequency: 2x week  Duration in visits: 20  Duration in weeks: 10  Plan of Care beginning date: 2022  Plan of Care expiration date: 2022  Treatment plan discussed with: patient        Subjective Evaluation    History of Present Illness  Date of surgery: 2022  Mechanism of injury: 22 Eval-pt with h/o L knee OA  Underwent L TKA on 22 and d/c'd home the next day  Pt now presents to outpatient PT    Prior to leaving hospital pt with inc bleeding from incision-PO dressing removed and steristrips applied to distal end of incision where bleeding occurred-also dressed with absorbant dressing pad and ace wrap       amb steps indep  Didn't sleep well last night  Constant pain  Taking oxycontin and gabapentin as well as tylenol  Aspirin for anti-coagulant  indep dressing, but shoe horn  Not driving or doing household chores  Hasn't showered yet  Wearing Edwin stockings     LEFS 16/80  Quality of life: fair    Pain  Current pain ratin  At best pain ratin  At worst pain ratin  Location: L knee  Relieving factors: ice and medications    Social Support  Steps to enter house: yes (3)  Stairs in house: yes (step to step pattern with railing and cane)   Lives in: multiple-level home  Lives with: adult children (son)    Employment status: not working ([employer] a lot)  Patient Goals  Patient goals for therapy: decreased pain, increased strength and decreased edema  Patient goal: resume walking, riding bike without pain  resume activities without pain        Objective     Observations     Additional Observation Details  L knee w/ bloody drainage/seeping from pinpoint spot located distal to incision  Present bandage and ace wrap with bright red bloody exudate  Area cleaned with sterile gauze pad and pinpoint spot dressed wtth steristrip  Gauze pad dressing placed and secured with 5" compressogrip stockinette  Entire knee/thigh edematous throughout    L knee, thigh, lower leg skin is bright red-pt relates this is from sun exposure on vacation just prior to surgery  Palpation     Additional Palpation Details  Tender L knee and surrounding area  Dec L knee pat mob  Active Range of Motion   Left Knee   Flexion: 91 (sitting; supine flex 85) degrees with pain  Extension: -15 degrees   Extensor lag: -25 degrees with pain    Passive Range of Motion   Left Knee   Flexion: 95 (sitting) degrees   Extension: -10 (supine) degrees     Strength/Myotome Testing     Left Hip   Planes of Motion   Flexion: 3-    Right Hip   Normal muscle strength    Left Knee   Flexion: 3-  Extension: 3-  Quadriceps contraction: fair    Right Knee   Flexion: WFL  Quadriceps contraction: good    Left Ankle/Foot   Dorsiflexion: 4  Plantar flexion: 4    Right Ankle/Foot   Normal strength    Additional Strength Details        Tests     Additional Tests Details  PSFS 0/10    Sit to stand without Assist  0/10  amb steps reciprocally 0/10  Drive 3/55  amb without AD 0/10    Ambulation     Comments   Amb indep with RW  x clinic distances and parking lot to clinic  Step through gait with no toe off, short step length        Flowsheet Rows    Flowsheet Row Most Recent Value   PT/OT G-Codes    Current Score 37   Projected Score 77   Assessment Type Evaluation Precautions:  HTN, DM       6/30/22            Manuals             PROM/MOB/STM L knee AE gentle                                                   Neuro Re-Ed                          Foam:  -Static  -Step f/b  -Step s/s -                                                                             Ther Ex                          Stand:  HT raises -            Stand:  Squats, knee flex, march -            Stand hip AROM flex, abd, ext -                         LAQ/HS flex x10            Supine:  QS, SAQ, hip add iso x10 ea            Supine:  HS flex x10            Supine: SLR   -            S/l hip abd -            Prone  Knee flex  Hip ext -                         Ther Activity             Xride seat -                         Gait Training                                       Modalities             Ice A-P w/ elev L knee home            Dressing change AE

## 2022-06-29 NOTE — PLAN OF CARE
Problem: MOBILITY - ADULT  Goal: Maintain or return to baseline ADL function  Description: INTERVENTIONS:  -  Assess patient's ability to carry out ADLs; assess patient's baseline for ADL function and identify physical deficits which impact ability to perform ADLs (bathing, care of mouth/teeth, toileting, grooming, dressing, etc )  - Assess/evaluate cause of self-care deficits   - Assess range of motion  - Assess patient's mobility; develop plan if impaired  - Assess patient's need for assistive devices and provide as appropriate  - Encourage maximum independence but intervene and supervise when necessary  - Involve family in performance of ADLs  - Assess for home care needs following discharge   - Consider OT consult to assist with ADL evaluation and planning for discharge  - Provide patient education as appropriate  Outcome: Progressing  Goal: Maintains/Returns to pre admission functional level  Description: INTERVENTIONS:  - Perform BMAT or MOVE assessment daily    - Set and communicate daily mobility goal to care team and patient/family/caregiver  - Collaborate with rehabilitation services on mobility goals if consulted  - Performs bed exercises after instructed by PT  - Reposition patient every 2 hours    - Dangle patient 3 times a day  - Stand patient 3 times a day  - Ambulate patient 3 times a day  - Out of bed to chair 3 times a day  - Out of bed for toileting  - Record patient progress and toleration of activity level   Outcome: Progressing     Problem: Potential for Falls  Goal: Patient will remain free of falls  Description: INTERVENTIONS:  - Educate patient/family on patient safety including physical limitations  - Instruct patient to call for assistance with activity   - Consult OT/PT to assist with strengthening/mobility   - Keep Call bell within reach  - Keep bed low and locked with side rails adjusted as appropriate  - Keep care items and personal belongings within reach  - Initiate and maintain comfort rounds  - Make Fall Risk Sign visible to staff  - Offer Toileting every 2 Hours, in advance of need  - Initiate/Maintain bed/chair alarm  - Obtain necessary fall risk management equipment: bed/chair alarm, call bell, walker, gripper socks, rounds  - Apply yellow socks and bracelet for high fall risk patients  - Consider moving patient to room near nurses station  Outcome: Progressing     Problem: PAIN - ADULT  Goal: Verbalizes/displays adequate comfort level or baseline comfort level  Description: Interventions:  - Encourage patient to monitor pain and request assistance  - Assess pain using appropriate pain scale  - Administer analgesics based on type and severity of pain and evaluate response  - Implement non-pharmacological measures as appropriate and evaluate response  - Consider cultural and social influences on pain and pain management  - Notify physician/advanced practitioner if interventions unsuccessful or patient reports new pain  Outcome: Progressing     Problem: INFECTION - ADULT  Goal: Absence or prevention of progression during hospitalization  Description: INTERVENTIONS:  - Assess and monitor for signs and symptoms of infection  - Monitor lab/diagnostic results  - Monitor all insertion sites, i e  indwelling lines, tubes, and drains  - Administer medications as ordered  - Instruct and encourage patient to use good hand hygiene technique  Outcome: Progressing     Problem: SAFETY ADULT  Goal: Maintain or return to baseline ADL function  Description: INTERVENTIONS:  -  Assess patient's ability to carry out ADLs; assess patient's baseline for ADL function and identify physical deficits which impact ability to perform ADLs (bathing, care of mouth/teeth, toileting, grooming, dressing, etc )  - Assess/evaluate cause of self-care deficits   - Assess range of motion  - Assess patient's mobility; develop plan if impaired  - Assess patient's need for assistive devices and provide as appropriate  - Encourage maximum independence but intervene and supervise when necessary  - Involve family in performance of ADLs  - Assess for home care needs following discharge   - Consider OT consult to assist with ADL evaluation and planning for discharge  - Provide patient education as appropriate  Outcome: Progressing  Goal: Maintains/Returns to pre admission functional level  Description: INTERVENTIONS:  - Perform BMAT or MOVE assessment daily    - Set and communicate daily mobility goal to care team and patient/family/caregiver  - Collaborate with rehabilitation services on mobility goals if consulted  - Performs bed exercises after instructed by PT  - Reposition patient every 2 hours    - Dangle patient 3 times a day  - Stand patient 3 times a day  - Ambulate patient 3 times a day  - Out of bed to chair 3 times a day  - Out of bed for toileting  - Record patient progress and toleration of activity level   Outcome: Progressing  Goal: Patient will remain free of falls  Description: INTERVENTIONS:  - Educate patient/family on patient safety including physical limitations  - Instruct patient to call for assistance with activity   - Consult OT/PT to assist with strengthening/mobility   - Keep Call bell within reach  - Keep bed low and locked with side rails adjusted as appropriate  - Keep care items and personal belongings within reach  - Initiate and maintain comfort rounds  - Make Fall Risk Sign visible to staff  - Offer Toileting every 2 Hours, in advance of need  - Initiate/Maintain bed/chair alarm  - Obtain necessary fall risk management equipment: bed/chair alarm, call bell, walker, gripper socks, rounds  - Apply yellow socks and bracelet for high fall risk patients  - Consider moving patient to room near nurses station  Outcome: Progressing     Problem: DISCHARGE PLANNING  Goal: Discharge to home or other facility with appropriate resources  Description: INTERVENTIONS:  - Identify barriers to discharge w/patient and caregiver  - Arrange for needed discharge resources   - Identify discharge learning needs  - Refer to Case Management Department for coordinating discharge planning if the patient needs post-hospital services based on physician/advanced practitioner order   Outcome: Progressing     Problem: Knowledge Deficit  Goal: Patient/family/caregiver demonstrates understanding of disease process, treatment plan, medications, and discharge instructions  Description: Complete learning assessment and assess knowledge base    Interventions:  - Provide teaching at level of understanding  - Provide teaching via preferred learning methods  Outcome: Progressing     Problem: GENITOURINARY - ADULT  Goal: Urinary catheter remains patent  Description: INTERVENTIONS:  - Assess patency of urinary catheter  - Follow guidelines for intermittent irrigation of non-functioning urinary catheter  Outcome: Progressing     Problem: SKIN/TISSUE INTEGRITY - ADULT  Goal: Incision(s), wounds(s) or drain site(s) healing without S/S of infection  Description: INTERVENTIONS  - Assess and document dressing, incision, wound bed, drain sites and surrounding tissue  - Provide patient and family education  - Perform skin care/dressing changes as ordered   Outcome: Progressing

## 2022-06-29 NOTE — ASSESSMENT & PLAN NOTE
Lab Results   Component Value Date    HGBA1C 6 1 (H) 05/31/2022     Resume metformin and empagliflozin at discharge

## 2022-06-29 NOTE — PROGRESS NOTES
Progress Note - Orthopedics   Hermilo Mac 64 y o  male MRN: [id number]  Unit/Bed#: E2 -02 Encounter: [id number]    Assessment:  64 y o  male s/p left total knee arthroplasty POD 1    Plan:  · Pain control prn  · PT/OT- WBAT left LE   · Lovenox/TEDs/SCDs for DVT prophylaxis  Patient will be discharged with  bid x 6 weeks  · Abx x 24h  · Dc planning- patient will dc home likely this morning once cleared by [facility]  Subjective: Pt S&E  Resting comfortably in bedside chair  Patient states he is doing well  He was able to work with PT and do stairs yesterday  He voided without issues  States pain is controlled  Denies fevers, chills, SOB, abd pain, distal numbness, or tingling  Vitals: Blood pressure (!) 171/79, pulse 57, temperature 97 5 °F (36 4 °C), temperature source Temporal, resp  rate 20, height 5' 10" (1 778 m), weight 88 kg (194 lb 0 1 oz), SpO2 100 %  ,Body mass index is 27 84 kg/m²  Intake/Output Summary (Last 24 hours) at 6/29/2022 0814  Last data filed at 6/29/2022 0601  Gross per 24 hour   Intake 5325 85 ml   Output 5335 ml   Net -9 15 ml       Invasive Devices  Report    Peripheral Intravenous Line  Duration           Peripheral IV 06/28/22 Dorsal (posterior); Left Forearm 1 day                Ortho Exam: Gabriels Noss:  Drsg:  C/D/I, very small amount of blood at distal aspect of dressing   sensation grossly intact L4, L5, S1, palpable pedal pulse, EHL/AT/GS intact    Lab, Imaging and other studies:   CBC:   Lab Results   Component Value Date    WBC 8 22 06/29/2022    HGB 13 9 06/29/2022    HCT 41 7 06/29/2022    MCV 90 06/29/2022     06/29/2022    MCH 30 1 06/29/2022    MCHC 33 3 06/29/2022    RDW 12 0 06/29/2022    MPV 9 8 06/29/2022     CMP:   Lab Results   Component Value Date    SODIUM 139 06/29/2022     06/29/2022    CO2 24 06/29/2022    BUN 17 06/29/2022    CREATININE 0 90 06/29/2022    CALCIUM 8 9 06/29/2022    EGFR 91 06/29/2022

## 2022-06-29 NOTE — TELEPHONE ENCOUNTER
Patient called stating Dr Arden Hancock wants to see him on Monday but that is a holiday  S/P L TKA sx 6/28/2022  He has an appt on 7/11  Should he be seen sooner?      # [phone]

## 2022-06-29 NOTE — CONSULTS
[address] [facility] 1960, 64 y o  male MRN: [id number]  Unit/Bed#: E2 -02 Encounter: [id number]  Primary Care Provider: Laure Funes DO   Date and time admitted to hospital: 6/28/2022  5:17 AM    Inpatient consult to Internal Medicine  Consult performed by: Elvia Zelaya DO  Consult ordered by: Abraham Sloan PA-C          * Status post total knee replacement, left  Assessment & Plan  Doing very well post operatively  His pain is well controlled  He is ambulating around the room with minimal difficulty    Ok with [facility]IM for discharge  I would have him resume all of his home medications at discharge  Primary hypertension  Assessment & Plan  Resume metoprolol and lisinopril/hctz    DMII (diabetes mellitus, type 2) ([facility])  Assessment & Plan    Lab Results   Component Value Date    HGBA1C 6 1 (H) 05/31/2022     Resume metformin and empagliflozin at discharge          Collaboration of Care: Were Recommendations Directly Discussed with Primary Treatment Team? - Yes     History of Present Illness: Westley Deutsch is a 64 y o  male who is originally admitted to the Orthopedic service due to knee pain  We are consulted for patient underwent left total knee replacement  He doing very well post operatively  His pain is well controlled  He is able to ambulate on his own  He is eating and drinking well       Review of Systems:    Review of Systems   Constitutional: Negative for chills and fever  HENT: Negative for ear pain and sore throat  Eyes: Negative for pain and visual disturbance  Respiratory: Negative for cough and shortness of breath  Cardiovascular: Negative for chest pain and palpitations  Gastrointestinal: Negative for abdominal pain and vomiting  Genitourinary: Negative for dysuria and hematuria  Musculoskeletal: Positive for arthralgias  Negative for back pain  Skin: Negative for color change and rash     Neurological: Negative for seizures and syncope  All other systems reviewed and are negative  Past Medical and Surgical History:     Past Medical History:   Diagnosis Date    Arthritis     L knee   for L TKR today 2022    Atopic dermatitis     Condition not found     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    Diabetes mellitus ([address] )     Hyperlipemia     Hypertension     Lateral epicondylitis, left elbow     Wears partial dentures     upper       Past Surgical History:   Procedure Laterality Date    APPENDECTOMY      COLONOSCOPY      CYST REMOVAL      Neck    KNEE ARTHROSCOPY      menisectomy    IN TOTAL KNEE ARTHROPLASTY Left 2022    Procedure: ARTHROPLASTY KNEE TOTAL;  Surgeon: Lauren Marquez DO;  Location: [facility];  Service: Orthopedics       Meds/Allergies:    all medications and allergies reviewed    Allergies: Allergies   Allergen Reactions    Sulfamethoxazole-Trimethoprim Nausea Only and GI Intolerance       Social History:     Marital Status:     Substance Use History:   Social History     Substance and Sexual Activity   Alcohol Use Not Currently    Alcohol/week: 2 0 standard drinks    Types: 2 Cans of beer per week    Comment: Social     Social History     Tobacco Use   Smoking Status Former Smoker    Packs/day: 1 00    Years: 40 00    Pack years: 40 00    Quit date: 10/2021    Years since quittin 7   Smokeless Tobacco Never Used     Social History     Substance and Sexual Activity   Drug Use Never       Family History:    non-contributory    Physical Exam:     Vitals:   Blood Pressure: (!) 171/79 (22 0749)  Pulse: 57 (22)  Temperature: 97 5 °F (36 4 °C) (22)  Temp Source: Temporal (22)  Respirations: 20 (22)  Height: 5' 10" (177 8 cm) (22 9654)  Weight - Scale: 88 kg (194 lb 0 1 oz) (2219)  SpO2: 100 % (2249)    Physical Exam  Vitals and nursing note reviewed     Constitutional:       Appearance: He is well-developed  HENT:      Head: Normocephalic and atraumatic  Eyes:      Conjunctiva/sclera: Conjunctivae normal    Cardiovascular:      Rate and Rhythm: Normal rate and regular rhythm  Heart sounds: No murmur heard  Pulmonary:      Effort: Pulmonary effort is normal  No respiratory distress  Breath sounds: Normal breath sounds  Abdominal:      Palpations: Abdomen is soft  Tenderness: There is no abdominal tenderness  Musculoskeletal:      Cervical back: Neck supple  Right lower leg: No edema  Left lower leg: No edema  Comments: Knee dressing C/D/I   Skin:     General: Skin is warm and dry  Neurological:      Mental Status: He is alert  Additional Data:     Lab Results: I have personally reviewed pertinent reports  Results from last 7 days   Lab Units 06/29/22  0425   WBC Thousand/uL 8 22   HEMOGLOBIN g/dL 13 9   HEMATOCRIT % 41 7   PLATELETS Thousands/uL 201     Results from last 7 days   Lab Units 06/29/22  0425   SODIUM mmol/L 139   POTASSIUM mmol/L 4 0   CHLORIDE mmol/L 106   CO2 mmol/L 24   BUN mg/dL 17   CREATININE mg/dL 0 90   ANION GAP mmol/L 9   CALCIUM mg/dL 8 9   GLUCOSE RANDOM mg/dL 168*             Lab Results   Component Value Date/Time    HGBA1C 6 1 (H) 05/31/2022 07:07 AM    HGBA1C 7 9 (H) 02/14/2022 08:11 AM    HGBA1C 6 3 (H) 09/10/2021 10:48 AM    HGBA1C 5 8 (H) 02/23/2021 07:56 AM    HGBA1C 5 8 (H) 06/18/2020 07:18 AM    HGBA1C 6 7 (H) 11/19/2019 07:28 AM     Results from last 7 days   Lab Units 06/29/22  0614 06/28/22  2139 06/28/22  1600 06/28/22  1126 06/28/22  0609   POC GLUCOSE mg/dl 120 196* 231* 153* 124           Imaging: I have personally reviewed pertinent reports  XR knee left 1 or 2 views    (Results Pending)     ·     ** Please Note: This note has been constructed using a voice recognition system   **

## 2022-06-29 NOTE — CASE MANAGEMENT
Case Management Discharge Planning Note    Patient name Ciarra Ambriz  Location [facility] /E2 -* MRN [id number]  : 1960 Date 2022       Current Admission Date: 2022  Current Admission Diagnosis:Status post total knee replacement, left   Patient Active Problem List    Diagnosis Date Noted    Status post total knee replacement, left 2022    Rash 2022    Murmur 2022    BMI 31 0-31 9,adult 2022    Anxiety 2022    Mass of appendix 2017    DMII (diabetes mellitus, type 2) ([facility] [place] [facility] ) 2013    Hyperlipidemia 2013    Primary hypertension 2012      LOS (days): 0  Geometric Mean LOS (GMLOS) (days):   Days to GMLOS:     OBJECTIVE:            Current admission status: Outpatient Surgery   Preferred Pharmacy:   [facility] , [facility] [address] ,  [facility] [address] ,  [place] [address] [address] [facility]  Phone: [phone] Fax: [phone]    Primary Care Provider: Bryce Bustamante DO    Primary Insurance: [employer] [facility] SIMI  Secondary Insurance:     DISCHARGE DETAILS:    Discharge planning discussed with[de-identified] Patient  Freedom of Choice:  (pt is going home with OP PT)     CM contacted family/caregiver?: No- see comments (declined)  Were Treatment Team discharge recommendations reviewed with patient/caregiver?: Yes  Did patient/caregiver verbalize understanding of patient care needs?: Yes  Were patient/caregiver advised of the risks associated with not following Treatment Team discharge recommendations?: Yes              DME Referral Provided  Referral made for DME?: Yes  DME referral completed for the following items[de-identified] Bedside Commode, Walker    Other Referral/Resources/Interventions Provided:  Interventions: DME         Treatment Team Recommendation: Home  Discharge Destination Plan[de-identified] Home  Transport at Discharge : Automobile, Family                       Accompanied by: Family member Additional Comments: CM met with the pt at the bedside  Pt reports he lives in a bi-level home with his son  He is able to stay on the main level if needed  Pt's son will be picking him up on discharge  RW and BSC were ordered for the pt via [employer]  Reports having OP PT set up to start tomorrow and has transportation to get to his therapy  No issues or concerns addressed by pt at this time  CM will continue to follow for discharge planning needs

## 2022-06-29 NOTE — PLAN OF CARE
Problem: PHYSICAL THERAPY ADULT  Goal: Performs mobility at highest level of function for planned discharge setting  See evaluation for individualized goals  Description: Treatment/Interventions: Functional transfer training, LE strengthening/ROM, Elevations, Therapeutic exercise, Patient/family training, Equipment eval/education, Bed mobility, Gait training, Compensatory technique education, Spoke to nursing  Equipment Recommended: Nash Dominguez       See flowsheet documentation for full assessment, interventions and recommendations  Outcome: Adequate for Discharge  Note: Prognosis: Good  Problem List: Decreased range of motion, Decreased strength, Decreased endurance, Impaired balance, Decreased mobility, Decreased skin integrity, Pain  Assessment: Pt  Seen for PT treatment session and progression of mobility, HEP, stair climbing, pt education S/p L TKR  Pt  Reports 6/10 pain to L knee at rest in bed  Pt  Perform supine L le arom exercises for TKR x 10 reps  Pt  Performs all exercises actively, pt is able to I'ly slr  Verbal and tactile Cues required for correct performance and techniques  Pt  Continues to show good progress toward PT goals with progression of mobility to I with bed mobility, mod I with transfers and supervision with ambulation  Pt progressed with ambulation distances to > household distances 200' x2, gait steady no lob noted, displays antalgic step  through gait pattern  Pt  Performs stair climbing with use of rail and spc with cg- supervision assist x1  Pt demonstrates correct le sequencing and safe techniques with stair climbing  Pt  Instructed in curb step climbing performing with min assist x1 progressing to close supervision cues for proper technique required  Pt tolerated well, good endurance and activity tolerance noted  No lob, fatigue or increased pain noted or reported during pt session  Noted bloody drainage oozing and running down pt 's shin from distal portion of incision  Pt 's Rn alerted and DR Dumont made aware  Pt's LIOR stocking removed and slipper sock due to bloody drainage, Dr Mondragon Roles present and changed pt, 's dressing and reinforced distal incision with steri- strips, 5x9 sterile dressings and ace wrap during PT session  Ice applied to L knee at conclusion of PT SESSION  Pt  Has made significant functional gains toward PT goals and is appropriate for d/c to home with family support and OPPT  The patient's AM-PAC Basic Mobility Inpatient Short Form Raw Score is 23  A Raw score of greater than 16 suggests the patient may benefit from discharge to home  Please also refer to the recommendation of the Physical Therapist for safe discharge planning  Barriers to Discharge: None        PT Discharge Recommendation: Home with outpatient rehabilitation          See flowsheet documentation for full assessment

## 2022-06-29 NOTE — PHYSICAL THERAPY NOTE
PHYSICAL THERAPY NOTE          Patient Name: Aaron Monge  [facility]'D Date: 6/29/2022 06/29/22 1052   Note Type   Note Type Treatment   Pain Assessment   Pain Assessment Tool 0-10   Pain Score 6   Pain Location/Orientation Orientation: Left; Location: Knee   Hospital Pain Intervention(s) Cold applied;Repositioned; Ambulation/increased activity; Emotional support   Restrictions/Precautions   LLE Weight Bearing Per Order WBAT   Other Precautions Fall Risk;Pain;Multiple lines   General   Chart Reviewed Yes   Family/Caregiver Present No   Cognition   Overall Cognitive Status WFL   Subjective   Subjective good no great not too bad  Bed Mobility   Supine to Sit 7  Independent   Sit to Supine 7  Independent   Transfers   Sit to Stand 6  Modified independent   Additional items Verbal cues; Assist x 1   Stand to Sit 6  Modified independent   Additional items Assist x 1;Verbal cues   Additional Comments cues for hand placement  Ambulation/Elevation   Gait pattern Antalgic;Decreased L stance; Step through pattern   Gait Assistance 5  Supervision   Additional items Assist x 1   Assistive Device Rolling walker   Distance 65' x2, 200' x2, 15' x2   Stair Management Assistance 5  Supervision   Additional items Assist x 1;Verbal cues; Increased time required   Stair Management Technique One rail L;One rail R;Foreward;Nonreciprocal;With cane   Number of Stairs   (5 x2 steps)   Curbs curb step training performed with use of Rw using forwrd approach with min assist progressing to close supervision  cues for technique required  pt performed curb step x2  Balance   Static Sitting Good   Dynamic Sitting Good   Static Standing Good   Dynamic Standing Good   Ambulatory Good   Activity Tolerance   Activity Tolerance Patient tolerated treatment well   Exercises   TKR Supine;10 reps;AROM; Left;Bilateral   Equipment Use   Comments pt issued written hep, reviewed and performed with pt ,pt issued handout on TKR guidelines, pt ed on lower body dressing twchniques  pt donned underwear and shorts with supervison cues for technqiue required, pt performed toileting in standing for urination I'ly  Assessment   Prognosis Good   Problem List Decreased range of motion;Decreased strength;Decreased endurance; Impaired balance;Decreased mobility; Decreased skin integrity;Pain   Assessment Pt  Seen for PT treatment session and progression of mobility, HEP, stair climbing, pt education S/p L TKR  Pt  Reports 6/10 pain to L knee at rest in bed  Pt  Perform supine L le arom exercises for TKR x 10 reps  Pt  Performs all exercises actively, pt is able to I'ly slr  Verbal and tactile Cues required for correct performance and techniques  Pt  Continues to show good progress toward PT goals with progression of mobility to I with bed mobility, mod I with transfers and supervision with ambulation  Pt progressed with ambulation distances to > household distances 200' x2, gait steady no lob noted, displays antalgic step  through gait pattern  Pt  Performs stair climbing with use of rail and spc with cg- supervision assist x1  Pt demonstrates correct le sequencing and safe techniques with stair climbing  Pt  Instructed in curb step climbing performing with min assist x1 progressing to close supervision cues for proper technique required  Pt tolerated well, good endurance and activity tolerance noted  No lob, fatigue or increased pain noted or reported during pt session  Noted bloody drainage oozing and running down pt 's shin from distal portion of incision  Pt 's Rn alerted and DR Dumont made aware  Pt's LIOR stocking removed and slipper sock due to bloody drainage, Dr Gayle Eubanks present and changed pt, 's dressing and reinforced distal incision with steri- strips, 5x9 sterile dressings and ace wrap during PT session  Ice applied to L knee at conclusion of PT SESSION   Pt  Has made significant functional gains toward PT goals and is appropriate for d/c to home with family support and OPPT  The patient's AM-PAC Basic Mobility Inpatient Short Form Raw Score is 23  A Raw score of greater than 16 suggests the patient may benefit from discharge to home  Please also refer to the recommendation of the Physical Therapist for safe discharge planning  Goals   Patient Goals being able to walk without pain  STG Expiration Date 07/01/22   PT Treatment Day 1   Plan   Treatment/Interventions Functional transfer training;LE strengthening/ROM; Therapeutic exercise; Endurance training;Patient/family training;Equipment eval/education; Bed mobility;Gait training;Spoke to nursing;Spoke to MD   Progress Improving as expected   PT Frequency Twice a day   Recommendation   PT Discharge Recommendation Home with outpatient rehabilitation   [address] Mobility Inpatient   Turning in Bed Without Bedrails 4   Lying on Back to Sitting on Edge of Flat Bed 4   Moving Bed to Chair 4   Standing Up From Chair 4   Walk in Room 4   Climb 3-5 Stairs 3   Basic Mobility Inpatient Raw Score 23   Basic Mobility Standardized Score 50 88   Highest Level Of Mobility   JH-HLM Goal 7: Walk 25 feet or more   JH-HLM Achieved 8: Walk 250 feet ot more   Education   Education Provided Mobility training;Home exercise program;Assistive device   Patient Demonstrates acceptance/verbal understanding   End of Consult   Patient Position at End of Consult Bedside chair; All needs within reach  (ice to L knee)   Marilee Rascon, PTA

## 2022-06-29 NOTE — ASSESSMENT & PLAN NOTE
Doing very well post operatively  His pain is well controlled  He is ambulating around the room with minimal difficulty    Ok with SLIM for discharge  I would have him resume all of his home medications at discharge

## 2022-06-30 ENCOUNTER — OFFICE VISIT (OUTPATIENT)
Dept: PHYSICAL THERAPY | Facility: CLINIC | Age: 62
End: 2022-06-30
Payer: COMMERCIAL

## 2022-06-30 ENCOUNTER — TELEPHONE (OUTPATIENT)
Dept: OBGYN CLINIC | Facility: HOSPITAL | Age: 62
End: 2022-06-30

## 2022-06-30 DIAGNOSIS — Z96.652 S/P TOTAL KNEE ARTHROPLASTY, LEFT: Primary | ICD-10-CM

## 2022-06-30 DIAGNOSIS — M17.12 PRIMARY OSTEOARTHRITIS OF LEFT KNEE: ICD-10-CM

## 2022-06-30 LAB
DME PARACHUTE DELIVERY DATE ACTUAL: NORMAL
DME PARACHUTE DELIVERY DATE REQUESTED: NORMAL
DME PARACHUTE ITEM DESCRIPTION: NORMAL
DME PARACHUTE ITEM DESCRIPTION: NORMAL
DME PARACHUTE ORDER STATUS: NORMAL
DME PARACHUTE SUPPLIER NAME: NORMAL
DME PARACHUTE SUPPLIER PHONE: NORMAL

## 2022-06-30 PROCEDURE — 97110 THERAPEUTIC EXERCISES: CPT

## 2022-06-30 PROCEDURE — 97140 MANUAL THERAPY 1/> REGIONS: CPT

## 2022-06-30 PROCEDURE — 97161 PT EVAL LOW COMPLEX 20 MIN: CPT

## 2022-06-30 NOTE — TELEPHONE ENCOUNTER
Patient contacted for a postoperative follow up assessment  Patient reports 3-4/10 pain when sitting and 5-6/10 when walking with RW  Patient confirmed post-op PT appointment, 6/30 at 12PM       Patient is taking Oxycodone 5mg every 4 hours, Gabapentin 100mg every 8 hours, ASA 325mg BID, Tylenol 1300mg BID, Colace 100mg BID  Patient has had a BM and is passing gas  Patient denies increase in swelling and dressing is clean, dry and intact  Patient is icing the site regularly  Patient denies nausea, vomiting, abdominal pain, chest pain, shortness of breath, fever, dizziness and calf pain   Patient confirmed post-op appointment with surgeon on 7/1 at [facility]  Patient does not have any other questions or concerns at this time

## 2022-07-01 ENCOUNTER — OFFICE VISIT (OUTPATIENT)
Dept: OBGYN CLINIC | Facility: MEDICAL CENTER | Age: 62
End: 2022-07-01

## 2022-07-01 VITALS
HEIGHT: 70 IN | BODY MASS INDEX: 27.77 KG/M2 | DIASTOLIC BLOOD PRESSURE: 72 MMHG | WEIGHT: 194 LBS | HEART RATE: 70 BPM | SYSTOLIC BLOOD PRESSURE: 148 MMHG

## 2022-07-01 DIAGNOSIS — Z96.652 STATUS POST TOTAL KNEE REPLACEMENT, LEFT: Primary | ICD-10-CM

## 2022-07-01 PROCEDURE — 99024 POSTOP FOLLOW-UP VISIT: CPT | Performed by: ORTHOPAEDIC SURGERY

## 2022-07-01 NOTE — PROGRESS NOTES
Assessment/Plan:  1  Status post total knee replacement, left      No orders of the defined types were placed in this encounter  · Wound care completed  No active bleeding from incision at this time  He will continue daily dry dressing changes with compression stocking over top  Advised to monitor for signs of infection and call if any changes  · Continue outpatient PT  · Pain control prn- oxycodone, gabapentin, and tylenol  Patient aware to wean away from opioids  · Continue with  bid for DVT prophylaxis  · LIOR stockings as needed for swelling  · Patient should call ahead for abx prior to dental appts  · We will obtain x-rays at next visit  Return in about 10 days (around 7/11/2022) for post op L TKA  I answered all of the patient's questions during the visit and provided education of the patient's condition during the visit  The patient verbalized understanding of the information given and agrees with the plan  This note was dictated using [employer] software  It may contain errors including improperly dictated words  Please contact physician directly for any questions  Subjective   Chief Complaint:   Chief Complaint   Patient presents with   Soni Rodrigez is a 64 y o  male who presents for 3 day follow up s/p left TKA on 6/28/22  Patient is here today for incision check since he was having bleeding from the distal portion post op  Patient states he is doing well  States his pain is well controlled  He is doing outpatient PT  States they checked his incision yesterday and he only had a small quarter size spot of dry blood on the guaze  He is taking ASA for DVT prophylaxis  Denies fever or chills  Review of Systems  ROS:    See HPI for musculoskeletal review     All other systems reviewed are negative     History:  Past Medical History:   Diagnosis Date    Arthritis     L knee   for L TKR today 6/28/2022    Atopic dermatitis     Condition not found     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    Diabetes mellitus ([address] )     Hyperlipemia     Hypertension     Lateral epicondylitis, left elbow     Wears partial dentures     upper     Past Surgical History:   Procedure Laterality Date    APPENDECTOMY      COLONOSCOPY      CYST REMOVAL      Neck    JOINT REPLACEMENT Left 2022    KNEE ARTHROSCOPY      menisectomy    OH TOTAL KNEE ARTHROPLASTY Left 2022    Procedure: ARTHROPLASTY KNEE TOTAL;  Surgeon: Farhad Gonzales DO;  Location: [facility];  Service: Orthopedics     Social History   Social History     Substance and Sexual Activity   Alcohol Use Not Currently    Alcohol/week: 2 0 standard drinks    Types: 2 Cans of beer per week    Comment: Social     Social History     Substance and Sexual Activity   Drug Use Never     Social History     Tobacco Use   Smoking Status Former Smoker    Packs/day: 1 00    Years: 40 00    Pack years: 40 00    Quit date: 10/2021    Years since quittin 7   Smokeless Tobacco Never Used     Family History:   Family History   Problem Relation Age of Onset    Colon polyps Mother         Inflammatory    Diabetes Father        Current Outpatient Medications on File Prior to Visit   Medication Sig Dispense Refill    acetaminophen (TYLENOL) 325 mg tablet Take 3 tablets (975 mg total) by mouth every 8 (eight) hours  0    albuterol (PROAIR HFA) 90 mcg/act inhaler Inhale 2 puffs every 6 (six) hours as needed for wheezing 5 Inhaler 5    Ascorbic Acid (VITAMIN C PO) Take 1 capsule by mouth daily      aspirin (ECOTRIN) 325 mg EC tablet Take 1 tablet (325 mg total) by mouth 2 (two) times a day Take with food  84 tablet 0    betamethasone dipropionate (DIPROSONE) 0 05 % cream Apply topically 2 (two) times a day (Patient taking differently: Apply 1 application topically 2 (two) times a day) 30 g 0    Blood Glucose Monitoring Suppl (OneTouch Verio Reflect) w/Device KIT Check blood sugars once daily   Please substitute with appropriate alternative as covered by patient's insurance  Dx: E11 65 1 kit 0    budesonide-formoterol (Symbicort) 160-4 5 mcg/act inhaler Inhale 2 puffs 2 (two) times a day Rinse mouth after use  10 2 g 5    co-enzyme Q-10 30 mg Take 30 mg by mouth daily      docusate sodium (COLACE) 100 mg capsule Take 1 capsule (100 mg total) by mouth 2 (two) times a day 20 capsule 0    Empagliflozin 10 MG TABS Take 1 tablet (10 mg total) by mouth every morning 90 tablet 1    fenofibrate (TRICOR) 145 mg tablet TAKE 1 TABLET DAILY (Patient taking differently: Take 145 mg by mouth daily in the early morning) 90 tablet 1    ferrous sulfate 324 (65 Fe) mg Take 1 tablet (324 mg total) by mouth in the morning 30 tablet 1    FOLIC ACID PO Take 1 capsule by mouth daily      gabapentin (NEURONTIN) 100 mg capsule Take 1 capsule (100 mg total) by mouth every 8 (eight) hours 90 capsule 0    glucose blood (OneTouch Verio) test strip Check blood sugars once daily  Please substitute with appropriate alternative as covered by patient's insurance  Dx: E11 65 100 each 3    lisinopril-hydrochlorothiazide (PRINZIDE,ZESTORETIC) 20-25 MG per tablet TAKE 1 TABLET DAILY (Patient taking differently: Take 1 tablet by mouth daily in the early morning) 90 tablet 1    metFORMIN (GLUCOPHAGE) 1000 MG tablet TAKE 1 TABLET TWICE DAILY  WITH MEALS (Patient taking differently: Take 1,000 mg by mouth 2 (two) times a day with meals) 180 tablet 1    metoprolol succinate (TOPROL-XL) 100 mg 24 hr tablet TAKE 1 TABLET DAILY (Patient taking differently: Take 100 mg by mouth daily in the early morning) 90 tablet 1    Multiple Vitamin (multivitamin) tablet Take 1 tablet by mouth daily 30 tablet 1    omega-3-acid ethyl esters (LOVAZA) 1 g capsule TAKE 2 CAPSULES TWICE DAILY (Patient taking differently: Take 2 g by mouth 2 (two) times a day) 360 capsule 1    OneTouch Delica Lancets 10H MISC Check blood sugars once daily   Please substitute with appropriate alternative as covered by patient's insurance  Dx: E11 65 100 each 3    oxyCODONE (ROXICODONE) 5 immediate release tablet Take 2 tablets (10 mg total) by mouth every 4 (four) hours as needed for severe pain Or take 1 tablet (5 mg total) by mouth every 4 (four) hours as needed for moderate pain  Max Daily Amount: 60 mg 60 tablet 0    rosuvastatin (CRESTOR) 10 MG tablet Take 1 tablet (10 mg total) by mouth daily (Patient taking differently: Take 10 mg by mouth daily in the early morning) 90 tablet 3    sildenafil (VIAGRA) 100 mg tablet Take one tablet daily as needed (Patient taking differently: Take 100 mg by mouth as needed Take one tablet daily as needed) 20 tablet 2     No current facility-administered medications on file prior to visit       Allergies   Allergen Reactions    Sulfamethoxazole-Trimethoprim Nausea Only and GI Intolerance        Objective     /72   Pulse 70   Ht 5' 10" (1 778 m)   Wt 88 kg (194 lb)   BMI 27 84 kg/m²      PE:  AAOx 3  WDWN  Hearing intact, no drainage from eyes  no audible wheezing  no abdominal distension  LE compartments soft, AT/GS intact    Ortho Exam:  left Knee:   INC: C/D/I, small amount of dry blood on gauze over distal incision, no active bleeding, No erythema, mild swelling  AROM:5 - 90 degrees

## 2022-07-06 ENCOUNTER — OFFICE VISIT (OUTPATIENT)
Dept: PHYSICAL THERAPY | Facility: CLINIC | Age: 62
End: 2022-07-06
Payer: COMMERCIAL

## 2022-07-06 DIAGNOSIS — M17.12 PRIMARY OSTEOARTHRITIS OF LEFT KNEE: ICD-10-CM

## 2022-07-06 DIAGNOSIS — Z96.652 S/P TOTAL KNEE ARTHROPLASTY, LEFT: Primary | ICD-10-CM

## 2022-07-06 PROCEDURE — 97110 THERAPEUTIC EXERCISES: CPT | Performed by: PHYSICAL THERAPIST

## 2022-07-06 PROCEDURE — 97140 MANUAL THERAPY 1/> REGIONS: CPT | Performed by: PHYSICAL THERAPIST

## 2022-07-06 NOTE — PROGRESS NOTES
Daily Note     Today's date: 2022  Patient name: Leesa Solo  : 1960  MRN: [id number]  Referring provider: Tariq Mahmood  Dx:   Encounter Diagnosis     ICD-10-CM    1  S/P total knee arthroplasty, left  Z96 652    2  Primary osteoarthritis of left knee  M17 12                   Subjective: patient reports no issues after last visit  He mentions he has been monitoring small opening and has not had any concerns  He mentions that he follows up with the doctor on Monday  He also states that he is no longer taking any prescription medication  Objective: See treatment diary below      Assessment: Patient tolerated treatment well  Initiated program as noted below with no reports of increased symptoms  He demonstrates good technique for provided program after initial demonstration  He would benefit from continuing physical therapy to improve ROM, strength, and functional abilities  Plan: Continue per plan of care        Precautions:  HTN, DM       22           Manuals             PROM/MOB/STM L knee AE gentle SK gentle                                                  Neuro Re-Ed                          Foam:  -Static  -Step f/b  -Step s/s -                                                                             Ther Ex                          Stand:  HT raises - 10x ea           Stand:  Squats, knee flex, march - 10x ea           Stand hip AROM flex, abd, ext - x10 ea                        LAQ/HS flex x10 x15           Supine:  QS, SAQ, hip add iso x10 ea x10 ea           Supine:  HS flex x10 x10           Supine: SLR   -            S/l hip abd -            Prone  Knee flex  Hip ext -                         Ther Activity             Xride seat - 5'                        Gait Training                                       Modalities             Ice A-P w/ elev L knee home 10'           Dressing change AE

## 2022-07-07 ENCOUNTER — OFFICE VISIT (OUTPATIENT)
Dept: PHYSICAL THERAPY | Facility: CLINIC | Age: 62
End: 2022-07-07
Payer: COMMERCIAL

## 2022-07-07 DIAGNOSIS — Z96.652 S/P TOTAL KNEE ARTHROPLASTY, LEFT: Primary | ICD-10-CM

## 2022-07-07 DIAGNOSIS — M17.12 PRIMARY OSTEOARTHRITIS OF LEFT KNEE: ICD-10-CM

## 2022-07-07 PROCEDURE — 97140 MANUAL THERAPY 1/> REGIONS: CPT

## 2022-07-07 PROCEDURE — 97110 THERAPEUTIC EXERCISES: CPT

## 2022-07-07 NOTE — PROGRESS NOTES
Daily Note     Today's date: 2022  Patient name: Manasa Chavez  : 1960  MRN: [id number]  Referring provider: Kika Mcintyre  Dx:   Encounter Diagnosis     ICD-10-CM    1  S/P total knee arthroplasty, left  Z96 652    2  Primary osteoarthritis of left knee  M17 12                   Subjective:Pt reports some soreness from being in Pt yesterday  Objective: See treatment diary below      Assessment: Patient did well with all TE as listed, pt is making excellent progress will continue to benefit from skilled Pt to maximize functional mobility  Plan: Continue per plan of care        Precautions:  HTN, DM       22          Manuals             PROM/MOB/STM L knee AE gentle SK gentle HA PROM                                                 Neuro Re-Ed                          Foam:  -Static  -Step f/b  -Step s/s -                                                                             Ther Ex                          Stand:   HT raises - 10x ea x15 ea          Stand:  Squats, knee flex, march - 10x ea x15 ea          Stand hip AROM flex, abd, ext - x10 ea x15 ea                       LAQ/HS flex x10 x15 x15          Supine:  QS, SAQ, hip add iso x10 ea x10 ea x15 ea          Supine:  HS flex x10 x10 x10          Supine: SLR   -            S/l hip abd -            Prone  Knee flex  Hip ext -                         Ther Activity             Xride seat - 5' 6'                        Gait Training                                       Modalities             Ice A-P w/ elev L knee home 10' 10'          Dressing change AE

## 2022-07-11 ENCOUNTER — APPOINTMENT (OUTPATIENT)
Dept: RADIOLOGY | Facility: MEDICAL CENTER | Age: 62
End: 2022-07-11
Payer: COMMERCIAL

## 2022-07-11 ENCOUNTER — OFFICE VISIT (OUTPATIENT)
Dept: OBGYN CLINIC | Facility: MEDICAL CENTER | Age: 62
End: 2022-07-11

## 2022-07-11 VITALS
HEIGHT: 70 IN | WEIGHT: 216.8 LBS | BODY MASS INDEX: 31.04 KG/M2 | HEART RATE: 79 BPM | SYSTOLIC BLOOD PRESSURE: 132 MMHG | DIASTOLIC BLOOD PRESSURE: 82 MMHG

## 2022-07-11 DIAGNOSIS — Z96.652 STATUS POST TOTAL LEFT KNEE REPLACEMENT: Primary | ICD-10-CM

## 2022-07-11 DIAGNOSIS — Z96.652 STATUS POST TOTAL LEFT KNEE REPLACEMENT: ICD-10-CM

## 2022-07-11 PROCEDURE — 99024 POSTOP FOLLOW-UP VISIT: CPT | Performed by: ORTHOPAEDIC SURGERY

## 2022-07-11 PROCEDURE — 73562 X-RAY EXAM OF KNEE 3: CPT

## 2022-07-11 NOTE — PROGRESS NOTES
Assessment/Plan:  1  Status post total left knee replacement      Orders Placed This Encounter   Procedures    XR knee 3 vw left non injury     S/p left TKA 6/28/22 overall doing well    Continue PT  Avoid sitting water until wound completely closes, he can shower and let water run over area  Pain control prn- tylenol, due to being on ASA avoid aleve, if needs anti inflammatory call and we can send celebrex to [place]  Continue with 325 ASA BID for another 4 weeks for DVT prophylaxis   Patient aware to call ahead for abx prior to dental appts  Return in about 4 weeks (around 8/8/2022) for Recheck  I answered all of the patient's questions during the visit and provided education of the patient's condition during the visit  The patient verbalized understanding of the information given and agrees with the plan  This note was dictated using [employer] software  It may contain errors including improperly dictated words  Please contact physician directly for any questions  Subjective   Chief Complaint: No chief complaint on file  Fantasma Dan is a 64 y o  male who presents for 2 week follow up s/p left TKA 6/28/22  Using SPC to ambulate  He is attending physical therapy  Staples intact with no evidence of infection or drainage  Wearing compression stocking to help with swelling  He is no longer using oxycodone or Neurontin only aleve and tylenol for pain control  325 ASA BID for DVT prophylaxis  Review of Systems  ROS:    See HPI for musculoskeletal review     All other systems reviewed are negative     History:  Past Medical History:   Diagnosis Date    Arthritis     L knee   for L TKR today 6/28/2022    Atopic dermatitis     Condition not found     Neoplasm of Gage's Dut    Diabetes mellitus ([facility] [address] )     Hyperlipemia     Hypertension     Lateral epicondylitis, left elbow     Wears partial dentures     upper     Past Surgical History:   Procedure Laterality Date    APPENDECTOMY      COLONOSCOPY      CYST REMOVAL      Neck    JOINT REPLACEMENT Left 2022    KNEE ARTHROSCOPY      menisectomy    TX TOTAL KNEE ARTHROPLASTY Left 2022    Procedure: ARTHROPLASTY KNEE TOTAL;  Surgeon: Saloni Lal DO;  Location: [facility];  Service: Orthopedics     Social History   Social History     Substance and Sexual Activity   Alcohol Use Not Currently    Alcohol/week: 2 0 standard drinks    Types: 2 Cans of beer per week    Comment: Social     Social History     Substance and Sexual Activity   Drug Use Never     Social History     Tobacco Use   Smoking Status Former Smoker    Packs/day: 1 00    Years: 40 00    Pack years: 40 00    Quit date: 10/2021    Years since quittin 7   Smokeless Tobacco Never Used     Family History:   Family History   Problem Relation Age of Onset    Colon polyps Mother         Inflammatory    Diabetes Father        Meds/Allergies   (Not in a hospital admission)    Allergies   Allergen Reactions    Sulfamethoxazole-Trimethoprim Nausea Only and GI Intolerance          Objective     /82   Pulse 79   Ht 5' 10" (1 778 m)   Wt 98 3 kg (216 lb 12 8 oz)   BMI 31 11 kg/m²      PE:  AAOx 3  WDWN  Hearing intact, no drainage from eyes  no audible wheezing  no abdominal distension  LE compartments soft, AT/GS intact    Ortho Exam:  left Knee:   INC: healed, No erythema, mild swelling  AROM:10 - 95 active and 5-100 passive degrees    XR left knee reviewed demonstrates stable total knee prosthesis in acceptable position and alignment         Scribe Attestation    I,:  Kya Beaver am acting as a scribe while in the presence of the attending physician :       I,:  Saloni Lal DO personally performed the services described in this documentation    as scribed in my presence :

## 2022-07-12 ENCOUNTER — OFFICE VISIT (OUTPATIENT)
Dept: PHYSICAL THERAPY | Facility: CLINIC | Age: 62
End: 2022-07-12
Payer: COMMERCIAL

## 2022-07-12 DIAGNOSIS — Z96.652 S/P TOTAL KNEE ARTHROPLASTY, LEFT: Primary | ICD-10-CM

## 2022-07-12 DIAGNOSIS — M17.12 PRIMARY OSTEOARTHRITIS OF LEFT KNEE: ICD-10-CM

## 2022-07-12 PROCEDURE — 97140 MANUAL THERAPY 1/> REGIONS: CPT

## 2022-07-12 PROCEDURE — 97530 THERAPEUTIC ACTIVITIES: CPT

## 2022-07-12 PROCEDURE — 97110 THERAPEUTIC EXERCISES: CPT

## 2022-07-12 NOTE — PROGRESS NOTES
Daily Note     Today's date: 2022  Patient name: Lavern Garcia  : 1960  MRN: [id number]  Referring provider: Sofía Tim  Dx:   Encounter Diagnosis     ICD-10-CM    1  S/P total knee arthroplasty, left  Z96 652    2  Primary osteoarthritis of left knee  M17 12                   Subjective:Pt states he is doing pretty good today  Pt states he still painful but knows that will get better with time  Objective: See treatment diary below      Assessment: Pt progressed through exercises well with no increase in pain and min to moderate fatigue  Pt demonstrates good ROM seen with self stretching and manual PT  Pt was given weight shift to SLS for HEP as he was compensation with ambulation  Pt would continue to benefit from PT  Plan: Continue per plan of care        Precautions:  HTN, DM       22          Manuals             PROM/MOB/STM L knee AE gentle SK gentle HA PROM CF                                                Neuro Re-Ed                          Foam:  -Static  -Step f/b  -Step s/s -   4" LLE step up   10x           Weight shift to SLS    HEP                                                              Ther Ex                          Stand:   HT raises - 10x ea x15 ea 20x ea          Stand:  Squats, knee flex, march - 10x ea x15 ea 20x ea          Stand hip AROM flex, abd, ext - x10 ea x15 ea 20x ea                      LAQ/HS flex x10 x15 x15          Supine:  QS, SAQ, hip add iso x10 ea x10 ea x15 ea 20x ea         Supine:  HS flex x10 x10 x10 10x          Supine: SLR   -            S/l hip abd -            Prone  Knee flex  Hip ext -                         Ther Activity             Xride seat - 5' 6'  8'                       Gait Training                                       Modalities             Ice A-P w/ elev L knee home 10' 10' np          Dressing change AE

## 2022-07-14 ENCOUNTER — OFFICE VISIT (OUTPATIENT)
Dept: PHYSICAL THERAPY | Facility: CLINIC | Age: 62
End: 2022-07-14
Payer: COMMERCIAL

## 2022-07-14 DIAGNOSIS — M17.12 PRIMARY OSTEOARTHRITIS OF LEFT KNEE: ICD-10-CM

## 2022-07-14 DIAGNOSIS — Z96.652 S/P TOTAL KNEE ARTHROPLASTY, LEFT: Primary | ICD-10-CM

## 2022-07-14 PROCEDURE — 97110 THERAPEUTIC EXERCISES: CPT

## 2022-07-14 PROCEDURE — 97140 MANUAL THERAPY 1/> REGIONS: CPT

## 2022-07-14 PROCEDURE — 97530 THERAPEUTIC ACTIVITIES: CPT

## 2022-07-14 NOTE — PROGRESS NOTES
Daily Note     Today's date: 2022  Patient name: Jocelyn Bustamante  : 1960  MRN: [id number]  Referring provider: Liv Gordillo  Dx:   Encounter Diagnosis     ICD-10-CM    1  S/P total knee arthroplasty, left  [id number]                   Subjective:Pt states he is doing pretty good today  Pt states he still painful but knows that will get better with time  Objective: See treatment diary below      Assessment: Pt tolerated session well today  Pt was able to add SLS today and TKE  Pt require VC with activate quad contraction throughout session  Pt ROM is as expected  Pt would continue to benefit from PT  Plan: Continue per plan of care        Precautions:  HTN, DM       22         Manuals             PROM/MOB/STM L knee AE gentle SK gentle HA PROM CF CF                                               Neuro Re-Ed                          Foam:  -Static  -Step f/b  -Step s/s -   4" LLE step up   10x   4" LLE step up 15x         Weight shift to SLS    HEP          SLS     5 x 15"                                                Ther Ex                          Stand:   HT raises - 10x ea x15 ea 20x ea  20x ea        Stand:  Squats, knee flex, march - 10x ea x15 ea 20x ea  20x ea         Stand hip AROM flex, abd, ext - x10 ea x15 ea 20x ea 20x ea                      LAQ/HS flex x10 x15 x15          Supine:  QS, SAQ, hip add iso x10 ea x10 ea x15 ea 20x ea 20x         Supine:  HS flex x10 x10 x10 10x  10x         Supine: SLR   -            S/l hip abd -            Prone  Knee flex  Hip ext -            TKE     PTB 20x5"         Ther Activity             Xride seat - 5' 6'  8'  10                     Gait Training                                       Modalities             Ice A-P w/ elev L knee home 10' 10' np  np         Dressing change AE

## 2022-07-19 ENCOUNTER — OFFICE VISIT (OUTPATIENT)
Dept: PHYSICAL THERAPY | Facility: CLINIC | Age: 62
End: 2022-07-19
Payer: COMMERCIAL

## 2022-07-19 DIAGNOSIS — M17.12 PRIMARY OSTEOARTHRITIS OF LEFT KNEE: ICD-10-CM

## 2022-07-19 DIAGNOSIS — Z96.652 S/P TOTAL KNEE ARTHROPLASTY, LEFT: Primary | ICD-10-CM

## 2022-07-19 PROCEDURE — 97110 THERAPEUTIC EXERCISES: CPT

## 2022-07-19 PROCEDURE — 97140 MANUAL THERAPY 1/> REGIONS: CPT

## 2022-07-19 PROCEDURE — 97530 THERAPEUTIC ACTIVITIES: CPT

## 2022-07-19 NOTE — PROGRESS NOTES
Daily Note     Today's date: 2022  Patient name: Lavern Garcia  : 1960  MRN: [id number]  Referring provider: Sofía Tim  Dx:   Encounter Diagnosis     ICD-10-CM    1  S/P total knee arthroplasty, left  Z96 652    2  Primary osteoarthritis of left knee  M17 12        Start Time: 1200  Stop Time: 1253  Total time in clinic (min): 53 minutes    Subjective: pt c/o mostly tightness/discomfort currently 4/10 pain scale  Feels a little tingling on ant lat L knee-has since surgery  Taking tylenol  A little more sore today b/c pt had son help stretch L knee at home  Objective: See treatment diary below      Assessment: gait training today to smooth mechanics with spc  Encourage HEP with self stretching at home (paulina terminal knee ext)  Pt still with L knee ext lag  Introduced s/l hip abd which pt found challenging (2x5 with knee flex'd slightly)  Advanced to KTC gravity stretch for knee flex as flex heelslide easier  Patient would benefit from continued PT      Plan: Continue per plan of care        Precautions:  HTN, DM       22       Manuals      #6-FOTO-done       PROM/MOB/STM L knee AE gentle SK gentle HA PROM CF CF AE grade 2-3                                              Neuro Re-Ed                          Foam:  -Static  -Step f/b  -Step s/s -   4" LLE step up   10x   4" LLE step up 15x  -       SLS     5 x 15"  -                                              Ther Ex                          Stand:   HT raises - 10x ea x15 ea 20x ea  20x ea 20x ea       Stand:  Squats, knee flex, march - 10x ea x15 ea 20x ea  20x ea  20x ea       Stand hip AROM flex, abd, ext - x10 ea x15 ea 20x ea 20x ea  20x ea       TKE     PTB 20x5"  -       LAQ/HS flex x10 x15 x15   x20       Supine:  QS, SAQ, hip add iso x10 ea x10 ea x15 ea 20x ea 20x  20x ea       Supine:  HS flex x10 x10 x10 10x  10x  KTC x10       Supine: SLR   -     x20       S/l hip abd -     2x5 Prone  Knee flex  Hip ext -                         Ther Activity             Xride seat 6 - 5' 6'  8'  10 10'                    Gait Training                                       Modalities             Ice A-P w/ elev L knee home 10' 10' np  np  home       Dressing change AE

## 2022-07-21 ENCOUNTER — OFFICE VISIT (OUTPATIENT)
Dept: PHYSICAL THERAPY | Facility: CLINIC | Age: 62
End: 2022-07-21
Payer: COMMERCIAL

## 2022-07-21 DIAGNOSIS — Z96.652 S/P TOTAL KNEE ARTHROPLASTY, LEFT: Primary | ICD-10-CM

## 2022-07-21 DIAGNOSIS — M17.12 PRIMARY OSTEOARTHRITIS OF LEFT KNEE: ICD-10-CM

## 2022-07-21 PROCEDURE — 97112 NEUROMUSCULAR REEDUCATION: CPT

## 2022-07-21 PROCEDURE — 97110 THERAPEUTIC EXERCISES: CPT

## 2022-07-21 PROCEDURE — 97530 THERAPEUTIC ACTIVITIES: CPT

## 2022-07-21 PROCEDURE — 97140 MANUAL THERAPY 1/> REGIONS: CPT

## 2022-07-21 NOTE — PROGRESS NOTES
Daily Note     Today's date: 2022  Patient name: Deloris Nation  : 1960  MRN: [id number]  Referring provider: Lulu Johnson  Dx:   Encounter Diagnosis     ICD-10-CM    1  S/P total knee arthroplasty, left  Z96 652    2  Primary osteoarthritis of left knee  M17 12        Start Time: 1200  Stop Time: 1300  Total time in clinic (min): 60 minutes    Subjective: pt reports he still feels tight in L knee  States he put 1 5# cuff weights on ankles and walked around this morning which could be why he "feels it more"      Objective: See treatment diary below      Assessment: pt cont to improve  Able to amb throughout clinic wihtout AD today including cone stepover exer that was introduced  Gait is more fluid with = wb/stance and heel<-->toe mechanics when pt using spc  L knee ext lag still noted, paulina at end of session with hip abd s/l  Patient would benefit from continued PT      Plan: Continue per plan of care        Precautions:  HTN, DM       22      Manuals      #6-FOTO-done #7      PROM/MOB/STM L knee AE gentle SK gentle HA PROM CF CF AE grade 2-3 AE grade 2-3                                             Neuro Re-Ed             Cones:  -step over  -tap/step over - - - - - -   4x12 cones  -      Foam:  -Static  -Step f/b  -Step s/s -   4" LLE step up   10x   4" LLE step up 15x  - -    x10 R/L  x10      SLS     5 x 15"  - -                                             Ther Ex                          Stand:   HT raises - 10x ea x15 ea 20x ea  20x ea 20x ea 20x ea      Stand:  Squats, knee flex, march - 10x ea x15 ea 20x ea  20x ea  20x ea 20x ea      Stand hip AROM flex, abd, ext - x10 ea x15 ea 20x ea 20x ea  20x ea 20x ea      TKE     PTB 20x5"  -       LAQ/HS flex x10 x15 x15   x20 x20      Supine:  QS, SAQ, hip add iso x10 ea x10 ea x15 ea 20x ea 20x  20x ea x20 ea      Supine:  HS flex x10 x10 x10 10x  10x  KTC x10 KTC x 10      Supine: SLR   - x20 x20      Supine bridging - - - - - - x10      S/l hip abd -     2x5 2x5      Prone  Knee flex  Hip ext -                         Ther Activity             Xride seat 6 - 5' 6'  8'  10 10' 10' L3                   Gait Training                                       Modalities             Ice A-P w/ elev L knee home 10' 10' np  np  home home      Dressing change AE

## 2022-07-26 ENCOUNTER — OFFICE VISIT (OUTPATIENT)
Dept: PHYSICAL THERAPY | Facility: CLINIC | Age: 62
End: 2022-07-26
Payer: COMMERCIAL

## 2022-07-26 DIAGNOSIS — Z96.652 S/P TOTAL KNEE ARTHROPLASTY, LEFT: Primary | ICD-10-CM

## 2022-07-26 DIAGNOSIS — M17.12 PRIMARY OSTEOARTHRITIS OF LEFT KNEE: ICD-10-CM

## 2022-07-26 PROCEDURE — 97110 THERAPEUTIC EXERCISES: CPT

## 2022-07-26 PROCEDURE — 97140 MANUAL THERAPY 1/> REGIONS: CPT

## 2022-07-26 PROCEDURE — 97530 THERAPEUTIC ACTIVITIES: CPT

## 2022-07-26 PROCEDURE — 97112 NEUROMUSCULAR REEDUCATION: CPT

## 2022-07-26 NOTE — PROGRESS NOTES
Daily Note     Today's date: 2022  Patient name: Leesa Solo  : 1960  MRN: [id number]  Referring provider: Tariq Mahmood  Dx:   Encounter Diagnosis     ICD-10-CM    1  S/P total knee arthroplasty, left  Z96 652    2  Primary osteoarthritis of left knee  M17 12        Start Time: 1200  Stop Time: 1303  Total time in clinic (min): 63 minutes    Subjective: cont to feel tightness in L knee  Didn't bring spc into clinic today (has in car-not using as much)      Objective: See treatment diary below      Assessment: cont with L knee edema and ext lag and limited EROM ext passive  Very challenged with s/l hip abd   step up/over with RLE (wb on LLE) is more challenging than pt anticipated  Patient would benefit from continued PT in oder to achieve stated goals and resume PLOF      Plan: Continue per plan of care        Precautions:  HTN, DM    F/u Dr Quinonez Rule 22     Manuals      #6-FOTO-done #7 #8     PROM/MOB/STM L knee AE gentle SK gentle HA PROM CF CF AE grade 2-3 AE grade 2-3 AE grade 2-3                                            Neuro Re-Ed             Cones:  -step over  -tap/step over - - - - - -   4x12 cones  -   4x12 cones     Foam:  -Static  -Step f/b  -Step s/s -   4" LLE step up   10x   4" LLE step up 15x  - -    x10 R/L  x10 -    x10 R/L  x10     SLS     5 x 15"  - -                                             Ther Ex             HC stretch @ step - - - - - - - 3x20"     Stand:   HT raises - 10x ea x15 ea 20x ea  20x ea 20x ea 20x ea 20x ea     Stand:  Squats, knee flex, march - 10x ea x15 ea 20x ea  20x ea  20x ea 20x ea 20x ea     Stand hip AROM flex, abd, ext - x10 ea x15 ea 20x ea 20x ea  20x ea 20x ea 20x ea     TKE     PTB 20x5"  -       LAQ/HS flex x10 x15 x15   x20 x20 x20     Supine:  QS, SAQ, hip add iso x10 ea x10 ea x15 ea 20x ea 20x  20x ea x20 ea x20 ea     Supine:  HS flex x10 x10 x10 10x  10x  KTC x10 KTC x 10 KTC x 10     Supine: SLR   -     x20 x20 2x10     Supine bridging - - - - - - x10      S/l hip abd -     2x5 2x5 2x5     Prone  Knee flex  Hip ext -                         Ther Activity             Xride seat 6 - 5' 6'  8'  10 10' 10' L3 10'L3                  Gait Training                                       Modalities             Ice A-P w/ elev L knee home 10' 10' np  np  home home home     Dressing change AE

## 2022-07-27 NOTE — PROGRESS NOTES
PT Re-Evaluation     Today's date: 2022  Patient name: Jaime Diop  : 1960  MRN: [id number]  Referring provider: Sylvester Lambert  Dx:   Encounter Diagnosis     ICD-10-CM    1  S/P total knee arthroplasty, left  Z96 652    2  Primary osteoarthritis of left knee  M17 12        Start Time: 1200  Stop Time: 1300  Total time in clinic (min): 60 minutes    Assessment  Assessment details: 65 yo male referred to PT s/p L TKA  Pt presents with 2-4/10 pain scale and only taking tylenol and Aleve  Pt amb without AD (occ with spc) with dec L knee ext  and shorter step length on R-though able to correct with VPs  Pt with improved AROM to -8-121 degrees-still with ext deficit as well as ext lag (-11), though improved  Strength improved to 5/5 hip flex and ext within available ext ROM)  Pt's L hip abd 3+/5  He is driving and has resumed  household tasks, not working currently  Pt amb steps with reciprocal pattern ascending, though not fluid, and step to step pattern descending    Incision is closed and healing nicely  He would cont to benefit from PT at this time to further improve his AROM/strength/mobility deficits- continuing with the progress made thus far, and develop his HEP in accordance with his progress in order to resume his PLOF    Impairments: abnormal gait, abnormal or restricted ROM, activity intolerance, impaired physical strength and lacks appropriate home exercise program    Symptom irritability: moderateUnderstanding of Dx/Px/POC: excellent  Goals  Pt will achieve the following goals in the next 2-4 weeks  STG 22  Indep with current HEP  Stair amb reciprocally  Improved L knee AROM by 2-3 deg more      STG 22  Indep with current HEP (met)  Dec pain by 1-3 levels (met)  Improve L knee AROM by 5-10 deg (met)  Improve LLE strength by 1/2 grade (met)      Pt will achieve the following goals by d/c (8-12 weeks, or as stated in plan)  LTG  indep and compliant with HEP in order to maximize gains achieved in therapy  0-2/10 pain scale in order to feel better and decrease/eliminate use of  pain &/or anti-inflammatory  meds  L knee/LE ROM WFL and  strength 4-5/5 in order to improve functional mobility   Amb safely level/unlevel x community distances with appropriate AD  Pt will demonstrate normalized gait quality on level surfaces as evidenced by longer step length and proper heel<-->toe strike and push off mechanics   Improve  FOTO score to 77 or more and LEFS score 65/80 to indicate inc functional ability of patient  Resume activities, including RTW, driving & biking/fitness walking, with less pain and limitation at premorbid intensity and duration    Plan  Patient would benefit from: skilled physical therapy  Planned modality interventions: cryotherapy  Planned therapy interventions: joint mobilization, manual therapy, massage, balance, neuromuscular re-education, body mechanics training, patient education, strengthening, stretching, therapeutic activities, therapeutic exercise, home exercise program, flexibility and gait training  Frequency: 2x week  Duration in visits: 12  Duration in weeks: 6  Plan of Care beginning date: 7/28/2022  Plan of Care expiration date: 9/7/2022  Treatment plan discussed with: patient        Subjective Evaluation    History of Present Illness  Date of surgery: 6/28/2022  Mechanism of injury: 7/28/22 Eval-pt driving now  Still with pain, but more tightness  Having tingling in lower leg and thigh at times  Taking Aleve 1x/day and tylenol  Indep ADLs, and household chores  Sleep still difficult  Able to grocery shop without cart support  LEFS score 56/80 improved    6/30/22 Eval-pt with h/o L knee OA  Underwent L TKA on 6/28/22 and d/c'd home the next day    Pt now presents to outpatient PT  Prior to leaving hospital pt with inc bleeding from incision-PO dressing removed and steristrips applied to distal end of incision where bleeding occurred-also dressed with absorbant dressing pad and ace wrap       amb steps indep  Didn't sleep well last night  Constant pain  Taking oxycontin and gabapentin as well as tylenol  Aspirin for anti-coagulant  indep dressing, but shoe horn  Not driving or doing household chores  Hasn't showered yet  Wearing Edwin stockings  LEFS   Quality of life: good    Pain  Current pain ratin  At best pain ratin  At worst pain ratin  Location: L knee  Relieving factors: ice and medications    Social Support  Steps to enter house: yes (3)  Stairs in house: yes (descending step to step pattern with railing; ascends reciprocally)   Lives in: multiple-level home  Lives with: adult children (son)    Employment status: not working ([employer] a lot)  Treatments  Current treatment: medication and physical therapy  Patient Goals  Patient goals for therapy: decreased pain, increased strength and decreased edema  Patient goal: resume walking, riding bike without pain  resume activities without pain (pt feels 75% there)        Objective     Observations     Additional Observation Details  L knee incision closed with minimal scabbing noted distal>proximal   No drainage or redness noted  Tissue restriction along length of incision noted   Cont with edema of L knee/thigh region    Palpation     Additional Palpation Details  No pain to palp of L knee/thigh/calf;  Good L knee pat mobility    Active Range of Motion   Left Knee   Flexion: 119 (sitting; supine ; improved) degrees   Extension: -8 (improved) degrees   Extensor lag: -10 (improved) degrees     Passive Range of Motion   Left Knee   Flexion: 125 (KTC/supine-improved) degrees   Extension: -7 (supine; improved) degrees     Strength/Myotome Testing     Left Hip   Planes of Motion   Flexion: 5  Abduction: 3+    Right Hip   Normal muscle strength    Left Knee   Flexion: 4  Extension: 4 (within available ext ROM)  Quadriceps contraction: fair    Right Knee   Flexion: WFL  Quadriceps contraction: good    Left Ankle/Foot   Dorsiflexion: 4  Plantar flexion: 4    Right Ankle/Foot   Normal strength    Additional Strength Details  Deep squat to 108 deg knee flex and return with fingertip support for balance    Tests     Additional Tests Details  PSFS 0/10    Sit to stand without Assist  10/10  amb steps reciprocally 0/10  Drive 34/31  amb without AD 10/10    Ambulation     Comments   Amb indep without AD  x clinic distances and parking lot to clinic  Step through gait with min  toe off, short step length on L-better with VPs and slower carlos        Flowsheet Rows    Flowsheet Row Most Recent Value   PT/OT G-Codes    Current Score 55   Projected Score 77   Assessment Type Re-evaluation             Precautions:  HTN, DM        F/u Dr Ramona Gil 8/12/22 6/30/22 7/6 7/7 7/12 7/14 7/19/22 7/21/22 7/26/22 7/28/22    Manuals      #6-FOTO-done #7 #8 #9 Re-eval    PROM/MOB/STM L knee AE gentle SK gentle HA PROM CF CF AE grade 2-3 AE grade 2-3 AE grade 2-3 AE grade 2-3                                           Neuro Re-Ed             Cones:  -step over  -tap/step over - - - - - -   4x12 cones  -   4x12 cones   4x12 cones    Foam:  -Static  -Step f/b  -Step s/s -   4" LLE step up   10x   4" LLE step up 15x  - -    x10 R/L  x10 -    x10 R/L  x10 -    x10 R/L  x10    SLS     5 x 15"  - -      Step down - - - - - - - - Foam x 10 L    Step up - - - - - - - - 1 riser x 10 L                 Ther Ex             HC stretch @ step - - - - - - - 3x20" 3x20"    Stand:   HT raises - 10x ea x15 ea 20x ea  20x ea 20x ea 20x ea 20x ea 20x ea    Stand:  Squats, knee flex, march - 10x ea x15 ea 20x ea  20x ea  20x ea 20x ea 20x ea 20x ea    Stand hip AROM flex, abd, ext - x10 ea x15 ea 20x ea 20x ea  20x ea 20x ea 20x ea 20x ea vectors   TKE     PTB 20x5"  -       LAQ/HS flex x10 x15 x15   x20 x20 x20 x20    Supine:  QS, SAQ, hip add iso x10 ea x10 ea x15 ea 20x ea 20x  20x ea x20 ea x20 ea x20 ea Supine:  HS flex x10 x10 x10 10x  10x  KTC x10 KTC x 10 KTC x 10 KTC x10    Supine: SLR   -     x20 x20 2x10 x20 ea    Supine bridging - - - - - - x10  x10    S/l hip abd -     2x5 2x5 2x5 2x5    Prone  Knee flex  Hip ext -                         Ther Activity             Xride seat 6 - 5' 6'  8'  10 10' 10' L3 10'L3 10'                 Gait Training                                       Modalities             Ice A-P w/ elev L knee home 10' 10' np  np  home home home home    Dressing change AE

## 2022-07-28 ENCOUNTER — EVALUATION (OUTPATIENT)
Dept: PHYSICAL THERAPY | Facility: CLINIC | Age: 62
End: 2022-07-28
Payer: COMMERCIAL

## 2022-07-28 DIAGNOSIS — Z96.652 S/P TOTAL KNEE ARTHROPLASTY, LEFT: Primary | ICD-10-CM

## 2022-07-28 DIAGNOSIS — M17.12 PRIMARY OSTEOARTHRITIS OF LEFT KNEE: ICD-10-CM

## 2022-07-28 PROCEDURE — 97110 THERAPEUTIC EXERCISES: CPT

## 2022-07-28 PROCEDURE — 97164 PT RE-EVAL EST PLAN CARE: CPT

## 2022-07-28 PROCEDURE — 97530 THERAPEUTIC ACTIVITIES: CPT

## 2022-07-28 PROCEDURE — 97112 NEUROMUSCULAR REEDUCATION: CPT

## 2022-07-28 PROCEDURE — 97140 MANUAL THERAPY 1/> REGIONS: CPT

## 2022-08-02 ENCOUNTER — OFFICE VISIT (OUTPATIENT)
Dept: PHYSICAL THERAPY | Facility: CLINIC | Age: 62
End: 2022-08-02
Payer: COMMERCIAL

## 2022-08-02 DIAGNOSIS — M17.12 PRIMARY OSTEOARTHRITIS OF LEFT KNEE: ICD-10-CM

## 2022-08-02 DIAGNOSIS — Z96.652 S/P TOTAL KNEE ARTHROPLASTY, LEFT: Primary | ICD-10-CM

## 2022-08-02 PROCEDURE — 97112 NEUROMUSCULAR REEDUCATION: CPT

## 2022-08-02 PROCEDURE — 97140 MANUAL THERAPY 1/> REGIONS: CPT

## 2022-08-02 PROCEDURE — 97110 THERAPEUTIC EXERCISES: CPT

## 2022-08-02 PROCEDURE — 97530 THERAPEUTIC ACTIVITIES: CPT

## 2022-08-02 NOTE — PROGRESS NOTES
Daily Note     Today's date: 2022  Patient name: Jocelyn Bustamante  : 1960  MRN: [id number]  Referring provider: Mallory Esparza  Dx:   Encounter Diagnosis     ICD-10-CM    1  S/P total knee arthroplasty, left  Z96 652    2  Primary osteoarthritis of left knee  M17 12        Start Time: 1155  Stop Time: 1252  Total time in clinic (min): 57 minutes    Subjective: pt with stiffness/soreness of L knee      Objective: See treatment diary below      Assessment: pt cont to progress- still with ext lag that impacts gait-though able to control with slower gait/conscious effort  Introduced vectors with GTB and pt mari well-given for HEP  Pt demonstrates good understanding of current HEP  Patient would benefit from continued PT      Plan: Continue per plan of care        Precautions:  HTN, DM        F/u Dr Bonnie Castaneda 22   Manuals      #6-FOTO-done #7 #8 #9 Re-eval #10   PROM/MOB/STM L knee    CF CF AE grade 2-3 AE grade 2-3 AE grade 2-3 AE grade 2-3 AE grade 2-3   Foam rolling  L ITB/quad *                                      Neuro Re-Ed             Cones:  -step over  -tap/step over    - - -   4x12 cones  -   4x12 cones   4x12 cones 4x12 cones   Foam:  -Static  -Step f/b  -Step s/s    4" LLE step up   10x   4" LLE step up 15x  - -    x10 R/L  x10 -    x10 R/L  x10 -    x10 R/L  x10 -    x10 R/L  x10   SLS     5 x 15"  - -      Step down    - - - - - Foam x 10 L Foam x 10 L  No riser L x 10   Step up    - - - - - 1 riser x 10 L 1 riser 2 x 10 L                Ther Ex             HC stretch @ step    - - - - 3x20" 3x20" 3x20"   Stand:   HT raises    20x ea  20x ea 20x ea 20x ea 20x ea 20x ea x20 ea   Stand:  Squats, knee flex, march    20x ea  20x ea  20x ea 20x ea 20x ea 20x ea x20 ea   Stand hip AROM flex, abd, ext    20x ea 20x ea  20x ea 20x ea 20x ea 20x ea vectors green x10   TKE     PTB 20x5"  -       LAQ/HS flex      x20 x20 x20 x20 x20 Supine:  QS, SAQ, hip add iso    20x ea 20x  20x ea x20 ea x20 ea x20 ea x20   Supine:  HS flex    10x  10x  KTC x10 KTC x 10 KTC x 10 KTC x10 KTC x10   Supine: SLR        x20 x20 2x10 x20 ea x20 ea   Supine bridging    - - - x10  x10 x10   S/l hip abd      2x5 2x5 2x5 2x5 x10   Prone  Knee flex  Hip ext                          Ther Activity             Xride seat 6    8'  10 10' 10' L3 10'L3 10' 10'                Gait Training                                       Modalities             Ice A-P w/ elev L knee    np  np  home home home home home   Dressing change

## 2022-08-04 ENCOUNTER — OFFICE VISIT (OUTPATIENT)
Dept: PHYSICAL THERAPY | Facility: CLINIC | Age: 62
End: 2022-08-04
Payer: COMMERCIAL

## 2022-08-04 DIAGNOSIS — Z96.652 S/P TOTAL KNEE ARTHROPLASTY, LEFT: Primary | ICD-10-CM

## 2022-08-04 DIAGNOSIS — M17.12 PRIMARY OSTEOARTHRITIS OF LEFT KNEE: ICD-10-CM

## 2022-08-04 PROCEDURE — 97140 MANUAL THERAPY 1/> REGIONS: CPT

## 2022-08-04 PROCEDURE — 97110 THERAPEUTIC EXERCISES: CPT

## 2022-08-04 PROCEDURE — 97112 NEUROMUSCULAR REEDUCATION: CPT

## 2022-08-04 NOTE — PROGRESS NOTES
Daily Note     Today's date: 2022  Patient name: Kaleigh Sousa  : 1960  MRN: [id number]  Referring provider: Donnie Ambrosio  Dx:   Encounter Diagnosis     ICD-10-CM    1  S/P total knee arthroplasty, left  Z96 652    2  Primary osteoarthritis of left knee  M17 12        Start Time: 1057  Stop Time: 1148  Total time in clinic (min): 51 minutes    Subjective: pt reports stiffness      Objective: See treatment diary below      Assessment: L ITB tightness/stiffness present-initiated foam rolling to area to address this  Pt cont with knee ext stiffness and lag  Patient would benefit from continued PT to achieve stated goals and resume PLOF    Plan: Continue per plan of care        Precautions:  HTN, DM        F/u Dr Keysha Concepcion 22   Manuals #11     #6-FOTO-done #7 #8 #9 Re-eval #10   PROM/MOB/STM L knee AE grade 2-3   CF CF AE grade 2-3 AE grade 2-3 AE grade 2-3 AE grade 2-3 AE grade 2-3   Foam rolling  L ITB/quad AE                                      Neuro Re-Ed             Cones:  -step over  -tap/step over   2x12 cones ea   - - -   4x12 cones  -   4x12 cones   4x12 cones 4x12 cones   Foam:  -Static  -Step f/b  -Step s/s -    x20 R/L  x20   4" LLE step up   10x   4" LLE step up 15x  - -    x10 R/L  x10 -    x10 R/L  x10 -    x10 R/L  x10 -    x10 R/L  x10   SLS     5 x 15"  - -      Step down    - - - - - Foam x 10 L Foam x 10 L  No riser L x 10   Step up    - - - - - 1 riser x 10 L 1 riser 2 x 10 L                Ther Ex             HC stretch @ step 3x20"   - - - - 3x20" 3x20" 3x20"   Stand:   HT raises    20x ea  20x ea 20x ea 20x ea 20x ea 20x ea x20 ea   Stand:  Squats, knee flex,     20x ea  20x ea  20x ea 20x ea 20x ea 20x ea x20 ea   Stand hip AROM flex, abd, ext vectors green x10   20x ea 20x ea  20x ea 20x ea 20x ea 20x ea vectors green x10   TKE     PTB 20x5"  -       LAQ/HS flex      x20 x20 x20 x20 x20 Supine:  QS, SAQ, hip add iso x20   20x ea 20x  20x ea x20 ea x20 ea x20 ea x20   Supine:  HS flex KTC x10   10x  10x  KTC x10 KTC x 10 KTC x 10 KTC x10 KTC x10   Supine: SLR   x20ea     x20 x20 2x10 x20 ea x20 ea   Supine bridging x20   - - - x10  x10 x10   S/l hip abd x10     2x5 2x5 2x5 2x5 x10   Prone  Knee flex  Hip ext x20                         Ther Activity             Xride seat 6 10'   8'  10 10' 10' L3 10'L3 10' 10'                Gait Training                                       Modalities             Ice A-P w/ elev L knee home   np  np  home home home home home   Dressing change

## 2022-08-06 DIAGNOSIS — E11.65 TYPE 2 DIABETES MELLITUS WITH HYPERGLYCEMIA, WITHOUT LONG-TERM CURRENT USE OF INSULIN (HCC): ICD-10-CM

## 2022-08-06 DIAGNOSIS — E78.5 HYPERLIPIDEMIA, UNSPECIFIED HYPERLIPIDEMIA TYPE: ICD-10-CM

## 2022-08-06 DIAGNOSIS — I10 HYPERTENSION, UNSPECIFIED TYPE: ICD-10-CM

## 2022-08-08 RX ORDER — LISINOPRIL AND HYDROCHLOROTHIAZIDE 25; 20 MG/1; MG/1
TABLET ORAL
Qty: 90 TABLET | Refills: 1 | Status: SHIPPED | OUTPATIENT
Start: 2022-08-08

## 2022-08-08 RX ORDER — EMPAGLIFLOZIN 10 MG/1
TABLET, FILM COATED ORAL
Qty: 90 TABLET | Refills: 1 | Status: SHIPPED | OUTPATIENT
Start: 2022-08-08

## 2022-08-08 RX ORDER — FENOFIBRATE 145 MG/1
TABLET, COATED ORAL
Qty: 90 TABLET | Refills: 1 | Status: SHIPPED | OUTPATIENT
Start: 2022-08-08

## 2022-08-08 RX ORDER — METOPROLOL SUCCINATE 100 MG/1
TABLET, EXTENDED RELEASE ORAL
Qty: 90 TABLET | Refills: 1 | Status: SHIPPED | OUTPATIENT
Start: 2022-08-08

## 2022-08-09 ENCOUNTER — OFFICE VISIT (OUTPATIENT)
Dept: PHYSICAL THERAPY | Facility: CLINIC | Age: 62
End: 2022-08-09
Payer: COMMERCIAL

## 2022-08-09 DIAGNOSIS — Z96.652 S/P TOTAL KNEE ARTHROPLASTY, LEFT: Primary | ICD-10-CM

## 2022-08-09 DIAGNOSIS — M17.12 PRIMARY OSTEOARTHRITIS OF LEFT KNEE: ICD-10-CM

## 2022-08-09 PROCEDURE — 97140 MANUAL THERAPY 1/> REGIONS: CPT | Performed by: PHYSICAL THERAPIST

## 2022-08-09 PROCEDURE — 97530 THERAPEUTIC ACTIVITIES: CPT | Performed by: PHYSICAL THERAPIST

## 2022-08-09 PROCEDURE — 97112 NEUROMUSCULAR REEDUCATION: CPT | Performed by: PHYSICAL THERAPIST

## 2022-08-09 PROCEDURE — 97110 THERAPEUTIC EXERCISES: CPT | Performed by: PHYSICAL THERAPIST

## 2022-08-09 NOTE — PROGRESS NOTES
Daily Note     Today's date: 2022  Patient name: Martha Bates  : 1960  MRN: [id number]  Referring provider: Nabeel Sinclair  Dx:   Encounter Diagnosis     ICD-10-CM    1  S/P total knee arthroplasty, left  Z96 652    2  Primary osteoarthritis of left knee  M17 12                   Subjective: patient reports that he continues to have stiffness in the knee  Patient reports he has a follow up with the surgeon on Friday  Objective: See treatment diary below      Assessment: Patient tolerated treatment well  He demonstrates good technique throughout the session  Able to complete program as noted below with no reports of increased symptoms  Continues to be limited in extension ROM  He would benefit from continuing physical therapy  Plan: Continue per plan of care        Precautions:  HTN, DM        F/u Dr Francisca Hill 22   Manuals #11 #12    #6-FOTO-done #7 #8 #9 Re-eval #10   PROM/MOB/STM L knee AE grade 2-3 SK  CF CF AE grade 2-3 AE grade 2-3 AE grade 2-3 AE grade 2-3 AE grade 2-3   Foam rolling  L ITB/quad AE                                      Neuro Re-Ed             Cones:  -step over  -tap/step over   2x12 cones ea   2x12 cones ea   - - -   4x12 cones  -   4x12 cones   4x12 cones 4x12 cones   Foam:  -Static  -Step f/b  -Step s/s -    x20 R/L x20     x20  R/L x20  4" LLE step up   10x   4" LLE step up 15x  - -    x10 R/L  x10 -    x10 R/L  x10 -    x10 R/L  x10 -    x10 R/L  x10   SLS     5 x 15"  - -      Step down    - - - - - Foam x 10 L Foam x 10 L  No riser L x 10   Step up    - - - - - 1 riser x 10 L 1 riser 2 x 10 L                Ther Ex             HC stretch @ step 3x20" 3x20"  - - - - 3x20" 3x20" 3x20"   Stand:   HT raises    20x ea  20x ea 20x ea 20x ea 20x ea 20x ea x20 ea   Stand:  Squats, knee flex,   x20  20x ea  20x ea  20x ea 20x ea 20x ea 20x ea x20 ea   Stand hip AROM flex, abd, ext vectors green x10 vectors green x10 ea  20x ea 20x ea  20x ea 20x ea 20x ea 20x ea vectors green x10   TKE     PTB 20x5"  -       LAQ/HS flex  x20    x20 x20 x20 x20 x20   Supine:  QS, SAQ, hip add iso x20 x20 ea  20x ea 20x  20x ea x20 ea x20 ea x20 ea x20   Supine:  HS flex KTC x10 xKTC  10  10x  10x  KTC x10 KTC x 10 KTC x 10 KTC x10 KTC x10   Supine: SLR   x20ea x20 ea    x20 x20 2x10 x20 ea x20 ea   Supine bridging x20 x20  - - - x10  x10 x10   S/l hip abd x10 x10    2x5 2x5 2x5 2x5 x10   Prone  Knee flex  Hip ext x20  x20                        Ther Activity             Xride seat 6 10' 10'   8'  10 10' 10' L3 10'L3 10' 10'                Gait Training                                       Modalities             Ice A-P w/ elev L knee home   np  np  home home home home home   Dressing change

## 2022-08-11 ENCOUNTER — OFFICE VISIT (OUTPATIENT)
Dept: PHYSICAL THERAPY | Facility: CLINIC | Age: 62
End: 2022-08-11
Payer: COMMERCIAL

## 2022-08-11 DIAGNOSIS — M17.12 PRIMARY OSTEOARTHRITIS OF LEFT KNEE: ICD-10-CM

## 2022-08-11 DIAGNOSIS — Z96.652 S/P TOTAL KNEE ARTHROPLASTY, LEFT: Primary | ICD-10-CM

## 2022-08-11 PROCEDURE — 97530 THERAPEUTIC ACTIVITIES: CPT | Performed by: PHYSICAL THERAPIST

## 2022-08-11 PROCEDURE — 97140 MANUAL THERAPY 1/> REGIONS: CPT | Performed by: PHYSICAL THERAPIST

## 2022-08-11 PROCEDURE — 97110 THERAPEUTIC EXERCISES: CPT | Performed by: PHYSICAL THERAPIST

## 2022-08-11 PROCEDURE — 97112 NEUROMUSCULAR REEDUCATION: CPT | Performed by: PHYSICAL THERAPIST

## 2022-08-11 NOTE — PROGRESS NOTES
Daily Note     Today's date: 2022  Patient name: Marti Skiff  : 1960  MRN: [id number]  Referring provider: Amanda Islas  Dx:   Encounter Diagnosis     ICD-10-CM    1  S/P total knee arthroplasty, left  Z96 652    2  Primary osteoarthritis of left knee  M17 12                   Subjective: "it feels stiff, it always feels stiff" he has a follow up with surgeon tomorrow  Objective: See treatment diary below      Assessment: Patient tolerated treatment well  He demonstrates good technique throughout the session  Continues to be limited in his extension range of motion but tolerance manual therapy well  He would benefit from continued PT  Plan: Continue per plan of care        Precautions:  HTN, DM        F/u Dr Tj Barrow 22   Manuals #11 #12 #13   #6-FOTO-done #7 #8 #9 Re-eval #10   PROM/MOB/STM L knee AE grade 2-3 SK SK  CF AE grade 2-3 AE grade 2-3 AE grade 2-3 AE grade 2-3 AE grade 2-3   Foam rolling  L ITB/quad AE                                      Neuro Re-Ed             Cones:  -step over  -tap/step over   2x12 cones ea   2x12 cones ea    2x12 cones ea  - -   4x12 cones  -   4x12 cones   4x12 cones 4x12 cones   Foam:  -Static  -Step f/b  -Step s/s -    x20 R/L x20     x20  R/L x20     x20  R/L x20  4" LLE step up 15x  - -    x10 R/L  x10 -    x10 R/L  x10 -    x10 R/L  x10 -    x10 R/L  x10   SLS     5 x 15"  - -      Step down     - - - - Foam x 10 L Foam x 10 L  No riser L x 10   Step up     - - - - 1 riser x 10 L 1 riser 2 x 10 L                Ther Ex             HC stretch @ step 3x20" 3x20" 3x20"  - - - 3x20" 3x20" 3x20"   Stand:   HT raises     20x ea 20x ea 20x ea 20x ea 20x ea x20 ea   Stand:  Squats, knee flex, march x20  x20 x20  20x ea  20x ea 20x ea 20x ea 20x ea x20 ea   Stand hip AROM flex, abd, ext vectors green x10 vectors green x10 ea Vectors green x10 ea  20x ea  20x ea 20x ea 20x ea 20x ea vectors green x10   TKE     PTB 20x5"  -       LAQ/HS flex  x20 x20   x20 x20 x20 x20 x20   Supine:  QS, SAQ, hip add iso x20 x20 ea x20 ea  20x  20x ea x20 ea x20 ea x20 ea x20   Supine:  HS flex KTC x10 KTC  x10 KTC x10  10x  KTC x10 KTC x 10 KTC x 10 KTC x10 KTC x10   Supine: SLR   x20ea x20 ea x20 ea   x20 x20 2x10 x20 ea x20 ea   Supine bridging x20 x20 x20  - - x10  x10 x10   S/l hip abd x10 x10 x10   2x5 2x5 2x5 2x5 x10   Prone  Knee flex  Hip ext x20  x20 x20                       Ther Activity             Xride seat 6 10' 10'  10'  10 10' 10' L3 10'L3 10' 10'                Gait Training                                       Modalities             Ice A-P w/ elev L knee home    np  home home home home home   Dressing change

## 2022-08-12 ENCOUNTER — OFFICE VISIT (OUTPATIENT)
Dept: OBGYN CLINIC | Facility: MEDICAL CENTER | Age: 62
End: 2022-08-12

## 2022-08-12 VITALS
HEIGHT: 70 IN | DIASTOLIC BLOOD PRESSURE: 86 MMHG | SYSTOLIC BLOOD PRESSURE: 140 MMHG | BODY MASS INDEX: 31.15 KG/M2 | HEART RATE: 78 BPM | WEIGHT: 217.6 LBS

## 2022-08-12 DIAGNOSIS — Z96.652 STATUS POST TOTAL LEFT KNEE REPLACEMENT: Primary | ICD-10-CM

## 2022-08-12 PROCEDURE — 99024 POSTOP FOLLOW-UP VISIT: CPT | Performed by: ORTHOPAEDIC SURGERY

## 2022-08-12 RX ORDER — AMOXICILLIN 500 MG/1
2000 CAPSULE ORAL ONCE
Qty: 4 CAPSULE | Refills: 2 | Status: SHIPPED | OUTPATIENT
Start: 2022-08-12 | End: 2022-08-12

## 2022-08-12 NOTE — PROGRESS NOTES
Assessment/Plan:  1  Status post total left knee replacement      No orders of the defined types were placed in this encounter       - Continue PT  - Pain control prn- Tylenol and Aleve  - Patient should call ahead for abx prior to dental appts  Informed him not to schedule dental work before the 12 week post-operative mandy  Amoxicillin 500 mg ordered, instructed to take 4 tablets 1 hour prior to dental procedure  - Can D/C pre-operative vitamins and aspirin  Aspirin can be taken for traveling if needed  - Work note placed to return on 9/15 without restrictions  - Follow up in about 4 weeks  Return in about 4 weeks (around 9/9/2022) for Recheck  I answered all of the patient's questions during the visit and provided education of the patient's condition during the visit  The patient verbalized understanding of the information given and agrees with the plan  This note was dictated using [employer] software  It may contain errors including improperly dictated words  Please contact physician directly for any questions  Subjective   Chief Complaint:   Chief Complaint   Patient presents with    Left Knee - Post-op, Follow-up       Miguel A Cooney is a 64 y o  male who presents for 6 week follow up s/p left TKA; DOS: 6/28/22  He states he is improving , and reports slight discomfort and stiffness but no pain in his knee  He is taking Tylenol for pain and wants to know if he needs to restart any other medications such as the aspirin, which he just finished  He has been attending PT twice a week and performing home exercises every day  Review of Systems  ROS:    See HPI for musculoskeletal review     All other systems reviewed are negative     History:  Past Medical History:   Diagnosis Date    Arthritis     L knee   for L TKR today 6/28/2022    Atopic dermatitis     Condition not found     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 [address] )     Hyperlipemia     Hypertension     Lateral epicondylitis, left elbow     Wears partial dentures     upper     Past Surgical History:   Procedure Laterality Date    APPENDECTOMY      COLONOSCOPY      CYST REMOVAL      Neck    JOINT REPLACEMENT Left 2022    KNEE ARTHROSCOPY      menisectomy    IN TOTAL KNEE ARTHROPLASTY Left 2022    Procedure: ARTHROPLASTY KNEE TOTAL;  Surgeon: Adarsh Slaughter DO;  Location: [facility];  Service: Orthopedics     Social History   Social History     Substance and Sexual Activity   Alcohol Use Not Currently    Alcohol/week: 2 0 standard drinks    Types: 2 Cans of beer per week    Comment: Social     Social History     Substance and Sexual Activity   Drug Use Never     Social History     Tobacco Use   Smoking Status Former Smoker    Packs/day: 1 00    Years: 40 00    Pack years: 40 00    Quit date: 10/2021    Years since quittin 8   Smokeless Tobacco Never Used     Family History:   Family History   Problem Relation Age of Onset    Colon polyps Mother         Inflammatory    Diabetes Father        Current Outpatient Medications on File Prior to Visit   Medication Sig Dispense Refill    acetaminophen (TYLENOL) 325 mg tablet Take 3 tablets (975 mg total) by mouth every 8 (eight) hours  0    albuterol (PROAIR HFA) 90 mcg/act inhaler Inhale 2 puffs every 6 (six) hours as needed for wheezing 5 Inhaler 5    Ascorbic Acid (VITAMIN C PO) Take 1 capsule by mouth daily      aspirin (ECOTRIN) 325 mg EC tablet Take 1 tablet (325 mg total) by mouth 2 (two) times a day Take with food  84 tablet 0    betamethasone dipropionate (DIPROSONE) 0 05 % cream Apply topically 2 (two) times a day (Patient taking differently: Apply 1 application topically 2 (two) times a day) 30 g 0    Blood Glucose Monitoring Suppl (OneTouch Verio Reflect) w/Device KIT Check blood sugars once daily  Please substitute with appropriate alternative as covered by patient's insurance   Dx: E11 65 1 kit 0    budesonide-formoterol (Symbicort) 160-4 5 mcg/act inhaler Inhale 2 puffs 2 (two) times a day Rinse mouth after use  10 2 g 5    co-enzyme Q-10 30 mg Take 30 mg by mouth daily      docusate sodium (COLACE) 100 mg capsule Take 1 capsule (100 mg total) by mouth 2 (two) times a day 20 capsule 0    fenofibrate (TRICOR) 145 mg tablet TAKE 1 TABLET DAILY 90 tablet 1    ferrous sulfate 324 (65 Fe) mg Take 1 tablet (324 mg total) by mouth in the morning 30 tablet 1    FOLIC ACID PO Take 1 capsule by mouth daily      gabapentin (NEURONTIN) 100 mg capsule Take 1 capsule (100 mg total) by mouth every 8 (eight) hours (Patient not taking: Reported on 7/28/2022) 90 capsule 0    glucose blood (OneTouch Verio) test strip Check blood sugars once daily  Please substitute with appropriate alternative as covered by patient's insurance  Dx: E11 65 100 each 3    Jardiance 10 MG TABS TAKE 1 TABLET EVERY MORNING 90 tablet 1    lisinopril-hydrochlorothiazide (PRINZIDE,ZESTORETIC) 20-25 MG per tablet TAKE 1 TABLET DAILY 90 tablet 1    metFORMIN (GLUCOPHAGE) 1000 MG tablet TAKE 1 TABLET TWICE DAILY  WITH MEALS (Patient taking differently: Take 1,000 mg by mouth 2 (two) times a day with meals) 180 tablet 1    metoprolol succinate (TOPROL-XL) 100 mg 24 hr tablet TAKE 1 TABLET DAILY 90 tablet 1    Multiple Vitamin (multivitamin) tablet Take 1 tablet by mouth daily 30 tablet 1    omega-3-acid ethyl esters (LOVAZA) 1 g capsule TAKE 2 CAPSULES TWICE DAILY (Patient taking differently: Take 2 g by mouth 2 (two) times a day) 360 capsule 1    OneTouch Delica Lancets 62Q MISC Check blood sugars once daily  Please substitute with appropriate alternative as covered by patient's insurance  Dx: E11 65 100 each 3    oxyCODONE (ROXICODONE) 5 immediate release tablet Take 2 tablets (10 mg total) by mouth every 4 (four) hours as needed for severe pain Or take 1 tablet (5 mg total) by mouth every 4 (four) hours as needed for moderate pain   Max Daily Amount: 60 mg (Patient not taking: Reported on 7/28/2022) 60 tablet 0    rosuvastatin (CRESTOR) 10 MG tablet Take 1 tablet (10 mg total) by mouth daily (Patient taking differently: Take 10 mg by mouth daily in the early morning) 90 tablet 3    sildenafil (VIAGRA) 100 mg tablet Take one tablet daily as needed (Patient taking differently: Take 100 mg by mouth as needed Take one tablet daily as needed) 20 tablet 2     No current facility-administered medications on file prior to visit       Allergies   Allergen Reactions    Sulfamethoxazole-Trimethoprim Nausea Only and GI Intolerance        Objective     /86   Pulse 78   Ht 5' 10" (1 778 m)   Wt 98 7 kg (217 lb 9 6 oz)   BMI 31 22 kg/m²      PE:  AAOx 3  WDWN  Hearing intact, no drainage from eyes  no audible wheezing  no abdominal distension  LE compartments soft, AT/GS intact    Ortho Exam:  left Knee:   INC: healed, No erythema, mild swelling  AROM:5 - 115 degrees  PROM: 0-115

## 2022-08-12 NOTE — LETTER
August 12, 2022     Patient: Deloris Nation  YOB: 1960  Date of Visit: 8/12/2022      To Whom it May Concern: Lillie Alvarado is under my professional care  Tatyshazia Heredia was seen in my office on 8/12/2022  Taty Heredia may return to work on 9/15/22 with no restrictions  If you have any questions or concerns, please don't hesitate to call           Sincerely,          Dahlia Ascencio DO        CC: Deloris Nation

## 2022-08-16 ENCOUNTER — OFFICE VISIT (OUTPATIENT)
Dept: PHYSICAL THERAPY | Facility: CLINIC | Age: 62
End: 2022-08-16
Payer: COMMERCIAL

## 2022-08-16 DIAGNOSIS — Z96.652 S/P TOTAL KNEE ARTHROPLASTY, LEFT: Primary | ICD-10-CM

## 2022-08-16 PROCEDURE — 97530 THERAPEUTIC ACTIVITIES: CPT

## 2022-08-16 PROCEDURE — 97112 NEUROMUSCULAR REEDUCATION: CPT

## 2022-08-16 PROCEDURE — 97140 MANUAL THERAPY 1/> REGIONS: CPT

## 2022-08-16 PROCEDURE — 97110 THERAPEUTIC EXERCISES: CPT

## 2022-08-16 NOTE — PROGRESS NOTES
Daily Note     Today's date: 2022  Patient name: Romelia Benavidez  : 1960  MRN: [id number]  Referring provider: Jessika Chavez  Dx:   Encounter Diagnosis     ICD-10-CM    1  S/P total knee arthroplasty, left  F60 033        Start Time: 1214  Stop Time: 3852  Total time in clinic (min): 51 minutes    Subjective: Patient notes he saw the doctor for follow-up and had no concerns  Patient notes he is still experiencing stiffness and discomfort, but loosens up throughout the day  He states that he wakes up about 4am each night with discomfort  Patient went to [address] [place] [address] this past weekend and walked ~2 miles without pain  Objective: See treatment diary below      Assessment: Patient noted feeling of instability in with steps fwd/back on foam  Patient was able to step ups with 2 risers and step downs with 1 riser which is an improvement  Patient required cueing throughout session for proper form with exercise  Continues to demonstrate extensor lag as well as limited extension ROM  Patient would benefit from continued PT to return to his PLOF  Plan: Continue per plan of care        Precautions:  HTN, DM        F/u Dr Gi Nichole 22, RTW 9/15/22   8/4/22 8/9/22 8/11 8/16   7/21/22 7/26/22 7/28/22 8/2/22   Manuals #11 #12 #13 #14   #7 #8 #9 Re-eval #10   PROM/MOB/STM L knee AE grade 2-3 SK SK MK   AE grade 2-3 AE grade 2-3 AE grade 2-3 AE grade 2-3   Foam rolling  L ITB/quad AE                                      Neuro Re-Ed             Cones:  -step over  -tap/step over   2x12 cones ea   2x12 cones ea    2x12 cones ea 2x12 cones ea     4x12 cones  -   4x12 cones   4x12 cones 4x12 cones   Foam:  -Static  -Step f/b  -Step s/s -    x20 R/L x20     x20  R/L x20     x20  R/L x20     x20   x20 R/L   -    x10 R/L  x10 -    x10 R/L  x10 -    x10 R/L  x10 -    x10 R/L  x10   SLS       -      Step down    1 riser L x10   - - Foam x 10 L Foam x 10 L  No riser L x 10   Step up    2 risers 2x10 L   - - 1 riser x 10 L 1 riser 2 x 10 L                Ther Ex             HC stretch @ step 3x20" 3x20" 3x20" 3x20"   - 3x20" 3x20" 3x20"   Stand:   HT raises       20x ea 20x ea 20x ea x20 ea   Stand:  Squats, knee flex, march x20  x20 x20 x20 ea   20x ea 20x ea 20x ea x20 ea   Stand hip AROM flex, abd, ext vectors green x10 vectors green x10 ea Vectors green x10 ea vectors green x10 ea    20x ea 20x ea 20x ea vectors green x10   TKE             LAQ/HS flex  x20 x20 x20   x20 x20 x20 x20   Supine:  QS, SAQ, hip add iso x20 x20 ea x20 ea    x20 ea x20 ea x20 ea x20   Supine:  HS flex KTC x10 KTC  x10 KTC x10 KTC x10   KTC x 10 KTC x 10 KTC x10 KTC x10   Supine: SLR   x20ea x20 ea x20 ea x20 ea   x20 2x10 x20 ea x20 ea   Supine bridging x20 x20 x20    x10  x10 x10   S/l hip abd x10 x10 x10    2x5 2x5 2x5 x10   Prone  Knee flex  Hip ext x20  x20 x20                       Ther Activity             Xride seat 6 10' 10'  10' 10'   10' L3 10'L3 10' 10'                Gait Training                                       Modalities             Ice A-P w/ elev L knee home      home home home home   Dressing change

## 2022-08-18 ENCOUNTER — OFFICE VISIT (OUTPATIENT)
Dept: PHYSICAL THERAPY | Facility: CLINIC | Age: 62
End: 2022-08-18
Payer: COMMERCIAL

## 2022-08-18 DIAGNOSIS — M17.12 PRIMARY OSTEOARTHRITIS OF LEFT KNEE: ICD-10-CM

## 2022-08-18 DIAGNOSIS — Z96.652 S/P TOTAL KNEE ARTHROPLASTY, LEFT: Primary | ICD-10-CM

## 2022-08-18 PROCEDURE — 97530 THERAPEUTIC ACTIVITIES: CPT

## 2022-08-18 PROCEDURE — 97110 THERAPEUTIC EXERCISES: CPT

## 2022-08-18 PROCEDURE — 97112 NEUROMUSCULAR REEDUCATION: CPT

## 2022-08-18 PROCEDURE — 97140 MANUAL THERAPY 1/> REGIONS: CPT

## 2022-08-18 NOTE — PROGRESS NOTES
Daily Note     Today's date: 2022  Patient name: Hermilo Mac  : 1960  MRN: [id number]  Referring provider: Grey Arreaga  Dx:   Encounter Diagnosis     ICD-10-CM    1  S/P total knee arthroplasty, left  Z96 652    2  Primary osteoarthritis of left knee  M17 12        Start Time: 1132  Stop Time: 1220  Total time in clinic (min): 48 minutes    Subjective: pt reports cont stiffness      Objective: See treatment diary below      Assessment: pt cont with L ITB restriction and tenderness-finds soreness after manual therapy/PT session, but feels and moves better later  Initiated s/l hip abd/ITB stretch today and given HEP  Patient would benefit from continued PT      Plan: Continue per plan of care        Precautions:  HTN, DM        F/u Dr Sultana Garcia 22, RTW 9/15/22   8/4/22 8/9/22 8/11 8/16 8/18/22     8/2/22   Manuals #11 #12 #13 #14 #15     #10   PROM/MOB/STM L knee AE grade 2-3 SK SK MK AE incl ITB STM     AE grade 2-3   Foam rolling  L ITB/quad AE    AE                                  Neuro Re-Ed             Cones:  -step over  -tap/step over   2x12 cones ea   2x12 cones ea    2x12 cones ea 2x12 cones ea 2x12 cones ea     4x12 cones   Foam:  -Static  -Step f/b  -Step s/s -    x20 R/L x20     x20  R/L x20     x20  R/L x20     x20   x20 R/L     x20 ea  x20 R/L  Inc speed     -    x10 R/L  x10   Toe walk  Heel walk - - - - 2x20' ea        Step down    1 riser L x10 L:  2 risers x 10  1 riser x 10     Foam x 10 L  No riser L x 10   Step up to chair    2 risers 2x10 L 2 risers 2x10 L     1 riser 2 x 10 L   S/l ITB stretch (L) - - - - 3x20"        Ther Ex             HC stretch @ step 3x20" 3x20" 3x20" 3x20" 3x20"     3x20"   Stand:   HT raises          x20 ea   Stand:  Squats, knee flex, march x20  x20 x20 x20 ea HEP     x20 ea   Stand hip AROM flex, abd, ext vectors green x10 vectors green x10 ea Vectors green x10 ea vectors green x10 ea       vectors green x10   TKE             LAQ/HS flex  x20 x20 x20      x20   Supine:  QS, SAQ, hip add iso x20 x20 ea x20 ea  x20 ea     x20   Supine:  HS flex KTC x10 KTC  x10 KTC x10 KTC x10 x10 KTC     KTC x10   Supine: SLR   x20ea x20 ea x20 ea x20 ea x20     x20 ea   Supine bridging x20 x20 x20  x20     x10   S/l hip abd x10 x10 x10  x10     x10   Prone  Knee flex  Hip ext x20  x20 x20                       Ther Activity             Xride seat 6 10' 10'  10' 10' 10'     10'                Gait Training                                       Modalities             Ice A-P w/ elev L knee home         home   Dressing change

## 2022-08-23 ENCOUNTER — OFFICE VISIT (OUTPATIENT)
Dept: PHYSICAL THERAPY | Facility: CLINIC | Age: 62
End: 2022-08-23
Payer: COMMERCIAL

## 2022-08-23 DIAGNOSIS — M17.12 PRIMARY OSTEOARTHRITIS OF LEFT KNEE: ICD-10-CM

## 2022-08-23 DIAGNOSIS — Z96.652 S/P TOTAL KNEE ARTHROPLASTY, LEFT: Primary | ICD-10-CM

## 2022-08-23 DIAGNOSIS — Z01.818 ENCOUNTER FOR PREADMISSION TESTING: ICD-10-CM

## 2022-08-23 DIAGNOSIS — Z01.818 PREOPERATIVE TESTING: ICD-10-CM

## 2022-08-23 PROCEDURE — 97140 MANUAL THERAPY 1/> REGIONS: CPT

## 2022-08-23 PROCEDURE — 97112 NEUROMUSCULAR REEDUCATION: CPT

## 2022-08-23 PROCEDURE — 97110 THERAPEUTIC EXERCISES: CPT

## 2022-08-23 PROCEDURE — 97530 THERAPEUTIC ACTIVITIES: CPT

## 2022-08-23 NOTE — PROGRESS NOTES
Daily Note     Today's date: 2022  Patient name: Ciarra Ambriz  : 1960  MRN: [id number]  Referring provider: Inés Medeiros  Dx:   Encounter Diagnosis     ICD-10-CM    1  S/P total knee arthroplasty, left  Z96 [id number]    2  Primary osteoarthritis of left knee  M17 12 Ambulatory Referral to [facility]     Ambulatory referral to [facility]   3  Encounter for preadmission testing  Z01 818 Ambulatory referral to [facility]   4  Preoperative testing  Z01 818 Ambulatory referral to [facility] Time: 181  Stop Time: 1300  Total time in clinic (min): 48 minutes    Subjective:  Cont with stiffness      Objective: See treatment diary below      Assessment: cont with L knee tightness (ITB paulina )-responding to manual foam rolling and ITB stretch interventions  Introduced prone knee flex today  Also progressed to performing standing foam tasks without support which was challenging for the pt  Patient would benefit from continued PT      Plan: Continue per plan of care        Precautions:  HTN, DM        F/u Dr Jarrod Kowalski 22, RTW 9/15/22   8/4/22 8/9/22 8/11 8/16 8/18/22 8/23/22    8/2/22   Manuals #11 #12 #13 #14 #15 #16    #10   PROM/MOB/STM L knee AE grade 2-3 SK SK [address] AE incl ITB STM AE incl ITB STM    AE grade 2-3   Foam rolling  L ITB/quad AE    AE AE                                 Neuro Re-Ed             Cones:  -step over  -tap/step over   2x12 cones ea   2x12 cones ea    2x12 cones ea 2x12 cones ea 2x12 cones ea 2x12 cones ea    4x12 cones   Foam:  -Static  -Step f/b  -Step s/s -    x20 R/L x20     x20  R/L x20     x20  R/L x20     x20   x20 R/L     x20 ea  x20 R/L  Inc speed No hand support    x20 ea  x20 R/L  Inc speed    -    x10 R/L  x10   Side step-in shallow squat      4x20'       Toe walk  Heel walk - - - - 2x20' ea 2x20' ea       Step down    1 riser L x10 L:  2 risers x 10  1 riser x 10 L:  2 risers x 10      Foam x 10 L  No riser L x 10   Step up to chair    2 risers 2x10 L 2 risers 2x10 L 2 risers 2x10 L    1 riser 2 x 10 L   S/l ITB stretch (L) - - - - 3x20" 3x20"       Ther Ex             HC stretch @ step 3x20" 3x20" 3x20" 3x20" 3x20" 3x20"    3x20"   Stand:   HT raises          x20 ea   Stand:  Squats, knee flex, march x20  x20 x20 x20 ea HEP HEP    x20 ea   Stand hip AROM flex, abd, ext vectors green x10 vectors green x10 ea Vectors green x10 ea vectors green x10 ea   HEP    vectors green x10   LAQ/HS flex  x20 x20 x20  x20    x20   Supine:  QS, SAQ, hip add iso x20 x20 ea x20 ea  x20 ea x20 ea    x20   Supine:  HS flex KTC x10 KTC  x10 KTC x10 KTC x10 x10 KTC x10 KTC    KTC x10   Supine: SLR   x20ea x20 ea x20 ea x20 ea x20 x20    x20 ea   Supine bridging x20 x20 x20  x20 x20    x10   S/l hip abd x10 x10 x10  x10 x10    x10   Prone  Knee flex  Hip ext x20  x20 x20 - - x10                    Ther Activity             Xride seat 6 10' 10'  10' 10' 10' 10'    10'                Gait Training                                       Modalities             Ice A-P w/ elev L knee home         home   Dressing change

## 2022-08-25 ENCOUNTER — OFFICE VISIT (OUTPATIENT)
Dept: PHYSICAL THERAPY | Facility: CLINIC | Age: 62
End: 2022-08-25
Payer: COMMERCIAL

## 2022-08-25 DIAGNOSIS — M17.12 PRIMARY OSTEOARTHRITIS OF LEFT KNEE: Primary | ICD-10-CM

## 2022-08-25 PROCEDURE — 97140 MANUAL THERAPY 1/> REGIONS: CPT

## 2022-08-25 PROCEDURE — 97110 THERAPEUTIC EXERCISES: CPT

## 2022-08-25 PROCEDURE — 97530 THERAPEUTIC ACTIVITIES: CPT

## 2022-08-25 PROCEDURE — 97112 NEUROMUSCULAR REEDUCATION: CPT

## 2022-08-25 NOTE — PROGRESS NOTES
Daily Note     Today's date: 2022  Patient name: Susu King  : 1960  MRN: [id number]  Referring provider: Anisha Flores  Dx:   Encounter Diagnosis     ICD-10-CM    1  Primary osteoarthritis of left knee  M17 12        Start Time: 1217  Stop Time: 1305  Total time in clinic (min): 48 minutes    Subjective: still with stiffness, but mov't getting more fluid-I e with car transfers  Objective: See treatment diary below      Assessment: cont to improve fluidity/smooth out gait and general mobility  L ITB tightness persists with more restriction/lump noted ~ mid ITB with foam rolling  L hip abd strength improving (mari 2 x10 s/l hip abd now), but pt still noting the general weakness with s/l hip abd and with sidestepping to the L  Patient would benefit from continued PT      Plan: Continue per plan of care        Precautions:  HTN, DM        F/u Dr Robert Soha 22, RTW 9/15/22   8/4/22 8/9/22 8/11 8/16 8/18/22 8/23/22 8/25/22      Manuals #11 #12 #13 #14 #15 #16 #17      PROM/MOB/STM L knee AE grade 2-3 SK SK [address] AE incl ITB STM AE incl ITB STM AE incl ITB STM      Foam rolling  L ITB/quad AE    AE AE AE                                Neuro Re-Ed             Cones:  -step over  -tap/step over   2x12 cones ea   2x12 cones ea    2x12 cones ea 2x12 cones ea 2x12 cones ea 2x12 cones ea 4x12 cones ea      Foam:  -Static  -Step f/b  -Step s/s -    x20 R/L x20     x20  R/L x20     x20  R/L x20     x20   x20 R/L     x20 ea  x20 R/L  Inc speed No hand support    x20 ea  x20 R/L  Inc speed No hand support    x20 ea  x20 R/L  Inc speed      Side step-in shallow squat      4x20' 4x20'      Toe walk  Heel walk - - - - 2x20' ea 2x20' ea 2x20' ea      Step down    1 riser L x10 L:  2 risers x 10  1 riser x 10 L:  2 risers x 10   L:  2 risers x 10      Step up to chair    2 risers 2x10 L 2 risers 2x10 L 2 risers 2x10 L 2 risers 2x10 L      S/l ITB stretch (L) - - - - 3x20" 3x20" 3x20"      Ther Ex HC stretch @ step 3x20" 3x20" 3x20" 3x20" 3x20" 3x20"                    Stand:  Squats, knee flex, march x20  x20 x20 x20 ea HEP HEP HEP      Stand hip AROM flex, abd, ext vectors green x10 vectors green x10 ea Vectors green x10 ea vectors green x10 ea   HEP HEP      LAQ/HS flex  x20 x20 x20  x20 x20      Supine:  QS, SAQ, hip add iso x20 x20 ea x20 ea  x20 ea x20 ea x20 ea      Supine:  HS flex KTC x10 KTC  x10 KTC x10 KTC x10 x10 KTC x10 KTC x10 KTC      Supine: SLR   x20ea x20 ea x20 ea x20 ea x20 x20 x20      Supine bridging x20 x20 x20  x20 x20 x20      S/l hip abd x10 x10 x10  x10 x10 2X10      Prone  Knee flex  Hip ext x20  x20 x20 - - x10 2X10                   Ther Activity             Xride seat 6 10' 10'  10' 10' 10' 10' 10'                   Gait Training                                       Modalities             Ice A-P w/ elev L knee home            Dressing change

## 2022-08-30 ENCOUNTER — OFFICE VISIT (OUTPATIENT)
Dept: PHYSICAL THERAPY | Facility: CLINIC | Age: 62
End: 2022-08-30
Payer: COMMERCIAL

## 2022-08-30 DIAGNOSIS — Z01.818 PREOPERATIVE TESTING: ICD-10-CM

## 2022-08-30 DIAGNOSIS — Z96.652 S/P TOTAL KNEE ARTHROPLASTY, LEFT: ICD-10-CM

## 2022-08-30 DIAGNOSIS — Z01.818 ENCOUNTER FOR PREADMISSION TESTING: ICD-10-CM

## 2022-08-30 DIAGNOSIS — M17.12 PRIMARY OSTEOARTHRITIS OF LEFT KNEE: Primary | ICD-10-CM

## 2022-08-30 PROCEDURE — 97140 MANUAL THERAPY 1/> REGIONS: CPT

## 2022-08-30 PROCEDURE — 97110 THERAPEUTIC EXERCISES: CPT

## 2022-08-30 PROCEDURE — 97112 NEUROMUSCULAR REEDUCATION: CPT

## 2022-08-30 NOTE — PROGRESS NOTES
Daily Note     Today's date: 2022  Patient name: Jade Mesa  : 1960  MRN: [id number]  Referring provider: Marilin Herrmann  Dx:   Encounter Diagnosis     ICD-10-CM    1  Primary osteoarthritis of left knee  M17 12    2  S/P total knee arthroplasty, left  Z96 652    3  Encounter for preadmission testing  Z01 818    4  Preoperative testing  Z01 818        Start Time: 1215  Stop Time: 1303  Total time in clinic (min): 48 minutes    Subjective: pt reports he has been trying to massage/work out the outside of L knee/thigh (ITB)  Still with stiffness      Objective: See treatment diary below      Assessment: L ITB tightness present, but pt feeling more at L knee joint line v  Lat thigh  Point tender L lat knee joint  L knee ext still lagging  Patient would benefit from continued PT to achieve stated goals and resume PLOF      Plan: Continue per plan of care        Precautions:  HTN, DM        F/u Dr Zorita Claude 22, RTW 9/15/22   8/4/22 8/9/22 8/11 8/16 8/18/22 8/23/22 8/25/22 8/30/22     Manuals #11 #12 #13 #14 #15 #16 #17 #18     PROM/MOB/STM L knee AE grade 2-3 SK SK [address] AE incl ITB STM AE incl ITB STM AE incl ITB STM AE incl ITB STM     Foam rolling  L ITB/quad AE    AE AE AE AE                               Neuro Re-Ed             Cones:  -step over  -tap/step over   2x12 cones ea   2x12 cones ea    2x12 cones ea 2x12 cones ea 2x12 cones ea 2x12 cones ea 4x12 cones ea 4x12 cones ea     Foam:  -Static  -Step f/b  -Step s/s -    x20 R/L x20     x20  R/L x20     x20  R/L x20     x20   x20 R/L     x20 ea  x20 R/L  Inc speed No hand support    x20 ea  x20 R/L  Inc speed No hand support    x20 ea  x20 R/L  Inc speed No hand support    x20 ea  x20 R/L  Inc speed     Side step-in shallow squat      4x20' 4x20' 4x20'     Toe walk  Heel walk - - - - 2x20' ea 2x20' ea 2x20' ea 2x20' anand     Step down    1 riser L x10 L:  2 risers x 10  1 riser x 10 L:  2 risers x 10   L:  2 risers x 10 L:  2 risers x 10     Step up to chair    2 risers 2x10 L 2 risers 2x10 L 2 risers 2x10 L 2 risers 2x10 L 2 risers 2x10 L     S/l ITB stretch (L) - - - - 3x20" 3x20" 3x20" 3x20"     Ther Ex             HC stretch @ step 3x20" 3x20" 3x20" 3x20" 3x20" 3x20"                    Stand:  Squats, knee flex, march x20  x20 x20 x20 ea HEP HEP HEP HEP     Stand hip AROM flex, abd, ext vectors green x10 vectors green x10 ea Vectors green x10 ea vectors green x10 ea   HEP HEP HEP     LAQ/HS flex  x20 x20 x20  x20 x20      Supine:  QS, SAQ, hip add iso x20 x20 ea x20 ea  x20 ea x20 ea x20 ea      Supine:  HS flex KTC x10 KTC  x10 KTC x10 KTC x10 x10 KTC x10 KTC x10 KTC x10 KTC     Supine: SLR   x20ea x20 ea x20 ea x20 ea x20 x20 x20 2x10     Supine bridging x20 x20 x20  x20 x20 x20 x20     S/l hip abd x10 x10 x10  x10 x10 2X10 2x10     Prone  Knee flex  Hip ext x20  x20 x20 - - x10 2X10 x20                  Ther Activity             Xride seat 6 10' 10'  10' 10' 10' 10' 10' 10'                  Gait Training                                       Modalities             Ice A-P w/ elev L knee home            Dressing change

## 2022-09-01 ENCOUNTER — EVALUATION (OUTPATIENT)
Dept: PHYSICAL THERAPY | Facility: CLINIC | Age: 62
End: 2022-09-01
Payer: COMMERCIAL

## 2022-09-01 DIAGNOSIS — M17.12 PRIMARY OSTEOARTHRITIS OF LEFT KNEE: Primary | ICD-10-CM

## 2022-09-01 PROCEDURE — 97110 THERAPEUTIC EXERCISES: CPT

## 2022-09-01 PROCEDURE — 97164 PT RE-EVAL EST PLAN CARE: CPT

## 2022-09-01 PROCEDURE — 97112 NEUROMUSCULAR REEDUCATION: CPT

## 2022-09-01 PROCEDURE — 97140 MANUAL THERAPY 1/> REGIONS: CPT

## 2022-09-01 NOTE — PROGRESS NOTES
PT Re-Evaluation     Today's date: 2022  Patient name: Marquis Jaimes  : 1960  MRN: [id number]  Referring provider: Bo Lawrence  Dx:   Encounter Diagnosis     ICD-10-CM    1  Primary osteoarthritis of left knee  M17 12        Start Time: 1215  Stop Time: 1310  Total time in clinic (min): 55 minutes    Assessment  Assessment details: 63 yo male referred to PT s/p L TKA  Pt presents with 0-1/10 pain scale and not taking any meds for pain  Pt amb without AD with dec L knee ext  and shorter step length on R when he inc his carlos  Overall, pt with improved AROM to 125 degrees L knee flex-stil l with ext deficit (-6 deg active) as well as ext lag (-8 deg), though improved  Strength improved to 5/5 hip flex and ext within available ROM  Pt's L hip abd 4/5 which has iimproved  With ITB tightness and soreness that he feels in L lat knee region  He is driving and has resumed  household tasks-plans to RTW 9/15/22  Pt amb steps with reciprocal pattern ascending and descending now    L ant knee incision is well healed  He would cont to benefit from PT x 1 more visit prior to RTW following 1 week Hold of PT/trial self management to assess status/potential issues that may arise    Impairments: abnormal gait and abnormal or restricted ROM    Symptom irritability: lowUnderstanding of Dx/Px/POC: excellent  Goals  Pt will achieve the following goals in the next 2-4 weeks      STG 22  Indep with current HEP (met)  Stair amb reciprocally (met)  Improved L knee AROM by 2-3 deg more (met)      STG 22  Indep with current HEP (met)  Dec pain by 1-3 levels (met)  Improve L knee AROM by 5-10 deg (met)  Improve LLE strength by 1/2 grade (met)      Pt will achieve the following goals by d/c (8-12 weeks, or as stated in plan)  LTG  indep and compliant with HEP in order to maximize gains achieved in therapy (met)  0-2/10 pain scale in order to feel better and decrease/eliminate use of  pain &/or anti-inflammatory  meds (met)  L knee/LE ROM WFL and  strength 4-5/5 in order to improve functional mobility (met)  Amb safely level/unlevel x community distances with appropriate AD (met)  Pt will demonstrate normalized gait quality on level surfaces as evidenced by longer step length and proper heel<-->toe strike and push off mechanics (improved)   Improve  FOTO score to 77 or more and LEFS score 65/80 to indicate inc functional ability of patient (met)  Resume activities, including RTW, driving & biking/fitness walking, with less pain and limitation at premorbid intensity and duration (iimproved)    Plan  Patient would benefit from: skilled physical therapy  Planned modality interventions: cryotherapy  Planned therapy interventions: joint mobilization, manual therapy, massage, balance, neuromuscular re-education, body mechanics training, patient education, strengthening, stretching, therapeutic activities, therapeutic exercise, home exercise program, flexibility and gait training  Frequency: 2x week  Duration in visits: 4  Duration in weeks: 2  Plan of Care beginning date: 9/1/2022  Plan of Care expiration date: 9/15/2022  Treatment plan discussed with: patient        Subjective Evaluation    History of Present Illness  Date of surgery: 6/28/2022  Mechanism of injury: 9/1/22 [id number] with stiffness  Reciprocally stairs up/down now  No meds; can Le dress in standing now  Still with tightness/compression feeling  Feels some general numbness/dull sensation  Sleeping is better too  LEFS 79/80 (improved)    7/28/22 Eval-pt driving now  Still with pain, but more tightness  Having tingling in lower leg and thigh at times  Taking Aleve 1x/day and tylenol  Indep ADLs, and household chores  Sleep still difficult  Able to grocery shop without cart support  LEFS score 56/80 improved    6/30/22 Eval-pt with h/o L knee OA  Underwent L TKA on 6/28/22 and d/c'd home the next day    Pt now presents to outpatient PT  Prior to leaving hospital pt with inc bleeding from incision-PO dressing removed and steristrips applied to distal end of incision where bleeding occurred-also dressed with absorbant dressing pad and ace wrap       amb steps indep  Didn't sleep well last night  Constant pain  Taking oxycontin and gabapentin as well as tylenol  Aspirin for anti-coagulant  indep dressing, but shoe horn  Not driving or doing household chores  Hasn't showered yet  Wearing Edwin stockings  LEFS   Quality of life: good    Pain  Current pain ratin  At best pain ratin  At worst pain ratin  Location: L knee  Relieving factors: ice and medications    Social Support  Steps to enter house: yes (3)  Stairs in house: yes (descending and ascending reciprocally)   Lives in: multiple-level home  Lives with: adult children (son)    Employment status: not working ([employer] a lot)  Treatments  Current treatment: physical therapy  Patient Goals  Patient goals for therapy: decreased pain, increased strength and decreased edema  Patient goal: resume walking, riding bike without pain  resume activities without pain (pt feels 85% there)        Objective     Observations     Additional Observation Details  L knee incision closed and well healed    Palpation     Additional Palpation Details  Tender L lat knee joint/ITB insertion    Good L knee pat mobility    Active Range of Motion   Left Knee   Flexion: 123 (sitting; supine ; improved) degrees   Extension: -6 (improved) degrees   Extensor lag: -8 (improved) degrees     Passive Range of Motion   Left Knee   Flexion: 127 (KTC/supine-improved) degrees   Extension: -3 (supine; improved) degrees     Strength/Myotome Testing     Left Hip   Planes of Motion   Flexion: 5  Abduction: 4 (improved)    Right Hip   Normal muscle strength    Left Knee   Flexion: 5  Extension: 5 (within available ext ROM)  Quadriceps contraction: good    Right Knee   Flexion: WFL  Quadriceps contraction: good    Left Ankle/Foot   Dorsiflexion: 4  Plantar flexion: 4    Right Ankle/Foot   Normal strength    Additional Strength Details  Deep squat to 113 deg knee flex and return without support  Tests     Additional Tests Details  PSFS 10/10    Sit to stand without Assist  10/10  amb steps reciprocally 10/10  Drive 90/84  amb without AD 10/10    Ambulation     Comments   Amb indep without AD  x clinic distances and parking lot to clinic  Step through gait with min  toe off, short step length on L-better with VPs and slower carlos        Flowsheet Rows    Flowsheet Row Most Recent Value   PT/OT G-Codes    Current Score 84   Projected Score 77   Assessment Type Re-evaluation             Precautions:  HTN, DM        F/u Dr Susan Steele 9/13/22   F/u Dr Susan Steele 9/13/22, RTW 9/15/22   8/4/22 8/9/22 8/11 8/16 8/18/22 8/23/22 8/25/22 8/30/22 9/1/22    Manuals #11 #12 #13 #14 #15 #16 #17 #18 #19 re-eval/FOTO    PROM/MOB/STM L knee AE grade 2-3 SK SK [address] AE incl ITB STM AE incl ITB STM AE incl ITB STM AE incl ITB STM AE incl ITB STM    Foam rolling  L ITB/quad AE    AE AE AE AE AE                              Neuro Re-Ed             Cones:  -step over  -tap/step over   2x12 cones ea   2x12 cones ea    2x12 cones ea 2x12 cones ea 2x12 cones ea 2x12 cones ea 4x12 cones ea 4x12 cones ea 4x12 cones ea    Alt 1/2 kneels to p/u cones - - - - - - - - x12 cones    Foam:  -Static  -Step f/b  -Step s/s -    x20 R/L x20     x20  R/L x20     x20  R/L x20     x20   x20 R/L     x20 ea  x20 R/L  Inc speed No hand support    x20 ea  x20 R/L  Inc speed No hand support    x20 ea  x20 R/L  Inc speed No hand support    x20 ea  x20 R/L  Inc speed No hand support    x20 ea  x20 R/L  Inc speed    Side step-in shallow squat      4x20' 4x20' 4x20' 4x20'    Toe walk  Heel walk - - - - 2x20' ea 2x20' ea 2x20' ea 2x20' ea 2x20' ea    Step down    1 riser L x10 L:  2 risers x 10  1 riser x 10 L:  2 risers x 10   L:  2 risers x 10 L:  2 risers x 10 L:  2 risers x 10    Step up to chair    2 risers 2x10 L 2 risers 2x10 L 2 risers 2x10 L 2 risers 2x10 L 2 risers 2x10 L 2 risers 2x10 L    S/l ITB stretch (L) - - - - 3x20" 3x20" 3x20" 3x20" 3x20"    Ther Ex             HC stretch @ step 3x20" 3x20" 3x20" 3x20" 3x20" 3x20"                    Stand:  Squats, knee flex, march x20  x20 x20 x20 ea HEP HEP HEP HEP HEP    Stand hip AROM flex, abd, ext vectors green x10 vectors green x10 ea Vectors green x10 ea vectors green x10 ea   HEP HEP HEP HEP    LAQ/HS flex  x20 x20 x20  x20 x20      Supine:  QS, SAQ, hip add iso x20 x20 ea x20 ea  x20 ea x20 ea x20 ea      Supine:  HS flex KTC x10 KTC  x10 KTC x10 KTC x10 x10 KTC x10 KTC x10 KTC x10 KTC x10 KTC    Supine: SLR   x20ea x20 ea x20 ea x20 ea x20 x20 x20 2x10 x20    Supine bridging x20 x20 x20  x20 x20 x20 x20 x20    S/l hip abd x10 x10 x10  x10 x10 2X10 2x10 2x10    Prone  Knee flex  Hip ext x20  x20 x20 - - x10 2X10 x20                  Ther Activity             Xride seat 6 10' 10'  10' 10' 10' 10' 10' 10' 10'                 Gait Training                                       Modalities             Ice A-P w/ elev L knee home            Dressing change

## 2022-09-06 ENCOUNTER — APPOINTMENT (OUTPATIENT)
Dept: PHYSICAL THERAPY | Facility: CLINIC | Age: 62
End: 2022-09-06
Payer: COMMERCIAL

## 2022-09-08 ENCOUNTER — APPOINTMENT (OUTPATIENT)
Dept: PHYSICAL THERAPY | Facility: CLINIC | Age: 62
End: 2022-09-08
Payer: COMMERCIAL

## 2022-09-13 ENCOUNTER — OFFICE VISIT (OUTPATIENT)
Dept: PHYSICAL THERAPY | Facility: CLINIC | Age: 62
End: 2022-09-13
Payer: COMMERCIAL

## 2022-09-13 ENCOUNTER — OFFICE VISIT (OUTPATIENT)
Dept: OBGYN CLINIC | Facility: MEDICAL CENTER | Age: 62
End: 2022-09-13

## 2022-09-13 VITALS
DIASTOLIC BLOOD PRESSURE: 82 MMHG | WEIGHT: 221 LBS | HEART RATE: 77 BPM | HEIGHT: 70 IN | SYSTOLIC BLOOD PRESSURE: 122 MMHG | BODY MASS INDEX: 31.64 KG/M2

## 2022-09-13 DIAGNOSIS — Z96.652 S/P TOTAL KNEE ARTHROPLASTY, LEFT: ICD-10-CM

## 2022-09-13 DIAGNOSIS — Z96.652 STATUS POST TOTAL KNEE REPLACEMENT, LEFT: Primary | ICD-10-CM

## 2022-09-13 DIAGNOSIS — M17.12 PRIMARY OSTEOARTHRITIS OF LEFT KNEE: Primary | ICD-10-CM

## 2022-09-13 PROCEDURE — 97110 THERAPEUTIC EXERCISES: CPT

## 2022-09-13 PROCEDURE — 97112 NEUROMUSCULAR REEDUCATION: CPT

## 2022-09-13 PROCEDURE — 97140 MANUAL THERAPY 1/> REGIONS: CPT

## 2022-09-13 PROCEDURE — 99024 POSTOP FOLLOW-UP VISIT: CPT | Performed by: ORTHOPAEDIC SURGERY

## 2022-09-13 NOTE — PROGRESS NOTES
Assessment/Plan:  1  Status post total knee replacement, left      No orders of the defined types were placed in this encounter  · Patient is doing well status post L TKA on 6/28/22  · Transition to home exercises  · Pain control prn-OTC pain medication  · Patient should call ahead for abx prior to dental appts  Return in about 9 months (around 6/29/2023) for L TKA year post op  I answered all of the patient's questions during the visit and provided education of the patient's condition during the visit  The patient verbalized understanding of the information given and agrees with the plan  This note was dictated using [employer] software  It may contain errors including improperly dictated words  Please contact physician directly for any questions  Subjective   Chief Complaint:   Chief Complaint   Patient presents with   Nohelia Hancock is a 64 y o  male who presents for 11 week follow up s/p left TKA  Patient states he is doing well  He [place]  He feels his stiffness is improving over time  He completed PT  Patient is not taking anything for pain at this time  Denies fevers, chills, distal numbness, or tingling  He is overall happy with his total knee replacement  Review of Systems  ROS:    See HPI for musculoskeletal review     All other systems reviewed are negative     History:  Past Medical History:   Diagnosis Date    Arthritis     L knee   for L TKR today 6/28/2022    Atopic dermatitis     Condition not found     Neoplasm of [place]    Diabetes mellitus ([facility] [address] )     Hyperlipemia     Hypertension     Lateral epicondylitis, left elbow     Wears partial dentures     upper     Past Surgical History:   Procedure Laterality Date    APPENDECTOMY      COLONOSCOPY      CYST REMOVAL      Neck    JOINT REPLACEMENT Left 06/28/2022    KNEE ARTHROSCOPY      menisectomy    [facility] TOTAL KNEE ARTHROPLASTY Left 2022    Procedure: ARTHROPLASTY KNEE TOTAL;  Surgeon: Dahlia Ascencio DO;  Location: [facility];  Service: Orthopedics     Social History   Social History     Substance and Sexual Activity   Alcohol Use Not Currently    Alcohol/week: 2 0 standard drinks    Types: 2 Cans of beer per week    Comment: Social     Social History     Substance and Sexual Activity   Drug Use Never     Social History     Tobacco Use   Smoking Status Former Smoker    Packs/day: 1 00    Years: 40 00    Pack years: 40 00    Quit date: 10/2021    Years since quittin 9   Smokeless Tobacco Never Used     Family History:   Family History   Problem Relation Age of Onset    Colon polyps Mother         Inflammatory    Diabetes Father        Current Outpatient Medications on File Prior to Visit   Medication Sig Dispense Refill    acetaminophen (TYLENOL) 325 mg tablet Take 3 tablets (975 mg total) by mouth every 8 (eight) hours  0    albuterol (PROAIR HFA) 90 mcg/act inhaler Inhale 2 puffs every 6 (six) hours as needed for wheezing 5 Inhaler 5    Ascorbic Acid (VITAMIN C PO) Take 1 capsule by mouth daily      aspirin (ECOTRIN) 325 mg EC tablet Take 1 tablet (325 mg total) by mouth 2 (two) times a day Take with food  84 tablet 0    betamethasone dipropionate (DIPROSONE) 0 05 % cream Apply topically 2 (two) times a day (Patient taking differently: Apply 1 application topically 2 (two) times a day) 30 g 0    Blood Glucose Monitoring Suppl (OneTouch Verio Reflect) w/Device KIT Check blood sugars once daily  Please substitute with appropriate alternative as covered by patient's insurance  Dx: E11 65 1 kit 0    budesonide-formoterol (Symbicort) 160-4 5 mcg/act inhaler Inhale 2 puffs 2 (two) times a day Rinse mouth after use   10 2 g 5    co-enzyme Q-10 30 mg Take 30 mg by mouth daily      docusate sodium (COLACE) 100 mg capsule Take 1 capsule (100 mg total) by mouth 2 (two) times a day 20 capsule 0    fenofibrate (TRICOR) 145 mg tablet TAKE 1 TABLET DAILY 90 tablet 1    ferrous sulfate 324 (65 Fe) mg Take 1 tablet (324 mg total) by mouth in the morning 30 tablet 1    FOLIC ACID PO Take 1 capsule by mouth daily      gabapentin (NEURONTIN) 100 mg capsule Take 1 capsule (100 mg total) by mouth every 8 (eight) hours (Patient not taking: Reported on 7/28/2022) 90 capsule 0    glucose blood (OneTouch Verio) test strip Check blood sugars once daily  Please substitute with appropriate alternative as covered by patient's insurance  Dx: E11 65 100 each 3    Jardiance 10 MG TABS TAKE 1 TABLET EVERY MORNING 90 tablet 1    lisinopril-hydrochlorothiazide (PRINZIDE,ZESTORETIC) 20-25 MG per tablet TAKE 1 TABLET DAILY 90 tablet 1    metFORMIN (GLUCOPHAGE) 1000 MG tablet TAKE 1 TABLET TWICE DAILY  WITH MEALS (Patient taking differently: Take 1,000 mg by mouth 2 (two) times a day with meals) 180 tablet 1    metoprolol succinate (TOPROL-XL) 100 mg 24 hr tablet TAKE 1 TABLET DAILY 90 tablet 1    Multiple Vitamin (multivitamin) tablet Take 1 tablet by mouth daily 30 tablet 1    omega-3-acid ethyl esters (LOVAZA) 1 g capsule TAKE 2 CAPSULES TWICE DAILY (Patient taking differently: Take 2 g by mouth 2 (two) times a day) 360 capsule 1    OneTouch Delica Lancets 17G MISC Check blood sugars once daily  Please substitute with appropriate alternative as covered by patient's insurance  Dx: E11 65 100 each 3    oxyCODONE (ROXICODONE) 5 immediate release tablet Take 2 tablets (10 mg total) by mouth every 4 (four) hours as needed for severe pain Or take 1 tablet (5 mg total) by mouth every 4 (four) hours as needed for moderate pain   Max Daily Amount: 60 mg (Patient not taking: Reported on 7/28/2022) 60 tablet 0    rosuvastatin (CRESTOR) 10 MG tablet Take 1 tablet (10 mg total) by mouth daily (Patient taking differently: Take 10 mg by mouth daily in the early morning) 90 tablet 3    sildenafil (VIAGRA) 100 mg tablet Take one tablet daily as needed (Patient taking differently: Take 100 mg by mouth as needed Take one tablet daily as needed) 20 tablet 2     No current facility-administered medications on file prior to visit       Allergies   Allergen Reactions    Sulfamethoxazole-Trimethoprim Nausea Only and GI Intolerance        Objective     /82   Pulse 77   Ht 5' 10" (1 778 m)   Wt 100 kg (221 lb)   BMI 31 71 kg/m²      PE:  AAOx 3  WDWN  Hearing intact, no drainage from eyes  no audible wheezing  no abdominal distension  LE compartments soft, AT/GS intact    Ortho Exam:  left Knee:   INC: healed, No erythema, mild swelling  AROM: 3 - 115 degrees

## 2022-09-13 NOTE — PROGRESS NOTES
Daily Note     Today's date: 2022  Patient name: Tere Kocher  : 1960  MRN: [id number]  Referring provider: Kong Valverde  Dx:   Encounter Diagnosis     ICD-10-CM    1  Primary osteoarthritis of left knee  M17 12    2  S/P total knee arthroplasty, left  Q26 989        Start Time: 1215  Stop Time: 1281  Total time in clinic (min): 50 minutes    Subjective: pt able to walk on beach and in water and rode his bike while in [place] last week  No pain meds  Stiffness slowly improving      Objective: See treatment diary below      Assessment:pt cont to progress with HEP    At this time, pt is ready for d/c from formal PT -encourage cont HEP      Plan: d/c from formal PT     Precautions:  HTN, DM        F/u Dr Sirisha Fox 22   F/u Dr Sirisha Fxo 22, RTW 9/15/22   8/4/22 8/9/22 8/11 8/16 8/18/22 8/23/22 8/25/22 8/30/22 9/1/22 9/13/22   Manuals #11 #12 #13 #14 #15 #16 #17 #18 #19 re-eval/FOTO #20   PROM/MOB/STM L knee AE grade 2-3 SK SK [address] AE incl ITB STM AE incl ITB STM AE incl ITB STM AE incl ITB STM AE incl ITB STM AE incl ITB STM   Foam rolling  L ITB/quad AE    AE AE AE AE AE AE                             Neuro Re-Ed             Cones:  -step over  -tap/step over   2x12 cones ea   2x12 cones ea    2x12 cones ea 2x12 cones ea 2x12 cones ea 2x12 cones ea 4x12 cones ea 4x12 cones ea 4x12 cones ea 4x12 cones ea   Alt 1/2 kneels to p/u cones - - - - - - - - x12 cones x12 cones   Foam:  -Static  -Step f/b  -Step s/s -    x20 R/L x20     x20  R/L x20     x20  R/L x20     x20   x20 R/L     x20 ea  x20 R/L  Inc speed No hand support    x20 ea  x20 R/L  Inc speed No hand support    x20 ea  x20 R/L  Inc speed No hand support    x20 ea  x20 R/L  Inc speed No hand support    x20 ea  x20 R/L  Inc speed No hand support    x20 ea  x20 R/L  Inc speed   Side step-in shallow squat      4x20' 4x20' 4x20' 4x20' 4x20'   Toe walk  Heel walk - - - - 2x20' ea 2x20' ea 2x20' ea 2x20' ea 2x20' ea 2x20' ea   Step down    1 riser L x10 L:  2 risers x 10  1 riser x 10 L:  2 risers x 10   L:  2 risers x 10 L:  2 risers x 10 L:  2 risers x 10 L:  2 risers x 10   Step up to chair    2 risers 2x10 L 2 risers 2x10 L 2 risers 2x10 L 2 risers 2x10 L 2 risers 2x10 L 2 risers 2x10 L 2 risers 2x10 L   S/l ITB stretch (L) - - - - 3x20" 3x20" 3x20" 3x20" 3x20" 3x20"   Ther Ex             HC stretch @ step 3x20" 3x20" 3x20" 3x20" 3x20" 3x20"                    Stand:  Squats, knee flex, march x20  x20 x20 x20 ea HEP HEP HEP HEP HEP HEP   Stand hip AROM flex, abd, ext vectors green x10 vectors green x10 ea Vectors green x10 ea vectors green x10 ea   HEP HEP HEP HEP HEP   LAQ/HS flex  x20 x20 x20  x20 x20      Supine:  QS, SAQ, hip add iso x20 x20 ea x20 ea  x20 ea x20 ea x20 ea      Supine:  HS flex KTC x10 KTC  x10 KTC x10 KTC x10 x10 KTC x10 KTC x10 KTC x10 KTC x10 KTC x10   Supine: SLR   x20ea x20 ea x20 ea x20 ea x20 x20 x20 2x10 x20 x20   Supine bridging x20 x20 x20  x20 x20 x20 x20 x20    S/l hip abd x10 x10 x10  x10 x10 2X10 2x10 2x10 2x10   Prone  Knee flex  Hip ext x20  x20 x20 - - x10 2X10 x20  x20                Ther Activity             Xride seat 6 10' 10'  10' 10' 10' 10' 10' 10' 10' 10'                Gait Training                                       Modalities             Ice A-P w/ elev L knee home            Dressing change

## 2022-10-20 NOTE — PROGRESS NOTES
PT Evaluation     Today's date: 10/25/2022  Patient name: Maggy Arechiga  : 1960  MRN: [id number]  Referring provider: Sridhar Liang PA-C  Dx:   Encounter Diagnosis     ICD-10-CM    1  Status post total knee replacement, left  Z96 652        Start Time: 1515  Stop Time: 8235  Total time in clinic (min): 55 minutes    Assessment  Assessment details: 65 yo male referred to PT ~ 4 mos s/p L TKR  Pt presents with 0-3/10 pain scale, though primary c/o is cont weakness and stiffness of L knee/LE  inhibiting fluidity and ease of daily mov't/tasks  Pt w/ dec L knee/hip strength as compared to R noted w/ MMT and w/ functional strength assessments (unisquat, SLS, and Lat Step Down Test)  L knee ext lag is still present from previously  Pt would benefit from PT intervention in order to address his strength and flexibility deficits so that he may resume his daily activities more fluidly w/ less pain and limitation      Impairments: abnormal gait, abnormal or restricted ROM, abnormal movement and impaired physical strength    Goals  Pt will achieve the following goals in the next 2-4 weeks  STG 10/25/22  Indep with current HEP  L SLS x10" unsupported  L unisquat x 3 unsupported w/o compensatory mov't    Pt will achieve the following goals by d/c (8-12 weeks, or as stated in plan)  LTG  indep and compliant with HEP in order to maximize gains achieved in therapy  0-1/10 pain scale @ worst  in order to feel better and decrease/eliminate use of  pain &/or anti-inflammatory  meds  L hip strength 5/5 in order to improve functional mobility   Decrease soft tissue restriction of L ITB in order to improve L knee/hip flexiblity and fluidity of LLE mov't  Pt will demonstrate normalized gait quality on level surfaces as evidenced by consistent = step length and = weightbearing b/w BLE without trendelenberg shifts  Improve  FOTO score to 75 or more to indicate inc functional ability of patient  Resume activities, including don/doff shoes & socks, amb steps reciprocally, & general mobility/walking,  with improved fluidity and less pain and limitation     Plan  Patient would benefit from: skilled physical therapy  Planned modality interventions: cryotherapy and thermotherapy: hydrocollator packs  Planned therapy interventions: manual therapy, joint mobilization, massage, balance, neuromuscular re-education, patient education, strengthening, stretching, therapeutic activities, therapeutic exercise, gait training and home exercise program  Frequency: 2x week  Duration in visits: 12  Duration in weeks: 6  Plan of Care beginning date: 10/25/2022  Plan of Care expiration date: 2022  Treatment plan discussed with: patient        Subjective Evaluation    History of Present Illness  Date of surgery: 2022  Mechanism of injury: 10/25/22 Eval-pt known to this clinic due to PT following L TKR 22  Pt was last seen 22  Pt RTW following  Pt finds he still doesn't feel as strong or have the fluidity of mov't he needs I e  Difficulty manuevering to don/doff shoes and socks  Pt takes Aleve 1x/day ( 2 pills)-w/ relief  Stiffness paulina after sitting/prolonged positioning  L s/l uncomfortable-needs pillow b/w legs for sleep  Pt w/ noticeable limp-able to correct w/ VPs/conscious effort-still feels different to walk "normal  Weak on steps, but able to do reciprocal (occ non-reciprocal)   LEFS 66/80            Recurrent probem    Pain  Current pain ratin  At best pain ratin  At worst pain rating: 3  Location: L lat knee/ITB  Quality: squeezing  Progression: no change (tighter)    Social Support  Steps to enter house: yes (3-reciprocal most of time-occ non-reciprocal)  Stairs in house: yes   Lives in: multiple-level home  Lives with: adult children (son)    Employment status: working ([employer] a lot)  Patient Goals  Patient goals for therapy: increased strength  Patient goal: improve ease of mov't w/ tasks  Objective     Observations     Additional Observation Details  L knee tight thickness/inc girth as compared to R knee due to TKR  Noted soft lump L knee inf lat  pat-not painful to palp (at site of previous childhood scar)    Palpation     Additional Palpation Details  Pat mob (L) intact; Tender w/ soft tissue restriction along L ITB distal>prox    Active Range of Motion   Left Knee   Flexion: 126 (KTC) degrees   Extension: -9 (supine) degrees   Extensor lag: -8 degrees     Passive Range of Motion   Left Hip   Flexion: 60 (SLR) degrees     Right Hip   Flexion: 75 (SLR) degrees     Additional Passive Range of Motion Details  L ITB tightness w/ soreness    Strength/Myotome Testing     Left Hip   Planes of Motion   Flexion: 5  Extension: 4-  Abduction: 4-    Right Hip   Planes of Motion   Flexion: 5  Extension: 4+  Abduction: 4+    Left Knee   Flexion: 5  Prone flexion: 4  Quadriceps contraction: good    Right Knee   Flexion: 5  Prone flexion: 5  Quadriceps contraction: good    Left Ankle/Foot   Dorsiflexion: 5  Plantar flexion: 5    Right Ankle/Foot   Dorsiflexion: 5  Plantar flexion: 5    Additional Strength Details  Deep squat to 112 deg knee flex unsupported  SLS R and L 10" unsupported  unisquat  R x 5 unsupported w/ good excursion                  L x 4 unsupported w/ min excursion and hip compensation    Tests     Additional Tests Details  10/25/22 Lat Step Down Test  R  1 (good)                                                    L 4 (poor)    Ambulation     Comments   Amb indep without AD x clinic distances and parking lot to clinic  LLE limp noted w/ dec step length on R and mild trendelenberg deviation    Pt able to correct w/ conscious effort      Flowsheet Rows    Flowsheet Row Most Recent Value   PT/OT G-Codes    Current Score 69   Projected Score 75   Assessment Type Evaluation             Precautions: HTN, DM       10/25/22            Manuals #1    FOTO     Re-Eval   R s/l:  Foam rolling/manual STM L ITB AE                                                   Neuro Re-Ed             Stand: forward Alt foot taps on cone -            Stand: lat alt foot taps on cones -            Step ups:  -onto chair - One riser           Step downs - One riser           TKE w/ TB -            Knee flex stretch on stool -                         Ther Ex             Stand:  -squats  -unisquat -            Stand:  -ITB stretch 3x15"                         Sit:  -ham stretch 3x15"            S/l hip abd stretch (behind) 3x15"                                                   Ther Activity             TM:  -DH  -backwards  -sidestep -            Gait Training                                       Modalities

## 2022-10-21 DIAGNOSIS — Z96.652 STATUS POST TOTAL KNEE REPLACEMENT, LEFT: Primary | ICD-10-CM

## 2022-10-25 ENCOUNTER — EVALUATION (OUTPATIENT)
Dept: PHYSICAL THERAPY | Facility: CLINIC | Age: 62
End: 2022-10-25
Payer: COMMERCIAL

## 2022-10-25 DIAGNOSIS — Z96.652 STATUS POST TOTAL KNEE REPLACEMENT, LEFT: Primary | ICD-10-CM

## 2022-10-25 PROCEDURE — 97162 PT EVAL MOD COMPLEX 30 MIN: CPT

## 2022-10-25 PROCEDURE — 97140 MANUAL THERAPY 1/> REGIONS: CPT

## 2022-10-25 PROCEDURE — 97110 THERAPEUTIC EXERCISES: CPT

## 2022-10-27 ENCOUNTER — OFFICE VISIT (OUTPATIENT)
Dept: PHYSICAL THERAPY | Facility: CLINIC | Age: 62
End: 2022-10-27
Payer: COMMERCIAL

## 2022-10-27 DIAGNOSIS — Z96.652 STATUS POST TOTAL KNEE REPLACEMENT, LEFT: Primary | ICD-10-CM

## 2022-10-27 PROCEDURE — 97110 THERAPEUTIC EXERCISES: CPT

## 2022-10-27 PROCEDURE — 97140 MANUAL THERAPY 1/> REGIONS: CPT

## 2022-10-27 NOTE — PROGRESS NOTES
Daily Note     Today's date: 10/27/2022  Patient name: Garrick Cabot  : 1960  MRN: [id number]  Referring provider: Armando Fabian PA-C  Dx:   Encounter Diagnosis     ICD-10-CM    1  Status post total knee replacement, left  Z96 652        Start Time: 1720  Stop Time: 1800  Total time in clinic (min): 40 minutes    Subjective:  Pt w/o c/o currently      Objective: See treatment diary below      Assessment: pt challenged w/ unisquat (L)-weakness noted  Cont w/ restriction palpable and felt by pt of L ITB-finds some relief of this w/ manual interventions (STM and foam rolling) Patient would benefit from continued PT      Plan: Continue per plan of care  Precautions: HTN, DM       10/25/22 10/27/22           Manuals #1 #2   FOTO     Re-Eval   R s/l:  Foam rolling/manual STM L ITB AE AE Graston?                                                  Neuro Re-Ed             Stand: forward Alt foot taps on cone - X60"  floor   foam          Stand: lat alt foot taps on cones - X60" floor   floor          Step ups:  -onto chair - two risers x10 ea side           Step downs - - two risers          TKE w/ TB - BluTB x 10           Knee flex stretch on stool - x5                        Ther Ex             Stand:  -squats  -unisquat - x20 ea             Stand:  -ITB stretch 3x15" 3x15"           HC stretch at step - 3x15"           Sit:  -ham stretch 3x15" 3x15"           S/l hip abd stretch (behind) 3x15" 3x15"           S/l hip abd - 2x10                                     Ther Activity             TM:  -DH  -backwards  -sidestep - -           Gait Training                                       Modalities

## 2022-11-01 ENCOUNTER — OFFICE VISIT (OUTPATIENT)
Dept: PHYSICAL THERAPY | Facility: CLINIC | Age: 62
End: 2022-11-01

## 2022-11-01 DIAGNOSIS — Z96.652 STATUS POST TOTAL KNEE REPLACEMENT, LEFT: Primary | ICD-10-CM

## 2022-11-01 NOTE — PROGRESS NOTES
Daily Note     Today's date: 2022  Patient name: Pranav Beckford  : 1960  MRN: [id number]  Referring provider: Dago Awad PA-C  Dx:   Encounter Diagnosis     ICD-10-CM    1  Status post total knee replacement, left  Z96 652        Start Time: 1500  Stop Time: 1600  Total time in clinic (min): 60 minutes    Subjective:  3/10 pain scale L lat knee/knee-also some medial knee soreness (L)      Objective: See treatment diary below      Assessment: pt found [place] more aggressive for L ITB, but it felt good  Pt fatigue w/ session today  Still difficulty (minimal excursion) w/ unisquat on L  Patient would benefit from continued PT      Plan: Continue per plan of care        Precautions: HTN, DM       10/25/22 10/27/22 11/1/22          Manuals #1 #2 #3  FOTO     Re-Eval   R s/l:  Foam rolling/manual STM L ITB AE AE Ae-Graston                                                 Neuro Re-Ed             Stand: forward Alt foot taps on cone - X60"  floor x20  foam          Stand: lat alt foot taps on cones - X60" floor   Foam x20          Step ups:  -onto chair - two risers x10 ea side two risers x20 ea side          -L lat step ups - - Two risers x 20 to L          Step downs - - two risers x10  One riser  x10          TKE w/ TB - BluTB x 10           Knee flex stretch on stool - x5 x5          Side step - - 1x20' ea dir          Diagonal steps for/back w/ TB - - GTB x 20' ea dir          Ther Ex             Stand:  -squats  -unisquat - x20 ea   x20  2x10          Stand:  -ITB stretch 3x15" 3x15" 3x15"          HC stretch at step - 3x15" 3x15"          Sit:  -ham stretch 3x15" 3x15"           S/l hip abd stretch (behind) 3x15" 3x15" 3x15"          S/l hip abd - 2x10 2x10          Prone knee flex w/ overpressure stretch - - x10                       Ther Activity             Xride seat 6 - x10' x10'          TM:  -DH  -backwards  -sidestep - -   -  1 0 mph (back) x5'  -          Gait Training Modalities

## 2022-11-03 ENCOUNTER — OFFICE VISIT (OUTPATIENT)
Dept: PHYSICAL THERAPY | Facility: CLINIC | Age: 62
End: 2022-11-03

## 2022-11-03 DIAGNOSIS — Z96.652 STATUS POST TOTAL KNEE REPLACEMENT, LEFT: Primary | ICD-10-CM

## 2022-11-03 NOTE — PROGRESS NOTES
Daily Note     Today's date: 11/3/2022  Patient name: Guille Vera  : 1960  MRN: [id number]  Referring provider: Christi Rogers PA-C  Dx:   Encounter Diagnosis     ICD-10-CM    1  Status post total knee replacement, left  Z96 652        Start Time: 3423  Stop Time: 0138  Total time in clinic (min): 60 minutes    Subjective: pt reports feeling better after last session w/ Graston treatment to L ITB region      Objective: See treatment diary below      Assessment: pt still challenged with unisquat (L), though improved execution by pt  Cont with Graston for L ITB IASTM-grittiness/restriction present-pt mari  Pt given written/illustrated HEP that he is encourage to perform  Patient would benefit from continued PT      Plan: Continue per plan of care        Precautions: HTN, DM       10/25/22 10/27/22 11/1/22 11/3/22         Manuals #1 #2 #3 #4 FOTO     Re-Eval   R s/l:  Foam rolling/manual STM L ITB AE AE Ae-Graston Ae-Graston                                                Neuro Re-Ed             Stand: forward Alt foot taps on cone - X60"  floor x20  foam Foam x20         Stand: lat alt foot taps on cones - X60" floor   Foam x20 Foam x20         Step ups:  -onto chair - two risers x10 ea side two risers x20 ea side two risers x20 ea side         -L lat step ups - - Two risers x 20 to L Two risers x 20 to L         Step downs - - two risers x10  One riser  x10 two risers x10  One riser  x10         TKE w/ TB - BluTB x 10           Knee flex stretch on stool - x5 x5 x5         Side step - - 1x20' ea dir GTB  1x20' ea dir         Diagonal steps for/back w/ TB - - GTB x 20' ea dir GTB x 20' ea dir         Ther Ex             Stand:  -squats  -unisquat - x20 ea   x20  2x10 x20  2x10         Stand:  -ITB stretch 3x15" 3x15" 3x15" 3x15"         HC stretch at step - 3x15" 3x15" 3x15"         Sit:  -ham stretch 3x15" 3x15"  3x15"         Supine: SLR - - - 2x10         S/l hip abd stretch (behind) 3x15" 3x15" 3x15" 3x15"         S/l hip abd - 2x10 2x10 x30         Prone knee flex w/ overpressure stretch - - x10 x10         Prone hip ext - - - 2x10         Ther Activity             Xride seat 6 - x10' x10' x10'         TM:  -DH  -backwards  -sidestep - -   -  1 0 mph (back) x5'  -   -  1 0 mph (back) x5'      -         Gait Training                                       Modalities

## 2022-11-08 ENCOUNTER — OFFICE VISIT (OUTPATIENT)
Dept: PHYSICAL THERAPY | Facility: CLINIC | Age: 62
End: 2022-11-08

## 2022-11-08 DIAGNOSIS — Z96.652 STATUS POST TOTAL KNEE REPLACEMENT, LEFT: Primary | ICD-10-CM

## 2022-11-08 NOTE — PROGRESS NOTES
Daily Note     Today's date: 2022  Patient name: Jose Gonzales  : 1960  MRN: [id number]  Referring provider: Rodney Stover PA-C  Dx:   Encounter Diagnosis     ICD-10-CM    1  Status post total knee replacement, left  Z96 652        Start Time:   Stop Time:   Total time in clinic (min): 60 minutes    Subjective:  Pt feels he has a little ways to go for strengthening  2/10 pain scale currently  Objective: See treatment diary below      Assessment: improved soft tissue/ITB restriction noted w/ IASTM and STM today  Still challenged w/ L unisquat  Patient would benefit from continued PT      Plan: Continue per plan of care        Precautions: HTN, DM       10/25/22 10/27/22 11/1/22 11/3/22 11/8/22        Manuals #1 #2 #3 #4 FOTO-done     Re-Eval   R s/l:  Foam rolling/manual STM L ITB AE AE Ae-Darrin Ae-Darrin Ae-Darrin                                               Neuro Re-Ed             Stand: forward Alt foot taps on cone - X60"  floor x20  foam Foam x20 Foam x20        Stand: lat alt foot taps on cones - X60" floor   Foam x20 Foam x20 Foam x20        Step ups:  -onto chair - two risers x10 ea side two risers x20 ea side two risers x20 ea side two risers x20 L        -L lat step ups - - Two risers x 20 to L Two risers x 20 to L Two risers x 20 to L        Step downs - - two risers x10  One riser  x10 two risers x10  One riser  x10 two risers 2x10        TKE w/ TB - BluTB x 10 - - -        Knee flex stretch on stool - x5 x5 x5 x5        Side step - - 1x20' ea dir GTB  1x20' ea dir GTB x 20' ea dir        Diagonal steps for/back w/ TB - - GTB x 20' ea dir GTB x 20' ea dir GTB x 20' ea dir        Ther Ex             Stand:  -squats  -unisquat - x20 ea   x20  2x10 x20  2x10 x20  2x10        Stand:  -ITB stretch 3x15" 3x15" 3x15" 3x15" 3x15"        HC stretch at step - 3x15" 3x15" 3x15" 3x15"        Sit:  -ham stretch 3x15" 3x15"  3x15"         Supine: SLR - - - 2x10 x20        S/l hip abd stretch (behind) 3x15" 3x15" 3x15" 3x15" 3x15"        S/l hip abd - 2x10 2x10 x30 x30        Prone knee flex w/ overpressure stretch - - x10 x10 x10        Prone hip ext - - - 2x10 x20        Ther Activity             Xride seat 6 - x10' x10' x10'   10'        TM:  -DH  -backwards  -sidestep - -   -  1 0 mph (back) x5'  -   -  1 0 mph (back) x5'      -   -  1 0 mph (back) x5'      -          Gait Training                                       Modalities

## 2022-11-10 ENCOUNTER — OFFICE VISIT (OUTPATIENT)
Dept: PHYSICAL THERAPY | Facility: CLINIC | Age: 62
End: 2022-11-10

## 2022-11-10 DIAGNOSIS — Z96.652 STATUS POST TOTAL KNEE REPLACEMENT, LEFT: Primary | ICD-10-CM

## 2022-11-10 NOTE — PROGRESS NOTES
Daily Note     Today's date: 11/10/2022  Patient name: Adonay Cabral  : 1960  MRN: [id number]  Referring provider: Analisa Gonzáles PA-C  Dx:   Encounter Diagnosis     ICD-10-CM    1  Status post total knee replacement, left  Z96 652        Start Time: 1500  Stop Time: 0925  Total time in clinic (min): 55 minutes    Subjective: pt reports 2/10 pain scale currently      Objective: See treatment diary below      Assessment: L ITB w/ more restriction today w/ Graston and STM, though pt finds this helpful  Unisquat and step down exer cont to be challenging  Patient would benefit from continued PT      Plan: Continue per plan of care        Precautions: HTN, DM       10/25/22 10/27/22 11/1/22 11/3/22 11/8/22 11/10/2       Manuals #1 #2 #3 #4 FOTO-done #6    Re-Eval   R s/l:  Foam rolling/manual STM L ITB AE AE Ae-[place] Ae-[place] Ae-Graston AE-Graston                                              Neuro Re-Ed             Stand: forward Alt foot taps on cone - X60"  floor x20  foam Foam x20 Foam x20 Foam x20       Stand: lat alt foot taps on cones - X60" floor   Foam x20 Foam x20 Foam x20 Foam x20       Step ups:  -onto chair - two risers x10 ea side two risers x20 ea side two risers x20 ea side two risers x20 L two risers x20 L       -L lat step ups - - Two risers x 20 to L Two risers x 20 to L Two risers x 20 to L Two risers x 20 to L       Step downs - - two risers x10  One riser  x10 two risers x10  One riser  x10 two risers 2x10 two risers 2x10       TKE w/ TB - BluTB x 10 - - - -       Knee flex stretch on stool - x5 x5 x5 x5 x5       Side step - - 1x20' ea dir GTB  1x20' ea dir GTB x 20' ea dir GTB x 20' ea dir       Diagonal steps for/back w/ TB - - GTB x 20' ea dir GTB x 20' ea dir GTB x 20' ea dir GTB x 20' ea dir       Ther Ex             Stand:  -squats  -unisquat - x20 ea   x20  2x10 x20  2x10 x20  2x10 x20  2x10       Stand:  -ITB stretch 3x15" 3x15" 3x15" 3x15" 3x15" 3x15"       HC stretch at step - 3x15" 3x15" 3x15" 3x15" 3x15"       Sit:  -ham stretch 3x15" 3x15"  3x15"         Supine: SLR - - - 2x10 x20        S/l hip abd stretch (behind) 3x15" 3x15" 3x15" 3x15" 3x15" 3x15"       S/l hip abd - 2x10 2x10 x30 x30 x30       Prone knee flex w/ overpressure stretch - - x10 x10 x10 x10       Prone hip ext - - - 2x10 x20 x20       Ther Activity             Xride seat 6 - x10' x10' x10'   10' 10'       TM:  -DH  -backwards  -sidestep - -   -  1 0 mph (back) x5'  -   -  1 0 mph (back) x5'      -   -  1 0 mph (back) x5'      -          Gait Training                                       Modalities

## 2022-11-11 NOTE — PROGRESS NOTES
Daily Note     Today's date: 11/15/2022  Patient name: Aaron Monge  : 1960  MRN: [id number]  Referring provider: Clarissa Palma PA-C  Dx:   Encounter Diagnosis     ICD-10-CM    1  Status post total knee replacement, left  Z96 652        Start Time: 165  Stop Time: 1818  Total time in clinic (min): 63 minutes    Subjective: pt reports 2-3/10 pain scale  Objective: See treatment diary below      Assessment: introduced 3# handheld weight B for step ups today and pt mari well  Pt cont with soft tissue restriction of L ITB felt to palp and IASTM  Pt cont to be challenged with unisquat (L)  Plan: Continue per plan of care  Progress treatment as tolerated         Precautions: HTN, DM       10/25/22 10/27/22 11/1/22 11/3/22 11/8/22 11/10/2 11/15/22      Manuals #1 #2 #3 #4 FOTO-done #6 #7   Re-Eval   R s/l:  Foam rolling/manual STM L ITB AE AE Ae-Graston Ae-Graston Ae-Graston AE-Graston AE-Graston                                             Neuro Re-Ed             Stand: forward Alt foot taps on cone - X60"  floor x20  foam Foam x20 Foam x20 Foam x20 Foam x20      Stand: lat alt foot taps on cones - X60" floor   Foam x20 Foam x20 Foam x20 Foam x20 Foam x20      Step ups:  -onto chair - two risers x10 ea side two risers x20 ea side two risers x20 ea side two risers x20 L two risers x20 L 3# ea hand  two risers x20 L      -L lat step ups - - Two risers x 20 to L Two risers x 20 to L Two risers x 20 to L Two risers x 20 to L 3# ea hand  two risers x20 L      Step downs - - two risers x10  One riser  x10 two risers x10  One riser  x10 two risers 2x10 two risers 2x10 3# ea hand  two risers x20 L      TKE w/ TB - BluTB x 10 - - - -       Knee flex stretch on stool - x5 x5 x5 x5 x5 x5      Side step - - 1x20' ea dir GTB  1x20' ea dir GTB x 20' ea dir GTB x 20' ea dir GTB 2 x 20' ea dir      Diagonal steps for/back w/ TB - - GTB x 20' ea dir GTB x 20' ea dir GTB x 20' ea dir GTB x 20' ea dir GTB 2 x 20' ea dir Ther Ex             Stand:  -squats  -unisquat - x20 ea   x20  2x10 x20  2x10 x20  2x10 x20  2x10 x20  2x10      Stand:  -ITB stretch 3x15" 3x15" 3x15" 3x15" 3x15" 3x15" 3x15"      HC stretch at step - 3x15" 3x15" 3x15" 3x15" 3x15" 3x15"      Sit:  -ham stretch 3x15" 3x15"  3x15"   -      Supine: SLR - - - 2x10 x20  x20      S/l hip abd stretch (behind) 3x15" 3x15" 3x15" 3x15" 3x15" 3x15" 3x15"      S/l hip abd - 2x10 2x10 x30 x30 x30 x30      Prone knee flex w/ overpressure stretch - - x10 x10 x10 x10 x10      Prone hip ext - - - 2x10 x20 x20 x20      Ther Activity             Xride seat 6 - x10' x10' x10'   10' 10' 10'      TM:  -DH  -backwards  -sidestep - -   -  1 0 mph (back) x5'  -   -  1 0 mph (back) x5'      -   -  1 0 mph (back) x5'      -      -  1 0 mph (back) x5'      -        Gait Training                                       Modalities

## 2022-11-15 ENCOUNTER — OFFICE VISIT (OUTPATIENT)
Dept: PHYSICAL THERAPY | Facility: CLINIC | Age: 62
End: 2022-11-15

## 2022-11-15 DIAGNOSIS — Z96.652 STATUS POST TOTAL KNEE REPLACEMENT, LEFT: Primary | ICD-10-CM

## 2022-11-15 DIAGNOSIS — E78.5 HYPERLIPIDEMIA, UNSPECIFIED HYPERLIPIDEMIA TYPE: ICD-10-CM

## 2022-11-15 DIAGNOSIS — E11.9 TYPE 2 DIABETES MELLITUS WITHOUT COMPLICATION, WITHOUT LONG-TERM CURRENT USE OF INSULIN (HCC): ICD-10-CM

## 2022-11-17 ENCOUNTER — OFFICE VISIT (OUTPATIENT)
Dept: PHYSICAL THERAPY | Facility: CLINIC | Age: 62
End: 2022-11-17

## 2022-11-17 DIAGNOSIS — Z96.652 STATUS POST TOTAL KNEE REPLACEMENT, LEFT: Primary | ICD-10-CM

## 2022-11-17 NOTE — PROGRESS NOTES
Daily Note     Today's date: 2022  Patient name: Ciarra Ambriz  : 1960  MRN: [id number]  Referring provider: Deisy Landis PA-C  Dx:   Encounter Diagnosis     ICD-10-CM    1  Status post total knee replacement, left  Z96 652           Start Time: 1500  Stop Time: 1600  Total time in clinic (min): 60 minutes    Subjective: pt reports stiffness  Objective: See treatment diary below      Assessment:less pt discomfort w/ Graston/IASTM of L ITB  Cont to progress w/ inc reps and intensity  Patient would benefit from continued PT      Plan: Continue per plan of care        Precautions: HTN, DM       10/25/22 10/27/22 11/1/22 11/3/22 11/8/22 11/10/2 11/15/22 11/17/22     Manuals #1 #2 #3 #4 FOTO-done #6 #7 #8  Re-Eval   R s/l:  Foam rolling/manual STM L ITB AE AE Ae-Graston Ae-Graston Ae-Graston AE-Graston AE-Graston AE-Graston                                            Neuro Re-Ed             Stand: forward Alt foot taps on cone - X60"  floor x20  foam Foam x20 Foam x20 Foam x20 Foam x20 Foam x20     Stand: lat alt foot taps on cones - X60" floor   Foam x20 Foam x20 Foam x20 Foam x20 Foam x20 Foam x20     Step ups:  -onto chair - two risers x10 ea side two risers x20 ea side two risers x20 ea side two risers x20 L two risers x20 L 3# ea hand  two risers x20 L 5# ea hand  two risers x20 L     -L lat step ups - - Two risers x 20 to L Two risers x 20 to L Two risers x 20 to L Two risers x 20 to L 3# ea hand  two risers x20 L 5# ea hand  two risers x20 L     Step downs - - two risers x10  One riser  x10 two risers x10  One riser  x10 two risers 2x10 two risers 2x10 3# ea hand  two risers x20 L 5# ea hand  two risers x20 L     TKE w/ TB - BluTB x 10 - - - -       Knee flex stretch on stool - x5 x5 x5 x5 x5 x5      Side step - - 1x20' ea dir GTB  1x20' ea dir GTB x 20' ea dir GTB x 20' ea dir GTB 2 x 20' ea dir GTB 2 x 20' ea dir     Diagonal steps for/back w/ TB - - GTB x 20' ea dir GTB x 20' ea dir GTB x 20' ea dir GTB x 20' ea dir GTB 2 x 20' ea dir GTB 2 x 20' ea dir     Ther Ex             Stand:  -squats  -unisquat - x20 ea   x20  2x10 x20  2x10 x20  2x10 x20  2x10 x20  2x10 x20  2x10     Stand:  -ITB stretch 3x15" 3x15" 3x15" 3x15" 3x15" 3x15" 3x15" 3x15"     HC stretch at step - 3x15" 3x15" 3x15" 3x15" 3x15" 3x15" 3x15"     Sit:  -ham stretch 3x15" 3x15"  3x15"   -      Supine: SLR - - - 2x10 x20  x20 x20     S/l hip abd stretch (behind) 3x15" 3x15" 3x15" 3x15" 3x15" 3x15" 3x15" 3x15"     S/l hip abd - 2x10 2x10 x30 x30 x30 x30 x30     Prone knee flex w/ overpressure stretch - - x10 x10 x10 x10 x10 x10     Prone hip ext - - - 2x10 x20 x20 x20 x20     Ther Activity             Xride seat 6 - x10' x10' x10'   10' 10' 10'   x10'     TM:  -DH  -backwards   - -   -  1 0 mph (back) x5'  -   -  1 0 mph (back) x5'      -   -  1 0 mph (back) x5'      -      -  1 0 mph (back) x5'           -  1 0 mph (back) x5'     Gait Training                                       Modalities

## 2022-11-18 NOTE — PROGRESS NOTES
PT Re-Evaluation     Today's date: 2022  Patient name: Jaylene Flanagan  : 1960  MRN: [id number]  Referring provider: Neno Francois PA-C  Dx:   Encounter Diagnosis     ICD-10-CM    1  Status post total knee replacement, left  Z96 652           Start Time: 1720  Stop Time: 06  Total time in clinic (min): 60 minutes    Assessment  Assessment details: 59 yo male who was referred to PT ~ 5 mos s/p L TKR  Pt currently with 0-3/10 pain scale-bothering pt mostly due to stiffness after sitting/prolonged static positioning  L ITB tightness/restriction  has been a contributing factor to pt's stiffness and this is improved since last assessment as noted w/ manual STM and Graston/IASTM as grittiness and restriction is less palpable  Pt w/ improved strength via MMT, (though hip extension (L) remains 4-/5)  However, functional strength is still limited:   unisquat on L has less excursion and fluidity of mov't as compared to the R which is also noted w/ descending stairs  Lat step down test is improved on L, but, again, still w/ less control and fluidity of mov't than the RLE  Pt would benefit from further  PT intervention in order to cont  address his functional strength deficits and ITB restriction so that he may resume his daily activities more fluidly w/ less pain and limitation      Impairments: abnormal gait, abnormal or restricted ROM, abnormal movement and impaired physical strength    Symptom irritability: low  Goals  Pt will achieve the following goals in the next 2-4 weeks  STG 22  Indep and compliant w/ current HEP  L hip extension strength 4/5  LLE step down x5 w/o compensation      STG 10/25/22  Indep with current HEP (met)  L SLS x10" unsupported (met)  L unisquat x 3 unsupported w/o compensatory mov't (met)    Pt will achieve the following goals by d/c (8-12 weeks, or as stated in plan)  LTG  indep and compliant with HEP in order to maximize gains achieved in therapy  0-1/10 pain scale @ worst  in order to feel better and decrease/eliminate use of  pain &/or anti-inflammatory  meds  L hip strength 5/5 in order to improve functional mobility   Decrease soft tissue restriction of L ITB in order to improve L knee/hip flexiblity and fluidity of LLE mov't  Pt will demonstrate normalized gait quality on level surfaces as evidenced by consistent = step length and = weightbearing b/w BLE without trendelenberg shifts  Improve  FOTO score to 75 or more to indicate inc functional ability of patient   Resume activities, including don/doff shoes & socks, amb steps reciprocally, & general mobility/walking,  with improved fluidity and less pain and limitation     Plan  Patient would benefit from: skilled physical therapy  Planned modality interventions: cryotherapy and thermotherapy: hydrocollator packs  Planned therapy interventions: manual therapy, joint mobilization, massage, balance, neuromuscular re-education, patient education, strengthening, stretching, therapeutic activities, therapeutic exercise, gait training and home exercise program  Frequency: 2x week  Duration in visits: 12  Duration in weeks: 6  Plan of Care beginning date: 10/25/2022  Plan of Care expiration date: 12/6/2022  Treatment plan discussed with: patient        Subjective Evaluation    History of Present Illness  Date of surgery: 6/28/2022  Mechanism of injury: 11/22/22 Re-eval-pt reports still modifying to don shoes/socks  Feels strength and fluidity of mov't is improved, but still an issue  Taking Aleve 1x/day still  Still stiff after sitting/prolonged positioning, but better than previously  Descending stairs easier than up, still w/ weakness/dec propulsion when ascending stairs  LEFS 72/80 (improved)  10/25/22 Eval-pt known to this clinic due to PT following L TKR 6/28/22  Pt was last seen 9/13/22  Pt RTW following  Pt finds he still doesn't feel as strong or have the fluidity of mov't he needs I e   Difficulty manuevering to don/doff shoes and socks  Pt takes Aleve 1x/day ( 2 pills)-w/ relief  Stiffness paulina after sitting/prolonged positioning  L s/l uncomfortable-needs pillow b/w legs for sleep  Pt w/ noticeable limp-able to correct w/ VPs/conscious effort-still feels different to walk "normal  Weak on steps, but able to do reciprocal (occ non-reciprocal)   LEFS 66/80            Recurrent probem    Pain  Current pain ratin  At best pain ratin  At worst pain rating: 3  Location: L lat knee/ITB  Quality: squeezing  Progression: improved    Social Support  Steps to enter house: yes (3-reciprocal most of time-occ non-reciprocal)  Stairs in house: yes   Lives in: multiple-level home  Lives with: adult children (son)    Employment status: working ([employer] a lot)  Patient Goals  Patient goals for therapy: increased strength  Patient goal: improve ease of mov't w/ tasks  (improving)        Objective     Observations     Additional Observation Details  L knee tight thickness/inc girth as compared to R knee due to TKR     Soft lump L knee inf lat  pat-not painful to palp (at site of previous childhood scar)    Palpation     Additional Palpation Details  Pat mob (L) intact w/ good mobility;  L ITB restriction present, but improved (<R ITB which hasn't been receiving Graston IASTM/STM)    Active Range of Motion   Left Knee   Flexion: 126 (KTC; =) degrees   Extension: -9 (supine; =) degrees   Extensor lag: -8 (=) degrees     Passive Range of Motion   Left Hip   Flexion: 70 (SLR; improved) degrees     Right Hip   Flexion: 75 (SLR;=) degrees     Additional Passive Range of Motion Details  No pain w/ passive L ITB stretch     Strength/Myotome Testing     Left Hip   Planes of Motion   Flexion: 5 (=)  Extension: 4- (=)  Abduction: 4+ (improved)    Right Hip   Planes of Motion   Flexion: 5  Extension: 4+  Abduction: 4+    Left Knee   Flexion: 5  Prone flexion: 5 (improved)  Quadriceps contraction: good    Right Knee Flexion: 5  Prone flexion: 5  Quadriceps contraction: good    Left Ankle/Foot   Dorsiflexion: 5  Plantar flexion: 5    Right Ankle/Foot   Dorsiflexion: 5  Plantar flexion: 5    Additional Strength Details  Deep squat to 120 deg knee flex unsupported (inc 8 deg)  SLS R and L 10" unsupported =  unisquat  R x 5 unsupported w/ good excursion                  L x 5 unsupported w/ min excursion and hip compensation    Tests     Additional Tests Details  10/25/22 Lat Step Down Test  R  1 (good)     11/22/22  R 1 (good) =                                                    L 4 (poor)                       L 2 (average) improved    Ambulation     Comments   Amb indep without AD x clinic distances and parking lot to clinic  Cont w/ LLE limp  w/ dec step length on R and mild trendelenberg deviation    Pt able to correct w/ conscious effort      Flowsheet Rows    Flowsheet Row Most Recent Value   PT/OT G-Codes    Current Score 72   Projected Score 75   Assessment Type Re-evaluation             Precautions: HTN, DM   10/25/22 10/27/22 11/1/22 11/3/22 11/8/22 11/10/2 11/15/22 11/17/22 11/22/22    Manuals #1 #2 #3 #4 FOTO-done #6 #7 #8 #9 Re-eval    R s/l:  Foam rolling/manual STM L ITB AE AE Ae-Graston Ae-Graston Ae-Graston AE-Graston AE-Graston AE-Graston AE-Graston                                           Neuro Re-Ed             Stand: forward Alt foot taps on cone - X60"  floor x20  foam Foam x20 Foam x20 Foam x20 Foam x20 Foam x20 Foam x20       Stand: lat alt foot taps on cones - X60" floor   Foam x20 Foam x20 Foam x20 Foam x20 Foam x20 Foam x20 Foam x20       Step ups:  -onto chair - two risers x10 ea side two risers x20 ea side two risers x20 ea side two risers x20 L two risers x20 L 3# ea hand  two risers x20 L 5# ea hand  two risers x20 L 5# ea hand  two risers x20 L    -L lat step ups - - Two risers x 20 to L Two risers x 20 to L Two risers x 20 to L Two risers x 20 to L 3# ea hand  two risers x20 L 5# ea hand  two risers x20 L 5# ea hand  two risers x20 L    Step downs - - two risers x10  One riser  x10 two risers x10  One riser  x10 two risers 2x10 two risers 2x10 3# ea hand  two risers x20 L 5# ea hand  two risers x20 L 5# ea hand  two risers x20 L    TKE w/ TB - BluTB x 10 - - - -       Knee flex stretch on stool - x5 x5 x5 x5 x5 x5  x5    Side step - - 1x20' ea dir GTB  1x20' ea dir GTB x 20' ea dir GTB x 20' ea dir GTB 2 x 20' ea dir GTB 2 x 20' ea dir GTB 2 x 20' ea dir    Diagonal steps for/back w/ TB - - GTB x 20' ea dir GTB x 20' ea dir GTB x 20' ea dir GTB x 20' ea dir GTB 2 x 20' ea dir GTB 2 x 20' ea dir GTB 2 x 20' ea dir    Ther Ex             Leg press - - - - - - - - - *   LLE sit to stand - - - - - - - - - *   Stand:  -squats  -unisquat - x20 ea   x20  2x10 x20  2x10 x20  2x10 x20  2x10 x20  2x10 x20  2x10 x20  2x10    Stand:  -ITB stretch 3x15" 3x15" 3x15" 3x15" 3x15" 3x15" 3x15" 3x15" 3x15"    HC stretch at step - 3x15" 3x15" 3x15" 3x15" 3x15" 3x15" 3x15" 3x15"    Sit:  -ham stretch 3x15" 3x15"  3x15"   -      Supine: SLR - - - 2x10 x20  x20 x20     S/l hip abd stretch (behind) 3x15" 3x15" 3x15" 3x15" 3x15" 3x15" 3x15" 3x15" 3x15"    S/l hip abd - 2x10 2x10 x30 x30 x30 x30 x30 x30    Prone knee flex w/ overpressure stretch - - x10 x10 x10 x10 x10 x10 x10    Prone hip ext - - - 2x10 x20 x20 x20 x20 x20    Ther Activity             Xride seat 6 - x10' x10' x10'   10' 10' 10'   x10' x10'    TM:  -DH  -backwards   - -   -  1 0 mph (back) x5'  -   -  1 0 mph (back) x5'      -   -  1 0 mph (back) x5'      -      -  1 0 mph (back) x5'           -  1 0 mph (back) x5'     Gait Training                                       Modalities

## 2022-11-22 ENCOUNTER — EVALUATION (OUTPATIENT)
Dept: PHYSICAL THERAPY | Facility: CLINIC | Age: 62
End: 2022-11-22

## 2022-11-22 DIAGNOSIS — Z96.652 STATUS POST TOTAL KNEE REPLACEMENT, LEFT: Primary | ICD-10-CM

## 2022-12-12 LAB
CHOLEST SERPL-MCNC: 146 MG/DL
CHOLEST/HDLC SERPL: 3.8 (CALC)
HDLC SERPL-MCNC: 38 MG/DL
LDLC SERPL CALC-MCNC: 67 MG/DL (CALC)
NONHDLC SERPL-MCNC: 108 MG/DL (CALC)
TRIGL SERPL-MCNC: 344 MG/DL

## 2022-12-13 ENCOUNTER — OFFICE VISIT (OUTPATIENT)
Age: 62
End: 2022-12-13

## 2022-12-13 DIAGNOSIS — Z96.652 STATUS POST TOTAL KNEE REPLACEMENT, LEFT: Primary | ICD-10-CM

## 2022-12-13 LAB
ALBUMIN SERPL-MCNC: 4.6 G/DL (ref 3.6–5.1)
ALBUMIN/GLOB SERPL: 1.5 (CALC) (ref 1–2.5)
ALP SERPL-CCNC: 73 U/L (ref 35–144)
ALT SERPL-CCNC: 43 U/L (ref 9–46)
AST SERPL-CCNC: 26 U/L (ref 10–35)
BILIRUB SERPL-MCNC: 0.9 MG/DL (ref 0.2–1.2)
BUN SERPL-MCNC: 23 MG/DL (ref 7–25)
BUN/CREAT SERPL: ABNORMAL (CALC) (ref 6–22)
CALCIUM SERPL-MCNC: 10.3 MG/DL (ref 8.6–10.3)
CHLORIDE SERPL-SCNC: 100 MMOL/L (ref 98–110)
CO2 SERPL-SCNC: 25 MMOL/L (ref 20–32)
CREAT SERPL-MCNC: 1 MG/DL (ref 0.7–1.35)
GFR/BSA.PRED SERPLBLD CYS-BASED-ARV: 85 ML/MIN/1.73M2
GLOBULIN SER CALC-MCNC: 3.1 G/DL (CALC) (ref 1.9–3.7)
GLUCOSE SERPL-MCNC: 118 MG/DL (ref 65–99)
HBA1C MFR BLD: 7.1 % OF TOTAL HGB
POTASSIUM SERPL-SCNC: 4 MMOL/L (ref 3.5–5.3)
PROT SERPL-MCNC: 7.7 G/DL (ref 6.1–8.1)
SODIUM SERPL-SCNC: 136 MMOL/L (ref 135–146)
TSH SERPL-ACNC: 2.14 MIU/L (ref 0.4–4.5)

## 2022-12-13 NOTE — PROGRESS NOTES
Daily Note     Today's date: 2022  Patient name: Haja Ortiz  : 1960  MRN: [id number]  Referring provider: Jeremias Farnsworth PA-C  Dx:   Encounter Diagnosis     ICD-10-CM    1  Status post total knee replacement, left  [id number]                      Subjective: pt reports his L knee felt good while away on vacation-swimming, climbing stairs, walking on steps  No pain currently  Objective: See treatment diary below      Assessment:Pt tolerated today's treatment session well    Initiated sit to stand on LLE only today which pt was challenged w/-needed A to stand and minor A to control back to sitting  Pt also challenged w/ h step downs (LLE)  Pt completed exer with no adverse reactions    Pt felt positive relief of sx with Graston/IASTM to L ITB region  Pt continues to benefit from skilled PT services  Will continue to encourage HEP and PT attendance while addressing pt's  functional deficits & focusing on progression of POC as patient tolerates  Patient performed Recumbent bike aerobic exercise to increase blood flow to the area being treated, prepare the muscles for strength training and stretching, improve overall tolerance to activity, and aerobic endurance  Plan: Continue per plan of care        Precautions: HTN, DM      11/3/22 11/8/22 11/10/2 11/15/22 11/17/22 11/22/22 12/13/22   Manuals    #4 FOTO-done #6 #7 #8 #9 Re-eval #10   R s/l:  Foam rolling/manual STM L ITB    Ae-Graston Ae-Graston AE-Graston AE-Graston AE-Graston AE-Graston AE-Graston                                          Neuro Re-Ed             Stand: forward Alt foot taps on cone    Foam x20 Foam x20 Foam x20 Foam x20 Foam x20 Foam x20    Foam x20   Stand: lat alt foot taps on cones    Foam x20 Foam x20 Foam x20 Foam x20 Foam x20 Foam x20    Foam x20   Step ups:  -onto chair    two risers x20 ea side two risers x20 L two risers x20 L 3# ea hand  two risers x20 L 5# ea hand  two risers x20 L 5# ea hand  two risers x20 L x20 L (19" height)   -L lat step ups    Two risers x 20 to L Two risers x 20 to L Two risers x 20 to L 3# ea hand  two risers x20 L 5# ea hand  two risers x20 L 5# ea hand  two risers x20 L 5# ea hand  two risers x20 LL   Step downs    two risers x10  One riser  x10 two risers 2x10 two risers 2x10 3# ea hand  two risers x20 L 5# ea hand  two risers x20 L 5# ea hand  two risers x20 L 5# ea hand  two risers x20 L   TKE w/ TB    - - -       Knee flex stretch on stool    x5 x5 x5 x5  x5 x5   Side step    GTB  1x20' ea dir GTB x 20' ea dir GTB x 20' ea dir GTB 2 x 20' ea dir GTB 2 x 20' ea dir GTB 2 x 20' ea dir GTB 2 x 20' ea dir   Diagonal steps for/back w/ TB    GTB x 20' ea dir GTB x 20' ea dir GTB x 20' ea dir GTB 2 x 20' ea dir GTB 2 x 20' ea dir GTB 2 x 20' ea dir GTB 2 x 20' ea dir   Ther Ex             Leg press *   - - - - - - -   LLE sit to stand    - - - - - - x5 L   Stand:  -squats  -unisquat    x20  2x10 x20  2x10 x20  2x10 x20  2x10 x20  2x10 x20  2x10 x20  2x10   Stand:  -ITB stretch    3x15" 3x15" 3x15" 3x15" 3x15" 3x15" 3x15"   HC stretch at step    3x15" 3x15" 3x15" 3x15" 3x15" 3x15"    Sit:  -ham stretch    3x15"   -      Supine: SLR    2x10 x20  x20 x20  x20   S/l hip abd stretch (behind)    3x15" 3x15" 3x15" 3x15" 3x15" 3x15" 3x15"   S/l hip abd    x30 x30 x30 x30 x30 x30 x20   Prone knee flex w/ overpressure stretch    x10 x10 x10 x10 x10 x10 x10   Prone hip ext    2x10 x20 x20 x20 x20 x20 x20   Ther Activity             Xride seat 6    x10'   10' 10' 10'   x10' x10' -   Recumbent bike-seat 16    - - - - - - 10'   TM:  -DH  -backwards        -  1 0 mph (back) x5'      -   -  1 0 mph (back) x5'      -      -  1 0 mph (back) x5'           -  1 0 mph (back) x5'     Gait Training                                       Modalities

## 2022-12-15 ENCOUNTER — APPOINTMENT (OUTPATIENT)
Age: 62
End: 2022-12-15

## 2022-12-15 DIAGNOSIS — Z96.652 STATUS POST TOTAL KNEE REPLACEMENT, LEFT: Primary | ICD-10-CM

## 2022-12-15 NOTE — PROGRESS NOTES
Daily Note     Today's date: 12/15/2022  Patient name: Haja Ortiz  : 1960  MRN: [id number]  Referring provider: Jeremias Farnsworth PA-C  Dx:   Encounter Diagnosis     ICD-10-CM    1  Status post total knee replacement, left  [id number]                      Subjective: ***      Objective: See treatment diary below      Assessment:Pt tolerated today's treatment session {AE treatment mari:53973}   Initiated *** today during session and patient education provided on ***   Pt challenged with ***  Pt completed exer with {AEtreatmentquality:77110}  Pt felt positive relief of sx with ***  Pt continues to benefit from skilled PT services to address ***   Will continue to encourage HEP and PT attendance while addressing pt's  functional deficits & focusing on progression of POC as patient tolerates  Patient performed {KUAerobic:93643} aerobic exercise to increase blood flow to the area being treated, prepare the muscles for strength training and stretching, improve overall tolerance to activity, and aerobic endurance  Plan: Continue per plan of care        Precautions: HTN, DM   12/15/22   11/3/22 11/8/22 11/10/2 11/15/22 11/17/22 11/22/22 12/13/22   Manuals #11   #4 FOTO-done #6 #7 #8 #9 Re-eval #10   R s/l:  Foam rolling/manual STM L ITB    Ae-Graston Ae-Graston AE-Graston AE-Graston AE-Graston AE-Graston AE-Graston                                          Neuro Re-Ed             Stand: forward Alt foot taps on cone    Foam x20 Foam x20 Foam x20 Foam x20 Foam x20 Foam x20    Foam x20   Stand: lat alt foot taps on cones    Foam x20 Foam x20 Foam x20 Foam x20 Foam x20 Foam x20    Foam x20   Step ups:  -onto chair    two risers x20 ea side two risers x20 L two risers x20 L 3# ea hand  two risers x20 L 5# ea hand  two risers x20 L 5# ea hand  two risers x20 L x20 L (19" height)   -L lat step ups    Two risers x 20 to L Two risers x 20 to L Two risers x 20 to L 3# ea hand  two risers x20 L 5# ea hand  two risers x20 L 5# ea hand  two risers x20 L 5# ea hand  two risers x20 LL   Step downs    two risers x10  One riser  x10 two risers 2x10 two risers 2x10 3# ea hand  two risers x20 L 5# ea hand  two risers x20 L 5# ea hand  two risers x20 L 5# ea hand  two risers x20 L   TKE w/ TB    - - -       Knee flex stretch on stool    x5 x5 x5 x5  x5 x5   Side step    GTB  1x20' ea dir GTB x 20' ea dir GTB x 20' ea dir GTB 2 x 20' ea dir GTB 2 x 20' ea dir GTB 2 x 20' ea dir GTB 2 x 20' ea dir   Diagonal steps for/back w/ TB    GTB x 20' ea dir GTB x 20' ea dir GTB x 20' ea dir GTB 2 x 20' ea dir GTB 2 x 20' ea dir GTB 2 x 20' ea dir GTB 2 x 20' ea dir   Ther Ex             Leg press *   - - - - - - -   LLE sit to stand    - - - - - - x5 L   Stand:  -squats  -unisquat    x20  2x10 x20  2x10 x20  2x10 x20  2x10 x20  2x10 x20  2x10 x20  2x10   Stand:  -ITB stretch    3x15" 3x15" 3x15" 3x15" 3x15" 3x15" 3x15"   HC stretch at step    3x15" 3x15" 3x15" 3x15" 3x15" 3x15"    Sit:  -ham stretch    3x15"   -      Supine: SLR    2x10 x20  x20 x20  x20   S/l hip abd stretch (behind)    3x15" 3x15" 3x15" 3x15" 3x15" 3x15" 3x15"   S/l hip abd    x30 x30 x30 x30 x30 x30 x20   Prone knee flex w/ overpressure stretch    x10 x10 x10 x10 x10 x10 x10   Prone hip ext    2x10 x20 x20 x20 x20 x20 x20   Ther Activity             Xride seat 6    x10'   10' 10' 10'   x10' x10' -   Recumbent bike-seat 16    - - - - - - 10'   TM:  -DH  -backwards        -  1 0 mph (back) x5'      -   -  1 0 mph (back) x5'      -      -  1 0 mph (back) x5'           -  1 0 mph (back) x5'     Gait Training                                       Modalities

## 2022-12-20 ENCOUNTER — APPOINTMENT (OUTPATIENT)
Age: 62
End: 2022-12-20

## 2022-12-20 DIAGNOSIS — Z96.652 STATUS POST TOTAL KNEE REPLACEMENT, LEFT: Primary | ICD-10-CM

## 2022-12-20 NOTE — PROGRESS NOTES
Daily Note     Today's date: 2022  Patient name: Westley Deutsch  : 1960  MRN: [id number]  Referring provider: Humera Da Silva PA-C  Dx:   Encounter Diagnosis     ICD-10-CM    1  Status post total knee replacement, left  [id number]                      Subjective: ***      Objective: See treatment diary below      Assessment: Pt tolerated today's treatment session {AE treatment mari:11908}   Initiated *** today during session and patient education provided on ***   Pt challenged with ***  Pt completed exer with {AEtreatmentquality:50440}  Pt felt positive relief of sx with ***  Pt continues to benefit from skilled PT services to address ***   Will continue to encourage HEP and PT attendance while addressing pt's  functional deficits & focusing on progression of POC as patient tolerates  Patient performed {KUAerobic:98552} aerobic exercise to increase blood flow to the area being treated, prepare the muscles for strength training and stretching, improve overall tolerance to activity, and aerobic endurance    Plan: {PLAN:[id number]}     Precautions: HTN, DM   12/15/22 12/20/22    11/10/2 11/15/22 11/17/22 11/22/22 12/13/22   Manuals #11 #12    #6 #7 #8 #9 Re-eval #10   R s/l:  Foam rolling/manual STM L ITB      AE-Graston AE-Graston AE-Graston AE-Graston AE-Graston                                          Neuro Re-Ed             Stand: forward Alt foot taps on cone      Foam x20 Foam x20 Foam x20 Foam x20    Foam x20   Stand: lat alt foot taps on cones      Foam x20 Foam x20 Foam x20 Foam x20    Foam x20   Step ups:  -onto chair      two risers x20 L 3# ea hand  two risers x20 L 5# ea hand  two risers x20 L 5# ea hand  two risers x20 L x20 L (19" height)   -L lat step ups      Two risers x 20 to L 3# ea hand  two risers x20 L 5# ea hand  two risers x20 L 5# ea hand  two risers x20 L 5# ea hand  two risers x20 LL   Step downs      two risers 2x10 3# ea hand  two risers x20 L 5# ea hand  two risers x20 L 5# ea hand  two risers x20 L 5# ea hand  two risers x20 L   TKE w/ TB      -       Knee flex stretch on stool      x5 x5  x5 x5   Side step      GTB x 20' ea dir GTB 2 x 20' ea dir GTB 2 x 20' ea dir GTB 2 x 20' ea dir GTB 2 x 20' ea dir   Diagonal steps for/back w/ TB      GTB x 20' ea dir GTB 2 x 20' ea dir GTB 2 x 20' ea dir GTB 2 x 20' ea dir GTB 2 x 20' ea dir   Ther Ex             Leg press *     - - - - -   LLE sit to stand      - - - - x5 L   Stand:  -squats  -unisquat      x20  2x10 x20  2x10 x20  2x10 x20  2x10 x20  2x10   Stand:  -ITB stretch      3x15" 3x15" 3x15" 3x15" 3x15"   HC stretch at step      3x15" 3x15" 3x15" 3x15"    Sit:  -ham stretch       -      Supine: SLR       x20 x20  x20   S/l hip abd stretch (behind)      3x15" 3x15" 3x15" 3x15" 3x15"   S/l hip abd      x30 x30 x30 x30 x20   Prone knee flex w/ overpressure stretch      x10 x10 x10 x10 x10   Prone hip ext      x20 x20 x20 x20 x20   Ther Activity             Xride seat 6      10' 10'   x10' x10' -   Recumbent bike-seat 16      - - - - 10'   TM:  -DH  -backwards           -  1 0 mph (back) x5'           -  1 0 mph (back) x5'     Gait Training                                       Modalities

## 2022-12-22 ENCOUNTER — OFFICE VISIT (OUTPATIENT)
Age: 62
End: 2022-12-22

## 2022-12-22 DIAGNOSIS — Z96.652 STATUS POST TOTAL KNEE REPLACEMENT, LEFT: Primary | ICD-10-CM

## 2022-12-22 NOTE — PROGRESS NOTES
Daily Note     Today's date: 2022  Patient name: Vivienne Bartlett  : 1960  MRN: [id number]  Referring provider: Genevieve Kidd PA-C  Dx:   Encounter Diagnosis     ICD-10-CM    1  Status post total knee replacement, left  [id number]           Start Time:   Stop Time: 5471  Total time in clinic (min): 59 minutes    Subjective: feeling numbness in L knee since attempting sit to stand with LLE-feels it radiates into ant lower leg as well    Objective: See treatment diary below      Assessment: Pt tolerated today's treatment session well    Initiated leg press w/ 45# (B)  today during session   Pt challenged with unilateral squat  Pt completed exer with no adverse reactions    Pt felt positive relief of sx with IASTM of L IITB  Pt continues to benefit from skilled PT services  Will continue to encourage HEP and PT attendance while addressing pt's  functional deficits & focusing on progression of POC as patient tolerates  Patient performed X-ride  aerobic exercise to increase blood flow to the area being treated, prepare the muscles for strength training and stretching, improve overall tolerance to activity, and aerobic endurance  Plan: Continue per plan of care        Precautions: HTN, DM   12/22/22     11/10/2 11/15/22 11/17/22 11/22/22 12/13/22   Manuals #11     #6 #7 #8 #9 Re-eval #10   R s/l:  Foam rolling/manual STM L ITB AE-Graston     AE-Graston AE-Graston AE-Graston AE-Graston AE-Graston                                          Neuro Re-Ed             Stand: forward Alt foot taps on cone Foam x 20     Foam x20 Foam x20 Foam x20 Foam x20    Foam x20   Stand: lat alt foot taps on cones Foam x20     Foam x20 Foam x20 Foam x20 Foam x20    Foam x20   Step ups:  -onto chair 5# ea hand  7 3/4" step to chair x20 L     two risers x20 L 3# ea hand  two risers x20 L 5# ea hand  two risers x20 L 5# ea hand  two risers x20 L x20 L (19" height)   -L lat step ups 5# ea hand  7 3/4" step  x20 L     Two risers x 20 to L 3# ea hand  two risers x20 L 5# ea hand  two risers x20 L 5# ea hand  two risers x20 L 5# ea hand  two risers x20 LL   Step downs 5# ea hand  5 1/2" step  x20 L     two risers 2x10 3# ea hand  two risers x20 L 5# ea hand  two risers x20 L 5# ea hand  two risers x20 L 5# ea hand  two risers x20 L   Knee flex stretch on stool      x5 x5  x5 x5   Side step GTB 2 x 25' ea dir     GTB x 20' ea dir GTB 2 x 20' ea dir GTB 2 x 20' ea dir GTB 2 x 20' ea dir GTB 2 x 20' ea dir   Diagonal steps for/back w/ TB 1x 25' ea dir (GTB)     GTB x 20' ea dir GTB 2 x 20' ea dir GTB 2 x 20' ea dir GTB 2 x 20' ea dir GTB 2 x 20' ea dir   Ther Ex             Leg press-BL 45#  2x10 55#    - - - - -   LLE sit to stand HOLD     - - - - x5 L   Stand:  -squats  -unisquat x20  x20     x20  2x10 x20  2x10 x20  2x10 x20  2x10 x20  2x10   Stand:  -ITB stretch 3x20"     3x15" 3x15" 3x15" 3x15" 3x15"   HC stretch at step 3x20"     3x15" 3x15" 3x15" 3x15"    Supine: SLR x20      x20 x20  x20   S/l hip abd stretch (behind) 3x15"     3x15" 3x15" 3x15" 3x15" 3x15"   S/l hip abd x20     x30 x30 x30 x30 x20   Prone knee flex w/ overpressure stretch x5     x10 x10 x10 x10 x10   Prone hip ext x20     x20 x20 x20 x20 x20   Ther Activity             Xride seat 6 10'     10' 10'   x10' x10' -   Recumbent bike-seat 16 -     - - - - 10'   Gait Training                                       Modalities

## 2022-12-27 ENCOUNTER — OFFICE VISIT (OUTPATIENT)
Age: 62
End: 2022-12-27

## 2022-12-27 DIAGNOSIS — Z96.652 STATUS POST TOTAL KNEE REPLACEMENT, LEFT: Primary | ICD-10-CM

## 2022-12-27 NOTE — PROGRESS NOTES
Daily Note     Today's date: 2022  Patient name: Vickie Beavers  : 1960  MRN: [id number]  Referring provider: Rochelle Montejo PA-C  Dx:   Encounter Diagnosis     ICD-10-CM    1  Status post total knee replacement, left  [id number]           Start Time:   Stop Time:   Total time in clinic (min): 60 minutes    Subjective: Patient continues with (L) knee numbness  He states numbness is "pins and needles" raiding down (L) leg  He rates pain 1/10  Objective: See treatment diary below      Assessment: Patient responded well to treatment with no adverse effects  Numbness reported in subjective abolished by end os session  Patient would benefit from continued PT  Patient performed X-ride  aerobic exercise to increase blood flow to the area being treated, prepare the muscles for strength training and stretching, improve overall tolerance to activity, and aerobic endurance  Plan: Continue per plan of care        Precautions: HTN, DM   12/22/22 12/27    11/10/2 11/15/22 11/17/22 11/22/22 12/13/22   Manuals #11 #12    #6 #7 #8 #9 Re-eval #10   R s/l:  Foam rolling/manual STM L ITB AE-Raoulton NV    AE-[place] AE-[place] AE-[place] AE-Darrin AE-[place]                                          Neuro Re-Ed             Stand: forward Alt foot taps on cone Foam x 20 Foam x 20    Foam x20 Foam x20 Foam x20 Foam x20    Foam x20   Stand: lat alt foot taps on cones Foam x20 Foam x20    Foam x20 Foam x20 Foam x20 Foam x20    Foam x20   Step ups:  -onto chair 5# ea hand  7 3/4" step to chair x20 L 5# ea hand  7 3/4" step to chair x20 L    two risers x20 L 3# ea hand  two risers x20 L 5# ea hand  two risers x20 L 5# ea hand  two risers x20 L x20 L (19" height)   -L lat step ups 5# ea hand  7 3/4" step  x20 L 5# ea hand  7 3/4" step  x20 L    Two risers x 20 to L 3# ea hand  two risers x20 L 5# ea hand  two risers x20 L 5# ea hand  two risers x20 L 5# ea hand  two risers x20 LL   Step downs 5# ea hand  5 " step  x20 L 5# ea hand  5 1/2" step  x20 L    two risers 2x10 3# ea hand  two risers x20 L 5# ea hand  two risers x20 L 5# ea hand  two risers x20 L 5# ea hand  two risers x20 L   Knee flex stretch on stool      x5 x5  x5 x5   Side step GTB 2 x 25' ea dir GTB 2 x 25' ea dir    GTB x 20' ea dir GTB 2 x 20' ea dir GTB 2 x 20' ea dir GTB 2 x 20' ea dir GTB 2 x 20' ea dir   Diagonal steps for/back w/ TB 1x 25' ea dir (GTB) 1x 25' ea dir (GTB)    GTB x 20' ea dir GTB 2 x 20' ea dir GTB 2 x 20' ea dir GTB 2 x 20' ea dir GTB 2 x 20' ea dir   Ther Ex             Leg press-BL 45#  2x10 55#    - - - - -   LLE sit to stand HOLD     - - - - x5 L   Stand:  -squats  -unisquat x20  x20 x20  x20    x20  2x10 x20  2x10 x20  2x10 x20  2x10 x20  2x10   Stand:  -ITB stretch 3x20" 3x20"    3x15" 3x15" 3x15" 3x15" 3x15"   HC stretch at step 3x20" 3x20"    3x15" 3x15" 3x15" 3x15"    Supine: SLR x20 x20     x20 x20  x20   S/l hip abd stretch (behind) 3x15" NV, missed    3x15" 3x15" 3x15" 3x15" 3x15"   S/l hip abd x20 x20    x30 x30 x30 x30 x20   Prone knee flex w/ overpressure stretch x5 x10    x10 x10 x10 x10 x10   Prone hip ext x20 x20    x20 x20 x20 x20 x20   Ther Activity             Xride seat 6 10' 10'    10' 10'   x10' x10' -   Recumbent bike-seat 16 -     - - - - 10'   Gait Training                                       Modalities

## 2022-12-29 ENCOUNTER — EVALUATION (OUTPATIENT)
Age: 62
End: 2022-12-29

## 2022-12-29 DIAGNOSIS — Z96.652 STATUS POST TOTAL KNEE REPLACEMENT, LEFT: Primary | ICD-10-CM

## 2022-12-29 NOTE — PROGRESS NOTES
PT Re-Evaluation     Today's date: 2022  Patient name: Arlene Solo  : 1960  MRN: [id number]  Referring provider: Suri James PA-C  Dx:   Encounter Diagnosis     ICD-10-CM    1  Status post total knee replacement, left  [id number] [id number]           Start Time: 1500  Stop Time: 1788  Total time in clinic (min): 55 minutes    Assessment  Assessment details: 57 yo male who was initially referred to PT s/p L TKR  Pt currently with 0-2/10 pain scale-with pain after prolonged sitting, and described more as stiffness than pain  Today's physical exam reveals improved MMT of LLE (hip and knee) w/ improved functional knee/LE strength noted (SLS, unisquat, step down test all improved) which translates to improved daily mobility paulina noted w/ stair ambulation  Pt w/ improved L ITB/lat knee soft tissue mobility from initial eval   Pt demonstrates indep w/ current HEP and self massage and stretch of L ITB region  At this time, pt's progress has slowed and he ready for d/c from formal PT and self manage indep w/ HEP            Symptom irritability: low  Goals  Pt will achieve the following goals in the next 2-4 weeks  STG 22  Indep and compliant w/ current HEP (met)  L hip extension strength 4/5 (met)  LLE step down x5 w/o compensation (met)    STG 10/25/22  Indep with current HEP (met)  L SLS x10" unsupported (met)  L unisquat x 3 unsupported w/o compensatory mov't (met)    Pt will achieve the following goals by d/c (8-12 weeks, or as stated in plan)  LTG  indep and compliant with HEP in order to maximize gains achieved in therapy (met)  0-1/10 pain scale @ worst  in order to feel better and decrease/eliminate use of  pain &/or anti-inflammatory  meds (met)  L hip strength 5/5 in order to improve functional mobility (met)  Decrease soft tissue restriction of L ITB in order to improve L knee/hip flexiblity and fluidity of LLE mov't (met)  Pt will demonstrate normalized gait quality on level surfaces as evidenced by consistent = step length and = weightbearing b/w BLE without trendelenberg shifts (met)  Improve  FOTO score to 75 or more and LEFS >65/80 to indicate inc functional ability of patient (met LEFS, progressing FOTO)  Resume activities, including don/doff shoes & socks, amb steps reciprocally, & general mobility/walking,  with improved fluidity and less pain and limitation (met)    Plan  Plan details: D/c from formal PT  Planned therapy interventions: home exercise program  Treatment plan discussed with: patient        Subjective Evaluation    History of Present Illness  Date of surgery: 2022  Mechanism of injury: 22 Re-eval-still L lat thigh/ITB tightness, but improved  Still doesn't feel as fluid  [place] Lo shoes/socks indep, but still feels tight to bend knee to perform  Takes Aleve daily w/ relief  Stair amb w/o issue  Work duties w/o issue  Some stiffness w/ prolonged sitting, but getting better  LEFS 74/80 (improved)  22 Re-eval-pt reports still modifying to don shoes/socks  Feels strength and fluidity of mov't is improved, but still an issue  Taking Aleve 1x/day still  Still stiff after sitting/prolonged positioning, but better than previously  Descending stairs easier than up, still w/ weakness/dec propulsion when ascending stairs  LEFS 72/80 (improved)  10/25/22 Eval-pt known to this clinic due to PT following L TKR 22  Pt was last seen 22  Pt RTW following  Pt finds he still doesn't feel as strong or have the fluidity of mov't he needs I e  Difficulty manuevering to don/doff shoes and socks  Pt takes Aleve 1x/day ( 2 pills)-w/ relief      Stiffness paulina after sitting/prolonged positioning  L s/l uncomfortable-needs pillow b/w legs for sleep  Pt w/ noticeable limp-able to correct w/ VPs/conscious effort-still feels different to walk "normal  Weak on steps, but able to do reciprocal (occ non-reciprocal)   LEFS 66/80            Recurrent probem    Pain  Current pain ratin  At best pain ratin  At worst pain ratin  Location: L lat knee/ITB  Quality: stiff  Progression: improved    Social Support  Steps to enter house: yes (3-reciprocal most of time)  Stairs in house: yes   Lives in: multiple-level home  Lives with: adult children (son)    Employment status: working ([employer] a lot)  Patient Goals  Patient goals for therapy: increased strength  Patient goal: improve ease of mov't w/ tasks   (met)        Objective     Observations     Additional Observation Details  L knee tight thickness/inc girth as compared to R knee due to TKR  Soft lump L knee inf lat  pat-not painful to palp (at site of previous childhood scar)    Palpation     Additional Palpation Details  Pat mob (L) intact w/ good mobility;  L ITB restriction present, but improved (<R ITB which hasn't been receiving Graston IASTM/STM)    Pt no longer w/ L ITB "divot"  That he had upon eval      Active Range of Motion   Left Knee   Flexion: 126 (KTC; =) degrees   Extension: -3 (supine; improved) degrees   Extensor lag: -8 (=) degrees     Passive Range of Motion   Left Hip   Flexion: 70 (SLR; =) degrees     Right Hip   Flexion: 75 (SLR;=) degrees     Additional Passive Range of Motion Details  No pain w/ passive L ITB stretch     Strength/Myotome Testing     Left Hip   Planes of Motion   Flexion: 5 (=)  Extension: 4 (improved)  Abduction: 5 (improved)    Right Hip   Planes of Motion   Flexion: 5  Extension: 4+  Abduction: 4+    Left Knee   Flexion: 5  Prone flexion: 5 (improved)  Quadriceps contraction: good    Right Knee   Flexion: 5  Prone flexion: 5  Quadriceps contraction: good    Left Ankle/Foot   Dorsiflexion: 5  Plantar flexion: 5    Right Ankle/Foot   Dorsiflexion: 5  Plantar flexion: 5    Additional Strength Details  Deep squat to 122 deg knee flex unsupported (improved)  SLS R and L 10" unsupported =  unisquat  R x 5 unsupported w/ good excursion                  L x 5 unsupported w/ good excursion, though fingertip support     Tests     Additional Tests Details  10/25/22 Lat Step Down Test  R  1 (good)     11/22/22  R 1 (good) =                                                    L 4 (poor)                       L 2 (average) improved    12/29/22  R 0 (good)  =                   L 1 (good) improved    Ambulation     Comments   Amb indep without AD x clinic distances and parking lot to clinic   Pt w/ noted improved consistency w/ proper gait technique-noted limp w/ fatigue, but less frequency      Flowsheet Rows    Flowsheet Row Most Recent Value   PT/OT G-Codes    Current Score 72   Projected Score 75   Assessment Type Re-evaluation             Precautions: HTN, DM   12/22/22 12/27 12/29/22   11/10/2 11/15/22 11/17/22 11/22/22 12/13/22   Manuals #11 #12 #13 Re-eval   #6 #7 #8 #9 Re-eval #10   R s/l:  Foam rolling/manual STM L ITB AE-Graston NV AE   AE-Graston AE-Graston AE-Graston AE-Graston AE-Graston                                          Neuro Re-Ed             Stand: forward Alt foot taps on cone Foam x 20 Foam x 20 Foam x20   Foam x20 Foam x20 Foam x20 Foam x20    Foam x20   Stand: lat alt foot taps on cones Foam x20 Foam x20 Foam x20   Foam x20 Foam x20 Foam x20 Foam x20    Foam x20   Step ups:  -onto chair 5# ea hand  7 3/4" step to chair x20 L 5# ea hand  7 3/4" step to chair x20 L 5# ea hand  7 3/4" step to chair x20 L   two risers x20 L 3# ea hand  two risers x20 L 5# ea hand  two risers x20 L 5# ea hand  two risers x20 L x20 L (19" height)   -L lat step ups 5# ea hand  7 3/4" step  x20 L 5# ea hand  7 3/4" step  x20 L 5# ea hand  7 3/4" step  x20 L   Two risers x 20 to L 3# ea hand  two risers x20 L 5# ea hand  two risers x20 L 5# ea hand  two risers x20 L 5# ea hand  two risers x20 LL   Step downs 5# ea hand  5 1/2" step  x20 L 5# ea hand  5 1/2" step  x20 L 5# ea hand  5 1/2" step  x20 L   two risers 2x10 3# ea hand  two risers x20 L 5# ea hand  two risers x20 L 5# ea hand  two risers x20 L 5# ea hand  two risers x20 L   Knee flex stretch on stool      x5 x5  x5 x5   Side step GTB 2 x 25' ea dir GTB 2 x 25' ea dir GTB 2 x 25' ea dir   GTB x 20' ea dir GTB 2 x 20' ea dir GTB 2 x 20' ea dir GTB 2 x 20' ea dir GTB 2 x 20' ea dir   Diagonal steps for/back w/ TB 1x 25' ea dir (GTB) 1x 25' ea dir (GTB) 2x 20' ea dir (GTB)   GTB x 20' ea dir GTB 2 x 20' ea dir GTB 2 x 20' ea dir GTB 2 x 20' ea dir GTB 2 x 20' ea dir   Ther Ex             Leg press-BL 45#  2x10 55#    - - - - -   LLE sit to stand HOLD  -   - - - - x5 L   Stand:  -squats  -unisquat x20  x20 x20  x20 x2x20   x20  2x10 x20  2x10 x20  2x10 x20  2x10 x20  2x10   Stand:  -ITB stretch 3x20" 3x20" 3x20"   3x15" 3x15" 3x15" 3x15" 3x15"   HC stretch at step 3x20" 3x20"    3x15" 3x15" 3x15" 3x15"    Supine: SLR x20 x20 x20    x20 x20  x20   S/l hip abd stretch (behind) 3x15" NV, missed 3x20"   3x15" 3x15" 3x15" 3x15" 3x15"   S/l hip abd x20 x20 x20   x30 x30 x30 x30 x20   Prone knee flex w/ overpressure stretch x5 x10 x10   x10 x10 x10 x10 x10   Prone hip ext x20 x20 x20   x20 x20 x20 x20 x20   Ther Activity             Xride seat 6 10' 10' 10'   10' 10'   x10' x10' -   Recumbent bike-seat 16 - - -   - - - - 10'   Gait Training                                       Modalities

## 2023-01-01 ENCOUNTER — APPOINTMENT (OUTPATIENT)
Dept: RADIOLOGY | Facility: HOSPITAL | Age: 63
End: 2023-01-01

## 2023-01-01 ENCOUNTER — APPOINTMENT (INPATIENT)
Dept: RADIOLOGY | Facility: HOSPITAL | Age: 63
End: 2023-01-01

## 2023-01-01 ENCOUNTER — HOSPITAL ENCOUNTER (INPATIENT)
Facility: HOSPITAL | Age: 63
LOS: 6 days | End: 2023-01-20
Attending: RADIOLOGY | Admitting: INTERNAL MEDICINE

## 2023-01-01 ENCOUNTER — APPOINTMENT (INPATIENT)
Dept: GASTROENTEROLOGY | Facility: HOSPITAL | Age: 63
End: 2023-01-01

## 2023-01-01 ENCOUNTER — TRANSCRIBE ORDERS (OUTPATIENT)
Dept: HOME HEALTH SERVICES | Facility: HOME HEALTHCARE | Age: 63
End: 2023-01-01

## 2023-01-01 ENCOUNTER — APPOINTMENT (INPATIENT)
Dept: CT IMAGING | Facility: HOSPITAL | Age: 63
End: 2023-01-01

## 2023-01-01 ENCOUNTER — ANESTHESIA (INPATIENT)
Dept: RADIOLOGY | Facility: HOSPITAL | Age: 63
End: 2023-01-01

## 2023-01-01 ENCOUNTER — ANESTHESIA EVENT (INPATIENT)
Dept: RADIOLOGY | Facility: HOSPITAL | Age: 63
End: 2023-01-01

## 2023-01-01 ENCOUNTER — APPOINTMENT (INPATIENT)
Dept: NON INVASIVE DIAGNOSTICS | Facility: HOSPITAL | Age: 63
End: 2023-01-01

## 2023-01-01 ENCOUNTER — ANESTHESIA (INPATIENT)
Dept: PERIOP | Facility: HOSPITAL | Age: 63
End: 2023-01-01

## 2023-01-01 ENCOUNTER — TELEPHONE (OUTPATIENT)
Dept: OBGYN CLINIC | Facility: HOSPITAL | Age: 63
End: 2023-01-01

## 2023-01-01 ENCOUNTER — APPOINTMENT (INPATIENT)
Dept: MRI IMAGING | Facility: HOSPITAL | Age: 63
End: 2023-01-01

## 2023-01-01 ENCOUNTER — HOSPITAL ENCOUNTER (OUTPATIENT)
Dept: RADIOLOGY | Facility: HOSPITAL | Age: 63
Discharge: HOME | End: 2023-01-14
Attending: RADIOLOGY

## 2023-01-01 ENCOUNTER — APPOINTMENT (INPATIENT)
Dept: GASTROENTEROLOGY | Facility: HOSPITAL | Age: 63
End: 2023-01-01
Attending: INTERNAL MEDICINE

## 2023-01-01 ENCOUNTER — ANESTHESIA EVENT (INPATIENT)
Dept: PERIOP | Facility: HOSPITAL | Age: 63
End: 2023-01-01

## 2023-01-01 ENCOUNTER — HOSPITAL ENCOUNTER (INPATIENT)
Facility: HOSPITAL | Age: 63
LOS: 7 days | End: 2023-01-14
Attending: EMERGENCY MEDICINE | Admitting: INTERNAL MEDICINE

## 2023-01-01 VITALS
BODY MASS INDEX: 32.19 KG/M2 | HEART RATE: 72 BPM | HEIGHT: 70 IN | RESPIRATION RATE: 24 BRPM | TEMPERATURE: 100.8 F | DIASTOLIC BLOOD PRESSURE: 79 MMHG | OXYGEN SATURATION: 98 % | SYSTOLIC BLOOD PRESSURE: 103 MMHG | WEIGHT: 224.87 LBS

## 2023-01-01 VITALS
HEART RATE: 128 BPM | DIASTOLIC BLOOD PRESSURE: 58 MMHG | HEIGHT: 70 IN | TEMPERATURE: 100 F | BODY MASS INDEX: 32.21 KG/M2 | SYSTOLIC BLOOD PRESSURE: 121 MMHG | WEIGHT: 225 LBS | OXYGEN SATURATION: 100 % | RESPIRATION RATE: 30 BRPM

## 2023-01-01 DIAGNOSIS — I63.412 ACUTE STROKE DUE TO EMBOLISM OF LEFT MIDDLE CEREBRAL ARTERY (HCC): Primary | ICD-10-CM

## 2023-01-01 DIAGNOSIS — K92.1 HEMATOCHEZIA: ICD-10-CM

## 2023-01-01 DIAGNOSIS — L97.529 ISCHEMIC ULCER OF TOES ON BOTH FEET (HCC): ICD-10-CM

## 2023-01-01 DIAGNOSIS — D64.9 ANEMIA, UNSPECIFIED TYPE: ICD-10-CM

## 2023-01-01 DIAGNOSIS — L97.519 ISCHEMIC ULCER OF TOES ON BOTH FEET (HCC): ICD-10-CM

## 2023-01-01 DIAGNOSIS — R01.1 MURMUR: ICD-10-CM

## 2023-01-01 DIAGNOSIS — I35.8 AORTIC VALVE ENDOCARDITIS: ICD-10-CM

## 2023-01-01 DIAGNOSIS — I76 SEPTIC EMBOLISM (HCC): ICD-10-CM

## 2023-01-01 DIAGNOSIS — M00.9 SEPTIC ARTHRITIS, DUE TO UNSPECIFIED ORGANISM, SEPTIC ARTHRITIS OF UNSPECIFIED LOCATION (HCC): ICD-10-CM

## 2023-01-01 DIAGNOSIS — T84.54XA INFECTION OF TOTAL LEFT KNEE REPLACEMENT, INITIAL ENCOUNTER (HCC): ICD-10-CM

## 2023-01-01 DIAGNOSIS — R82.71 BACTERIURIA: ICD-10-CM

## 2023-01-01 DIAGNOSIS — M00.9 INFECTION OF LEFT ELBOW (HCC): ICD-10-CM

## 2023-01-01 DIAGNOSIS — L01.00 IMPETIGO: ICD-10-CM

## 2023-01-01 DIAGNOSIS — Z96.652 STATUS POST TOTAL KNEE REPLACEMENT, LEFT: ICD-10-CM

## 2023-01-01 DIAGNOSIS — I63.9 CEREBROVASCULAR ACCIDENT (CVA), UNSPECIFIED MECHANISM (HCC): ICD-10-CM

## 2023-01-01 DIAGNOSIS — L03.115 CELLULITIS OF RIGHT FOOT: Primary | ICD-10-CM

## 2023-01-01 LAB
2HR DELTA HS TROPONIN: -37 NG/L
4HR DELTA HS TROPONIN: 67 NG/L
ABO GROUP BLD BPU: NORMAL
ABO GROUP BLD: NORMAL
ABO GROUP BLD: NORMAL
ALBUMIN SERPL BCP-MCNC: 1.3 G/DL (ref 3.5–5)
ALBUMIN SERPL BCP-MCNC: 1.7 G/DL (ref 3.5–5)
ALBUMIN SERPL BCP-MCNC: 1.8 G/DL (ref 3.5–5)
ALBUMIN SERPL BCP-MCNC: 2 G/DL (ref 3.5–5)
ALBUMIN SERPL BCP-MCNC: 2.3 G/DL (ref 3.5–5)
ALBUMIN SERPL BCP-MCNC: 2.6 G/DL (ref 3.5–5)
ALP SERPL-CCNC: 101 U/L (ref 46–116)
ALP SERPL-CCNC: 104 U/L (ref 46–116)
ALP SERPL-CCNC: 110 U/L (ref 46–116)
ALP SERPL-CCNC: 120 U/L (ref 46–116)
ALP SERPL-CCNC: 127 U/L (ref 46–116)
ALP SERPL-CCNC: 68 U/L (ref 46–116)
ALP SERPL-CCNC: 73 U/L (ref 46–116)
ALP SERPL-CCNC: 74 U/L (ref 46–116)
ALP SERPL-CCNC: 77 U/L (ref 46–116)
ALP SERPL-CCNC: 87 U/L (ref 46–116)
ALP SERPL-CCNC: 98 U/L (ref 46–116)
ALT SERPL W P-5'-P-CCNC: 118 U/L (ref 12–78)
ALT SERPL W P-5'-P-CCNC: 14 U/L (ref 12–78)
ALT SERPL W P-5'-P-CCNC: 15 U/L (ref 12–78)
ALT SERPL W P-5'-P-CCNC: 17 U/L (ref 12–78)
ALT SERPL W P-5'-P-CCNC: 17 U/L (ref 12–78)
ALT SERPL W P-5'-P-CCNC: 19 U/L (ref 12–78)
ALT SERPL W P-5'-P-CCNC: 22 U/L (ref 12–78)
ALT SERPL W P-5'-P-CCNC: 28 U/L (ref 12–78)
ALT SERPL W P-5'-P-CCNC: 63 U/L (ref 12–78)
ALT SERPL W P-5'-P-CCNC: 83 U/L (ref 12–78)
ALT SERPL W P-5'-P-CCNC: 86 U/L (ref 12–78)
ANION GAP SERPL CALCULATED.3IONS-SCNC: 12 MMOL/L (ref 4–13)
ANION GAP SERPL CALCULATED.3IONS-SCNC: 13 MMOL/L (ref 4–13)
ANION GAP SERPL CALCULATED.3IONS-SCNC: 14 MMOL/L (ref 4–13)
ANION GAP SERPL CALCULATED.3IONS-SCNC: 16 MMOL/L (ref 4–13)
ANION GAP SERPL CALCULATED.3IONS-SCNC: 17 MMOL/L (ref 4–13)
ANION GAP SERPL CALCULATED.3IONS-SCNC: 3 MMOL/L (ref 4–13)
ANION GAP SERPL CALCULATED.3IONS-SCNC: 4 MMOL/L (ref 4–13)
ANION GAP SERPL CALCULATED.3IONS-SCNC: 5 MMOL/L (ref 4–13)
ANION GAP SERPL CALCULATED.3IONS-SCNC: 5 MMOL/L (ref 4–13)
ANION GAP SERPL CALCULATED.3IONS-SCNC: 6 MMOL/L (ref 4–13)
ANION GAP SERPL CALCULATED.3IONS-SCNC: 7 MMOL/L (ref 4–13)
ANION GAP SERPL CALCULATED.3IONS-SCNC: 7 MMOL/L (ref 4–13)
ANION GAP SERPL CALCULATED.3IONS-SCNC: 8 MMOL/L (ref 4–13)
ANION GAP SERPL CALCULATED.3IONS-SCNC: 9 MMOL/L (ref 4–13)
AORTIC ROOT: 3.6 CM
AORTIC ROOT: 4 CM
AORTIC VALVE MEAN VELOCITY: 11.5 M/S
APICAL FOUR CHAMBER EJECTION FRACTION: 68 %
APICAL FOUR CHAMBER EJECTION FRACTION: 79 %
APPEARANCE FLD: ABNORMAL
APTT PPP: 26 SECONDS (ref 23–37)
APTT PPP: 28 SECONDS (ref 23–37)
APTT PPP: 33 SECONDS (ref 23–37)
ASCENDING AORTA: 3.9 CM
AST SERPL W P-5'-P-CCNC: 21 U/L (ref 5–45)
AST SERPL W P-5'-P-CCNC: 212 U/L (ref 5–45)
AST SERPL W P-5'-P-CCNC: 223 U/L (ref 5–45)
AST SERPL W P-5'-P-CCNC: 23 U/L (ref 5–45)
AST SERPL W P-5'-P-CCNC: 250 U/L (ref 5–45)
AST SERPL W P-5'-P-CCNC: 26 U/L (ref 5–45)
AST SERPL W P-5'-P-CCNC: 43 U/L (ref 5–45)
AST SERPL W P-5'-P-CCNC: 51 U/L (ref 5–45)
AST SERPL W P-5'-P-CCNC: 55 U/L (ref 5–45)
AST SERPL W P-5'-P-CCNC: 59 U/L (ref 5–45)
AST SERPL W P-5'-P-CCNC: 67 U/L (ref 5–45)
ATRIAL RATE: 104 BPM
ATRIAL RATE: 111 BPM
AV AREA BY CONTINUOUS VTI: 2.4 CM2
AV AREA PEAK VELOCITY: 2.6 CM2
AV LVOT MEAN GRADIENT: 3 MMHG
AV LVOT PEAK GRADIENT: 7 MMHG
AV MEAN GRADIENT: 6 MMHG
AV PEAK GRADIENT: 10 MMHG
AV PEAK GRADIENT: 35 MMHG
AV REGURGITATION PRESSURE HALF TIME: 302 MS
AV VALVE AREA: 2.36 CM2
AV VELOCITY RATIO: 0.84
BACTERIA BLD CULT: ABNORMAL
BACTERIA BLD CULT: NORMAL
BACTERIA BLD CULT: NORMAL
BACTERIA SPEC ANAEROBE CULT: NORMAL
BACTERIA SPEC BFLD CULT: NO GROWTH
BACTERIA TISS AEROBE CULT: ABNORMAL
BACTERIA UR CULT: ABNORMAL
BACTERIA UR QL AUTO: ABNORMAL /HPF
BACTERIA WND AEROBE CULT: ABNORMAL
BASE EX.OXY STD BLDV CALC-SCNC: 71 % (ref 60–80)
BASE EXCESS BLDA CALC-SCNC: -1.8 MMOL/L
BASE EXCESS BLDA CALC-SCNC: -10 MMOL/L (ref -2–3)
BASE EXCESS BLDA CALC-SCNC: -2.7 MMOL/L
BASE EXCESS BLDA CALC-SCNC: -2.9 MMOL/L
BASE EXCESS BLDA CALC-SCNC: -3.5 MMOL/L
BASE EXCESS BLDA CALC-SCNC: -3.8 MMOL/L
BASE EXCESS BLDA CALC-SCNC: -4.2 MMOL/L
BASE EXCESS BLDA CALC-SCNC: -5 MMOL/L
BASE EXCESS BLDA CALC-SCNC: -5.3 MMOL/L
BASE EXCESS BLDA CALC-SCNC: -5.8 MMOL/L
BASE EXCESS BLDA CALC-SCNC: -6.1 MMOL/L
BASE EXCESS BLDA CALC-SCNC: -6.3 MMOL/L
BASE EXCESS BLDA CALC-SCNC: -6.4 MMOL/L
BASE EXCESS BLDA CALC-SCNC: -6.8 MMOL/L
BASE EXCESS BLDA CALC-SCNC: -7 MMOL/L
BASE EXCESS BLDA CALC-SCNC: -7 MMOL/L (ref -2–3)
BASE EXCESS BLDA CALC-SCNC: -7.8 MMOL/L
BASE EXCESS BLDA CALC-SCNC: -8 MMOL/L (ref -2–3)
BASE EXCESS BLDV CALC-SCNC: -7.6 MMOL/L
BASOPHILS # BLD AUTO: 0.01 THOUSANDS/ÂΜL (ref 0–0.1)
BASOPHILS # BLD AUTO: 0.02 THOUSANDS/ÂΜL (ref 0–0.1)
BASOPHILS # BLD AUTO: 0.02 THOUSANDS/ÂΜL (ref 0–0.1)
BASOPHILS # BLD AUTO: 0.04 THOUSANDS/ÂΜL (ref 0–0.1)
BASOPHILS # BLD AUTO: 0.04 THOUSANDS/ÂΜL (ref 0–0.1)
BASOPHILS # BLD AUTO: 0.05 THOUSANDS/ÂΜL (ref 0–0.1)
BASOPHILS # BLD AUTO: 0.06 THOUSANDS/ÂΜL (ref 0–0.1)
BASOPHILS # BLD AUTO: 0.07 THOUSANDS/ÂΜL (ref 0–0.1)
BASOPHILS # BLD MANUAL: 0 THOUSAND/UL (ref 0–0.1)
BASOPHILS NFR BLD AUTO: 0 % (ref 0–1)
BASOPHILS NFR BLD AUTO: 1 % (ref 0–1)
BASOPHILS NFR MAR MANUAL: 0 % (ref 0–1)
BILIRUB DIRECT SERPL-MCNC: 0.15 MG/DL (ref 0–0.2)
BILIRUB DIRECT SERPL-MCNC: 0.28 MG/DL (ref 0–0.2)
BILIRUB DIRECT SERPL-MCNC: 0.31 MG/DL (ref 0–0.2)
BILIRUB SERPL-MCNC: 0.49 MG/DL (ref 0.2–1)
BILIRUB SERPL-MCNC: 0.54 MG/DL (ref 0.2–1)
BILIRUB SERPL-MCNC: 0.54 MG/DL (ref 0.2–1)
BILIRUB SERPL-MCNC: 0.55 MG/DL (ref 0.2–1)
BILIRUB SERPL-MCNC: 0.58 MG/DL (ref 0.2–1)
BILIRUB SERPL-MCNC: 0.59 MG/DL (ref 0.2–1)
BILIRUB SERPL-MCNC: 0.6 MG/DL (ref 0.2–1)
BILIRUB SERPL-MCNC: 0.73 MG/DL (ref 0.2–1)
BILIRUB SERPL-MCNC: 0.75 MG/DL (ref 0.2–1)
BILIRUB SERPL-MCNC: 0.79 MG/DL (ref 0.2–1)
BILIRUB SERPL-MCNC: 0.82 MG/DL (ref 0.2–1)
BILIRUB UR QL STRIP: NEGATIVE
BLD GP AB SCN SERPL QL: NEGATIVE
BLD GP AB SCN SERPL QL: NEGATIVE
BLD SMEAR INTERP: NORMAL
BODY TEMPERATURE: 100.6 DEGREES FEHRENHEIT
BODY TEMPERATURE: 101.3 DEGREES FEHRENHEIT
BODY TEMPERATURE: 101.8 DEGREES FEHRENHEIT
BODY TEMPERATURE: 103.3 DEGREES FEHRENHEIT
BODY TEMPERATURE: 99.3 DEGREES FEHRENHEIT
BPU ID: NORMAL
BUN SERPL-MCNC: 17 MG/DL (ref 5–25)
BUN SERPL-MCNC: 17 MG/DL (ref 5–25)
BUN SERPL-MCNC: 18 MG/DL (ref 5–25)
BUN SERPL-MCNC: 19 MG/DL (ref 5–25)
BUN SERPL-MCNC: 19 MG/DL (ref 5–25)
BUN SERPL-MCNC: 20 MG/DL (ref 5–25)
BUN SERPL-MCNC: 21 MG/DL (ref 5–25)
BUN SERPL-MCNC: 24 MG/DL (ref 5–25)
BUN SERPL-MCNC: 24 MG/DL (ref 5–25)
BUN SERPL-MCNC: 26 MG/DL (ref 5–25)
BUN SERPL-MCNC: 26 MG/DL (ref 5–25)
BUN SERPL-MCNC: 29 MG/DL (ref 5–25)
BUN SERPL-MCNC: 29 MG/DL (ref 5–25)
BUN SERPL-MCNC: 30 MG/DL (ref 5–25)
BUN SERPL-MCNC: 30 MG/DL (ref 5–25)
BUN SERPL-MCNC: 34 MG/DL (ref 5–25)
BUN SERPL-MCNC: 34 MG/DL (ref 5–25)
BUN SERPL-MCNC: 38 MG/DL (ref 5–25)
BUN SERPL-MCNC: 38 MG/DL (ref 5–25)
BUN SERPL-MCNC: 45 MG/DL (ref 5–25)
BUN SERPL-MCNC: 48 MG/DL (ref 5–25)
BUN SERPL-MCNC: 50 MG/DL (ref 5–25)
C TRACH DNA SPEC QL NAA+PROBE: NEGATIVE
CA-I BLD-SCNC: 1.13 MMOL/L (ref 1.12–1.32)
CA-I BLD-SCNC: 1.19 MMOL/L (ref 1.12–1.32)
CA-I BLD-SCNC: 1.25 MMOL/L (ref 1.12–1.32)
CALCIUM ALBUM COR SERPL-MCNC: 10.5 MG/DL (ref 8.3–10.1)
CALCIUM ALBUM COR SERPL-MCNC: 10.7 MG/DL (ref 8.3–10.1)
CALCIUM ALBUM COR SERPL-MCNC: 10.8 MG/DL (ref 8.3–10.1)
CALCIUM ALBUM COR SERPL-MCNC: 10.8 MG/DL (ref 8.3–10.1)
CALCIUM ALBUM COR SERPL-MCNC: 10.9 MG/DL (ref 8.3–10.1)
CALCIUM ALBUM COR SERPL-MCNC: 11.1 MG/DL (ref 8.3–10.1)
CALCIUM ALBUM COR SERPL-MCNC: 11.1 MG/DL (ref 8.3–10.1)
CALCIUM ALBUM COR SERPL-MCNC: 9.7 MG/DL (ref 8.3–10.1)
CALCIUM SERPL-MCNC: 7.2 MG/DL (ref 8.3–10.1)
CALCIUM SERPL-MCNC: 7.5 MG/DL (ref 8.3–10.1)
CALCIUM SERPL-MCNC: 7.5 MG/DL (ref 8.3–10.1)
CALCIUM SERPL-MCNC: 7.7 MG/DL (ref 8.3–10.1)
CALCIUM SERPL-MCNC: 7.7 MG/DL (ref 8.3–10.1)
CALCIUM SERPL-MCNC: 8 MG/DL (ref 8.3–10.1)
CALCIUM SERPL-MCNC: 8 MG/DL (ref 8.3–10.1)
CALCIUM SERPL-MCNC: 8.1 MG/DL (ref 8.3–10.1)
CALCIUM SERPL-MCNC: 8.2 MG/DL (ref 8.3–10.1)
CALCIUM SERPL-MCNC: 8.2 MG/DL (ref 8.3–10.1)
CALCIUM SERPL-MCNC: 8.4 MG/DL (ref 8.3–10.1)
CALCIUM SERPL-MCNC: 8.5 MG/DL (ref 8.3–10.1)
CALCIUM SERPL-MCNC: 8.5 MG/DL (ref 8.3–10.1)
CALCIUM SERPL-MCNC: 8.6 MG/DL (ref 8.3–10.1)
CALCIUM SERPL-MCNC: 8.7 MG/DL (ref 8.3–10.1)
CALCIUM SERPL-MCNC: 8.9 MG/DL (ref 8.3–10.1)
CALCIUM SERPL-MCNC: 9.1 MG/DL (ref 8.3–10.1)
CALCIUM SERPL-MCNC: 9.2 MG/DL (ref 8.3–10.1)
CALCIUM SERPL-MCNC: 9.3 MG/DL (ref 8.3–10.1)
CALCIUM SERPL-MCNC: 9.6 MG/DL (ref 8.3–10.1)
CALCIUM SERPL-MCNC: 9.7 MG/DL (ref 8.3–10.1)
CALCIUM SERPL-MCNC: 9.7 MG/DL (ref 8.3–10.1)
CARDIAC TROPONIN I PNL SERPL HS: 608 NG/L
CARDIAC TROPONIN I PNL SERPL HS: 645 NG/L
CARDIAC TROPONIN I PNL SERPL HS: 712 NG/L
CHLORIDE SERPL-SCNC: 102 MMOL/L (ref 96–108)
CHLORIDE SERPL-SCNC: 110 MMOL/L (ref 96–108)
CHLORIDE SERPL-SCNC: 114 MMOL/L (ref 96–108)
CHLORIDE SERPL-SCNC: 119 MMOL/L (ref 96–108)
CHLORIDE SERPL-SCNC: 126 MMOL/L (ref 96–108)
CHLORIDE SERPL-SCNC: 127 MMOL/L (ref 96–108)
CHLORIDE SERPL-SCNC: 128 MMOL/L (ref 96–108)
CHLORIDE SERPL-SCNC: 130 MMOL/L (ref 96–108)
CHLORIDE SERPL-SCNC: 130 MMOL/L (ref 96–108)
CHLORIDE SERPL-SCNC: 132 MMOL/L (ref 96–108)
CHLORIDE SERPL-SCNC: 133 MMOL/L (ref 96–108)
CHLORIDE SERPL-SCNC: 134 MMOL/L (ref 96–108)
CHLORIDE SERPL-SCNC: 94 MMOL/L (ref 96–108)
CHLORIDE SERPL-SCNC: 94 MMOL/L (ref 96–108)
CHLORIDE SERPL-SCNC: 95 MMOL/L (ref 96–108)
CHLORIDE SERPL-SCNC: 96 MMOL/L (ref 96–108)
CHLORIDE SERPL-SCNC: 97 MMOL/L (ref 96–108)
CHLORIDE SERPL-SCNC: 98 MMOL/L (ref 96–108)
CHOLEST SERPL-MCNC: 51 MG/DL
CHOLEST SERPL-MCNC: <50 MG/DL
CLARITY UR: CLEAR
CO2 SERPL-SCNC: 17 MMOL/L (ref 21–32)
CO2 SERPL-SCNC: 17 MMOL/L (ref 21–32)
CO2 SERPL-SCNC: 18 MMOL/L (ref 21–32)
CO2 SERPL-SCNC: 19 MMOL/L (ref 21–32)
CO2 SERPL-SCNC: 20 MMOL/L (ref 21–32)
CO2 SERPL-SCNC: 21 MMOL/L (ref 21–32)
CO2 SERPL-SCNC: 22 MMOL/L (ref 21–32)
CO2 SERPL-SCNC: 25 MMOL/L (ref 21–32)
COLOR FLD: YELLOW
COLOR UR: YELLOW
CREAT SERPL-MCNC: 0.79 MG/DL (ref 0.6–1.3)
CREAT SERPL-MCNC: 0.86 MG/DL (ref 0.6–1.3)
CREAT SERPL-MCNC: 0.9 MG/DL (ref 0.6–1.3)
CREAT SERPL-MCNC: 0.92 MG/DL (ref 0.6–1.3)
CREAT SERPL-MCNC: 0.92 MG/DL (ref 0.6–1.3)
CREAT SERPL-MCNC: 0.98 MG/DL (ref 0.6–1.3)
CREAT SERPL-MCNC: 1.01 MG/DL (ref 0.6–1.3)
CREAT SERPL-MCNC: 1.02 MG/DL (ref 0.6–1.3)
CREAT SERPL-MCNC: 1.04 MG/DL (ref 0.6–1.3)
CREAT SERPL-MCNC: 1.05 MG/DL (ref 0.6–1.3)
CREAT SERPL-MCNC: 1.06 MG/DL (ref 0.6–1.3)
CREAT SERPL-MCNC: 1.07 MG/DL (ref 0.6–1.3)
CREAT SERPL-MCNC: 1.08 MG/DL (ref 0.6–1.3)
CREAT SERPL-MCNC: 1.08 MG/DL (ref 0.6–1.3)
CREAT SERPL-MCNC: 1.09 MG/DL (ref 0.6–1.3)
CREAT SERPL-MCNC: 1.09 MG/DL (ref 0.6–1.3)
CREAT SERPL-MCNC: 1.1 MG/DL (ref 0.6–1.3)
CREAT SERPL-MCNC: 1.1 MG/DL (ref 0.6–1.3)
CREAT SERPL-MCNC: 1.11 MG/DL (ref 0.6–1.3)
CREAT SERPL-MCNC: 1.24 MG/DL (ref 0.6–1.3)
CREAT SERPL-MCNC: 1.44 MG/DL (ref 0.6–1.3)
CREAT SERPL-MCNC: 1.5 MG/DL (ref 0.6–1.3)
CROSSMATCH: NORMAL
CRYSTALS SNV QL MICRO: NORMAL
DOP CALC AO PEAK VEL: 1.58 M/S
DOP CALC AO VTI: 29.78 CM
DOP CALC LVOT AREA: 3.14 CM2
DOP CALC LVOT DIAMETER: 2 CM
DOP CALC LVOT PEAK VEL VTI: 22.36 CM
DOP CALC LVOT PEAK VEL: 1.32 M/S
DOP CALC LVOT STROKE INDEX: 31.4 ML/M2
DOP CALC LVOT STROKE VOLUME: 70.21 CM3
E WAVE DECELERATION TIME: 210 MS
E WAVE DECELERATION TIME: 212 MS
EOSINOPHIL # BLD AUTO: 0 THOUSAND/ÂΜL (ref 0–0.61)
EOSINOPHIL # BLD AUTO: 0 THOUSAND/ÂΜL (ref 0–0.61)
EOSINOPHIL # BLD AUTO: 0.03 THOUSAND/ÂΜL (ref 0–0.61)
EOSINOPHIL # BLD AUTO: 0.03 THOUSAND/ÂΜL (ref 0–0.61)
EOSINOPHIL # BLD AUTO: 0.05 THOUSAND/ÂΜL (ref 0–0.61)
EOSINOPHIL # BLD AUTO: 0.09 THOUSAND/ÂΜL (ref 0–0.61)
EOSINOPHIL # BLD AUTO: 0.14 THOUSAND/ÂΜL (ref 0–0.61)
EOSINOPHIL # BLD AUTO: 0.16 THOUSAND/ÂΜL (ref 0–0.61)
EOSINOPHIL # BLD MANUAL: 0.13 THOUSAND/UL (ref 0–0.4)
EOSINOPHIL NFR BLD AUTO: 0 % (ref 0–6)
EOSINOPHIL NFR BLD AUTO: 1 % (ref 0–6)
EOSINOPHIL NFR BLD AUTO: 1 % (ref 0–6)
EOSINOPHIL NFR BLD AUTO: 2 % (ref 0–6)
EOSINOPHIL NFR BLD MANUAL: 1 % (ref 0–6)
ERYTHROCYTE [DISTWIDTH] IN BLOOD BY AUTOMATED COUNT: 13.5 % (ref 11.6–15.1)
ERYTHROCYTE [DISTWIDTH] IN BLOOD BY AUTOMATED COUNT: 13.5 % (ref 11.6–15.1)
ERYTHROCYTE [DISTWIDTH] IN BLOOD BY AUTOMATED COUNT: 13.6 % (ref 11.6–15.1)
ERYTHROCYTE [DISTWIDTH] IN BLOOD BY AUTOMATED COUNT: 13.6 % (ref 11.6–15.1)
ERYTHROCYTE [DISTWIDTH] IN BLOOD BY AUTOMATED COUNT: 13.7 % (ref 11.6–15.1)
ERYTHROCYTE [DISTWIDTH] IN BLOOD BY AUTOMATED COUNT: 14.2 % (ref 11.6–15.1)
ERYTHROCYTE [DISTWIDTH] IN BLOOD BY AUTOMATED COUNT: 14.2 % (ref 11.6–15.1)
ERYTHROCYTE [DISTWIDTH] IN BLOOD BY AUTOMATED COUNT: 14.4 % (ref 11.6–15.1)
ERYTHROCYTE [DISTWIDTH] IN BLOOD BY AUTOMATED COUNT: 14.6 % (ref 11.6–15.1)
ERYTHROCYTE [DISTWIDTH] IN BLOOD BY AUTOMATED COUNT: 15.5 % (ref 11.6–15.1)
ERYTHROCYTE [DISTWIDTH] IN BLOOD BY AUTOMATED COUNT: 16 % (ref 11.6–15.1)
ERYTHROCYTE [DISTWIDTH] IN BLOOD BY AUTOMATED COUNT: 16.7 % (ref 11.6–15.1)
ERYTHROCYTE [DISTWIDTH] IN BLOOD BY AUTOMATED COUNT: 17.3 % (ref 11.6–15.1)
ERYTHROCYTE [DISTWIDTH] IN BLOOD BY AUTOMATED COUNT: 17.4 % (ref 11.6–15.1)
ERYTHROCYTE [DISTWIDTH] IN BLOOD BY AUTOMATED COUNT: 17.5 % (ref 11.6–15.1)
EST. AVERAGE GLUCOSE BLD GHB EST-MCNC: 148 MG/DL
FERRITIN SERPL-MCNC: 8397 NG/ML (ref 8–388)
FLUAV RNA RESP QL NAA+PROBE: NEGATIVE
FLUBV RNA RESP QL NAA+PROBE: NEGATIVE
FOLATE SERPL-MCNC: 18.4 NG/ML (ref 3.1–17.5)
FRACTIONAL SHORTENING: 32 % (ref 28–44)
FRACTIONAL SHORTENING: 47 % (ref 28–44)
FUNGUS SPEC CULT: NORMAL
GFR SERPL CREATININE-BSD FRML MDRD: 49 ML/MIN/1.73SQ M
GFR SERPL CREATININE-BSD FRML MDRD: 51 ML/MIN/1.73SQ M
GFR SERPL CREATININE-BSD FRML MDRD: 61 ML/MIN/1.73SQ M
GFR SERPL CREATININE-BSD FRML MDRD: 70 ML/MIN/1.73SQ M
GFR SERPL CREATININE-BSD FRML MDRD: 71 ML/MIN/1.73SQ M
GFR SERPL CREATININE-BSD FRML MDRD: 71 ML/MIN/1.73SQ M
GFR SERPL CREATININE-BSD FRML MDRD: 72 ML/MIN/1.73SQ M
GFR SERPL CREATININE-BSD FRML MDRD: 72 ML/MIN/1.73SQ M
GFR SERPL CREATININE-BSD FRML MDRD: 73 ML/MIN/1.73SQ M
GFR SERPL CREATININE-BSD FRML MDRD: 74 ML/MIN/1.73SQ M
GFR SERPL CREATININE-BSD FRML MDRD: 75 ML/MIN/1.73SQ M
GFR SERPL CREATININE-BSD FRML MDRD: 76 ML/MIN/1.73SQ M
GFR SERPL CREATININE-BSD FRML MDRD: 78 ML/MIN/1.73SQ M
GFR SERPL CREATININE-BSD FRML MDRD: 79 ML/MIN/1.73SQ M
GFR SERPL CREATININE-BSD FRML MDRD: 82 ML/MIN/1.73SQ M
GFR SERPL CREATININE-BSD FRML MDRD: 88 ML/MIN/1.73SQ M
GFR SERPL CREATININE-BSD FRML MDRD: 88 ML/MIN/1.73SQ M
GFR SERPL CREATININE-BSD FRML MDRD: 91 ML/MIN/1.73SQ M
GFR SERPL CREATININE-BSD FRML MDRD: 92 ML/MIN/1.73SQ M
GFR SERPL CREATININE-BSD FRML MDRD: 96 ML/MIN/1.73SQ M
GLUCOSE SERPL-MCNC: 104 MG/DL (ref 65–140)
GLUCOSE SERPL-MCNC: 114 MG/DL (ref 65–140)
GLUCOSE SERPL-MCNC: 115 MG/DL (ref 65–140)
GLUCOSE SERPL-MCNC: 116 MG/DL (ref 65–140)
GLUCOSE SERPL-MCNC: 117 MG/DL (ref 65–140)
GLUCOSE SERPL-MCNC: 118 MG/DL (ref 65–140)
GLUCOSE SERPL-MCNC: 119 MG/DL (ref 65–140)
GLUCOSE SERPL-MCNC: 121 MG/DL (ref 65–140)
GLUCOSE SERPL-MCNC: 122 MG/DL (ref 65–140)
GLUCOSE SERPL-MCNC: 122 MG/DL (ref 65–140)
GLUCOSE SERPL-MCNC: 124 MG/DL (ref 65–140)
GLUCOSE SERPL-MCNC: 126 MG/DL (ref 65–140)
GLUCOSE SERPL-MCNC: 127 MG/DL (ref 65–140)
GLUCOSE SERPL-MCNC: 128 MG/DL (ref 65–140)
GLUCOSE SERPL-MCNC: 131 MG/DL (ref 65–140)
GLUCOSE SERPL-MCNC: 133 MG/DL (ref 65–140)
GLUCOSE SERPL-MCNC: 134 MG/DL (ref 65–140)
GLUCOSE SERPL-MCNC: 134 MG/DL (ref 65–140)
GLUCOSE SERPL-MCNC: 136 MG/DL (ref 65–140)
GLUCOSE SERPL-MCNC: 138 MG/DL (ref 65–140)
GLUCOSE SERPL-MCNC: 139 MG/DL (ref 65–140)
GLUCOSE SERPL-MCNC: 139 MG/DL (ref 65–140)
GLUCOSE SERPL-MCNC: 142 MG/DL (ref 65–140)
GLUCOSE SERPL-MCNC: 143 MG/DL (ref 65–140)
GLUCOSE SERPL-MCNC: 144 MG/DL (ref 65–140)
GLUCOSE SERPL-MCNC: 145 MG/DL (ref 65–140)
GLUCOSE SERPL-MCNC: 146 MG/DL (ref 65–140)
GLUCOSE SERPL-MCNC: 146 MG/DL (ref 65–140)
GLUCOSE SERPL-MCNC: 153 MG/DL (ref 65–140)
GLUCOSE SERPL-MCNC: 153 MG/DL (ref 65–140)
GLUCOSE SERPL-MCNC: 157 MG/DL (ref 65–140)
GLUCOSE SERPL-MCNC: 157 MG/DL (ref 65–140)
GLUCOSE SERPL-MCNC: 165 MG/DL (ref 65–140)
GLUCOSE SERPL-MCNC: 166 MG/DL (ref 65–140)
GLUCOSE SERPL-MCNC: 168 MG/DL (ref 65–140)
GLUCOSE SERPL-MCNC: 169 MG/DL (ref 65–140)
GLUCOSE SERPL-MCNC: 175 MG/DL (ref 65–140)
GLUCOSE SERPL-MCNC: 176 MG/DL (ref 65–140)
GLUCOSE SERPL-MCNC: 177 MG/DL (ref 65–140)
GLUCOSE SERPL-MCNC: 180 MG/DL (ref 65–140)
GLUCOSE SERPL-MCNC: 181 MG/DL (ref 65–140)
GLUCOSE SERPL-MCNC: 186 MG/DL (ref 65–140)
GLUCOSE SERPL-MCNC: 186 MG/DL (ref 65–140)
GLUCOSE SERPL-MCNC: 190 MG/DL (ref 65–140)
GLUCOSE SERPL-MCNC: 195 MG/DL (ref 65–140)
GLUCOSE SERPL-MCNC: 200 MG/DL (ref 65–140)
GLUCOSE SERPL-MCNC: 201 MG/DL (ref 65–140)
GLUCOSE SERPL-MCNC: 212 MG/DL (ref 65–140)
GLUCOSE SERPL-MCNC: 214 MG/DL (ref 65–140)
GLUCOSE SERPL-MCNC: 221 MG/DL (ref 65–140)
GLUCOSE SERPL-MCNC: 222 MG/DL (ref 65–140)
GLUCOSE SERPL-MCNC: 223 MG/DL (ref 65–140)
GLUCOSE SERPL-MCNC: 224 MG/DL (ref 65–140)
GLUCOSE SERPL-MCNC: 226 MG/DL (ref 65–140)
GLUCOSE SERPL-MCNC: 227 MG/DL (ref 65–140)
GLUCOSE SERPL-MCNC: 227 MG/DL (ref 65–140)
GLUCOSE SERPL-MCNC: 228 MG/DL (ref 65–140)
GLUCOSE SERPL-MCNC: 232 MG/DL (ref 65–140)
GLUCOSE SERPL-MCNC: 232 MG/DL (ref 65–140)
GLUCOSE SERPL-MCNC: 233 MG/DL (ref 65–140)
GLUCOSE SERPL-MCNC: 233 MG/DL (ref 65–140)
GLUCOSE SERPL-MCNC: 239 MG/DL (ref 65–140)
GLUCOSE SERPL-MCNC: 241 MG/DL (ref 65–140)
GLUCOSE SERPL-MCNC: 242 MG/DL (ref 65–140)
GLUCOSE SERPL-MCNC: 246 MG/DL (ref 65–140)
GLUCOSE SERPL-MCNC: 249 MG/DL (ref 65–140)
GLUCOSE SERPL-MCNC: 250 MG/DL (ref 65–140)
GLUCOSE SERPL-MCNC: 252 MG/DL (ref 65–140)
GLUCOSE SERPL-MCNC: 254 MG/DL (ref 65–140)
GLUCOSE SERPL-MCNC: 255 MG/DL (ref 65–140)
GLUCOSE SERPL-MCNC: 258 MG/DL (ref 65–140)
GLUCOSE SERPL-MCNC: 260 MG/DL (ref 65–140)
GLUCOSE SERPL-MCNC: 263 MG/DL (ref 65–140)
GLUCOSE SERPL-MCNC: 264 MG/DL (ref 65–140)
GLUCOSE SERPL-MCNC: 273 MG/DL (ref 65–140)
GLUCOSE SERPL-MCNC: 274 MG/DL (ref 65–140)
GLUCOSE SERPL-MCNC: 276 MG/DL (ref 65–140)
GLUCOSE SERPL-MCNC: 280 MG/DL (ref 65–140)
GLUCOSE SERPL-MCNC: 281 MG/DL (ref 65–140)
GLUCOSE SERPL-MCNC: 283 MG/DL (ref 65–140)
GLUCOSE SERPL-MCNC: 284 MG/DL (ref 65–140)
GLUCOSE SERPL-MCNC: 287 MG/DL (ref 65–140)
GLUCOSE SERPL-MCNC: 296 MG/DL (ref 65–140)
GLUCOSE SERPL-MCNC: 299 MG/DL (ref 65–140)
GLUCOSE SERPL-MCNC: 316 MG/DL (ref 65–140)
GLUCOSE SERPL-MCNC: 93 MG/DL (ref 65–140)
GLUCOSE SERPL-MCNC: 96 MG/DL (ref 65–140)
GLUCOSE SERPL-MCNC: 99 MG/DL (ref 65–140)
GLUCOSE UR STRIP-MCNC: ABNORMAL MG/DL
GRAM STN SPEC: ABNORMAL
GRAM STN SPEC: NORMAL
GRAM STN SPEC: NORMAL
HBA1C MFR BLD: 6.8 %
HBV CORE AB SER QL: NORMAL
HBV CORE IGM SER QL: NORMAL
HBV SURFACE AG SER QL: NORMAL
HCO3 BLDA-SCNC: 14.5 MMOL/L (ref 22–28)
HCO3 BLDA-SCNC: 16.1 MMOL/L (ref 22–28)
HCO3 BLDA-SCNC: 16.2 MMOL/L (ref 22–28)
HCO3 BLDA-SCNC: 16.5 MMOL/L (ref 22–28)
HCO3 BLDA-SCNC: 16.6 MMOL/L (ref 22–28)
HCO3 BLDA-SCNC: 17.3 MMOL/L (ref 22–28)
HCO3 BLDA-SCNC: 17.7 MMOL/L (ref 22–28)
HCO3 BLDA-SCNC: 17.9 MMOL/L (ref 22–28)
HCO3 BLDA-SCNC: 18 MMOL/L (ref 22–28)
HCO3 BLDA-SCNC: 18 MMOL/L (ref 22–28)
HCO3 BLDA-SCNC: 19 MMOL/L (ref 22–28)
HCO3 BLDA-SCNC: 19.3 MMOL/L (ref 22–28)
HCO3 BLDA-SCNC: 19.7 MMOL/L (ref 22–28)
HCO3 BLDA-SCNC: 19.8 MMOL/L (ref 22–28)
HCO3 BLDA-SCNC: 20.1 MMOL/L (ref 22–28)
HCO3 BLDA-SCNC: 20.3 MMOL/L (ref 22–28)
HCO3 BLDA-SCNC: 20.4 MMOL/L (ref 22–28)
HCO3 BLDA-SCNC: 21.4 MMOL/L (ref 22–28)
HCO3 BLDV-SCNC: 17.9 MMOL/L (ref 24–30)
HCT VFR BLD AUTO: 20 % (ref 36.5–49.3)
HCT VFR BLD AUTO: 21.1 % (ref 36.5–49.3)
HCT VFR BLD AUTO: 21.3 % (ref 36.5–49.3)
HCT VFR BLD AUTO: 21.9 % (ref 36.5–49.3)
HCT VFR BLD AUTO: 21.9 % (ref 36.5–49.3)
HCT VFR BLD AUTO: 22.1 % (ref 36.5–49.3)
HCT VFR BLD AUTO: 23.3 % (ref 36.5–49.3)
HCT VFR BLD AUTO: 23.9 % (ref 36.5–49.3)
HCT VFR BLD AUTO: 24.3 % (ref 36.5–49.3)
HCT VFR BLD AUTO: 24.5 % (ref 36.5–49.3)
HCT VFR BLD AUTO: 25.8 % (ref 36.5–49.3)
HCT VFR BLD AUTO: 34.6 % (ref 36.5–49.3)
HCT VFR BLD AUTO: 36.1 % (ref 36.5–49.3)
HCT VFR BLD AUTO: 36.6 % (ref 36.5–49.3)
HCT VFR BLD AUTO: 36.8 % (ref 36.5–49.3)
HCT VFR BLD AUTO: 41 % (ref 36.5–49.3)
HCT VFR BLD AUTO: 41.3 % (ref 36.5–49.3)
HCT VFR BLD AUTO: 41.3 % (ref 36.5–49.3)
HCT VFR BLD AUTO: 42.1 % (ref 36.5–49.3)
HCT VFR BLD CALC: 16 % (ref 36.5–49.3)
HCT VFR BLD CALC: 20 % (ref 36.5–49.3)
HCT VFR BLD CALC: 24 % (ref 36.5–49.3)
HCV AB SER QL: NORMAL
HDLC SERPL-MCNC: 10 MG/DL
HDLC SERPL-MCNC: 8 MG/DL
HGB BLD-MCNC: 12.1 G/DL (ref 12–17)
HGB BLD-MCNC: 12.1 G/DL (ref 12–17)
HGB BLD-MCNC: 12.3 G/DL (ref 12–17)
HGB BLD-MCNC: 12.8 G/DL (ref 12–17)
HGB BLD-MCNC: 13.7 G/DL (ref 12–17)
HGB BLD-MCNC: 14.1 G/DL (ref 12–17)
HGB BLD-MCNC: 14.4 G/DL (ref 12–17)
HGB BLD-MCNC: 14.5 G/DL (ref 12–17)
HGB BLD-MCNC: 6.8 G/DL (ref 12–17)
HGB BLD-MCNC: 6.9 G/DL (ref 12–17)
HGB BLD-MCNC: 7.2 G/DL (ref 12–17)
HGB BLD-MCNC: 7.5 G/DL (ref 12–17)
HGB BLD-MCNC: 7.7 G/DL (ref 12–17)
HGB BLD-MCNC: 7.9 G/DL (ref 12–17)
HGB BLD-MCNC: 7.9 G/DL (ref 12–17)
HGB BLD-MCNC: 8.1 G/DL (ref 12–17)
HGB BLD-MCNC: 8.2 G/DL (ref 12–17)
HGB BLD-MCNC: 8.8 G/DL (ref 12–17)
HGB BLDA-MCNC: 5.4 G/DL (ref 12–17)
HGB BLDA-MCNC: 6.8 G/DL (ref 12–17)
HGB BLDA-MCNC: 8.2 G/DL (ref 12–17)
HGB UR QL STRIP.AUTO: ABNORMAL
HIV 1+2 AB+HIV1 P24 AG SERPL QL IA: NORMAL
HIV1 P24 AG SER QL: NORMAL
HOROWITZ INDEX BLDA+IHG-RTO: 40 MM[HG]
HOROWITZ INDEX BLDA+IHG-RTO: 60 MM[HG]
HSV1 DNA SPEC QL NAA+PROBE: NOT DETECTED
HSV1 DNA SPEC QL NAA+PROBE: NOT DETECTED
IMM GRANULOCYTES # BLD AUTO: 0.06 THOUSAND/UL (ref 0–0.2)
IMM GRANULOCYTES # BLD AUTO: 0.06 THOUSAND/UL (ref 0–0.2)
IMM GRANULOCYTES # BLD AUTO: 0.07 THOUSAND/UL (ref 0–0.2)
IMM GRANULOCYTES # BLD AUTO: 0.17 THOUSAND/UL (ref 0–0.2)
IMM GRANULOCYTES # BLD AUTO: 0.18 THOUSAND/UL (ref 0–0.2)
IMM GRANULOCYTES # BLD AUTO: 0.18 THOUSAND/UL (ref 0–0.2)
IMM GRANULOCYTES # BLD AUTO: 0.2 THOUSAND/UL (ref 0–0.2)
IMM GRANULOCYTES # BLD AUTO: 0.25 THOUSAND/UL (ref 0–0.2)
IMM GRANULOCYTES # BLD AUTO: 0.29 THOUSAND/UL (ref 0–0.2)
IMM GRANULOCYTES # BLD AUTO: >0.5 THOUSAND/UL (ref 0–0.2)
IMM GRANULOCYTES NFR BLD AUTO: 1 % (ref 0–2)
IMM GRANULOCYTES NFR BLD AUTO: 2 % (ref 0–2)
IMM GRANULOCYTES NFR BLD AUTO: 2 % (ref 0–2)
INR PPP: 1.15 (ref 0.84–1.19)
INR PPP: 1.35 (ref 0.84–1.19)
INR PPP: 1.45 (ref 0.84–1.19)
INR PPP: 1.5 (ref 0.84–1.19)
INR PPP: 1.64 (ref 0.84–1.19)
INTERVENTRICULAR SEPTUM IN DIASTOLE (PARASTERNAL SHORT AXIS VIEW): 1.3 CM
INTERVENTRICULAR SEPTUM IN DIASTOLE (PARASTERNAL SHORT AXIS VIEW): 1.3 CM
INTERVENTRICULAR SEPTUM: 1.3 CM (ref 0.6–1.1)
INTERVENTRICULAR SEPTUM: 1.3 CM (ref 0.6–1.1)
IRON SATN MFR SERPL: 56 % (ref 20–50)
IRON SERPL-MCNC: 82 UG/DL (ref 65–175)
KETONES UR STRIP-MCNC: NEGATIVE MG/DL
LAAS-AP2: 20.9 CM2
LAAS-AP4: 21.3 CM2
LACTATE SERPL-SCNC: 1.8 MMOL/L (ref 0.5–2)
LACTATE SERPL-SCNC: 2.9 MMOL/L (ref 0.5–2)
LACTATE SERPL-SCNC: 8.9 MMOL/L (ref 0.5–2)
LDLC SERPL CALC-MCNC: 3 MG/DL (ref 0–100)
LEFT ATRIUM AREA SYSTOLE SINGLE PLANE A4C: 19.6 CM2
LEFT ATRIUM SIZE: 3.5 CM
LEFT INTERNAL DIMENSION IN SYSTOLE: 2.4 CM (ref 2.1–4)
LEFT INTERNAL DIMENSION IN SYSTOLE: 3.4 CM (ref 2.1–4)
LEFT VENTRICULAR INTERNAL DIMENSION IN DIASTOLE: 4.5 CM (ref 3.5–6)
LEFT VENTRICULAR INTERNAL DIMENSION IN DIASTOLE: 5 CM (ref 3.5–6)
LEFT VENTRICULAR POSTERIOR WALL IN END DIASTOLE: 1.3 CM
LEFT VENTRICULAR POSTERIOR WALL IN END DIASTOLE: 1.3 CM
LEFT VENTRICULAR STROKE VOLUME: 69 ML
LEFT VENTRICULAR STROKE VOLUME: 70 ML
LEUKOCYTE ESTERASE UR QL STRIP: ABNORMAL
LVSV (TEICH): 69 ML
LVSV (TEICH): 70 ML
LYMPHOCYTES # BLD AUTO: 0.98 THOUSANDS/ÂΜL (ref 0.6–4.47)
LYMPHOCYTES # BLD AUTO: 1.01 THOUSANDS/ÂΜL (ref 0.6–4.47)
LYMPHOCYTES # BLD AUTO: 1.04 THOUSANDS/ÂΜL (ref 0.6–4.47)
LYMPHOCYTES # BLD AUTO: 1.09 THOUSANDS/ÂΜL (ref 0.6–4.47)
LYMPHOCYTES # BLD AUTO: 1.11 THOUSANDS/ÂΜL (ref 0.6–4.47)
LYMPHOCYTES # BLD AUTO: 1.14 THOUSANDS/ÂΜL (ref 0.6–4.47)
LYMPHOCYTES # BLD AUTO: 1.19 THOUSANDS/ÂΜL (ref 0.6–4.47)
LYMPHOCYTES # BLD AUTO: 1.55 THOUSANDS/ÂΜL (ref 0.6–4.47)
LYMPHOCYTES # BLD AUTO: 1.67 THOUSANDS/ÂΜL (ref 0.6–4.47)
LYMPHOCYTES # BLD AUTO: 17 % (ref 14–44)
LYMPHOCYTES # BLD AUTO: 2 THOUSANDS/ÂΜL (ref 0.6–4.47)
LYMPHOCYTES # BLD AUTO: 2.27 THOUSAND/UL (ref 0.6–4.47)
LYMPHOCYTES # SNV MANUAL: 8 %
LYMPHOCYTES NFR BLD AUTO: 10 % (ref 14–44)
LYMPHOCYTES NFR BLD AUTO: 11 % (ref 14–44)
LYMPHOCYTES NFR BLD AUTO: 13 % (ref 14–44)
LYMPHOCYTES NFR BLD AUTO: 14 % (ref 14–44)
LYMPHOCYTES NFR BLD AUTO: 6 % (ref 14–44)
LYMPHOCYTES NFR BLD AUTO: 7 % (ref 14–44)
LYMPHOCYTES NFR BLD AUTO: 7 % (ref 14–44)
LYMPHOCYTES NFR BLD AUTO: 8 % (ref 14–44)
MAGNESIUM SERPL-MCNC: 1.6 MG/DL (ref 1.6–2.6)
MAGNESIUM SERPL-MCNC: 1.6 MG/DL (ref 1.6–2.6)
MAGNESIUM SERPL-MCNC: 1.7 MG/DL (ref 1.6–2.6)
MAGNESIUM SERPL-MCNC: 1.8 MG/DL (ref 1.6–2.6)
MAGNESIUM SERPL-MCNC: 2 MG/DL (ref 1.6–2.6)
MAGNESIUM SERPL-MCNC: 2.2 MG/DL (ref 1.6–2.6)
MAGNESIUM SERPL-MCNC: 2.3 MG/DL (ref 1.6–2.6)
MCH RBC QN AUTO: 28.9 PG (ref 26.8–34.3)
MCH RBC QN AUTO: 29.4 PG (ref 26.8–34.3)
MCH RBC QN AUTO: 29.5 PG (ref 26.8–34.3)
MCH RBC QN AUTO: 29.7 PG (ref 26.8–34.3)
MCH RBC QN AUTO: 29.9 PG (ref 26.8–34.3)
MCH RBC QN AUTO: 30 PG (ref 26.8–34.3)
MCH RBC QN AUTO: 30.6 PG (ref 26.8–34.3)
MCH RBC QN AUTO: 30.9 PG (ref 26.8–34.3)
MCH RBC QN AUTO: 31 PG (ref 26.8–34.3)
MCH RBC QN AUTO: 31.2 PG (ref 26.8–34.3)
MCH RBC QN AUTO: 31.2 PG (ref 26.8–34.3)
MCH RBC QN AUTO: 31.3 PG (ref 26.8–34.3)
MCH RBC QN AUTO: 31.3 PG (ref 26.8–34.3)
MCHC RBC AUTO-ENTMCNC: 30.2 G/DL (ref 31.4–37.4)
MCHC RBC AUTO-ENTMCNC: 32.2 G/DL (ref 31.4–37.4)
MCHC RBC AUTO-ENTMCNC: 32.6 G/DL (ref 31.4–37.4)
MCHC RBC AUTO-ENTMCNC: 33.1 G/DL (ref 31.4–37.4)
MCHC RBC AUTO-ENTMCNC: 33.4 G/DL (ref 31.4–37.4)
MCHC RBC AUTO-ENTMCNC: 33.9 G/DL (ref 31.4–37.4)
MCHC RBC AUTO-ENTMCNC: 34.1 G/DL (ref 31.4–37.4)
MCHC RBC AUTO-ENTMCNC: 34.2 G/DL (ref 31.4–37.4)
MCHC RBC AUTO-ENTMCNC: 34.5 G/DL (ref 31.4–37.4)
MCHC RBC AUTO-ENTMCNC: 34.8 G/DL (ref 31.4–37.4)
MCHC RBC AUTO-ENTMCNC: 35 G/DL (ref 31.4–37.4)
MCHC RBC AUTO-ENTMCNC: 35.1 G/DL (ref 31.4–37.4)
MCV RBC AUTO: 87 FL (ref 82–98)
MCV RBC AUTO: 88 FL (ref 82–98)
MCV RBC AUTO: 88 FL (ref 82–98)
MCV RBC AUTO: 89 FL (ref 82–98)
MCV RBC AUTO: 89 FL (ref 82–98)
MCV RBC AUTO: 90 FL (ref 82–98)
MCV RBC AUTO: 91 FL (ref 82–98)
MCV RBC AUTO: 91 FL (ref 82–98)
MCV RBC AUTO: 92 FL (ref 82–98)
MCV RBC AUTO: 93 FL (ref 82–98)
MCV RBC AUTO: 96 FL (ref 82–98)
MONOCYTES # BLD AUTO: 0.46 THOUSAND/ÂΜL (ref 0.17–1.22)
MONOCYTES # BLD AUTO: 0.69 THOUSAND/ÂΜL (ref 0.17–1.22)
MONOCYTES # BLD AUTO: 0.88 THOUSAND/ÂΜL (ref 0.17–1.22)
MONOCYTES # BLD AUTO: 1.01 THOUSAND/ÂΜL (ref 0.17–1.22)
MONOCYTES # BLD AUTO: 1.04 THOUSAND/ÂΜL (ref 0.17–1.22)
MONOCYTES # BLD AUTO: 1.06 THOUSAND/ÂΜL (ref 0.17–1.22)
MONOCYTES # BLD AUTO: 1.1 THOUSAND/ÂΜL (ref 0.17–1.22)
MONOCYTES # BLD AUTO: 1.56 THOUSAND/ÂΜL (ref 0.17–1.22)
MONOCYTES # BLD AUTO: 1.64 THOUSAND/ÂΜL (ref 0.17–1.22)
MONOCYTES # BLD AUTO: 1.87 THOUSAND/UL (ref 0–1.22)
MONOCYTES # BLD AUTO: 2.17 THOUSAND/ÂΜL (ref 0.17–1.22)
MONOCYTES NFR BLD AUTO: 11 % (ref 4–12)
MONOCYTES NFR BLD AUTO: 5 % (ref 4–12)
MONOCYTES NFR BLD AUTO: 5 % (ref 4–12)
MONOCYTES NFR BLD AUTO: 6 % (ref 4–12)
MONOCYTES NFR BLD AUTO: 6 % (ref 4–12)
MONOCYTES NFR BLD AUTO: 7 % (ref 4–12)
MONOCYTES NFR BLD AUTO: 8 % (ref 4–12)
MONOCYTES NFR BLD AUTO: 9 % (ref 4–12)
MONOCYTES NFR BLD: 14 % (ref 4–12)
MONOCYTES NFR SNV MANUAL: 5 %
MRSA NOSE QL CULT: NORMAL
MV E'TISSUE VEL-LAT: 10 CM/S
MV E'TISSUE VEL-SEP: 8 CM/S
MV PEAK A VEL: 0.84 M/S
MV PEAK A VEL: 0.94 M/S
MV PEAK E VEL: 48 CM/S
MV PEAK E VEL: 58 CM/S
MV STENOSIS PRESSURE HALF TIME: 61 MS
MV STENOSIS PRESSURE HALF TIME: 61 MS
MV VALVE AREA P 1/2 METHOD: 3.61 CM2
MV VALVE AREA P 1/2 METHOD: 3.61 CM2
N GONORRHOEA DNA SPEC QL NAA+PROBE: NEGATIVE
NEUTROPHILS # BLD AUTO: 11.55 THOUSANDS/ÂΜL (ref 1.85–7.62)
NEUTROPHILS # BLD AUTO: 12.18 THOUSANDS/ÂΜL (ref 1.85–7.62)
NEUTROPHILS # BLD AUTO: 14.53 THOUSANDS/ÂΜL (ref 1.85–7.62)
NEUTROPHILS # BLD AUTO: 15.91 THOUSANDS/ÂΜL (ref 1.85–7.62)
NEUTROPHILS # BLD AUTO: 16.97 THOUSANDS/ÂΜL (ref 1.85–7.62)
NEUTROPHILS # BLD AUTO: 16.98 THOUSANDS/ÂΜL (ref 1.85–7.62)
NEUTROPHILS # BLD AUTO: 23.12 THOUSANDS/ÂΜL (ref 1.85–7.62)
NEUTROPHILS # BLD AUTO: 6.63 THOUSANDS/ÂΜL (ref 1.85–7.62)
NEUTROPHILS # BLD AUTO: 7.11 THOUSANDS/ÂΜL (ref 1.85–7.62)
NEUTROPHILS # BLD AUTO: 7.77 THOUSANDS/ÂΜL (ref 1.85–7.62)
NEUTROPHILS # BLD MANUAL: 9.06 THOUSAND/UL (ref 1.85–7.62)
NEUTROPHILS NFR SNV MANUAL: 87 %
NEUTS SEG NFR BLD AUTO: 68 % (ref 43–75)
NEUTS SEG NFR BLD AUTO: 75 % (ref 43–75)
NEUTS SEG NFR BLD AUTO: 78 % (ref 43–75)
NEUTS SEG NFR BLD AUTO: 78 % (ref 43–75)
NEUTS SEG NFR BLD AUTO: 80 % (ref 43–75)
NEUTS SEG NFR BLD AUTO: 83 % (ref 43–75)
NEUTS SEG NFR BLD AUTO: 83 % (ref 43–75)
NEUTS SEG NFR BLD AUTO: 84 % (ref 43–75)
NEUTS SEG NFR BLD AUTO: 86 % (ref 43–75)
NEUTS SEG NFR BLD AUTO: 87 % (ref 43–75)
NEUTS SEG NFR BLD AUTO: 88 % (ref 43–75)
NITRITE UR QL STRIP: POSITIVE
NON-SQ EPI CELLS URNS QL MICRO: ABNORMAL /HPF
NONHDLC SERPL-MCNC: 43 MG/DL
NRBC BLD AUTO-RTO: 0 /100 WBCS
O2 CT BLDA-SCNC: 10.1 ML/DL (ref 16–23)
O2 CT BLDA-SCNC: 10.1 ML/DL (ref 16–23)
O2 CT BLDA-SCNC: 10.8 ML/DL (ref 16–23)
O2 CT BLDA-SCNC: 10.9 ML/DL (ref 16–23)
O2 CT BLDA-SCNC: 11.1 ML/DL (ref 16–23)
O2 CT BLDA-SCNC: 11.5 ML/DL (ref 16–23)
O2 CT BLDA-SCNC: 11.6 ML/DL (ref 16–23)
O2 CT BLDA-SCNC: 11.8 ML/DL (ref 16–23)
O2 CT BLDA-SCNC: 12.1 ML/DL (ref 16–23)
O2 CT BLDA-SCNC: 12.3 ML/DL (ref 16–23)
O2 CT BLDA-SCNC: 13.4 ML/DL (ref 16–23)
O2 CT BLDA-SCNC: 13.7 ML/DL (ref 16–23)
O2 CT BLDA-SCNC: 16.6 ML/DL (ref 16–23)
O2 CT BLDA-SCNC: 8.4 ML/DL (ref 16–23)
O2 CT BLDA-SCNC: 9.8 ML/DL (ref 16–23)
O2 CT BLDV-SCNC: 7.7 ML/DL
OSMOLALITY UR/SERPL-RTO: 296 MMOL/KG (ref 282–298)
OSMOLALITY UR/SERPL-RTO: 300 MMOL/KG (ref 282–298)
OSMOLALITY UR/SERPL-RTO: 315 MMOL/KG (ref 282–298)
OSMOLALITY UR/SERPL-RTO: 316 MMOL/KG (ref 282–298)
OSMOLALITY UR/SERPL-RTO: 317 MMOL/KG (ref 282–298)
OSMOLALITY UR/SERPL-RTO: 318 MMOL/KG (ref 282–298)
OSMOLALITY UR/SERPL-RTO: 318 MMOL/KG (ref 282–298)
OSMOLALITY UR/SERPL-RTO: 319 MMOL/KG (ref 282–298)
OSMOLALITY UR/SERPL-RTO: 320 MMOL/KG (ref 282–298)
OSMOLALITY UR/SERPL-RTO: 321 MMOL/KG (ref 282–298)
OSMOLALITY UR/SERPL-RTO: 324 MMOL/KG (ref 282–298)
OXYHGB MFR BLDA: 92 % (ref 94–97)
OXYHGB MFR BLDA: 95.3 % (ref 94–97)
OXYHGB MFR BLDA: 95.4 % (ref 94–97)
OXYHGB MFR BLDA: 96.2 % (ref 94–97)
OXYHGB MFR BLDA: 96.4 % (ref 94–97)
OXYHGB MFR BLDA: 96.4 % (ref 94–97)
OXYHGB MFR BLDA: 96.8 % (ref 94–97)
OXYHGB MFR BLDA: 96.9 % (ref 94–97)
OXYHGB MFR BLDA: 97.2 % (ref 94–97)
OXYHGB MFR BLDA: 97.3 % (ref 94–97)
OXYHGB MFR BLDA: 97.3 % (ref 94–97)
OXYHGB MFR BLDA: 97.6 % (ref 94–97)
OXYHGB MFR BLDA: 98 % (ref 94–97)
OXYHGB MFR BLDA: 98.1 % (ref 94–97)
OXYHGB MFR BLDA: 98.5 % (ref 94–97)
P AXIS: 47 DEGREES
P AXIS: 54 DEGREES
PCO2 BLD: 15 MMOL/L (ref 21–32)
PCO2 BLD: 18 MMOL/L (ref 21–32)
PCO2 BLD: 19 MMOL/L (ref 21–32)
PCO2 BLD: 26.9 MM HG (ref 36–44)
PCO2 BLD: 33.7 MM HG (ref 36–44)
PCO2 BLD: 35.8 MM HG (ref 36–44)
PCO2 BLDA: 24.4 MM HG (ref 36–44)
PCO2 BLDA: 24.5 MM HG (ref 36–44)
PCO2 BLDA: 25.4 MM HG (ref 36–44)
PCO2 BLDA: 25.9 MM HG (ref 36–44)
PCO2 BLDA: 27 MM HG (ref 36–44)
PCO2 BLDA: 27.3 MM HG (ref 36–44)
PCO2 BLDA: 28.3 MM HG (ref 36–44)
PCO2 BLDA: 28.3 MM HG (ref 36–44)
PCO2 BLDA: 28.4 MM HG (ref 36–44)
PCO2 BLDA: 28.5 MM HG (ref 36–44)
PCO2 BLDA: 28.6 MM HG (ref 36–44)
PCO2 BLDA: 29.8 MM HG (ref 36–44)
PCO2 BLDA: 31.4 MM HG (ref 36–44)
PCO2 BLDA: 37.5 MM HG (ref 36–44)
PCO2 BLDA: 41.4 MM HG (ref 36–44)
PCO2 BLDV: 36.2 MM HG (ref 42–50)
PCO2 TEMP ADJ BLDA: 24.9 MM HG (ref 36–44)
PCO2 TEMP ADJ BLDA: 26.1 MM HG (ref 36–44)
PCO2 TEMP ADJ BLDA: 28.5 MM HG (ref 36–44)
PCO2 TEMP ADJ BLDA: 29.8 MM HG (ref 36–44)
PCO2 TEMP ADJ BLDA: 30.9 MM HG (ref 36–44)
PEEP RESPIRATORY: 6 CM[H2O]
PEEP RESPIRATORY: 8 CM[H2O]
PEEP RESPIRATORY: 8 CM[H2O]
PH BLD: 7.29 [PH] (ref 7.35–7.45)
PH BLD: 7.34 [PH] (ref 7.35–7.45)
PH BLD: 7.34 [PH] (ref 7.35–7.45)
PH BLD: 7.39 [PH] (ref 7.35–7.45)
PH BLD: 7.43 [PH] (ref 7.35–7.45)
PH BLD: 7.43 [PH] (ref 7.35–7.45)
PH BLD: 7.44 [PH] (ref 7.35–7.45)
PH BLD: 7.46 [PH] (ref 7.35–7.45)
PH BLDA: 7.3 [PH] (ref 7.35–7.45)
PH BLDA: 7.32 [PH] (ref 7.35–7.45)
PH BLDA: 7.39 [PH] (ref 7.35–7.45)
PH BLDA: 7.41 [PH] (ref 7.35–7.45)
PH BLDA: 7.42 [PH] (ref 7.35–7.45)
PH BLDA: 7.43 [PH] (ref 7.35–7.45)
PH BLDA: 7.43 [PH] (ref 7.35–7.45)
PH BLDA: 7.44 [PH] (ref 7.35–7.45)
PH BLDA: 7.45 [PH] (ref 7.35–7.45)
PH BLDA: 7.45 [PH] (ref 7.35–7.45)
PH BLDA: 7.46 [PH] (ref 7.35–7.45)
PH BLDA: 7.46 [PH] (ref 7.35–7.45)
PH BLDA: 7.47 [PH] (ref 7.35–7.45)
PH BLDV: 7.31 [PH] (ref 7.3–7.4)
PH UR STRIP.AUTO: 5.5 [PH] (ref 4.5–8)
PHOSPHATE SERPL-MCNC: 2.6 MG/DL (ref 2.3–4.1)
PHOSPHATE SERPL-MCNC: 3.3 MG/DL (ref 2.3–4.1)
PHOSPHATE SERPL-MCNC: 5.4 MG/DL (ref 2.3–4.1)
PLATELET # BLD AUTO: 139 THOUSANDS/UL (ref 149–390)
PLATELET # BLD AUTO: 156 THOUSANDS/UL (ref 149–390)
PLATELET # BLD AUTO: 173 THOUSANDS/UL (ref 149–390)
PLATELET # BLD AUTO: 180 THOUSANDS/UL (ref 149–390)
PLATELET # BLD AUTO: 182 THOUSANDS/UL (ref 149–390)
PLATELET # BLD AUTO: 183 THOUSANDS/UL (ref 149–390)
PLATELET # BLD AUTO: 191 THOUSANDS/UL (ref 149–390)
PLATELET # BLD AUTO: 191 THOUSANDS/UL (ref 149–390)
PLATELET # BLD AUTO: 194 THOUSANDS/UL (ref 149–390)
PLATELET # BLD AUTO: 221 THOUSANDS/UL (ref 149–390)
PLATELET # BLD AUTO: 224 THOUSANDS/UL (ref 149–390)
PLATELET # BLD AUTO: 227 THOUSANDS/UL (ref 149–390)
PLATELET # BLD AUTO: 227 THOUSANDS/UL (ref 149–390)
PLATELET # BLD AUTO: 238 THOUSANDS/UL (ref 149–390)
PLATELET # BLD AUTO: 257 THOUSANDS/UL (ref 149–390)
PLATELET BLD QL SMEAR: ADEQUATE
PMV BLD AUTO: 10 FL (ref 8.9–12.7)
PMV BLD AUTO: 10 FL (ref 8.9–12.7)
PMV BLD AUTO: 10.1 FL (ref 8.9–12.7)
PMV BLD AUTO: 10.1 FL (ref 8.9–12.7)
PMV BLD AUTO: 10.4 FL (ref 8.9–12.7)
PMV BLD AUTO: 10.7 FL (ref 8.9–12.7)
PMV BLD AUTO: 10.8 FL (ref 8.9–12.7)
PMV BLD AUTO: 9.6 FL (ref 8.9–12.7)
PMV BLD AUTO: 9.7 FL (ref 8.9–12.7)
PMV BLD AUTO: 9.8 FL (ref 8.9–12.7)
PO2 BLD: 105.5 MM HG (ref 75–129)
PO2 BLD: 127 MM HG (ref 75–129)
PO2 BLD: 139.3 MM HG (ref 75–129)
PO2 BLD: 149.1 MM HG (ref 75–129)
PO2 BLD: 156 MM HG (ref 75–129)
PO2 BLD: 295 MM HG (ref 75–129)
PO2 BLD: 64.9 MM HG (ref 75–129)
PO2 BLD: >400 MM HG (ref 75–129)
PO2 BLDA: 106.6 MM HG (ref 75–129)
PO2 BLDA: 107 MM HG (ref 75–129)
PO2 BLDA: 117.3 MM HG (ref 75–129)
PO2 BLDA: 118.1 MM HG (ref 75–129)
PO2 BLDA: 132 MM HG (ref 75–129)
PO2 BLDA: 132.4 MM HG (ref 75–129)
PO2 BLDA: 136.8 MM HG (ref 75–129)
PO2 BLDA: 151.3 MM HG (ref 75–129)
PO2 BLDA: 220.7 MM HG (ref 75–129)
PO2 BLDA: 229.1 MM HG (ref 75–129)
PO2 BLDA: 60.1 MM HG (ref 75–129)
PO2 BLDA: 82.4 MM HG (ref 75–129)
PO2 BLDA: 94.4 MM HG (ref 75–129)
PO2 BLDA: 94.5 MM HG (ref 75–129)
PO2 BLDA: 97.1 MM HG (ref 75–129)
PO2 BLDV: 42 MM HG (ref 35–45)
POTASSIUM BLD-SCNC: 2.7 MMOL/L (ref 3.5–5.3)
POTASSIUM BLD-SCNC: 3.3 MMOL/L (ref 3.5–5.3)
POTASSIUM BLD-SCNC: 5.3 MMOL/L (ref 3.5–5.3)
POTASSIUM SERPL-SCNC: 3.3 MMOL/L (ref 3.5–5.3)
POTASSIUM SERPL-SCNC: 3.3 MMOL/L (ref 3.5–5.3)
POTASSIUM SERPL-SCNC: 3.4 MMOL/L (ref 3.5–5.3)
POTASSIUM SERPL-SCNC: 3.5 MMOL/L (ref 3.5–5.3)
POTASSIUM SERPL-SCNC: 3.6 MMOL/L (ref 3.5–5.3)
POTASSIUM SERPL-SCNC: 3.6 MMOL/L (ref 3.5–5.3)
POTASSIUM SERPL-SCNC: 3.7 MMOL/L (ref 3.5–5.3)
POTASSIUM SERPL-SCNC: 3.9 MMOL/L (ref 3.5–5.3)
POTASSIUM SERPL-SCNC: 4 MMOL/L (ref 3.5–5.3)
POTASSIUM SERPL-SCNC: 4.1 MMOL/L (ref 3.5–5.3)
POTASSIUM SERPL-SCNC: 4.1 MMOL/L (ref 3.5–5.3)
POTASSIUM SERPL-SCNC: 4.3 MMOL/L (ref 3.5–5.3)
POTASSIUM SERPL-SCNC: 4.3 MMOL/L (ref 3.5–5.3)
POTASSIUM SERPL-SCNC: 4.4 MMOL/L (ref 3.5–5.3)
POTASSIUM SERPL-SCNC: 4.5 MMOL/L (ref 3.5–5.3)
POTASSIUM SERPL-SCNC: 4.6 MMOL/L (ref 3.5–5.3)
POTASSIUM SERPL-SCNC: 5.1 MMOL/L (ref 3.5–5.3)
PR INTERVAL: 136 MS
PR INTERVAL: 142 MS
PRESSURE CONTROL: 14
PRESSURE CONTROL: 14
PROCALCITONIN SERPL-MCNC: 15.09 NG/ML
PROCALCITONIN SERPL-MCNC: 3.2 NG/ML
PROT SERPL-MCNC: 4.7 G/DL (ref 6.4–8.4)
PROT SERPL-MCNC: 4.8 G/DL (ref 6.4–8.4)
PROT SERPL-MCNC: 5 G/DL (ref 6.4–8.4)
PROT SERPL-MCNC: 5.3 G/DL (ref 6.4–8.4)
PROT SERPL-MCNC: 6.6 G/DL (ref 6.4–8.4)
PROT SERPL-MCNC: 6.8 G/DL (ref 6.4–8.4)
PROT SERPL-MCNC: 7 G/DL (ref 6.4–8.4)
PROT SERPL-MCNC: 7.1 G/DL (ref 6.4–8.4)
PROT SERPL-MCNC: 7.5 G/DL (ref 6.4–8.4)
PROT SERPL-MCNC: 7.7 G/DL (ref 6.4–8.4)
PROT SERPL-MCNC: 7.9 G/DL (ref 6.4–8.4)
PROT UR STRIP-MCNC: NEGATIVE MG/DL
PROTHROMBIN TIME: 14.7 SECONDS (ref 11.6–14.5)
PROTHROMBIN TIME: 17 SECONDS (ref 11.6–14.5)
PROTHROMBIN TIME: 17.9 SECONDS (ref 11.6–14.5)
PROTHROMBIN TIME: 18.1 SECONDS (ref 11.6–14.5)
PROTHROMBIN TIME: 19.7 SECONDS (ref 11.6–14.5)
PS CM H2O: 5
PS VENT FIO2: 40
PS VENT PEEP: 6
QRS AXIS: 1 DEGREES
QRS AXIS: 25 DEGREES
QRSD INTERVAL: 100 MS
QRSD INTERVAL: 100 MS
QT INTERVAL: 332 MS
QT INTERVAL: 338 MS
QTC INTERVAL: 444 MS
QTC INTERVAL: 451 MS
RBC # BLD AUTO: 2.31 MILLION/UL (ref 3.88–5.62)
RBC # BLD AUTO: 2.32 MILLION/UL (ref 3.88–5.62)
RBC # BLD AUTO: 2.4 MILLION/UL (ref 3.88–5.62)
RBC # BLD AUTO: 2.45 MILLION/UL (ref 3.88–5.62)
RBC # BLD AUTO: 2.61 MILLION/UL (ref 3.88–5.62)
RBC # BLD AUTO: 2.66 MILLION/UL (ref 3.88–5.62)
RBC # BLD AUTO: 2.84 MILLION/UL (ref 3.88–5.62)
RBC # BLD AUTO: 3.88 MILLION/UL (ref 3.88–5.62)
RBC # BLD AUTO: 3.95 MILLION/UL (ref 3.88–5.62)
RBC # BLD AUTO: 4.02 MILLION/UL (ref 3.88–5.62)
RBC # BLD AUTO: 4.1 MILLION/UL (ref 3.88–5.62)
RBC # BLD AUTO: 4.47 MILLION/UL (ref 3.88–5.62)
RBC # BLD AUTO: 4.56 MILLION/UL (ref 3.88–5.62)
RBC # BLD AUTO: 4.6 MILLION/UL (ref 3.88–5.62)
RBC # BLD AUTO: 4.64 MILLION/UL (ref 3.88–5.62)
RBC #/AREA URNS AUTO: ABNORMAL /HPF
RBC MORPH BLD: NORMAL
RH BLD: NEGATIVE
RH BLD: NEGATIVE
RIGHT ATRIUM AREA SYSTOLE A4C: 16 CM2
RIGHT VENTRICLE ID DIMENSION: 4.5 CM
RPR SER QL: NORMAL
RSV RNA RESP QL NAA+PROBE: POSITIVE
S AUREUS DNA BLD POS QL NAA+NON-PROBE: DETECTED
SAO2 % BLD FROM PO2: 100 % (ref 60–85)
SAO2 % BLD FROM PO2: 99 % (ref 60–85)
SARS-COV-2 RNA RESP QL NAA+PROBE: NEGATIVE
SITE: ABNORMAL
SL CV AV DECELERATION TIME RETROGRADE: 1043 MS
SL CV AV PEAK GRADIENT RETROGRADE: 56 MMHG
SL CV LEFT ATRIUM LENGTH A2C: 5.1 CM
SL CV PED ECHO LEFT VENTRICLE DIASTOLIC VOLUME (MOD BIPLANE) 2D: 116 ML
SL CV PED ECHO LEFT VENTRICLE DIASTOLIC VOLUME (MOD BIPLANE) 2D: 91 ML
SL CV PED ECHO LEFT VENTRICLE SYSTOLIC VOLUME (MOD BIPLANE) 2D: 21 ML
SL CV PED ECHO LEFT VENTRICLE SYSTOLIC VOLUME (MOD BIPLANE) 2D: 47 ML
SODIUM BLD-SCNC: 126 MMOL/L (ref 136–145)
SODIUM BLD-SCNC: 154 MMOL/L (ref 136–145)
SODIUM BLD-SCNC: 156 MMOL/L (ref 136–145)
SODIUM SERPL-SCNC: 125 MMOL/L (ref 135–147)
SODIUM SERPL-SCNC: 125 MMOL/L (ref 135–147)
SODIUM SERPL-SCNC: 129 MMOL/L (ref 135–147)
SODIUM SERPL-SCNC: 129 MMOL/L (ref 135–147)
SODIUM SERPL-SCNC: 130 MMOL/L (ref 135–147)
SODIUM SERPL-SCNC: 130 MMOL/L (ref 135–147)
SODIUM SERPL-SCNC: 131 MMOL/L (ref 135–147)
SODIUM SERPL-SCNC: 136 MMOL/L (ref 135–147)
SODIUM SERPL-SCNC: 140 MMOL/L (ref 135–147)
SODIUM SERPL-SCNC: 144 MMOL/L (ref 135–147)
SODIUM SERPL-SCNC: 150 MMOL/L (ref 135–147)
SODIUM SERPL-SCNC: 154 MMOL/L (ref 135–147)
SODIUM SERPL-SCNC: 155 MMOL/L (ref 135–147)
SODIUM SERPL-SCNC: 155 MMOL/L (ref 135–147)
SODIUM SERPL-SCNC: 156 MMOL/L (ref 135–147)
SODIUM SERPL-SCNC: 158 MMOL/L (ref 135–147)
SP GR UR STRIP.AUTO: 1.01 (ref 1–1.03)
SPECIMEN EXPIRATION DATE: NORMAL
SPECIMEN EXPIRATION DATE: NORMAL
SPECIMEN SOURCE: ABNORMAL
T WAVE AXIS: 10 DEGREES
T WAVE AXIS: 24 DEGREES
TIBC SERPL-MCNC: 146 UG/DL (ref 250–450)
TOTAL CELLS COUNTED SPEC: 100
TRICUSPID ANNULAR PLANE SYSTOLIC EXCURSION: 1.5 CM
TRIGL SERPL-MCNC: 130 MG/DL
TRIGL SERPL-MCNC: 201 MG/DL
UNIT DISPENSE STATUS: NORMAL
UNIT PRODUCT CODE: NORMAL
UNIT PRODUCT VOLUME: 350 ML
UNIT RH: NORMAL
UROBILINOGEN UR QL STRIP.AUTO: 0.2 E.U./DL
VENT - PS: ABNORMAL
VENT - PS: ABNORMAL
VENT AC: 16
VENT AC: 18
VENT AC: 22
VENT- AC: AC
VENTRICULAR RATE: 104 BPM
VENTRICULAR RATE: 111 BPM
VIT B12 SERPL-MCNC: 1863 PG/ML (ref 100–900)
VT SETTING VENT: 480 ML
VT SETTING VENT: 520 ML
WBC # BLD AUTO: 10.06 THOUSAND/UL (ref 4.31–10.16)
WBC # BLD AUTO: 11.39 THOUSAND/UL (ref 4.31–10.16)
WBC # BLD AUTO: 13.33 THOUSAND/UL (ref 4.31–10.16)
WBC # BLD AUTO: 13.53 THOUSAND/UL (ref 4.31–10.16)
WBC # BLD AUTO: 14.64 THOUSAND/UL (ref 4.31–10.16)
WBC # BLD AUTO: 15.27 THOUSAND/UL (ref 4.31–10.16)
WBC # BLD AUTO: 16.86 THOUSAND/UL (ref 4.31–10.16)
WBC # BLD AUTO: 18.41 THOUSAND/UL (ref 4.31–10.16)
WBC # BLD AUTO: 19.37 THOUSAND/UL (ref 4.31–10.16)
WBC # BLD AUTO: 20.38 THOUSAND/UL (ref 4.31–10.16)
WBC # BLD AUTO: 22.4 THOUSAND/UL (ref 4.31–10.16)
WBC # BLD AUTO: 27.91 THOUSAND/UL (ref 4.31–10.16)
WBC # BLD AUTO: 8.52 THOUSAND/UL (ref 4.31–10.16)
WBC # BLD AUTO: 9.05 THOUSAND/UL (ref 4.31–10.16)
WBC # BLD AUTO: 9.71 THOUSAND/UL (ref 4.31–10.16)
WBC # FLD MANUAL: 5941 /UL (ref 0–200)
WBC #/AREA URNS AUTO: ABNORMAL /HPF

## 2023-01-01 PROCEDURE — 0DJ08ZZ INSPECTION OF UPPER INTESTINAL TRACT, VIA NATURAL OR ARTIFICIAL OPENING ENDOSCOPIC: ICD-10-PCS | Performed by: STUDENT IN AN ORGANIZED HEALTH CARE EDUCATION/TRAINING PROGRAM

## 2023-01-01 PROCEDURE — B31RYZZ FLUOROSCOPY OF INTRACRANIAL ARTERIES USING OTHER CONTRAST: ICD-10-PCS | Performed by: RADIOLOGY

## 2023-01-01 PROCEDURE — 0S9D3ZX DRAINAGE OF LEFT KNEE JOINT, PERCUTANEOUS APPROACH, DIAGNOSTIC: ICD-10-PCS | Performed by: STUDENT IN AN ORGANIZED HEALTH CARE EDUCATION/TRAINING PROGRAM

## 2023-01-01 PROCEDURE — 4A133B1 MONITORING OF ARTERIAL PRESSURE, PERIPHERAL, PERCUTANEOUS APPROACH: ICD-10-PCS | Performed by: EMERGENCY MEDICINE

## 2023-01-01 PROCEDURE — 30233N1 TRANSFUSION OF NONAUTOLOGOUS RED BLOOD CELLS INTO PERIPHERAL VEIN, PERCUTANEOUS APPROACH: ICD-10-PCS | Performed by: STUDENT IN AN ORGANIZED HEALTH CARE EDUCATION/TRAINING PROGRAM

## 2023-01-01 PROCEDURE — 02HV33Z INSERTION OF INFUSION DEVICE INTO SUPERIOR VENA CAVA, PERCUTANEOUS APPROACH: ICD-10-PCS | Performed by: RADIOLOGY

## 2023-01-01 PROCEDURE — 0SPD0JZ REMOVAL OF SYNTHETIC SUBSTITUTE FROM LEFT KNEE JOINT, OPEN APPROACH: ICD-10-PCS | Performed by: STUDENT IN AN ORGANIZED HEALTH CARE EDUCATION/TRAINING PROGRAM

## 2023-01-01 PROCEDURE — 4A133J1 MONITORING OF ARTERIAL PULSE, PERIPHERAL, PERCUTANEOUS APPROACH: ICD-10-PCS | Performed by: EMERGENCY MEDICINE

## 2023-01-01 PROCEDURE — 0SRD0LZ REPLACEMENT OF LEFT KNEE JOINT WITH MEDIAL UNICONDYLAR SYNTHETIC SUBSTITUTE, OPEN APPROACH: ICD-10-PCS | Performed by: STUDENT IN AN ORGANIZED HEALTH CARE EDUCATION/TRAINING PROGRAM

## 2023-01-01 PROCEDURE — 5A1955Z RESPIRATORY VENTILATION, GREATER THAN 96 CONSECUTIVE HOURS: ICD-10-PCS | Performed by: STUDENT IN AN ORGANIZED HEALTH CARE EDUCATION/TRAINING PROGRAM

## 2023-01-01 PROCEDURE — 0J9H0ZZ DRAINAGE OF LEFT LOWER ARM SUBCUTANEOUS TISSUE AND FASCIA, OPEN APPROACH: ICD-10-PCS | Performed by: STUDENT IN AN ORGANIZED HEALTH CARE EDUCATION/TRAINING PROGRAM

## 2023-01-01 PROCEDURE — 0DJ08ZZ INSPECTION OF UPPER INTESTINAL TRACT, VIA NATURAL OR ARTIFICIAL OPENING ENDOSCOPIC: ICD-10-PCS | Performed by: INTERNAL MEDICINE

## 2023-01-01 PROCEDURE — 03CG3Z7 EXTIRPATION OF MATTER FROM INTRACRANIAL ARTERY USING STENT RETRIEVER, PERCUTANEOUS APPROACH: ICD-10-PCS | Performed by: RADIOLOGY

## 2023-01-01 PROCEDURE — 03HY32Z INSERTION OF MONITORING DEVICE INTO UPPER ARTERY, PERCUTANEOUS APPROACH: ICD-10-PCS | Performed by: EMERGENCY MEDICINE

## 2023-01-01 PROCEDURE — 0SRD0EZ REPLACEMENT OF LEFT KNEE JOINT WITH ARTICULATING SPACER, OPEN APPROACH: ICD-10-PCS | Performed by: STUDENT IN AN ORGANIZED HEALTH CARE EDUCATION/TRAINING PROGRAM

## 2023-01-01 PROCEDURE — 4A103BD MONITORING OF INTRACRANIAL PRESSURE, PERCUTANEOUS APPROACH: ICD-10-PCS | Performed by: NEUROLOGICAL SURGERY

## 2023-01-01 PROCEDURE — 00N00ZZ RELEASE BRAIN, OPEN APPROACH: ICD-10-PCS | Performed by: NEUROLOGICAL SURGERY

## 2023-01-01 PROCEDURE — 30233N1 TRANSFUSION OF NONAUTOLOGOUS RED BLOOD CELLS INTO PERIPHERAL VEIN, PERCUTANEOUS APPROACH: ICD-10-PCS | Performed by: HOSPITALIST

## 2023-01-01 DEVICE — ATTUNE KNEE SYSTEM TIBIAL INSERT FIXED BEARING MEDIAL STABILIZED LEFT AOX 7, 8MM
Type: IMPLANTABLE DEVICE | Site: KNEE | Status: FUNCTIONAL
Brand: ATTUNE

## 2023-01-01 DEVICE — COLLAGEN DURAL REGENERATION MEMBRANE 5IN X 7IN (12.5CM X 17.5CM)
Type: IMPLANTABLE DEVICE | Site: BRAIN | Status: FUNCTIONAL
Brand: DURAMATRIX-ONLAY PLUS

## 2023-01-01 DEVICE — STIMULAN® RAPID CURE PROVIDED STERILE FOR SINGLE PATIENT USE. STIMULAN® RAPID CURE CONTAINS CALCIUM SULFATE POWDER AND MIXING SOLUTION IN PRE-MEASURED QUANTITIES SO THAT WHEN MIXED TOGETHER IN A STERILE MIXING BOWL, THE RESULTANT PASTE IS TO BE DIGITALLY PACKED INTO OPEN BONE VOID/GAP TO SET INSITU OR PLACED INTO THE MOULD PROVIDED, THE MIXTURE SETS TO FORM BEADS. THE BIODEGRADABLE, RADIOPAQUE BEADS ARE RESORBED IN APPROXIMATELY 30 – 60 DAYS WHEN USED IN ACCORDANCE WITH THE DEVICE LABELLING. STIMULAN® RAPID CURE IS MANUFACTURED FROM SYNTHETIC IMPLANT GRADE CALCIUM SULFATE DIHYDRATE(CASO4.2H2O) THAT RESORBS AND IS REPLACED WITH BONE DURING THE HEALING PROCESS. ALSO, AS THE BONE VOID FILLER BEADS ARE BIODEGRADABLE AND BIOCOMPATIBLE, THEY MAY BE USED AT AN INFECTED SITE.
Type: IMPLANTABLE DEVICE | Site: KNEE | Status: FUNCTIONAL
Brand: STIMULAN® RAPID CURE

## 2023-01-01 RX ORDER — CEFAZOLIN SODIUM 2 G/50ML
2000 SOLUTION INTRAVENOUS ONCE
Status: DISCONTINUED | OUTPATIENT
Start: 2023-01-01 | End: 2023-01-01 | Stop reason: HOSPADM

## 2023-01-01 RX ORDER — LORAZEPAM 2 MG/ML
1 INJECTION INTRAMUSCULAR
Status: DISCONTINUED | OUTPATIENT
Start: 2023-01-01 | End: 2023-01-01

## 2023-01-01 RX ORDER — 3% SODIUM CHLORIDE 3 G/100ML
30 INJECTION, SOLUTION INTRAVENOUS CONTINUOUS
Status: DISCONTINUED | OUTPATIENT
Start: 2023-01-01 | End: 2023-01-01

## 2023-01-01 RX ORDER — DOCUSATE SODIUM 100 MG/1
100 CAPSULE, LIQUID FILLED ORAL 2 TIMES DAILY
Status: DISCONTINUED | OUTPATIENT
Start: 2023-01-01 | End: 2023-01-01 | Stop reason: HOSPADM

## 2023-01-01 RX ORDER — DEXTROSE MONOHYDRATE 50 MG/ML
50 INJECTION, SOLUTION INTRAVENOUS CONTINUOUS
Status: DISCONTINUED | OUTPATIENT
Start: 2023-01-01 | End: 2023-01-01

## 2023-01-01 RX ORDER — POTASSIUM CHLORIDE 29.8 MG/ML
40 INJECTION INTRAVENOUS ONCE
Status: COMPLETED | OUTPATIENT
Start: 2023-01-01 | End: 2023-01-01

## 2023-01-01 RX ORDER — CHLORHEXIDINE GLUCONATE 0.12 MG/ML
15 RINSE ORAL ONCE
Status: DISCONTINUED | OUTPATIENT
Start: 2023-01-01 | End: 2023-01-01 | Stop reason: HOSPADM

## 2023-01-01 RX ORDER — ACETAMINOPHEN 160 MG/5ML
650 SUSPENSION, ORAL (FINAL DOSE FORM) ORAL EVERY 6 HOURS
Status: DISCONTINUED | OUTPATIENT
Start: 2023-01-01 | End: 2023-01-01

## 2023-01-01 RX ORDER — MANNITOL 250 MG/ML
INJECTION, SOLUTION INTRAVENOUS AS NEEDED
Status: DISCONTINUED | OUTPATIENT
Start: 2023-01-01 | End: 2023-01-01

## 2023-01-01 RX ORDER — ENOXAPARIN SODIUM 100 MG/ML
40 INJECTION SUBCUTANEOUS DAILY
Status: DISCONTINUED | OUTPATIENT
Start: 2023-01-01 | End: 2023-01-01

## 2023-01-01 RX ORDER — BUDESONIDE AND FORMOTEROL FUMARATE DIHYDRATE 160; 4.5 UG/1; UG/1
2 AEROSOL RESPIRATORY (INHALATION) 2 TIMES DAILY
Status: DISCONTINUED | OUTPATIENT
Start: 2023-01-01 | End: 2023-01-01 | Stop reason: HOSPADM

## 2023-01-01 RX ORDER — ONDANSETRON 2 MG/ML
4 INJECTION INTRAMUSCULAR; INTRAVENOUS ONCE AS NEEDED
Status: DISCONTINUED | OUTPATIENT
Start: 2023-01-01 | End: 2023-01-01 | Stop reason: HOSPADM

## 2023-01-01 RX ORDER — SODIUM CHLORIDE, SODIUM LACTATE, POTASSIUM CHLORIDE, CALCIUM CHLORIDE 600; 310; 30; 20 MG/100ML; MG/100ML; MG/100ML; MG/100ML
125 INJECTION, SOLUTION INTRAVENOUS CONTINUOUS
Status: CANCELLED | OUTPATIENT
Start: 2023-01-01

## 2023-01-01 RX ORDER — GLYCOPYRROLATE 0.2 MG/ML
0.1 INJECTION INTRAMUSCULAR; INTRAVENOUS EVERY 4 HOURS PRN
Status: DISCONTINUED | OUTPATIENT
Start: 2023-01-01 | End: 2023-01-01

## 2023-01-01 RX ORDER — FUROSEMIDE 10 MG/ML
INJECTION INTRAMUSCULAR; INTRAVENOUS AS NEEDED
Status: DISCONTINUED | OUTPATIENT
Start: 2023-01-01 | End: 2023-01-01

## 2023-01-01 RX ORDER — LABETALOL HYDROCHLORIDE 5 MG/ML
10 INJECTION, SOLUTION INTRAVENOUS ONCE
Status: COMPLETED | OUTPATIENT
Start: 2023-01-01 | End: 2023-01-01

## 2023-01-01 RX ORDER — MAGNESIUM HYDROXIDE 1200 MG/15ML
LIQUID ORAL AS NEEDED
Status: DISCONTINUED | OUTPATIENT
Start: 2023-01-01 | End: 2023-01-01 | Stop reason: HOSPADM

## 2023-01-01 RX ORDER — DEXTROSE MONOHYDRATE 50 MG/ML
100 INJECTION, SOLUTION INTRAVENOUS CONTINUOUS
Status: DISCONTINUED | OUTPATIENT
Start: 2023-01-01 | End: 2023-01-01

## 2023-01-01 RX ORDER — BUDESONIDE AND FORMOTEROL FUMARATE DIHYDRATE 160; 4.5 UG/1; UG/1
2 AEROSOL RESPIRATORY (INHALATION) 2 TIMES DAILY
Status: DISCONTINUED | OUTPATIENT
Start: 2023-01-01 | End: 2023-01-01

## 2023-01-01 RX ORDER — FENTANYL CITRATE 50 UG/ML
50 INJECTION, SOLUTION INTRAMUSCULAR; INTRAVENOUS EVERY 2 HOUR PRN
Status: DISCONTINUED | OUTPATIENT
Start: 2023-01-01 | End: 2023-01-01

## 2023-01-01 RX ORDER — POTASSIUM CHLORIDE 20MEQ/15ML
40 LIQUID (ML) ORAL ONCE
Status: COMPLETED | OUTPATIENT
Start: 2023-01-01 | End: 2023-01-01

## 2023-01-01 RX ORDER — INSULIN LISPRO 100 [IU]/ML
1-5 INJECTION, SOLUTION INTRAVENOUS; SUBCUTANEOUS
Status: DISCONTINUED | OUTPATIENT
Start: 2023-01-01 | End: 2023-01-01 | Stop reason: HOSPADM

## 2023-01-01 RX ORDER — FENTANYL CITRATE 50 UG/ML
50 INJECTION, SOLUTION INTRAMUSCULAR; INTRAVENOUS
Status: DISCONTINUED | OUTPATIENT
Start: 2023-01-01 | End: 2023-01-01

## 2023-01-01 RX ORDER — LEVETIRACETAM 500 MG/1
500 TABLET ORAL EVERY 12 HOURS SCHEDULED
Status: DISCONTINUED | OUTPATIENT
Start: 2023-01-01 | End: 2023-01-01

## 2023-01-01 RX ORDER — PROPOFOL 10 MG/ML
INJECTION, EMULSION INTRAVENOUS CONTINUOUS PRN
Status: DISCONTINUED | OUTPATIENT
Start: 2023-01-01 | End: 2023-01-01

## 2023-01-01 RX ORDER — GLYCOPYRROLATE 0.2 MG/ML
0.6 INJECTION INTRAMUSCULAR; INTRAVENOUS ONCE
Status: DISCONTINUED | OUTPATIENT
Start: 2023-01-01 | End: 2023-01-01

## 2023-01-01 RX ORDER — OXYCODONE HYDROCHLORIDE 5 MG/1
5 TABLET ORAL EVERY 4 HOURS PRN
Status: DISCONTINUED | OUTPATIENT
Start: 2023-01-01 | End: 2023-01-01 | Stop reason: HOSPADM

## 2023-01-01 RX ORDER — CYCLOBENZAPRINE HCL 10 MG
10 TABLET ORAL 3 TIMES DAILY PRN
Status: DISCONTINUED | OUTPATIENT
Start: 2023-01-01 | End: 2023-01-01 | Stop reason: HOSPADM

## 2023-01-01 RX ORDER — LEVALBUTEROL 1.25 MG/.5ML
1.25 SOLUTION, CONCENTRATE RESPIRATORY (INHALATION)
Status: DISCONTINUED | OUTPATIENT
Start: 2023-01-01 | End: 2023-01-01

## 2023-01-01 RX ORDER — SODIUM CHLORIDE 9 MG/ML
125 INJECTION, SOLUTION INTRAVENOUS CONTINUOUS
Status: DISCONTINUED | OUTPATIENT
Start: 2023-01-01 | End: 2023-01-01

## 2023-01-01 RX ORDER — LISINOPRIL 20 MG/1
20 TABLET ORAL DAILY
Status: DISCONTINUED | OUTPATIENT
Start: 2023-01-01 | End: 2023-01-01

## 2023-01-01 RX ORDER — SODIUM CHLORIDE FOR INHALATION 3 %
4 VIAL, NEBULIZER (ML) INHALATION
Status: DISCONTINUED | OUTPATIENT
Start: 2023-01-01 | End: 2023-01-01

## 2023-01-01 RX ORDER — HYDROMORPHONE HCL/PF 1 MG/ML
0.5 SYRINGE (ML) INJECTION
Status: DISCONTINUED | OUTPATIENT
Start: 2023-01-01 | End: 2023-01-01 | Stop reason: HOSPADM

## 2023-01-01 RX ORDER — ACETAMINOPHEN 160 MG/5ML
650 SUSPENSION, ORAL (FINAL DOSE FORM) ORAL EVERY 4 HOURS PRN
Status: DISCONTINUED | OUTPATIENT
Start: 2023-01-01 | End: 2023-01-01

## 2023-01-01 RX ORDER — FENTANYL CITRATE 50 UG/ML
100 INJECTION, SOLUTION INTRAMUSCULAR; INTRAVENOUS ONCE
Status: COMPLETED | OUTPATIENT
Start: 2023-01-01 | End: 2023-01-01

## 2023-01-01 RX ORDER — LORAZEPAM 1 MG/1
1 TABLET ORAL ONCE
Status: COMPLETED | OUTPATIENT
Start: 2023-01-01 | End: 2023-01-01

## 2023-01-01 RX ORDER — POTASSIUM CHLORIDE 20MEQ/15ML
20 LIQUID (ML) ORAL ONCE
Status: COMPLETED | OUTPATIENT
Start: 2023-01-01 | End: 2023-01-01

## 2023-01-01 RX ORDER — SODIUM CHLORIDE, SODIUM LACTATE, POTASSIUM CHLORIDE, CALCIUM CHLORIDE 600; 310; 30; 20 MG/100ML; MG/100ML; MG/100ML; MG/100ML
INJECTION, SOLUTION INTRAVENOUS CONTINUOUS PRN
Status: DISCONTINUED | OUTPATIENT
Start: 2023-01-01 | End: 2023-01-01

## 2023-01-01 RX ORDER — NOREPINEPHRINE BITARTRATE 1 MG/ML
INJECTION, SOLUTION INTRAVENOUS
Status: DISPENSED
Start: 2023-01-01 | End: 2023-01-01

## 2023-01-01 RX ORDER — FENTANYL CITRATE/PF 50 MCG/ML
25 SYRINGE (ML) INJECTION
Status: DISCONTINUED | OUTPATIENT
Start: 2023-01-01 | End: 2023-01-01 | Stop reason: HOSPADM

## 2023-01-01 RX ORDER — SODIUM CHLORIDE, SODIUM GLUCONATE, SODIUM ACETATE, POTASSIUM CHLORIDE, MAGNESIUM CHLORIDE, SODIUM PHOSPHATE, DIBASIC, AND POTASSIUM PHOSPHATE .53; .5; .37; .037; .03; .012; .00082 G/100ML; G/100ML; G/100ML; G/100ML; G/100ML; G/100ML; G/100ML
1000 INJECTION, SOLUTION INTRAVENOUS ONCE
Status: COMPLETED | OUTPATIENT
Start: 2023-01-01 | End: 2023-01-01

## 2023-01-01 RX ORDER — IODIXANOL 320 MG/ML
100 INJECTION, SOLUTION INTRAVASCULAR
Status: COMPLETED | OUTPATIENT
Start: 2023-01-01 | End: 2023-01-01

## 2023-01-01 RX ORDER — PANTOPRAZOLE SODIUM 40 MG/1
40 TABLET, DELAYED RELEASE ORAL
Status: DISCONTINUED | OUTPATIENT
Start: 2023-01-01 | End: 2023-01-01

## 2023-01-01 RX ORDER — ROCURONIUM BROMIDE 10 MG/ML
INJECTION, SOLUTION INTRAVENOUS AS NEEDED
Status: DISCONTINUED | OUTPATIENT
Start: 2023-01-01 | End: 2023-01-01

## 2023-01-01 RX ORDER — VANCOMYCIN HYDROCHLORIDE 1 G/200ML
1000 INJECTION, SOLUTION INTRAVENOUS EVERY 12 HOURS
Status: DISCONTINUED | OUTPATIENT
Start: 2023-01-01 | End: 2023-01-01

## 2023-01-01 RX ORDER — SODIUM CHLORIDE FOR INHALATION 3 %
VIAL, NEBULIZER (ML) INHALATION
Status: COMPLETED
Start: 2023-01-01 | End: 2023-01-01

## 2023-01-01 RX ORDER — OXYCODONE HYDROCHLORIDE 10 MG/1
10 TABLET ORAL EVERY 4 HOURS PRN
Status: DISCONTINUED | OUTPATIENT
Start: 2023-01-01 | End: 2023-01-01 | Stop reason: HOSPADM

## 2023-01-01 RX ORDER — FENTANYL CITRATE 50 UG/ML
25 INJECTION, SOLUTION INTRAMUSCULAR; INTRAVENOUS EVERY 2 HOUR PRN
Status: DISCONTINUED | OUTPATIENT
Start: 2023-01-01 | End: 2023-01-01

## 2023-01-01 RX ORDER — GLYCOPYRROLATE 0.2 MG/ML
0.2 INJECTION INTRAMUSCULAR; INTRAVENOUS EVERY 4 HOURS PRN
Status: DISCONTINUED | OUTPATIENT
Start: 2023-01-01 | End: 2023-01-01

## 2023-01-01 RX ORDER — FENTANYL CITRATE 50 UG/ML
INJECTION, SOLUTION INTRAMUSCULAR; INTRAVENOUS AS NEEDED
Status: DISCONTINUED | OUTPATIENT
Start: 2023-01-01 | End: 2023-01-01

## 2023-01-01 RX ORDER — HYDROMORPHONE HCL/PF 1 MG/ML
0.5 SYRINGE (ML) INJECTION EVERY 4 HOURS PRN
Status: ACTIVE | OUTPATIENT
Start: 2023-01-01 | End: 2023-01-01

## 2023-01-01 RX ORDER — METOPROLOL TARTRATE 5 MG/5ML
INJECTION INTRAVENOUS AS NEEDED
Status: DISCONTINUED | OUTPATIENT
Start: 2023-01-01 | End: 2023-01-01

## 2023-01-01 RX ORDER — ACETAMINOPHEN 325 MG/1
975 TABLET ORAL EVERY 8 HOURS SCHEDULED
Status: DISCONTINUED | OUTPATIENT
Start: 2023-01-01 | End: 2023-01-01

## 2023-01-01 RX ORDER — CEFAZOLIN SODIUM 2 G/50ML
2000 SOLUTION INTRAVENOUS ONCE
Status: COMPLETED | OUTPATIENT
Start: 2023-01-01 | End: 2023-01-01

## 2023-01-01 RX ORDER — OXYCODONE HYDROCHLORIDE 5 MG/1
5 TABLET ORAL EVERY 6 HOURS PRN
Status: DISCONTINUED | OUTPATIENT
Start: 2023-01-01 | End: 2023-01-01

## 2023-01-01 RX ORDER — HYDROMORPHONE HCL/PF 1 MG/ML
1 SYRINGE (ML) INJECTION ONCE
Status: DISCONTINUED | OUTPATIENT
Start: 2023-01-01 | End: 2023-01-01

## 2023-01-01 RX ORDER — METOPROLOL SUCCINATE 100 MG/1
100 TABLET, EXTENDED RELEASE ORAL DAILY
Status: DISCONTINUED | OUTPATIENT
Start: 2023-01-01 | End: 2023-01-01 | Stop reason: HOSPADM

## 2023-01-01 RX ORDER — LORAZEPAM 1 MG/1
1 TABLET ORAL ONCE AS NEEDED
Status: COMPLETED | OUTPATIENT
Start: 2023-01-01 | End: 2023-01-01

## 2023-01-01 RX ORDER — INSULIN LISPRO 100 [IU]/ML
1-5 INJECTION, SOLUTION INTRAVENOUS; SUBCUTANEOUS
Status: DISCONTINUED | OUTPATIENT
Start: 2023-01-01 | End: 2023-01-01

## 2023-01-01 RX ORDER — ONDANSETRON 2 MG/ML
4 INJECTION INTRAMUSCULAR; INTRAVENOUS EVERY 6 HOURS PRN
Status: CANCELLED | OUTPATIENT
Start: 2023-01-01

## 2023-01-01 RX ORDER — ETOMIDATE 2 MG/ML
INJECTION INTRAVENOUS AS NEEDED
Status: DISCONTINUED | OUTPATIENT
Start: 2023-01-01 | End: 2023-01-01

## 2023-01-01 RX ORDER — ENOXAPARIN SODIUM 100 MG/ML
40 INJECTION SUBCUTANEOUS DAILY
Status: CANCELLED | OUTPATIENT
Start: 2023-01-01

## 2023-01-01 RX ORDER — MIDAZOLAM HYDROCHLORIDE 2 MG/2ML
2 INJECTION, SOLUTION INTRAMUSCULAR; INTRAVENOUS ONCE
Status: COMPLETED | OUTPATIENT
Start: 2023-01-01 | End: 2023-01-01

## 2023-01-01 RX ORDER — HEPARIN SODIUM 5000 [USP'U]/ML
5000 INJECTION, SOLUTION INTRAVENOUS; SUBCUTANEOUS EVERY 8 HOURS SCHEDULED
Status: DISCONTINUED | OUTPATIENT
Start: 2023-01-01 | End: 2023-01-01

## 2023-01-01 RX ORDER — CEFAZOLIN SODIUM 2 G/50ML
2000 SOLUTION INTRAVENOUS
Status: DISCONTINUED | OUTPATIENT
Start: 2023-01-01 | End: 2023-01-01

## 2023-01-01 RX ORDER — PRAVASTATIN SODIUM 80 MG/1
80 TABLET ORAL
Status: DISCONTINUED | OUTPATIENT
Start: 2023-01-01 | End: 2023-01-01 | Stop reason: HOSPADM

## 2023-01-01 RX ORDER — CHLORAL HYDRATE 500 MG
1000 CAPSULE ORAL 2 TIMES DAILY
Status: DISCONTINUED | OUTPATIENT
Start: 2023-01-01 | End: 2023-01-01 | Stop reason: HOSPADM

## 2023-01-01 RX ORDER — ENOXAPARIN SODIUM 100 MG/ML
40 INJECTION SUBCUTANEOUS DAILY
Status: DISCONTINUED | OUTPATIENT
Start: 2023-01-01 | End: 2023-01-01 | Stop reason: HOSPADM

## 2023-01-01 RX ORDER — FENTANYL CITRATE/PF 50 MCG/ML
50 SYRINGE (ML) INJECTION
Status: DISCONTINUED | OUTPATIENT
Start: 2023-01-01 | End: 2023-01-01 | Stop reason: HOSPADM

## 2023-01-01 RX ORDER — LABETALOL HYDROCHLORIDE 5 MG/ML
10 INJECTION, SOLUTION INTRAVENOUS EVERY 4 HOURS PRN
Status: DISCONTINUED | OUTPATIENT
Start: 2023-01-01 | End: 2023-01-01

## 2023-01-01 RX ORDER — CEFAZOLIN SODIUM 1 G/50ML
1000 SOLUTION INTRAVENOUS EVERY 8 HOURS
Status: COMPLETED | OUTPATIENT
Start: 2023-01-01 | End: 2023-01-01

## 2023-01-01 RX ORDER — ONDANSETRON 2 MG/ML
4 INJECTION INTRAMUSCULAR; INTRAVENOUS EVERY 6 HOURS PRN
Status: DISCONTINUED | OUTPATIENT
Start: 2023-01-01 | End: 2023-01-01

## 2023-01-01 RX ORDER — INSULIN LISPRO 100 [IU]/ML
2 INJECTION, SOLUTION INTRAVENOUS; SUBCUTANEOUS ONCE
Status: COMPLETED | OUTPATIENT
Start: 2023-01-01 | End: 2023-01-01

## 2023-01-01 RX ORDER — VANCOMYCIN HYDROCHLORIDE 1 G/20ML
INJECTION, POWDER, LYOPHILIZED, FOR SOLUTION INTRAVENOUS AS NEEDED
Status: DISCONTINUED | OUTPATIENT
Start: 2023-01-01 | End: 2023-01-01 | Stop reason: HOSPADM

## 2023-01-01 RX ORDER — FENTANYL CITRATE 50 UG/ML
50 INJECTION, SOLUTION INTRAMUSCULAR; INTRAVENOUS ONCE
Status: DISCONTINUED | OUTPATIENT
Start: 2023-01-01 | End: 2023-01-01

## 2023-01-01 RX ORDER — SODIUM CHLORIDE 3 G/100ML
250 INJECTION, SOLUTION INTRAVENOUS ONCE
Status: COMPLETED | OUTPATIENT
Start: 2023-01-01 | End: 2023-01-01

## 2023-01-01 RX ORDER — PROPOFOL 10 MG/ML
5-50 INJECTION, EMULSION INTRAVENOUS
Status: DISCONTINUED | OUTPATIENT
Start: 2023-01-01 | End: 2023-01-01

## 2023-01-01 RX ORDER — KETOROLAC TROMETHAMINE 30 MG/ML
15 INJECTION, SOLUTION INTRAMUSCULAR; INTRAVENOUS EVERY 6 HOURS PRN
Status: DISCONTINUED | OUTPATIENT
Start: 2023-01-01 | End: 2023-01-01

## 2023-01-01 RX ORDER — FENOFIBRATE 145 MG/1
145 TABLET, COATED ORAL DAILY
Status: DISCONTINUED | OUTPATIENT
Start: 2023-01-01 | End: 2023-01-01 | Stop reason: HOSPADM

## 2023-01-01 RX ORDER — CALCIUM CARBONATE 200(500)MG
1000 TABLET,CHEWABLE ORAL DAILY PRN
Status: DISCONTINUED | OUTPATIENT
Start: 2023-01-01 | End: 2023-01-01 | Stop reason: HOSPADM

## 2023-01-01 RX ORDER — FENTANYL CITRATE-0.9 % NACL/PF 10 MCG/ML
100 PLASTIC BAG, INJECTION (ML) INTRAVENOUS CONTINUOUS
Status: DISCONTINUED | OUTPATIENT
Start: 2023-01-01 | End: 2023-01-01

## 2023-01-01 RX ORDER — INSULIN LISPRO 100 [IU]/ML
1-5 INJECTION, SOLUTION INTRAVENOUS; SUBCUTANEOUS
Status: CANCELLED | OUTPATIENT
Start: 2023-01-01

## 2023-01-01 RX ORDER — PANTOPRAZOLE SODIUM 40 MG/10ML
40 INJECTION, POWDER, LYOPHILIZED, FOR SOLUTION INTRAVENOUS EVERY 12 HOURS SCHEDULED
Status: DISCONTINUED | OUTPATIENT
Start: 2023-01-01 | End: 2023-01-01

## 2023-01-01 RX ORDER — SUCCINYLCHOLINE/SOD CL,ISO/PF 100 MG/5ML
SYRINGE (ML) INTRAVENOUS AS NEEDED
Status: DISCONTINUED | OUTPATIENT
Start: 2023-01-01 | End: 2023-01-01

## 2023-01-01 RX ORDER — ESMOLOL HYDROCHLORIDE 10 MG/ML
INJECTION INTRAVENOUS AS NEEDED
Status: DISCONTINUED | OUTPATIENT
Start: 2023-01-01 | End: 2023-01-01

## 2023-01-01 RX ORDER — CHLORHEXIDINE GLUCONATE 0.12 MG/ML
15 RINSE ORAL EVERY 12 HOURS SCHEDULED
Status: DISCONTINUED | OUTPATIENT
Start: 2023-01-01 | End: 2023-01-01 | Stop reason: HOSPADM

## 2023-01-01 RX ORDER — LIDOCAINE HYDROCHLORIDE AND EPINEPHRINE 10; 10 MG/ML; UG/ML
INJECTION, SOLUTION INFILTRATION; PERINEURAL AS NEEDED
Status: DISCONTINUED | OUTPATIENT
Start: 2023-01-01 | End: 2023-01-01 | Stop reason: HOSPADM

## 2023-01-01 RX ORDER — CEFAZOLIN SODIUM 2 G/50ML
2000 SOLUTION INTRAVENOUS EVERY 8 HOURS
Status: DISCONTINUED | OUTPATIENT
Start: 2023-01-01 | End: 2023-01-01

## 2023-01-01 RX ORDER — BUDESONIDE AND FORMOTEROL FUMARATE DIHYDRATE 160; 4.5 UG/1; UG/1
2 AEROSOL RESPIRATORY (INHALATION) 2 TIMES DAILY
Status: CANCELLED | OUTPATIENT
Start: 2023-01-01

## 2023-01-01 RX ORDER — ACETAMINOPHEN 325 MG/1
650 TABLET ORAL EVERY 6 HOURS PRN
Status: DISCONTINUED | OUTPATIENT
Start: 2023-01-01 | End: 2023-01-01

## 2023-01-01 RX ORDER — SODIUM CHLORIDE, SODIUM LACTATE, POTASSIUM CHLORIDE, CALCIUM CHLORIDE 600; 310; 30; 20 MG/100ML; MG/100ML; MG/100ML; MG/100ML
125 INJECTION, SOLUTION INTRAVENOUS CONTINUOUS
Status: DISCONTINUED | OUTPATIENT
Start: 2023-01-01 | End: 2023-01-01

## 2023-01-01 RX ORDER — SODIUM CHLORIDE, SODIUM GLUCONATE, SODIUM ACETATE, POTASSIUM CHLORIDE, MAGNESIUM CHLORIDE, SODIUM PHOSPHATE, DIBASIC, AND POTASSIUM PHOSPHATE .53; .5; .37; .037; .03; .012; .00082 G/100ML; G/100ML; G/100ML; G/100ML; G/100ML; G/100ML; G/100ML
1000 INJECTION, SOLUTION INTRAVENOUS ONCE
Status: DISCONTINUED | OUTPATIENT
Start: 2023-01-01 | End: 2023-01-01

## 2023-01-01 RX ORDER — ONDANSETRON 2 MG/ML
4 INJECTION INTRAMUSCULAR; INTRAVENOUS EVERY 6 HOURS PRN
Status: DISCONTINUED | OUTPATIENT
Start: 2023-01-01 | End: 2023-01-01 | Stop reason: HOSPADM

## 2023-01-01 RX ORDER — TOBRAMYCIN 1.2 G/30ML
INJECTION, POWDER, LYOPHILIZED, FOR SOLUTION INTRAVENOUS AS NEEDED
Status: DISCONTINUED | OUTPATIENT
Start: 2023-01-01 | End: 2023-01-01 | Stop reason: HOSPADM

## 2023-01-01 RX ORDER — NEOSTIGMINE METHYLSULFATE 1 MG/ML
3 INJECTION INTRAVENOUS ONCE
Status: DISCONTINUED | OUTPATIENT
Start: 2023-01-01 | End: 2023-01-01

## 2023-01-01 RX ORDER — SODIUM CHLORIDE 9 MG/ML
INJECTION, SOLUTION INTRAVENOUS CONTINUOUS PRN
Status: DISCONTINUED | OUTPATIENT
Start: 2023-01-01 | End: 2023-01-01

## 2023-01-01 RX ORDER — ACETAMINOPHEN 325 MG/1
975 TABLET ORAL EVERY 8 HOURS SCHEDULED
Status: DISCONTINUED | OUTPATIENT
Start: 2023-01-01 | End: 2023-01-01 | Stop reason: HOSPADM

## 2023-01-01 RX ORDER — CEFAZOLIN SODIUM 1 G/3ML
INJECTION, POWDER, FOR SOLUTION INTRAMUSCULAR; INTRAVENOUS AS NEEDED
Status: DISCONTINUED | OUTPATIENT
Start: 2023-01-01 | End: 2023-01-01

## 2023-01-01 RX ORDER — GINSENG 100 MG
CAPSULE ORAL AS NEEDED
Status: DISCONTINUED | OUTPATIENT
Start: 2023-01-01 | End: 2023-01-01 | Stop reason: HOSPADM

## 2023-01-01 RX ORDER — DEXMEDETOMIDINE HYDROCHLORIDE 4 UG/ML
.1-1 INJECTION, SOLUTION INTRAVENOUS
Status: DISCONTINUED | OUTPATIENT
Start: 2023-01-01 | End: 2023-01-01

## 2023-01-01 RX ORDER — SODIUM CHLORIDE, SODIUM LACTATE, POTASSIUM CHLORIDE, CALCIUM CHLORIDE 600; 310; 30; 20 MG/100ML; MG/100ML; MG/100ML; MG/100ML
125 INJECTION, SOLUTION INTRAVENOUS CONTINUOUS
Status: DISCONTINUED | OUTPATIENT
Start: 2023-01-01 | End: 2023-01-01 | Stop reason: HOSPADM

## 2023-01-01 RX ADMIN — FENOFIBRATE 145 MG: 145 TABLET, FILM COATED ORAL at 08:16

## 2023-01-01 RX ADMIN — DEXMEDETOMIDINE HYDROCHLORIDE 0.2 MCG/KG/HR: 4 INJECTION, SOLUTION INTRAVENOUS at 20:04

## 2023-01-01 RX ADMIN — FENOFIBRATE 145 MG: 145 TABLET, FILM COATED ORAL at 09:51

## 2023-01-01 RX ADMIN — SODIUM CHLORIDE, SODIUM LACTATE, POTASSIUM CHLORIDE, AND CALCIUM CHLORIDE: .6; .31; .03; .02 INJECTION, SOLUTION INTRAVENOUS at 10:02

## 2023-01-01 RX ADMIN — FENTANYL CITRATE 100 MCG: 50 INJECTION INTRAMUSCULAR; INTRAVENOUS at 15:23

## 2023-01-01 RX ADMIN — ACETAMINOPHEN 975 MG: 325 TABLET ORAL at 14:30

## 2023-01-01 RX ADMIN — PROPOFOL 30 MCG/KG/MIN: 10 INJECTION, EMULSION INTRAVENOUS at 18:28

## 2023-01-01 RX ADMIN — OMEGA-3 FATTY ACIDS CAP 1000 MG 1000 MG: 1000 CAP at 17:24

## 2023-01-01 RX ADMIN — ACETAMINOPHEN 650 MG: 650 SUSPENSION ORAL at 12:19

## 2023-01-01 RX ADMIN — NAFCILLIN SODIUM 2000 MG: 2 POWDER, FOR SOLUTION INTRAMUSCULAR; INTRAVENOUS at 13:06

## 2023-01-01 RX ADMIN — NOREPINEPHRINE BITARTRATE 20 MCG/MIN: 1 INJECTION, SOLUTION, CONCENTRATE INTRAVENOUS at 22:29

## 2023-01-01 RX ADMIN — IODIXANOL 65 ML: 320 INJECTION, SOLUTION INTRAVASCULAR at 16:38

## 2023-01-01 RX ADMIN — LEVALBUTEROL HYDROCHLORIDE 1.25 MG: 1.25 SOLUTION, CONCENTRATE RESPIRATORY (INHALATION) at 19:58

## 2023-01-01 RX ADMIN — SODIUM CHLORIDE 250 ML: 3 INJECTION, SOLUTION INTRAVENOUS at 14:31

## 2023-01-01 RX ADMIN — ACETAMINOPHEN 650 MG: 650 SUSPENSION ORAL at 22:22

## 2023-01-01 RX ADMIN — CEFAZOLIN SODIUM 2000 MG: 2 SOLUTION INTRAVENOUS at 05:13

## 2023-01-01 RX ADMIN — SODIUM CHLORIDE 125 ML/HR: 0.9 INJECTION, SOLUTION INTRAVENOUS at 17:16

## 2023-01-01 RX ADMIN — SODIUM CHLORIDE SOLN NEBU 3% 4 ML: 3 NEBU SOLN at 07:12

## 2023-01-01 RX ADMIN — NAFCILLIN SODIUM 2000 MG: 2 POWDER, FOR SOLUTION INTRAMUSCULAR; INTRAVENOUS at 17:35

## 2023-01-01 RX ADMIN — CEFAZOLIN SODIUM 2000 MG: 2 SOLUTION INTRAVENOUS at 11:15

## 2023-01-01 RX ADMIN — CEFAZOLIN SODIUM 2000 MG: 2 SOLUTION INTRAVENOUS at 12:04

## 2023-01-01 RX ADMIN — Medication 100 MCG/HR: at 06:41

## 2023-01-01 RX ADMIN — LISINOPRIL 20 MG: 20 TABLET ORAL at 09:51

## 2023-01-01 RX ADMIN — SODIUM CHLORIDE SOLN NEBU 3% 4 ML: 3 NEBU SOLN at 07:41

## 2023-01-01 RX ADMIN — LORAZEPAM 1 MG: 1 TABLET ORAL at 12:18

## 2023-01-01 RX ADMIN — CEFAZOLIN SODIUM 2000 MG: 2 SOLUTION INTRAVENOUS at 18:41

## 2023-01-01 RX ADMIN — LEVALBUTEROL HYDROCHLORIDE 1.25 MG: 1.25 SOLUTION, CONCENTRATE RESPIRATORY (INHALATION) at 07:12

## 2023-01-01 RX ADMIN — CEFAZOLIN SODIUM 2000 MG: 2 SOLUTION INTRAVENOUS at 15:39

## 2023-01-01 RX ADMIN — FENTANYL CITRATE 50 MCG: 50 INJECTION INTRAMUSCULAR; INTRAVENOUS at 15:31

## 2023-01-01 RX ADMIN — DEXMEDETOMIDINE HYDROCHLORIDE 0.7 MCG/KG/HR: 4 INJECTION, SOLUTION INTRAVENOUS at 20:15

## 2023-01-01 RX ADMIN — INSULIN LISPRO 2 UNITS: 100 INJECTION, SOLUTION INTRAVENOUS; SUBCUTANEOUS at 21:23

## 2023-01-01 RX ADMIN — FENTANYL CITRATE 50 MCG: 50 INJECTION INTRAMUSCULAR; INTRAVENOUS at 19:58

## 2023-01-01 RX ADMIN — IPRATROPIUM BROMIDE 0.5 MG: 0.5 SOLUTION RESPIRATORY (INHALATION) at 13:13

## 2023-01-01 RX ADMIN — INSULIN LISPRO 1 UNITS: 100 INJECTION, SOLUTION INTRAVENOUS; SUBCUTANEOUS at 17:00

## 2023-01-01 RX ADMIN — OMEGA-3 FATTY ACIDS CAP 1000 MG 1000 MG: 1000 CAP at 08:27

## 2023-01-01 RX ADMIN — ACETAMINOPHEN 650 MG: 650 SUSPENSION ORAL at 13:10

## 2023-01-01 RX ADMIN — NAFCILLIN SODIUM 2000 MG: 2 POWDER, FOR SOLUTION INTRAMUSCULAR; INTRAVENOUS at 21:24

## 2023-01-01 RX ADMIN — LORAZEPAM 1 MG: 2 INJECTION INTRAMUSCULAR; INTRAVENOUS at 17:32

## 2023-01-01 RX ADMIN — INSULIN LISPRO 2 UNITS: 100 INJECTION, SOLUTION INTRAVENOUS; SUBCUTANEOUS at 12:15

## 2023-01-01 RX ADMIN — FENTANYL CITRATE 50 MCG: 50 INJECTION INTRAMUSCULAR; INTRAVENOUS at 16:10

## 2023-01-01 RX ADMIN — OMEGA-3 FATTY ACIDS CAP 1000 MG 1000 MG: 1000 CAP at 08:15

## 2023-01-01 RX ADMIN — ACETAMINOPHEN 650 MG: 650 SUSPENSION ORAL at 06:19

## 2023-01-01 RX ADMIN — NAFCILLIN SODIUM 2000 MG: 2 POWDER, FOR SOLUTION INTRAMUSCULAR; INTRAVENOUS at 21:15

## 2023-01-01 RX ADMIN — FENTANYL CITRATE 50 MCG: 50 INJECTION INTRAMUSCULAR; INTRAVENOUS at 00:29

## 2023-01-01 RX ADMIN — SODIUM CHLORIDE SOLN NEBU 3% 4 ML: 3 NEBU SOLN at 20:00

## 2023-01-01 RX ADMIN — OXYCODONE HYDROCHLORIDE 5 MG: 5 TABLET ORAL at 03:46

## 2023-01-01 RX ADMIN — CHLORHEXIDINE GLUCONATE 0.12% ORAL RINSE 15 ML: 1.2 LIQUID ORAL at 09:08

## 2023-01-01 RX ADMIN — NOREPINEPHRINE BITARTRATE 6 MCG/MIN: 1 INJECTION INTRAVENOUS at 16:20

## 2023-01-01 RX ADMIN — OMEGA-3 FATTY ACIDS CAP 1000 MG 1000 MG: 1000 CAP at 10:56

## 2023-01-01 RX ADMIN — PHENYLEPHRINE HYDROCHLORIDE 30 MCG/MIN: 10 INJECTION INTRAVENOUS at 15:27

## 2023-01-01 RX ADMIN — HEPARIN SODIUM 5000 UNITS: 5000 INJECTION INTRAVENOUS; SUBCUTANEOUS at 05:05

## 2023-01-01 RX ADMIN — PRAVASTATIN SODIUM 80 MG: 80 TABLET ORAL at 17:24

## 2023-01-01 RX ADMIN — ROCURONIUM BROMIDE 20 MG: 50 INJECTION, SOLUTION INTRAVENOUS at 10:42

## 2023-01-01 RX ADMIN — FENOFIBRATE 145 MG: 145 TABLET, FILM COATED ORAL at 09:01

## 2023-01-01 RX ADMIN — IPRATROPIUM BROMIDE 0.5 MG: 0.5 SOLUTION RESPIRATORY (INHALATION) at 07:12

## 2023-01-01 RX ADMIN — INSULIN LISPRO 2 UNITS: 100 INJECTION, SOLUTION INTRAVENOUS; SUBCUTANEOUS at 08:00

## 2023-01-01 RX ADMIN — SODIUM CHLORIDE SOLN NEBU 3% 4 ML: 3 NEBU SOLN at 13:15

## 2023-01-01 RX ADMIN — ACETAMINOPHEN 975 MG: 325 TABLET ORAL at 14:57

## 2023-01-01 RX ADMIN — Medication 50 MCG/HR: at 20:19

## 2023-01-01 RX ADMIN — SODIUM CHLORIDE 30 ML/HR: 3 INJECTION, SOLUTION INTRAVENOUS at 06:48

## 2023-01-01 RX ADMIN — SODIUM CHLORIDE SOLN NEBU 3% 4 ML: 3 NEBU SOLN at 13:32

## 2023-01-01 RX ADMIN — INSULIN LISPRO 2 UNITS: 100 INJECTION, SOLUTION INTRAVENOUS; SUBCUTANEOUS at 17:23

## 2023-01-01 RX ADMIN — METOPROLOL SUCCINATE 100 MG: 100 TABLET, FILM COATED, EXTENDED RELEASE ORAL at 09:51

## 2023-01-01 RX ADMIN — OXYCODONE HYDROCHLORIDE 10 MG: 10 TABLET ORAL at 00:54

## 2023-01-01 RX ADMIN — IOHEXOL 85 ML: 350 INJECTION, SOLUTION INTRAVENOUS at 11:46

## 2023-01-01 RX ADMIN — LABETALOL HYDROCHLORIDE 10 MG: 5 INJECTION, SOLUTION INTRAVENOUS at 00:42

## 2023-01-01 RX ADMIN — ESMOLOL HYDROCHLORIDE 10 MG: 100 INJECTION, SOLUTION INTRAVENOUS at 10:57

## 2023-01-01 RX ADMIN — ENOXAPARIN SODIUM 40 MG: 40 INJECTION SUBCUTANEOUS at 08:00

## 2023-01-01 RX ADMIN — HYDROCORTISONE SODIUM SUCCINATE 50 MG: 100 INJECTION, POWDER, FOR SOLUTION INTRAMUSCULAR; INTRAVENOUS at 13:40

## 2023-01-01 RX ADMIN — GLYCOPYRROLATE 0.2 MG: 0.2 INJECTION, SOLUTION INTRAMUSCULAR; INTRAVENOUS at 16:03

## 2023-01-01 RX ADMIN — NAFCILLIN SODIUM 2000 MG: 2 POWDER, FOR SOLUTION INTRAMUSCULAR; INTRAVENOUS at 04:56

## 2023-01-01 RX ADMIN — INSULIN LISPRO 3 UNITS: 100 INJECTION, SOLUTION INTRAVENOUS; SUBCUTANEOUS at 11:55

## 2023-01-01 RX ADMIN — ENOXAPARIN SODIUM 40 MG: 40 INJECTION SUBCUTANEOUS at 05:35

## 2023-01-01 RX ADMIN — OMEGA-3 FATTY ACIDS CAP 1000 MG 1000 MG: 1000 CAP at 17:58

## 2023-01-01 RX ADMIN — Medication 100 MCG/HR: at 10:26

## 2023-01-01 RX ADMIN — INSULIN LISPRO 2 UNITS: 100 INJECTION, SOLUTION INTRAVENOUS; SUBCUTANEOUS at 18:53

## 2023-01-01 RX ADMIN — LEVETIRACETAM 500 MG: 500 TABLET, FILM COATED ORAL at 21:10

## 2023-01-01 RX ADMIN — DOCUSATE SODIUM 100 MG: 100 CAPSULE, LIQUID FILLED ORAL at 17:24

## 2023-01-01 RX ADMIN — CEFAZOLIN SODIUM 1000 MG: 1 SOLUTION INTRAVENOUS at 13:02

## 2023-01-01 RX ADMIN — PRAVASTATIN SODIUM 80 MG: 80 TABLET ORAL at 17:00

## 2023-01-01 RX ADMIN — ACETAMINOPHEN 650 MG: 650 SUSPENSION ORAL at 05:11

## 2023-01-01 RX ADMIN — METOROPROLOL TARTRATE 1 MG: 5 INJECTION, SOLUTION INTRAVENOUS at 10:46

## 2023-01-01 RX ADMIN — NAFCILLIN SODIUM 2000 MG: 2 POWDER, FOR SOLUTION INTRAMUSCULAR; INTRAVENOUS at 08:54

## 2023-01-01 RX ADMIN — OXYCODONE HYDROCHLORIDE 5 MG: 5 TABLET ORAL at 03:10

## 2023-01-01 RX ADMIN — PRAVASTATIN SODIUM 80 MG: 80 TABLET ORAL at 17:11

## 2023-01-01 RX ADMIN — POTASSIUM CHLORIDE 20 MEQ: 20 SOLUTION ORAL at 01:56

## 2023-01-01 RX ADMIN — LEVETIRACETAM 500 MG: 500 TABLET, FILM COATED ORAL at 09:08

## 2023-01-01 RX ADMIN — DEXMEDETOMIDINE HYDROCHLORIDE 0.7 MCG/KG/HR: 4 INJECTION, SOLUTION INTRAVENOUS at 02:09

## 2023-01-01 RX ADMIN — SODIUM CHLORIDE 2 UNITS/HR: 9 INJECTION, SOLUTION INTRAVENOUS at 01:57

## 2023-01-01 RX ADMIN — DEXTROSE 75 ML/HR: 5 SOLUTION INTRAVENOUS at 13:31

## 2023-01-01 RX ADMIN — POTASSIUM CHLORIDE 40 MEQ: 1.5 SOLUTION ORAL at 01:17

## 2023-01-01 RX ADMIN — ACETAMINOPHEN 975 MG: 325 TABLET ORAL at 08:15

## 2023-01-01 RX ADMIN — KETOROLAC TROMETHAMINE 15 MG: 30 INJECTION, SOLUTION INTRAMUSCULAR; INTRAVENOUS at 15:35

## 2023-01-01 RX ADMIN — OXYCODONE HYDROCHLORIDE 10 MG: 10 TABLET ORAL at 04:56

## 2023-01-01 RX ADMIN — ACETAMINOPHEN 975 MG: 325 TABLET ORAL at 22:00

## 2023-01-01 RX ADMIN — CEFAZOLIN SODIUM 2000 MG: 2 SOLUTION INTRAVENOUS at 20:39

## 2023-01-01 RX ADMIN — Medication 100 MG: at 15:23

## 2023-01-01 RX ADMIN — IPRATROPIUM BROMIDE 0.5 MG: 0.5 SOLUTION RESPIRATORY (INHALATION) at 18:49

## 2023-01-01 RX ADMIN — ACETAMINOPHEN 975 MG: 325 TABLET ORAL at 21:14

## 2023-01-01 RX ADMIN — DEXMEDETOMIDINE HYDROCHLORIDE 0.3 MCG/KG/HR: 4 INJECTION, SOLUTION INTRAVENOUS at 03:16

## 2023-01-01 RX ADMIN — HYDROCORTISONE SODIUM SUCCINATE 50 MG: 100 INJECTION, POWDER, FOR SOLUTION INTRAMUSCULAR; INTRAVENOUS at 22:21

## 2023-01-01 RX ADMIN — IPRATROPIUM BROMIDE 0.5 MG: 0.5 SOLUTION RESPIRATORY (INHALATION) at 07:45

## 2023-01-01 RX ADMIN — ENOXAPARIN SODIUM 40 MG: 40 INJECTION SUBCUTANEOUS at 08:02

## 2023-01-01 RX ADMIN — FENTANYL CITRATE 50 MCG: 50 INJECTION INTRAMUSCULAR; INTRAVENOUS at 13:16

## 2023-01-01 RX ADMIN — LISINOPRIL 20 MG: 20 TABLET ORAL at 09:39

## 2023-01-01 RX ADMIN — ACETAMINOPHEN 650 MG: 650 SUSPENSION ORAL at 18:21

## 2023-01-01 RX ADMIN — ACETAMINOPHEN 975 MG: 325 TABLET ORAL at 05:14

## 2023-01-01 RX ADMIN — SODIUM CHLORIDE 8 MG/HR: 9 INJECTION, SOLUTION INTRAVENOUS at 16:33

## 2023-01-01 RX ADMIN — FENTANYL CITRATE 50 MCG: 50 INJECTION INTRAMUSCULAR; INTRAVENOUS at 23:33

## 2023-01-01 RX ADMIN — CHLORHEXIDINE GLUCONATE 0.12% ORAL RINSE 15 ML: 1.2 LIQUID ORAL at 13:06

## 2023-01-01 RX ADMIN — ENOXAPARIN SODIUM 40 MG: 40 INJECTION SUBCUTANEOUS at 09:51

## 2023-01-01 RX ADMIN — SODIUM CHLORIDE SOLN NEBU 3% 4 ML: 3 NEBU SOLN at 19:59

## 2023-01-01 RX ADMIN — IPRATROPIUM BROMIDE 0.5 MG: 0.5 SOLUTION RESPIRATORY (INHALATION) at 07:40

## 2023-01-01 RX ADMIN — VASOPRESSIN 0.04 UNITS/MIN: 20 INJECTION, SOLUTION INTRAMUSCULAR; SUBCUTANEOUS at 04:55

## 2023-01-01 RX ADMIN — DOCUSATE SODIUM 100 MG: 100 CAPSULE, LIQUID FILLED ORAL at 10:56

## 2023-01-01 RX ADMIN — SODIUM CHLORIDE 50 ML/HR: 3 INJECTION, SOLUTION INTRAVENOUS at 18:31

## 2023-01-01 RX ADMIN — LEVALBUTEROL HYDROCHLORIDE 1.25 MG: 1.25 SOLUTION, CONCENTRATE RESPIRATORY (INHALATION) at 07:45

## 2023-01-01 RX ADMIN — DOCUSATE SODIUM 100 MG: 100 CAPSULE, LIQUID FILLED ORAL at 21:15

## 2023-01-01 RX ADMIN — CHLORHEXIDINE GLUCONATE 0.12% ORAL RINSE 15 ML: 1.2 LIQUID ORAL at 13:08

## 2023-01-01 RX ADMIN — INSULIN HUMAN 10 UNITS: 100 INJECTION, SOLUTION PARENTERAL at 16:14

## 2023-01-01 RX ADMIN — LABETALOL HYDROCHLORIDE 10 MG: 5 INJECTION, SOLUTION INTRAVENOUS at 02:38

## 2023-01-01 RX ADMIN — CHLORHEXIDINE GLUCONATE 0.12% ORAL RINSE 15 ML: 1.2 LIQUID ORAL at 20:23

## 2023-01-01 RX ADMIN — Medication 50 MCG/HR: at 17:21

## 2023-01-01 RX ADMIN — HYDROCORTISONE SODIUM SUCCINATE 50 MG: 100 INJECTION, POWDER, FOR SOLUTION INTRAMUSCULAR; INTRAVENOUS at 06:04

## 2023-01-01 RX ADMIN — Medication 50 MCG/HR: at 01:42

## 2023-01-01 RX ADMIN — METOPROLOL SUCCINATE 100 MG: 100 TABLET, FILM COATED, EXTENDED RELEASE ORAL at 09:39

## 2023-01-01 RX ADMIN — POTASSIUM CHLORIDE 40 MEQ: 29.8 INJECTION, SOLUTION INTRAVENOUS at 14:09

## 2023-01-01 RX ADMIN — POTASSIUM CHLORIDE 40 MEQ: 1.5 SOLUTION ORAL at 15:41

## 2023-01-01 RX ADMIN — DEXMEDETOMIDINE HYDROCHLORIDE 0.2 MCG/KG/HR: 4 INJECTION, SOLUTION INTRAVENOUS at 20:23

## 2023-01-01 RX ADMIN — Medication 100 MCG/HR: at 23:55

## 2023-01-01 RX ADMIN — NAFCILLIN SODIUM 2000 MG: 2 POWDER, FOR SOLUTION INTRAMUSCULAR; INTRAVENOUS at 05:53

## 2023-01-01 RX ADMIN — DEXMEDETOMIDINE HYDROCHLORIDE 0.5 MCG/KG/HR: 4 INJECTION, SOLUTION INTRAVENOUS at 13:00

## 2023-01-01 RX ADMIN — SODIUM CHLORIDE 8 MG/HR: 9 INJECTION, SOLUTION INTRAVENOUS at 09:13

## 2023-01-01 RX ADMIN — CEFAZOLIN SODIUM 2000 MG: 2 SOLUTION INTRAVENOUS at 12:15

## 2023-01-01 RX ADMIN — NAFCILLIN SODIUM 2000 MG: 2 POWDER, FOR SOLUTION INTRAMUSCULAR; INTRAVENOUS at 01:30

## 2023-01-01 RX ADMIN — FENOFIBRATE 145 MG: 145 TABLET, FILM COATED ORAL at 10:56

## 2023-01-01 RX ADMIN — PROPOFOL 40 MCG/KG/MIN: 10 INJECTION, EMULSION INTRAVENOUS at 17:05

## 2023-01-01 RX ADMIN — CEFAZOLIN SODIUM 2000 MG: 2 SOLUTION INTRAVENOUS at 04:11

## 2023-01-01 RX ADMIN — INSULIN LISPRO 1 UNITS: 100 INJECTION, SOLUTION INTRAVENOUS; SUBCUTANEOUS at 08:00

## 2023-01-01 RX ADMIN — PANTOPRAZOLE SODIUM 40 MG: 40 INJECTION, POWDER, FOR SOLUTION INTRAVENOUS at 13:07

## 2023-01-01 RX ADMIN — HEPARIN SODIUM 5000 UNITS: 5000 INJECTION INTRAVENOUS; SUBCUTANEOUS at 22:22

## 2023-01-01 RX ADMIN — INSULIN LISPRO 2 UNITS: 100 INJECTION, SOLUTION INTRAVENOUS; SUBCUTANEOUS at 12:14

## 2023-01-01 RX ADMIN — POTASSIUM CHLORIDE 40 MEQ: 20 SOLUTION ORAL at 14:33

## 2023-01-01 RX ADMIN — DEXMEDETOMIDINE HYDROCHLORIDE 0.3 MCG/KG/HR: 4 INJECTION, SOLUTION INTRAVENOUS at 03:59

## 2023-01-01 RX ADMIN — ONDANSETRON HYDROCHLORIDE 4 MG: 2 SOLUTION INTRAMUSCULAR; INTRAVENOUS at 13:08

## 2023-01-01 RX ADMIN — SODIUM CHLORIDE, SODIUM GLUCONATE, SODIUM ACETATE, POTASSIUM CHLORIDE, MAGNESIUM CHLORIDE, SODIUM PHOSPHATE, DIBASIC, AND POTASSIUM PHOSPHATE 1000 ML: .53; .5; .37; .037; .03; .012; .00082 INJECTION, SOLUTION INTRAVENOUS at 11:02

## 2023-01-01 RX ADMIN — DOCUSATE SODIUM 100 MG: 100 CAPSULE, LIQUID FILLED ORAL at 08:16

## 2023-01-01 RX ADMIN — ACETAMINOPHEN 975 MG: 325 TABLET ORAL at 05:34

## 2023-01-01 RX ADMIN — NAFCILLIN SODIUM 2000 MG: 2 POWDER, FOR SOLUTION INTRAMUSCULAR; INTRAVENOUS at 13:36

## 2023-01-01 RX ADMIN — SODIUM CHLORIDE 1000 ML: 0.9 INJECTION, SOLUTION INTRAVENOUS at 08:00

## 2023-01-01 RX ADMIN — Medication 50 MCG/HR: at 14:33

## 2023-01-01 RX ADMIN — NAFCILLIN SODIUM 2000 MG: 2 POWDER, FOR SOLUTION INTRAMUSCULAR; INTRAVENOUS at 12:19

## 2023-01-01 RX ADMIN — INSULIN LISPRO 2 UNITS: 100 INJECTION, SOLUTION INTRAVENOUS; SUBCUTANEOUS at 11:45

## 2023-01-01 RX ADMIN — INSULIN LISPRO 2 UNITS: 100 INJECTION, SOLUTION INTRAVENOUS; SUBCUTANEOUS at 12:17

## 2023-01-01 RX ADMIN — CEFAZOLIN 2000 MG: 330 INJECTION, POWDER, FOR SOLUTION INTRAMUSCULAR; INTRAVENOUS at 10:12

## 2023-01-01 RX ADMIN — NAFCILLIN SODIUM 2000 MG: 2 POWDER, FOR SOLUTION INTRAMUSCULAR; INTRAVENOUS at 18:21

## 2023-01-01 RX ADMIN — LEVALBUTEROL HYDROCHLORIDE 1.25 MG: 1.25 SOLUTION, CONCENTRATE RESPIRATORY (INHALATION) at 13:32

## 2023-01-01 RX ADMIN — NAFCILLIN SODIUM 2000 MG: 2 POWDER, FOR SOLUTION INTRAMUSCULAR; INTRAVENOUS at 04:33

## 2023-01-01 RX ADMIN — KETOROLAC TROMETHAMINE 15 MG: 30 INJECTION, SOLUTION INTRAMUSCULAR; INTRAVENOUS at 20:39

## 2023-01-01 RX ADMIN — SODIUM CHLORIDE 8 MG/HR: 9 INJECTION, SOLUTION INTRAVENOUS at 14:45

## 2023-01-01 RX ADMIN — POTASSIUM CHLORIDE 40 MEQ: 20 SOLUTION ORAL at 21:24

## 2023-01-01 RX ADMIN — IOHEXOL 100 ML: 350 INJECTION, SOLUTION INTRAVENOUS at 13:50

## 2023-01-01 RX ADMIN — OXYCODONE HYDROCHLORIDE 10 MG: 10 TABLET ORAL at 20:47

## 2023-01-01 RX ADMIN — CHLORHEXIDINE GLUCONATE 0.12% ORAL RINSE 15 ML: 1.2 LIQUID ORAL at 09:21

## 2023-01-01 RX ADMIN — SODIUM CHLORIDE 4 UNITS/HR: 9 INJECTION, SOLUTION INTRAVENOUS at 21:40

## 2023-01-01 RX ADMIN — ROCURONIUM BROMIDE 50 MG: 50 INJECTION, SOLUTION INTRAVENOUS at 09:51

## 2023-01-01 RX ADMIN — METOPROLOL SUCCINATE 100 MG: 100 TABLET, FILM COATED, EXTENDED RELEASE ORAL at 09:01

## 2023-01-01 RX ADMIN — OMEGA-3 FATTY ACIDS CAP 1000 MG 1000 MG: 1000 CAP at 17:11

## 2023-01-01 RX ADMIN — OMEGA-3 FATTY ACIDS CAP 1000 MG 1000 MG: 1000 CAP at 09:39

## 2023-01-01 RX ADMIN — OMEGA-3 FATTY ACIDS CAP 1000 MG 1000 MG: 1000 CAP at 09:01

## 2023-01-01 RX ADMIN — IPRATROPIUM BROMIDE 0.5 MG: 0.5 SOLUTION RESPIRATORY (INHALATION) at 13:35

## 2023-01-01 RX ADMIN — DOCUSATE SODIUM 100 MG: 100 CAPSULE, LIQUID FILLED ORAL at 18:38

## 2023-01-01 RX ADMIN — OXYCODONE HYDROCHLORIDE 5 MG: 5 TABLET ORAL at 18:46

## 2023-01-01 RX ADMIN — NAFCILLIN SODIUM 2000 MG: 2 POWDER, FOR SOLUTION INTRAMUSCULAR; INTRAVENOUS at 21:01

## 2023-01-01 RX ADMIN — NOREPINEPHRINE BITARTRATE 14 MCG/MIN: 1 INJECTION, SOLUTION, CONCENTRATE INTRAVENOUS at 02:37

## 2023-01-01 RX ADMIN — NAFCILLIN SODIUM 2000 MG: 2 POWDER, FOR SOLUTION INTRAMUSCULAR; INTRAVENOUS at 21:10

## 2023-01-01 RX ADMIN — SODIUM CHLORIDE 250 ML: 3 INJECTION, SOLUTION INTRAVENOUS at 06:02

## 2023-01-01 RX ADMIN — NAFCILLIN SODIUM 2000 MG: 2 POWDER, FOR SOLUTION INTRAMUSCULAR; INTRAVENOUS at 09:51

## 2023-01-01 RX ADMIN — SODIUM CHLORIDE, SODIUM GLUCONATE, SODIUM ACETATE, POTASSIUM CHLORIDE, MAGNESIUM CHLORIDE, SODIUM PHOSPHATE, DIBASIC, AND POTASSIUM PHOSPHATE 1000 ML: .53; .5; .37; .037; .03; .012; .00082 INJECTION, SOLUTION INTRAVENOUS at 12:10

## 2023-01-01 RX ADMIN — IPRATROPIUM BROMIDE 0.5 MG: 0.5 SOLUTION RESPIRATORY (INHALATION) at 20:11

## 2023-01-01 RX ADMIN — FENTANYL CITRATE 50 MCG: 50 INJECTION INTRAMUSCULAR; INTRAVENOUS at 02:37

## 2023-01-01 RX ADMIN — CEFAZOLIN SODIUM 1000 MG: 1 SOLUTION INTRAVENOUS at 20:24

## 2023-01-01 RX ADMIN — HYDROCORTISONE SODIUM SUCCINATE 100 MG: 100 INJECTION, POWDER, FOR SOLUTION INTRAMUSCULAR; INTRAVENOUS at 20:39

## 2023-01-01 RX ADMIN — FENTANYL CITRATE 50 MCG: 50 INJECTION INTRAMUSCULAR; INTRAVENOUS at 02:35

## 2023-01-01 RX ADMIN — METOPROLOL SUCCINATE 100 MG: 100 TABLET, FILM COATED, EXTENDED RELEASE ORAL at 08:02

## 2023-01-01 RX ADMIN — LEVALBUTEROL HYDROCHLORIDE 1.25 MG: 1.25 SOLUTION, CONCENTRATE RESPIRATORY (INHALATION) at 20:00

## 2023-01-01 RX ADMIN — METOPROLOL SUCCINATE 100 MG: 100 TABLET, FILM COATED, EXTENDED RELEASE ORAL at 08:28

## 2023-01-01 RX ADMIN — MIDAZOLAM 1 MG: 1 INJECTION INTRAMUSCULAR; INTRAVENOUS at 15:31

## 2023-01-01 RX ADMIN — ESMOLOL HYDROCHLORIDE 10 MG: 100 INJECTION, SOLUTION INTRAVENOUS at 10:32

## 2023-01-01 RX ADMIN — ACETAMINOPHEN 650 MG: 650 SUSPENSION ORAL at 00:42

## 2023-01-01 RX ADMIN — SODIUM CHLORIDE 8 MG/HR: 9 INJECTION, SOLUTION INTRAVENOUS at 02:44

## 2023-01-01 RX ADMIN — GLYCOPYRROLATE 0.2 MG: 0.2 INJECTION, SOLUTION INTRAMUSCULAR; INTRAVENOUS at 02:35

## 2023-01-01 RX ADMIN — NAFCILLIN SODIUM 2000 MG: 2 POWDER, FOR SOLUTION INTRAMUSCULAR; INTRAVENOUS at 01:09

## 2023-01-01 RX ADMIN — LEVETIRACETAM 500 MG: 500 TABLET, FILM COATED ORAL at 20:24

## 2023-01-01 RX ADMIN — DEXTROSE 100 ML/HR: 5 SOLUTION INTRAVENOUS at 02:38

## 2023-01-01 RX ADMIN — FENOFIBRATE 145 MG: 145 TABLET, FILM COATED ORAL at 08:02

## 2023-01-01 RX ADMIN — CALCIUM CARBONATE (ANTACID) CHEW TAB 500 MG 1000 MG: 500 CHEW TAB at 20:47

## 2023-01-01 RX ADMIN — CEFAZOLIN SODIUM 2000 MG: 2 SOLUTION INTRAVENOUS at 04:36

## 2023-01-01 RX ADMIN — Medication 100 MCG/HR: at 18:30

## 2023-01-01 RX ADMIN — FUROSEMIDE 20 MG: 10 INJECTION, SOLUTION INTRAMUSCULAR; INTRAVENOUS at 10:10

## 2023-01-01 RX ADMIN — LEVETIRACETAM 500 MG: 500 TABLET, FILM COATED ORAL at 13:06

## 2023-01-01 RX ADMIN — INSULIN LISPRO 1 UNITS: 100 INJECTION, SOLUTION INTRAVENOUS; SUBCUTANEOUS at 12:19

## 2023-01-01 RX ADMIN — DOCUSATE SODIUM 100 MG: 100 CAPSULE, LIQUID FILLED ORAL at 08:02

## 2023-01-01 RX ADMIN — GLYCOPYRROLATE 0.2 MG: 0.2 INJECTION, SOLUTION INTRAMUSCULAR; INTRAVENOUS at 13:15

## 2023-01-01 RX ADMIN — GADOBUTROL 10 ML: 604.72 INJECTION INTRAVENOUS at 17:58

## 2023-01-01 RX ADMIN — CEFAZOLIN SODIUM 2000 MG: 2 SOLUTION INTRAVENOUS at 19:52

## 2023-01-01 RX ADMIN — CHLORHEXIDINE GLUCONATE 0.12% ORAL RINSE 15 ML: 1.2 LIQUID ORAL at 21:55

## 2023-01-01 RX ADMIN — ACETAMINOPHEN 650 MG: 650 SUSPENSION ORAL at 00:23

## 2023-01-01 RX ADMIN — SODIUM CHLORIDE SOLN NEBU 3% 4 ML: 3 NEBU SOLN at 07:45

## 2023-01-01 RX ADMIN — ACETAMINOPHEN 975 MG: 325 TABLET ORAL at 04:55

## 2023-01-01 RX ADMIN — DOCUSATE SODIUM 100 MG: 100 CAPSULE, LIQUID FILLED ORAL at 08:27

## 2023-01-01 RX ADMIN — INSULIN LISPRO 1 UNITS: 100 INJECTION, SOLUTION INTRAVENOUS; SUBCUTANEOUS at 17:12

## 2023-01-01 RX ADMIN — HYDROCORTISONE SODIUM SUCCINATE 50 MG: 100 INJECTION, POWDER, FOR SOLUTION INTRAMUSCULAR; INTRAVENOUS at 05:05

## 2023-01-01 RX ADMIN — MANNITOL 12.5 G: 12.5 INJECTION, SOLUTION INTRAVENOUS at 10:04

## 2023-01-01 RX ADMIN — ETOMIDATE 12 MG: 20 INJECTION, SOLUTION INTRAVENOUS at 15:23

## 2023-01-01 RX ADMIN — SODIUM CHLORIDE SOLN NEBU 3% 4 ML: 3 NEBU SOLN at 07:40

## 2023-01-01 RX ADMIN — OMEGA-3 FATTY ACIDS CAP 1000 MG 1000 MG: 1000 CAP at 21:14

## 2023-01-01 RX ADMIN — FENOFIBRATE 145 MG: 145 TABLET, FILM COATED ORAL at 08:27

## 2023-01-01 RX ADMIN — GLYCOPYRROLATE 0.1 MG: 0.2 INJECTION INTRAMUSCULAR; INTRAVENOUS at 01:18

## 2023-01-01 RX ADMIN — OMEGA-3 FATTY ACIDS CAP 1000 MG 1000 MG: 1000 CAP at 08:02

## 2023-01-01 RX ADMIN — VANCOMYCIN HYDROCHLORIDE 1000 MG: 1 INJECTION, SOLUTION INTRAVENOUS at 07:38

## 2023-01-01 RX ADMIN — ACETAMINOPHEN 650 MG: 650 SUSPENSION ORAL at 05:53

## 2023-01-01 RX ADMIN — Medication 100 MCG/HR: at 00:16

## 2023-01-01 RX ADMIN — SODIUM CHLORIDE SOLN NEBU 3% 4 ML: 3 NEBU SOLN at 18:50

## 2023-01-01 RX ADMIN — LEVALBUTEROL HYDROCHLORIDE 1.25 MG: 1.25 SOLUTION, CONCENTRATE RESPIRATORY (INHALATION) at 13:15

## 2023-01-01 RX ADMIN — DEXMEDETOMIDINE HYDROCHLORIDE 0.5 MCG/KG/HR: 4 INJECTION, SOLUTION INTRAVENOUS at 09:18

## 2023-01-01 RX ADMIN — NOREPINEPHRINE BITARTRATE 6 MCG/MIN: 1 INJECTION, SOLUTION, CONCENTRATE INTRAVENOUS at 18:28

## 2023-01-01 RX ADMIN — INSULIN LISPRO 2 UNITS: 100 INJECTION, SOLUTION INTRAVENOUS; SUBCUTANEOUS at 16:48

## 2023-01-01 RX ADMIN — ACETAMINOPHEN 650 MG: 650 SUSPENSION ORAL at 17:59

## 2023-01-01 RX ADMIN — NAFCILLIN SODIUM 2000 MG: 2 POWDER, FOR SOLUTION INTRAMUSCULAR; INTRAVENOUS at 01:17

## 2023-01-01 RX ADMIN — SODIUM CHLORIDE 3 UNITS/HR: 9 INJECTION, SOLUTION INTRAVENOUS at 13:48

## 2023-01-01 RX ADMIN — Medication 100 MCG/HR: at 01:18

## 2023-01-01 RX ADMIN — NAFCILLIN SODIUM 2000 MG: 2 POWDER, FOR SOLUTION INTRAMUSCULAR; INTRAVENOUS at 09:13

## 2023-01-01 RX ADMIN — LEVALBUTEROL HYDROCHLORIDE 1.25 MG: 1.25 SOLUTION, CONCENTRATE RESPIRATORY (INHALATION) at 18:49

## 2023-01-01 RX ADMIN — NAFCILLIN SODIUM 2000 MG: 2 POWDER, FOR SOLUTION INTRAMUSCULAR; INTRAVENOUS at 05:37

## 2023-01-01 RX ADMIN — DEXTROSE 50 ML/HR: 5 SOLUTION INTRAVENOUS at 00:42

## 2023-01-01 RX ADMIN — CHLORHEXIDINE GLUCONATE 0.12% ORAL RINSE 15 ML: 1.2 LIQUID ORAL at 09:30

## 2023-01-01 RX ADMIN — INSULIN LISPRO 2 UNITS: 100 INJECTION, SOLUTION INTRAVENOUS; SUBCUTANEOUS at 07:42

## 2023-01-01 RX ADMIN — PROPOFOL 10 MCG/KG/MIN: 10 INJECTION, EMULSION INTRAVENOUS at 15:12

## 2023-01-01 RX ADMIN — CHLORHEXIDINE GLUCONATE 0.12% ORAL RINSE 15 ML: 1.2 LIQUID ORAL at 21:10

## 2023-01-01 RX ADMIN — ROCURONIUM BROMIDE 50 MG: 50 INJECTION INTRAVENOUS at 16:58

## 2023-01-01 RX ADMIN — LEVALBUTEROL HYDROCHLORIDE 1.25 MG: 1.25 SOLUTION, CONCENTRATE RESPIRATORY (INHALATION) at 07:41

## 2023-01-01 RX ADMIN — SODIUM CHLORIDE 8 MG/HR: 9 INJECTION, SOLUTION INTRAVENOUS at 01:56

## 2023-01-01 RX ADMIN — ACETAMINOPHEN 975 MG: 325 TABLET ORAL at 15:13

## 2023-01-01 RX ADMIN — INSULIN LISPRO 2 UNITS: 100 INJECTION, SOLUTION INTRAVENOUS; SUBCUTANEOUS at 08:02

## 2023-01-01 RX ADMIN — CHLORHEXIDINE GLUCONATE 0.12% ORAL RINSE 15 ML: 1.2 LIQUID ORAL at 20:07

## 2023-01-01 RX ADMIN — LEVALBUTEROL HYDROCHLORIDE 1.25 MG: 1.25 SOLUTION, CONCENTRATE RESPIRATORY (INHALATION) at 13:35

## 2023-01-01 RX ADMIN — METOPROLOL SUCCINATE 100 MG: 100 TABLET, FILM COATED, EXTENDED RELEASE ORAL at 08:16

## 2023-01-01 RX ADMIN — ACETAMINOPHEN 975 MG: 325 TABLET ORAL at 21:37

## 2023-01-01 RX ADMIN — INSULIN LISPRO 1 UNITS: 100 INJECTION, SOLUTION INTRAVENOUS; SUBCUTANEOUS at 16:08

## 2023-01-01 RX ADMIN — DOCUSATE SODIUM 100 MG: 100 CAPSULE, LIQUID FILLED ORAL at 09:01

## 2023-01-01 RX ADMIN — NAFCILLIN SODIUM 2000 MG: 2 POWDER, FOR SOLUTION INTRAMUSCULAR; INTRAVENOUS at 13:11

## 2023-01-01 RX ADMIN — IPRATROPIUM BROMIDE 0.5 MG: 0.5 SOLUTION RESPIRATORY (INHALATION) at 07:41

## 2023-01-01 RX ADMIN — VASOPRESSIN 0.04 UNITS/MIN: 20 INJECTION, SOLUTION INTRAMUSCULAR; SUBCUTANEOUS at 22:29

## 2023-01-01 RX ADMIN — LEVALBUTEROL HYDROCHLORIDE 1.25 MG: 1.25 SOLUTION, CONCENTRATE RESPIRATORY (INHALATION) at 13:22

## 2023-01-01 RX ADMIN — DEXTROSE 100 ML/HR: 5 SOLUTION INTRAVENOUS at 16:03

## 2023-01-01 RX ADMIN — ROCURONIUM BROMIDE 20 MG: 50 INJECTION, SOLUTION INTRAVENOUS at 11:35

## 2023-01-01 RX ADMIN — DOCUSATE SODIUM 100 MG: 100 CAPSULE, LIQUID FILLED ORAL at 17:33

## 2023-01-01 RX ADMIN — SODIUM CHLORIDE: 0.9 INJECTION, SOLUTION INTRAVENOUS at 15:22

## 2023-01-01 RX ADMIN — SODIUM CHLORIDE 125 ML/HR: 0.9 INJECTION, SOLUTION INTRAVENOUS at 20:38

## 2023-01-01 RX ADMIN — SODIUM CHLORIDE SOLN NEBU 3% 4 ML: 3 NEBU SOLN at 13:22

## 2023-01-01 RX ADMIN — CEFAZOLIN SODIUM 2000 MG: 2 SOLUTION INTRAVENOUS at 19:33

## 2023-01-01 RX ADMIN — HEPARIN SODIUM 5000 UNITS: 5000 INJECTION INTRAVENOUS; SUBCUTANEOUS at 00:59

## 2023-01-01 RX ADMIN — ACETAMINOPHEN 975 MG: 325 TABLET ORAL at 13:08

## 2023-01-01 RX ADMIN — NAFCILLIN SODIUM 2000 MG: 2 POWDER, FOR SOLUTION INTRAMUSCULAR; INTRAVENOUS at 18:55

## 2023-01-01 RX ADMIN — PRAVASTATIN SODIUM 80 MG: 80 TABLET ORAL at 16:26

## 2023-01-01 RX ADMIN — METOROPROLOL TARTRATE 1 MG: 5 INJECTION, SOLUTION INTRAVENOUS at 10:53

## 2023-01-01 RX ADMIN — IPRATROPIUM BROMIDE 0.5 MG: 0.5 SOLUTION RESPIRATORY (INHALATION) at 20:00

## 2023-01-01 RX ADMIN — FENTANYL CITRATE 50 MCG: 50 INJECTION INTRAMUSCULAR; INTRAVENOUS at 17:54

## 2023-01-01 RX ADMIN — CHLORHEXIDINE GLUCONATE 0.12% ORAL RINSE 15 ML: 1.2 LIQUID ORAL at 20:24

## 2023-01-01 RX ADMIN — KETOROLAC TROMETHAMINE 15 MG: 30 INJECTION, SOLUTION INTRAMUSCULAR; INTRAVENOUS at 09:51

## 2023-01-01 RX ADMIN — ACETAMINOPHEN 975 MG: 325 TABLET ORAL at 05:32

## 2023-01-01 RX ADMIN — NAFCILLIN SODIUM 2000 MG: 2 POWDER, FOR SOLUTION INTRAMUSCULAR; INTRAVENOUS at 17:27

## 2023-01-01 RX ADMIN — OMEGA-3 FATTY ACIDS CAP 1000 MG 1000 MG: 1000 CAP at 18:38

## 2023-01-01 RX ADMIN — CEFAZOLIN SODIUM 2000 MG: 2 SOLUTION INTRAVENOUS at 03:46

## 2023-01-01 RX ADMIN — FENTANYL CITRATE 50 MCG: 50 INJECTION INTRAMUSCULAR; INTRAVENOUS at 18:35

## 2023-01-01 RX ADMIN — OMEGA-3 FATTY ACIDS CAP 1000 MG 1000 MG: 1000 CAP at 17:33

## 2023-01-01 RX ADMIN — SODIUM CHLORIDE, SODIUM LACTATE, POTASSIUM CHLORIDE, AND CALCIUM CHLORIDE 125 ML/HR: .6; .31; .03; .02 INJECTION, SOLUTION INTRAVENOUS at 18:27

## 2023-01-01 RX ADMIN — LORAZEPAM 1 MG: 1 TABLET ORAL at 11:15

## 2023-01-01 RX ADMIN — DEXMEDETOMIDINE HYDROCHLORIDE 0.5 MCG/KG/HR: 4 INJECTION, SOLUTION INTRAVENOUS at 16:49

## 2023-01-01 RX ADMIN — INSULIN LISPRO 2 UNITS: 100 INJECTION, SOLUTION INTRAVENOUS; SUBCUTANEOUS at 16:26

## 2023-01-01 RX ADMIN — IPRATROPIUM BROMIDE 0.5 MG: 0.5 SOLUTION RESPIRATORY (INHALATION) at 13:32

## 2023-01-01 RX ADMIN — OMEGA-3 FATTY ACIDS CAP 1000 MG 1000 MG: 1000 CAP at 17:10

## 2023-01-01 RX ADMIN — HYDROCORTISONE SODIUM SUCCINATE 50 MG: 100 INJECTION, POWDER, FOR SOLUTION INTRAMUSCULAR; INTRAVENOUS at 00:58

## 2023-01-01 RX ADMIN — VASOPRESSIN 0.04 UNITS/MIN: 20 INJECTION, SOLUTION INTRAMUSCULAR; SUBCUTANEOUS at 19:57

## 2023-01-01 RX ADMIN — FENTANYL CITRATE 50 MCG: 50 INJECTION INTRAMUSCULAR; INTRAVENOUS at 11:08

## 2023-01-01 RX ADMIN — CEFAZOLIN SODIUM 2000 MG: 2 SOLUTION INTRAVENOUS at 12:14

## 2023-01-01 RX ADMIN — INSULIN LISPRO 2 UNITS: 100 INJECTION, SOLUTION INTRAVENOUS; SUBCUTANEOUS at 08:18

## 2023-01-01 RX ADMIN — HEPARIN SODIUM 5000 UNITS: 5000 INJECTION INTRAVENOUS; SUBCUTANEOUS at 06:04

## 2023-01-01 RX ADMIN — FENOFIBRATE 145 MG: 145 TABLET, FILM COATED ORAL at 09:39

## 2023-01-01 RX ADMIN — ROCURONIUM BROMIDE 50 MG: 50 INJECTION INTRAVENOUS at 15:26

## 2023-01-01 RX ADMIN — POTASSIUM CHLORIDE 40 MEQ: 1.5 SOLUTION ORAL at 03:45

## 2023-01-01 RX ADMIN — ACETAMINOPHEN 650 MG: 650 SUSPENSION ORAL at 22:08

## 2023-01-01 RX ADMIN — LEVALBUTEROL HYDROCHLORIDE 1.25 MG: 1.25 SOLUTION, CONCENTRATE RESPIRATORY (INHALATION) at 07:40

## 2023-01-01 RX ADMIN — GADOBUTROL 10 ML: 604.72 INJECTION INTRAVENOUS at 23:45

## 2023-01-01 RX ADMIN — VANCOMYCIN HYDROCHLORIDE 2000 MG: 5 INJECTION, POWDER, LYOPHILIZED, FOR SOLUTION INTRAVENOUS at 18:21

## 2023-01-01 RX ADMIN — ACETAMINOPHEN 975 MG: 325 TABLET, FILM COATED ORAL at 13:13

## 2023-01-01 RX ADMIN — ENOXAPARIN SODIUM 40 MG: 40 INJECTION SUBCUTANEOUS at 08:19

## 2023-01-01 RX ADMIN — DOCUSATE SODIUM 100 MG: 100 CAPSULE, LIQUID FILLED ORAL at 17:58

## 2023-01-01 RX ADMIN — SODIUM CHLORIDE SOLN NEBU 3% 4 ML: 3 NEBU SOLN at 13:35

## 2023-01-01 RX ADMIN — SODIUM CHLORIDE, SODIUM GLUCONATE, SODIUM ACETATE, POTASSIUM CHLORIDE, MAGNESIUM CHLORIDE, SODIUM PHOSPHATE, DIBASIC, AND POTASSIUM PHOSPHATE 1000 ML: .53; .5; .37; .037; .03; .012; .00082 INJECTION, SOLUTION INTRAVENOUS at 09:30

## 2023-01-01 RX ADMIN — GLYCOPYRROLATE 0.1 MG: 0.2 INJECTION INTRAMUSCULAR; INTRAVENOUS at 16:49

## 2023-01-01 RX ADMIN — PANTOPRAZOLE SODIUM 40 MG: 40 INJECTION, POWDER, FOR SOLUTION INTRAVENOUS at 09:08

## 2023-01-01 RX ADMIN — OXYCODONE HYDROCHLORIDE 5 MG: 5 TABLET ORAL at 23:26

## 2023-01-01 RX ADMIN — DEXTROSE 100 ML/HR: 5 SOLUTION INTRAVENOUS at 13:14

## 2023-01-01 RX ADMIN — SODIUM CHLORIDE, POTASSIUM CHLORIDE, SODIUM LACTATE AND CALCIUM CHLORIDE 125 ML/HR: 600; 310; 30; 20 INJECTION, SOLUTION INTRAVENOUS at 21:15

## 2023-01-01 RX ADMIN — OXYCODONE HYDROCHLORIDE 5 MG: 5 TABLET ORAL at 14:49

## 2023-01-01 RX ADMIN — MANNITOL 12.5 G: 12.5 INJECTION, SOLUTION INTRAVENOUS at 10:00

## 2023-01-01 RX ADMIN — CEFAZOLIN SODIUM 2000 MG: 2 SOLUTION INTRAVENOUS at 17:58

## 2023-01-01 RX ADMIN — NAFCILLIN SODIUM 2000 MG: 2 POWDER, FOR SOLUTION INTRAMUSCULAR; INTRAVENOUS at 01:11

## 2023-01-01 RX ADMIN — OMEGA-3 FATTY ACIDS CAP 1000 MG 1000 MG: 1000 CAP at 09:51

## 2023-01-01 RX ADMIN — CEFAZOLIN SODIUM 1000 MG: 1 SOLUTION INTRAVENOUS at 04:53

## 2023-01-01 RX ADMIN — GADOBUTROL 10 ML: 604.72 INJECTION INTRAVENOUS at 14:16

## 2023-01-01 RX ADMIN — NOREPINEPHRINE BITARTRATE 16 MCG/MIN: 1 INJECTION, SOLUTION, CONCENTRATE INTRAVENOUS at 19:56

## 2023-01-01 RX ADMIN — IPRATROPIUM BROMIDE 0.5 MG: 0.5 SOLUTION RESPIRATORY (INHALATION) at 13:22

## 2023-01-01 RX ADMIN — CEFAZOLIN SODIUM 2000 MG: 2 SOLUTION INTRAVENOUS at 02:47

## 2023-01-01 RX ADMIN — FENTANYL CITRATE 50 MCG: 50 INJECTION INTRAMUSCULAR; INTRAVENOUS at 09:21

## 2023-01-01 RX ADMIN — SODIUM CHLORIDE SOLN NEBU 3% 4 ML: 3 NEBU SOLN at 20:11

## 2023-01-01 RX ADMIN — Medication 100 MCG/HR: at 20:43

## 2023-01-01 RX ADMIN — NOREPINEPHRINE BITARTRATE 2 MCG/MIN: 1 INJECTION, SOLUTION, CONCENTRATE INTRAVENOUS at 14:23

## 2023-01-01 RX ADMIN — ACETAMINOPHEN 975 MG: 325 TABLET ORAL at 21:36

## 2023-01-01 RX ADMIN — FENTANYL CITRATE 50 MCG: 50 INJECTION INTRAMUSCULAR; INTRAVENOUS at 17:27

## 2023-01-01 RX ADMIN — KETOROLAC TROMETHAMINE 15 MG: 30 INJECTION, SOLUTION INTRAMUSCULAR; INTRAVENOUS at 06:14

## 2023-01-01 RX ADMIN — PROPOFOL 30 MCG/KG/MIN: 10 INJECTION, EMULSION INTRAVENOUS at 11:24

## 2023-01-01 RX ADMIN — CEFAZOLIN SODIUM 2000 MG: 2 SOLUTION INTRAVENOUS at 02:33

## 2023-01-01 RX ADMIN — CEFAZOLIN SODIUM 2000 MG: 2 SOLUTION INTRAVENOUS at 10:09

## 2023-01-01 RX ADMIN — Medication 100 MCG/HR: at 11:08

## 2023-01-01 RX ADMIN — SODIUM CHLORIDE 3000 ML: 0.9 INJECTION, SOLUTION INTRAVENOUS at 20:10

## 2023-01-01 RX ADMIN — PANTOPRAZOLE SODIUM 40 MG: 40 INJECTION, POWDER, FOR SOLUTION INTRAVENOUS at 21:10

## 2023-01-01 RX ADMIN — ACETAMINOPHEN 650 MG: 650 SUSPENSION ORAL at 13:06

## 2023-01-01 RX ADMIN — INSULIN LISPRO 2 UNITS: 100 INJECTION, SOLUTION INTRAVENOUS; SUBCUTANEOUS at 12:04

## 2023-01-01 RX ADMIN — LEVALBUTEROL HYDROCHLORIDE 1.25 MG: 1.25 SOLUTION, CONCENTRATE RESPIRATORY (INHALATION) at 20:11

## 2023-01-02 ENCOUNTER — OFFICE VISIT (OUTPATIENT)
Dept: URGENT CARE | Facility: CLINIC | Age: 63
End: 2023-01-02

## 2023-01-02 VITALS
OXYGEN SATURATION: 98 % | HEART RATE: 87 BPM | DIASTOLIC BLOOD PRESSURE: 72 MMHG | SYSTOLIC BLOOD PRESSURE: 138 MMHG | RESPIRATION RATE: 18 BRPM | TEMPERATURE: 97.1 F

## 2023-01-02 DIAGNOSIS — M62.838 MUSCLE SPASM: Primary | ICD-10-CM

## 2023-01-02 RX ORDER — METHYLPREDNISOLONE 4 MG/1
TABLET ORAL
Qty: 1 EACH | Refills: 0 | Status: ON HOLD | OUTPATIENT
Start: 2023-01-02

## 2023-01-02 RX ORDER — CYCLOBENZAPRINE HCL 10 MG
10 TABLET ORAL 3 TIMES DAILY PRN
Qty: 20 TABLET | Refills: 0 | Status: ON HOLD | OUTPATIENT
Start: 2023-01-02

## 2023-01-02 NOTE — PATIENT INSTRUCTIONS
Muscle Spasm   WHAT YOU NEED TO KNOW:   A muscle spasm is a sudden contraction of any muscle or group of muscles  A muscle cramp is a painful muscle spasm  Muscle cramps commonly occur after intense exercise or during pregnancy  They may also be caused by certain medications, dehydration, low calcium or magnesium levels, or another medical condition  DISCHARGE INSTRUCTIONS:   Medicines: You may need the following:  NSAIDs  help decrease swelling and pain or fever  This medicine is available with or without a doctor's order  NSAIDs can cause stomach bleeding or kidney problems in certain people  If you take blood thinner medicine, always ask your healthcare provider if NSAIDs are safe for you  Always read the medicine label and follow directions  Take your medicine as directed  Contact your healthcare provider if you think your medicine is not helping or if you have side effects  Tell him of her if you are allergic to any medicine  Keep a list of the medicines, vitamins, and herbs you take  Include the amounts, and when and why you take them  Bring the list or the pill bottles to follow-up visits  Carry your medicine list with you in case of an emergency  Follow up with your healthcare provider as directed: You may need other tests or treatment  You may also be referred to a physical therapist or other specialist  Write down your questions so you remember to ask them during your visits  Self-care:   Stretch  your muscle to help relieve the cramp  It may be helpful to keep your muscle in the stretched position until the cramp is gone  Apply heat  to help decrease pain and muscle spasms  Apply heat on the area for 20 to 30 minutes every 2 hours for as many days as directed  Apply ice  to help decrease swelling and pain  Ice may also help prevent tissue damage  Use an ice pack, or put crushed ice in a plastic bag   Cover it with a towel and place it on your muscle for 15 to 20 minutes every hour or as directed  Drink more liquids  to help prevent muscle cramps caused by dehydration  Sports drinks may help replace electrolytes you lose through sweat during exercise  Ask your healthcare provider how much liquid to drink each day and which liquids are best for you  Eat healthy foods , such as fruits, vegetables, whole grains, low-fat dairy products, and lean proteins (meat, beans, and fish)  If you are pregnant, ask your healthcare provider about foods that are high in magnesium and sodium  They may help to relieve cramps during pregnancy  Massage your muscle  to help relieve the cramp  Take frequent deep breaths  until the cramp feels better  Lie down while you take the deep breaths so you do not get dizzy or lightheaded  Contact your healthcare provider if:   You have signs of dehydration, such as a headache, dark yellow urine, dry eyes or mouth, or a fast heartbeat  You have questions or concerns about your condition or care  Return to the emergency department if:   You have warmth, swelling, or redness in the cramping muscle  You have frequent or unrelieved muscle cramps in several different muscles  You have muscle cramps with numbness, tingling, and burning in your hands and feet  © Copyright [employer] 2022 Information is for End User's use only and may not be sold, redistributed or otherwise used for commercial purposes  All illustrations and images included in CareNotes® are the copyrighted property of A D A M , [employer]  or Shankar Dillon  The above information is an  only  It is not intended as medical advice for individual conditions or treatments  Talk to your doctor, nurse or pharmacist before following any medical regimen to see if it is safe and effective for you

## 2023-01-02 NOTE — PROGRESS NOTES
[facility]        NAME: Westley Deutsch is a 58 y o  male  : 1960    MRN: [id number]  DATE: 2023  TIME: 9:11 AM    Assessment and Plan   Muscle spasm [M62 838]  1  Muscle spasm  methylPREDNISolone 4 MG tablet therapy pack    cyclobenzaprine (FLEXERIL) 10 mg tablet        Start course of medrol dose pack and flexeril   Discussed watch BG closely while on steroids -   BG currently stable and normal limits  ER with new or worsening symptoms especially in light of recent travel from foreign country   Has appt with pcp on Friday  Patient Instructions     Follow up with PCP in 3-5 days  Proceed to  ER if symptoms worsen  Chief Complaint     Chief Complaint   Patient presents with   • Cold Like Symptoms     Patient states that 4 weeks ago when he came home from [facility] he had right ear problems and chest congestion  He was seen at [facility] and tested for flu and COVID and EKG  and all were negative  This past Saturday he was havimg trouble with right ear and stiff neck  History of Present Illness   Westley Deutsch presents to the clinic c/o    Patient states that 4 weeks ago when he came home from [facility] he had right ear problems and chest congestion- thought it was the long plain ride  He put some alcohol in his ear  Feels clogged/cracking  On Friday he was at work half the day and was having pain in his left arm and chest - went to [facility] urgent care for evaluation and tested for flu and COVID and EKG  and all were negative  States noticed a fever of 101 on Friday, but nothing since  Sat he took a nap on his recliner and woke up with right side neck pain/stiffness  Has been applying aspercream with lidocaine, applying ice, and using a travel pillow - helps with the pain and discomfort but states still can't drive due to limited ROM  Has an appt on Friday with PCP   Right ear continues to feel clogged and pressure      Review of Systems   Review of Systems   All other systems reviewed and are negative  Current Medications     Long-Term Medications   Medication Sig Dispense Refill   • betamethasone dipropionate (DIPROSONE) 0 05 % cream Apply topically 2 (two) times a day (Patient taking differently: Apply 1 application topically 2 (two) times a day) 30 g 0   • budesonide-formoterol (Symbicort) 160-4 5 mcg/act inhaler Inhale 2 puffs 2 (two) times a day Rinse mouth after use  10 2 g 5   • cyclobenzaprine (FLEXERIL) 10 mg tablet Take 1 tablet (10 mg total) by mouth 3 (three) times a day as needed for muscle spasms 20 tablet 0   • fenofibrate (TRICOR) 145 mg tablet TAKE 1 TABLET DAILY 90 tablet 1   • ferrous sulfate 324 (65 Fe) mg Take 1 tablet (324 mg total) by mouth in the morning 30 tablet 1   • Jardiance 10 MG TABS TAKE 1 TABLET EVERY MORNING 90 tablet 1   • lisinopril-hydrochlorothiazide (PRINZIDE,ZESTORETIC) 20-25 MG per tablet TAKE 1 TABLET DAILY 90 tablet 1   • metFORMIN (GLUCOPHAGE) 1000 MG tablet Take 1 tablet (1,000 mg total) by mouth 2 (two) times a day with meals 180 tablet 1   • metoprolol succinate (TOPROL-XL) 100 mg 24 hr tablet TAKE 1 TABLET DAILY 90 tablet 1   • Multiple Vitamin (multivitamin) tablet Take 1 tablet by mouth daily 30 tablet 1   • omega-3-acid ethyl esters (LOVAZA) 1 g capsule TAKE 2 CAPSULES TWICE DAILY (Patient taking differently: Take 2 g by mouth 2 (two) times a day) 360 capsule 1   • rosuvastatin (CRESTOR) 10 MG tablet Take 1 tablet (10 mg total) by mouth daily (Patient taking differently: Take 10 mg by mouth daily in the early morning) 90 tablet 3   • sildenafil (VIAGRA) 100 mg tablet Take one tablet daily as needed (Patient taking differently: Take 100 mg by mouth as needed Take one tablet daily as needed) 20 tablet 2   • aspirin (ECOTRIN) 325 mg EC tablet Take 1 tablet (325 mg total) by mouth 2 (two) times a day Take with food   (Patient not taking: Reported on 10/25/2022) 84 tablet 0   • docusate sodium (COLACE) 100 mg capsule Take 1 capsule (100 mg total) by mouth 2 (two) times a day (Patient not taking: Reported on 10/25/2022) 20 capsule 0   • gabapentin (NEURONTIN) 100 mg capsule Take 1 capsule (100 mg total) by mouth every 8 (eight) hours (Patient not taking: Reported on 7/28/2022) 90 capsule 0       Current Allergies     Allergies as of 01/02/2023 - Reviewed 01/02/2023   Allergen Reaction Noted   • Sulfamethoxazole-trimethoprim Nausea Only and GI Intolerance 01/02/2018            The following portions of the patient's history were reviewed and updated as appropriate: allergies, current medications, past family history, past medical history, past social history, past surgical history and problem list     Objective   /72   Pulse 87   Temp (!) 97 1 °F (36 2 °C) (Tympanic)   Resp 18   SpO2 98%        Physical Exam     Physical Exam  Vitals and nursing note reviewed  Constitutional:       Appearance: Normal appearance  He is well-developed  HENT:      Head: Normocephalic and atraumatic  Right Ear: Hearing, ear canal and external ear normal  Tympanic membrane is bulging  Left Ear: Hearing, tympanic membrane, ear canal and external ear normal       Nose: Nose normal       Mouth/Throat:      Lips: Pink  Mouth: Mucous membranes are moist       Pharynx: Oropharynx is clear  Eyes:      General: Lids are normal       Conjunctiva/sclera: Conjunctivae normal       Pupils: Pupils are equal, round, and reactive to light  Neck:     Cardiovascular:      Rate and Rhythm: Normal rate and regular rhythm  Heart sounds: Normal heart sounds, S1 normal and S2 normal    Pulmonary:      Effort: Pulmonary effort is normal       Breath sounds: Normal breath sounds  Musculoskeletal:      Cervical back: No edema, erythema, signs of trauma, rigidity, torticollis or crepitus  Pain with movement and muscular tenderness present  No spinous process tenderness  Decreased range of motion     Lymphadenopathy:      Cervical: No cervical adenopathy  Skin:     General: Skin is warm and dry  Neurological:      Mental Status: He is alert  Psychiatric:         Speech: Speech normal          Behavior: Behavior normal          Thought Content:  Thought content normal          Judgment: Judgment normal

## 2023-01-04 ENCOUNTER — OFFICE VISIT (OUTPATIENT)
Dept: FAMILY MEDICINE CLINIC | Facility: CLINIC | Age: 63
End: 2023-01-04

## 2023-01-04 VITALS
BODY MASS INDEX: 32.27 KG/M2 | WEIGHT: 225.4 LBS | DIASTOLIC BLOOD PRESSURE: 66 MMHG | OXYGEN SATURATION: 97 % | HEIGHT: 70 IN | HEART RATE: 97 BPM | SYSTOLIC BLOOD PRESSURE: 114 MMHG | TEMPERATURE: 99.8 F

## 2023-01-04 DIAGNOSIS — I10 HYPERTENSION, UNSPECIFIED TYPE: ICD-10-CM

## 2023-01-04 DIAGNOSIS — I10 PRIMARY HYPERTENSION: ICD-10-CM

## 2023-01-04 DIAGNOSIS — L03.114 CELLULITIS OF LEFT UPPER EXTREMITY: ICD-10-CM

## 2023-01-04 DIAGNOSIS — E11.65 TYPE 2 DIABETES MELLITUS WITH HYPERGLYCEMIA, WITHOUT LONG-TERM CURRENT USE OF INSULIN (HCC): Primary | ICD-10-CM

## 2023-01-04 DIAGNOSIS — E78.2 MIXED HYPERLIPIDEMIA: ICD-10-CM

## 2023-01-04 DIAGNOSIS — J41.0 SIMPLE CHRONIC BRONCHITIS (HCC): ICD-10-CM

## 2023-01-04 DIAGNOSIS — E78.5 HYPERLIPIDEMIA, UNSPECIFIED HYPERLIPIDEMIA TYPE: ICD-10-CM

## 2023-01-04 RX ORDER — CEPHALEXIN 500 MG/1
1000 CAPSULE ORAL EVERY 12 HOURS SCHEDULED
Qty: 28 CAPSULE | Refills: 0 | Status: SHIPPED | OUTPATIENT
Start: 2023-01-04 | End: 2023-01-11

## 2023-01-04 RX ORDER — BUDESONIDE AND FORMOTEROL FUMARATE DIHYDRATE 160; 4.5 UG/1; UG/1
2 AEROSOL RESPIRATORY (INHALATION) 2 TIMES DAILY
Qty: 30.6 G | Refills: 1 | Status: ON HOLD | OUTPATIENT
Start: 2023-01-04

## 2023-01-04 RX ORDER — METOPROLOL SUCCINATE 100 MG/1
100 TABLET, EXTENDED RELEASE ORAL DAILY
Qty: 90 TABLET | Refills: 1 | Status: ON HOLD | OUTPATIENT
Start: 2023-01-04

## 2023-01-04 RX ORDER — ROSUVASTATIN CALCIUM 10 MG/1
10 TABLET, COATED ORAL DAILY
Qty: 90 TABLET | Refills: 3 | Status: ON HOLD | OUTPATIENT
Start: 2023-01-04

## 2023-01-04 RX ORDER — FENOFIBRATE 145 MG/1
145 TABLET, COATED ORAL DAILY
Qty: 90 TABLET | Refills: 1 | Status: ON HOLD | OUTPATIENT
Start: 2023-01-04

## 2023-01-04 RX ORDER — LISINOPRIL AND HYDROCHLOROTHIAZIDE 25; 20 MG/1; MG/1
1 TABLET ORAL DAILY
Qty: 90 TABLET | Refills: 1 | Status: ON HOLD | OUTPATIENT
Start: 2023-01-04

## 2023-01-04 NOTE — ASSESSMENT & PLAN NOTE
Lab Results   Component Value Date    HGBA1C 7 1 (H) 12/12/2022   Hemoglobin A1c has gone up somewhat to 7 1  Continue with metformin 1000 twice daily  Need to watch diet more closely and return to regular exercise

## 2023-01-04 NOTE — PROGRESS NOTES
Name: Slime Rosado      : 1960      MRN: [id number]  Encounter Provider: Jason Roca DO  Encounter Date: 2023   Encounter department: [address] [place] [address]     1  Type 2 diabetes mellitus with hyperglycemia, without long-term current use of insulin ([facility])  Assessment & Plan:    Lab Results   Component Value Date    HGBA1C 7 1 (H) 2022   Hemoglobin A1c has gone up somewhat to 7 1  Continue with metformin 1000 twice daily  Need to watch diet more closely and return to regular exercise  Orders:  -     Empagliflozin (Jardiance) 10 MG TABS tablet; Take 1 tablet (10 mg total) by mouth daily  -     Hemoglobin A1c (w/out EAG); Future; Expected date: 06/15/2023  -     Hemoglobin A1c (w/out EAG)    2  Mixed hyperlipidemia  Assessment & Plan:  HDL and LDL at goal however triglycerides greater than 300  Continue fenofibrate and rosuvastatin  Orders:  -     Comprehensive metabolic panel; Future; Expected date: 06/15/2023  -     Lipid Panel with Direct LDL reflex; Future; Expected date: 06/15/2023  -     TSH, 3rd generation; Future; Expected date: 06/15/2023  -     Comprehensive metabolic panel  -     Lipid Panel with Direct LDL reflex  -     TSH, 3rd generation    3  Primary hypertension  Assessment & Plan:  Blood pressure well controlled  Continue lisinopril hydrochlorothiazide and metoprolol  4  Cellulitis of left upper extremity  -     cephalexin (KEFLEX) 500 mg capsule; Take 2 capsules (1,000 mg total) by mouth every 12 (twelve) hours for 7 days    5  Hyperlipidemia, unspecified hyperlipidemia type  Assessment & Plan:  HDL and LDL at goal however triglycerides greater than 300  Continue fenofibrate and rosuvastatin  Orders:  -     fenofibrate (TRICOR) 145 mg tablet; Take 1 tablet (145 mg total) by mouth daily  -     rosuvastatin (CRESTOR) 10 MG tablet; Take 1 tablet (10 mg total) by mouth daily    6   Hypertension, unspecified type  - lisinopril-hydrochlorothiazide (PRINZIDE,ZESTORETIC) 20-25 MG per tablet; Take 1 tablet by mouth daily  -     metoprolol succinate (TOPROL-XL) 100 mg 24 hr tablet; Take 1 tablet (100 mg total) by mouth daily    7  Simple chronic bronchitis (HCC)  -     budesonide-formoterol (Symbicort) 160-4 5 mcg/act inhaler; Inhale 2 puffs 2 (two) times a day Rinse mouth after use  Subjective      Patient was seen for routine follow-up of chronic medical problems  He is being treated for type 2 diabetes, hyperlipidemia and hypertension  His only complaint at this time is of some musculoskeletal aches and pains which exacerbated suddenly several days ago  He was seen at urgent care and tested for COVID and flu both of which were negative  He was given at prescription for steroids which she is currently taking  He has noticed since those visits that his left elbow is slightly swollen and erythematous  He does have a prior history of bursitis in that elbow with infection  Review of Systems   Constitutional: Negative  Respiratory: Negative  Cardiovascular: Negative  Gastrointestinal: Negative  Genitourinary: Negative  Musculoskeletal: Negative  Psychiatric/Behavioral: Negative  Current Outpatient Medications on File Prior to Visit   Medication Sig   • albuterol (PROAIR HFA) 90 mcg/act inhaler Inhale 2 puffs every 6 (six) hours as needed for wheezing   • Ascorbic Acid (VITAMIN C PO) Take 1 capsule by mouth daily   • co-enzyme Q-10 30 mg Take 30 mg by mouth daily   • cyclobenzaprine (FLEXERIL) 10 mg tablet Take 1 tablet (10 mg total) by mouth 3 (three) times a day as needed for muscle spasms   • ferrous sulfate 324 (65 Fe) mg Take 1 tablet (324 mg total) by mouth in the morning   • FOLIC ACID PO Take 1 capsule by mouth daily   • glucose blood (OneTouch Verio) test strip Check blood sugars once daily  Please substitute with appropriate alternative as covered by patient's insurance   Dx: E11 65 • metFORMIN (GLUCOPHAGE) 1000 MG tablet Take 1 tablet (1,000 mg total) by mouth 2 (two) times a day with meals   • methylPREDNISolone 4 MG tablet therapy pack Use as directed on package   • Multiple Vitamin (multivitamin) tablet Take 1 tablet by mouth daily   • omega-3-acid ethyl esters (LOVAZA) 1 g capsule TAKE 2 CAPSULES TWICE DAILY (Patient taking differently: Take 2 g by mouth 2 (two) times a day)   • OneTouch Delica Lancets 83X MISC Check blood sugars once daily  Please substitute with appropriate alternative as covered by patient's insurance  Dx: E11 65   • sildenafil (VIAGRA) 100 mg tablet Take one tablet daily as needed (Patient taking differently: Take 100 mg by mouth as needed Take one tablet daily as needed)   • [DISCONTINUED] budesonide-formoterol (Symbicort) 160-4 5 mcg/act inhaler Inhale 2 puffs 2 (two) times a day Rinse mouth after use  • [DISCONTINUED] fenofibrate (TRICOR) 145 mg tablet TAKE 1 TABLET DAILY   • [DISCONTINUED] Jardiance 10 MG TABS TAKE 1 TABLET EVERY MORNING   • [DISCONTINUED] lisinopril-hydrochlorothiazide (PRINZIDE,ZESTORETIC) 20-25 MG per tablet TAKE 1 TABLET DAILY   • [DISCONTINUED] metoprolol succinate (TOPROL-XL) 100 mg 24 hr tablet TAKE 1 TABLET DAILY   • [DISCONTINUED] rosuvastatin (CRESTOR) 10 MG tablet Take 1 tablet (10 mg total) by mouth daily (Patient taking differently: Take 10 mg by mouth daily in the early morning)   • acetaminophen (TYLENOL) 325 mg tablet Take 3 tablets (975 mg total) by mouth every 8 (eight) hours (Patient not taking: Reported on 10/25/2022)   • aspirin (ECOTRIN) 325 mg EC tablet Take 1 tablet (325 mg total) by mouth 2 (two) times a day Take with food   (Patient not taking: Reported on 10/25/2022)   • betamethasone dipropionate (DIPROSONE) 0 05 % cream Apply topically 2 (two) times a day (Patient taking differently: Apply 1 application topically 2 (two) times a day)   • Blood Glucose Monitoring Suppl (OneTouch Verio Reflect) w/Device KIT Check blood sugars once daily  Please substitute with appropriate alternative as covered by patient's insurance  Dx: E11 65   • docusate sodium (COLACE) 100 mg capsule Take 1 capsule (100 mg total) by mouth 2 (two) times a day (Patient not taking: Reported on 10/25/2022)   • gabapentin (NEURONTIN) 100 mg capsule Take 1 capsule (100 mg total) by mouth every 8 (eight) hours (Patient not taking: Reported on 7/28/2022)   • oxyCODONE (ROXICODONE) 5 immediate release tablet Take 2 tablets (10 mg total) by mouth every 4 (four) hours as needed for severe pain Or take 1 tablet (5 mg total) by mouth every 4 (four) hours as needed for moderate pain  Max Daily Amount: 60 mg (Patient not taking: Reported on 7/28/2022)       Objective     /66 (BP Location: Left arm, Patient Position: Sitting, Cuff Size: Adult)   Pulse 97   Temp 99 8 °F (37 7 °C)   Ht 5' 10" (1 778 m)   Wt 102 kg (225 lb 6 4 oz)   SpO2 97%   BMI 32 34 kg/m²     Physical Exam  Vitals and nursing note reviewed  Constitutional:       General: He is not in acute distress  Appearance: He is well-developed  He is not diaphoretic  HENT:      Head: Normocephalic and atraumatic  Eyes:      General:         Right eye: No discharge  Conjunctiva/sclera: Conjunctivae normal       Pupils: Pupils are equal, round, and reactive to light  Neck:      Thyroid: No thyromegaly  Cardiovascular:      Rate and Rhythm: Normal rate and regular rhythm  Pulmonary:      Effort: Pulmonary effort is normal  No respiratory distress  Breath sounds: Normal breath sounds  Musculoskeletal:      Cervical back: Normal range of motion  Lymphadenopathy:      Cervical: No cervical adenopathy  Skin:     General: Skin is warm and dry  Neurological:      Mental Status: He is alert and oriented to person, place, and time  Psychiatric:         Behavior: Behavior normal          Thought Content:  Thought content normal          Judgment: Judgment normal        Amy Ivey Deshawn Mitchell DO

## 2023-01-05 ENCOUNTER — TELEPHONE (OUTPATIENT)
Dept: OBGYN CLINIC | Facility: HOSPITAL | Age: 63
End: 2023-01-05

## 2023-01-05 NOTE — TELEPHONE ENCOUNTER
Spoke with patient  Currently on keflex due to cellulitis but also feeling achy and not well  Advised he may want to cancel dentist appt for now since he is not feeling well  He did see PCP  He will monitor elbow and if redness does not resolve he will go to ED for eval and possible IV abx  No other question at this time

## 2023-01-05 NOTE — TELEPHONE ENCOUNTER
Caller: Patient    Doctor: Misty Ambrosio    Reason for call: Patient has a dental appt tomorrow and has amoxicillin to take  Patient also has an infection in his elbow and and his PCP prescribed him cephalexin  The pharmacist advised he cannot take the amoxicillin with the cephalexin  He wanted to know if the cephalexin is okay to take for the dentist or should he cancel his dental appt all together       Call back#: [phone]

## 2023-01-07 PROBLEM — R82.71 BACTERIURIA: Status: ACTIVE | Noted: 2023-01-01

## 2023-01-07 PROBLEM — L01.00 IMPETIGO: Status: ACTIVE | Noted: 2023-01-01

## 2023-01-07 PROBLEM — D72.9 NEUTROPHILIC LEUKOCYTOSIS: Status: ACTIVE | Noted: 2023-01-01

## 2023-01-07 PROBLEM — R82.90 ABNORMAL URINALYSIS: Status: ACTIVE | Noted: 2023-01-01

## 2023-01-07 PROBLEM — E87.1 HYPONATREMIA: Status: ACTIVE | Noted: 2023-01-01

## 2023-01-07 NOTE — ED PROVIDER NOTES
History  Chief Complaint   Patient presents with   • Neck Pain     Pt reports neck pain, right ear pain x 1 week  Was seen at urgent care and family doctor, was prescribed flexeril and a steroid  Also had fever, cut on left elbow that he thinks was infected  Took abx for elbow  42-year-old male with history of hypertension, diabetes presents to the ED with multiple complaints  He states about a week ago he went to see his family doctor regarding left elbow pain and redness  He was treated with antibiotics  This seems to be resolving  He also complains of a 2-week history of atraumatic right-sided neck pain for which she is being treated with muscle relaxants  He still states he has the pain but it is starting to get better  He complains of fever on and off since Monday up to 101  No chills, URI symptoms, nausea or vomiting  No headache  He states over the last 4 days he has had pain to the bottoms of both of his feet  He denies any trauma, new soaps or creams  He had a pedicure locally on December 29  He also states that he has had irritation of his penis  He denies dysuria  He is sexually active with 1 female partner  He does state he recently traveled in December to [facility] for vacation  History provided by:  Patient   used: No    Medical Problem  Location:  Right-sided neck pain, pain to plantar aspect of both feet, fever, Nail irritation  Onset quality:  Gradual  Duration:  4 days  Timing:  Constant  Progression:  Unchanged  Chronicity:  New  Context:  Has had right-sided neck pain for the last 2 weeks starting to improve with Flexeril, had left elbow pain and redness treated with Keflex  Now with bilateral foot pain and penile discomfort    Associated symptoms: fever and rash    Associated symptoms: no abdominal pain, no chest pain, no congestion, no cough, no diarrhea, no ear pain, no fatigue, no headaches, no loss of consciousness, no myalgias, no nausea, no rhinorrhea, no shortness of breath, no sore throat, no vomiting and no wheezing        Prior to Admission Medications   Prescriptions Last Dose Informant Patient Reported? Taking? Ascorbic Acid (VITAMIN C PO)   Yes No   Sig: Take 1 capsule by mouth daily   Blood Glucose Monitoring Suppl (OneTouch Verio Reflect) w/Device KIT   No No   Sig: Check blood sugars once daily  Please substitute with appropriate alternative as covered by patient's insurance  Dx: E11 65   Empagliflozin (Jardiance) 10 MG TABS tablet   No No   Sig: Take 1 tablet (10 mg total) by mouth daily   FOLIC ACID PO   Yes No   Sig: Take 1 capsule by mouth daily   Multiple Vitamin (multivitamin) tablet   No No   Sig: Take 1 tablet by mouth daily   OneTouch Delica Lancets 89B MISC   No No   Sig: Check blood sugars once daily  Please substitute with appropriate alternative as covered by patient's insurance  Dx: E11 65   acetaminophen (TYLENOL) 325 mg tablet   No No   Sig: Take 3 tablets (975 mg total) by mouth every 8 (eight) hours   Patient not taking: Reported on 10/25/2022   albuterol (PROAIR HFA) 90 mcg/act inhaler   No No   Sig: Inhale 2 puffs every 6 (six) hours as needed for wheezing   aspirin (ECOTRIN) 325 mg EC tablet   No No   Sig: Take 1 tablet (325 mg total) by mouth 2 (two) times a day Take with food  Patient not taking: Reported on 10/25/2022   betamethasone dipropionate (DIPROSONE) 0 05 % cream   No No   Sig: Apply topically 2 (two) times a day   Patient taking differently: Apply 1 application topically 2 (two) times a day   budesonide-formoterol (Symbicort) 160-4 5 mcg/act inhaler   No No   Sig: Inhale 2 puffs 2 (two) times a day Rinse mouth after use     cephalexin (KEFLEX) 500 mg capsule   No No   Sig: Take 2 capsules (1,000 mg total) by mouth every 12 (twelve) hours for 7 days   co-enzyme Q-10 30 mg   Yes No   Sig: Take 30 mg by mouth daily   cyclobenzaprine (FLEXERIL) 10 mg tablet   No No   Sig: Take 1 tablet (10 mg total) by mouth 3 (three) times a day as needed for muscle spasms   docusate sodium (COLACE) 100 mg capsule   No No   Sig: Take 1 capsule (100 mg total) by mouth 2 (two) times a day   Patient not taking: Reported on 10/25/2022   fenofibrate (TRICOR) 145 mg tablet   No No   Sig: Take 1 tablet (145 mg total) by mouth daily   ferrous sulfate 324 (65 Fe) mg   No No   Sig: Take 1 tablet (324 mg total) by mouth in the morning   gabapentin (NEURONTIN) 100 mg capsule   No No   Sig: Take 1 capsule (100 mg total) by mouth every 8 (eight) hours   Patient not taking: Reported on 7/28/2022   glucose blood (OneTouch Verio) test strip   No No   Sig: Check blood sugars once daily  Please substitute with appropriate alternative as covered by patient's insurance  Dx: E11 65   lisinopril-hydrochlorothiazide (PRINZIDE,ZESTORETIC) 20-25 MG per tablet   No No   Sig: Take 1 tablet by mouth daily   metFORMIN (GLUCOPHAGE) 1000 MG tablet   No No   Sig: Take 1 tablet (1,000 mg total) by mouth 2 (two) times a day with meals   methylPREDNISolone 4 MG tablet therapy pack   No No   Sig: Use as directed on package   metoprolol succinate (TOPROL-XL) 100 mg 24 hr tablet   No No   Sig: Take 1 tablet (100 mg total) by mouth daily   omega-3-acid ethyl esters (LOVAZA) 1 g capsule   No No   Sig: TAKE 2 CAPSULES TWICE DAILY   Patient taking differently: Take 2 g by mouth 2 (two) times a day   oxyCODONE (ROXICODONE) 5 immediate release tablet   No No   Sig: Take 2 tablets (10 mg total) by mouth every 4 (four) hours as needed for severe pain Or take 1 tablet (5 mg total) by mouth every 4 (four) hours as needed for moderate pain   Max Daily Amount: 60 mg   Patient not taking: Reported on 7/28/2022   rosuvastatin (CRESTOR) 10 MG tablet   No No   Sig: Take 1 tablet (10 mg total) by mouth daily   sildenafil (VIAGRA) 100 mg tablet   No No   Sig: Take one tablet daily as needed   Patient taking differently: Take 100 mg by mouth as needed Take one tablet daily as needed Facility-Administered Medications: None       Past Medical History:   Diagnosis Date   • Arthritis     L knee   for L TKR today 2022   • Atopic dermatitis    • Condition not found     Neoplasm of [place]   • Diabetes mellitus ([facility] [address] )    • Hyperlipemia    • Hypertension    • Lateral epicondylitis, left elbow    • Wears partial dentures     upper       Past Surgical History:   Procedure Laterality Date   • APPENDECTOMY     • COLONOSCOPY     • CYST REMOVAL      Neck   • JOINT REPLACEMENT Left 2022   • KNEE ARTHROSCOPY      menisectomy   • VA ARTHRP KNE CONDYLE&PLATU MEDIAL&LAT COMPARTMENTS Left 2022    Procedure: ARTHROPLASTY KNEE TOTAL;  Surgeon: Shanna Quezada DO;  Location: [facility];  Service: Orthopedics       Family History   Problem Relation Age of Onset   • Colon polyps Mother         Inflammatory   • Diabetes Father      I have reviewed and agree with the history as documented  E-Cigarette/Vaping   • E-Cigarette Use Never User      E-Cigarette/Vaping Substances   • Nicotine No    • THC No    • CBD No    • Flavoring No    • Other No    • Unknown No      Social History     Tobacco Use   • Smoking status: Former     Packs/day: 1 00     Years: 40 00     Pack years: 40 00     Types: Cigarettes     Quit date: 10/2021     Years since quittin 2   • Smokeless tobacco: Never   Vaping Use   • Vaping Use: Never used   Substance Use Topics   • Alcohol use: Not Currently     Alcohol/week: 2 0 standard drinks     Types: 2 Cans of beer per week     Comment: Social   • Drug use: Never       Review of Systems   Constitutional: Positive for fever  Negative for activity change, appetite change, chills and fatigue  HENT: Negative  Negative for congestion, ear pain, rhinorrhea and sore throat  Eyes: Negative  Respiratory: Negative  Negative for cough, shortness of breath and wheezing  Cardiovascular: Negative  Negative for chest pain  Gastrointestinal: Negative    Negative for abdominal pain, diarrhea, nausea and vomiting  Genitourinary: Positive for penile pain  Negative for decreased urine volume, difficulty urinating, dysuria, genital sores, penile discharge, penile swelling, scrotal swelling, testicular pain and urgency  Musculoskeletal: Negative for myalgias and neck pain  Skin: Positive for rash  Allergic/Immunologic: Negative  Neurological: Negative  Negative for loss of consciousness, weakness, numbness and headaches  Hematological: Negative  Psychiatric/Behavioral: Negative  All other systems reviewed and are negative  Physical Exam  Physical Exam  Vitals and nursing note reviewed  Constitutional:       General: He is awake  He is not in acute distress  Appearance: Normal appearance  He is well-developed and normal weight  He is not ill-appearing, toxic-appearing or diaphoretic  HENT:      Head: Normocephalic and atraumatic  Right Ear: Tympanic membrane and external ear normal       Left Ear: Tympanic membrane and external ear normal       Nose: Nose normal       Mouth/Throat:      Mouth: Mucous membranes are moist       Pharynx: No oropharyngeal exudate or posterior oropharyngeal erythema  Eyes:      General: No scleral icterus  Conjunctiva/sclera: Conjunctivae normal       Pupils: Pupils are equal, round, and reactive to light  Neck:      Thyroid: No thyromegaly  Vascular: No JVD  Cardiovascular:      Rate and Rhythm: Normal rate and regular rhythm  Heart sounds: Murmur heard  Systolic murmur is present with a grade of 2/6  Pulmonary:      Effort: Pulmonary effort is normal  No respiratory distress  Breath sounds: Normal breath sounds  No stridor  No wheezing, rhonchi or rales  Abdominal:      General: Bowel sounds are normal  There is no distension  Palpations: Abdomen is soft  There is no mass  Tenderness: There is no abdominal tenderness  Hernia: No hernia is present     Genitourinary: Comments: No penile ulcerations, vesicles  Mild erythema to glans penis  No scrotal swelling or tenderness  Musculoskeletal:         General: No swelling, tenderness, deformity or signs of injury  Normal range of motion  Cervical back: Normal range of motion and neck supple  No rigidity or tenderness  Right lower leg: No edema  Left lower leg: No edema  Comments: Multiple papules/vesicles to plantar aspect right foot with areas of petechiae  See clinical images on chart  The feet are warm  with palpable dorsalis pedis pulses bilaterally  Erythema right foot   Lymphadenopathy:      Cervical: No cervical adenopathy  Skin:     General: Skin is warm and dry  Coloration: Skin is not jaundiced or pale  Findings: Rash present  No bruising, erythema or lesion  Neurological:      General: No focal deficit present  Mental Status: He is alert and oriented to person, place, and time  Motor: No weakness  Deep Tendon Reflexes: Reflexes are normal and symmetric  Psychiatric:         Mood and Affect: Mood normal          Behavior: Behavior is cooperative                   Vital Signs  ED Triage Vitals [01/07/23 1256]   Temperature Pulse Respirations Blood Pressure SpO2   98 1 °F (36 7 °C) 89 18 144/76 96 %      Temp Source Heart Rate Source Patient Position - Orthostatic VS BP Location FiO2 (%)   Oral Monitor -- -- --      Pain Score       --           Vitals:    01/07/23 1256   BP: 144/76   Pulse: 89         Visual Acuity      ED Medications  Medications - No data to display    Diagnostic Studies  Results Reviewed     Procedure Component Value Units Date/Time    CBC and differential [[id number]]     Lab Status: No result Specimen: Blood     Comprehensive metabolic panel [[id number]]     Lab Status: No result Specimen: Blood     Chlamydia/GC amplified DNA by PCR [[id number]]     Lab Status: No result     Protime-INR [[id number]]     Lab Status: No result Specimen: Blood     APTT [[id number]]     Lab Status: No result Specimen: Blood     RPR [[id number]]     Lab Status: No result Specimen: Blood                  No orders to display              Procedures  Procedures         ED Course                                             Medical Decision Making  60-year-old male presents to the ED with multiple complaints  His main complaint is that of bilateral feet pain  He states this started about 4 days ago  He also complains of intermittent fevers up to 101  This was preceded by left elbow pain and redness which was treated with Keflex and seems to be resolving  He also had right neck pain for the last 2 weeks treated with Flexeril  This also seems to be resolving  Also complains of penile irritation without dysuria or penile discharge  He did recently travel to [facility]  He states he is sexually active with 1 female partner  No past history of STD  On exam he is alert no acute distress  Pertinent positives include multiple papular/vesicular lesions to the plantar aspect of the right foot with some areas of petechiae  He also has lightly erythematous glans penis without ulceration or clots noted  We will check basic labs rule out infection, thrombocytopenia  Will send off RPR  Will check urine and rule out GC chlamydia  We will also consult podiatry for further insight on lesions to the feet  Amount and/or Complexity of Data Reviewed  Labs: ordered  Disposition  Final diagnoses:   None     ED Disposition     None      Follow-up Information    None         Patient's Medications   Discharge Prescriptions    No medications on file       No discharge procedures on file      PDMP Review       Value Time User    PDMP Reviewed  Yes 6/28/2022  3:16 PM Lacey Pat PA-C          ED Provider  Electronically Signed by           Frankie Leigh DO  01/08/23 4639

## 2023-01-07 NOTE — ASSESSMENT & PLAN NOTE
· Patient has ongoing malaise and leukocytosis with multiple sites of infection ( urine with positive nitrite, left elbow, right foot) despite antibiotic treatment, with recent endodontic procedure (deep scaling), heart murmur on exam, and status post left knee replacement  · I am concerned for a central source of infection such as subacute endocarditis/bacteremia  · Follow-up on blood cultures,  HSV culture, chlamydia and urine culture (cultures may be affected by oral antibiotic use prior to admission)  · Check MRSA culture (works in a [facility])  · Instead of cefazolin we will start vancomycin    · Consult ID  · Check echocardiogram  · Further evaluation and treatment will depend on the result of initial work-up

## 2023-01-07 NOTE — ASSESSMENT & PLAN NOTE
· He has multiple pustular lesions in the right foot (plantar and dorsal surface) which looks like impetigo    · His recent left elbow infection also started out as a pustule  · Not improved with oral Keflex  · Start vancomycin IV and consult pharmacy  · Follow-up on culture results

## 2023-01-07 NOTE — PLAN OF CARE
Problem: PAIN - ADULT  Goal: Verbalizes/displays adequate comfort level or baseline comfort level  Description: Interventions:  - Encourage patient to monitor pain and request assistance  - Assess pain using appropriate pain scale  - Administer analgesics based on type and severity of pain and evaluate response  - Implement non-pharmacological measures as appropriate and evaluate response  - Consider cultural and social influences on pain and pain management  - Notify physician/advanced practitioner if interventions unsuccessful or patient reports new pain  Outcome: Progressing     Problem: INFECTION - ADULT  Goal: Absence or prevention of progression during hospitalization  Description: INTERVENTIONS:  - Assess and monitor for signs and symptoms of infection  - Monitor lab/diagnostic results  - Monitor all insertion sites, i e  indwelling lines, tubes, and drains  - Monitor endotracheal if appropriate and nasal secretions for changes in amount and color  - Edna appropriate cooling/warming therapies per order  - Administer medications as ordered  - Instruct and encourage patient and family to use good hand hygiene technique  - Identify and instruct in appropriate isolation precautions for identified infection/condition  Outcome: Progressing  Goal: Absence of fever/infection during neutropenic period  Description: INTERVENTIONS:  - Monitor WBC    Outcome: Progressing     Problem: SAFETY ADULT  Goal: Patient will remain free of falls  Description: INTERVENTIONS:  - Educate patient/family on patient safety including physical limitations  - Instruct patient to call for assistance with activity   - Consult OT/PT to assist with strengthening/mobility   - Keep Call bell within reach  - Keep bed low and locked with side rails adjusted as appropriate  - Keep care items and personal belongings within reach  - Initiate and maintain comfort rounds  - Make Fall Risk Sign visible to staff  - Apply yellow socks and bracelet for high fall risk patients  - Consider moving patient to room near nurses station  Outcome: Progressing  Goal: Maintain or return to baseline ADL function  Description: INTERVENTIONS:  -  Assess patient's ability to carry out ADLs; assess patient's baseline for ADL function and identify physical deficits which impact ability to perform ADLs (bathing, care of mouth/teeth, toileting, grooming, dressing, etc )  - Assess/evaluate cause of self-care deficits   - Assess range of motion  - Assess patient's mobility; develop plan if impaired  - Assess patient's need for assistive devices and provide as appropriate  - Encourage maximum independence but intervene and supervise when necessary  - Involve family in performance of ADLs  - Assess for home care needs following discharge   - Consider OT consult to assist with ADL evaluation and planning for discharge  - Provide patient education as appropriate  Outcome: Progressing  Goal: Maintains/Returns to pre admission functional level  Description: INTERVENTIONS:  - Perform BMAT or MOVE assessment daily    - Set and communicate daily mobility goal to care team and patient/family/caregiver     - Collaborate with rehabilitation services on mobility goals if consulted  - Out of bed for toileting  - Record patient progress and toleration of activity level   Outcome: Progressing     Problem: DISCHARGE PLANNING  Goal: Discharge to home or other facility with appropriate resources  Description: INTERVENTIONS:  - Identify barriers to discharge w/patient and caregiver  - Arrange for needed discharge resources and transportation as appropriate  - Identify discharge learning needs (meds, wound care, etc )  - Arrange for interpretive services to assist at discharge as needed  - Refer to Case Management Department for coordinating discharge planning if the patient needs post-hospital services based on physician/advanced practitioner order or complex needs related to functional status, cognitive ability, or social support system  Outcome: Progressing     Problem: Knowledge Deficit  Goal: Patient/family/caregiver demonstrates understanding of disease process, treatment plan, medications, and discharge instructions  Description: Complete learning assessment and assess knowledge base    Interventions:  - Provide teaching at level of understanding  - Provide teaching via preferred learning methods  Outcome: Progressing     Problem: HEMATOLOGIC - ADULT  Goal: Maintains hematologic stability  Description: INTERVENTIONS  - Assess for signs and symptoms of bleeding or hemorrhage  - Monitor labs  - Administer supportive blood products/factors as ordered and appropriate  Outcome: Progressing

## 2023-01-07 NOTE — ASSESSMENT & PLAN NOTE
· Patient with positive nitrite on urinalysis  · Added formal UA and urine culture  · Start empiric vancomycin and  · If the urinalysis gross Staphylococcus then  suspicion for bacteremia is higher

## 2023-01-07 NOTE — ED NOTES
Admitting at bedside     Ernesto Murillo, [facility]  01/07/23 4325

## 2023-01-07 NOTE — ASSESSMENT & PLAN NOTE
Switch Crestor to pravastatin while in the hospital  Switch Lovaza to fish oil while in the hospital  Continue  fenofibrate

## 2023-01-07 NOTE — PROGRESS NOTES
Vancomycin Monitoring    Demographics    Otilia Cabello    Assessment    Today is day 1 of vancomycin therapy for ssti/endocarditis  Patient has yet to receive first dose of vancomycin  Renal function is stable  Cultures pending     Plan    Loading with vanco 2,000 mg x1 now followed by 1,000 mg q12h starting 1/8 0800    Vanco level ordered for 1/9/23 at 0600 w/ am labs     Mikal Rice, PharmD

## 2023-01-07 NOTE — H&P
[address] [facility]  H&P- Brooklyn Jefferson 1960, 58 y o  male MRN: [id number]  Unit/Bed#: ED 29 Encounter: [id number]  Primary Care Provider: Jefry Monte DO   Date and time admitted to hospital: 1/7/2023 12:58 PM    * Neutrophilic leukocytosis  Assessment & Plan  · Patient has ongoing malaise and leukocytosis with multiple sites of infection ( urine with positive nitrite, left elbow, right foot) despite antibiotic treatment, with recent endodontic procedure (deep scaling), heart murmur on exam, and status post left knee replacement  · I am concerned for a central source of infection such as subacute endocarditis/bacteremia  · Follow-up on blood cultures,  HSV culture, chlamydia and urine culture (cultures may be affected by oral antibiotic use prior to admission)  · Check MRSA culture (works in a [facility])  · Instead of cefazolin we will start vancomycin  · Consult ID  · Check echocardiogram  · Further evaluation and treatment will depend on the result of initial work-up      Impetigo  Assessment & Plan  · He has multiple pustular lesions in the right foot (plantar and dorsal surface) which looks like impetigo    · His recent left elbow infection also started out as a pustule  · Not improved with oral Keflex  · Start vancomycin IV and consult pharmacy  · Follow-up on culture results    Abnormal urinalysis  Assessment & Plan  · Patient with positive nitrite on urinalysis  · Added formal UA and urine culture  · Start empiric vancomycin and  · If the urinalysis gross Staphylococcus then  suspicion for bacteremia is higher    Murmur  Assessment & Plan  · Check echocardiogram rule out endocarditis    Status post total knee replacement, left  Assessment & Plan  · Monitor for  increasing pain, swelling or change in color   · Start Lovenox for DVT prophylaxis    DMII (diabetes mellitus, type 2) ([facility])  Assessment & Plan  Lab Results   Component Value Date    HGBA1C 7 1 (H) 12/12/2022     Hold metformin and Jardiance  Start diabetic diet and sliding scale insulin      Hyperlipidemia  Assessment & Plan  Switch Crestor to pravastatin while in the hospital  Switch Lovaza to fish oil while in the hospital  Continue  fenofibrate    VTE Prophylaxis: Enoxaparin (Lovenox)  / sequential compression device   Code Status: Full code  POLST: There is no POLST form on file for this patient (pre-hospital)  Discussion with family: Son    Anticipated Length of Stay:  Patient will be admitted on an Inpatient basis with an anticipated length of stay of at least 2 midnights  Justification for Hospital Stay: Leukocytosis    Chief Complaint:   Not feeling well    History of Present Illness: Maggie Leblanc is a 58 y o  male who presents with persistent malaise, generalized body aches  and joint aches for the past week  He has not been feeling well since the end of December  He  presented to the  to [facility] urgent care in December 30, 2022 due to fever, malaise and joint aches  and RSV were ruled out and he was ultimately discharged with  instructions to follow-up with his PCP for more extensive work-up  He presented to [address] now on is PCP on 1/2/2023 and was started on a Medrol Dosepak and Flexeril for muscle spasms  He was ultimately able to follow-up with his PCP on January 4, 2023 and was noticed with cellulitis of the left elbow and was started on Keflex 1000 mg every 12 hours for 7 days  He said he felt better initially after starting the Keflex but malaise, body aches and joint aches came back again so he decided to come to the emergency department today  Luca Lira He denied chest pain, cough, phlegm,or burning on urination  Yesterday he also underwent routine endodontic  deep scaling procedure for his teeth  Recently he went on a vacation in [facility]  He met with his long distance partner/girlfriend there  He is in a monogamous relationship with her      Review of Systems:    Review of Systems    Past Medical and Surgical History:     Past Medical History:   Diagnosis Date   • Arthritis     L knee   for L TKR today 6/28/2022   • Atopic dermatitis    • Condition not found     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   • Diabetes mellitus ([address] )    • Hyperlipemia    • Hypertension    • Lateral epicondylitis, left elbow    • Wears partial dentures     upper       Past Surgical History:   Procedure Laterality Date   • APPENDECTOMY     • COLONOSCOPY     • CYST REMOVAL      Neck   • JOINT REPLACEMENT Left 06/28/2022   • KNEE ARTHROSCOPY      menisectomy   • HI ARTHRP KNE CONDYLE&PLATU MEDIAL&LAT COMPARTMENTS Left 06/28/2022    Procedure: ARTHROPLASTY KNEE TOTAL;  Surgeon: Jair Sheehan DO;  Location: [facility];  Service: Orthopedics       Meds/Allergies:    Prior to Admission medications    Medication Sig Start Date End Date Taking? Authorizing Provider   Blood Glucose Monitoring Suppl (OneTouch Verio Reflect) w/Device KIT Check blood sugars once daily  Please substitute with appropriate alternative as covered by patient's insurance  Dx: E11 65 2/21/22  Yes Dany Guzman DO   budesonide-formoterol (Symbicort) 160-4 5 mcg/act inhaler Inhale 2 puffs 2 (two) times a day Rinse mouth after use   1/4/23  Yes Delmis Contreras DO   cephalexin [facility]) 500 mg capsule Take 2 capsules (1,000 mg total) by mouth every 12 (twelve) hours for 7 days 1/4/23 1/11/23 Yes Delmis Contreras DO   co-enzyme Q-10 30 mg Take 30 mg by mouth daily   Yes Historical Provider, MD   cyclobenzaprine (FLEXERIL) 10 mg tablet Take 1 tablet (10 mg total) by mouth 3 (three) times a day as needed for muscle spasms 1/2/23  Yes JAI Magaña   Empagliflozin (Jardiance) 10 MG TABS tablet Take 1 tablet (10 mg total) by mouth daily 1/4/23  Yes Delmis Contreras DO   fenofibrate (TRICOR) 145 mg tablet Take 1 tablet (145 mg total) by mouth daily 1/4/23  Yes Delmis Contreras DO   ferrous sulfate 324 (65 Fe) mg Take 1 tablet (324 mg total) by mouth in the morning 1/17/22  Yes Queenie Dumont, DO   FOLIC ACID PO Take 1 capsule by mouth daily   Yes Historical Provider, MD   glucose blood (OneTouch Verio) test strip Check blood sugars once daily  Please substitute with appropriate alternative as covered by patient's insurance  Dx: E11 65 2/21/22  Yes Albert Jameson DO   lisinopril-hydrochlorothiazide (PRINZIDE,ZESTORETIC) 20-25 MG per tablet Take 1 tablet by mouth daily 1/4/23  Yes Lynette Calderon DO   metFORMIN (GLUCOPHAGE) 1000 MG tablet Take 1 tablet (1,000 mg total) by mouth 2 (two) times a day with meals 11/15/22  Yes Lynette Calderon DO   methylPREDNISolone 4 MG tablet therapy pack Use as directed on package 1/2/23  Yes JAI Alas   metoprolol succinate (TOPROL-XL) 100 mg 24 hr tablet Take 1 tablet (100 mg total) by mouth daily 1/4/23  Yes Lynette Calderon DO   Multiple Vitamin (multivitamin) tablet Take 1 tablet by mouth daily 1/17/22  Yes Perri Miller,    omega-3-acid ethyl esters (LOVAZA) 1 g capsule TAKE 2 CAPSULES TWICE DAILY  Patient taking differently: Take 2 g by mouth 2 (two) times a day 5/5/22  Yes DO Freddy Garcia Delica Lancets 50Z MISC Check blood sugars once daily  Please substitute with appropriate alternative as covered by patient's insurance   Dx: E11 65 2/21/22  Yes Albert Jameson DO   rosuvastatin (CRESTOR) 10 MG tablet Take 1 tablet (10 mg total) by mouth daily 1/4/23  Yes Lynette Calderon DO   sildenafil (VIAGRA) 100 mg tablet Take one tablet daily as needed  Patient taking differently: Take 100 mg by mouth as needed Take one tablet daily as needed 6/7/22  Yes Lynette Calderon DO   acetaminophen (TYLENOL) 325 mg tablet Take 3 tablets (975 mg total) by mouth every 8 (eight) hours  Patient not taking: Reported on 10/25/2022 6/29/22   Nonda SIDDHARTHA Hill   albuterol (PROAIR HFA) 90 mcg/act inhaler Inhale 2 puffs every 6 (six) hours as needed for wheezing  Patient not taking: Reported on 1/7/2023 11/27/19   Mary Garcias DO   Ascorbic Acid (VITAMIN C PO) Take 1 capsule by mouth daily  Patient not taking: Reported on 1/7/2023    Historical Provider, MD   aspirin (ECOTRIN) 325 mg EC tablet Take 1 tablet (325 mg total) by mouth 2 (two) times a day Take with food  Patient not taking: Reported on 10/25/2022 6/29/22   Marine Torres PA-C   betamethasone dipropionate (DIPROSONE) 0 05 % cream Apply topically 2 (two) times a day  Patient not taking: Reported on 1/7/2023 3/21/22   Tika Holcomb DO   docusate sodium (COLACE) 100 mg capsule Take 1 capsule (100 mg total) by mouth 2 (two) times a day  Patient not taking: Reported on 10/25/2022 6/29/22   Marine Torres PA-C   gabapentin (NEURONTIN) 100 mg capsule Take 1 capsule (100 mg total) by mouth every 8 (eight) hours  Patient not taking: Reported on 7/28/2022 6/29/22   Marine Torres PA-C   oxyCODONE (ROXICODONE) 5 immediate release tablet Take 2 tablets (10 mg total) by mouth every 4 (four) hours as needed for severe pain Or take 1 tablet (5 mg total) by mouth every 4 (four) hours as needed for moderate pain  Max Daily Amount: 60 mg  Patient not taking: Reported on 7/28/2022 6/29/22 1/7/23  Marine Torres PA-C     I have reviewed home medications with a medical source (PCP, Pharmacy, other)  Allergies: Allergies   Allergen Reactions   • Sulfamethoxazole-Trimethoprim Nausea Only and GI Intolerance       Social History:     Marital Status:     Occupation: Employed  Patient Pre-hospital Living Situation: Independent  Patient Pre-hospital Level of Mobility: Independent  Patient Pre-hospital Diet Restrictions: None  Substance Use History:   Social History     Substance and Sexual Activity   Alcohol Use Not Currently   • Alcohol/week: 2 0 standard drinks   • Types: 2 Cans of beer per week    Comment: Social     Social History     Tobacco Use   Smoking Status Former   • Packs/day: 1 00   • Years: 40 00   • Pack years: 40 00 • Types: Cigarettes   • Quit date: 10/2021   • Years since quittin 2   Smokeless Tobacco Never     Social History     Substance and Sexual Activity   Drug Use Never       Family History:    Family History   Problem Relation Age of Onset   • Colon polyps Mother         Inflammatory   • Diabetes Father        Physical Exam:     Vitals:   Blood Pressure: 144/76 (23 1256)  Pulse: 89 (23 1256)  Temperature: 98 1 °F (36 7 °C) (23 1256)  Temp Source: Oral (23 1256)  Respirations: 18 (23 1256)  SpO2: 96 % (23 1256)    Physical Exam  Vitals reviewed  Constitutional:       Appearance: He is obese  He is ill-appearing  HENT:      Head: Normocephalic and atraumatic  Eyes:      General: No scleral icterus  Cardiovascular:      Rate and Rhythm: Normal rate and regular rhythm  Heart sounds: Murmur heard  Comments: 2/6 murmur, systolic left fourth intercostal space mid clavicular line  Pulmonary:      Effort: Pulmonary effort is normal  No respiratory distress  Breath sounds: No wheezing or rales  Abdominal:      General: Abdomen is flat  There is no distension  Palpations: Abdomen is soft  Tenderness: There is no abdominal tenderness  Musculoskeletal:      Cervical back: Neck supple  Skin:     Comments: Scattered pustular lesions in the right foot dorsum and plantar surface  Ecchymoses of the distal right fourth and fifth toes  Thick skin left elbow with no warmth or  erythema   Neurological:      Mental Status: He is oriented to person, place, and time  Psychiatric:         Behavior: Behavior normal      Additional Data:     Lab Results: I have personally reviewed pertinent reports        Results from last 7 days   Lab Units 23  1334   WBC Thousand/uL 15 27*   HEMOGLOBIN g/dL 14 5   HEMATOCRIT % 41 3   PLATELETS Thousands/uL 224   NEUTROS PCT % 75   LYMPHS PCT % 11*   MONOS PCT % 11   EOS PCT % 1     Results from last 7 days   Lab Units 01/07/23  1334   SODIUM mmol/L 129*   POTASSIUM mmol/L 4 3   CHLORIDE mmol/L 94*   CO2 mmol/L 21   BUN mg/dL 34*   CREATININE mg/dL 1 04   ANION GAP mmol/L 14*   CALCIUM mg/dL 9 7   ALBUMIN g/dL 2 6*   TOTAL BILIRUBIN mg/dL 0 79   ALK PHOS U/L 127*   ALT U/L 28   AST U/L 21   GLUCOSE RANDOM mg/dL 263*     Results from last 7 days   Lab Units 01/07/23  1334   INR  1 15                   Imaging: Echocardiogram pending    No orders to display       EKG, Pathology, and Other Studies Reviewed on Admission:   · Echo cardiogram pending    [facility] Yes     ** Please Note: This note has been constructed using a voice recognition system   **

## 2023-01-07 NOTE — ASSESSMENT & PLAN NOTE
· Probably related to ongoing use of combination lisinopril/HCTZ prior to admission  · Hold HCTZ  · Continue lisinopril for blood pressure control

## 2023-01-07 NOTE — ASSESSMENT & PLAN NOTE
Lab Results   Component Value Date    HGBA1C 7 1 (H) 12/12/2022     Hold metformin and Jardiance      Start diabetic diet and sliding scale insulin 07-Aug-2018

## 2023-01-08 NOTE — PLAN OF CARE
Problem: PAIN - ADULT  Goal: Verbalizes/displays adequate comfort level or baseline comfort level  Description: Interventions:  - Encourage patient to monitor pain and request assistance  - Assess pain using appropriate pain scale  - Administer analgesics based on type and severity of pain and evaluate response  - Implement non-pharmacological measures as appropriate and evaluate response  - Consider cultural and social influences on pain and pain management  - Notify physician/advanced practitioner if interventions unsuccessful or patient reports new pain  Outcome: Progressing     Problem: INFECTION - ADULT  Goal: Absence or prevention of progression during hospitalization  Description: INTERVENTIONS:  - Assess and monitor for signs and symptoms of infection  - Monitor lab/diagnostic results  - Monitor all insertion sites, i e  indwelling lines, tubes, and drains  - Monitor endotracheal if appropriate and nasal secretions for changes in amount and color  - [place] appropriate cooling/warming therapies per order  - Administer medications as ordered  - Instruct and encourage patient and family to use good hand hygiene technique  - Identify and instruct in appropriate isolation precautions for identified infection/condition  Outcome: Progressing  Goal: Absence of fever/infection during neutropenic period  Description: INTERVENTIONS:  - Monitor WBC    Outcome: Progressing     Problem: SAFETY ADULT  Goal: Patient will remain free of falls  Description: INTERVENTIONS:  - Educate patient/family on patient safety including physical limitations  - Instruct patient to call for assistance with activity   - Consult OT/PT to assist with strengthening/mobility   - Keep Call bell within reach  - Keep bed low and locked with side rails adjusted as appropriate  - Keep care items and personal belongings within reach  - Initiate and maintain comfort rounds  - Make Fall Risk Sign visible to staff  - Offer Toileting every 2 Hours, in advance of need  - Initiate/Maintain bed alarm  - Obtain necessary fall risk management equipment: bed adlarm  - Apply yellow socks and bracelet for high fall risk patients  - Consider moving patient to room near nurses station  Outcome: Progressing  Goal: Maintain or return to baseline ADL function  Description: INTERVENTIONS:  -  Assess patient's ability to carry out ADLs; assess patient's baseline for ADL function and identify physical deficits which impact ability to perform ADLs (bathing, care of mouth/teeth, toileting, grooming, dressing, etc )  - Assess/evaluate cause of self-care deficits   - Assess range of motion  - Assess patient's mobility; develop plan if impaired  - Assess patient's need for assistive devices and provide as appropriate  - Encourage maximum independence but intervene and supervise when necessary  - Involve family in performance of ADLs  - Assess for home care needs following discharge   - Consider OT consult to assist with ADL evaluation and planning for discharge  - Provide patient education as appropriate  Outcome: Progressing  Goal: Maintains/Returns to pre admission functional level  Description: INTERVENTIONS:  - Perform BMAT or MOVE assessment daily    - Set and communicate daily mobility goal to care team and patient/family/caregiver  - Collaborate with rehabilitation services on mobility goals if consulted  - Perform Range of Motion 3 times a day  - Reposition patient every 2 hours    - Dangle patient 3 times a day  - Stand patient 3 times a day  - Ambulate patient 3 times a day  - Out of bed to chair 3 times a day   - Out of bed for meals 3 times a day  - Out of bed for toileting  - Record patient progress and toleration of activity level   Outcome: Progressing     Problem: DISCHARGE PLANNING  Goal: Discharge to home or other facility with appropriate resources  Description: INTERVENTIONS:  - Identify barriers to discharge w/patient and caregiver  - Arrange for needed discharge resources and transportation as appropriate  - Identify discharge learning needs (meds, wound care, etc )  - Arrange for interpretive services to assist at discharge as needed  - Refer to Case Management Department for coordinating discharge planning if the patient needs post-hospital services based on physician/advanced practitioner order or complex needs related to functional status, cognitive ability, or social support system  Outcome: Progressing     Problem: Knowledge Deficit  Goal: Patient/family/caregiver demonstrates understanding of disease process, treatment plan, medications, and discharge instructions  Description: Complete learning assessment and assess knowledge base    Interventions:  - Provide teaching at level of understanding  - Provide teaching via preferred learning methods  Outcome: Progressing     Problem: HEMATOLOGIC - ADULT  Goal: Maintains hematologic stability  Description: INTERVENTIONS  - Assess for signs and symptoms of bleeding or hemorrhage  - Monitor labs  - Administer supportive blood products/factors as ordered and appropriate  Outcome: Progressing     Problem: Potential for Falls  Goal: Patient will remain free of falls  Description: INTERVENTIONS:  - Educate patient/family on patient safety including physical limitations  - Instruct patient to call for assistance with activity   - Consult OT/PT to assist with strengthening/mobility   - Keep Call bell within reach  - Keep bed low and locked with side rails adjusted as appropriate  - Keep care items and personal belongings within reach  - Initiate and maintain comfort rounds  - Make Fall Risk Sign visible to staff  - Offer Toileting every 2 Hours, in advance of need  - Initiate/Maintain bed alarm  - Obtain necessary fall risk management equipment: bed alarm  - Apply yellow socks and bracelet for high fall risk patients  - Consider moving patient to room near nurses station  Outcome: Progressing

## 2023-01-08 NOTE — PROGRESS NOTES
Progress Note - Roxanne Kovacs 58 y o  male MRN: [id number]    Unit/Bed#: E5 -01 Encounter: [id number]      Subjective: The patient feels generally lousy  He had no specific problem overnight  He denies any chest pain, shortness of breath, abdominal pain, nausea, or vomiting  He denies diarrhea  He has no dysuria  Physical Exam:   Temp:  [97 9 °F (36 6 °C)-99 1 °F (37 3 °C)] 99 1 °F (37 3 °C)  HR:  [89-91] 91  Resp:  [16-18] 16  BP: (120-163)/(75-84) 163/84    Gen: Well-developed, well-nourished  Appearing somewhat diaphoretic and uncomfortable  Neck: Supple  No lymphadenopathy, goiter, or bruit  Heart: Regular rhythm  I heard no murmur, gallop, or rub  Lungs: Clear to auscultation and percussion  No wheezing, rales, or rhonchi  Abd: Soft with active bowel sounds  No mass, tenderness, organomegaly  Extremities: No clubbing, cyanosis, or edema  Mild erythema and swelling over the left ankle olecranon bursa  Large left knee effusion  The left knee is not warm or tender  Neuro: Alert and oriented  No focal sign  Skin: Warm and dry        LABS:   CBC:   Lab Results   Component Value Date    WBC 13 53 (H) 01/08/2023    HGB 14 4 01/08/2023    HCT 42 1 01/08/2023    MCV 92 01/08/2023     01/08/2023    MCH 31 3 01/08/2023    MCHC 34 2 01/08/2023    RDW 14 6 01/08/2023    MPV 9 7 01/08/2023    NRBC 0 01/07/2023   , CMP:   Lab Results   Component Value Date    SODIUM 131 (L) 01/08/2023    K 3 9 01/08/2023    CL 95 (L) 01/08/2023    CO2 20 (L) 01/08/2023    BUN 26 (H) 01/08/2023    CREATININE 0 92 01/08/2023    CALCIUM 9 7 01/08/2023    AST 23 01/08/2023    ALT 19 01/08/2023    ALKPHOS 120 (H) 01/08/2023    EGFR 88 01/08/2023           Patient Active Problem List   Diagnosis   • DMII (diabetes mellitus, type 2) ([facility] [address] )   • Hyperlipidemia   • Primary hypertension   • Mass of appendix   • Murmur   • BMI 31 0-31 9,adult   • Anxiety   • Rash   • Status post total knee replacement, left   • Impetigo   • Abnormal urinalysis   • Neutrophilic leukocytosis   • Hyponatremia       Assessment/Plan:  1  Neutrophilic leukocytosis with generalized malaise  2  Impetigo, right foot  3  Abnormal urinalysis  4  Heart murmur  5  Large left knee effusion, status post left total knee replacement  6  Type 2 diabetes  7  Mild hyponatremia  8  Hypertension  9  Hyperlipidemia    Concern has been raised regarding the possibility of endocarditis  The patient is currently on vancomycin  He was treated with cephalexin as an outpatient  Cultures, echocardiogram, etc  are in progress  I have spoken with orthopedics and they will be checking his left knee  After the completion of this dictation, both sets of blood cultures returned positive for gram-positive cocci  This makes the possibility of endocarditis much more likely      VTE Pharmacologic Prophylaxis: Enoxaparin (Lovenox)  VTE Mechanical Prophylaxis: sequential compression device

## 2023-01-09 ENCOUNTER — ANESTHESIA EVENT (INPATIENT)
Dept: PERIOP | Facility: HOSPITAL | Age: 63
End: 2023-01-09

## 2023-01-09 ENCOUNTER — ANESTHESIA (INPATIENT)
Dept: PERIOP | Facility: HOSPITAL | Age: 63
End: 2023-01-09

## 2023-01-09 RX ORDER — FENTANYL CITRATE 50 UG/ML
INJECTION, SOLUTION INTRAMUSCULAR; INTRAVENOUS AS NEEDED
Status: DISCONTINUED | OUTPATIENT
Start: 2023-01-09 | End: 2023-01-09

## 2023-01-09 RX ORDER — LIDOCAINE HYDROCHLORIDE 10 MG/ML
INJECTION, SOLUTION EPIDURAL; INFILTRATION; INTRACAUDAL; PERINEURAL AS NEEDED
Status: DISCONTINUED | OUTPATIENT
Start: 2023-01-09 | End: 2023-01-09

## 2023-01-09 RX ORDER — PROPOFOL 10 MG/ML
INJECTION, EMULSION INTRAVENOUS AS NEEDED
Status: DISCONTINUED | OUTPATIENT
Start: 2023-01-09 | End: 2023-01-09

## 2023-01-09 RX ORDER — ONDANSETRON 2 MG/ML
INJECTION INTRAMUSCULAR; INTRAVENOUS AS NEEDED
Status: DISCONTINUED | OUTPATIENT
Start: 2023-01-09 | End: 2023-01-09

## 2023-01-09 RX ORDER — HYDROMORPHONE HCL/PF 1 MG/ML
SYRINGE (ML) INJECTION AS NEEDED
Status: DISCONTINUED | OUTPATIENT
Start: 2023-01-09 | End: 2023-01-09

## 2023-01-09 RX ORDER — CEFAZOLIN SODIUM 2 G/50ML
SOLUTION INTRAVENOUS AS NEEDED
Status: DISCONTINUED | OUTPATIENT
Start: 2023-01-09 | End: 2023-01-09

## 2023-01-09 RX ORDER — MIDAZOLAM HYDROCHLORIDE 2 MG/2ML
INJECTION, SOLUTION INTRAMUSCULAR; INTRAVENOUS AS NEEDED
Status: DISCONTINUED | OUTPATIENT
Start: 2023-01-09 | End: 2023-01-09

## 2023-01-09 RX ORDER — DEXMEDETOMIDINE HYDROCHLORIDE 100 UG/ML
INJECTION, SOLUTION INTRAVENOUS AS NEEDED
Status: DISCONTINUED | OUTPATIENT
Start: 2023-01-09 | End: 2023-01-09

## 2023-01-09 RX ADMIN — HYDROMORPHONE HYDROCHLORIDE 0.5 MG: 1 INJECTION, SOLUTION INTRAMUSCULAR; INTRAVENOUS; SUBCUTANEOUS at 18:16

## 2023-01-09 RX ADMIN — CEFAZOLIN SODIUM 2000 MG: 2 SOLUTION INTRAVENOUS at 18:15

## 2023-01-09 RX ADMIN — ONDANSETRON 4 MG: 2 INJECTION INTRAMUSCULAR; INTRAVENOUS at 18:42

## 2023-01-09 RX ADMIN — FENTANYL CITRATE 100 MCG: 50 INJECTION INTRAMUSCULAR; INTRAVENOUS at 18:11

## 2023-01-09 RX ADMIN — FENTANYL CITRATE 25 MCG: 50 INJECTION INTRAMUSCULAR; INTRAVENOUS at 18:30

## 2023-01-09 RX ADMIN — FENTANYL CITRATE 50 MCG: 50 INJECTION INTRAMUSCULAR; INTRAVENOUS at 18:25

## 2023-01-09 RX ADMIN — FENTANYL CITRATE 50 MCG: 50 INJECTION INTRAMUSCULAR; INTRAVENOUS at 18:44

## 2023-01-09 RX ADMIN — MIDAZOLAM 2 MG: 1 INJECTION INTRAMUSCULAR; INTRAVENOUS at 18:04

## 2023-01-09 RX ADMIN — FENTANYL CITRATE 25 MCG: 50 INJECTION INTRAMUSCULAR; INTRAVENOUS at 18:33

## 2023-01-09 RX ADMIN — HYDROMORPHONE HYDROCHLORIDE 0.5 MG: 1 INJECTION, SOLUTION INTRAMUSCULAR; INTRAVENOUS; SUBCUTANEOUS at 18:38

## 2023-01-09 RX ADMIN — DEXMEDETOMIDINE HCL 8 MCG: 100 INJECTION INTRAVENOUS at 18:37

## 2023-01-09 RX ADMIN — DEXMEDETOMIDINE HCL 4 MCG: 100 INJECTION INTRAVENOUS at 18:40

## 2023-01-09 RX ADMIN — PROPOFOL 200 MG: 10 INJECTION, EMULSION INTRAVENOUS at 18:07

## 2023-01-09 RX ADMIN — SODIUM CHLORIDE: 0.9 INJECTION, SOLUTION INTRAVENOUS at 19:35

## 2023-01-09 RX ADMIN — LIDOCAINE HYDROCHLORIDE 100 MG: 10 INJECTION, SOLUTION EPIDURAL; INFILTRATION; INTRACAUDAL; PERINEURAL at 18:07

## 2023-01-09 NOTE — PROGRESS NOTES
Progress Note - Infectious Disease   Lutricia Linear 58 y o  male MRN: [id number]  Unit/Bed#: E5 -01 Encounter: [id number]    Impression/Plan:  1  Staph aureus bacteremia  2 of 2 admission blood culture sets growing gram-positive cocci in clusters, identified as MSSA on BCI  Likely source is recent L elbow wound with progression to abscess/bursitis  Concern for seeding of cervical spine given worsening neck stiffness  Concern for seeing of L TKA given new effusion  The patient also has evidence of Janeway lesions on exam of his feet  High concern for endocarditis now with multiple embolic sites of infection  The patient has been receiving IV cefazolin which he is tolerating without difficulty  I will continue this for now  He should have imaging of the neck and he is ordered to complete a TTE  Orthopedics to aspirate L knee later today  Repeat blood cultures were sent this morning    -continue IV cefazolin  -check CBCD and BMP tomorrow  -follow up blood cultures  -follow up repeat blood cultures   -monitor vitals  -check TTE  -check cervical spine MRI  -follow up L knee aspiration  -follow up orthopedic surgery consult     2  Left elbow cellulitis  Concern for septic bursitis/cellulitis  The patient initially suffered a cut to his left elbow while in [place] which I suspect is the initial source of bacteremia above  There is fluctuance over the bursa, but the patient has decent range of motion of the joint  He should have imaging and possible joint aspiration  Orthopedic surgery consult is pending   -antibiotic as above  -serial L elbow exams  -recommend L elbow CT  -consider L elbow aspiration  -follow up orthopedic surgery consult    3  Left knee swelling  History of left TKA  In the setting of staph aureus bacteremia will need to consider septic arthritis  I personally reviewed this L knee xray which showed no hardware complication  Recommend joint aspiration   Orthopedic surgery consult is pending   -antibiotic as above  -serial L knee exams  -recommend L knee aspiration  -follow up orthopedic surgery consult     4  Neck pain  In setting of staph aureus bacteremia there is concern for possible spine seeding  Patient is not rigid on exam but has limited ROM which he reports is from stiffness  Recommend imaging of cervical spine now   -antibiotic as above  -serial neuro exams  -serial exams of neck  -recommend cervical spine MRI    5  Leukocytosis  Due to bacteremia and cellulitis above  Concern for seeding of L elbow, L knee, cervical spine, and heart in setting of MSSA  Fortunately the patient is afebrile and hemodynamically stable  Repeat blood cultures were sent today   -antibiotic as above  -check CBCD tomorrow  -follow up blood cultures  -follow up repeat blood cultures  -follow up TTE  -follow up joint aspirations  -follow up neck imaging  -monitor vitals     6   Pyuria  The patient has no specific urinary symptoms  Urine GC/CT was negative  Urine cultures is pending   -antibiotic as above  -check CBCD and BMP tomorrow  -follow up urine culture  -monitor  symptoms  -monitor urine output     7  Type 2 diabetes mellitus without long term insulin use  Patient's last hemoglobin A1c was 7 1% on 12/12/2022  Elevated blood glucose is risk factor for infection  Recommend tight glycemic control   -blood glucose management per primary service     8   Janeway lesions of the feet  In setting of staph aureus bacteremia, high concern for endocarditis and septic emboli    -antibiotic as above  -check TTE    Above plan was discussed in detail with patient at the bedside  Above plan was discussed in detail with SLIM  Antibiotics:  Cefazolin 2  Antibiotics 3    Subjective:  Patient reports his neck is really bothering him  Reports no improvement in the pain since his admission and feels the R side has become very stiff  Is able to move but feels limited due to the stiffness   He denies pain radiating into the shoulder, back, jaw, and face  He has no fever, chills, sweats, shakes; no nausea, vomiting, abdominal pain, diarrhea, or dysuria; no cough, shortness of breath, or chest pain  No new symptoms  Objective:  Vitals:  Temp:  [97 6 °F (36 4 °C)-99 6 °F (37 6 °C)] 97 6 °F (36 4 °C)  HR:  [84-88] 84  Resp:  [16-18] 18  BP: (117-163)/(67-84) 117/67  SpO2:  [93 %-95 %] 95 %  Temp (24hrs), Av 7 °F (37 1 °C), Min:97 6 °F (36 4 °C), Max:99 6 °F (37 6 °C)  Current: Temperature: 97 6 °F (36 4 °C)    Physical Exam:   General Appearance:  Alert, interactive, nontoxic, no acute distress  He appears somewhat comfortable sitting up in bed, has travel neck pillow in place  Throat: Oropharynx moist without lesions  Neck: No rigidity during passive ROM  Patient's active ROM is intact but he cannot turn as far to R on his own as I can with passive ROM  Lungs:   Clear to auscultation bilaterally; no wheezes, rhonchi or rales; respirations unlabored on room air  Heart:  RRR; no murmur, rub or gallop  Abdomen:   Soft, non-tender, non-distended, positive bowel sounds  Extremities: No clubbing or cyanosis, no edema  L elbow mildly swollen, no overlying erythema or warmth to touch  L knee swollen without overlying erythema  Skin: No new rashes noted on exposed skin       Labs, Imaging, & Other studies:   All pertinent labs and imaging studies were personally reviewed  Results from last 7 days   Lab Units 23  0555 23  0523 23  1334   WBC Thousand/uL 13 33* 13 53* 15 27*   HEMOGLOBIN g/dL 14 1 14 4 14 5   PLATELETS Thousands/uL 257 238 224     Results from last 7 days   Lab Units 23  0523 23  1334   POTASSIUM mmol/L 3 9 4 3   CHLORIDE mmol/L 95* 94*   CO2 mmol/L 20* 21   BUN mg/dL 26* 34*   CREATININE mg/dL 0 92 1 04   EGFR ml/min/1 73sq m 88 76   CALCIUM mg/dL 9 7 9 7   AST U/L 23 21   ALT U/L 19 28   ALK PHOS U/L 120* 127*     Results from last 7 days   Lab Units 23  3937 01/07/23  1538 01/07/23  1445   GRAM STAIN RESULT   --  Gram positive cocci in clusters*  Gram positive cocci in clusters* No Polys or Bacteria seen   URINE CULTURE  >100,000 cfu/ml Gram Negative Ranjith*  --   --

## 2023-01-09 NOTE — PLAN OF CARE
Problem: PAIN - ADULT  Goal: Verbalizes/displays adequate comfort level or baseline comfort level  Description: Interventions:  - Encourage patient to monitor pain and request assistance  - Assess pain using appropriate pain scale  - Administer analgesics based on type and severity of pain and evaluate response  - Implement non-pharmacological measures as appropriate and evaluate response  - Consider cultural and social influences on pain and pain management  - Notify physician/advanced practitioner if interventions unsuccessful or patient reports new pain  Outcome: Progressing     Problem: INFECTION - ADULT  Goal: Absence or prevention of progression during hospitalization  Description: INTERVENTIONS:  - Assess and monitor for signs and symptoms of infection  - Monitor lab/diagnostic results  - Monitor all insertion sites, i e  indwelling lines, tubes, and drains  - Monitor endotracheal if appropriate and nasal secretions for changes in amount and color  - [place] appropriate cooling/warming therapies per order  - Administer medications as ordered  - Instruct and encourage patient and family to use good hand hygiene technique  - Identify and instruct in appropriate isolation precautions for identified infection/condition  Outcome: Progressing  Goal: Absence of fever/infection during neutropenic period  Description: INTERVENTIONS:  - Monitor WBC    Outcome: Progressing     Problem: SAFETY ADULT  Goal: Patient will remain free of falls  Description: INTERVENTIONS:  - Educate patient/family on patient safety including physical limitations  - Instruct patient to call for assistance with activity   - Consult OT/PT to assist with strengthening/mobility   - Keep Call bell within reach  - Keep bed low and locked with side rails adjusted as appropriate  - Keep care items and personal belongings within reach  - Initiate and maintain comfort rounds  - Make Fall Risk Sign visible to staff  - Offer Toileting every 2 Hours, in advance of need  - Initiate/Maintain bed alarm  - Obtain necessary fall risk management equipment: Alarm  - Apply yellow socks and bracelet for high fall risk patients  - Consider moving patient to room near nurses station  Outcome: Progressing  Goal: Maintain or return to baseline ADL function  Description: INTERVENTIONS:  -  Assess patient's ability to carry out ADLs; assess patient's baseline for ADL function and identify physical deficits which impact ability to perform ADLs (bathing, care of mouth/teeth, toileting, grooming, dressing, etc )  - Assess/evaluate cause of self-care deficits   - Assess range of motion  - Assess patient's mobility; develop plan if impaired  - Assess patient's need for assistive devices and provide as appropriate  - Encourage maximum independence but intervene and supervise when necessary  - Involve family in performance of ADLs  - Assess for home care needs following discharge   - Consider OT consult to assist with ADL evaluation and planning for discharge  - Provide patient education as appropriate  Outcome: Progressing  Goal: Maintains/Returns to pre admission functional level  Description: INTERVENTIONS:  - Perform BMAT or MOVE assessment daily    - Set and communicate daily mobility goal to care team and patient/family/caregiver  - Collaborate with rehabilitation services on mobility goals if consulted  - Perform Range of Motion 3 times a day  - Reposition patient every 2 hours    - Dangle patient 3 times a day  - Stand patient 3 times a day  - Ambulate patient 3 times a day  - Out of bed to chair 3 times a day   - Out of bed for meals 3 times a day  - Out of bed for toileting  - Record patient progress and toleration of activity level   Outcome: Progressing     Problem: DISCHARGE PLANNING  Goal: Discharge to home or other facility with appropriate resources  Description: INTERVENTIONS:  - Identify barriers to discharge w/patient and caregiver  - Arrange for needed discharge resources and transportation as appropriate  - Identify discharge learning needs (meds, wound care, etc )  - Arrange for interpretive services to assist at discharge as needed  - Refer to Case Management Department for coordinating discharge planning if the patient needs post-hospital services based on physician/advanced practitioner order or complex needs related to functional status, cognitive ability, or social support system  Outcome: Progressing     Problem: Knowledge Deficit  Goal: Patient/family/caregiver demonstrates understanding of disease process, treatment plan, medications, and discharge instructions  Description: Complete learning assessment and assess knowledge base    Interventions:  - Provide teaching at level of understanding  - Provide teaching via preferred learning methods  Outcome: Progressing     Problem: HEMATOLOGIC - ADULT  Goal: Maintains hematologic stability  Description: INTERVENTIONS  - Assess for signs and symptoms of bleeding or hemorrhage  - Monitor labs  - Administer supportive blood products/factors as ordered and appropriate  Outcome: Progressing     Problem: Potential for Falls  Goal: Patient will remain free of falls  Description: INTERVENTIONS:  - Educate patient/family on patient safety including physical limitations  - Instruct patient to call for assistance with activity   - Consult OT/PT to assist with strengthening/mobility   - Keep Call bell within reach  - Keep bed low and locked with side rails adjusted as appropriate  - Keep care items and personal belongings within reach  - Initiate and maintain comfort rounds  - Make Fall Risk Sign visible to staff  - Offer Toileting every 2 Hours, in advance of need  - Initiate/Maintain bed alarm  - Obtain necessary fall risk management equipment: Alarm  - Apply yellow socks and bracelet for high fall risk patients  - Consider moving patient to room near nurses station  Outcome: Progressing

## 2023-01-09 NOTE — PROGRESS NOTES
Vancomycin therapy has been discontinued  Pharmacy will sign off  Thank you for this consult  Please do not hesitate to call us with questions or re-consult us if the need arises

## 2023-01-09 NOTE — ANESTHESIA PREPROCEDURE EVALUATION
Procedure:  REMOVAL / DEBRIDEMENT PROSTHESIS KNEE W/ POLY EXCHANGE (Left: Knee)  INCISION AND DRAINAGE (I&D) EXTREMITY (Left: Elbow)    Relevant Problems   CARDIO   (+) Hyperlipidemia   (+) Murmur   (+) Primary hypertension      ENDO   (+) DMII (diabetes mellitus, type 2) (HCC)      NEURO/PSYCH   (+) Anxiety      ECHO 3/2022  Left Ventricle Left ventricular cavity size is normal  Wall thickness is increased  There is mild concentric hypertrophy  The left ventricular ejection fraction is 68% by visual estimation  Systolic function is normal   Wall motion is normal  Diastolic function is mildly abnormal, consistent with grade I (abnormal) relaxation  Right Ventricle Right ventricular cavity size is normal  Systolic function is normal    Left Atrium The atrium is mildly dilated  Right Atrium The atrium is normal in size  Atrial Septum The interatrial septum appears to be grossly normal without evidence of shunting by color-flow Doppler  Aortic Valve The leaflets are mildly thickened  The leaflets are mildly calcified  There is trace regurgitation  There is very mild stenosis with Vmax 2 41 m/s, RANDY 2 09 cm2, DVI 0 67  Mean PG 11 mmHg, LVOTd 2 0 cm, LVOT VTI 33 7, AV VTI 50 6  Mitral Valve There is mild calcification  The leaflets exhibit normal mobility  There is mild annular calcification  There is trace regurgitation  There is no evidence of stenosis  Tricuspid Valve Tricuspid valve structure is normal  There is no evidence of regurgitation  There is no evidence of stenosis  Pulmonary artery systolic pressures cannot be estimated due to lack of tricuspid regurgitation jet  Pulmonic Valve Pulmonic valve structure is normal  There is trace regurgitation  There is no evidence of stenosis  Ascending Aorta The aortic root is mildly dilated at 4 0 cm  The ascending aorta is ectatic at 3 9 cm  IVC/SVC The inferior vena cava is normal in size   Respirophasic changes were normal    Pericardium There is no pericardial effusion  The pericardium is normal in appearance  3/2022 Stress  •  Normal pharmacologic nuclear stress test  •  Stress ECG: The stress ECG is negative for ischemia after pharmacologic stress  •  Perfusion: There are no perfusion defects  •  Stress Function: Left ventricular function post-stress is normal  Post-stress ejection fraction is 65 %  Physical Exam    Airway    Mallampati score: II  TM Distance: >3 FB  Neck ROM: full     Dental   upper dentures,     Cardiovascular  Rhythm: regular, Rate: normal,     Pulmonary  Breath sounds clear to auscultation,     Other Findings        Anesthesia Plan  ASA Score- 2     Anesthesia Type- general with ASA Monitors  Additional Monitors:   Airway Plan:           Plan Factors-Exercise tolerance (METS): >4 METS  Chart reviewed  Existing labs reviewed  Patient did not smoke on day of surgery  Induction- intravenous  Postoperative Plan-     Informed Consent- Anesthetic plan and risks discussed with patient

## 2023-01-09 NOTE — UTILIZATION REVIEW
Initial Clinical Review    Admission: Date/Time/Statement:   Admission Orders (From admission, onward)     Ordered        01/07/23 1503  INPATIENT ADMISSION  Once                      Orders Placed This Encounter   Procedures   • INPATIENT ADMISSION     Standing Status:   Standing     Number of Occurrences:   1     Order Specific Question:   Level of Care     Answer:   Med Surg [16]     Order Specific Question:   Estimated length of stay     Answer:   More than 2 Midnights     Order Specific Question:   Certification     Answer:   I certify that inpatient services are medically necessary for this patient for a duration of greater than two midnights  See H&P and MD Progress Notes for additional information about the patient's course of treatment  ED Arrival Information     Expected   -    Arrival   1/7/2023 12:43    Acuity   Urgent            Means of arrival   Wheelchair    Escorted by   Self    Service   Hospitalist    Admission type   Emergency            Arrival complaint   fever, neck pain           Chief Complaint   Patient presents with   • Neck Pain     Pt reports neck pain, right ear pain x 1 week  Was seen at urgent care and family doctor, was prescribed flexeril and a steroid  Also had fever, cut on left elbow that he thinks was infected  Took abx for elbow  Initial Presentation: 58 y o  male to ED via self / wc from home  Admitted with Neutrophilic leukocytosis  Presents with malaise, generalized body aches  and joint aches for the past week; saw his PCP on 1/4/22 and started on Keflex with no resolution; on exam Murmur - 2/6 murmur, systolic left fourth intercostal space mid clavicular line; Skin - Scattered pustular lesions in the right foot dorsum and plantar surface   Ecchymoses of the distal right fourth and fifth toes; WBC 15 27; Gluc 263; Blood Cx (+); Urine Cx (+)  Given in ED Cefazolin iv   PMHX: L TKR (6/28/22); DM; HLD; HTN  PLAN: Echo Pending        Date: 1/8/22  Day 2  feels generally lousy; WBC 13 53;   PLAN: ID Consult; Cont on IV Abx    ID Consult: Staph aureus bacteremia  2 of 2 admission blood culture sets growing gram-positive cocci in clusters, identified as MSSA on rapid ID panel  The patient states he has a known heart murmur  I suspect the initial source of bacteremia was his left elbow wound and development of a left elbow abscess/bursitis  High concern for endocarditis now with multiple embolic sites of infection  The patient has evidence of Janeway lesions on exam of his feet  Concern for possible seeding of the left TKA along with seeding of the cervical spine  PLAN: Stop vancomycin; Start IV cefazolin 2 g every 8 hours; Follow-up blood cultures; Repeat blood cultures tomorrow morning; Recommend orthopedic surgery consult for left knee aspiration to rule out septic joint; Check CT with contrast of the left elbow to evaluate for abscess; Check MRI of the cervical spine; Follow-up TTE, patient will likely need a KALINA; Monitor white blood cell count, fever curve    Orthopedic Consult: 80-year-old male who is status post left total knee arthroplasty 6/28/2022  Patient was in [facility] recently and sustained a injury to the left elbow which has subsequently become infected  Patient presented to the hospital after feeling unwell  Patient found to have MSSA bacteremia and new acute knee pain for the past 2 to 3 days  Patient states that during this time as he has also become swollen  Patient has a large effusion on exam   Patient aspirated of 60 cc of turbid appearing fluid  This fluid was sent for stat analysis and the cell count is 6000  His bacteremia, cell count, elevated inflammatory markers and white count are all indicative of concomitant left knee periprosthetic joint infection  Patient should maintain NPO  Plan for incision and drainage with polyethylene exchange of the left knee today in hopes of saving his left knee    We will also plan to I&D his left elbow if there is a collection present at that time  ED Triage Vitals   Temperature Pulse Respirations Blood Pressure SpO2   01/07/23 1256 01/07/23 1256 01/07/23 1256 01/07/23 1256 01/07/23 1256   98 1 °F (36 7 °C) 89 18 144/76 96 %      Temp Source Heart Rate Source Patient Position - Orthostatic VS BP Location FiO2 (%)   01/07/23 1256 01/07/23 1256 01/07/23 1624 01/07/23 1624 --   Oral Monitor Lying Left arm       Pain Score       01/07/23 1718       8          Wt Readings from Last 1 Encounters:   01/09/23 102 kg (225 lb)     Additional Vital Signs:   Date/Time Temp Pulse Resp BP MAP (mmHg) SpO2 O2 Device Patient Position - Orthostatic VS   01/09/23 0939 -- 82 -- 127/60 -- -- -- --   01/09/23 0830 -- 76 -- 117/67 -- -- -- --   01/09/23 07:16:51 97 6 °F (36 4 °C) 72 18 117/67 84 95 % None (Room air) Lying   01/08/23 22:43:04 98 6 °F (37 °C) 84 -- 133/74 94 95 % None (Room air) Sitting   01/08/23 16:03:29 99 6 °F (37 6 °C) 88 18 129/73 92 93 % None (Room air) Lying   01/08/23 09:50:20 -- -- -- 163/84 110 -- -- --   01/08/23 07:38:23 99 1 °F (37 3 °C) -- 16 151/84 106 -- -- Lying   01/07/23 23:38:46 98 4 °F (36 9 °C) 91 18 139/81 100 96 % -- --   01/07/23 16:24:18 97 9 °F (36 6 °C) 90 17 120/75 90 95 % None (Room air) Lying   01/07/23 1256 98 1 °F (36 7 °C) 89 18 144/76 -- 96 % None (Room air) --         EKG: None      Pertinent Labs/Diagnostic Test Results:   CT spine cervical w contrast   Final Result by Jose Luis Miller DO (01/09 1234)      No significant disc herniation, canal stenosis or foraminal narrowing  No acute fracture  Facet changes are seen on the right at C4-5 most likely representing subchondral cystic degenerative change  However, there does appear to be a small peripherally enhancing focus extending laterally from the facet joint which could represent a small, 4    mm synovial cyst, see series 3 image 51    The possibility of septic arthritis not excluded in this patient with active bacteremia  Nonspecific density within the anterior medial aspect of the left hemithorax  Differential considerations would include an infectious etiology, possibly fungal or other form of organizing pneumonia, as well as old pulmonary infarct  No previous chest    imaging for comparison other than a chest x-ray from 2020  Workstation performed: [id number]         XR knee 1 or 2 vw left   Final Result by Maria Elena Peña MD (01/09 1202)      Unremarkable appearance of total knee arthroplasty  No evidence of hardware complication           Workstation performed: [id number]               Results from last 7 days   Lab Units 01/09/23  0555 01/08/23  0523 01/07/23  1334   WBC Thousand/uL 13 33* 13 53* 15 27*   HEMOGLOBIN g/dL 14 1 14 4 14 5   HEMATOCRIT % 41 3 42 1 41 3   PLATELETS Thousands/uL 257 238 224   NEUTROS ABS Thousands/µL  --   --  11 55*         Results from last 7 days   Lab Units 01/09/23  0555 01/08/23  0523 01/07/23  1334   SODIUM mmol/L 129* 131* 129*   POTASSIUM mmol/L 4 3 3 9 4 3   CHLORIDE mmol/L 95* 95* 94*   CO2 mmol/L 21 20* 21   ANION GAP mmol/L 13 16* 14*   BUN mg/dL 30* 26* 34*   CREATININE mg/dL 0 98 0 92 1 04   EGFR ml/min/1 73sq m 82 88 76   CALCIUM mg/dL 9 6 9 7 9 7     Results from last 7 days   Lab Units 01/08/23  0523 01/07/23  1334   AST U/L 23 21   ALT U/L 19 28   ALK PHOS U/L 120* 127*   TOTAL PROTEIN g/dL 7 7 7 9   ALBUMIN g/dL 2 3* 2 6*   TOTAL BILIRUBIN mg/dL 0 82 0 79     Results from last 7 days   Lab Units 01/09/23  1116 01/09/23  0716 01/08/23  2039 01/08/23  1603 01/08/23  1134 01/08/23  0737 01/07/23 2054 01/07/23  1702   POC GLUCOSE mg/dl 232* 255* 249* 250* 296* 139 228* 169*     Results from last 7 days   Lab Units 01/09/23  0555 01/08/23  0523 01/07/23  1334   GLUCOSE RANDOM mg/dL 274* 175* 263*             Results from last 7 days   Lab Units 01/07/23  1334   PROTIME seconds 14 7*   INR  1 15   PTT seconds 26 Results from last 7 days   Lab Units 01/07/23  1349   CLARITY UA  Clear   COLOR UA  Yellow   SPEC GRAV UA  1 015   PH UA  5 5   GLUCOSE UA mg/dl >=1000 (1%)*   KETONES UA mg/dl Negative   BLOOD UA  Small*   PROTEIN UA mg/dl Negative   NITRITE UA  Positive*   BILIRUBIN UA  Negative   UROBILINOGEN UA E U /dl 0 2   LEUKOCYTES UA  Small*   WBC UA /hpf Innumerable*   RBC UA /hpf 1-2*   BACTERIA UA /hpf Innumerable*   EPITHELIAL CELLS WET PREP /hpf Occasional         Results from last 7 days   Lab Units 01/09/23  0601 01/07/23  1554 01/07/23  1538 01/07/23  1445   BLOOD CULTURE  Received in Microbiology Lab  Culture in Progress  Received in Microbiology Lab  Culture in Progress    --  Staphylococcus aureus*  Staphylococcus aureus*  --    GRAM STAIN RESULT   --   --  Gram positive cocci in clusters*  Gram positive cocci in clusters* No Polys or Bacteria seen   URINE CULTURE   --  >100,000 cfu/ml Escherichia coli*  --   --    WOUND CULTURE   --   --   --  No growth     Results from last 7 days   Lab Units 01/09/23  0747   TOTAL COUNTED  100   NEUTROPHIL % (SYNOVIAL) % 87   MONOCYTE % (SYNOVIAL) % 5   WBC FLUID /ul 5,941*   CRYSTALS, SYNOVIAL FLUID  No Crystals Seen           ED Treatment:   Medication Administration from 01/07/2023 1243 to 01/07/2023 1618       Date/Time Order Dose Route Action Action by Comments     01/07/2023 1539 EST ceFAZolin (ANCEF) IVPB (premix in dextrose) 2,000 mg 50 mL 2,000 mg Intravenous New Bag Gayla Choi RN --          Present on Admission:  • Murmur  • DMII (diabetes mellitus, type 2) ([facility])  • Hyperlipidemia      Admitting Diagnosis: Impetigo [L01 00]  Neck pain [M54 2]  Murmur [R01 1]  Bacteriuria [R82 71]  Cellulitis of right foot [L03 115]  Age/Sex: 58 y o  male     Admission Orders: SCD's; NPO      Scheduled Medications:  budesonide-formoterol, 2 puff, Inhalation, BID  cefazolin, 2,000 mg, Intravenous, Q8H  enoxaparin, 40 mg, Subcutaneous, Daily  fenofibrate, 145 mg, Oral, Daily  fish oil, 1,000 mg, Oral, BID  insulin lispro, 1-5 Units, Subcutaneous, TID AC  lisinopril, 20 mg, Oral, Daily  metoprolol succinate, 100 mg, Oral, Daily  pravastatin, 80 mg, Oral, Daily With Dinner      Continuous IV Infusions:     PRN Meds:  acetaminophen, 650 mg, Oral, Q6H PRN  cyclobenzaprine, 10 mg, Oral, TID PRN  ketorolac, 15 mg, Intravenous, Q6H PRN (rec'd x 2 1/8/22)   oxyCODONE, 5 mg, Oral, Q6H PRN        IP CONSULT TO INFECTIOUS DISEASES  IP CONSULT TO ORTHOPEDIC SURGERY    Network Utilization Review Department  ATTENTION: Please call with any questions or concerns to [phone] and carefully listen to the prompts so that you are directed to the right person  All voicemails are confidential   Tatyana King all requests for admission clinical reviews, approved or denied determinations and any other requests to dedicated fax number below belonging to the campus where the patient is receiving treatment   List of dedicated fax numbers for the Facilities:  [address] DENIALS (Administrative/Medical Necessity) [phone]   [address] (Maternity/NICU/Pediatrics) [phone]   912 Miguelina Altman [phone]   [address] [place] [address] [phone]   [address] [place] [address] Wilmer Packer 28 [phone]   [address] [place] [address] [facility] 134 [address] [facility] [phone]

## 2023-01-09 NOTE — CONSULTS
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    Plan:  Patient is a 27-year-old male who is status post left total knee arthroplasty 6/28/2022  Patient was in [facility] recently and sustained a injury to the left elbow which has subsequently become infected  Patient presented to the hospital after feeling unwell  Patient found to have MSSA bacteremia and new acute knee pain for the past 2 to 3 days  Patient states that during this time as he has also become swollen  Patient has a large effusion on exam   Patient aspirated of 60 cc of turbid appearing fluid  This fluid was sent for stat analysis and the cell count is 6000  His bacteremia, cell count, elevated inflammatory markers and white count are all indicative of concomitant left knee periprosthetic joint infection  Patient should maintain NPO  Plan for incision and drainage with polyethylene exchange of the left knee today in hopes of saving his left knee  We will also plan to I&D his left elbow if there is a collection present at that time  History of Present Illness   Physician Requesting Consult: Luma Andres DO  Reason for Consult / Principal Problem: Left knee pain, left elbow pain  HPI: Ervin Nicholas is a 58y o  year old male who presents with Leslie John is a 27-year-old male who is status post left total knee arthroplasty 6/28/2022  Patient was in [facility] recently and sustained a injury to the left elbow which has subsequently become infected  Patient presented to the hospital after feeling unwell  Patient found to have MSSA bacteremia and new acute knee pain for the past 2 to 3 days  Patient states that during this time as he has also become swollen  Patient has a large effusion on exam   Patient's knee was functioning well up until this acute pain 2 to 3 days ago      Inpatient consult to Orthopedic Surgery  Consult performed by: Zan Carter DO  Consult ordered by: Winston Dugan MD          ROS: see HPI, all other systems negative      Historical Information   Past Medical History:   Diagnosis Date   • Arthritis     L knee   for L TKR today 2022   • Atopic dermatitis    • Condition not found     Neoplasm of Pershing's Dut   • Diabetes mellitus ([address] )    • Hyperlipemia    • Hypertension    • Lateral epicondylitis, left elbow    • Wears partial dentures     upper     Past Surgical History:   Procedure Laterality Date   • APPENDECTOMY     • COLONOSCOPY     • CYST REMOVAL      Neck   • JOINT REPLACEMENT Left 2022   • KNEE ARTHROSCOPY      menisectomy   • CO ARTHRP KNE CONDYLE&PLATU MEDIAL&LAT COMPARTMENTS Left 2022    Procedure: ARTHROPLASTY KNEE TOTAL;  Surgeon: Rafael Saldivar DO;  Location: [facility];  Service: Orthopedics     Social History   Social History     Substance and Sexual Activity   Alcohol Use Not Currently   • Alcohol/week: 2 0 standard drinks   • Types: 2 Cans of beer per week    Comment: Social     Social History     Substance and Sexual Activity   Drug Use Never     Social History     Tobacco Use   Smoking Status Former   • Packs/day: 1 00   • Years: 40 00   • Pack years: 40 00   • Types: Cigarettes   • Quit date: 10/2021   • Years since quittin 2   Smokeless Tobacco Never     Family History:   Family History   Problem Relation Age of Onset   • Colon polyps Mother         Inflammatory   • Diabetes Father        Meds/Allergies   Medications Prior to Admission   Medication   • Blood Glucose Monitoring Suppl (OneTouch Verio Reflect) w/Device KIT   • budesonide-formoterol (Symbicort) 160-4 5 mcg/act inhaler   • cephalexin (KEFLEX) 500 mg capsule   • co-enzyme Q-10 30 mg   • cyclobenzaprine (FLEXERIL) 10 mg tablet   • Empagliflozin (Jardiance) 10 MG TABS tablet   • fenofibrate (TRICOR) 145 mg tablet   • ferrous sulfate 324 (65 Fe) mg   • FOLIC ACID PO • glucose blood (OneTouch Verio) test strip   • lisinopril-hydrochlorothiazide (PRINZIDE,ZESTORETIC) 20-25 MG per tablet   • metFORMIN (GLUCOPHAGE) 1000 MG tablet   • methylPREDNISolone 4 MG tablet therapy pack   • metoprolol succinate (TOPROL-XL) 100 mg 24 hr tablet   • Multiple Vitamin (multivitamin) tablet   • omega-3-acid ethyl esters (LOVAZA) 1 g capsule   • OneTouch Delica Lancets 57S MISC   • rosuvastatin (CRESTOR) 10 MG tablet   • sildenafil (VIAGRA) 100 mg tablet   • acetaminophen (TYLENOL) 325 mg tablet   • albuterol (PROAIR HFA) 90 mcg/act inhaler   • Ascorbic Acid (VITAMIN C PO)   • aspirin (ECOTRIN) 325 mg EC tablet   • betamethasone dipropionate (DIPROSONE) 0 05 % cream   • docusate sodium (COLACE) 100 mg capsule   • gabapentin (NEURONTIN) 100 mg capsule     Allergies   Allergen Reactions   • Sulfamethoxazole-Trimethoprim Nausea Only and GI Intolerance       Objective   Vitals: Blood pressure 117/67, pulse 76, temperature 97 6 °F (36 4 °C), temperature source Oral, resp  rate 18, height 5' 10" (1 778 m), weight 102 kg (225 lb), SpO2 95 %  ,Body mass index is 32 28 kg/m²  Intake/Output Summary (Last 24 hours) at 1/9/2023 0917  Last data filed at 1/8/2023 2017  Gross per 24 hour   Intake 480 ml   Output --   Net 480 ml     I/O last 24 hours:   In: 18 [P O :480]  Out: -     Invasive Devices     Peripheral Intravenous Line  Duration           Peripheral IV 01/07/23 Left Antecubital 1 day    Peripheral IV 01/07/23 Right Antecubital 1 day                PE:  Gen:  Awake and alert  HEENT:  Hearing intact  Heart:  Regular rate  Lungs: no audible wheezing  GI: no abdominal distension    Ortho Exam  Ortho Exam - Left Lower Extremity  · Left knee:  · Large effusion  · Range of motion 5-85 limited by pain and swelling  · Gross motor intact TA/EHL/GSC/peroneals  · SILT DP/SP/saphenous/sural/tibial  · Less than 2 seconds cap refill    Lab Results:   CBC:   Lab Results   Component Value Date    WBC 13 33 (H) 01/09/2023    HGB 14 1 01/09/2023    HCT 41 3 01/09/2023    MCV 91 01/09/2023     01/09/2023    MCH 30 9 01/09/2023    MCHC 34 1 01/09/2023    RDW 14 2 01/09/2023    MPV 9 7 01/09/2023     CMP:   Lab Results   Component Value Date    SODIUM 129 (L) 01/09/2023    CL 95 (L) 01/09/2023    CO2 21 01/09/2023    BUN 30 (H) 01/09/2023    CREATININE 0 98 01/09/2023    CALCIUM 9 6 01/09/2023    EGFR 82 01/09/2023     PT/INR: No results found for: PT, INR  ESR: No results found for: ESR  CRP: No results found for: CRP  Imaging Studies: X-ray left knee status post cemented CR attune with large effusion, no fracture dislocation    Code Status: Level 1 - Full Code  Advance Directive and Living Will:      Power of :    POLST:      Left knee aspiration: Left knee was prepped in table sterile fashion with chlorhexidine swab  The knee was aspirated of 60 cc turbid appearing fluid through a superior lateral approach with an 18-gauge needle  Pressure dressing and Ace wrap was placed afterwards  Fluid was sent for cell count, crystals, culture  Patient tolerated the procedure well with no complications

## 2023-01-09 NOTE — PLAN OF CARE
Problem: PAIN - ADULT  Goal: Verbalizes/displays adequate comfort level or baseline comfort level  Description: Interventions:  - Encourage patient to monitor pain and request assistance  - Assess pain using appropriate pain scale  - Administer analgesics based on type and severity of pain and evaluate response  - Implement non-pharmacological measures as appropriate and evaluate response  - Consider cultural and social influences on pain and pain management  - Notify physician/advanced practitioner if interventions unsuccessful or patient reports new pain  Outcome: Progressing     Problem: INFECTION - ADULT  Goal: Absence or prevention of progression during hospitalization  Description: INTERVENTIONS:  - Assess and monitor for signs and symptoms of infection  - Monitor lab/diagnostic results  - Monitor all insertion sites, i e  indwelling lines, tubes, and drains  - Monitor endotracheal if appropriate and nasal secretions for changes in amount and color  - [place] appropriate cooling/warming therapies per order  - Administer medications as ordered  - Instruct and encourage patient and family to use good hand hygiene technique  - Identify and instruct in appropriate isolation precautions for identified infection/condition  Outcome: Progressing  Goal: Absence of fever/infection during neutropenic period  Description: INTERVENTIONS:  - Monitor WBC    Outcome: Progressing     Problem: SAFETY ADULT  Goal: Patient will remain free of falls  Description: INTERVENTIONS:  - Educate patient/family on patient safety including physical limitations  - Instruct patient to call for assistance with activity   - Consult OT/PT to assist with strengthening/mobility   - Keep Call bell within reach  - Keep bed low and locked with side rails adjusted as appropriate  - Keep care items and personal belongings within reach  - Initiate and maintain comfort rounds  - Make Fall Risk Sign visible to staff  - Offer Toileting every 2 Hours, in advance of need  - Initiate/Maintain bed alarm  - Obtain necessary fall risk management equipment: Alarm  - Apply yellow socks and bracelet for high fall risk patients  - Consider moving patient to room near nurses station  Outcome: Progressing  Goal: Maintain or return to baseline ADL function  Description: INTERVENTIONS:  -  Assess patient's ability to carry out ADLs; assess patient's baseline for ADL function and identify physical deficits which impact ability to perform ADLs (bathing, care of mouth/teeth, toileting, grooming, dressing, etc )  - Assess/evaluate cause of self-care deficits   - Assess range of motion  - Assess patient's mobility; develop plan if impaired  - Assess patient's need for assistive devices and provide as appropriate  - Encourage maximum independence but intervene and supervise when necessary  - Involve family in performance of ADLs  - Assess for home care needs following discharge   - Consider OT consult to assist with ADL evaluation and planning for discharge  - Provide patient education as appropriate  Outcome: Progressing  Goal: Maintains/Returns to pre admission functional level  Description: INTERVENTIONS:  - Perform BMAT or MOVE assessment daily    - Set and communicate daily mobility goal to care team and patient/family/caregiver  - Collaborate with rehabilitation services on mobility goals if consulted  - Perform Range of Motion 3 times a day  - Reposition patient every 2 hours    - Dangle patient 3 times a day  - Stand patient 3 times a day  - Ambulate patient 3 times a day  - Out of bed to chair 3 times a day   - Out of bed for meals 3 times a day  - Out of bed for toileting  - Record patient progress and toleration of activity level   Outcome: Progressing     Problem: DISCHARGE PLANNING  Goal: Discharge to home or other facility with appropriate resources  Description: INTERVENTIONS:  - Identify barriers to discharge w/patient and caregiver  - Arrange for needed discharge resources and transportation as appropriate  - Identify discharge learning needs (meds, wound care, etc )  - Arrange for interpretive services to assist at discharge as needed  - Refer to Case Management Department for coordinating discharge planning if the patient needs post-hospital services based on physician/advanced practitioner order or complex needs related to functional status, cognitive ability, or social support system  Outcome: Progressing     Problem: Knowledge Deficit  Goal: Patient/family/caregiver demonstrates understanding of disease process, treatment plan, medications, and discharge instructions  Description: Complete learning assessment and assess knowledge base    Interventions:  - Provide teaching at level of understanding  - Provide teaching via preferred learning methods  Outcome: Progressing     Problem: HEMATOLOGIC - ADULT  Goal: Maintains hematologic stability  Description: INTERVENTIONS  - Assess for signs and symptoms of bleeding or hemorrhage  - Monitor labs  - Administer supportive blood products/factors as ordered and appropriate  Outcome: Progressing     Problem: Potential for Falls  Goal: Patient will remain free of falls  Description: INTERVENTIONS:  - Educate patient/family on patient safety including physical limitations  - Instruct patient to call for assistance with activity   - Consult OT/PT to assist with strengthening/mobility   - Keep Call bell within reach  - Keep bed low and locked with side rails adjusted as appropriate  - Keep care items and personal belongings within reach  - Initiate and maintain comfort rounds  - Make Fall Risk Sign visible to staff  - Offer Toileting every 2 Hours, in advance of need  - Initiate/Maintain bed alarm  - Obtain necessary fall risk management equipment: Alarm  - Apply yellow socks and bracelet for high fall risk patients  - Consider moving patient to room near nurses station  Outcome: Progressing

## 2023-01-10 NOTE — OCCUPATIONAL THERAPY NOTE
Occupational Therapy Evaluation(time=0865-1902)     Patient Name: Tong CLARKEX Date: 1/10/2023  Problem List  Principal Problem:    Neutrophilic leukocytosis  Active Problems:    DMII (diabetes mellitus, type 2) ([facility])    Hyperlipidemia    Murmur    Status post total knee replacement, left    Impetigo    Abnormal urinalysis    Hyponatremia    Past Medical History  Past Medical History:   Diagnosis Date    Arthritis     L knee   for L TKR today 6/28/2022    Atopic dermatitis     Condition not found     Neoplasm of [place]    Diabetes mellitus ([address] )     Hyperlipemia     Hypertension     Lateral epicondylitis, left elbow     Wears partial dentures     upper     Past Surgical History  Past Surgical History:   Procedure Laterality Date    APPENDECTOMY      COLONOSCOPY      CYST REMOVAL      Neck    INCISION AND DRAINAGE OF WOUND Left 1/9/2023    Procedure: INCISION AND DRAINAGE (I&D) EXTREMITY;  Surgeon: Terri Delarosa DO;  [facility];  Service: Orthopedics    JOINT REPLACEMENT Left 06/28/2022    KNEE ARTHROSCOPY      menisectomy    KY ARTHRP KNE CONDYLE&PLATU MEDIAL&LAT COMPARTMENTS Left 06/28/2022    Procedure: ARTHROPLASTY KNEE TOTAL;  Surgeon: Birdie Joy DO;  [facility];  Service: Orthopedics    [place] RMVL PROSTH TOT KNEE PROSTH MMA W/WO INSJ SPACER Left 1/9/2023    Procedure: REMOVAL / DEBRIDEMENT PROSTHESIS KNEE W/ POLY EXCHANGE;  Surgeon: Terri Delarosa DO;  [facility];  Service: Orthopedics           01/10/23 1808   Note Type   Note type Evaluation   Pain Assessment   Pain Assessment Tool 0-10   Pain Score 6   Pain Location/Orientation Location: Neck;Orientation: Left; Location: Leg  (elbow)   Restrictions/Precautions   Weight Bearing Precautions Per Order Yes   LUE Weight Bearing Per Order WBAT   LLE Weight Bearing Per Order WBAT   Home Living   Type of [address] Walker;Cane   Prior Function   Lives With Son   Falls in the last 6 months 0   Lifestyle   Autonomy PTA pt states independence with his ADLs, transfers, ambulation--w/o device; neg falls, +   Reciprocal Relationships supportive son   Service to Others works as a    Intrinsic Gratification watching TV   Subjective   Subjective "My neck is what's sore "   ADL   Where Assessed Edge of bed   Eating Assistance 6  Modified independent   Grooming Assistance 6  Modified Independent   UB Bathing Assistance 5  Supervision/Setup   LB Bathing Assistance 4  Minimal Assistance   [address] 5  Supervision/Setup   LB Dressing Assistance 4  [address]  6  Modified independent   Bed Mobility   Rolling R 5  Supervision   Additional items Increased time required;LE management;Verbal cues   Rolling L 5  Supervision   Additional items Increased time required;Verbal cues;LE management   Supine to Sit 4  Minimal assistance   Additional items Assist x 1; Increased time required;Verbal cues;LE management   Transfers   Sit to Stand 5  Supervision   Additional items Increased time required;Verbal cues   Stand to Sit 5  Supervision   Additional items Increased time required;Verbal cues   Additional Comments bp's=133/62(EOB)   Functional Mobility   Functional Mobility 6  Modified independent   Additional items Rolling walker   Balance   Static Sitting Good   Dynamic Sitting Fair +   Static Standing Fair   Dynamic Standing Fair -   Activity Tolerance   Activity Tolerance Patient limited by fatigue;Patient limited by pain   Medical Staff Made Aware nsg, P T    RUE Assessment   RUE Assessment WFL   RUE Strength   RUE Overall Strength Within Functional Limits - strength 5/5   LUE Assessment   LUE Assessment WFL   LUE Strength   LUE Overall Strength Within Functional Limits - able to perform ADL tasks with strength  (4/5 throughout--limited 2* L elbow wound)   Hand Function   Gross Motor Coordination Functional   Fine Motor Coordination Functional   Sensation   Light Touch No apparent deficits   Proprioception   Proprioception No apparent deficits   Vision-Basic Assessment   Current Vision   (glasses)   Vision - Complex Assessment   Acuity Able to read clock/calendar on wall without difficulty   Psychosocial   Psychosocial (WDL) WDL   Perception   Inattention/Neglect Appears intact   Cognition   Overall Cognitive Status WFL   Arousal/Participation Alert   Attention Within functional limits   Orientation Level Oriented X4   Memory Within functional limits   Following Commands Follows all commands and directions without difficulty   Assessment   Limitation Decreased ADL status; Decreased UE strength;Decreased endurance;Decreased high-level ADLs   Prognosis Good   Assessment Pt is a 61y/o male admitted to the hospital 2* symptoms of malaise, body aches, and joint [id number]  Pt noted with neutrophilic leukocytosis, L TKR infection, L elbow infection--ESBL, MDRO  Pt required a s/p  REMOVAL / DEBRIDEMENT PROSTHESIS KNEE W/ POLY EXCHANGE (Left: Knee)       INCISION AND DRAINAGE (I&D) EXTREMITY (Left: Elbow)--1/9  Pt is currently allowed WBAT L LE/UE  Pt with PMH DM, HTN, L TKR(6/22)  PTA pt states independence with his ADLs, transfers, ambulation--w/o device; neg falls, +  During initial eval, pt demonstrated slight deficits with his functional balance, functional mobility, ADL status, transfer safety, activity tolerance, and L UE strength  Pt would benefit from 1-5 OT tx sessions for the above deficits  The patient's raw score on the AM-PAC Daily Activity inpatient short form is 22, standardized score is 47 1, greater than 39 4  Patients at this level are likely to benefit from discharge to home  Please refer to the recommendation of the Occupational Therapist for safe discharge planning     Goals   Patient Goals "to get better"   STG Time Frame   (1-7 days)   Short Term Goal #1 Pt will demonstrate improved activity tolerance to good(20-30mins) and standing tolerance to 3-5mins to assist with ADLs  Short Term Goal #2 Pt will demonstrate proper walker/transfer safety 100% of the time  Short Term Goal  Pt will demonstrate g/g- balance with all functional activities  Long Term Goal #1 Pt will demonstrate mod I with their UE and LE bathing/dresssing  Plan   Treatment Interventions ADL retraining;Functional transfer training; Endurance training;Patient/family training;Equipment evaluation/education; Compensatory technique education   Goal Expiration Date 01/17/23   OT Treatment Day 0   OT Frequency 1-2x/wk   Recommendation   OT Discharge Recommendation Home with outpatient rehabilitation   AM-PAC Daily Activity Inpatient   Lower Body Dressing 3   Bathing 3   Toileting 4   Upper Body Dressing 4   Grooming 4   Eating 4   Daily Activity Raw Score 22   Daily Activity Standardized Score (Calc for Raw Score >=11) 47  1   AM-PAC Applied Cognition Inpatient   Following a Speech/Presentation 4   Understanding Ordinary Conversation 4   Taking Medications 4   Remembering Where Things Are Placed or Put Away 4   Remembering List of 4-5 Errands 4   Taking Care of Complicated Tasks 4   Applied Cognition Raw Score 24   Applied Cognition Standardized Score 62 21   Cathy Hernandez

## 2023-01-10 NOTE — ASSESSMENT & PLAN NOTE
Lab Results   Component Value Date    HGBA1C 7 1 (H) 12/12/2022     Hold metformin and Jardiance      Start diabetic diet and sliding scale insulin  (P) 392 [id number]

## 2023-01-10 NOTE — PROGRESS NOTES
[facility]  Progress Note - Linnea Irwin 1960, 58 y o  male MRN: [id number]  Unit/Bed#: E5 -01 Encounter: [id number]  Primary Care Provider: Paul Zhang DO   Date and time admitted to hospital: 2023 12:58 PM    * Neutrophilic leukocytosis  Assessment & Plan  Multiple sources of infection, knee, elbow and neck  Appreciate orthopedics and infectious disease help   Patient underwent polyexchange of total knee replacement and I&D of elbow abscess by orthopedics    Continue IV antibiotics    Next part of 2ork-up is to get MRI of the cervical spine          Impetigo  Assessment & Plan  · He has multiple pustular lesions in the right foot (plantar and dorsal surface) which looks like impetigo  · His recent left elbow infection also started out as a pustule  · Not improved with oral Keflex  · Defer abx to infectious disease    DMII (diabetes mellitus, type 2) ([facility] [address] )  Assessment & Plan  Lab Results   Component Value Date    HGBA1C 7 1 (H) 2022     Hold metformin and Jardiance  Start diabetic diet and sliding scale insulin  (P) 107 [id number]          Subjective:   Having some significant neck stiffness  No fever or chills  Shortness of shortness of breath acute COPD exacerbation  Quickly improved on IV steroids  Okay for discharge home  Elbow pain and knee pain are under control    Objective:     Vitals:   Temp (24hrs), Av 3 °F (36 8 °C), Min:97 4 °F (36 3 °C), Max:98 9 °F (37 2 °C)    Temp:  [97 4 °F (36 3 °C)-98 9 °F (37 2 °C)] 98 8 °F (37 1 °C)  HR:  [66-87] 79  Resp:  [16-21] 18  BP: (105-170)/(49-88) 137/65  SpO2:  [91 %-97 %] 95 %  Body mass index is 32 28 kg/m²  Input and Output Summary (last 24 hours): Intake/Output Summary (Last 24 hours) at 1/10/2023 1534  Last data filed at 1/10/2023 1125  Gross per 24 hour   Intake 1100 ml   Output 1165 ml   Net -65 ml       Physical Exam:     Physical Exam  Vitals and nursing note reviewed  HENT:      Head: Normocephalic and atraumatic  Eyes:      Pupils: Pupils are equal, round, and reactive to light  Neck:      Comments: Neck is significantly rigid to flexion and extension  He is very hesitant to move it  Cardiovascular:      Rate and Rhythm: Normal rate and regular rhythm  Heart sounds: No murmur heard  No friction rub  No gallop  Pulmonary:      Effort: Pulmonary effort is normal       Breath sounds: Normal breath sounds  No wheezing or rales  Abdominal:      General: Bowel sounds are normal       Palpations: Abdomen is soft  Tenderness: There is no abdominal tenderness  Musculoskeletal:      Right lower leg: No edema  Left lower leg: No edema  Comments: Knee and elbow dressings are clean, dry, intact         Additional Data:     Labs:    Results from last 7 days   Lab Units 01/10/23  0441 01/09/23  0555 01/08/23  0523 01/07/23  1334   WBC Thousand/uL 11 39* 13 33*   < > 15 27*   HEMOGLOBIN g/dL 12 1 14 1   < > 14 5   HEMATOCRIT % 36 6 41 3   < > 41 3   PLATELETS Thousands/uL 227 257   < > 224   NEUTROS PCT %  --   --   --  75   LYMPHS PCT %  --   --   --  11*   LYMPHO PCT %  --  17  --   --    MONOS PCT %  --   --   --  11   MONO PCT %  --  14*  --   --    EOS PCT %  --  1  --  1    < > = values in this interval not displayed       Results from last 7 days   Lab Units 01/10/23  0441   POTASSIUM mmol/L 4 5   CHLORIDE mmol/L 98   CO2 mmol/L 25   BUN mg/dL 38*   CREATININE mg/dL 1 10   CALCIUM mg/dL 8 7   ALK PHOS U/L 87   ALT U/L 22   AST U/L 26     Results from last 7 days   Lab Units 01/07/23  1334   INR  1 15     Results from last 7 days   Lab Units 01/10/23  1055 01/10/23  0715 01/09/23  2038 01/09/23  1605 01/09/23  1116 01/09/23  0716 01/08/23  2039 01/08/23  1603 01/08/23  1134 01/08/23  0737 01/07/23  2054 01/07/23  1702   POC GLUCOSE mg/dl 316* 246* 223* 221* 232* 255* 249* 250* 296* 139 228* 169*               * I Have Reviewed All Lab Data Recent Cultures (last 7 days):     Results from last 7 days   Lab Units 01/09/23  1836 01/09/23  0743 01/09/23  0601 01/07/23  1554 01/07/23  1538 01/07/23  1445   BLOOD CULTURE   --   --  No Growth at 24 hrs  No Growth at 24 hrs   --  Staphylococcus aureus*  Staphylococcus aureus*  --    GRAM STAIN RESULT  No Polys or Bacteria seen 3+ Polys  No bacteria seen  --   --  Gram positive cocci in clusters*  Gram positive cocci in clusters* No Polys or Bacteria seen   URINE CULTURE   --   --   --  >100,000 cfu/ml Escherichia coli ESBL*  --   --    WOUND CULTURE   --   --   --   --   --  Culture results to follow     BODY FLUID CULTURE, STERILE   --  No growth  --   --   --   --          Last 24 Hours Medication List:   Current Facility-Administered Medications   Medication Dose Route Frequency Provider Last Rate   • acetaminophen  975 mg Oral [facility] Shannon Stevens PA-C     • budesonide-formoterol  2 puff Inhalation BID Shannon Stevens PA-C     • calcium carbonate  1,000 mg Oral Daily PRN Shannon Stevens PA-C     • cefazolin  2,000 mg Intravenous Q8H Howard Barnett PA-C 2,000 mg (01/10/23 1009)   • cyclobenzaprine  10 mg Oral TID PRN Shannon Stevens PA-C     • docusate sodium  100 mg Oral BID Shannon Stevens PA-C     • enoxaparin  40 mg Subcutaneous Daily Shannon Stevens PA-C     • fenofibrate  145 mg Oral Daily Howard Barnett PA-C     • fish oil  1,000 mg Oral BID Shannon Stevens PA-C     • HYDROmorphone  0 5 mg Intravenous Q4H PRN Shannon Stevens PA-C     • insulin lispro  1-5 Units Subcutaneous TID AC Howard Franklin PA-C     • lactated ringers  1,000 mL Intravenous Once PRN Shannon Stevens PA-C      And   • lactated ringers  1,000 mL Intravenous Once PRN Shannon Stevens PA-C     • lactated ringers  125 mL/hr Intravenous Continuous Shannon Stevens PA-C Stopped (01/10/23 0741)   • metoprolol succinate  100 mg Oral Daily Shannon Stevens PA-C     • ondansetron  4 mg Intravenous Q6H PRN Shannon Stevens PA-C • oxyCODONE  10 mg Oral Q4H PRN Luis Seo PA-C     • oxyCODONE  5 mg Oral Q4H PRN Luis Seo PA-C     • pravastatin  80 mg Oral Daily With Dinner Chay Franklin PA-C     • sodium chloride  1,000 mL Intravenous Once PRN Cherjoann Seo PA-C      And   • sodium chloride  1,000 mL Intravenous Once PRN Cheryal SIDDHARTHA Seo           VTE Pharmacologic Prophylaxis:   Pharmacologic: Enoxaparin (Lovenox)      Current Length of Stay: 3 day(s)    Current Patient Status: Inpatient       Discharge Plan: needs MRI cervical spine  IV abx       Code Status: Level 1 - Full Code           Today, Patient Was Seen By: Cher Lantigua DO    ** Please Note: Dictation voice to text software may have been used in the creation of this document   **

## 2023-01-10 NOTE — PROGRESS NOTES
Orthopaedic Surgery - Progress Note  Malaika Espinosa (32 y o  male)   : 1960   MRN: [id number]  Date: 1/10/2023   Encounter: [id number]   Unit/Bed#: E5 -01    Assessment / Plan  Postop day 1 status post left knee I&D with polyexchange of the left total knee replacement  Postop day 1 status post I&D of abscess of the left elbow    · Patient can progress activity as tolerated with weightbearing as tolerated at this time for the left knee  · Ice and analgesics as needed for pain  · Continue antibiotics per infectious disease  Currently on Ancef 2 g every 8 hours  · Cultures from yesterday and surgery still pending  · Maintain dressings and drain for 48 hours minimal, will discontinue drain when output less than 30 mL per 8-hour shift  Maintain packing elbow until the drain is discontinued in the knee  · Patient can progress activity as tolerated with weightbearing as tolerated on both the left upper and lower extremities  · Ortho will continue to follow at this time  Subjective  60-year-old male postop day 1 status post left knee I&D with polyexchange of the left total knee replacement and I&D of the abscess of the left elbow with packing  Overall the patient states his pain is controlled  He is pain in the operative sites and also complaining about cervical spine discomfort which has been an ongoing issue for him prior to this  Patient is denying any distal numbness or tingling in the left upper or lower extremities at this time  Vitals  Temp:  [97 4 °F (36 3 °C)-98 9 °F (37 2 °C)] 98 8 °F (37 1 °C)  HR:  [66-87] 83  Resp:  [16-21] 18  BP: (105-170)/(49-88) 146/64  Body mass index is 32 28 kg/m²  I/O last 24 hours: In: 1100 [I V :1100]  Out: 2323 [Urine:1075; Drains:90]    Left Knee Exam  Alignment:  Normal knee alignment    Inspection:  Dressing is clean, dry and intact at this time, drain is continuing to maintain suction  Palpation:  Generalized mild tenderness at Area around the knee   ROM:  Normal ankle ROM  Strength:  5/5 AT, GSC, PT, and peroneals  Stability:  Not tested  Tests:  No pertinent positive or negative tests  Patella:  Not tested  Neurovascular:  Sensation intact in DP/SP/Myles/Sa/T nerve distributions  Sensation intact in all digital nerve distributions  Toes warm and perfused  Gait:  Not tested  Left Elbow Exam  Alignment:  Normal elbow alignment and carrying angle  Inspection:  Dressing is clean, dry and intact at this time  Palpation:  Mild tenderness at Area generally around the elbow  ROM:  Not tested  Strength:  Not tested  Stability:  Not tested  Tests:  No pertinent positive or negative tests  Neurovascular:  Sensation intact in Ax/R/M/U nerve distributions  Sensation intact in all digital nerve distributions  Fingers warm and perfused  Lab Results  (I have personally reviewed pertinent lab results )  Results from last 7 days   Lab Units 01/10/23  0441 01/09/23  0555 01/08/23  0523 01/07/23  1334   WBC Thousand/uL 11 39* 13 33* 13 53* 15 27*   HEMOGLOBIN g/dL 12 1 14 1 14 4 14 5   HEMATOCRIT % 36 6 41 3 42 1 41 3   PLATELETS Thousands/uL 227 257 238 224     Results from last 7 days   Lab Units 01/07/23  1334   PTT seconds 26   INR  1 15     Results from last 7 days   Lab Units 01/10/23  0441 01/09/23  0555 01/08/23  0523 01/07/23  1334   POTASSIUM mmol/L 4 5 4 3 3 9 4 3   CHLORIDE mmol/L 98 95* 95* 94*   CO2 mmol/L 25 21 20* 21   BUN mg/dL 38* 30* 26* 34*   CREATININE mg/dL 1 10 0 98 0 92 1 04   EGFR ml/min/1 73sq m 71 82 88 76   CALCIUM mg/dL 8 7 9 6 9 7 9 7   ALK PHOS U/L 87  --  120* 127*   ALT U/L 22  --  19 28   AST U/L 26  --  23 21         Results from last 7 days   Lab Units 01/09/23  1836 01/09/23  0743 01/09/23  0601 01/07/23  1554 01/07/23  1538 01/07/23  1445   WOUND CULTURE   --   --   --   --   --  No growth   BLOOD CULTURE   --   --  No Growth at 24 hrs    No Growth at 24 hrs   --  Staphylococcus aureus*  Staphylococcus aureus*  -- URINE CULTURE   --   --   --  >100,000 cfu/ml Escherichia coli ESBL*  --   --    GRAM STAIN RESULT  No Polys or Bacteria seen 3+ Polys  No bacteria seen  --   --  Gram positive cocci in clusters*  Gram positive cocci in clusters* No Polys or Bacteria seen   MRSA CULTURE ONLY   --   --  No Methicillin Resistant Staphlyococcus aureus (MRSA) isolated  --   --   --        Norberto Leiva PA-C

## 2023-01-10 NOTE — PLAN OF CARE
Problem: OCCUPATIONAL THERAPY ADULT  Goal: Performs self-care activities at highest level of function for planned discharge setting  See evaluation for individualized goals  Description: Treatment Interventions: ADL retraining, Functional transfer training, Endurance training, Patient/family training, Equipment evaluation/education, Compensatory technique education          See flowsheet documentation for full assessment, interventions and recommendations  Note: Limitation: Decreased ADL status, Decreased UE strength, Decreased endurance, Decreased high-level ADLs  Prognosis: Good  Assessment: Pt is a 61y/o male admitted to the hospital 2* symptoms of malaise, body aches, and joint [id number]  Pt noted with neutrophilic leukocytosis, L TKR infection, L elbow infection--ESBL, MDRO  Pt required a s/p  REMOVAL / DEBRIDEMENT PROSTHESIS KNEE W/ POLY EXCHANGE (Left: Knee)       INCISION AND DRAINAGE (I&D) EXTREMITY (Left: Elbow)--1/9  Pt is currently allowed WBAT L LE/UE  Pt with PMH DM, HTN, L TKR(6/22)  PTA pt states independence with his ADLs, transfers, ambulation--w/o device; neg falls, +  During initial eval, pt demonstrated slight deficits with his functional balance, functional mobility, ADL status, transfer safety, activity tolerance, and L UE strength  Pt would benefit from 1-5 OT tx sessions for the above deficits  The patient's raw score on the AM-PAC Daily Activity inpatient short form is 22, standardized score is 47 1, greater than 39 4  Patients at this level are likely to benefit from discharge to home  Please refer to the recommendation of the Occupational Therapist for safe discharge planning       OT Discharge Recommendation: Home with outpatient rehabilitation

## 2023-01-10 NOTE — PLAN OF CARE
Problem: PAIN - ADULT  Goal: Verbalizes/displays adequate comfort level or baseline comfort level  Description: Interventions:  - Encourage patient to monitor pain and request assistance  - Assess pain using appropriate pain scale  - Administer analgesics based on type and severity of pain and evaluate response  - Implement non-pharmacological measures as appropriate and evaluate response  - Consider cultural and social influences on pain and pain management  - Notify physician/advanced practitioner if interventions unsuccessful or patient reports new pain  Outcome: Progressing     Problem: INFECTION - ADULT  Goal: Absence or prevention of progression during hospitalization  Description: INTERVENTIONS:  - Assess and monitor for signs and symptoms of infection  - Monitor lab/diagnostic results  - Monitor all insertion sites, i e  indwelling lines, tubes, and drains  - Monitor endotracheal if appropriate and nasal secretions for changes in amount and color  - [place] appropriate cooling/warming therapies per order  - Administer medications as ordered  - Instruct and encourage patient and family to use good hand hygiene technique  - Identify and instruct in appropriate isolation precautions for identified infection/condition  Outcome: Progressing  Goal: Absence of fever/infection during neutropenic period  Description: INTERVENTIONS:  - Monitor WBC    Outcome: Progressing     Problem: SAFETY ADULT  Goal: Patient will remain free of falls  Description: INTERVENTIONS:  - Educate patient/family on patient safety including physical limitations  - Instruct patient to call for assistance with activity   - Consult OT/PT to assist with strengthening/mobility   - Keep Call bell within reach  - Keep bed low and locked with side rails adjusted as appropriate  - Keep care items and personal belongings within reach  - Initiate and maintain comfort rounds  - Make Fall Risk Sign visible to staff  - Offer Toileting every 2 Hours, in advance of need  - Initiate/Maintain bed alarm  - Obtain necessary fall risk management equipment: Alarm  - Apply yellow socks and bracelet for high fall risk patients  - Consider moving patient to room near nurses station  Outcome: Progressing  Goal: Maintain or return to baseline ADL function  Description: INTERVENTIONS:  -  Assess patient's ability to carry out ADLs; assess patient's baseline for ADL function and identify physical deficits which impact ability to perform ADLs (bathing, care of mouth/teeth, toileting, grooming, dressing, etc )  - Assess/evaluate cause of self-care deficits   - Assess range of motion  - Assess patient's mobility; develop plan if impaired  - Assess patient's need for assistive devices and provide as appropriate  - Encourage maximum independence but intervene and supervise when necessary  - Involve family in performance of ADLs  - Assess for home care needs following discharge   - Consider OT consult to assist with ADL evaluation and planning for discharge  - Provide patient education as appropriate  Outcome: Progressing  Goal: Maintains/Returns to pre admission functional level  Description: INTERVENTIONS:  - Perform BMAT or MOVE assessment daily    - Set and communicate daily mobility goal to care team and patient/family/caregiver  - Collaborate with rehabilitation services on mobility goals if consulted  - Perform Range of Motion 3 times a day  - Reposition patient every 2 hours    - Dangle patient 3 times a day  - Stand patient 3 times a day  - Ambulate patient 3 times a day  - Out of bed to chair 3 times a day   - Out of bed for meals 3 times a day  - Out of bed for toileting  - Record patient progress and toleration of activity level   Outcome: Progressing     Problem: DISCHARGE PLANNING  Goal: Discharge to home or other facility with appropriate resources  Description: INTERVENTIONS:  - Identify barriers to discharge w/patient and caregiver  - Arrange for needed discharge resources and transportation as appropriate  - Identify discharge learning needs (meds, wound care, etc )  - Arrange for interpretive services to assist at discharge as needed  - Refer to Case Management Department for coordinating discharge planning if the patient needs post-hospital services based on physician/advanced practitioner order or complex needs related to functional status, cognitive ability, or social support system  Outcome: Progressing     Problem: Knowledge Deficit  Goal: Patient/family/caregiver demonstrates understanding of disease process, treatment plan, medications, and discharge instructions  Description: Complete learning assessment and assess knowledge base    Interventions:  - Provide teaching at level of understanding  - Provide teaching via preferred learning methods  Outcome: Progressing     Problem: HEMATOLOGIC - ADULT  Goal: Maintains hematologic stability  Description: INTERVENTIONS  - Assess for signs and symptoms of bleeding or hemorrhage  - Monitor labs  - Administer supportive blood products/factors as ordered and appropriate  Outcome: Progressing     Problem: Potential for Falls  Goal: Patient will remain free of falls  Description: INTERVENTIONS:  - Educate patient/family on patient safety including physical limitations  - Instruct patient to call for assistance with activity   - Consult OT/PT to assist with strengthening/mobility   - Keep Call bell within reach  - Keep bed low and locked with side rails adjusted as appropriate  - Keep care items and personal belongings within reach  - Initiate and maintain comfort rounds  - Make Fall Risk Sign visible to staff  - Offer Toileting every 2 Hours, in advance of need  - Initiate/Maintain bed alarm  - Obtain necessary fall risk management equipment: Alarm  - Apply yellow socks and bracelet for high fall risk patients  - Consider moving patient to room near nurses station  Outcome: Progressing

## 2023-01-10 NOTE — ANESTHESIA POSTPROCEDURE EVALUATION
Post-Op Assessment Note    CV Status:  Stable    Pain management: adequate     Mental Status:  Alert and awake   Hydration Status:  Euvolemic   PONV Controlled:  Controlled   Airway Patency:  Patent      Post Op Vitals Reviewed: Yes            No notable events documented      BP      Temp      Pulse     Resp      SpO2      /57 (BP Location: Right arm)   Pulse 77   Temp 98 6 °F (37 °C) (Temporal)   Resp 18   Ht 5' 10" (1 778 m)   Wt 102 kg (225 lb)   SpO2 93%   BMI 32 28 kg/m²

## 2023-01-10 NOTE — DISCHARGE INSTR - AVS FIRST PAGE
Dr Jean Bone Knee Replacement    What to Expect/Activity  It is normal to have some discomfort in your knee for several days to weeks  You are weight bearing as tolerated to your operative leg with assist devices  Please use crutches/walker when ambulating until your follow-up  Swelling and discomfort in the knee is normal for several days after surgery  For the first 2-3 days, use ice around the knee to help  Use for 20-30 minutes every 1-2 hours for 48 hours, while awake  You may continue beyond 48 hours as needed  Place one or two pillows underneath your calf, not your knee, to reduce swelling  Physical therapy on your own at home should start as soon as possible (see below)  Please perform heel slides and extension exercises on your own as well (see diagram)  Please use incentive spirometer 10 times per hour while awake (see diagram)  Dressing/Wound Care/Bathing  You may remove your toe-to-groin dressing 24 hours after surgery  You may start showering 24 hours after surgery, the surgical dressing will remain in place  Please pat the dressing dry  If you notice the dressing appears saturated or is starting to come off, please replace with dry dressing  There may be dried blood around the incision  It is ok to continue showering after removing the dressing but do not scrub the incision  Pat incision dry  Do not place any creams, ointments or gels on or around the incision  No baths, swimming or submerging until cleared by Dr Lindo Can may resume your usual medications    Please take the following medications:  Anti-coagulation (blood clot prevention) - aspirin 81 mg twice daily for 4 weeks  Pain medication:  Narcotic: Take as directed  NSAID/Anti-inflammatory: Take as directed  Tylenol 1000mg every 8 hours  Zofran (ondasetron) - 4mg every 8 hours as needed for nausea  Stool softeners (senna/colace) - take daily to prevent constipation as narcotic pain medication causes constipation  Antibiotic - take as directed if prescribed   If you have questions or pain concerns, please contact the office  Pain medication cannot remove all post-operative pain  Follow up/Call if:  The findings of your surgery will be explained to you and your family immediately after surgery  However, in the post-operative period, during recovery from anesthesia you may not fully remember or fully understand what was said  This will be again gone over when you return for your post-op appointment    Please contact Dr Travis Ovalle office if you experience the following:  Excessive bleeding (bleeding through your dressing)  Fever greater than 101 degrees F after 48 hours (low grade fevers the day or two after surgery are normal)  Persistent nausea or vomiting  Decreased sensation or discoloration of the operative limb  Pain or swelling that is getting worse and not better with medication    Dr Jorge Schmidt: [phone]

## 2023-01-10 NOTE — ASSESSMENT & PLAN NOTE
Multiple sources of infection, knee, elbow and neck  Appreciate orthopedics and infectious disease help   Patient underwent polyexchange of total knee replacement and I&D of elbow abscess by orthopedics    Continue IV antibiotics    Next part of 2ork-up is to get MRI of the cervical spine

## 2023-01-10 NOTE — PLAN OF CARE
Problem: PAIN - ADULT  Goal: Verbalizes/displays adequate comfort level or baseline comfort level  Description: Interventions:  - Encourage patient to monitor pain and request assistance  - Assess pain using appropriate pain scale  - Administer analgesics based on type and severity of pain and evaluate response  - Implement non-pharmacological measures as appropriate and evaluate response  - Consider cultural and social influences on pain and pain management  - Notify physician/advanced practitioner if interventions unsuccessful or patient reports new pain  Outcome: Progressing     Problem: INFECTION - ADULT  Goal: Absence or prevention of progression during hospitalization  Description: INTERVENTIONS:  - Assess and monitor for signs and symptoms of infection  - Monitor lab/diagnostic results  - Monitor all insertion sites, i e  indwelling lines, tubes, and drains  - Monitor endotracheal if appropriate and nasal secretions for changes in amount and color  - [place] appropriate cooling/warming therapies per order  - Administer medications as ordered  - Instruct and encourage patient and family to use good hand hygiene technique  - Identify and instruct in appropriate isolation precautions for identified infection/condition  Outcome: Progressing     Problem: SAFETY ADULT  Goal: Patient will remain free of falls  Description: INTERVENTIONS:  - Educate patient/family on patient safety including physical limitations  - Instruct patient to call for assistance with activity   - Consult OT/PT to assist with strengthening/mobility   - Keep Call bell within reach  - Keep bed low and locked with side rails adjusted as appropriate  - Keep care items and personal belongings within reach  - Initiate and maintain comfort rounds  - Make Fall Risk Sign visible to staff  - Offer Toileting every 2 Hours, in advance of need  - Initiate/Maintain bed alarm  - Obtain necessary fall risk management equipment: Alarm  - Apply yellow socks and bracelet for high fall risk patients  - Consider moving patient to room near nurses station  Outcome: Progressing     Problem: DISCHARGE PLANNING  Goal: Discharge to home or other facility with appropriate resources  Description: INTERVENTIONS:  - Identify barriers to discharge w/patient and caregiver  - Arrange for needed discharge resources and transportation as appropriate  - Identify discharge learning needs (meds, wound care, etc )  - Arrange for interpretive services to assist at discharge as needed  - Refer to Case Management Department for coordinating discharge planning if the patient needs post-hospital services based on physician/advanced practitioner order or complex needs related to functional status, cognitive ability, or social support system  Outcome: Progressing     Problem: Knowledge Deficit  Goal: Patient/family/caregiver demonstrates understanding of disease process, treatment plan, medications, and discharge instructions  Description: Complete learning assessment and assess knowledge base    Interventions:  - Provide teaching at level of understanding  - Provide teaching via preferred learning methods  Outcome: Progressing     Problem: HEMATOLOGIC - ADULT  Goal: Maintains hematologic stability  Description: INTERVENTIONS  - Assess for signs and symptoms of bleeding or hemorrhage  - Monitor labs  - Administer supportive blood products/factors as ordered and appropriate  Outcome: Progressing     Problem: Potential for Falls  Goal: Patient will remain free of falls  Description: INTERVENTIONS:  - Educate patient/family on patient safety including physical limitations  - Instruct patient to call for assistance with activity   - Consult OT/PT to assist with strengthening/mobility   - Keep Call bell within reach  - Keep bed low and locked with side rails adjusted as appropriate  - Keep care items and personal belongings within reach  - Initiate and maintain comfort rounds  - Make Fall Risk Sign visible to staff  - Offer Toileting every 2 Hours, in advance of need  - Initiate/Maintain bed alarm  - Obtain necessary fall risk management equipment: Alarm  - Apply yellow socks and bracelet for high fall risk patients  - Consider moving patient to room near nurses station  Outcome: Progressing

## 2023-01-10 NOTE — PROGRESS NOTES
Progress Note - [facility] Infectious Disease   Malaika Espinosa 58 y o  male MRN: [id number]  Unit/Bed#: E5 -01 Encounter: [id number]      IMPRESSION & RECOMMENDATIONS:   1  Staph aureus bacteremia   2 of 2 admission blood cultures positive for MSSA  Likely source is recent L elbow wound with progression to abscess/bursitis  Concern for seeding of L elbow, L knee, cervical spine, and heart in setting of MSSA  The patient also has evidence of Janeway lesions on exam of his feet  High concern for endocarditis now with multiple embolic sites of infection  TTE negative (study quality adequate though technically difficult)  Repeat blood cultures are negative at 24 hours  -continue IV cefazolin 2 g every 8 hours  -follow up repeat blood cultures   -recommend cervical spine MRI  -recommend KALINA  -duration of antibiotic treatment pending further imaging studies and repeat cultures     2   Left elbow cellulitis with bursitis/abscess   Concern for septic bursitis/cellulitis   The patient initially suffered a cut to his left elbow while in [place] which I suspect is the initial source of bacteremia above  He should have imaging and possible joint aspiration  Evaluated by Ortho who planned for incision and drainage if fluid collection was present  Awaiting operative report   -antibiotic as above  -follow-up ortho operative report  -consider imaging of left elbow/UE to rule out deeper infection     3   Left TKA acute PJI  Admitted with large left knee effusion s/p bedside joint aspiration 1/9 with Ortho  Joint fluid cultures pending though fluid appeared turbid with WBC 6000, consistent with septic joint  S/p OR I&D with polyethylene exchange of the left knee 1/9/2023  OR cultures pending though may be low yield due to prior antibiotics    -antibiotic as above  -serial L knee exams   -follow-up ortho operative report  -follow-up cultures from bedside arthrocentesis and OR incision and drainage  -ongoing Ortho follow-up     4  Neck pain  In setting of staph aureus bacteremia there is concern for possible seeding of the spine    -serial exams of neck  -recommend cervical spine MRI     5  Leukocytosis   Due to bacteremia and cellulitis above  Fortunately the patient is afebrile and hemodynamically stable  Leukocytosis downtrending   -Monitor temperature, hemodynamics and daily CBC     6   Pyuria   The patient has no specific urinary symptoms  Urine GC/CT was negative  Urine culture shows growth of ESBL E  coli  -monitor  symptoms  -monitor urine output     7   Type 2 diabetes mellitus  HA1c was 7 1%    -blood glucose management per primary service     8   Janeway lesions of the feet   In setting of staph aureus bacteremia, high concern for endocarditis and septic emboli  No obvious echocardiographic evidence of endocarditis   -antibiotic as above  -recommend KALINA    Antibiotics:  Cefazolin 3  abx 4    I have discussed the above management plan in detail with patient  I have discussed the above management plan in detail with patient's RN, and the primary service, SLIM  Subjective:  Patient has no fever, chills, sweats; no nausea, vomiting, diarrhea; no cough, shortness of breath; no pain  Ambulating halls with PT without difficulty  Objective:  Vitals:  Temp:  [97 4 °F (36 3 °C)-98 9 °F (37 2 °C)] 98 °F (36 7 °C)  HR:  [66-87] 87  Resp:  [16-21] 16  BP: (105-170)/(49-88) 133/62  SpO2:  [91 %-97 %] 96 %  Temp (24hrs), Av 2 °F (36 8 °C), Min:97 4 °F (36 3 °C), Max:98 9 °F (37 2 °C)  Current: Temperature: 98 °F (36 7 °C)    PHYSICAL EXAM:  General Appearance:  Appearing well, nontoxic, and in no distress, sitting in bedside recliner    HEENT: Normocephalic, without obvious abnormality, atraumatic  Conjunctiva pink and sclera anicteric  Oropharynx moist without lesions       Lungs:   Clear to auscultation bilaterally, respirations unlabored   Heart:  RRR; no murmur, rub or gallop   Abdomen:   Soft, non-tender, non-distended, positive bowel sounds    Extremities: L elbow and L knee ace wraps in place  Skin: No rashes or lesions  No draining wounds noted  Peripheral IV intact without evidence of erythema, warmth, or exudate  LABS, IMAGING, & OTHER STUDIES:  Lab Results:  I have personally reviewed pertinent labs  Results from last 7 days   Lab Units 01/10/23  0441 01/09/23  0555 01/08/23  0523   WBC Thousand/uL 11 39* 13 33* 13 53*   HEMOGLOBIN g/dL 12 1 14 1 14 4   PLATELETS Thousands/uL 227 257 238     Results from last 7 days   Lab Units 01/10/23  0441 01/09/23  0555 01/08/23  0523 01/07/23  1334   SODIUM mmol/L 131* 129* 131* 129*   POTASSIUM mmol/L 4 5 4 3 3 9 4 3   CHLORIDE mmol/L 98 95* 95* 94*   CO2 mmol/L 25 21 20* 21   BUN mg/dL 38* 30* 26* 34*   CREATININE mg/dL 1 10 0 98 0 92 1 04   EGFR ml/min/1 73sq m 71 82 88 76   CALCIUM mg/dL 8 7 9 6 9 7 9 7   AST U/L 26  --  23 21   ALT U/L 22  --  19 28   ALK PHOS U/L 87  --  120* 127*     Results from last 7 days   Lab Units 01/09/23  1836 01/09/23  0743 01/09/23  0601 01/07/23  1554 01/07/23  1538 01/07/23  1445   BLOOD CULTURE   --   --  No Growth at 24 hrs    No Growth at 24 hrs   --  Staphylococcus aureus*  Staphylococcus aureus*  --    GRAM STAIN RESULT  No Polys or Bacteria seen 3+ Polys  No bacteria seen  --   --  Gram positive cocci in clusters*  Gram positive cocci in clusters* No Polys or Bacteria seen   URINE CULTURE   --   --   --  >100,000 cfu/ml Escherichia coli ESBL*  --   --    WOUND CULTURE   --   --   --   --   --  No growth   MRSA CULTURE ONLY   --   --  No Methicillin Resistant Staphlyococcus aureus (MRSA) isolated  --   --   --

## 2023-01-10 NOTE — PHYSICAL THERAPY NOTE
PHYSICAL THERAPY EVALUATION          Patient Name: Acosta Orantes  [id number] Date: 1/10/2023  PT EVALUATION    58 y o     [id number]    Impetigo [L01 00]  Neck pain [M54 2]  Murmur [R01 1]  Bacteriuria [R82 71]  Cellulitis of right foot [L03 115]    Past Medical History:   Diagnosis Date    Arthritis     L knee   for L TKR today 6/28/2022    Atopic dermatitis     Condition not found     Neoplasm of Ford's Dut    Diabetes mellitus ([address] )     Hyperlipemia     Hypertension     Lateral epicondylitis, left elbow     Wears partial dentures     upper         Past Surgical History:   Procedure Laterality Date    APPENDECTOMY      COLONOSCOPY      CYST REMOVAL      Neck    INCISION AND DRAINAGE OF WOUND Left 1/9/2023    Procedure: INCISION AND DRAINAGE (I&D) EXTREMITY;  Surgeon: Justin Morales DO;  [facility];  Service: Orthopedics    JOINT REPLACEMENT Left 06/28/2022    KNEE ARTHROSCOPY      menisectomy    NY ARTHRP KNE CONDYLE&PLATU MEDIAL&LAT COMPARTMENTS Left 06/28/2022    Procedure: ARTHROPLASTY KNEE TOTAL;  Surgeon: Barrington Petty DO;  [facility];  Service: Orthopedics    NY RMVL PROSTH TOT KNEE PROSTH MMA W/WO INSJ SPACER Left 1/9/2023    Procedure: REMOVAL / DEBRIDEMENT PROSTHESIS KNEE W/ POLY EXCHANGE;  Surgeon: Justin Morales DO;  [facility];  Service: Orthopedics        01/10/23 0845   PT Last Visit   PT Visit Date 01/10/23   Note Type   Note type Evaluation   Pain Assessment   Pain Assessment Tool 0-10   Pain Score 6   Pain Location/Orientation Location: Neck;Orientation: Left; Location: Knee   Hospital Pain Intervention(s) Repositioned; Ambulation/increased activity; Elevated; Emotional support; Rest   Restrictions/Precautions   Weight Bearing Precautions Per Order Yes   LUE Weight Bearing Per Order WBAT   LLE Weight Bearing Per Order WBAT   Other Precautions Multiple lines;Pain  (hemovac L knee)   Home Living   Type of [address] [place] [address] Multi-level;Bed/bath upstairs;Stairs to enter with rails;Stairs to enter without rails   Bathroom Shower/Tub Walk-in shower   Bathroom Toilet Standard   [address] Walker;Cane   Additional Comments MEI; 1+3 w midline HR then +1  Prior Function   Level of [place] Independent with ADLs; Independent with functional mobility; Independent with IADLS   Lives With Son   IADLs Independent with driving; Independent with meal prep; Independent with medication management   Vocational Full time employment   Comments indep at baseline without an AD   General   Additional Pertinent History pt admitted 2/7/07 for neutophilic leukocytosis  s/p I&D of L elbow and removal/ debridement of knee prosthesis w poly exchange L knee  wbat LUE/LLE  activity day #0 orders per ortho  PMHx significant for L TKR 6/28, arthritis, DM   Cognition   Overall Cognitive Status WFL   Arousal/Participation Cooperative   Attention Within functional limits   Orientation Level Oriented X4   Memory Within functional limits   Following Commands Follows all commands and directions without difficulty   RLE Assessment   RLE Assessment WFL  (4+)   LLE Assessment   LLE Assessment X  (4- except 2+ knee ext)   Coordination   Sensation WFL   Bed Mobility   Supine to Sit 5  Supervision   Additional items HOB elevated; Bedrails; Increased time required   Transfers   Sit to Stand 5  Supervision   Additional items Increased time required  (RW)   Stand to Sit 5  Supervision   Additional items Armrests; Increased time required;Verbal cues; Other  (RW)   Ambulation/Elevation   Gait pattern Improper Weight shift; Excessively slow   Gait Assistance 6  Modified independent   Additional items Assist x 1   Assistive Device Rolling walker   Distance 150'   Stair Management Assistance 5  Supervision   Additional items Assist x 1; Increased time required   Stair Management Technique Two rails; Step to pattern; Foreward   Number of Stairs 5 Ambulation/Elevation Additional Comments  steps   Balance   Static Standing Fair +   Dynamic Standing Fair   Ambulatory Fair   Endurance Deficit   Endurance Deficit No  (vitals at rest 133/62, 96% on RA)   Activity Tolerance   Activity Tolerance Patient tolerated treatment well   Medical Staff Made Aware Zia OT   Nurse Made Aware Saloni Hodgson RN   Assessment   Prognosis Good   Problem List Decreased range of motion; Impaired balance;Decreased mobility; Decreased skin integrity;Pain   Assessment Toya Herrera is a 58 y o  male admitted to [email or web address] on 9/9/4850 for Neutrophilic leukocytosis  POD#1 REMOVAL / DEBRIDEMENT PROSTHESIS KNEE W/ POLY EXCHANGE (Left: Knee) & INCISION AND DRAINAGE (I&D) EXTREMITY (Left: Elbow)  PT was consulted and pt was seen on 1/10/2023 for mobility assessment and d/c planning  Pt presents w high fall risk, hemovac L knee, acute neck pain, wbat LUE/LLE per ortho  At baseline is indep for ADLs, IADLs and ambulation without an AD  Pt is currently functioning at a S- mod I Ax1 level for bed mobility, transfers, ambulation w RW and stair negotiation  Current neck pain w no impact on mobility  Did require use of walker due to knee pain  No instability noted w use of AD  Denies pain in elbow w WB  Pt demonstrated ability to ambulate limited community distances and negotiate steps to simulate home environment  Pt will benefit from continued skilled IP PT to address the above mentioned impairments  in order to maximize recovery and increase functional independence when completing mobility and ADLs  May benefit from initiation of HEP for knee ROM/strengthening  At this time PT recommendations for d/c are OPPT to fcilitate recovery  Barriers to Discharge None   Goals   Patient Goals get better   STG Expiration Date 01/20/23   Short Term Goal #1 1)  Pt will perform bed mobility with Dominic demonstrating appropriate technique 100% of the time in order to improve function  2)  Perform all transfers with Dominic demonstrating safe and appropriate technique 100% of the time in order to improve ability to negotiate safely in home environment  3) Amb with least restrictive AD > 300'x1 with mod I in order to demonstrate ability to negotiate in home environment  4)  Improve overall strength and balance 1/2 grade in order to optimize ability to perform functional tasks and reduce fall risk  5) Increase activity tolerance to 45 minutes in order to improve endurance to functional tasks  6)  Negotiate stairs using most appropriate technique and mod I in order to be able to negotiate safely in home environment  7) PT for ongoing patient and family/caregiver education, DME needs and d/c planning in order to promote highest level of function in least restrictive environment  Plan   Treatment/Interventions LE strengthening/ROM; Functional transfer training;Elevations; Therapeutic exercise;Patient/family training;Bed mobility;Gait training;Equipment eval/education; Compensatory technique education;Spoke to nursing;OT   PT Frequency 1-2x/wk   Recommendation   PT Discharge Recommendation Home with outpatient rehabilitation  (will defer initiation of therapy services to ortho)   AM-PAC Basic Mobility Inpatient   Turning in Flat Bed Without Bedrails 3   Lying on Back to Sitting on Edge of Flat Bed Without Bedrails 3   Moving Bed to Chair 4   Standing Up From Chair Using Arms 4   Walk in Room 4   Climb 3-5 Stairs With Railing 4   Basic Mobility Inpatient Raw Score 22   Basic Mobility Standardized Score 47 4   Highest Level Of Mobility   JH-HLM Goal 7: Walk 25 feet or more   JH-HLM Achieved 7: Walk 25 feet or more   End of Consult   Patient Position at End of Consult Bedside chair; All needs within reach   History: co - morbidities, fall risk, multiple lines  Exam: impairments in systems including musculoskeletal (strength), neuromuscular (balance, gait, transfers, motor function and sensation), am-pac, integumentary (skin integrity, presence of scars or wounds), cardiopulmonary, cognition  Clinical: unstable/unpredictable  Complexity:high      Adron Shanks, PT

## 2023-01-10 NOTE — PLAN OF CARE
Problem: PHYSICAL THERAPY ADULT  Goal: Performs mobility at highest level of function for planned discharge setting  See evaluation for individualized goals  Description: Treatment/Interventions: LE strengthening/ROM, Functional transfer training, Elevations, Therapeutic exercise, Patient/family training, Bed mobility, Gait training, Equipment eval/education, Compensatory technique education, Spoke to nursing, OT          See flowsheet documentation for full assessment, interventions and recommendations  Note: Prognosis: Good  Problem List: Decreased range of motion, Impaired balance, Decreased mobility, Decreased skin integrity, Pain  Assessment: Vivienne Bartlett is a 58 y o  male admitted to [facility] on 5/4/9059 for Neutrophilic leukocytosis  POD#1 REMOVAL / DEBRIDEMENT PROSTHESIS KNEE W/ POLY EXCHANGE (Left: Knee) & INCISION AND DRAINAGE (I&D) EXTREMITY (Left: Elbow)  PT was consulted and pt was seen on 1/10/2023 for mobility assessment and d/c planning  Pt presents w high fall risk, hemovac L knee, acute neck pain, wbat LUE/LLE per ortho  At baseline is indep for ADLs, IADLs and ambulation without an AD  Pt is currently functioning at a S- mod I Ax1 level for bed mobility, transfers, ambulation w RW and stair negotiation  Current neck pain w no impact on mobility  Did require use of walker due to knee pain  No instability noted w use of AD  Denies pain in elbow w WB  Pt demonstrated ability to ambulate limited community distances and negotiate steps to simulate home environment  Pt will benefit from continued skilled IP PT to address the above mentioned impairments  in order to maximize recovery and increase functional independence when completing mobility and ADLs  May benefit from initiation of HEP for knee ROM/strengthening  At this time PT recommendations for d/c are OPPT to fcilitate recovery    Barriers to Discharge: None     PT Discharge Recommendation: Home with outpatient rehabilitation (will defer initiation of therapy services to ortho)    See flowsheet documentation for full assessment

## 2023-01-10 NOTE — OP NOTE
OPERATIVE REPORT  PATIENT NAME: Flash Del Castillo    :  1960  MRN: [id number]  Pt Location: [facility]    SURGERY DATE: 2023    Surgeon(s) and Role:     * Krzysztof Paz DO - Primary     * Otilia Saucedo - Assisting    Preop Diagnosis:  Infection of total left knee replacement, initial encounter [facility]) Colleen Mini  Infection of left elbow ([place] [address] ) [M00 9]    Post-Op Diagnosis Codes:     * Infection of total left knee replacement, initial encounter ([place] [address] ) Colleen Mini     * Infection of left elbow ([place] [address] ) [M00 9]    Procedure(s) (LRB):  REMOVAL / DEBRIDEMENT PROSTHESIS KNEE W/ POLY EXCHANGE (Left)  INCISION AND DRAINAGE (I&D) EXTREMITY (Left)   Insertion of hand-crafted drug delivery device (Stimulan abx beads)  Incision and debridement of left knee down to and including bone    Specimen(s):  ID Type Source Tests Collected by Time Destination   A : LEFT KNEE Tissue Joint, Left Knee ANAEROBIC CULTURE AND GRAM STAIN, FUNGAL CULTURE, CULTURE, TISSUE AND [address] [place] [address] 2023 1836        Estimated Blood Loss:   Minimal    Drains:  Closed/Suction Drain Left;Lateral Knee Accordion 10 Fr  (Active)   Site Description Unable to view 01/10/23 0501   Dressing Status Clean;Dry; Intact 01/10/23 0501   Drainage Appearance Bloody 01/10/23 0501   Status Accordion suction 01/10/23 0501   Output (mL) 80 mL 01/10/23 0501   Number of days: 1       Anesthesia Type:   General    Operative Indications:  Infection of total left knee replacement, initial encounter ([place] [address] ) [T84 54XA]  Infection of left elbow [facility]) [M00 9]    Patient is a 79-year-old male who is status post left total knee arthroplasty 2022  Patient was in [place] recently and sustained a injury to the left elbow which has subsequently become infected  Patient presented to the hospital after feeling unwell  Patient found to have MSSA bacteremia and new acute knee pain for the past 2 to 3 days    Patient states that during this time as he has also become swollen  Patient has a large effusion on exam   Patient aspirated of 60 cc of turbid appearing fluid  This fluid was sent for stat analysis and the cell count is 6000  His bacteremia, cell count, elevated inflammatory markers and white count are all indicative of concomitant left knee periprosthetic joint infection  Patient should maintain NPO  Plan for incision and drainage with polyethylene exchange of the left knee today in hopes of saving his left knee  We will also plan to I&D his left elbow if there is a collection present at that time  Operative Findings:  Pre-op ROM   Post-op ROM 3-120+ gravity-assisted calf to thigh motion  Murky fluid left knee and left elbow    Implant Name Type Inv  Item Serial No   Lot No  LRB No  Used Action   TISSUE SYNTHETIC STIMULAN RAPID CURE 10ML - [id number]  TISSUE SYNTHETIC STIMULAN RAPID CURE 10ML  BIOCOMPOSITES [id number] Left 1 Implanted   INSERT TIB SZ 7 8MM LT FX BRNG MED STAB ATTUNE - [id number]  INSERT TIB SZ 7 8MM LT FX BRNG MED STAB ATTUNE  DEPUY [id number] Left 1 Implanted         Complications:   None    Procedure and Technique:  Patient was seen in the preoperative holding area   Informed consent was confirmed and all questions were answered  Operative site was confirmed and marked  Patient was taken to the operating room and transferred to the operating room table  Spinal anesthesia was performed  The patient was then placed supine and all bony prominences were well-padded  left lower extremity was prepped and draped in usual sterile fashion with chlorhexidine scrub   Patient was given 2g ancef for perioperative antibiotics prior to incision and SCDs were placed on the non-operative leg   A formal time-out was performed identifying the patient and confirmed operative site  The knee was elevated and a pneumatic tourniquet was inflated at 250 mmHg    The patient's previous anterior midline incision was resected in its entirety down to the level of the extensor mechanism  A medial parapatellar approach was then performed  Upon entering the knee joint there was cloudy appearing synovial fluid similar to his aspiration earlier in the day  A complete synovectomy of the medial and lateral gutters was performed removing all infected appearing tissues  The knee was taken through range of motion and found to lack 10 degrees of extension  At this time the size 7 10 mm Depuy Attune cruciate retaining polyethylene was removed  The knee was extensively debrided down to and including bone  At this time the knee was soaked and scrubbed with sterile iodine  This was allowed to sit for 3 minutes and was then irrigated with 3 L of normal saline solution  The knee was then irrigated with 1 L of Bactisure solution  The knee was then again irrigated with 3 L of normal saline  The knee was then irrigated with Aricept solution followed by an additional 3 L of normal saline solution  At this time all members of the surgical staff changed gloves and new instruments were used  The knee was trialed with a 8mm MS inset  The knee was found to have restoration of stability and collateral tension with the 8 mm MS insert obtaining near full extension and gravity assisted flexion of calf to thigh  At this time the trial was removed and the tibial tray was dried  The real 8 mm MS insert was impacted into place  The knee was taken through a final range of motion and found to have excellent stability and patellar tracking  The hand crafted antibiotic Stimulan beads were placed in the knee at this time  An additional 600 mg of tobramycin powder was placed in the knee  A medium Hemovac was placed at this time  The pneumatic tourniquet was dropped and all bleeders were coagulated  All instrument and sponge counts were correct x2  The arthrotomy was closed with #1 Stratafix suture  Subcutaneous tissues were closed with 2-0 interrupted sutures  The skin was closed with 2-0 nylon in horizontal mattress fashion  A sterile Mepilex was placed, drain sponge along with a thigh foot Ace wrap  At this time controlled tension to his left elbow  An area of fluctuance that was approximately 5 x 6 cm was palpated underneath the subcutaneous tissues  A 1 inch incision was made in this area and hemostat was used to open up the abscess pocket  Murky purulent fluid was expressed  The cavity was debrided of skin and soft tissues and fascia down to the level of the bone  The abscess cavity was then extensively irrigated with normal saline solution  Quarter inch iodoform packing was then packed into the abscess cavity  The elbow was dressed with 4 x 4's, web roll and an Ace  Patient was awoken from anesthesia and taken to recovery room in stable condition       I was present for the entire procedure, A qualified resident physician was not available and A physician assistant was required during the procedure for retraction tissue handling,dissection and suturing    Patient Disposition:  PACU         SIGNATURE: Amador Gonzalez DO  DATE: January 10, 2023  TIME: 12:58 PM

## 2023-01-11 PROBLEM — D72.9 NEUTROPHILIC LEUKOCYTOSIS: Status: RESOLVED | Noted: 2023-01-01 | Resolved: 2023-01-01

## 2023-01-11 PROBLEM — M00.9 SEPTIC ARTHRITIS (HCC): Status: ACTIVE | Noted: 2023-01-01

## 2023-01-11 NOTE — PROGRESS NOTES
Progress Note - Infectious Disease   Tong Downing 58 y o  male MRN: [id number]  Unit/Bed#: E5 -01 Encounter: [id number]      Impression/Plan:  1   Sepsis, POA   Noted with leukocytosis and tachycardia early in admission   Sources are 2, 3, 4 and 5   Clinical picture also complicated by 6 with recent travel and unprotected sex  2   MSSA bacteremia  Harles Mustache source is problem 3   Multiple other complications now below  3   Left elbow cellulitis and abscess   Elbow itself appears fairly benign   Seen by orthopedics  Underwent incision and drainage on 1/9   4   Left knee prosthetic joint infection   Developed knee swelling over admission   Fluid studies concerning for prosthetic joint infection given the above  Underwent debridement and polyexchange on 1/9  Cultures also with staph  5   Neck pain and 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 arthritis   Has had progressive pain over the last 2 weeks   CT imaging reviewed and suspicion is for osteomyelitis/discitis and will need to rule out epidural involvement  MRI confirms changes at C4-5  6   Pyuria, positive urine culture and penile irritation   Urine culture ultimately isolated E  coli   Patient without any definitive urinary symptoms   Suspect that these represent asymptomatic bacteriuria   The patient does however report penile irritation that is been going on for the last 1 to 2 weeks   Would question if he has a superficial yeast infection based on exam   STI testing had been sent out in the setting of 8   RPR negative   Urine GC negative   Patient does have unprotected sexual exposures and so would likely need repeat testing as outpatient  7   Type 2 diabetes   Hemoglobin A1c of 7 1   8   [place]eway lesions on the feet   Tender lesions most consistent with findings related to SBE   Swabs of the site negative for HSV  Continue Ancef 2 g every 8 hours  Follow-up pending blood and OR cultures  Ongoing follow-up by orthopedics  2D echo grossly unremarkable    Plans remain for prolonged IV antibiotic course with oral therapy thereafter in the setting of prosthetic joint infection  Anticipate the addition of rifampin if no major drug interactions once blood cultures have cleared  Recommend discussion of case with neurosurgery as patient may require c-collar  Would also obtain MRI of the T-spine with contrast for completeness  Continue to trend fever curve/vitals  Repeat CBC and chemistry tomorrow  Recommend formal transesophageal echo as well  HIV and hepatitis testing negative and so anticipate repeat RPR, HIV and hepatitis testing and urine GC as an outpatient in about 3 to 4 weeks  Recommend wound care evaluation for irritation reported in the groin  Additional supportive care as per primary  Additional interventions pending clinical course  Above plan discussed with the patient and with primary service  ID consult service will continue to follow  Antibiotics:  Cefazolin 4  abx 5    24 Hour events:  Yesterday and overnight notes reviewed and no acute events noted  Subjective:  Patient denies having any nausea, vomiting, chest pain or shortness of breath  Still having neck discomfort  Tolerating current antibiotic without itching or rash  Objective:  Vitals:  Temp:  [97 6 °F (36 4 °C)-98 8 °F (37 1 °C)] 97 6 °F (36 4 °C)  HR:  [79-93] 91  Resp:  [18-19] 18  BP: (137-158)/(65-96) 158/96  SpO2:  [95 %-97 %] 97 %  Temp (24hrs), Av 1 °F (36 7 °C), Min:97 6 °F (36 4 °C), Max:98 8 °F (37 1 °C)  Current: Temperature: 97 6 °F (36 4 °C)    Physical Exam:   General Appearance:  Alert, interactive, nontoxic, no acute distress  Throat: Oropharynx moist without lesions  Lungs:   Clear to auscultation bilaterally; no wheezes, rhonchi or rales; respirations unlabored on room air   Heart:  RRR; no murmur, rub or gallop noted   Abdomen:   Soft, non-tender, non-distended, positive bowel sounds       Extremities: No clubbing, cyanosis or edema   Skin: No new rashes or lesions  No new draining wounds noted  Labs, Imaging, & Other studies:   All pertinent labs and imaging studies were personally reviewed as below  Results from last 7 days   Lab Units 01/11/23  0513 01/10/23  0441 01/09/23  0555   WBC Thousand/uL 14 64* 11 39* 13 33*   HEMOGLOBIN g/dL 13 7 12 1 14 1   PLATELETS Thousands/uL 227 227 257     Results from last 7 days   Lab Units 01/11/23  0513 01/10/23  0441 01/09/23  0555 01/08/23  0523   POTASSIUM mmol/L 4 6 4 5   < > 3 9   CHLORIDE mmol/L 96 98   < > 95*   CO2 mmol/L 18* 25   < > 20*   BUN mg/dL 29* 38*   < > 26*   CREATININE mg/dL 0 92 1 10   < > 0 92   EGFR ml/min/1 73sq m 88 71   < > 88   CALCIUM mg/dL 9 2 8 7   < > 9 7   AST U/L 59* 26  --  23   ALT U/L 17 22  --  19   ALK PHOS U/L 104 87  --  120*    < > = values in this interval not displayed  Results from last 7 days   Lab Units 01/09/23  1836 01/09/23  0743 01/09/23  0601 01/07/23  1554 01/07/23  1538 01/07/23  1445   BLOOD CULTURE   --   --  No Growth at 48 hrs  No Growth at 48 hrs  --  Staphylococcus aureus*  Staphylococcus aureus*  --    GRAM STAIN RESULT  No Polys or Bacteria seen 3+ Polys  No bacteria seen  --   --  Gram positive cocci in clusters*  Gram positive cocci in clusters* No Polys or Bacteria seen   URINE CULTURE   --   --   --  >100,000 cfu/ml Escherichia coli ESBL*  --   --    WOUND CULTURE   --   --   --   --   --  Growth in Broth culture only Staphylococcus aureus*   BODY FLUID CULTURE, STERILE   --  No growth  --   --   --   --    MRSA CULTURE ONLY   --   --  No Methicillin Resistant Staphlyococcus aureus (MRSA) isolated  --   --   --        Lab interpretation/comments: White count remains elevated today  Imaging interpretation/comments: MRI C-spine reviewed with changes at C4-5  Culture data: Blood culture sets pending and OR cultures also with staph

## 2023-01-11 NOTE — ASSESSMENT & PLAN NOTE
Lab Results   Component Value Date    HGBA1C 7 1 (H) 12/12/2022     Hold metformin and Jardiance      Start diabetic diet and sliding scale insulin  (P) 022 [id number]

## 2023-01-11 NOTE — CASE MANAGEMENT
Case Management Assessment & Discharge Planning Note    Patient name Ed Rolanda  Location [address]-* MRN [id number]  : 1960 Date 2023       Current Admission Date: 2023  Current Admission Diagnosis:Septic arthritis [facility])   Patient Active Problem List    Diagnosis Date Noted   • Impetigo 2023   • Abnormal urinalysis 2023   • Septic arthritis ([email or web address] ) 2023   • Hyponatremia 2023   • Status post total knee replacement, left 2022   • Rash 2022   • Murmur 2022   • BMI 31 0-31 9,adult 2022   • Anxiety 2022   • Mass of appendix 2017   • DMII (diabetes mellitus, type 2) ([email or web address] ) 2013   • Hyperlipidemia 2013   • Primary hypertension 2012      LOS (days): 4  Geometric Mean LOS (GMLOS) (days): 4 90  Days to GMLOS:1     OBJECTIVE:    Risk of Unplanned Readmission Score: 13 37         Current admission status: Inpatient       [id number] [address] [place] [address]  Phone: [phone] Fax: [phone]    [place] [address]  Phone: [phone] Fax: [phone]    Primary Care Provider: Marquita Salcido DO    Primary Insurance: [facility]  Secondary Insurance:     ASSESSMENT:  [address] [facility], [address] [place] [address] Representative - Son   Primary Phone: [phone] (Mobile)               Advance Directives  Does patient have a [address] [facility] [address]?: Yes  Does patient have Advance Directives?: Yes  Advance Directives: Living will, Power of  for finance, Power of  for health care  Primary Contact: Josefa Flores [phone]    Readmission Root Cause  30 Day Readmission: No    Patient Information  Admitted from[de-identified] Home  Mental Status: Alert  During Assessment patient was accompanied by: Not accompanied during assessment  Assessment information provided by[de-identified] Patient  Primary Caregiver: Self  Support Systems: Self, Son  What city do you live in?: [facility] entry access options  Select all that apply : Stairs  Number of steps to enter home : 3  Type of Current Residence: 3 story home  Living Arrangements: Lives w/ Son    Activities of Daily Living Prior to Admission  Functional Status: Independent  Completes ADLs independently?: Yes  Ambulates independently?: Yes  Does patient use assisted devices?: Yes  Assisted Devices (DME) used: [employer]stance [employer]aptiste  Does patient currently own DME?: Yes  What DME does the patient currently own?: [employer]stance Jean-Baptiste  Does patient have a history of Outpatient Therapy (PT/OT)?: No  Does the patient have a history of Short-Term Rehab?: No  Does patient have a history of HHC?: No  Does patient currently have [facility]?: No    Patient Information Continued  Does patient have prescription coverage?: Yes  Does patient have a history of substance abuse?: No  Does patient have a history of Mental Health Diagnosis?: Yes (anxiety)    Means of Transportation  Means of Transport to Appts[de-identified] Drives Self        DISCHARGE DETAILS:    Discharge planning discussed with[de-identified] patient  Freedom of Choice: Yes  Comments - Freedom of Choice: patient agreeable to blanket referral  CM contacted family/caregiver?: No- see comments (ipta)  Were Treatment Team discharge recommendations reviewed with patient/caregiver?: Yes  Did patient/caregiver verbalize understanding of patient care needs?: Yes  Were patient/caregiver advised of the risks associated with not following Treatment Team discharge recommendations?: Yes    Contacts  Patient Contacts:  Luke  Relationship to Patient[de-identified] Family  Contact Method: Phone  Phone Number: [phone]  Reason/Outcome: Emergency [address]         Is the patient interested in [facility] at discharge?: Yes  Via Darnell Gutierrez requested[de-identified] [address] Name[de-identified] Other  [address] A[address] Santoyo Provider[de-identified] PCP  Home Health Services Needed[de-identified] Administration of IV, IM or SC Medications  Homebound Criteria Met[de-identified] Uses an Assist Device (i e  cane, walker, etc)  Supporting Clincal Findings[de-identified] Limited Endurance    DME Referral Provided  Referral made for DME?: No    Other Referral/Resources/Interventions Provided:  Interventions: C  Referral Comments: referrals made in [place]    Treatment Team Recommendation: Home with [address]  Discharge Destination Plan[de-identified] Home with [address]     Patient pending PICC line placement for long term antibiotics

## 2023-01-11 NOTE — ASSESSMENT & PLAN NOTE
Multiple sources of infection, knee, elbow and neck  Patient is bacteremic with MSSA    Appreciate orthopedics and infectious disease help   Patient underwent polyexchange of total knee replacement and I&D of elbow abscess by orthopedics    MRI cervical spine is concerning for possible C4 - C5 septic arthritis and osteomyelitis     Patient will need prolonged course of IV abx  He is doing better with PT  Will see when ID recommends putting in a PICC line

## 2023-01-11 NOTE — PLAN OF CARE
Problem: PAIN - ADULT  Goal: Verbalizes/displays adequate comfort level or baseline comfort level  Description: Interventions:  - Encourage patient to monitor pain and request assistance  - Assess pain using appropriate pain scale  - Administer analgesics based on type and severity of pain and evaluate response  - Implement non-pharmacological measures as appropriate and evaluate response  - Consider cultural and social influences on pain and pain management  - Notify physician/advanced practitioner if interventions unsuccessful or patient reports new pain  Outcome: Progressing     Problem: INFECTION - ADULT  Goal: Absence or prevention of progression during hospitalization  Description: INTERVENTIONS:  - Assess and monitor for signs and symptoms of infection  - Monitor lab/diagnostic results  - Monitor all insertion sites, i e  indwelling lines, tubes, and drains  - Monitor endotracheal if appropriate and nasal secretions for changes in amount and color  - [place] appropriate cooling/warming therapies per order  - Administer medications as ordered  - Instruct and encourage patient and family to use good hand hygiene technique  - Identify and instruct in appropriate isolation precautions for identified infection/condition  Outcome: Progressing  Goal: Absence of fever/infection during neutropenic period  Description: INTERVENTIONS:  - Monitor WBC    Outcome: Progressing     Problem: SAFETY ADULT  Goal: Patient will remain free of falls  Description: INTERVENTIONS:  - Educate patient/family on patient safety including physical limitations  - Instruct patient to call for assistance with activity   - Consult OT/PT to assist with strengthening/mobility   - Keep Call bell within reach  - Keep bed low and locked with side rails adjusted as appropriate  - Keep care items and personal belongings within reach  - Initiate and maintain comfort rounds  - Make Fall Risk Sign visible to staff  - Offer Toileting every 2 Hours, in advance of need  - Initiate/Maintain bed alarm  - Obtain necessary fall risk management equipment: Alarm  - Apply yellow socks and bracelet for high fall risk patients  - Consider moving patient to room near nurses station  Outcome: Progressing  Goal: Maintain or return to baseline ADL function  Description: INTERVENTIONS:  -  Assess patient's ability to carry out ADLs; assess patient's baseline for ADL function and identify physical deficits which impact ability to perform ADLs (bathing, care of mouth/teeth, toileting, grooming, dressing, etc )  - Assess/evaluate cause of self-care deficits   - Assess range of motion  - Assess patient's mobility; develop plan if impaired  - Assess patient's need for assistive devices and provide as appropriate  - Encourage maximum independence but intervene and supervise when necessary  - Involve family in performance of ADLs  - Assess for home care needs following discharge   - Consider OT consult to assist with ADL evaluation and planning for discharge  - Provide patient education as appropriate  Outcome: Progressing  Goal: Maintains/Returns to pre admission functional level  Description: INTERVENTIONS:  - Perform BMAT or MOVE assessment daily    - Set and communicate daily mobility goal to care team and patient/family/caregiver  - Collaborate with rehabilitation services on mobility goals if consulted  - Perform Range of Motion 3 times a day  - Reposition patient every 2 hours    - Dangle patient 3 times a day  - Stand patient 3 times a day  - Ambulate patient 3 times a day  - Out of bed to chair 3 times a day   - Out of bed for meals 3 times a day  - Out of bed for toileting  - Record patient progress and toleration of activity level   Outcome: Progressing     Problem: DISCHARGE PLANNING  Goal: Discharge to home or other facility with appropriate resources  Description: INTERVENTIONS:  - Identify barriers to discharge w/patient and caregiver  - Arrange for needed discharge resources and transportation as appropriate  - Identify discharge learning needs (meds, wound care, etc )  - Arrange for interpretive services to assist at discharge as needed  - Refer to Case Management Department for coordinating discharge planning if the patient needs post-hospital services based on physician/advanced practitioner order or complex needs related to functional status, cognitive ability, or social support system  Outcome: Progressing     Problem: Knowledge Deficit  Goal: Patient/family/caregiver demonstrates understanding of disease process, treatment plan, medications, and discharge instructions  Description: Complete learning assessment and assess knowledge base    Interventions:  - Provide teaching at level of understanding  - Provide teaching via preferred learning methods  Outcome: Progressing     Problem: HEMATOLOGIC - ADULT  Goal: Maintains hematologic stability  Description: INTERVENTIONS  - Assess for signs and symptoms of bleeding or hemorrhage  - Monitor labs  - Administer supportive blood products/factors as ordered and appropriate  Outcome: Progressing     Problem: Potential for Falls  Goal: Patient will remain free of falls  Description: INTERVENTIONS:  - Educate patient/family on patient safety including physical limitations  - Instruct patient to call for assistance with activity   - Consult OT/PT to assist with strengthening/mobility   - Keep Call bell within reach  - Keep bed low and locked with side rails adjusted as appropriate  - Keep care items and personal belongings within reach  - Initiate and maintain comfort rounds  - Make Fall Risk Sign visible to staff  - Offer Toileting every 2 Hours, in advance of need  - Initiate/Maintain bed alarm  - Obtain necessary fall risk management equipment: Alarm  - Apply yellow socks and bracelet for high fall risk patients  - Consider moving patient to room near nurses station  Outcome: Progressing     Problem: MOBILITY - ADULT  Goal: Maintain or return to baseline ADL function  Description: INTERVENTIONS:  -  Assess patient's ability to carry out ADLs; assess patient's baseline for ADL function and identify physical deficits which impact ability to perform ADLs (bathing, care of mouth/teeth, toileting, grooming, dressing, etc )  - Assess/evaluate cause of self-care deficits   - Assess range of motion  - Assess patient's mobility; develop plan if impaired  - Assess patient's need for assistive devices and provide as appropriate  - Encourage maximum independence but intervene and supervise when necessary  - Involve family in performance of ADLs  - Assess for home care needs following discharge   - Consider OT consult to assist with ADL evaluation and planning for discharge  - Provide patient education as appropriate  Outcome: Progressing  Goal: Maintains/Returns to pre admission functional level  Description: INTERVENTIONS:  - Perform BMAT or MOVE assessment daily    - Set and communicate daily mobility goal to care team and patient/family/caregiver  - Collaborate with rehabilitation services on mobility goals if consulted  - Perform Range of Motion 3 times a day  - Reposition patient every 2 hours    - Dangle patient 3 times a day  - Stand patient 3 times a day  - Ambulate patient 3 times a day  - Out of bed to chair 3 times a day   - Out of bed for meals 3 times a day  - Out of bed for toileting  - Record patient progress and toleration of activity level   Outcome: Progressing

## 2023-01-11 NOTE — PLAN OF CARE
Problem: PAIN - ADULT  Goal: Verbalizes/displays adequate comfort level or baseline comfort level  Description: Interventions:  - Encourage patient to monitor pain and request assistance  - Assess pain using appropriate pain scale  - Administer analgesics based on type and severity of pain and evaluate response  - Implement non-pharmacological measures as appropriate and evaluate response  - Consider cultural and social influences on pain and pain management  - Notify physician/advanced practitioner if interventions unsuccessful or patient reports new pain  Outcome: Progressing     Problem: INFECTION - ADULT  Goal: Absence or prevention of progression during hospitalization  Description: INTERVENTIONS:  - Assess and monitor for signs and symptoms of infection  - Monitor lab/diagnostic results  - Monitor all insertion sites, i e  indwelling lines, tubes, and drains  - Monitor endotracheal if appropriate and nasal secretions for changes in amount and color  - [place] appropriate cooling/warming therapies per order  - Administer medications as ordered  - Instruct and encourage patient and family to use good hand hygiene technique  - Identify and instruct in appropriate isolation precautions for identified infection/condition  Outcome: Progressing  Goal: Absence of fever/infection during neutropenic period  Description: INTERVENTIONS:  - Monitor WBC    Outcome: Progressing     Problem: SAFETY ADULT  Goal: Patient will remain free of falls  Description: INTERVENTIONS:  - Educate patient/family on patient safety including physical limitations  - Instruct patient to call for assistance with activity   - Consult OT/PT to assist with strengthening/mobility   - Keep Call bell within reach  - Keep bed low and locked with side rails adjusted as appropriate  - Keep care items and personal belongings within reach  - Initiate and maintain comfort rounds  - Make Fall Risk Sign visible to staff  - Offer Toileting every 2 Hours, in advance of need  - Initiate/Maintain bed alarm  - Obtain necessary fall risk management equipment: Alarm  - Apply yellow socks and bracelet for high fall risk patients  - Consider moving patient to room near nurses station  Outcome: Progressing  Goal: Maintain or return to baseline ADL function  Description: INTERVENTIONS:  -  Assess patient's ability to carry out ADLs; assess patient's baseline for ADL function and identify physical deficits which impact ability to perform ADLs (bathing, care of mouth/teeth, toileting, grooming, dressing, etc )  - Assess/evaluate cause of self-care deficits   - Assess range of motion  - Assess patient's mobility; develop plan if impaired  - Assess patient's need for assistive devices and provide as appropriate  - Encourage maximum independence but intervene and supervise when necessary  - Involve family in performance of ADLs  - Assess for home care needs following discharge   - Consider OT consult to assist with ADL evaluation and planning for discharge  - Provide patient education as appropriate  Outcome: Progressing  Goal: Maintains/Returns to pre admission functional level  Description: INTERVENTIONS:  - Perform BMAT or MOVE assessment daily    - Set and communicate daily mobility goal to care team and patient/family/caregiver  - Collaborate with rehabilitation services on mobility goals if consulted  - Perform Range of Motion 3 times a day  - Reposition patient every 2 hours    - Dangle patient 3 times a day  - Stand patient 3 times a day  - Ambulate patient 3 times a day  - Out of bed to chair 3 times a day   - Out of bed for meals 3 times a day  - Out of bed for toileting  - Record patient progress and toleration of activity level   Outcome: Progressing     Problem: DISCHARGE PLANNING  Goal: Discharge to home or other facility with appropriate resources  Description: INTERVENTIONS:  - Identify barriers to discharge w/patient and caregiver  - Arrange for needed discharge resources and transportation as appropriate  - Identify discharge learning needs (meds, wound care, etc )  - Arrange for interpretive services to assist at discharge as needed  - Refer to Case Management Department for coordinating discharge planning if the patient needs post-hospital services based on physician/advanced practitioner order or complex needs related to functional status, cognitive ability, or social support system  Outcome: Progressing     Problem: Knowledge Deficit  Goal: Patient/family/caregiver demonstrates understanding of disease process, treatment plan, medications, and discharge instructions  Description: Complete learning assessment and assess knowledge base    Interventions:  - Provide teaching at level of understanding  - Provide teaching via preferred learning methods  Outcome: Progressing     Problem: HEMATOLOGIC - ADULT  Goal: Maintains hematologic stability  Description: INTERVENTIONS  - Assess for signs and symptoms of bleeding or hemorrhage  - Monitor labs  - Administer supportive blood products/factors as ordered and appropriate  Outcome: Progressing     Problem: Potential for Falls  Goal: Patient will remain free of falls  Description: INTERVENTIONS:  - Educate patient/family on patient safety including physical limitations  - Instruct patient to call for assistance with activity   - Consult OT/PT to assist with strengthening/mobility   - Keep Call bell within reach  - Keep bed low and locked with side rails adjusted as appropriate  - Keep care items and personal belongings within reach  - Initiate and maintain comfort rounds  - Make Fall Risk Sign visible to staff  - Offer Toileting every 2 Hours, in advance of need  - Initiate/Maintain bed alarm  - Obtain necessary fall risk management equipment: Alarm  - Apply yellow socks and bracelet for high fall risk patients  - Consider moving patient to room near nurses station  Outcome: Progressing     Problem: MOBILITY - ADULT  Goal: Maintain or return to baseline ADL function  Description: INTERVENTIONS:  -  Assess patient's ability to carry out ADLs; assess patient's baseline for ADL function and identify physical deficits which impact ability to perform ADLs (bathing, care of mouth/teeth, toileting, grooming, dressing, etc )  - Assess/evaluate cause of self-care deficits   - Assess range of motion  - Assess patient's mobility; develop plan if impaired  - Assess patient's need for assistive devices and provide as appropriate  - Encourage maximum independence but intervene and supervise when necessary  - Involve family in performance of ADLs  - Assess for home care needs following discharge   - Consider OT consult to assist with ADL evaluation and planning for discharge  - Provide patient education as appropriate  Outcome: Progressing  Goal: Maintains/Returns to pre admission functional level  Description: INTERVENTIONS:  - Perform BMAT or MOVE assessment daily    - Set and communicate daily mobility goal to care team and patient/family/caregiver  - Collaborate with rehabilitation services on mobility goals if consulted  - Perform Range of Motion 3 times a day  - Reposition patient every 2 hours    - Dangle patient 3 times a day  - Stand patient 3 times a day  - Ambulate patient 3 times a day  - Out of bed to chair 3 times a day   - Out of bed for meals 3 times a day  - Out of bed for toileting  - Record patient progress and toleration of activity level   Outcome: Progressing

## 2023-01-11 NOTE — PROGRESS NOTES
[address] [facility]  Progress Note - Joann Flores 1960, 58 y o  male MRN: [id number]  Unit/Bed#: E5 -01 Encounter: [id number]  Primary Care Provider: Nette Pelaez DO   Date and time admitted to hospital: 2023 12:58 PM    Status post total knee replacement, left  Assessment & Plan  · Monitor for  increasing pain, swelling or change in color   · Start Lovenox for DVT prophylaxis    * Septic arthritis [facility])  Assessment & Plan  Multiple sources of infection, knee, elbow and neck  Patient is bacteremic with MSSA    Appreciate orthopedics and infectious disease help   Patient underwent polyexchange of total knee replacement and I&D of elbow abscess by orthopedics    MRI cervical spine is concerning for possible C4 - C5 septic arthritis and osteomyelitis     Patient will need prolonged course of IV abx  He is doing better with PT  Will see when ID recommends putting in a PICC line              DM[facility], type 2) [facility]  Assessment & Plan  Lab Results   Component Value Date    HGBA1C 7 1 (H) 2022     Hold metformin and Jardiance  Start diabetic diet and sliding scale insulin  (P) 176 [id number]            Subjective:   Feels a little better  He neck is a little less stiff  No fever nor chills  Objective:     Vitals:   Temp (24hrs), Av 2 °F (36 8 °C), Min:97 6 °F (36 4 °C), Max:98 8 °F (37 1 °C)    Temp:  [97 6 °F (36 4 °C)-98 8 °F (37 1 °C)] 98 3 °F (36 8 °C)  HR:  [79-93] 79  Resp:  [18-19] 18  BP: (136-158)/(63-96) 136/63  SpO2:  [95 %-97 %] 95 %  Body mass index is 32 28 kg/m²  Input and Output Summary (last 24 hours): Intake/Output Summary (Last 24 hours) at 2023 1312  Last data filed at 1/10/2023 1810  Gross per 24 hour   Intake --   Output 620 ml   Net -620 ml       Physical Exam:     Physical Exam  Vitals and nursing note reviewed  HENT:      Head: Normocephalic and atraumatic     Eyes:      Pupils: Pupils are equal, round, and reactive to light  Cardiovascular:      Rate and Rhythm: Normal rate and regular rhythm  Heart sounds: No murmur heard  No friction rub  No gallop  Pulmonary:      Effort: Pulmonary effort is normal       Breath sounds: Normal breath sounds  No wheezing or rales  Abdominal:      General: Bowel sounds are normal       Palpations: Abdomen is soft  Tenderness: There is no abdominal tenderness  Musculoskeletal:      Right lower leg: No edema  Left lower leg: No edema          Additional Data:     Labs:    Results from last 7 days   Lab Units 01/11/23  0513   WBC Thousand/uL 14 64*   HEMOGLOBIN g/dL 13 7   HEMATOCRIT % 41 0   PLATELETS Thousands/uL 227   NEUTROS PCT % 84*   LYMPHS PCT % 7*   MONOS PCT % 7   EOS PCT % 0     Results from last 7 days   Lab Units 01/11/23  0513   POTASSIUM mmol/L 4 6   CHLORIDE mmol/L 96   CO2 mmol/L 18*   BUN mg/dL 29*   CREATININE mg/dL 0 92   CALCIUM mg/dL 9 2   ALK PHOS U/L 104   ALT U/L 17   AST U/L 59*     Results from last 7 days   Lab Units 01/07/23  1334   INR  1 15     Results from last 7 days   Lab Units 01/11/23  1116 01/11/23  0709 01/10/23  2133 01/10/23  1614 01/10/23  1055 01/10/23  0715 01/09/23  2038 01/09/23  1605 01/09/23  1116 01/09/23  0716 01/08/23  2039 01/08/23  1603   POC GLUCOSE mg/dl 239* 226* 242* 241* 316* 246* 223* 221* 232* 255* 249* 250*               * I Have Reviewed All Lab Data     Recent Cultures (last 7 days):     Results from last 7 days   Lab Units 01/09/23  1836 01/09/23  0743 01/09/23  0601 01/07/23  1554 01/07/23  1538 01/07/23  1445   BLOOD CULTURE   --   --  No Growth at 48 hrs  No Growth at 48 hrs    --  Staphylococcus aureus*  Staphylococcus aureus*  --    GRAM STAIN RESULT  No Polys or Bacteria seen 3+ Polys  No bacteria seen  --   --  Gram positive cocci in clusters*  Gram positive cocci in clusters* No Polys or Bacteria seen   URINE CULTURE   --   --   --  >100,000 cfu/ml Escherichia coli ESBL*  -- --    WOUND CULTURE   --   --   --   --   --  Growth in Broth culture only Staphylococcus aureus*   BODY FLUID CULTURE, STERILE   --  No growth  --   --   --   --          Last 24 Hours Medication List:   Current Facility-Administered Medications   Medication Dose Route Frequency Provider Last Rate   • acetaminophen  975 mg Oral [facility] Min Colindres PA-C     • budesonide-formoterol  2 puff Inhalation BID Min Colindres PA-C     • calcium carbonate  1,000 mg Oral Daily PRN Min Colindres PA-C     • cefazolin  2,000 mg Intravenous Q8H Ai Barnett PA-C 2,000 mg (01/11/23 1204)   • cyclobenzaprine  10 mg Oral TID PRN Min Colindres PA-C     • docusate sodium  100 mg Oral BID Min Colindres PA-C     • enoxaparin  40 mg Subcutaneous Daily Min Colindres PA-C     • fenofibrate  145 mg Oral Daily Min Colindres PA-C     • fish oil  1,000 mg Oral BID Min Colindres PA-C     • HYDROmorphone  0 5 mg Intravenous Q4H PRN Min Colindres PA-C     • insulin lispro  1-5 Units Subcutaneous TID AC Mni Colindres PA-C     • lactated ringers  125 mL/hr Intravenous Continuous Min Colindres PA-C Stopped (01/10/23 0741)   • metoprolol succinate  100 mg Oral Daily Min Colindres PA-C     • ondansetron  4 mg Intravenous Q6H PRN Min Colindres PA-C     • oxyCODONE  10 mg Oral Q4H PRN Min Colindres PA-C     • oxyCODONE  5 mg Oral Q4H PRN Min Colindres PA-C     • pravastatin  80 mg Oral Daily With Dinner Min Colindres PA-C           VTE Pharmacologic Prophylaxis:   Pharmacologic: Enoxaparin (Lovenox)      Current Length of Stay: 4 day(s)    Current Patient Status: Inpatient       Discharge Plan: will need long term IV abx  Will see when ID feels we can place PICC    Code Status: Level 1 - Full Code           Today, Patient Was Seen By: Yuki Del Cid DO    ** Please Note: Dictation voice to text software may have been used in the creation of this document   **

## 2023-01-11 NOTE — PROGRESS NOTES
Progress Note - Orthopedics   Cleveland Clancy 58 y o  male MRN: [id number]  Unit/Bed#: E5 -01      Subjective:    58 y o male POD 2 left TKA I&D with poly exchange and left elbow abscess I&D  No acute overnight events, no new complaints  Patient doing well this morning  Pain well controlled in both the knee and the elbow  Denies numbness or tingling in the left hand or leg      Labs:  0   Lab Value Date/Time    HCT 41 0 01/11/2023 0513    HCT 36 6 01/10/2023 0441    HCT 41 3 01/09/2023 0555    HCT 55 5 (H) 05/14/2016 0809    HGB 13 7 01/11/2023 0513    HGB 12 1 01/10/2023 0441    HGB 14 1 01/09/2023 0555    HGB 18 3 (H) 05/14/2016 0809    INR 1 15 01/07/2023 1334    WBC 14 64 (H) 01/11/2023 0513    WBC 11 39 (H) 01/10/2023 0441    WBC 13 33 (H) 01/09/2023 0555    WBC 8 0 05/14/2016 0809    CRP 62 0 (H) 02/14/2022 0811       Meds:    Current Facility-Administered Medications:   •  acetaminophen (TYLENOL) tablet 975 mg, 975 mg, Oral, Q8H [facility], Cyn Franklin PA-C, 975 mg at 01/11/23 0815  •  budesonide-formoterol (SYMBICORT) 160-4 5 mcg/act inhaler 2 puff, 2 puff, Inhalation, BID, Cyn Franklin PA-C  •  calcium carbonate (TUMS) chewable tablet 1,000 mg, 1,000 mg, Oral, Daily PRN, Cyn Franklin PA-C, 1,000 mg at 01/10/23 2047  •  ceFAZolin (ANCEF) IVPB (premix in dextrose) 2,000 mg 50 mL, 2,000 mg, Intravenous, Q8H, Megan Haynes PA-C, Last Rate: 100 mL/hr at 01/11/23 0346, 2,000 mg at 01/11/23 0346  •  cyclobenzaprine (FLEXERIL) tablet 10 mg, 10 mg, Oral, TID PRN, Cyn Franklin PA-C  •  docusate sodium (COLACE) capsule 100 mg, 100 mg, Oral, BID, Cyn Franklin PA-C, 100 mg at 01/11/23 0816  •  enoxaparin (LOVENOX) subcutaneous injection 40 mg, 40 mg, Subcutaneous, Daily, Cyn Franklin PA-C, 40 mg at 01/11/23 1449  •  fenofibrate (TRICOR) tablet 145 mg, 145 mg, Oral, Daily, Cyn Franklin PA-C, 145 mg at 01/11/23 9421  •  fish oil capsule 1,000 mg, 1,000 mg, Oral, BID, Cyn Franklin PA-C, 1,000 mg at 01/11/23 0815  •  HYDROmorphone (DILAUDID) injection 0 5 mg, 0 5 mg, Intravenous, Q4H PRN, Drew Franklin PA-C  •  insulin lispro (HumaLOG) 100 units/mL subcutaneous injection 1-5 Units, 1-5 Units, Subcutaneous, TID AC, 2 Units at 01/11/23 0818 **AND** Fingerstick Glucose (POCT), , , TID AC, Carmen Fermin PA-C  •  lactated ringers infusion, 125 mL/hr, Intravenous, Continuous, Carmen Fermin PA-C, Stopped at 01/10/23 3800  •  metoprolol succinate (TOPROL-XL) 24 hr tablet 100 mg, 100 mg, Oral, Daily, Drew Franklin PA-C, 100 mg at 01/11/23 0816  •  ondansetron (ZOFRAN) injection 4 mg, 4 mg, Intravenous, Q6H PRN, Drew Franklin PA-C, 4 mg at 01/10/23 1308  •  oxyCODONE (ROXICODONE) immediate release tablet 10 mg, 10 mg, Oral, Q4H PRN, Drew Franklin PA-C, 10 mg at 01/10/23 2047  •  oxyCODONE (ROXICODONE) IR tablet 5 mg, 5 mg, Oral, Q4H PRN, Drew Franklin PA-C, 5 mg at 01/11/23 0346  •  pravastatin (PRAVACHOL) tablet 80 mg, 80 mg, Oral, Daily With Charisma Franklin PA-C, 80 mg at 01/08/23 1626    Blood Culture:   Lab Results   Component Value Date    BLOODCX No Growth at 48 hrs  01/09/2023    BLOODCX No Growth at 48 hrs  01/09/2023       Wound Culture:   Lab Results   Component Value Date    WOUNDCULT Culture results to follow  01/07/2023       Ins and Outs:  I/O last 24 hours: In: -   Out: 7270 [Urine:1125; Drains:70]          Physical:  Vitals:    01/11/23 0710   BP: 158/96   Pulse: 91   Resp: 18   Temp: 97 6 °F (36 4 °C)   SpO2: 97%     Musculoskeletal: left Upper Extremity  · Visible skin without erythema or ecchymosis  · Dressing C/D/I   · TTP directly over the elbow  · Sensation intact to median/radial/ulnar nerve distribution   · Motor intact anterior interosseous nerve/posterior interosseous nerve/median/radial/ulnar nerve distributions  · Digits warm and well perfused  · Capillary refill < 2 seconds    Left Lower Extremity  · Visible skin without erythema or ecchymosis    · Dressing C/D/I  · The drain is in place and examination of the fluid collection reveals bloody serous fluid without obvious purulence  Rough estimation of fluid amount is 80 cc  · TTP directly over the knee  · Sensation intact over the toes  · Motor intact to +FHL/EHL, +ankle dorsi/plantar flexion  · Digits warm and well perfused  · Capillary refill < 2 seconds    Assessment:    62 y o male POD 2 left TKA I&D with poly exchange and left elbow abscess I&D  Patient doing well  Plan:  • Patient can progress to activity as tolerated with weightbearing as tolerated at this time for the left arm and leg  · Preliminary results of cultures taken in the OR reveal staph aureus  Continue IV Ancef and all antibiotic recommendations per ID  · The patient presently has a suction drain in the left knee and packing in the left elbow  The drain must stay in place for a minimum of 48 hours and then may be removed once output is less than 30 cc in an 8 hour shift  Packing is to be removed when the drain is removed  The patient's surgery was in the evening of 1/9, so should stay in place until at least this evening for a full 48 hours  Drain output has been 70 since 1800 yesterday evening, so it is likely that the drain and packing will be able to be removed tomorrow morning     · PT/OT  · Pain control  · DVT ppx with Lovenox while in the hospital and then discharge home with aspirin 81 mg twice a day for 4 weeks if agreeable with primary team   · Medical co-morbidities are being managed per primary team  · Dispo: Ortho will follow    Shefali Brennan PA-C

## 2023-01-12 NOTE — PROGRESS NOTES
Progress Note - Orthopedics   Rella Lilly 58 y o  male MRN: [id number]  Unit/Bed#: E5 -01      Subjective:    58 y o male POD 3 left TKA I&D with poly exchange and left elbow abscess I&D  No acute overnight events, no new complaints  Patient doing well  Pain well controlled  Denies any numbness or tingling in the left arm or leg       Labs:  0   Lab Value Date/Time    HCT 36 8 01/12/2023 0635    HCT 41 0 01/11/2023 0513    HCT 36 6 01/10/2023 0441    HCT 55 5 (H) 05/14/2016 0809    HGB 12 8 01/12/2023 0635    HGB 13 7 01/11/2023 0513    HGB 12 1 01/10/2023 0441    HGB 18 3 (H) 05/14/2016 0809    INR 1 15 01/07/2023 1334    WBC 16 86 (H) 01/12/2023 0635    WBC 14 64 (H) 01/11/2023 0513    WBC 11 39 (H) 01/10/2023 0441    WBC 8 0 05/14/2016 0809    CRP 62 0 (H) 02/14/2022 0811       Meds:    Current Facility-Administered Medications:   •  acetaminophen (TYLENOL) tablet 975 mg, 975 mg, Oral, Q8H [facility], Arelia Primrose Hake, PA-C, 975 mg at 01/12/23 0532  •  budesonide-formoterol (SYMBICORT) 160-4 5 mcg/act inhaler 2 puff, 2 puff, Inhalation, BID, Arelia Primrose Hake, PA-C  •  calcium carbonate (TUMS) chewable tablet 1,000 mg, 1,000 mg, Oral, Daily PRN, Arelia Primrose Hake, PA-C, 1,000 mg at 01/10/23 2047  •  ceFAZolin (ANCEF) IVPB (premix in dextrose) 2,000 mg 50 mL, 2,000 mg, Intravenous, Q8H, Mary Macias PA-C, Last Rate: 100 mL/hr at 01/12/23 0247, 2,000 mg at 01/12/23 0247  •  cyclobenzaprine (FLEXERIL) tablet 10 mg, 10 mg, Oral, TID PRN, Arelia Primrose Rigoberto, PA-C  •  docusate sodium (COLACE) capsule 100 mg, 100 mg, Oral, BID, Arelia Primrose Rigoberto, PA-C, 100 mg at 01/12/23 0827  •  enoxaparin (LOVENOX) subcutaneous injection 40 mg, 40 mg, Subcutaneous, Daily, Arelia Primrose Rigoberto, PA-C, 40 mg at 01/12/23 0800  •  fenofibrate (TRICOR) tablet 145 mg, 145 mg, Oral, Daily, Arelia Primrose Rigoberto, PA-C, 145 mg at 01/12/23 0827  •  fish oil capsule 1,000 mg, 1,000 mg, Oral, BID, Arelia Primrose Rigoberto, PA-C, 1,000 mg at 01/12/23 0827  •  insulin lispro (HumaLOG) 100 units/mL subcutaneous injection 1-5 Units, 1-5 Units, Subcutaneous, TID AC, 2 Units at 01/12/23 0800 **AND** Fingerstick Glucose (POCT), , , TID AC, BENNIE Gonzalez-WAYNE  •  lactated ringers infusion, 125 mL/hr, Intravenous, Continuous, Therese Reddy PA-C, Stopped at 01/10/23 1645  •  LORazepam (ATIVAN) tablet 1 mg, 1 mg, Oral, Once, Ezequiel Perez, DO  •  metoprolol succinate (TOPROL-XL) 24 hr tablet 100 mg, 100 mg, Oral, Daily, Nitza Franklin PA-C, 100 mg at 01/12/23 7413  •  ondansetron [facility]) injection 4 mg, 4 mg, Intravenous, Q6H PRN, Nitza Franklin PA-C, 4 mg at 01/10/23 1308  •  oxyCODONE (ROXICODONE) immediate release tablet 10 mg, 10 mg, Oral, Q4H PRN, Nitza Franklin PA-C, 10 mg at 01/10/23 2047  •  oxyCODONE (ROXICODONE) IR tablet 5 mg, 5 mg, Oral, Q4H PRN, Nitza Franklin PA-C, 5 mg at 01/11/23 0346  •  pravastatin (PRAVACHOL) tablet 80 mg, 80 mg, Oral, Daily With Dinner, Therese Reddy PA-C, 80 mg at 01/11/23 1724    Blood Culture:   Lab Results   Component Value Date    BLOODCX Received in Microbiology Lab  Culture in Progress  01/11/2023    BLOODCX Received in Microbiology Lab  Culture in Progress  01/11/2023       Wound Culture:   Lab Results   Component Value Date    WOUNDCULT Growth in Broth culture only Staphylococcus aureus (A) 01/07/2023       Ins and Outs:  I/O last 24 hours: In: -   Out: 112 [Drains:112]          Physical:  Vitals:    01/12/23 0733   BP: 137/59   Pulse: 82   Resp: 18   Temp: 98 4 °F (36 9 °C)   SpO2: 96%     Musculoskeletal: left Upper Extremity  • Visible skin without erythema or ecchymosis  • Dressing C/D/I   • No TTP over the elbow    • Sensation intact to median/radial/ulnar nerve distribution   • Motor intact anterior interosseous nerve/posterior interosseous nerve/median/radial/ulnar nerve distributions  • Digits warm and well perfused  • Capillary refill < 2 seconds     Left Lower Extremity  • Visible skin without erythema or ecchymosis  • Dressing C/D/I  • The drain is in place and examination of the fluid collection reveals bloody serous fluid without obvious purulence  Rough estimation of fluid amount is 10 cc  • No TTP over the knee  • Sensation intact over the toes  • Motor intact to +FHL/EHL, +ankle dorsi/plantar flexion  • Digits warm and well perfused  • Capillary refill < 2 seconds    Assessment:    62 y o male POD 3 left TKA I&D with poly exchange and left elbow abscess I&D  Patient doing well  Plan:  • Patient can progress to activity as tolerated with weightbearing as tolerated at this time for the left arm and leg  • Preliminary results of cultures taken in the OR reveal staph aureus  Continue IV Ancef and all antibiotic recommendations per ID  • The patient presently has a suction drain in the left knee and packing in the left elbow  The drain must be maintained until output is less than 30 cc in an 8 hour shift  Packing is to be removed when the drain is removed  Drainage in the past 8 hours has been 40 cc  We will maintain drain and packing for now and remove once output slows       • PT/OT  • Pain control  • DVT ppx with Lovenox while in the hospital and then discharge home with aspirin 81 mg twice a day for 4 weeks if agreeable with primary team   • Medical co-morbidities are being managed per primary team  • Dispo: Ortho will follow    Agapito De Dios PA-C

## 2023-01-12 NOTE — ASSESSMENT & PLAN NOTE
Lab Results   Component Value Date    HGBA1C 7 1 (H) 12/12/2022     Hold metformin and Jardiance      Start diabetic diet and sliding scale insulin  (P) 738 [id number]

## 2023-01-12 NOTE — PLAN OF CARE
Problem: PAIN - ADULT  Goal: Verbalizes/displays adequate comfort level or baseline comfort level  Description: Interventions:  - Encourage patient to monitor pain and request assistance  - Assess pain using appropriate pain scale  - Administer analgesics based on type and severity of pain and evaluate response  - Implement non-pharmacological measures as appropriate and evaluate response  - Consider cultural and social influences on pain and pain management  - Notify physician/advanced practitioner if interventions unsuccessful or patient reports new pain  Outcome: Progressing     Problem: INFECTION - ADULT  Goal: Absence or prevention of progression during hospitalization  Description: INTERVENTIONS:  - Assess and monitor for signs and symptoms of infection  - Monitor lab/diagnostic results  - Monitor all insertion sites, i e  indwelling lines, tubes, and drains  - Monitor endotracheal if appropriate and nasal secretions for changes in amount and color  - [place] appropriate cooling/warming therapies per order  - Administer medications as ordered  - Instruct and encourage patient and family to use good hand hygiene technique  - Identify and instruct in appropriate isolation precautions for identified infection/condition  Outcome: Progressing     Problem: SAFETY ADULT  Goal: Patient will remain free of falls  Description: INTERVENTIONS:  - Educate patient/family on patient safety including physical limitations  - Instruct patient to call for assistance with activity   - Consult OT/PT to assist with strengthening/mobility   - Keep Call bell within reach  - Keep bed low and locked with side rails adjusted as appropriate  - Keep care items and personal belongings within reach  - Initiate and maintain comfort rounds  - Make Fall Risk Sign visible to staff  - Offer Toileting every 2 Hours, in advance of need  - Initiate/Maintain bed alarm  - Obtain necessary fall risk management equipment: Alarm  - Apply yellow socks and bracelet for high fall risk patients  - Consider moving patient to room near nurses station  Outcome: Progressing  Goal: Maintain or return to baseline ADL function  Description: INTERVENTIONS:  -  Assess patient's ability to carry out ADLs; assess patient's baseline for ADL function and identify physical deficits which impact ability to perform ADLs (bathing, care of mouth/teeth, toileting, grooming, dressing, etc )  - Assess/evaluate cause of self-care deficits   - Assess range of motion  - Assess patient's mobility; develop plan if impaired  - Assess patient's need for assistive devices and provide as appropriate  - Encourage maximum independence but intervene and supervise when necessary  - Involve family in performance of ADLs  - Assess for home care needs following discharge   - Consider OT consult to assist with ADL evaluation and planning for discharge  - Provide patient education as appropriate  Outcome: Progressing  Goal: Maintains/Returns to pre admission functional level  Description: INTERVENTIONS:  - Perform BMAT or MOVE assessment daily    - Set and communicate daily mobility goal to care team and patient/family/caregiver  - Collaborate with rehabilitation services on mobility goals if consulted  - Perform Range of Motion 3 times a day  - Reposition patient every 2 hours    - Dangle patient 3 times a day  - Stand patient 3 times a day  - Ambulate patient 3 times a day  - Out of bed to chair 3 times a day   - Out of bed for meals 3 times a day  - Out of bed for toileting  - Record patient progress and toleration of activity level   Outcome: Progressing     Problem: DISCHARGE PLANNING  Goal: Discharge to home or other facility with appropriate resources  Description: INTERVENTIONS:  - Identify barriers to discharge w/patient and caregiver  - Arrange for needed discharge resources and transportation as appropriate  - Identify discharge learning needs (meds, wound care, etc )  - Arrange for interpretive services to assist at discharge as needed  - Refer to Case Management Department for coordinating discharge planning if the patient needs post-hospital services based on physician/advanced practitioner order or complex needs related to functional status, cognitive ability, or social support system  Outcome: Progressing     Problem: Knowledge Deficit  Goal: Patient/family/caregiver demonstrates understanding of disease process, treatment plan, medications, and discharge instructions  Description: Complete learning assessment and assess knowledge base    Interventions:  - Provide teaching at level of understanding  - Provide teaching via preferred learning methods  Outcome: Progressing     Problem: HEMATOLOGIC - ADULT  Goal: Maintains hematologic stability  Description: INTERVENTIONS  - Assess for signs and symptoms of bleeding or hemorrhage  - Monitor labs  - Administer supportive blood products/factors as ordered and appropriate  Outcome: Progressing     Problem: Potential for Falls  Goal: Patient will remain free of falls  Description: INTERVENTIONS:  - Educate patient/family on patient safety including physical limitations  - Instruct patient to call for assistance with activity   - Consult OT/PT to assist with strengthening/mobility   - Keep Call bell within reach  - Keep bed low and locked with side rails adjusted as appropriate  - Keep care items and personal belongings within reach  - Initiate and maintain comfort rounds  - Make Fall Risk Sign visible to staff  - Offer Toileting every 2 Hours, in advance of need  - Initiate/Maintain bed alarm  - Obtain necessary fall risk management equipment: Alarm  - Apply yellow socks and bracelet for high fall risk patients  - Consider moving patient to room near nurses station  Outcome: Progressing     Problem: MOBILITY - ADULT  Goal: Maintain or return to baseline ADL function  Description: INTERVENTIONS:  -  Assess patient's ability to carry out ADLs; assess patient's baseline for ADL function and identify physical deficits which impact ability to perform ADLs (bathing, care of mouth/teeth, toileting, grooming, dressing, etc )  - Assess/evaluate cause of self-care deficits   - Assess range of motion  - Assess patient's mobility; develop plan if impaired  - Assess patient's need for assistive devices and provide as appropriate  - Encourage maximum independence but intervene and supervise when necessary  - Involve family in performance of ADLs  - Assess for home care needs following discharge   - Consider OT consult to assist with ADL evaluation and planning for discharge  - Provide patient education as appropriate  Outcome: Progressing  Goal: Maintains/Returns to pre admission functional level  Description: INTERVENTIONS:  - Perform BMAT or MOVE assessment daily    - Set and communicate daily mobility goal to care team and patient/family/caregiver  - Collaborate with rehabilitation services on mobility goals if consulted  - Perform Range of Motion 3 times a day  - Reposition patient every 2 hours    - Dangle patient 3 times a day  - Stand patient 3 times a day  - Ambulate patient 3 times a day  - Out of bed to chair 3 times a day   - Out of bed for meals 3 times a day  - Out of bed for toileting  - Record patient progress and toleration of activity level   Outcome: Progressing

## 2023-01-12 NOTE — PROGRESS NOTES
[address] [place] [address]  Progress Note - Maurisio Carr 1960, 58 y o  male MRN: [id number]  Unit/Bed#: E5 -01 Encounter: [id number]  Primary Care Provider: Radha Watson DO   Date and time admitted to hospital: 2023 12:58 PM    * Septic arthritis [facility])  Assessment & Plan  Multiple sources of infection, knee, elbow and neck  Patient is bacteremic with MSSA    Appreciate orthopedics and infectious disease help   Patient underwent polyexchange of total knee replacement and I&D of elbow abscess by orthopedics    MRI cervical spine is concerning for possible C4 - C5 septic arthritis and osteomyelitis   I spoke with neurosurg  There is no indication for surgery and he does not need a hard c collar    Patient will need prolonged course of IV abx  He is doing better with PT  Will see when ID recommends putting in a PICC line, possibly tomorrow              Hyponatremia  Assessment & Plan  · Probably related to ongoing use of combination lisinopril/HCTZ prior to admission  · Hold HCTZ  · Continue lisinopril for blood pressure control    DM[facility] 2[facility]  Assessment & Plan  Lab Results   Component Value Date    HGBA1C 7 1 (H) 2022     Hold metformin and Jardiance  Start diabetic diet and sliding scale insulin  (P) 947 [id number]          Subjective:   Feels better  Less neck pain  Less knee pain  No fever nor chills  Objective:     Vitals:   Temp (24hrs), Av 5 °F (36 9 °C), Min:97 7 °F (36 5 °C), Max:99 2 °F (37 3 °C)    Temp:  [97 7 °F (36 5 °C)-99 2 °F (37 3 °C)] 98 4 °F (36 9 °C)  HR:  [79-86] 82  Resp:  [16-18] 18  BP: (136-157)/(59-70) 137/59  SpO2:  [95 %-99 %] 96 %  Body mass index is 32 28 kg/m²  Input and Output Summary (last 24 hours):        Intake/Output Summary (Last 24 hours) at 2023 1018  Last data filed at 2023 0532  Gross per 24 hour   Intake --   Output 112 ml   Net -112 ml       Physical Exam:     Physical Exam  Vitals and nursing note reviewed  HENT:      Head: Normocephalic and atraumatic  Eyes:      Pupils: Pupils are equal, round, and reactive to light  Cardiovascular:      Rate and Rhythm: Normal rate and regular rhythm  Heart sounds: No murmur heard  No friction rub  No gallop  Pulmonary:      Effort: Pulmonary effort is normal       Breath sounds: Normal breath sounds  No wheezing or rales  Abdominal:      General: Bowel sounds are normal       Palpations: Abdomen is soft  Tenderness: There is no abdominal tenderness  Musculoskeletal:      Right lower leg: No edema  Left lower leg: No edema          Additional Data:     Labs:    Results from last 7 days   Lab Units 01/12/23  0635   WBC Thousand/uL 16 86*   HEMOGLOBIN g/dL 12 8   HEMATOCRIT % 36 8   PLATELETS Thousands/uL 221   NEUTROS PCT % 87*   LYMPHS PCT % 6*   MONOS PCT % 6   EOS PCT % 0     Results from last 7 days   Lab Units 01/12/23  0635   POTASSIUM mmol/L 4 1   CHLORIDE mmol/L 97   CO2 mmol/L 21   BUN mg/dL 24   CREATININE mg/dL 0 86   CALCIUM mg/dL 9 3   ALK PHOS U/L 98   ALT U/L 14   AST U/L 55*     Results from last 7 days   Lab Units 01/07/23  1334   INR  1 15     Results from last 7 days   Lab Units 01/12/23  0733 01/11/23  2046 01/11/23  1522 01/11/23  1116 01/11/23  0709 01/10/23  2133 01/10/23  1614 01/10/23  1055 01/10/23  0715 01/09/23  2038 01/09/23  1605 01/09/23  1116   POC GLUCOSE mg/dl 233* 280* 281* 239* 226* 242* 241* 316* 246* 223* 221* 232*               * I Have Reviewed All Lab Data     Recent Cultures (last 7 days):     Results from last 7 days   Lab Units 01/11/23  0728 01/09/23  1836 01/09/23  0743 01/09/23  0601 01/07/23  1554 01/07/23  1538 01/07/23  1445   BLOOD CULTURE  Received in Microbiology Lab  Culture in Progress  Received in Microbiology Lab  Culture in Progress  --   --  No Growth at 72 hrs    No Growth at 72 hrs   --  Staphylococcus aureus*  Staphylococcus aureus*  --    Corinne Campbell STAIN RESULT   --  No Polys or Bacteria seen 3+ Polys  No bacteria seen  --   --  Gram positive cocci in clusters*  Gram positive cocci in clusters* No Polys or Bacteria seen   URINE CULTURE   --   --   --   --  >100,000 cfu/ml Escherichia coli ESBL*  --   --    WOUND CULTURE   --   --   --   --   --   --  Growth in Broth culture only Staphylococcus aureus*   BODY FLUID CULTURE, STERILE   --   --  No growth  --   --   --   --          Last 24 Hours Medication List:   Current Facility-Administered Medications   Medication Dose Route Frequency Provider Last Rate   • acetaminophen  975 mg Oral [facility] Hanny Mcnulty PA-C     • budesonide-formoterol  2 puff Inhalation BID Hanny Mcnulty PA-C     • calcium carbonate  1,000 mg Oral Daily PRN Hanny Mnculty PA-C     • cefazolin  2,000 mg Intravenous Q8H Jon Costa SIDDHARTHA Barnett 2,000 mg (01/12/23 0247)   • cyclobenzaprine  10 mg Oral TID PRN Hanny Mcnulty PA-C     • docusate sodium  100 mg Oral BID Hanny Mcnulty PA-C     • enoxaparin  40 mg Subcutaneous Daily Kataster Costa SIDDHARTHA Franklin     • fenofibrate  145 mg Oral Daily Jon Him SIDDHARTHA [place]     • fish oil  1,000 mg Oral BID Hanny Mcnulty PA-C     • insulin lispro  1-5 Units Subcutaneous TID AC Hanny Mcnulty PA-C     • lactated ringers  125 mL/hr Intravenous Continuous Hanny Mcnulty PA-C Stopped (01/10/23 0741)   • metoprolol succinate  100 mg Oral Daily Hanny Mcnulty PA-C     • ondansetron  4 mg Intravenous Q6H PRN Hanny Mcnulty PA-C     • oxyCODONE  10 mg Oral Q4H PRN aHnny Mcnulty PA-C     • oxyCODONE  5 mg Oral Q4H PRN Hanny Mcnulty PA-C     • pravastatin  80 mg Oral Daily With Dinner Hanny Mcnulty PA-C           VTE Pharmacologic Prophylaxis:   Pharmacologic: Enoxaparin (Lovenox)      Current Length of Stay: 5 day(s)    Current Patient Status: Inpatient       Discharge Plan:     Code Status: Level 1 - Full Code           Today, Patient Was Seen By: Prem Denise DO    ** Please Note: Dictation voice to text software may have been used in the creation of this document   **

## 2023-01-12 NOTE — QUICK NOTE
The patient's elbow packing was removed today at 1500 per the recommendations of Dr Ziggy Umana  All packing was removed successfully  The wound was noted to have some purulent expression when prodded with a swab  The wound was cleansed with betadine and a small piece of xeroform was placed over top of the wound  Some 4x4 gauze was placed over this  The arm from hand to elbow was then wrapped in Ehsan and ace bandages  The patient tolerated the entire procedure well and was NVI before and after the procedure

## 2023-01-12 NOTE — PLAN OF CARE
Problem: PAIN - ADULT  Goal: Verbalizes/displays adequate comfort level or baseline comfort level  Description: Interventions:  - Encourage patient to monitor pain and request assistance  - Assess pain using appropriate pain scale  - Administer analgesics based on type and severity of pain and evaluate response  - Implement non-pharmacological measures as appropriate and evaluate response  - Consider cultural and social influences on pain and pain management  - Notify physician/advanced practitioner if interventions unsuccessful or patient reports new pain  Outcome: Progressing     Problem: INFECTION - ADULT  Goal: Absence or prevention of progression during hospitalization  Description: INTERVENTIONS:  - Assess and monitor for signs and symptoms of infection  - Monitor lab/diagnostic results  - Monitor all insertion sites, i e  indwelling lines, tubes, and drains  - Monitor endotracheal if appropriate and nasal secretions for changes in amount and color  - [place] appropriate cooling/warming therapies per order  - Administer medications as ordered  - Instruct and encourage patient and family to use good hand hygiene technique  - Identify and instruct in appropriate isolation precautions for identified infection/condition  Outcome: Progressing  Goal: Absence of fever/infection during neutropenic period  Description: INTERVENTIONS:  - Monitor WBC    Outcome: Progressing     Problem: SAFETY ADULT  Goal: Patient will remain free of falls  Description: INTERVENTIONS:  - Educate patient/family on patient safety including physical limitations  - Instruct patient to call for assistance with activity   - Consult OT/PT to assist with strengthening/mobility   - Keep Call bell within reach  - Keep bed low and locked with side rails adjusted as appropriate  - Keep care items and personal belongings within reach  - Initiate and maintain comfort rounds  - Make Fall Risk Sign visible to staff  - Offer Toileting every 2 Hours, in advance of need  - Initiate/Maintain bed alarm  - Obtain necessary fall risk management equipment: Alarm  - Apply yellow socks and bracelet for high fall risk patients  - Consider moving patient to room near nurses station  Outcome: Progressing  Goal: Maintain or return to baseline ADL function  Description: INTERVENTIONS:  -  Assess patient's ability to carry out ADLs; assess patient's baseline for ADL function and identify physical deficits which impact ability to perform ADLs (bathing, care of mouth/teeth, toileting, grooming, dressing, etc )  - Assess/evaluate cause of self-care deficits   - Assess range of motion  - Assess patient's mobility; develop plan if impaired  - Assess patient's need for assistive devices and provide as appropriate  - Encourage maximum independence but intervene and supervise when necessary  - Involve family in performance of ADLs  - Assess for home care needs following discharge   - Consider OT consult to assist with ADL evaluation and planning for discharge  - Provide patient education as appropriate  Outcome: Progressing  Goal: Maintains/Returns to pre admission functional level  Description: INTERVENTIONS:  - Perform BMAT or MOVE assessment daily    - Set and communicate daily mobility goal to care team and patient/family/caregiver  - Collaborate with rehabilitation services on mobility goals if consulted  - Perform Range of Motion 3 times a day  - Reposition patient every 2 hours    - Dangle patient 3 times a day  - Stand patient 3 times a day  - Ambulate patient 3 times a day  - Out of bed to chair 3 times a day   - Out of bed for meals 3 times a day  - Out of bed for toileting  - Record patient progress and toleration of activity level   Outcome: Progressing     Problem: DISCHARGE PLANNING  Goal: Discharge to home or other facility with appropriate resources  Description: INTERVENTIONS:  - Identify barriers to discharge w/patient and caregiver  - Arrange for needed discharge resources and transportation as appropriate  - Identify discharge learning needs (meds, wound care, etc )  - Arrange for interpretive services to assist at discharge as needed  - Refer to Case Management Department for coordinating discharge planning if the patient needs post-hospital services based on physician/advanced practitioner order or complex needs related to functional status, cognitive ability, or social support system  Outcome: Progressing     Problem: Knowledge Deficit  Goal: Patient/family/caregiver demonstrates understanding of disease process, treatment plan, medications, and discharge instructions  Description: Complete learning assessment and assess knowledge base    Interventions:  - Provide teaching at level of understanding  - Provide teaching via preferred learning methods  Outcome: Progressing     Problem: HEMATOLOGIC - ADULT  Goal: Maintains hematologic stability  Description: INTERVENTIONS  - Assess for signs and symptoms of bleeding or hemorrhage  - Monitor labs  - Administer supportive blood products/factors as ordered and appropriate  Outcome: Progressing     Problem: Potential for Falls  Goal: Patient will remain free of falls  Description: INTERVENTIONS:  - Educate patient/family on patient safety including physical limitations  - Instruct patient to call for assistance with activity   - Consult OT/PT to assist with strengthening/mobility   - Keep Call bell within reach  - Keep bed low and locked with side rails adjusted as appropriate  - Keep care items and personal belongings within reach  - Initiate and maintain comfort rounds  - Make Fall Risk Sign visible to staff  - Offer Toileting every 2 Hours, in advance of need  - Initiate/Maintain bed alarm  - Obtain necessary fall risk management equipment: Alarm  - Apply yellow socks and bracelet for high fall risk patients  - Consider moving patient to room near nurses station  Outcome: Progressing     Problem: MOBILITY - ADULT  Goal: Maintain or return to baseline ADL function  Description: INTERVENTIONS:  -  Assess patient's ability to carry out ADLs; assess patient's baseline for ADL function and identify physical deficits which impact ability to perform ADLs (bathing, care of mouth/teeth, toileting, grooming, dressing, etc )  - Assess/evaluate cause of self-care deficits   - Assess range of motion  - Assess patient's mobility; develop plan if impaired  - Assess patient's need for assistive devices and provide as appropriate  - Encourage maximum independence but intervene and supervise when necessary  - Involve family in performance of ADLs  - Assess for home care needs following discharge   - Consider OT consult to assist with ADL evaluation and planning for discharge  - Provide patient education as appropriate  Outcome: Progressing  Goal: Maintains/Returns to pre admission functional level  Description: INTERVENTIONS:  - Perform BMAT or MOVE assessment daily    - Set and communicate daily mobility goal to care team and patient/family/caregiver  - Collaborate with rehabilitation services on mobility goals if consulted  - Perform Range of Motion 3 times a day  - Reposition patient every 2 hours    - Dangle patient 3 times a day  - Stand patient 3 times a day  - Ambulate patient 3 times a day  - Out of bed to chair 3 times a day   - Out of bed for meals 3 times a day  - Out of bed for toileting  - Record patient progress and toleration of activity level   Outcome: Progressing

## 2023-01-12 NOTE — TELEMEDICINE
e-Consult (IPC)     Consults     Contacted by Dr Kimani Dutta at [facility]  Ervin Drivers 58 y o  male MRN: [id number]  Unit/Bed#: E5 -01 Encounter: [id number]    Reason for Consult    Per provider report, patient currently with septic arthritis of the knee with MSSA bacteremia  Had neck pain, MRI cspine with concern for C4-5 septic arthritis  Available past medical history,social history, surgical history, medication list, drug allergies and review of systems were reviewed  /59 (BP Location: Left arm)   Pulse 82   Temp 98 4 °F (36 9 °C) (Oral)   Resp 18   Ht 5' 10" (1 778 m)   Wt 102 kg (225 lb)   SpO2 96%   BMI 32 28 kg/m²      Clinical exam per provider report, initially had neck pain with difficulty mobilizing neck but now improved with several days of IV abx, nonfocal otherwise  Imaging personally reviewed  · MRI cervical spine w wo contrast 01/10/2023:  Right C4-5 facet joint synovitis with marrow edema could be due to septic arthritis/osteomyelitis given clinical concern for infection  Inflammatory arthropathy with reactive marrow edema is in the differential  No other evidence of infection  No epidural abscess  Assessment and Recommendations  1  Imaging reviewed, underwhelming findings of septic arthritis right C4-5, no endplate erosive changes or instability noted  2  No need for collar at this time  3  Will order baseline upright XR cervical spine  4  Continue abx per ID recommendations, currently on IV cefazolin, would favor 6 weeks IV abx given MRI findings  5  ID has also recommended MRI thoracic spine, will follow up with those results  6  Ortho following for septic joints  7  Rest of care per primary team  8  Will review imaging once completed  9  No transfer indicated  10  Please call with questions or concerns    All questions answered  Provider is in agreement with the course of action   5-10 minutes, >50% of the total time devoted to medical consultative verbal/EMR discussion between providers  Written report will be generated in the EMR

## 2023-01-12 NOTE — PROGRESS NOTES
Progress Note - [facility] Infectious Disease   Roxanne Kovacs 58 y o  male MRN: [id number]  Unit/Bed#: E5 -01 Encounter: [id number]      IMPRESSION & RECOMMENDATIONS:   1  Staph aureus bacteremia   2 of 2 admission blood cultures positive for MSSA  Likely source is recent L elbow wound with progression to abscess/bursitis  Complicated by seeding of  L knee, cervical spine, and heart in setting of MSSA  The patient also has evidence of Janeway lesions on exam of his feet  High concern for endocarditis now with multiple embolic sites of infection  TTE negative (study quality adequate though technically difficult)  Repeat blood cultures 1/9 negative >72h however repeats from 1/11 with 1 of 2 positive for GPC in clusters    -continue IV cefazolin 2 g every 8 hours  -follow up repeat blood cultures   -ordered repeat blood cultures x2 sets for tomorrow   -recommend KALINA  -not yet cleared for PICC   -duration of antibiotics pending further imaging and repeat cultures  -anticipate addition of rifampin if no major drug interaction once bl cx have cleared      2   Left elbow cellulitis with abscess   Concern for septic bursitis/cellulitis   The patient initially suffered a cut to his left elbow while in [place] which I suspect is the initial source of bacteremia above  S/p I&D in OR 1/9 without evidence of joint involvement    -antibiotic as above     3   Left TKA acute PJI  Admitted with large left knee effusion s/p bedside joint aspiration 1/9 with Ortho  Synovial fluid cultures negative though fluid appeared turbid with WBC 6000, consistent with septic joint  S/p OR I&D with polyethylene exchange of the left knee 1/9/2023  OR cultures also positive for MSSA   -antibiotic as above  -serial L knee exams   -ongoing Ortho follow-up      4  Neck pain with C4-C5 septic arthritis  Reported progressive neck pain over the alst few weeks  Suspicious findings on CT C spine  MRI confirms changes at C4-C5   Primary team d/w neurosurgery   -serial exams of neck  -recommend thoracic spine MRI  -outpatient neurosurgery follow up      5  Leukocytosis   Due to bacteremia and cellulitis above  Fortunately the patient is afebrile and hemodynamically stable  Leukocytosis downtrending   -Monitor temperature, hemodynamics and daily CBC     6   Pyuria  The patient has no specific urinary symptoms  Urine GC/CT was negative  Urine culture shows growth of ESBL E  coli  -monitor  symptoms  -monitor urine output  -recommend repeat RPR, HIV, and hepatitis testing as in outpatient in 3-4 weeks   -recommend anti-fungal powder for possible yeast infection of groin      7   Type 2 diabetes mellitus  HA1c was 7 1%    -blood glucose management per primary service     8   ew lesions of the feet   In setting of staph aureus bacteremia, high concern for endocarditis and septic emboli    No obvious echocardiographic evidence of endocarditis   -antibiotic as above  -recommend KALINA     Antibiotics:  Cefazolin D5  abx D6    I have discussed the above management plan in detail with patient  I have discussed the above management plan in detail with patient's RN, and the primary service, SLIM attending  Subjective:  Patient has no fever, chills, sweats; no nausea, vomiting, diarrhea; no cough, shortness of breath; continues with neck pain though ROM improving  He is overwhelmed by his current condition  Objective:  Vitals:  Temp:  [97 7 °F (36 5 °C)-99 2 °F (37 3 °C)] 98 4 °F (36 9 °C)  HR:  [82-86] 82  Resp:  [16-18] 18  BP: (137-157)/(59-70) 137/59  SpO2:  [95 %-99 %] 96 %  Temp (24hrs), Av 5 °F (36 9 °C), Min:97 7 °F (36 5 °C), Max:99 2 °F (37 3 °C)  Current: Temperature: 98 4 °F (36 9 °C)    PHYSICAL EXAM:  General Appearance:  Appearing well, nontoxic, and in no distress   HEENT: Normocephalic, without obvious abnormality, atraumatic  Conjunctiva pink and sclera anicteric  Oropharynx moist without lesions       Lungs:   Clear to auscultation bilaterally, respirations unlabored   Heart:  RRR; no murmur, rub or gallop   Abdomen:   Soft, non-tender, non-distended, positive bowel sounds    Extremities: No cyanosis, clubbing or edema  L elbow ace wrap loosened around wrist  L knee ace wrap intact with drain in place with SS output in bulb  Musculoskeletal: Back symmetric without curvature, ROM normal     : No CVA or suprapubic tenderness  No ruiz  Year inf of groin noted  Skin: No rashes or lesions  No draining wounds noted  Plantar janeway lesions to R>L feet  Peripheral IV intact without evidence of erythema, warmth, or exudate  LABS, IMAGING, & OTHER STUDIES:  Lab Results:  I have personally reviewed pertinent labs  Results from last 7 days   Lab Units 01/12/23  0635 01/11/23  0513 01/10/23  0441   WBC Thousand/uL 16 86* 14 64* 11 39*   HEMOGLOBIN g/dL 12 8 13 7 12 1   PLATELETS Thousands/uL 221 227 227     Results from last 7 days   Lab Units 01/12/23  0635 01/11/23  0513 01/10/23  0441   SODIUM mmol/L 130* 131* 131*   POTASSIUM mmol/L 4 1 4 6 4 5   CHLORIDE mmol/L 97 96 98   CO2 mmol/L 21 18* 25   BUN mg/dL 24 29* 38*   CREATININE mg/dL 0 86 0 92 1 10   EGFR ml/min/1 73sq m 92 88 71   CALCIUM mg/dL 9 3 9 2 8 7   AST U/L 55* 59* 26   ALT U/L 14 17 22   ALK PHOS U/L 98 104 87     Results from last 7 days   Lab Units 01/11/23  0728 01/09/23  1836 01/09/23  0743 01/09/23  0601 01/07/23  1554 01/07/23  1538 01/07/23  1445   BLOOD CULTURE  No Growth at 24 hrs   --   --  No Growth at 72 hrs    No Growth at 72 hrs   --  Staphylococcus aureus*  Staphylococcus aureus*  --    GRAM STAIN RESULT  Gram positive cocci in clusters* No Polys or Bacteria seen 3+ Polys  No bacteria seen  --   --  Gram positive cocci in clusters*  Gram positive cocci in clusters* No Polys or Bacteria seen   URINE CULTURE   --   --   --   --  >100,000 cfu/ml Escherichia coli ESBL*  --   --    WOUND CULTURE   --   --   --   --   --   --  Growth in Broth culture only Staphylococcus aureus*   BODY FLUID CULTURE, STERILE   --   --  No growth  --   --   --   --    MRSA CULTURE ONLY   --   --   --  No Methicillin Resistant Staphlyococcus aureus (MRSA) isolated  --   --   --

## 2023-01-12 NOTE — ASSESSMENT & PLAN NOTE
Multiple sources of infection, knee, elbow and neck  Patient is bacteremic with MSSA    Appreciate orthopedics and infectious disease help   Patient underwent polyexchange of total knee replacement and I&D of elbow abscess by orthopedics    MRI cervical spine is concerning for possible C4 - C5 septic arthritis and osteomyelitis   I spoke with neurosurg     There is no indication for surgery and he does not need a hard c collar    Patient will need prolonged course of IV abx  He is doing better with PT  Will see when ID recommends putting in a PICC line, possibly tomorrow

## 2023-01-13 ENCOUNTER — TELEPHONE (OUTPATIENT)
Dept: NEUROSURGERY | Facility: CLINIC | Age: 63
End: 2023-01-13

## 2023-01-13 NOTE — PROGRESS NOTES
Progress Note - [facility] Infectious Disease   Ervin Drivers 58 y o  male MRN: [id number]  Unit/Bed#: E5 -01 Encounter: [id number]      IMPRESSION & RECOMMENDATIONS:   1  Staph aureus bacteremia   2 of 2 admission blood cultures positive for MSSA   Likely source is recent L elbow wound with progression to abscess/bursitis  Complicated by seeding of  L knee, cervical spine, and heart in setting of MSSA  The patient also has evidence of Janeway lesions on exam of his feet  High concern for endocarditis now with multiple embolic sites of infection and persistently positive blood cultures  TTE negative (study quality adequate though technically difficult)   Repeat blood cultures 1/9 negative >4 days however repeats from 1/11 both positive for MSSA  -continue IV cefazolin 2 g every 8 hours  -follow up repeat blood cultures drawn today   -repeat blood cultures in 48h   -recommend KALINA  -not yet cleared for PICC   -duration of antibiotics pending further imaging and repeat cultures  -anticipate addition of rifampin if no major drug interactions once bl cx have cleared      2   Left elbow cellulitis with abscess   Concern for septic bursitis/cellulitis   The patient initially suffered a cut to his left elbow while in [facility] which I suspect is the initial source of bacteremia above  S/p I&D in OR 1/9 without evidence of joint involvement    -antibiotic as above     3   Left TKA acute PJI   Admitted with large left knee effusion s/p bedside joint aspiration 1/9 with Ortho  Synovial fluid cultures negative though fluid appeared turbid with WBC 6000, consistent with septic joint  S/p OR I&D with polyethylene exchange of the left knee 1/9/2023   OR cultures also positive for MSSA   -antibiotic as above  -serial L knee exams   -ongoing Ortho follow-up      4  Neck pain with C4-C5 septic arthritis  Reported progressive neck pain over the alst few weeks  Suspicious findings on CT C spine  MRI confirms changes at C4-C5  Primary team d/w neurosurgery   -serial exams of neck  -recommend thoracic spine MRI  -outpatient neurosurgery follow up      5  Leukocytosis   Due to bacteremia and cellulitis above  Fortunately the patient is afebrile and hemodynamically stable  Leukocytosis persistent  -Monitor temperature, hemodynamics and daily CBC     6   Pyuria  The patient has no specific urinary symptoms  Urine GC/CT was negative  Urine culture shows growth of ESBL E  coli  -monitor  symptoms  -monitor urine output  -recommend repeat RPR, HIV, and hepatitis testing outpatient in 3-4 weeks   -recommend anti-fungal powder for possible yeast infection of groin      7   Type 2 diabetes mellitus  HA1c was 7 1%    -blood glucose management per primary service     8   Janeway lesions of the feet   In setting of staph aureus bacteremia, high concern for endocarditis and septic emboli    No obvious echocardiographic evidence of endocarditis   -antibiotic as above  -recommend KALINA    Antibiotics:  Cefazolin D6  abx D7    We will see patient PRN over the weekend  Please call ID on call physician in meantime if questions  I have discussed the above management plan in detail with patient  I have discussed the above management plan in detail with patient's RN, and the primary service, SLIM  Subjective:  Patient has no fever, chills, sweats; no nausea, vomiting, diarrhea; no cough, shortness of breath; mild neck pain with improving ROM  Continues to be overwhelmed by current condition  He is tolerating abx       Objective:  Vitals:  Temp:  [97 3 °F (36 3 °C)-99 9 °F (37 7 °C)] 99 9 °F (37 7 °C)  HR:  [62-89] 62  Resp:  [17-22] 18  BP: (130-151)/(49-68) 151/68  SpO2:  [93 %-97 %] 93 %  Temp (24hrs), Av 3 °F (36 8 °C), Min:97 3 °F (36 3 °C), Max:99 9 °F (37 7 °C)  Current: Temperature: 99 9 °F (37 7 °C)    PHYSICAL EXAM:  General Appearance:  Appearing well, nontoxic, and in no distress   HEENT: Normocephalic, without obvious abnormality, atraumatic  Conjunctiva pink and sclera anicteric  Oropharynx moist without lesions  Lungs:   Clear to auscultation bilaterally, respirations unlabored   Heart:  RRR; no murmur, rub or gallop   Abdomen:   Soft, non-tender, non-distended, positive bowel sounds    Extremities: No cyanosis, clubbing or edema   Musculoskeletal: Back symmetric without curvature, ROM normal  L elbow ace wrap intact  L knee ace wrap intact with drain in place with SS output in bulb  : No CVA or suprapubic tenderness  No ruiz  Yeast infection of groin  Skin: No rashes or lesions  No draining wounds noted  Plantar janeway lesions to R>L feet  Peripheral IV intact without evidence of erythema, warmth, or exudate  LABS, IMAGING, & OTHER STUDIES:  Lab Results:  I have personally reviewed pertinent labs  Results from last 7 days   Lab Units 01/13/23  0656 01/12/23  0635 01/11/23  0513   WBC Thousand/uL 18 41* 16 86* 14 64*   HEMOGLOBIN g/dL 12 3 12 8 13 7   PLATELETS Thousands/uL 191 221 227     Results from last 7 days   Lab Units 01/13/23  0656 01/12/23  0635 01/11/23  0513   SODIUM mmol/L 125* 130* 131*   POTASSIUM mmol/L 3 9 4 1 4 6   CHLORIDE mmol/L 95* 97 96   CO2 mmol/L 21 21 18*   BUN mg/dL 19 24 29*   CREATININE mg/dL 0 79 0 86 0 92   EGFR ml/min/1 73sq m 96 92 88   CALCIUM mg/dL 9 1 9 3 9 2   AST U/L 43 55* 59*   ALT U/L 15 14 17   ALK PHOS U/L 101 98 104     Results from last 7 days   Lab Units 01/13/23  0657 01/11/23  0728 01/09/23  1836 01/09/23  0743 01/09/23  0601 01/07/23  1554 01/07/23  1538 01/07/23  1445   BLOOD CULTURE  Received in Microbiology Lab  Culture in Progress  Received in Microbiology Lab  Culture in Progress  Staphylococcus aureus*  Staphylococcus aureus*  --   --  No Growth After 4 Days  No Growth After 4 Days    --  Staphylococcus aureus*  Staphylococcus aureus*  --    GRAM STAIN RESULT   --  Gram positive cocci in clusters*  Gram positive cocci in clusters* No Polys or Bacteria seen 3+ Polys  No bacteria seen  --   --  Gram positive cocci in clusters*  Gram positive cocci in clusters* No Polys or Bacteria seen   URINE CULTURE   --   --   --   --   --  >100,000 cfu/ml Escherichia coli ESBL*  --   --    WOUND CULTURE   --   --   --   --   --   --   --  Growth in Broth culture only Staphylococcus aureus*   BODY FLUID CULTURE, STERILE   --   --   --  No growth  --   --   --   --    MRSA CULTURE ONLY   --   --   --   --  No Methicillin Resistant Staphlyococcus aureus (MRSA) isolated  --   --   --

## 2023-01-13 NOTE — TREATMENT PLAN
Imaging:  · XR cervical spine 1/12/2023:  No acute osseous abnormality  No radiographic findings at the right C4-C5 facet level  · MRI thoracic spine without contrast 1/13/2023:  No MR evidence of discitis/osteomyelitis in the visualized spine      Plan:  · Continue medical management per primary team, ortho and ID  · Ortho - status post TKA I&D with polyexchange and left elbow abscess I&D  · ID - continue IV cefazolin, should be maintained for at least 6 weeks given septic arthritis  · Slim - follow for additional medical management   · No collar / brace  · Follow up in 4 weeks with upright XR cervical spine  · Signed off, please call with questions or concerns

## 2023-01-13 NOTE — CASE MANAGEMENT
Case Management Discharge Planning Note    Patient name Cleveland Clancy  Location [facility] [address] MRN [id number]  : 1960 Date 2023       Current Admission Date: 2023  Current Admission Diagnosis:Septic arthritis [facility])   Patient Active Problem List    Diagnosis Date Noted   • Impetigo 2023   • Abnormal urinalysis 2023   • Septic arthritis ([facility] [address] ) 2023   • Hyponatremia 2023   • Status post total knee replacement, left 2022   • Rash 2022   • Murmur 2022   • BMI 31 0-31 9,adult 2022   • Anxiety 2022   • Mass of appendix 2017   • DMII [facility]di[facility] mellitus, type 2) [facility] 2013   • Hyperlipidemia 2013   • Primary hypertension 2012      LOS (days): 6  Geometric Mean LOS (GMLOS) (days): 4 90  Days to GMLOS:-0 9     OBJECTIVE:  Risk of Unplanned Readmission Score: 12         Current admission status: Inpatient   Preferred Pharmacy:   [facility]/pharmacy #[id number] [place] [address]  Phone: [phone] Fax: [phone]    [facility] Traci Cano [address] [place] [address]  Phone: [phone] Fax: [phone]    Primary Care Provider: Elaine Huddleston DO    Primary Insurance: [facility]  Secondary Insurance:     DISCHARGE DETAILS:    CM was made aware, patient is pending a KALINA on Monday  No long term antibiotic script yet available for long term antibiotics  CM will continue to follow

## 2023-01-13 NOTE — ASSESSMENT & PLAN NOTE
Multiple sources of infection, knee, elbow and neck  Patient is bacteremic with MSSA    Appreciate orthopedics and infectious disease help   Patient underwent polyexchange of total knee replacement and I&D of elbow abscess by orthopedics    MRI cervical spine is concerning for possible C4 - C5 septic arthritis and osteomyelitis   I spoke with neurosurg  There is no indication for surgery and he does not need a hard c collar    Patient will need prolonged course of IV abx  He is doing better with PT      ID is concerned that he could have endocarditis or valvular abscess as blood cultures won't clear    KALINA planned for early next week

## 2023-01-13 NOTE — PROGRESS NOTES
Progress Note - Orthopedics   Chance Daughters 58 y o  male MRN: [id number]  Unit/Bed#: AL MRI      Subjective:    58 y o male POD 4 left TKA I&D with poly exchange and left elbow abscess I&D  No acute overnight events, no new complaints  Patient doing well  Pain well controlled  Denies numbness or tingling in the left arm or leg      Labs:  0   Lab Value Date/Time    HCT 36 1 (L) 01/13/2023 0656    HCT 36 8 01/12/2023 0635    HCT 41 0 01/11/2023 0513    HCT 55 5 (H) 05/14/2016 0809    HGB 12 3 01/13/2023 0656    HGB 12 8 01/12/2023 0635    HGB 13 7 01/11/2023 0513    HGB 18 3 (H) 05/14/2016 0809    INR 1 15 01/07/2023 1334    WBC 18 41 (H) 01/13/2023 0656    WBC 16 86 (H) 01/12/2023 0635    WBC 14 64 (H) 01/11/2023 0513    WBC 8 0 05/14/2016 0809    CRP 62 0 (H) 02/14/2022 0811       Meds:    Current Facility-Administered Medications:   •  acetaminophen (TYLENOL) tablet 975 mg, 975 mg, Oral, Q8H [address], Elsie Franklin PA-C, 975 mg at 01/13/23 5286  •  budesonide-formoterol (SYMBICORT) 160-4 5 mcg/act inhaler 2 puff, 2 puff, Inhalation, BID, Elsie Franklin PA-C  •  calcium carbonate (TUMS) chewable tablet 1,000 mg, 1,000 mg, Oral, Daily PRN, Elsie Franklin PA-C, 1,000 mg at 01/10/23 2047  •  ceFAZolin (ANCEF) IVPB (premix in dextrose) 2,000 mg 50 mL, 2,000 mg, Intravenous, Q8H, Pat aPlmer PA-C, Last Rate: 100 mL/hr at 01/13/23 0513, 2,000 mg at 01/13/23 4129  •  cyclobenzaprine (FLEXERIL) tablet 10 mg, 10 mg, Oral, TID PRN, Elsie Franklin PA-C  •  docusate sodium (COLACE) capsule 100 mg, 100 mg, Oral, BID, Elsie Franklin PA-C, 100 mg at 01/13/23 0901  •  enoxaparin (LOVENOX) subcutaneous injection 40 mg, 40 mg, Subcutaneous, Daily, Elsie Franklin PA-C, 40 mg at 01/13/23 0800  •  fenofibrate (TRICOR) tablet 145 mg, 145 mg, Oral, Daily, Elsie Franklin PA-C, 145 mg at 01/13/23 0901  •  fish oil capsule 1,000 mg, 1,000 mg, Oral, BID, Elsie Franklin PA-C, 1,000 mg at 01/13/23 0901  •  insulin lispro (HumaLOG) 100 units/mL subcutaneous injection 1-5 Units, 1-5 Units, Subcutaneous, TID AC, 1 Units at 01/13/23 0800 **AND** Fingerstick Glucose (POCT), , , TID AC, Portillo Streeter PA-C  •  lactated ringers infusion, 125 mL/hr, Intravenous, Continuous, Portillo Streeter PA-C, Stopped at 01/10/23 1805  •  LORazepam (ATIVAN) tablet 1 mg, 1 mg, Oral, Once PRN, Catarino Deutsch PA-C  •  metoprolol succinate (TOPROL-XL) 24 hr tablet 100 mg, 100 mg, Oral, Daily, Charlene Franklin PA-C, 100 mg at 01/13/23 0901  •  ondansetron (ZOFRAN) injection 4 mg, 4 mg, Intravenous, Q6H PRN, Charlene Franklin PA-C, 4 mg at 01/10/23 1308  •  oxyCODONE (ROXICODONE) immediate release tablet 10 mg, 10 mg, Oral, Q4H PRN, Charlene Franklin PA-C, 10 mg at 01/10/23 2047  •  oxyCODONE (ROXICODONE) IR tablet 5 mg, 5 mg, Oral, Q4H PRN, Charlene Franklin PA-C, 5 mg at 01/11/23 0346  •  pravastatin (PRAVACHOL) tablet 80 mg, 80 mg, Oral, Daily With Aaliyah Franklin PA-C, 80 mg at 01/12/23 1700    Blood Culture:   Lab Results   Component Value Date    BLOODCX Staphylococcus aureus (A) 01/11/2023       Wound Culture:   Lab Results   Component Value Date    WOUNDCULT Growth in Broth culture only Staphylococcus aureus (A) 01/07/2023       Ins and Outs:  I/O last 24 hours: In: -   Out: 30 [Drains:30]          Physical:  Vitals:    01/13/23 0729   BP: 132/59   Pulse: 76   Resp: 18   Temp: 97 6 °F (36 4 °C)   SpO2: 96%     Musculoskeletal: left Upper Extremity  • Visible skin without erythema or ecchymosis  • Dressing C/D/I   • No TTP over the elbow  • Sensation intact to median/radial/ulnar nerve distribution   • Motor intact anterior interosseous nerve/posterior interosseous nerve/median/radial/ulnar nerve distributions  • Digits warm and well perfused  • Capillary refill < 2 seconds     Left Lower Extremity  • Visible skin without erythema or ecchymosis    • Dressing C/D/I  • The drain is in place and examination of the fluid collection reveals minimal bloody serous fluid without obvious purulence  • No TTP over the knee  • Sensation intact over the toes  • Motor intact to +FHL/EHL, +ankle dorsi/plantar flexion  • Digits warm and well perfused  • Capillary refill < 2 seconds      Drain Removal  The patient's dressings were pulled back to access the drain  The drain was removed in one motion and gauze was applied  Pressure was held to provide hemostasis  Gauze and Tegaderm were applied to the area  The procedure ws tolerated well without any immediate complications  Assessment:    58 y o male POD 4 left TKA I&D with poly exchange and left elbow abscess I&D  Patient doing well  Plan:  • Patient can progress to activity as tolerated with weightbearing as tolerated at this time for the left arm and leg  • Preliminary results of cultures taken in the OR reveal staph aureus   Continue IV Ancef and all antibiotic recommendations per ID      • The patient's drain was removed during our visit today per the recommendations of Dr Rosebud Jeans     • PT/OT  • Pain control  • DVT ppx with Lovenox while in the hospital and then discharge home with aspirin 81 mg twice a day for 4 weeks if agreeable with primary team   • Medical co-morbidities are being managed per primary team  • Dispo: Ortho will follow    Reymundo Hathaway PA-C

## 2023-01-13 NOTE — PLAN OF CARE
Problem: PAIN - ADULT  Goal: Verbalizes/displays adequate comfort level or baseline comfort level  Description: Interventions:  - Encourage patient to monitor pain and request assistance  - Assess pain using appropriate pain scale  - Administer analgesics based on type and severity of pain and evaluate response  - Implement non-pharmacological measures as appropriate and evaluate response  - Consider cultural and social influences on pain and pain management  - Notify physician/advanced practitioner if interventions unsuccessful or patient reports new pain  Outcome: Progressing     Problem: INFECTION - ADULT  Goal: Absence or prevention of progression during hospitalization  Description: INTERVENTIONS:  - Assess and monitor for signs and symptoms of infection  - Monitor lab/diagnostic results  - Monitor all insertion sites, i e  indwelling lines, tubes, and drains  - Monitor endotracheal if appropriate and nasal secretions for changes in amount and color  - [place] appropriate cooling/warming therapies per order  - Administer medications as ordered  - Instruct and encourage patient and family to use good hand hygiene technique  - Identify and instruct in appropriate isolation precautions for identified infection/condition  Outcome: Progressing     Problem: SAFETY ADULT  Goal: Patient will remain free of falls  Description: INTERVENTIONS:  - Educate patient/family on patient safety including physical limitations  - Instruct patient to call for assistance with activity   - Consult OT/PT to assist with strengthening/mobility   - Keep Call bell within reach  - Keep bed low and locked with side rails adjusted as appropriate  - Keep care items and personal belongings within reach  - Initiate and maintain comfort rounds  - Make Fall Risk Sign visible to staff  - Offer Toileting every 2 Hours, in advance of need  - Initiate/Maintain bed alarm  - Obtain necessary fall risk management equipment: Alarm  - Apply yellow socks and bracelet for high fall risk patients  - Consider moving patient to room near nurses station  Outcome: Progressing  Goal: Maintain or return to baseline ADL function  Description: INTERVENTIONS:  -  Assess patient's ability to carry out ADLs; assess patient's baseline for ADL function and identify physical deficits which impact ability to perform ADLs (bathing, care of mouth/teeth, toileting, grooming, dressing, etc )  - Assess/evaluate cause of self-care deficits   - Assess range of motion  - Assess patient's mobility; develop plan if impaired  - Assess patient's need for assistive devices and provide as appropriate  - Encourage maximum independence but intervene and supervise when necessary  - Involve family in performance of ADLs  - Assess for home care needs following discharge   - Consider OT consult to assist with ADL evaluation and planning for discharge  - Provide patient education as appropriate  Outcome: Progressing  Goal: Maintains/Returns to pre admission functional level  Description: INTERVENTIONS:  - Perform BMAT or MOVE assessment daily    - Set and communicate daily mobility goal to care team and patient/family/caregiver  - Collaborate with rehabilitation services on mobility goals if consulted  - Perform Range of Motion 3 times a day  - Reposition patient every 2 hours    - Dangle patient 3 times a day  - Stand patient 3 times a day  - Ambulate patient 3 times a day  - Out of bed to chair 3 times a day   - Out of bed for meals 3 times a day  - Out of bed for toileting  - Record patient progress and toleration of activity level   Outcome: Progressing

## 2023-01-13 NOTE — PROGRESS NOTES
[address] [place] [address]  Progress Note - [place] Ronny 1960, 58 y o  male MRN: [id number]  Unit/Bed#: E5 -01 Encounter: [id number]  Primary Care Provider: Jefry Staff, DO   Date and time admitted to hospital: 2023 12:58 PM    * Septic arthritis [facility])  Assessment & Plan  Multiple sources of infection, knee, elbow and neck  Patient is bacteremic with MSSA    Appreciate orthopedics and infectious disease help   Patient underwent polyexchange of total knee replacement and I&D of elbow abscess by orthopedics    MRI cervical spine is concerning for possible C4 - C5 septic arthritis and osteomyelitis   I spoke with neurosurg  There is no indication for surgery and he does not need a hard c collar    Patient will need prolonged course of IV abx  He is doing better with PT      ID is concerned that he could have endocarditis or valvular abscess as blood cultures won't clear  KALINA planned for early next week            Hyponatremia  Assessment & Plan  · Probably related to ongoing use of combination lisinopril/HCTZ prior to admission  · Hold HCTZ  · Continue lisinopril for blood pressure control          Subjective:    feels better  Much less neck pain  No fever nor chills  Objective:     Vitals:   Temp (24hrs), Av 3 °F (36 8 °C), Min:97 3 °F (36 3 °C), Max:99 9 °F (37 7 °C)    Temp:  [97 3 °F (36 3 °C)-99 9 °F (37 7 °C)] 98 4 °F (36 9 °C)  HR:  [62-83] 83  Resp:  [18-22] 18  BP: (130-151)/(49-68) 135/62  SpO2:  [93 %-97 %] 96 %  Body mass index is 32 28 kg/m²  Input and Output Summary (last 24 hours): Intake/Output Summary (Last 24 hours) at 2023 1745  Last data filed at 2023 1841  Gross per 24 hour   Intake --   Output 30 ml   Net -30 ml       Physical Exam:     Physical Exam  Vitals and nursing note reviewed  HENT:      Head: Normocephalic and atraumatic  Eyes:      Pupils: Pupils are equal, round, and reactive to light     Cardiovascular:      Rate and Rhythm: Normal rate and regular rhythm  Heart sounds: No murmur heard  No friction rub  No gallop  Pulmonary:      Effort: Pulmonary effort is normal       Breath sounds: Normal breath sounds  No wheezing or rales  Abdominal:      General: Bowel sounds are normal       Palpations: Abdomen is soft  Tenderness: There is no abdominal tenderness  Musculoskeletal:      Right lower leg: No edema  Left lower leg: No edema          Additional Data:     Labs:    Results from last 7 days   Lab Units 01/13/23  0656   WBC Thousand/uL 18 41*   HEMOGLOBIN g/dL 12 3   HEMATOCRIT % 36 1*   PLATELETS Thousands/uL 191   NEUTROS PCT % 86*   LYMPHS PCT % 6*   MONOS PCT % 6   EOS PCT % 1     Results from last 7 days   Lab Units 01/13/23  0656   POTASSIUM mmol/L 3 9   CHLORIDE mmol/L 95*   CO2 mmol/L 21   BUN mg/dL 19   CREATININE mg/dL 0 79   CALCIUM mg/dL 9 1   ALK PHOS U/L 101   ALT U/L 15   AST U/L 43     Results from last 7 days   Lab Units 01/07/23  1334   INR  1 15     Results from last 7 days   Lab Units 01/13/23  1612 01/13/23  1128 01/13/23  0730 01/12/23  2043 01/12/23  1604 01/12/23  1128 01/12/23  0733 01/11/23  2046 01/11/23  1522 01/11/23  1116 01/11/23  0709 01/10/23  2133   POC GLUCOSE mg/dl 212* 223* 224* 264* 186* 227* 233* 280* 281* 239* 226* 242*               * I Have Reviewed All Lab Data     Recent Cultures (last 7 days):     Results from last 7 days   Lab Units 01/13/23  0657 01/11/23  0728 01/09/23  1836 01/09/23  0743 01/09/23  0601 01/07/23  1554 01/07/23  1538 01/07/23  1445   BLOOD CULTURE  Received in Microbiology Lab  Culture in Progress  Received in Microbiology Lab  Culture in Progress  Staphylococcus aureus*  Staphylococcus aureus*  --   --  No Growth After 4 Days  No Growth After 4 Days    --  Staphylococcus aureus*  Staphylococcus aureus*  --    GRAM STAIN RESULT   --  Gram positive cocci in clusters*  Gram positive cocci in clusters* No Polys or Bacteria seen 3+ Polys  No bacteria seen  --   --  Gram positive cocci in clusters*  Gram positive cocci in clusters* No Polys or Bacteria seen   URINE CULTURE   --   --   --   --   --  >100,000 cfu/ml Escherichia coli ESBL*  --   --    WOUND CULTURE   --   --   --   --   --   --   --  Growth in Broth culture only Staphylococcus aureus*   BODY FLUID CULTURE, STERILE   --   --   --  No growth  --   --   --   --          Last 24 Hours Medication List:   Current Facility-Administered Medications   Medication Dose Route Frequency Provider Last Rate   • acetaminophen  975 mg Oral [facility] Marcia Herron PA-C     • budesonide-formoterol  2 puff Inhalation BID Marcia Herron PA-C     • calcium carbonate  1,000 mg Oral Daily PRN Marcia Herron PA-C     • cefazolin  2,000 mg Intravenous Q8H Colleen Barnett PA-C 2,000 mg (01/13/23 1215)   • cyclobenzaprine  10 mg Oral TID PRN Marcia Herron PA-C     • docusate sodium  100 mg Oral BID Marcia Herron PA-C     • enoxaparin  40 mg Subcutaneous Daily Colleen Franklin PA-C     • fenofibrate  145 mg Oral Daily Colleen Frankiln PA-C     • fish oil  1,000 mg Oral BID Marcia Herron PA-C     • insulin lispro  1-5 Units Subcutaneous TID AC Marcia Herron PA-C     • lactated ringers  125 mL/hr Intravenous Continuous Marcia Herron PA-C Stopped (01/10/23 0741)   • metoprolol succinate  100 mg Oral Daily Marcia Herron PA-C     • ondansetron  4 mg Intravenous Q6H PRN Marcia Herron PA-C     • oxyCODONE  10 mg Oral Q4H PRN Marcia Herron PA-C     • oxyCODONE  5 mg Oral Q4H PRN Marcia Herron PA-C     • pravastatin  80 mg Oral Daily With Dinner Marcia Herron PA-C           VTE Pharmacologic Prophylaxis:   Pharmacologic: Enoxaparin (Lovenox)      Current Length of Stay: 6 day(s)    Current Patient Status: Inpatient       Discharge Plan:       Code Status: Level 1 - Full Code           Today, Patient Was Seen By: Queenie Stalling, DO    ** Please Note: Dictation voice to text software may have been used in the creation of this document   **

## 2023-01-13 NOTE — PLAN OF CARE
Problem: PAIN - ADULT  Goal: Verbalizes/displays adequate comfort level or baseline comfort level  Description: Interventions:  - Encourage patient to monitor pain and request assistance  - Assess pain using appropriate pain scale  - Administer analgesics based on type and severity of pain and evaluate response  - Implement non-pharmacological measures as appropriate and evaluate response  - Consider cultural and social influences on pain and pain management  - Notify physician/advanced practitioner if interventions unsuccessful or patient reports new pain  Outcome: Progressing     Problem: INFECTION - ADULT  Goal: Absence or prevention of progression during hospitalization  Description: INTERVENTIONS:  - Assess and monitor for signs and symptoms of infection  - Monitor lab/diagnostic results  - Monitor all insertion sites, i e  indwelling lines, tubes, and drains  - Monitor endotracheal if appropriate and nasal secretions for changes in amount and color  - [place] appropriate cooling/warming therapies per order  - Administer medications as ordered  - Instruct and encourage patient and family to use good hand hygiene technique  - Identify and instruct in appropriate isolation precautions for identified infection/condition  Outcome: Progressing     Problem: SAFETY ADULT  Goal: Patient will remain free of falls  Description: INTERVENTIONS:  - Educate patient/family on patient safety including physical limitations  - Instruct patient to call for assistance with activity   - Consult OT/PT to assist with strengthening/mobility   - Keep Call bell within reach  - Keep bed low and locked with side rails adjusted as appropriate  - Keep care items and personal belongings within reach  - Initiate and maintain comfort rounds  - Make Fall Risk Sign visible to staff  - Offer Toileting every 2 Hours, in advance of need  - Initiate/Maintain bed alarm  - Obtain necessary fall risk management equipment: Alarm  - Apply yellow socks and bracelet for high fall risk patients  - Consider moving patient to room near nurses station  Outcome: Progressing  Goal: Maintain or return to baseline ADL function  Description: INTERVENTIONS:  -  Assess patient's ability to carry out ADLs; assess patient's baseline for ADL function and identify physical deficits which impact ability to perform ADLs (bathing, care of mouth/teeth, toileting, grooming, dressing, etc )  - Assess/evaluate cause of self-care deficits   - Assess range of motion  - Assess patient's mobility; develop plan if impaired  - Assess patient's need for assistive devices and provide as appropriate  - Encourage maximum independence but intervene and supervise when necessary  - Involve family in performance of ADLs  - Assess for home care needs following discharge   - Consider OT consult to assist with ADL evaluation and planning for discharge  - Provide patient education as appropriate  Outcome: Progressing  Goal: Maintains/Returns to pre admission functional level  Description: INTERVENTIONS:  - Perform BMAT or MOVE assessment daily    - Set and communicate daily mobility goal to care team and patient/family/caregiver  - Collaborate with rehabilitation services on mobility goals if consulted  - Perform Range of Motion 3 times a day  - Reposition patient every 2 hours    - Dangle patient 3 times a day  - Stand patient 3 times a day  - Ambulate patient 3 times a day  - Out of bed to chair 3 times a day   - Out of bed for meals 3 times a day  - Out of bed for toileting  - Record patient progress and toleration of activity level   Outcome: Progressing     Problem: DISCHARGE PLANNING  Goal: Discharge to home or other facility with appropriate resources  Description: INTERVENTIONS:  - Identify barriers to discharge w/patient and caregiver  - Arrange for needed discharge resources and transportation as appropriate  - Identify discharge learning needs (meds, wound care, etc )  - Arrange for interpretive services to assist at discharge as needed  - Refer to Case Management Department for coordinating discharge planning if the patient needs post-hospital services based on physician/advanced practitioner order or complex needs related to functional status, cognitive ability, or social support system  Outcome: Progressing     Problem: Knowledge Deficit  Goal: Patient/family/caregiver demonstrates understanding of disease process, treatment plan, medications, and discharge instructions  Description: Complete learning assessment and assess knowledge base    Interventions:  - Provide teaching at level of understanding  - Provide teaching via preferred learning methods  Outcome: Progressing     Problem: HEMATOLOGIC - ADULT  Goal: Maintains hematologic stability  Description: INTERVENTIONS  - Assess for signs and symptoms of bleeding or hemorrhage  - Monitor labs  - Administer supportive blood products/factors as ordered and appropriate  Outcome: Progressing     Problem: Potential for Falls  Goal: Patient will remain free of falls  Description: INTERVENTIONS:  - Educate patient/family on patient safety including physical limitations  - Instruct patient to call for assistance with activity   - Consult OT/PT to assist with strengthening/mobility   - Keep Call bell within reach  - Keep bed low and locked with side rails adjusted as appropriate  - Keep care items and personal belongings within reach  - Initiate and maintain comfort rounds  - Make Fall Risk Sign visible to staff  - Offer Toileting every 2 Hours, in advance of need  - Initiate/Maintain bed alarm  - Obtain necessary fall risk management equipment: Alarm  - Apply yellow socks and bracelet for high fall risk patients  - Consider moving patient to room near nurses station  Outcome: Progressing     Problem: MOBILITY - ADULT  Goal: Maintain or return to baseline ADL function  Description: INTERVENTIONS:  -  Assess patient's ability to carry out ADLs; assess patient's baseline for ADL function and identify physical deficits which impact ability to perform ADLs (bathing, care of mouth/teeth, toileting, grooming, dressing, etc )  - Assess/evaluate cause of self-care deficits   - Assess range of motion  - Assess patient's mobility; develop plan if impaired  - Assess patient's need for assistive devices and provide as appropriate  - Encourage maximum independence but intervene and supervise when necessary  - Involve family in performance of ADLs  - Assess for home care needs following discharge   - Consider OT consult to assist with ADL evaluation and planning for discharge  - Provide patient education as appropriate  Outcome: Progressing  Goal: Maintains/Returns to pre admission functional level  Description: INTERVENTIONS:  - Perform BMAT or MOVE assessment daily    - Set and communicate daily mobility goal to care team and patient/family/caregiver  - Collaborate with rehabilitation services on mobility goals if consulted  - Perform Range of Motion 3 times a day  - Reposition patient every 2 hours    - Dangle patient 3 times a day  - Stand patient 3 times a day  - Ambulate patient 3 times a day  - Out of bed to chair 3 times a day   - Out of bed for meals 3 times a day  - Out of bed for toileting  - Record patient progress and toleration of activity level   Outcome: Progressing

## 2023-01-14 PROBLEM — J98.4 PULMONARY CAVITARY LESION: Status: ACTIVE | Noted: 2023-01-01

## 2023-01-14 PROBLEM — I63.412 ACUTE STROKE DUE TO EMBOLISM OF LEFT MIDDLE CEREBRAL ARTERY (HCC): Status: ACTIVE | Noted: 2023-01-01

## 2023-01-14 PROBLEM — I35.8 AORTIC VALVE ENDOCARDITIS: Status: ACTIVE | Noted: 2023-01-01

## 2023-01-14 PROBLEM — I76 SEPTIC EMBOLISM (HCC): Status: ACTIVE | Noted: 2023-01-14

## 2023-01-14 PROBLEM — D64.9 ANEMIA: Status: ACTIVE | Noted: 2023-01-01

## 2023-01-14 NOTE — DISCHARGE SUMMARY
Discharge Summary   Maggie Leblanc 58 y o  male MRN: [id number]  Unit/Bed#: ICU 04 Encounter: [id number]    Admission Date: 1/7/2023     Discharge Date: 1/14/23    Admitting Diagnoses:   1  Malaise and leukocytosis    Discharge Diagnoses:  1  Acute left MCA M1 stroke  2  Septic arthritis  3  Septic emboli  4  Endocarditis  5  Aortic valve vegetation      Procedures Performed:   No orders of the defined types were placed in this encounter  Hospital Course:   Patient was admitted with septic arthritis underwent replacement of knee replacement hardware on the left  Developed peripheral [place] lesions  Cardiology was following, echocardiogram did demonstrate aortic valve vegetation  This afternoon and around 1300 on 14 January 2023 patient was noted to have a mental status change  He was experience receptive and expressive aphasia and right-sided weakness  Rapid response was called and a subsequent stroke alert was called  The patient was taken to the CT scanner where a CTA was done of his head and demonstrated a left MCA, M1 stroke  Neurosurgery was consulted and recommended the patient be transferred to [facility] for possible thrombectomy  Significant Findings, Care, Treatment and Services Provided:   Left MCA M1 stroke    Complications:   Sepsis, MSSA bacteremia, septic arthritis    Condition at Discharge: critical     Discharge instructions/Information to patient and family:   See after visit summary for information provided to patient and family  Provisions for Follow-Up Care:  See after visit summary for information related to follow-up care and any pertinent home health orders  Disposition: [facility]    Discharge Statement   I spent 45 minutes discharging the patient  This time was spent on the day of discharge  I had direct contact with the patient on the day of discharge  Additional documentation is required if more than 30 minutes were spent on discharge  Discharge Medications:  See after visit summary for reconciled discharge medications provided to patient and family

## 2023-01-14 NOTE — SEDATION DOCUMENTATION
Cerebral angiogram performed by Dr Milton Toure  Anesthesia present for case  Perclose closure device used in right groin with dry dressing in place  Report given to [facility]   IR Procedure Bedrest Start Time is 1700

## 2023-01-14 NOTE — PROGRESS NOTES
Rapid response called  More lethargic  Right facial droop and right arm weakness  Last time seen normal was 1300 by nursing  Code stroke called at 1310    o  Afebrile    /53    Neuro:   Significant right facial droop which is new  2/5 strength RUE which is new  4/5 strength LUE which also feels new  Normal strength in legs  A/p  1  Right sided weakness  Code stroke called  Worrisome for septic emboli to the brain  Spoke with neurology, tPA is contraindicated in septic emboli  Appreciate critical care help  Move to ICU  Ordered 2 units pRBCs, not transfused yet  Called son  No answer  I left him my cell phone number to call back    Will try to get his mother's phone number for an update

## 2023-01-14 NOTE — ANESTHESIA PROCEDURE NOTES
Arterial Line Insertion  Performed by: Husam Thompson MD  Authorized by: Husam Thompson MD   Consent: The procedure was performed in an emergent situation  Preparation: Patient was prepped and draped in the usual sterile fashion  Indications: multiple ABGs and hemodynamic monitoring  Orientation:  Right  Location: radial artery  Sedation:  Patient sedated: under GA      Procedure Details:  Needle gauge: 20  Seldinger technique: Seldinger technique used  Number of attempts: 2    Post-procedure:  Post-procedure: dressing applied  Waveform: good waveform  Post-procedure CNS: normal  Patient tolerance: Patient tolerated the procedure well with no immediate complications

## 2023-01-14 NOTE — PROGRESS NOTES
Progress Note - Critical Care   Otilia Cabello 58 y o  male MRN: [id number]  Unit/Bed#: ICU 04 Encounter: [id number]    Assessment/Plan:  1  Acute left MCA stroke, M1 secondary to septic emboli  · He will be a priority 1 transfer to [facility] for perfusion study and hopeful clot extraction  · tPA contraindicated in the setting of septic emboli  · Discussed with Dr Queen Valdes  · Fabby Aw is excepting for the possible endovascular thrombectomy  2  Septic emboli with sepsis and septic shock  · Has MSSA bacteremia and valvular endocarditis  · Peripheral Janeway lesions  · Likely all secondary to infected knee hardware on the left  · Cefazolin changed to nafcillin by infectious disease  · Has not cleared bacteremia to this point  · May need eventual aortic valve replacement but stroke is priority at this time  3  Aortic valve endocarditis  · Echo from today has not been formally read but discussed with Dr Norma Barlow who showed me vegetation on the aortic valve  4  Recent total knee replacement with removal of hardware for septic joint  · Will need orthopedic follow-up  5  Acute blood loss anemia  · He will be transfused 2 units uncrossed match blood  · No obvious source of bleeding but given critical cerebral ischemia, will transfuse urgently    Patient needs emergent transfer to [facility]  This will be a priority 1 transfer  Critical Care Time: 42 minutes  Documented critical care time excludes any procedures documented elsewhere  It also excludes any family updates    _____________________________________________________________________    HPI/24hr events:   Rapid response called  Patient developed acute neurologic symptoms with right hemiparesis, aphasia and left facial droop  Went stat to CAT scan and CTA which demonstrated left MCA, M1 territory stroke with thrombus  Discussed with neurology  Patient unable to provide any additional history  Discussed in detail with Dr Ashok Cha as well    Patient has had left knee replacement  Ultimately had  infected joint with explantation of hardware  Has been admitted for sepsis  Had hypotension earlier in the day responsive initially to fluid resuscitation  Also found to have low hemoglobin  No obvious source of blood loss at this time, possibly dilutional after several liters of volume resuscitation    Medications:  Current Facility-Administered Medications   Medication Dose Route Frequency Provider Last Rate   • [MAR Hold] acetaminophen  975 mg Oral [facility] Ksenia Barnett PA-C     • [MAR Hold] budesonide-formoterol  2 puff Inhalation BID Ksenia Barnett PA-C     • [facility] Hold] calcium carbonate  1,000 mg Oral Daily PRN Ksenia Franklin PA-C     • [facility] Hold] cyclobenzaprine  10 mg Oral TID PRN Ksenia Franklin PA-C     • [facility] Hold] docusate sodium  100 mg Oral BID Ksenia Barnett PA-C     • [facility] Hold] enoxaparin  40 mg Subcutaneous Daily Ksenia Barnett PA-C     • [facility] Hold] fenofibrate  145 mg Oral Daily Otilia Fernandez     • [facility] Hold] fish oil  1,000 mg Oral BID Ksenia Barnett PA-C     • [facility] Hold] insulin lispro  1-5 Units Subcutaneous TID AC Marine Torres PA-C     • lactated ringers  125 mL/hr Intravenous Continuous Ksenia Barnett PA-C Stopped (01/10/23 0741)   • [MAR Hold] metoprolol succinate  100 mg Oral Daily Marine Torres PA-C     • [facility] Hold] nafcillin  2,000 mg Intravenous Q4H Dea Salazar MD     • [MAR Hold] ondansetron  4 mg Intravenous Q6H PRN Marine Torres PA-C     • [facility] Hold] oxyCODONE  10 mg Oral Q4H PRN Marine Torres PA-C     • [facility] Hold] oxyCODONE  5 mg Oral Q4H PRN JENNIFER RodneyC     • [facility] Hold] pravastatin  80 mg Oral Daily With Dinner Ksenia Franklin PA-C         lactated ringers, 125 mL/hr, Last Rate: Stopped (01/10/23 0741)        Physical exam:  Vitals: Body mass index is 32 28 kg/m²  Blood pressure 121/53, pulse (!) 114, temperature 100 2 °F (37 9 °C), temperature source Axillary, resp   rate 16, height 5' 10" (1 778 m), weight 102 kg (225 lb), SpO2 95 %  ,  Temp  Min: 97 1 °F (36 2 °C)  Max: 100 2 °F (37 9 °C)  IBW (Ideal Body Weight): 73 kg    SpO2: 95 %  SpO2 Activity: At Rest  O2 Device: None (Room air)    No intake or output data in the 24 hours ending 01/14/23 1410    Invasive/non-invasive ventilation settings:   Respiratory    Lab Data (Last 4 hours)    None         O2/Vent Data (Last 4 hours)    None              Invasive Devices     Peripheral Intravenous Line  Duration           Peripheral IV 01/12/23 Right Antecubital 1 day          Drain  Duration           Closed/Suction Drain Left;Lateral Knee Accordion 10 Fr  4 days                  Physical Exam:  Gen: Patient is tachypneic  He is on 6 L nasal cannula  HEENT: Has right facial droop  Mucous membranes slightly dry  Neck: No JVD or adenopathy, trachea midline  Chest: Tachypneic, clear breath sounds bilaterally  Cor: Regular, tachycardic  Abd: Soft, obese, benign  Ext: No significant peripheral edema  Neuro: Reactive pupils  Dense aphasia  Not able to respond  Not responding to command  At this time high risk for aspiration but does appear to be protecting airway  Skin: Has multiple Janeway lesions  Left knee has silver impregnated dressing      Diagnostic Data:  Lab: I have personally reviewed pertinent lab results     CBC:   Results from last 7 days   Lab Units 01/14/23  1203 01/14/23  0947 01/14/23  0440 01/13/23  0656   WBC Thousand/uL  --  22 40* 19 37* 18 41*   HEMOGLOBIN g/dL 7 2* 8 8* 12 1 12 3   HEMATOCRIT % 21 3* 25 8* 34 6* 36 1*   PLATELETS Thousands/uL  --  183 194 191       CMP:   Results from last 7 days   Lab Units 01/14/23  0440 01/13/23  0656 01/12/23  0635   SODIUM mmol/L 125* 125* 130*   POTASSIUM mmol/L 4 1 3 9 4 1   CHLORIDE mmol/L 94* 95* 97   CO2 mmol/L 22 21 21   BUN mg/dL 20 19 24   CREATININE mg/dL 0 90 0 79 0 86   CALCIUM mg/dL 8 9 9 1 9 3   ALK PHOS U/L 110 101 98   ALT U/L 17 15 14   AST U/L 51* 43 55*     PT/INR:   Lab Results Component Value Date    INR 1 50 (H) 01/14/2023   ,   Magnesium:   Results from last 7 days   Lab Units 01/14/23  0440 01/13/23  0656 01/12/23  0635   MAGNESIUM mg/dL 1 6 1 8 1 6     Phosphorous:       Microbiology:  Results from last 7 days   Lab Units 01/13/23  0657 01/11/23  0728 01/09/23  1836 01/09/23  0743 01/09/23  0601 01/07/23  1554 01/07/23  1538 01/07/23  1445   BLOOD CULTURE  No Growth at 24 hrs  Staphylococcus aureus*  Staphylococcus aureus*  --   --  No Growth After 5 Days  No Growth After 5 Days  --  Staphylococcus aureus*  Staphylococcus aureus*  --    GRAM STAIN RESULT  Gram positive cocci in clusters* Gram positive cocci in clusters*  Gram positive cocci in clusters* No Polys or Bacteria seen 3+ Polys  No bacteria seen  --   --  Gram positive cocci in clusters*  Gram positive cocci in clusters* No Polys or Bacteria seen   URINE CULTURE   --   --   --   --   --  >100,000 cfu/ml Escherichia coli ESBL*  --   --    WOUND CULTURE   --   --   --   --   --   --   --  Growth in Broth culture only Staphylococcus aureus*   BODY FLUID CULTURE, STERILE   --   --   --  No growth  --   --   --   --    MRSA CULTURE ONLY   --   --   --   --  No Methicillin Resistant Staphlyococcus aureus (MRSA) isolated  --   --   --      Results from last 7 days   Lab Units 01/14/23  0947   LACTIC ACID mmol/L 2 9*     Results from last 7 days   Lab Units 01/14/23  1203 01/14/23  0947   HS TNI 0HR ng/L  --  645*   HS TNI 2HR ng/L 608*  --          Imaging:  Head CT was reviewed on the PACS system  Has occlusive thrombus in the M1 territory    Cardiac lab/EKG/telemetry/ECHO:   Sinus tachycardia on telemetry  Discussed echocardiogram with Dr Maciej Bradshaw  Does have vegetation on the aortic valve    Code Status: Level 1 - Full Code    Karolina Reese MD    Portions of the record may have been created with voice recognition software   Occasional wrong word or "sound a like" substitutions may have occurred due to the inherent limitations of voice recognition software  Read the chart carefully and recognize, using context, where substitutions have occurred

## 2023-01-14 NOTE — ASSESSMENT & PLAN NOTE
Lab Results   Component Value Date    HGBA1C 7 1 (H) 12/12/2022     Hold metformin and Jardiance      Start diabetic diet and sliding scale insulin  (P) [phone] [phone]

## 2023-01-14 NOTE — SEDATION DOCUMENTATION
In department-1512  In room-1512  Access/puncture-1531  1st pass-1543  Recan time and Score- 1547, 2A  2nd pass-1552  3rd pass-1610  Recan time and score-1615, 2b

## 2023-01-14 NOTE — CONSULTS
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  6  Hypotension noted on the morning of 1/14/2023  7  Possible septic emboli  8  Concern for endocarditis and possible acute valvular insufficiency  9  Nonischemic nuclear stress test 3/31/2022  10  Elevated troponin 645  11  Acute anemia    PLAN/ DISCUSSION:     • With high suspicion of endocarditis we will plan for KALINA on Monday  • Presently he is hypotensive and tachycardic  His white blood cell count is rising and procalcitonin is 3 2  Lactic acid is pending  Presentation may be consistent with sepsis but with strong concern for endocarditis we will get stat limited echo to ensure that he does not have acute valvular insufficiency  He does have a soft murmur on exam  • Troponin is elevated to 645 suspect secondary to his acute infection as well as acute anemia  Check EKG  Continue to trend troponins  • Place on telemetry  • Hemoglobin 12 --> 8 with hypodensity on CT scan concerning for either prior infection or hemorrhage around the spleen  Patient to get 2 units of packed red blood cells  History Of Present Illness:  70-year-old gentleman who follows with Dr Kevin Schilling of our group for history of hypertension, dyslipidemia, and occasional asymptomatic PVCs  Presently he has been hospitalized for nearly 1 week    He has been found to have septic arthritis with multiple sources including knee, elbow, and cervical spine  He is bacteremic with MSSA  He has undergone surgeries on both his knee and his elbow for washouts  His blood cultures continue to come back positive  He has been on broad-spectrum antibiotics  Infectious diseases following  On the morning of 1/14/2023 he was noted to be hypotensive  He is also tachycardic  There is a high suspicion of endocarditis  We have been consulted to arrange for a KALINA  Given his acute hypotension we have also planned to obtain stat limited echocardiogram to ensure no acute valvular insufficiency is present  Past Medical History:        Past Medical History:   Diagnosis Date   • Arthritis     L knee   for L TKR today 6/28/2022   • Atopic dermatitis    • Condition not found     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   • Diabetes mellitus ([facility] [address] )    • Hyperlipemia    • Hypertension    • Lateral epicondylitis, left elbow    • Wears partial dentures     upper      Past Surgical History:   Procedure Laterality Date   • APPENDECTOMY     • COLONOSCOPY     • CYST REMOVAL      Neck   • INCISION AND DRAINAGE OF WOUND Left 1/9/2023    Procedure: INCISION AND DRAINAGE (I&D) EXTREMITY;  Surgeon: Laurel Olsen DO;  Location: [facility];  Service: Orthopedics   • JOINT REPLACEMENT Left 06/28/2022   • KNEE ARTHROSCOPY      menisectomy   • GA ARTHRP KNE CONDYLE&PLATU MEDIAL&LAT COMPARTMENTS Left 06/28/2022    Procedure: ARTHROPLASTY KNEE TOTAL;  Surgeon: Mel Parra DO;  Location: [facility];  Service: Orthopedics   • GA RMVL PROSTH TOT KNEE PROSTH MMA W/WO INSJ SPACER Left 1/9/2023    Procedure: REMOVAL / DEBRIDEMENT PROSTHESIS KNEE W/ POLY EXCHANGE;  Surgeon: Laurel Olsen DO;  Location: [facility];  Service: Orthopedics        Allergy:        Allergies   Allergen Reactions   • Sulfamethoxazole-Trimethoprim Nausea Only and GI Intolerance       Medications:       Prior to Admission medications    Medication Sig Start Date End Date Taking?  Authorizing Provider Blood Glucose Monitoring Suppl (OneTouch Verio Reflect) w/Device KIT Check blood sugars once daily  Please substitute with appropriate alternative as covered by patient's insurance  Dx: E11 65 2/21/22  Yes Harley Green, DO   budesonide-formoterol (Symbicort) 160-4 5 mcg/act inhaler Inhale 2 puffs 2 (two) times a day Rinse mouth after use  1/4/23  Yes Lobito Mercedes DO   co-enzyme Q-10 30 mg Take 30 mg by mouth daily   Yes Historical Provider, MD   cyclobenzaprine (FLEXERIL) 10 mg tablet Take 1 tablet (10 mg total) by mouth 3 (three) times a day as needed for muscle spasms 1/2/23  Yes JAI Cheema   Empagliflozin (Jardiance) 10 MG TABS tablet Take 1 tablet (10 mg total) by mouth daily 1/4/23  Yes Lobito Mercedes DO   fenofibrate (TRICOR) 145 mg tablet Take 1 tablet (145 mg total) by mouth daily 1/4/23  Yes Lobito Mercedes DO   ferrous sulfate 324 (65 Fe) mg Take 1 tablet (324 mg total) by mouth in the morning 1/17/22  Yes Eloisa Dumont,    FOLIC ACID PO Take 1 capsule by mouth daily   Yes Historical Provider, MD   glucose blood (OneTouch Verio) test strip Check blood sugars once daily  Please substitute with appropriate alternative as covered by patient's insurance   Dx: E11 65 2/21/22  Yes Harley Green, DO   lisinopril-hydrochlorothiazide (PRINZIDE,ZESTORETIC) 20-25 MG per tablet Take 1 tablet by mouth daily 1/4/23  Yes Lobito Mercedes DO   metFORMIN (GLUCOPHAGE) 1000 MG tablet Take 1 tablet (1,000 mg total) by mouth 2 (two) times a day with meals 11/15/22  Yes Lobito Mercedes DO   methylPREDNISolone 4 MG tablet therapy pack Use as directed on package 1/2/23  Yes JAI Cheema   metoprolol succinate (TOPROL-XL) 100 mg 24 hr tablet Take 1 tablet (100 mg total) by mouth daily 1/4/23  Yes Lobito Mercedes DO   Multiple Vitamin (multivitamin) tablet Take 1 tablet by mouth daily 1/17/22  Yes Garland Hoang, DO   gutof-5-odsf ethyl esters (LOVAZA) 1 g capsule TAKE 2 CAPSULES TWICE DAILY  Patient taking differently: Take 2 g by mouth 2 (two) times a day 5/5/22  Yes Naldo Dan DO   OneTouch Delica Lancets 49G MISC Check blood sugars once daily  Please substitute with appropriate alternative as covered by patient's insurance  Dx: E11 65 2/21/22  Yes Carmita Crook DO   rosuvastatin (CRESTOR) 10 MG tablet Take 1 tablet (10 mg total) by mouth daily 1/4/23  Yes Naldo Dan DO   sildenafil (VIAGRA) 100 mg tablet Take one tablet daily as needed  Patient taking differently: Take 100 mg by mouth as needed Take one tablet daily as needed 6/7/22  Yes Naldo Dan DO   acetaminophen (TYLENOL) 325 mg tablet Take 3 tablets (975 mg total) by mouth every 8 (eight) hours  Patient not taking: Reported on 10/25/2022 6/29/22   Carmen Parson PA-C   albuterol [facility] HFA) 90 mcg/act inhaler Inhale 2 puffs every 6 (six) hours as needed for wheezing  Patient not taking: Reported on 1/7/2023 11/27/19   Naldo Dan DO   Ascorbic Acid (VITAMIN C PO) Take 1 capsule by mouth daily  Patient not taking: Reported on 1/7/2023    Historical Provider, MD   aspirin (ECOTRIN) 325 mg EC tablet Take 1 tablet (325 mg total) by mouth 2 (two) times a day Take with food    Patient not taking: Reported on 10/25/2022 6/29/22   Carmen Parson PA-C   betamethasone dipropionate (DIPROSONE) 0 05 % cream Apply topically 2 (two) times a day  Patient not taking: Reported on 1/7/2023 3/21/22   Carmita Crook DO   docusate sodium (COLACE) 100 mg capsule Take 1 capsule (100 mg total) by mouth 2 (two) times a day  Patient not taking: Reported on 10/25/2022 6/29/22   Carmen Parson PA-C   gabapentin (NEURONTIN) 100 mg capsule Take 1 capsule (100 mg total) by mouth every 8 (eight) hours  Patient not taking: Reported on 7/28/2022 6/29/22   Carmen Parson PA-C       Family History:     Family History   Problem Relation Age of Onset   • Colon polyps Mother         Inflammatory   • Diabetes Father Social History:       Social History     Socioeconomic History   • Marital status:      Spouse name: None   • Number of children: None   • Years of education: None   • Highest education level: None   Occupational History   • None   Tobacco Use   • Smoking status: Former     Packs/day: 1 00     Years: 40 00     Pack years: 40 00     Types: Cigarettes     Quit date: 10/2021     Years since quittin 2   • Smokeless tobacco: Never   Vaping Use   • Vaping Use: Never used   Substance and Sexual Activity   • Alcohol use: Not Currently     Alcohol/week: 2 0 standard drinks     Types: 2 Cans of beer per week     Comment: Social   • Drug use: Never   • Sexual activity: None     Comment: defer   Other Topics Concern   • None   Social History Narrative   • None     Social Determinants of Health     Financial Resource Strain: Not on file   Food Insecurity: Not on file   Transportation Needs: Not on file   Physical Activity: Not on file   Stress: Not on file   Social Connections: Not on file   Intimate Partner Violence: Not on file   Housing Stability: Not on file       ROS:  14 point ROS negative except as outlined above  Remainder review of systems is negative    Exam:  General:  alert, oriented and in no distress, cooperative  Head: Normocephalic, atraumatic  Eyes:  EOMI  Pupils - equal, round, reactive to accomodation  No icterus  Normal Conjunctiva     Oropharynx: moist and normal-appearing mucosa  Neck: supple, symmetrical, trachea midline and no JVD  Heart:  , soft 2/6 murmur, tachycardic regular  Respiratory effort / Chest Inspection: unlabored  Lungs:  normal air entry, lungs clear to auscultation and no rales, rhonchi or wheezing   Abdomen: flat, normal findings: bowel sounds normal and soft, non-tender  Lower Limbs:  no pitting edema  Pulses[de-identified]  RLE - DP: present 2+                 LLE - DP: present 2+  Musculoskeletal: ROM grossly normal        DATA:      ECG:                     Telemetry: Not on telemetry, will order          Echocardiogram:           Ischemic Testing:         Weights: Wt Readings from Last 3 Encounters:   01/09/23 102 kg (225 lb)   01/04/23 102 kg (225 lb 6 4 oz)   09/13/22 100 kg (221 lb)   , Body mass index is 32 28 kg/m²           Lab Studies:             Results from last 7 days   Lab Units 01/14/23  0440 01/13/23  0656 01/12/23  0635   WBC Thousand/uL 19 37* 18 41* 16 86*   HEMOGLOBIN g/dL 12 1 12 3 12 8   HEMATOCRIT % 34 6* 36 1* 36 8   PLATELETS Thousands/uL 194 191 221   ,   Results from last 7 days   Lab Units 01/14/23  0440 01/13/23  0656 01/12/23  0635   POTASSIUM mmol/L 4 1 3 9 4 1   CHLORIDE mmol/L 94* 95* 97   CO2 mmol/L 22 21 21   BUN mg/dL 20 19 24   CREATININE mg/dL 0 90 0 79 0 86   CALCIUM mg/dL 8 9 9 1 9 3   ALK PHOS U/L 110 101 98   ALT U/L 17 15 14   AST U/L 51* 43 55*

## 2023-01-14 NOTE — BRIEF OP NOTE (RAD/CATH)
INTERVENTIONAL RADIOLOGY PROCEDURE NOTE    Date: 1/14/2023    Procedure:   Procedure Summary     Date: 01/14/23 Room / Location: [address] [place] [address] Interventional Radiology    Anesthesia Start: 0436 Anesthesia Stop:     Procedure: IR STROKE ALERT Diagnosis:       Cerebrovascular accident (CVA), unspecified mechanism ([address] )      (Stroke)    Scheduled Providers:  Responsible Provider: Marion Mackenzie MD    Anesthesia Type: general ASA Status: 4 - Emergent          Preoperative diagnosis:   1  Cerebrovascular accident (CVA), unspecified mechanism ([facility] [address] )         Postoperative diagnosis: Same  Surgeon: Stanley Day MD     Assistant: None  No qualified resident was available  Blood loss: 250ml    Specimens: none     Findings:     Successful recan left M1 occlusion with 2b flow post intervention    In dept: 1512  In room: 1512  Access: 1531  1st pass: 1543  Recan: 1547 2a  2nd pass: 1552  3rd pass: 1610  Recan: 1615 2b    Pre-TICI : 0  Post-TICI: 2b    Proglide closure right CFA    Complications: None immediate      Anesthesia: general

## 2023-01-14 NOTE — PROGRESS NOTES
Sepsis reeval     Subjective  Patient feeling well  No real complaints  No light headedness  Minimal abdominal pain at lovenox sites  No bloody nor black stools  VSS   HR 90     Ext:   Apparent septic emboli to the right foot  A/p  1  Sepsis  volumed resuscitated, has improved  Unclear if this is all sspsis related  Likely has septic emboli    Now hemoglobin has dropped for unclear reasons  It cannot be all dilutional  He may by lysing cells if he has endocarditis  Transfuse 2 units pRBCs as he dropped from 12 to less than 8  And has some tenuous hemodynamics

## 2023-01-14 NOTE — ANESTHESIA PREPROCEDURE EVALUATION
Procedure:  IR STROKE ALERT    Relevant Problems   CARDIO   (+) Hyperlipidemia   (+) Murmur   (+) Primary hypertension      ENDO   (+) DMII (diabetes mellitus, type 2) ([facility])      HEMATOLOGY   (+) Anemia      MUSCULOSKELETAL   (+) Septic arthritis (HCC)      NEURO/PSYCH   (+) Acute stroke due to embolism of left middle cerebral artery ([facility])   (+) Anxiety     L MCA stroke 2/2 septic emboli  Sepsis with septic shock, lactate >8  Aortic valve endocarditis   Recent total knee replacement with removal of hardware for septicemia   Acute blood loss anemia     Patient obtunded, unable to answer questions  Will proceed with GA as emergency  Limited TTE 1/14/23  Technically adequate Limited transthoracic echocardiogram study  Patient had complete echocardiogram on 1/9/2022      1  Mild concentric left ventricle hypertrophy, normal left ventricular systolic function, left ventricle ejection fraction is greater than 65%  2  Normal right ventricle size and systolic function  3  Normal left and right atrial size by visual assessment  4  Tricuspid aortic valve with thickening and densities noted on the tips of the cusps highly suspicious for vegetations  There is associated moderate aortic valve regurgitation  There  are no mobile lesions and there is no evidence of abscess involving the aortic valve  5  Borderline mitral annular calcification  No vegetation identified on mitral valve  Trace mitral valve regurgitation  6  No vegetations identified on the tricuspid or pulmonic valve regurgitation  Trace tricuspid valve regurgitation noted  7  No obvious pulmonary hypertension  8  Trace, hemodynamically insignificant circumferential pericardial effusion      Compared to previous echocardiogram from 1/9/2022 the abnormalities on the aortic valve are new  There is some progression of aortic valve regurgitation         Physical Exam    Airway  Comment: Not cooperative  Tachypneic on 2 L nasal cannula with increased work of breathing            Dental       Cardiovascular      Pulmonary      Other Findings        Anesthesia Plan  ASA Score- 4 Emergent    Anesthesia Type- general with ASA Monitors  Additional Monitors: arterial line  Airway Plan: ETT  Plan Factors-    Chart reviewed  EKG reviewed  Existing labs reviewed  Patient summary reviewed  Induction- intravenous and rapid sequence induction  Postoperative Plan-     Informed Consent- Plan/risks discussed with: Emergency consent due to stroke & altered mental status  I personally reviewed this patient with the CRNA  Discussed and agreed on the Anesthesia Plan with the CRNA  Rosie Khan Plan to remain intubated in ICU on pressors

## 2023-01-14 NOTE — ASSESSMENT & PLAN NOTE
Nursing noted two black spots on underside of toes this am  They are almost certainly septic emboli from endocarditis  We have not yet been able to confirm endocarditis, but clinically he has it  I will have vascular surgery evaluate him  I did speak with them briefly and they felt that anticoagulation was not likely warranted  Spoke with Dr Mirella Colindres and Dr Chanelle Keith  Will get a stat TTE     I want to see if there is any valvular insufficiency or if we can now see endocarditis on TTE    KALINA scheduled for Monday

## 2023-01-14 NOTE — PROGRESS NOTES
[facility]  Progress Note - Betty Acosta 1960, 58 y o  male MRN: [id number]  Unit/Bed#: E5 -01 Encounter: [id number]  Primary Care Provider: Carly Lopez,    Date and time admitted to hospital: 1/7/2023 12:58 PM    * Septic arthritis [facility])  Assessment & Plan  Multiple sources of infection, knee, elbow and neck  Patient is bacteremic with MSSA    Appreciate orthopedics and infectious disease help   Patient underwent polyexchange of total knee replacement and I&D of elbow abscess by orthopedics    MRI cervical spine is concerning for possible C4 - C5 septic arthritis and osteomyelitis   I spoke with neurosurg  There is no indication for surgery and he does not need a hard c collar    Called this morning with borderline low blood pressures and black spots on his right toes  Concerning for septic emboli    Initiated sepsis boluses  But I am more concerned that he could have valvular insufficiency as he almost certainly has endocarditis  Check lactic acid and procalcitonin  Spoke with cardiology who will do a stat echocardiogram  Spoke with infectious disease and critical care  He suggested contacting vascular surgery to see if we need anticoagulation  Vascular surgery will see him but they feel he does not likely need anticoagulation with septic emboli    I will move him to level 2 stepdown  Septic embolism [facility])  Assessment & Plan  Nursing noted two black spots on underside of toes this am  They are almost certainly septic emboli from endocarditis  We have not yet been able to confirm endocarditis, but clinically he has it  I will have vascular surgery evaluate him  I did speak with them briefly and they felt that anticoagulation was not likely warranted  Spoke with Dr Lida Barton and Dr Chantel Patel  Will get a stat TTE     I want to see if there is any valvular insufficiency or if we can now see endocarditis on TTE    KALINA scheduled for Monday    Anemia  Assessment & Plan  Hemoglobin dropped from 12 to 8 overnight  No clear bleeding source seen  Checking stat CT abd pelvis as he did have a little abdominal pain, but it was anterior and he said it was at the site of the lovenox shots    I do not see a hematoma at any of the lovenox sites  Will transfuse 2 units prbc as that was a significant drop    Hyponatremia  Assessment & Plan  Partially from HCTZ/Lisinopril but also from acute illness  Likely some SIADH  Sodium has been stable  DMII (diabetes mellitus, type 2) [facility])  Assessment & Plan  Lab Results   Component Value Date    HGBA1C 7 1 (H) 2022     Hold metformin and Jardiance  Start diabetic diet and sliding scale insulin  (P) 714 [id number]          Subjective:   No specific complaints today  He had borderline low blood pressure this am and was noted by nursing to have two large black spots on right two toes  They are not painful    He complains of mild abdominal pain at the lovenox sites, but says he has had that pain for days and it is unchanged  No bloody nor black stool    No blood in urine  No pain in his lower back    He does not have a fever      Objective:     Vitals:   Temp (24hrs), Av 2 °F (36 8 °C), Min:97 3 °F (36 3 °C), Max:99 9 °F (37 7 °C)    Temp:  [97 3 °F (36 3 °C)-99 9 °F (37 7 °C)] 97 4 °F (36 3 °C)  HR:  [] 100  Resp:  [16-19] 16  BP: ()/(47-68) 99/49  SpO2:  [93 %-100 %] 100 %  Body mass index is 32 28 kg/m²  Input and Output Summary (last 24 hours):     No intake or output data in the 24 hours ending 23 1009    Physical Exam:     Physical Exam  Vitals and nursing note reviewed  HENT:      Head: Normocephalic and atraumatic  Eyes:      Pupils: Pupils are equal, round, and reactive to light  Cardiovascular:      Rate and Rhythm: Normal rate and regular rhythm  Heart sounds: No murmur heard  No friction rub  No gallop     Pulmonary:      Effort: Pulmonary effort is normal       Breath sounds: Normal breath sounds  No wheezing or rales  Abdominal:      General: Bowel sounds are normal       Palpations: Abdomen is soft  Tenderness: There is no abdominal tenderness  Comments: Mildly obese   Musculoskeletal:      Right lower leg: No edema  Left lower leg: No edema  Comments: Right 3rd and 4th toes have black discoloration on the underside of the toe  He has normal pulses to feet  Has painful Janeway lesions on both dorsum of feet  Knee dresssing C/D/I           Additional Data:     Labs:    Results from last 7 days   Lab Units 01/14/23  0947 01/14/23  0440   WBC Thousand/uL 22 40* 19 37*   HEMOGLOBIN g/dL 8 8* 12 1   HEMATOCRIT % 25 8* 34 6*   PLATELETS Thousands/uL 183 194   NEUTROS PCT %  --  88*   LYMPHS PCT %  --  6*   MONOS PCT %  --  5   EOS PCT %  --  0     Results from last 7 days   Lab Units 01/14/23  0440   POTASSIUM mmol/L 4 1   CHLORIDE mmol/L 94*   CO2 mmol/L 22   BUN mg/dL 20   CREATININE mg/dL 0 90   CALCIUM mg/dL 8 9   ALK PHOS U/L 110   ALT U/L 17   AST U/L 51*     Results from last 7 days   Lab Units 01/14/23  0947   INR  1 50*     Results from last 7 days   Lab Units 01/14/23  0717 01/13/23  2049 01/13/23  1612 01/13/23  1128 01/13/23  0730 01/12/23  2043 01/12/23  1604 01/12/23  1128 01/12/23  0733 01/11/23  2046 01/11/23  1522 01/11/23  1116   POC GLUCOSE mg/dl 260* 175* 212* 223* 224* 264* 186* 227* 233* 280* 281* 239*               * I Have Reviewed All Lab Data     Recent Cultures (last 7 days):     Results from last 7 days   Lab Units 01/13/23  0657 01/11/23  0728 01/09/23  1836 01/09/23  0743 01/09/23  0601 01/07/23  1554 01/07/23  1538 01/07/23  1445   BLOOD CULTURE  Received in Microbiology Lab  Culture in Progress  Received in Microbiology Lab  Culture in Progress  Staphylococcus aureus*  Staphylococcus aureus*  --   --  No Growth After 5 Days  No Growth After 5 Days    --  Staphylococcus aureus* Staphylococcus aureus*  --    GRAM STAIN RESULT   --  Gram positive cocci in clusters*  Gram positive cocci in clusters* No Polys or Bacteria seen 3+ Polys  No bacteria seen  --   --  Gram positive cocci in clusters*  Gram positive cocci in clusters* No Polys or Bacteria seen   URINE CULTURE   --   --   --   --   --  >100,000 cfu/ml Escherichia coli ESBL*  --   --    WOUND CULTURE   --   --   --   --   --   --   --  Growth in Broth culture only Staphylococcus aureus*   BODY FLUID CULTURE, STERILE   --   --   --  No growth  --   --   --   --          Last 24 Hours Medication List:   Current Facility-Administered Medications   Medication Dose Route Frequency Provider Last Rate   • acetaminophen  975 mg Oral [facility] Mike Maguire PA-C     • budesonide-formoterol  2 puff Inhalation BID Mike Maguire PA-C     • calcium carbonate  1,000 mg Oral Daily PRN Mike Maguire PA-C     • cefazolin  2,000 mg Intravenous Q8H Mike Maguire PA-C 2,000 mg (01/14/23 0436)   • cyclobenzaprine  10 mg Oral TID PRN Mike Maguire PA-C     • docusate sodium  100 mg Oral BID Mike Maguire PA-C     • enoxaparin  40 mg Subcutaneous Daily Mike Maguire PA-C     • fenofibrate  145 mg Oral Daily Mike Maguire PA-C     • fish oil  1,000 mg Oral BID Mike Maguire PA-C     • insulin lispro  1-5 Units Subcutaneous TID AC Mike Maguire PA-C     • lactated ringers  125 mL/hr Intravenous Continuous Mike Maguire PA-C Stopped (01/10/23 0741)   • metoprolol succinate  100 mg Oral Daily Mike Maguire PA-C     • [START ON 1/15/2023] multi-electrolyte  1,000 mL Intravenous Once [facility], DO      Followed by   • [START ON 1/15/2023] multi-electrolyte  1,000 mL Intravenous Once [employer], DO     • ondansetron  4 mg Intravenous Q6H PRN Mike Maguire PA-C     • oxyCODONE  10 mg Oral Q4H PRN Mike Maguire PA-C     • oxyCODONE  5 mg Oral Q4H PRN Mike Maguire PA-C     • pravastatin  80 mg Oral Daily With [employer] Yolanda Carrera PA-C           VTE Pharmacologic Prophylaxis:   Pharmacologic: Enoxaparin (Lovenox)      Current Length of Stay: 7 day(s)    Current Patient Status: Inpatient       Discharge Plan:   Case discussed with Dr Chester Qiu, Dr Shannon Stokes, Dr Mik Hathaway  Reviewed labs  Greater than 65 minutes spent  Greater than 50% of time spent coordinating care  Code Status: Level 1 - Full Code           Today, Patient Was Seen By: John Deng DO    ** Please Note: Dictation voice to text software may have been used in the creation of this document   **

## 2023-01-14 NOTE — ASSESSMENT & PLAN NOTE
Hemoglobin dropped from 12 to 8 overnight  No clear bleeding source seen  Checking stat CT abd pelvis as he did have a little abdominal pain, but it was anterior and he said it was at the site of the lovenox shots    I do not see a hematoma at any of the lovenox sites      Will transfuse 2 units prbc as that was a significant drop

## 2023-01-14 NOTE — QUICK NOTE
NIHSS for Endovascular at [facility] 80  NIHSS:  1a Level of Consciousness: 1 = Not alert, but arousable with minimal stimulation   1b  LOC Questions: 2 = Answers neither correctly   1c  LOC Commands: 2 = Obeys neither correctly   2  Best Gaze: 0 = Normal   3  Visual: 0 = No visual field loss   4  Facial Palsy: 1=Minor paralysis (flattened nasolabial fold, asymmetric on smiling)   5a  Motor Right Arm: 4=No movement   5b  Motor Left Arm: 4=No movement   6a  Motor Right Le=No movement   6b  Motor Left Le=No movement   7  Limb Ataxia:  0=Absent   8  Sensory: 2=Severe to total sensory loss; patient is not aware of being touched in face, arm, leg   9  Best Language:  3=Mute, global aphasia; no usable speech or auditory comprehension   10  Dysarthria: 2=Mute/anarthric   11   Extinction and Inattention (formerly Neglect): 0=No abnormality   Total Score: 29

## 2023-01-14 NOTE — PROGRESS NOTES
Progress Note - Infectious Disease   Cleveland Clancy 58 y o  male MRN: [id number]  Unit/Bed#: E5 -01 Encounter: [id number]      Impression/Plan:  1   Sepsis, POA   Noted with leukocytosis and tachycardia early in admission   Sources are 2, 3, 4 and 5   Clinical picture also complicated by 6 with recent travel and unprotected sex  White count remains elevated likely from multiple other embolic foci of infection  Antibiotics adjusted as below  Repeat CBC and chemistry tomorrow  Repeat blood cultures ordered for tomorrow  Continue to trend fever curve/vitals  Monitor neuro exam  Follow-up pending head and abdominal imaging  Ongoing follow-up by cardiology and plans for KALINA  Additional supportive care as per primary  Likely transfer to ICU    2   MSSA bacteremia  Alric Hey source is problem 3   Multiple other complications now below   Repeat cultures starting to clear on Ancef  Patient however developing episodes of hypotension this morning and so ultimately switched to nafcillin  Later discussed with primary service and developed neurologic issues below and so would favor maintaining on nafcillin  He is without other prosthetic devices  Start nafcillin 2 g every 4 hours  Discontinue Ancef  Continue to trend fever curve/vitals  Repeat CBC and chemistry tomorrow  Repeat blood cultures ordered for tomorrow  Plans for transesophageal echo  Follow-up head and abdominal imaging  Follow-up chest images  Tentative plan for 6 weeks of IV therapy followed by oral therapy for total 6 months  Anticipate the addition of rifampin for 4 once bacteremia clears  Additional interventions pending clinical course    3   Left elbow cellulitis and abscess   Elbow itself appears fairly benign   Seen by orthopedics  Underwent incision and drainage on 1/9  Patient had reported this was his [place] injury [place] was [place] and subsequently developed complications below    Antibiotics as above  Orthopedic evaluation appreciated  Monitor surgical site  Continue local care    4   Left knee prosthetic joint infection   Developed knee swelling over admission   Fluid studies concerning for prosthetic joint infection given the above   Underwent debridement and polyexchange on 1/9   Cultures also with MSSA  Repeat blood cultures noted to be positive after intervention with late growth, possibly due to source control  Antibiotic as above  Tentative duration of therapy as above  Ongoing follow-up by orthopedics  Monitor site clinically otherwise    5   Neck pain and C4-5 septic arthritis   Has had progressive pain over the last 2 weeks   CT imaging reviewed and suspicion is for osteomyelitis/discitis and will need to rule out epidural involvement   MRI confirms changes at C4-5  MRI T-spine negative  Patient with further complications below  Antibiotics as above  Prolonged duration of therapy as above  Neurosurgical evaluation appreciated  Follow-up further head imaging  Continue to trend fever curve/WBC  Will likely trend sed rate and CRP as outpatient    6   Pyuria, positive urine culture and penile irritation   Urine culture ultimately isolated E  coli   Patient without any definitive urinary symptoms   Suspect that these represent asymptomatic bacteriuria   The patient does however report penile irritation that is been going on for the last 1 to 2 weeks   Would question if he has a superficial yeast infection based on exam   STI testing had been sent out in the setting of 8   RPR negative   Urine GC negative  Plan for repeat STI testing in 3 to 4 weeks  Consider nystatin/local cream for penile irritation  Continue antibiotics as above    7   Type 2 diabetes   Hemoglobin A1c of 7 1  Glucose management as per primary    8   Janeway lesions on the feet   Tender lesions most consistent with findings related to SBE   Swabs of the site negative for HSV   Cultures positive for MSSA    Lesions themselves improving on subsequent evaluation  Antibiotics as above  Plans for cardiac imaging as above  Monitor site clinically    9  Splenic infarcts and left lower lobe pulmonary cavitary lesion  Findings seen on MRI  Likely complications of the above  Partially visualized on CT of the abdomen without contrast   Initially discussed with primary and recommended further contrast imaging patient subsequently developed 10  Follow-up pending abdominal/chest contrast imaging  Follow-up pending head imaging  Antibiotics as above  Continue to trend fever curve/WBC    10  New right-sided facial droop and right arm weakness  Abruptly developed deficits after my evaluation and discussed with primary  Clinical picture is overall concerning for bacterial endocarditis with embolization now likely to the CNS  Continue with plans for nafcillin above  Monitor mental status  Follow-up pending imaging  Continue to trend fever curve/WBC  Anticipate transfer to ICU    Above plan discussed earlier with patient and with nursing present  Above plan discussed earlier this morning and later in detail with primary service    ID consult service will continue to follow  Antibiotics:  Nafcillin 1  Total antibiotic 8    24 Hour events:  Yesterday and overnight notes reviewed discussed with primary service and there was concern for progressing hypotension over the course of this morning  Stat 2D echo was performed  Subjective:  Patient on my evaluation denied having any nausea, vomiting, chest pain  He was reporting lower abdominal discomfort which he attributed to his Lovenox/heparin injections  He denied having any upper abdominal discomfort  Denied having any diarrhea  Reported having some chills      Objective:  Vitals:  Temp:  [97 3 °F (36 3 °C)-98 6 °F (37 °C)] 97 4 °F (36 3 °C)  HR:  [] 105  Resp:  [16-19] 16  BP: ()/(44-63) 85/44  SpO2:  [95 %-100 %] 100 %  Temp (24hrs), Av 9 °F (36 6 °C), Min:97 3 °F (36 3 °C), Max:98 6 °F (37 °C)  Current: Temperature: (!) 97 4 °F (36 3 °C)    Physical Exam:   General Appearance:  Alert, interactive, nontoxic, no acute distress  Was answering questions appropriately  Throat: Oropharynx moist without lesions  Lungs:   Clear to auscultation bilaterally; no wheezes, rhonchi or rales; respirations unlabored on room air   Heart:   Regular rhythm, systolic murmur present, no rubs or gallops   Abdomen:   Soft, non-tender, non-distended, positive bowel sounds  Only very mild discomfort when palpating the lower quadrants bilaterally  Extremities: No clubbing, cyanosis or edema   Skin: No new rashes or lesions  No new draining wounds noted  Patient has changes in the right foot which are improving compared to prior evaluations  Labs, Imaging, & Other studies:   All pertinent labs and imaging studies were personally reviewed as below  Results from last 7 days   Lab Units 01/14/23  0947 01/14/23  0440 01/13/23  0656   WBC Thousand/uL 22 40* 19 37* 18 41*   HEMOGLOBIN g/dL 8 8* 12 1 12 3   PLATELETS Thousands/uL 183 194 191     Results from last 7 days   Lab Units 01/14/23  0440 01/13/23  0656 01/12/23  0635   POTASSIUM mmol/L 4 1 3 9 4 1   CHLORIDE mmol/L 94* 95* 97   CO2 mmol/L 22 21 21   BUN mg/dL 20 19 24   CREATININE mg/dL 0 90 0 79 0 86   EGFR ml/min/1 73sq m 91 96 92   CALCIUM mg/dL 8 9 9 1 9 3   AST U/L 51* 43 55*   ALT U/L 17 15 14   ALK PHOS U/L 110 101 98     Results from last 7 days   Lab Units 01/13/23  0657 01/11/23  0728 01/09/23  1836 01/09/23  0743 01/09/23  0601 01/07/23  1554 01/07/23  1538 01/07/23  1445   BLOOD CULTURE  Received in Microbiology Lab  Culture in Progress  Received in Microbiology Lab  Culture in Progress  Staphylococcus aureus*  Staphylococcus aureus*  --   --  No Growth After 5 Days  No Growth After 5 Days    --  Staphylococcus aureus*  Staphylococcus aureus*  --    GRAM STAIN RESULT   --  Gram positive cocci in clusters*  Gram positive cocci in clusters* No Polys or Bacteria seen 3+ Polys  No bacteria seen  --   --  Gram positive cocci in clusters*  Gram positive cocci in clusters* No Polys or Bacteria seen   URINE CULTURE   --   --   --   --   --  >100,000 cfu/ml Escherichia coli ESBL*  --   --    WOUND CULTURE   --   --   --   --   --   --   --  Growth in Broth culture only Staphylococcus aureus*   BODY FLUID CULTURE, STERILE   --   --   --  No growth  --   --   --   --    MRSA CULTURE ONLY   --   --   --   --  No Methicillin Resistant Staphlyococcus aureus (MRSA) isolated  --   --   --        Lab interpretation/comments: White count unfortunately remains elevated  Hyponatremia noted along with mild elevation in AST  Hemoglobin also downtrending  Imaging interpretation/comments: CT imaging of the abdomen was done without contrast this morning and nodular lesions noted again in the spleen  The cavitary lung lesion was not visualized  Culture data: Cultures from 1/13 so far without growth

## 2023-01-14 NOTE — PROGRESS NOTES
Orthopaedic Surgery - Progress Note  Ervin Nicholas (65 y o  male)   : 1960   MRN: [id number]  Date: 2023   Encounter: [id number]   Unit/Bed#: E5 -01    Assessment / Plan  S/p left TKA I&D with poly exchange and left elbow abscess I&D    · Continue ABX per ID  · Elbow ROM as tolerated, dry gauze dressing prn  · WBAT LLE, knee ROM as tolerated  · PT/OT  · F/U 10-14 days postop with Dr Day Delgado reports mild left knee discomfort and minimal left elbow discomfort  Vitals  Temp:  [97 3 °F (36 3 °C)-98 6 °F (37 °C)] 97 4 °F (36 3 °C)  HR:  [] 108  Resp:  [16-19] 16  BP: ()/(44-63) 114/55  Body mass index is 32 28 kg/m²  No intake/output data recorded      Ortho Exam - Left Lower Extremity  · Dressings c/d  · No effusion, erythema, or warmth  · Mild knee tenderness  · ROM 0-70 degrees  · Sensation intact throughout LLE  · +DF/PF ankle and toes  · 2+ DP pulse    Ortho Exam - Left Upper Extremity  · No swelling or erythema  · Minimal elbow tenderness  · Elbow ROM 0-145 deg  · Sensation intact throughout LUE  · 2+ radial pulse    Lab Results  (I have personally reviewed pertinent lab results )  Results from last 7 days   Lab Units 23  0947 23  0440 23  0656 23  0635 23  0513 01/10/23  0441 23  0555 23  0523 23  1334   WBC Thousand/uL 22 40* 19 37* 18 41* 16 86* 14 64* 11 39* 13 33* 13 53* 15 27*   HEMOGLOBIN g/dL 8 8* 12 1 12 3 12 8 13 7 12 1 14 1 14 4 14 5   HEMATOCRIT % 25 8* 34 6* 36 1* 36 8 41 0 36 6 41 3 42 1 41 3   PLATELETS Thousands/uL 183 194 191 221 227 227 257 238 224     Results from last 7 days   Lab Units 23  0947 23  1334   PTT seconds  --  26   INR  1 50* 1 15     Results from last 7 days   Lab Units 23  0440 23  0656 23  0635 23  0513 01/10/23  0441 23  0555 23  0523 23  1334   POTASSIUM mmol/L 4 1 3 9 4 1 4 6 4 5 4 3 3 9 4 3   CHLORIDE mmol/L 94* 95* 97 96 98 95* 95* 94*   CO2 mmol/L 22 21 21 18* 25 21 20* 21   BUN mg/dL 20 19 24 29* 38* 30* 26* 34*   CREATININE mg/dL 0 90 0 79 0 86 0 92 1 10 0 98 0 92 1 04   EGFR ml/min/1 73sq m 91 96 92 88 71 82 88 76   CALCIUM mg/dL 8 9 9 1 9 3 9 2 8 7 9 6 9 7 9 7   ALK PHOS U/L 110 101 98 104 87  --  120* 127*   ALT U/L 17 15 14 17 22  --  19 28   AST U/L 51* 43 55* 59* 26  --  23 21         Results from last 7 days   Lab Units 01/13/23  0657 01/11/23  0728 01/09/23  1836 01/09/23  0743 01/09/23  0601 01/07/23  1554 01/07/23  1538 01/07/23  1445   WOUND CULTURE   --   --   --   --   --   --   --  Growth in Broth culture only Staphylococcus aureus*   BODY FLUID CULTURE, STERILE   --   --   --  No growth  --   --   --   --    BLOOD CULTURE  Received in Microbiology Lab  Culture in Progress  Received in Microbiology Lab  Culture in Progress  Staphylococcus aureus*  Staphylococcus aureus*  --   --  No Growth After 5 Days  No Growth After 5 Days    --  Staphylococcus aureus*  Staphylococcus aureus*  --    URINE CULTURE   --   --   --   --   --  >100,000 cfu/ml Escherichia coli ESBL*  --   --    GRAM STAIN RESULT   --  Gram positive cocci in clusters*  Gram positive cocci in clusters* No Polys or Bacteria seen 3+ Polys  No bacteria seen  --   --  Gram positive cocci in clusters*  Gram positive cocci in clusters* No Polys or Bacteria seen   MRSA CULTURE ONLY   --   --   --   --  No Methicillin Resistant Staphlyococcus aureus (MRSA) isolated  --   --   --        Sybil Mccoy MD

## 2023-01-14 NOTE — RAPID RESPONSE
Rapid Response Note  Rachana Hoyos 58 y o  male MRN: [id number]  Unit/Bed#: E5 -01 Encounter: [id number]    Rapid Response Notification(s):   Response called date/time:  1/14/2023 1:03 PM  Response team arrival date/time:  1/14/2023 1:04 PM  Response end date/time:  1/14/2023 2:10 PM  Level of care:  [facility]  Rapid response location:  [facility] unit  Primary reason for rapid response call:  Acute change in neuro status and other (comment) (New onset of right facial droop, right-sided weakness, expressive and receptive aphasia)    Rapid Response Intervention(s):   Airway:  None  Breathing:  Oxygen  Circulation:  None  Fluids administered:  None  Medications administered:  None       Assessment:   · Rapid response called secondary to new onset of expressive aphasia and right-sided weakness  · Stroke alert called  · Left MCA M1 occlusion based on CTA of the head  · Positive aortic valve vegetation    Plan:   · Transferred to the intensive care unit  · CTA of the head  · Transfer to [place] for neurosurgery and possible thrombectomy     Rapid Response Outcome:   Transfer:  Transfer to ICU  Primary service notified of transfer: Yes    Code Status: Level 1 (Full Code)      Family notified of transfer: {YES  Family member contacted: Son     Background/Situation:   Rachana Hoyos is a 58 y o  male who was admitted for increasing malaise and leukocytosis, noticed with the septic arthritis in the left knee, elbow and neck patient was found to be bacteremic with MSSA  He recently underwent surgery to replace the hardware in his left knee  Earlier this morning the patient was noted to have [place] lesions likely secondary to endocarditis  This morning the patient was in his normal state of health  He was found around 1300 to the aphasic with a right-sided weakness and right facial droop  A rapid response was called and subsequently a stroke alert was called    Patient was taken to the CT scanner where he was found to have a left-sided M1 stroke  Patient is not a TNKase candidate secondary to septic emboli  Neuro was consulted, spoke to neurosurgery at [facility] and recommended the patient be transferred to [facility]  Patient will be prepared in the intensive care unit in [facility] for transfer  Review of Systems   Reason unable to perform ROS: Receptive and expressive aphasia  Objective:   Vitals:    01/14/23 1156 01/14/23 1212 01/14/23 1254 01/14/23 1305   BP: 114/55 128/63 105/50 121/53   BP Location:       Pulse: (!) 108 (!) 113  (!) 114   Resp:       Temp:   (!) 97 1 °F (36 2 °C)    TempSrc:       SpO2: 100% 100%  95%   Weight:       Height:         Physical Exam  Vitals reviewed  Constitutional:       Comments: Patient is awake, writhing around the bed unable to follow commands   HENT:      Head: Normocephalic and atraumatic  Nose: Nose normal       Mouth/Throat:      Mouth: Mucous membranes are moist       Pharynx: Oropharynx is clear  Eyes:      Extraocular Movements: Extraocular movements intact  Pupils: Pupils are equal, round, and reactive to light  Cardiovascular:      Rate and Rhythm: Tachycardia present  Pulses: Normal pulses  Heart sounds: Normal heart sounds  Pulmonary:      Effort: Pulmonary effort is normal       Breath sounds: Normal breath sounds  Comments: Increased secretion  Abdominal:      General: Abdomen is flat  Bowel sounds are normal       Palpations: Abdomen is soft  Musculoskeletal:      Cervical back: Normal range of motion and neck supple  Comments: Right-sided weakness   Skin:     General: Skin is warm and dry  Capillary Refill: Capillary refill takes 2 to 3 seconds  Neurological:      Mental Status: He is disoriented  Sensory: Sensory deficit present  Motor: Weakness present        Comments: Right facial droop, right-sided weakness, expressive and receptive aphasia         Portions of the record may have been created with voice recognition software  Occasional wrong word or "sound a like" substitutions may have occurred due to the inherent limitations of voice recognition software  Read the chart carefully and recognize, using context, where substitutions have occurred      Karen Clement

## 2023-01-14 NOTE — ANESTHESIA POSTPROCEDURE EVALUATION
Post-Op Assessment Note    CV Status:  Unstable       Mental Status:  Unresponsive   Hydration Status:  Stable   Airway Patency:  Patent  Airway: intubated       Staff: Anesthesiologist, CRNA   Comments: patient transported to cat scan icu team-all questions answered, remains on levophed a 6mcg/min and propofol at 25        No notable events documented      BP      Temp      Pulse     Resp      SpO2

## 2023-01-14 NOTE — ANESTHESIA PROCEDURE NOTES
Arterial Line Insertion  Performed by: Laura Mack MD  Authorized by: Laura Mack MD   Consent: The procedure was performed in an emergent situation  Written consent not obtained  Preparation: Patient was prepped and draped in the usual sterile fashion  Indications: multiple ABGs, respiratory failure and hemodynamic monitoring  Orientation:  Left  Location: radial artery  Sedation:  Patient sedated: under GA  Procedure Details:  Needle gauge: 20  Seldinger technique: Seldinger technique used  Number of attempts: 4    Post-procedure:  Post-procedure: dressing applied  Waveform: good waveform  Post-procedure CNS: normal  Patient tolerance: Patient tolerated the procedure well with no immediate complications  Comments: Multiple attempts, good flow, but unable to thread guidewire  Successful placement with U/S guidance

## 2023-01-14 NOTE — ASSESSMENT & PLAN NOTE
Partially from HCTZ/Lisinopril but also from acute illness  Likely some SIADH  Sodium has been stable

## 2023-01-14 NOTE — QUICK NOTE
I spoke to the patient's son to inform him that the patient did in fact have an acute stroke and would require treatment at [facility]   I did inform him that he was going directly to the procedure room and there would be transferred to the sixth floor ICU at [place]  He expressed understanding

## 2023-01-14 NOTE — CONSULTS
NEUROLOGY CONSULT     Reason for consult: Stroke alert     HPI:   As discussed in my stroke alert Luiza Stern from today        PMHx:  Past Medical History:   Diagnosis Date   • Arthritis     L knee   for L TKR today 2022   • Atopic dermatitis    • Condition not found     Neoplasm of [place]ut   • Diabetes mellitus ([facility] [address] )    • Hyperlipemia    • Hypertension    • Lateral epicondylitis, left elbow    • Wears partial dentures     upper   PSHx:   Past Surgical History:   Procedure Laterality Date   • APPENDECTOMY     • COLONOSCOPY     • CYST REMOVAL      Neck   • INCISION AND DRAINAGE OF WOUND Left 2023    Procedure: INCISION AND DRAINAGE (I&D) EXTREMITY;  Surgeon: Foreign Rose DO;  Location: [facility];  Service: Orthopedics   • JOINT REPLACEMENT Left 2022   • KNEE ARTHROSCOPY      menisectomy   • AK ARTHRP KNE CONDYLE&PLATU MEDIAL&LAT COMPARTMENTS Left 2022    Procedure: ARTHROPLASTY KNEE TOTAL;  Surgeon: Mary Carmen Bloom DO;  [facility];  Service: Orthopedics   • AK RMVL PROSTH TOT KNEE PROSTH MMA W/WO INSJ SPACER Left 2023    Procedure: REMOVAL / [address]/ POLY EXCHANGE;  Surgeon: Foreign Rose DO;  [facility];  Service: Orthopedics   MED: Home medications as listed below  ALL:   Allergies   Allergen Reactions   • Sulfamethoxazole-Trimethoprim Nausea Only and GI Intolerance   FHx:   Family History   Problem Relation Age of Onset   • Colon polyps Mother         Inflammatory   • Diabetes Father    SHx:   Social History     Socioeconomic History   • Marital status:       Spouse name: Not on file   • Number of children: Not on file   • Years of education: Not on file   • Highest education level: Not on file   Occupational History   • Not on file   Tobacco Use   • Smoking status: Former     Packs/day: 1 00     Years: 40 00     Pack years: 40 00     Types: Cigarettes     Quit date: 10/2021     Years since quittin 2   • Smokeless tobacco: Never   Vaping Use   • Vaping Use: Never used   Substance and Sexual Activity   • Alcohol use: Not Currently     Alcohol/week: 2 0 standard drinks     Types: 2 Cans of beer per week     Comment: Social   • Drug use: Never   • Sexual activity: Not on file     Comment: defer   Other Topics Concern   • Not on file   Social History Narrative   • Not on file     Social Determinants of Health     Financial Resource Strain: Not on file   Food Insecurity: Not on file   Transportation Needs: Not on file   Physical Activity: Not on file   Stress: Not on file   Social Connections: Not on file   Intimate Partner Violence: Not on file   Housing Stability: Not on file   ROS: Unable to provide due to aphasia       ===============  EXAM:  VS: Temp:  [97 1 °F (36 2 °C)-100 2 °F (37 9 °C)] 100 2 °F (37 9 °C)  HR:  [] 114  Resp:  [16-19] 16  BP: ()/(44-63) 121/53  SpO2:  [95 %-100 %] 95 %  NCAT  Blackish discoloration noted to toes  L knee in bandaged after recent I&D   MS: Does not produce any vocalizations  Does not follow simple commands (like to open/close eyes, stick out tongue, wiggle toes)  CN: BTT b/l  L gaze deviation and does not cross midline  Face seems symmetric though difficult to assess with head tilt  MOT: Spontaneous antigravity movements at least 4/5 noted in LUE and LLE  RUE and RLE are flaccid  No abnormal movements  SENS: RUE and RLE do not withdraw and he does not grimace to noxious on R side  He withdraws to noxious in LUE and LLE  CEREB/GAIT: N/a     ===============  LABS:     Recent CBCs and BCx as discussed in my QuickNote      ===============  IMAGIN/10/23 MRI c-spine +Gd: Imp:   Right C4-5 facet joint synovitis with marrow edema could be due to septic arthritis/osteomyelitis given clinical concern for infection  Inflammatory arthropathy with reactive marrow edema is in the differential   No other evidence of infection  No epidural abscess      23 MRI t-spine +Gd: Imp:   1  Mild spondylosis without cord compression or cord signal abnormality  2   No MR evidence of discitis/ostomy colitis in the visualized spine  3   Peripheral T2 hyperintense lesions in the spleen which may be enlarged  Differential diagnosis includes splenic mass lesions such as hemangioma or other lesion or perhaps a splenic infarct  Consider left upper quadrant ultrasound and/or contrast-enhanced CT of the abdomen for further assessment  4   Cavitary lesion with air-fluid level in the left lower lobe, possibly infectious process such as a pulmonary abscess  Differential diagnosis includes malignancy  CT of the chest advised  1/9/23 TTE: Imp:   •  Left Ventricle: Left ventricular cavity size is normal  by visual estimation  Systolic function is normal  Wall motion is normal  Diastolic function is mildly abnormal, consistent with grade I (abnormal) relaxation  •  Right Ventricle: Right ventricular cavity size is mildly dilated  Systolic function is normal   •  Left Atrium: The atrium is mildly dilated  •  Aortic Valve: There is at least mild regurgitation  There is aortic sclerosis  •  Mitral Valve: There is mild annular calcification  •  Tricuspid Valve: Pulmonary artery systolic pressures cannot be estimated due to lack of tricuspid regurgitation jet  •  Aorta: The aortic root is ectatic at 3 6 cm  The ascending aorta is ectatic at 3 9 cm  •  Compared to report from March 31, 2022, aortic valve velocities inconsistent with stenosis  There are no obvious echocardiographic indications of endocarditis     1/14/23 TTE: report pending  Per communication with me this reportedly shows AV vegetation  1/14/23 NCHCT: Imp: No acute intracranial abnormality      1/14/23 CTA head and neck: Imp:   Left M1 thromboembolism with reconstitution just distally  Focal occlusion of left MCA branch arising from the proximal segment  No other intracranial stenosis or large vessel occlusion    No significant stenosis of the cervical carotid or vertebral arteries  Incidental nonocclusive DVT in the high cervical right internal jugular vein  Remainder of the right IJ is patent  No intracranial venous thrombosis  Cavitary lesions in the left lung suspicious for septic emboli  No prior brain imaging for review      ===============  ELECTROPHYSIOLOGY:      No prior EEG or EMG/NCS for review      ===============  IMP:    Acute onset L MCA syndrome (global aphasia, L gaze, R hemiplegia, R hemianesthesia), found to have L M1 occlusion, not candidate for IV thrombolytics due to severe anemia (of unclear etiology) today requiring blood transfusion and endocarditis (making strokes extremely high risk for hemorrhagic conversion  NCHCT does not yet show developing ischemia  MSSA bacteremia and AV endocarditis with systemic embolism (e g , possible splenic infarcts, necrotic toe lesions / Zay Booker lesions, cervical osteomyelitis, septic arthritis, cavitary lung lesions)  Anemia  With hypotension and tachycardia today  Vascular risk factors including HTN, HLD, DM2, prior tobacco abuse (1ppd x40 years)    REC:  -Neurochecks with vital signs  -Keep HOB as flat as tolerated, IV fluids, permissive HTN  -Transfer to [facility] for evaluation for mechanical thrombectomy  -Found to have IJV DVT but not good candidate for anticoagulation at present time with endocarditis, suspected acute stroke, and severe anemia requiring transfusion   -On pravastatin  Not currently on antithrombotic agents    -Evaluation for etiology of anemia, along with tachycardia and hypotension per primary team; f/u CT c/a/p   -Neurology should be consulted at [facility] to follow   -Discussed with Dr Malcolm Turner of ICU, Dr Pretty Cummings hospitalist, and Dr Shady Escobar of [facility]    No current facility-administered medications on file prior to encounter       Current Outpatient Medications on File Prior to Encounter   Medication Sig Dispense Refill   • Blood Glucose Monitoring Suppl (OneTouch Verio Reflect) w/Device KIT Check blood sugars once daily  Please substitute with appropriate alternative as covered by patient's insurance  Dx: E11 65 1 kit 0   • budesonide-formoterol (Symbicort) 160-4 5 mcg/act inhaler Inhale 2 puffs 2 (two) times a day Rinse mouth after use  30 6 g 1   • co-enzyme Q-10 30 mg Take 30 mg by mouth daily     • cyclobenzaprine (FLEXERIL) 10 mg tablet Take 1 tablet (10 mg total) by mouth 3 (three) times a day as needed for muscle spasms 20 tablet 0   • Empagliflozin (Jardiance) 10 MG TABS tablet Take 1 tablet (10 mg total) by mouth daily 90 tablet 1   • fenofibrate (TRICOR) 145 mg tablet Take 1 tablet (145 mg total) by mouth daily 90 tablet 1   • ferrous sulfate 324 (65 Fe) mg Take 1 tablet (324 mg total) by mouth in the morning 30 tablet 1   • FOLIC ACID PO Take 1 capsule by mouth daily     • glucose blood (OneTouch Verio) test strip Check blood sugars once daily  Please substitute with appropriate alternative as covered by patient's insurance  Dx: E11 65 100 each 3   • lisinopril-hydrochlorothiazide (PRINZIDE,ZESTORETIC) 20-25 MG per tablet Take 1 tablet by mouth daily 90 tablet 1   • metFORMIN (GLUCOPHAGE) 1000 MG tablet Take 1 tablet (1,000 mg total) by mouth 2 (two) times a day with meals 180 tablet 1   • methylPREDNISolone 4 MG tablet therapy pack Use as directed on package 1 each 0   • metoprolol succinate (TOPROL-XL) 100 mg 24 hr tablet Take 1 tablet (100 mg total) by mouth daily 90 tablet 1   • Multiple Vitamin (multivitamin) tablet Take 1 tablet by mouth daily 30 tablet 1   • omega-3-acid ethyl esters (LOVAZA) 1 g capsule TAKE 2 CAPSULES TWICE DAILY (Patient taking differently: Take 2 g by mouth 2 (two) times a day) 360 capsule 1   • OneTouch Delica Lancets 80N MISC Check blood sugars once daily  Please substitute with appropriate alternative as covered by patient's insurance   Dx: E11 65 100 each 3   • rosuvastatin (CRESTOR) 10 MG tablet Take 1 tablet (10 mg total) by mouth daily 90 tablet 3   • sildenafil (VIAGRA) 100 mg tablet Take one tablet daily as needed (Patient taking differently: Take 100 mg by mouth as needed Take one tablet daily as needed) 20 tablet 2   • acetaminophen (TYLENOL) 325 mg tablet Take 3 tablets (975 mg total) by mouth every 8 (eight) hours (Patient not taking: Reported on 10/25/2022)  0   • albuterol (PROAIR HFA) 90 mcg/act inhaler Inhale 2 puffs every 6 (six) hours as needed for wheezing (Patient not taking: Reported on 1/7/2023) 5 Inhaler 5   • Ascorbic Acid (VITAMIN C PO) Take 1 capsule by mouth daily (Patient not taking: Reported on 1/7/2023)     • aspirin (ECOTRIN) 325 mg EC tablet Take 1 tablet (325 mg total) by mouth 2 (two) times a day Take with food   (Patient not taking: Reported on 10/25/2022) 84 tablet 0   • betamethasone dipropionate (DIPROSONE) 0 05 % cream Apply topically 2 (two) times a day (Patient not taking: Reported on 1/7/2023) 30 g 0   • docusate sodium (COLACE) 100 mg capsule Take 1 capsule (100 mg total) by mouth 2 (two) times a day (Patient not taking: Reported on 10/25/2022) 20 capsule 0   • gabapentin (NEURONTIN) 100 mg capsule Take 1 capsule (100 mg total) by mouth every 8 (eight) hours (Patient not taking: Reported on 7/28/2022) 90 capsule 0

## 2023-01-14 NOTE — ASSESSMENT & PLAN NOTE
Multiple sources of infection, knee, elbow and neck  Patient is bacteremic with MSSA    Appreciate orthopedics and infectious disease help   Patient underwent polyexchange of total knee replacement and I&D of elbow abscess by orthopedics    MRI cervical spine is concerning for possible C4 - C5 septic arthritis and osteomyelitis   I spoke with neurosurg  There is no indication for surgery and he does not need a hard c collar    Called this morning with borderline low blood pressures and black spots on his right toes  Concerning for septic emboli    Initiated sepsis boluses  But I am more concerned that he could have valvular insufficiency as he almost certainly has endocarditis  Check lactic acid and procalcitonin  Spoke with cardiology who will do a stat echocardiogram  Spoke with infectious disease and critical care  He suggested contacting vascular surgery to see if we need anticoagulation  Vascular surgery will see him but they feel he does not likely need anticoagulation with septic emboli    I will move him to level 2 stepdown

## 2023-01-14 NOTE — DISCHARGE INSTRUCTIONS
Today, you underwent a cerebral angiogram under the care of Dr Jose Wilson for evaluation of stroke  ? The following instructions will help you care for yourself, or be cared for upon your return home today  These are guidelines for your care right after your surgery only  ? Notify Your Doctor or Nurse if you have any of the following:  ? SYMPTOMS OF WOUND INFECTION--   Increased pain in or around the incision   Swelling around the incision  Any drainage from the incision  Incision separates or opens up  Warmth in the tissues around the incision  Redness or tenderness on the skin near the incision   Fever (temperature greater than 101 degrees F)   ? NEUROLOGICAL CHANGES--  Change in alertness  Increased sleepiness   Nausea and vomiting   New onset of numbness or weakness in arms or legs   New problems with your bowels or bladder  New or worse problems with balance or walking  Seizures, new or worsening  ? UNRELIEVED HEADACHE PAIN--  New or increased pain unrelieved with pain medications   Pain associated with nausea and vomiting   Pain associated with other symptoms  ? QUESTIONS OR PROBLEMS--  Any questions or problems that you are unsure about  Wound Care:  Keep Incision Clean and Dry   You may shower daily, but do not soak incision  Pat dry after showering  No tub baths, soaking, swimming for 1 week after angiogram    You do not need to cover the incision  Mild to moderate bruising and tenderness to the site is expected and may last up to 1-2 weeks after your procedure  ?  A closure device was placed at the catheter insertion site  This is MRI compatible  Remove the dressing 24 hours after your procedure  If your groin site is bleeding, apply firm pressure for 10 minutes  Reinforce dressing rather than removing and checking frequently  If continues to bleed through the dressing after 1 hour, contact your neurosurgeon's office  Anticipatory Education:  ?   PAIN MED W/ Acetaminophen (Tylenol)  --IF a prescription for pain medicine has been sent home with you:  --Narcotic pain medication may cause constipation  Be sure to take stool softeners or laxatives while you are on narcotic pain medication  --Do not drive after taking prescription pain medicine  ?  If this medicine is too strong, or no longer necessary, or we did NOT recommend/prescribe oral narcotics, you may take:   - Tylenol Extra-strength/Acetaminophen, 2 tablets every 4-6 hours as needed for mild pain  DO NOT TAKE MORE THAN 4000MG PER DAY from combined sources  NOTE: Remember to eat when taking pain medicines in order to avoid nausea  Watch for constipation  Eat plenty of fruits, vegetables, juices, and drink 6-8 glasses of water each day  Constipation: Stay active and drink at least 6-8 cups of fluid each day to prevent constipation  If you need a laxative or stool softener follow the package directions or consult with your local pharmacists if you have questions  ? After anesthesia, rest for 24 hours  Do not drive, drink alcohol beverages or make any important decisions during this time  General anesthesia may cause sore throat, jaw discomfort or muscle aches  These symptoms can last for one or two days  Activity: Please follow these instructions:  Advance your activity as you can tolerate  You may do light house work; nothing strenuous   You may walk all you want  You may go up and down the steps  Use the railing for support  Do not do excessive bending, straining or heavy lifting for 48 hours after your procedure  Do not drive or return to work until you are instructed   It is normal for your energy level and sleep patterns to change after surgery  Get extra sleep at night and take naps during the day to help you feel less tired  Take rest periods during the day  Complete recovery may take several weeks  ?  You may resume driving after 35-17 hours recovery  You may return to work after 48 hours of recovery     ?  Diet:  Your doctor has recommended that you follow these diet instructions at home  Refer to the patient education materials you received during your hospital stay  If you would like more nutrition counseling, ask your doctor about making an appointment with an outpatient dietitian  Resume your home diet  ? Medications:  Please resume your home medications as instructed  ? Home Supplies and Equipment:  none  Additional Contacts:  ? CONTACTS FOR NEUROSURGERY: You may call your neurosurgeon’s office if you have questions between 8:30 am and 4:30 pm  You may request to speak to the nurse practitioner who is available Monday through Friday  ?  For off hours or the weekend you may call your neurosurgeon's office to leave a message

## 2023-01-14 NOTE — QUICK NOTE
Stroke alert note:    Spoke with hospitalist    Lavell Wharton about 13:00  Came back from bathroom, and noted to have R facial droop, R arm weakness, mumbling  Also noted to have L gaze preference  (Though on current CTA it appears that he is gazing to the R side!)     Before that was full strength and speaking normally    MSSA bacteremia  BCx 1/11/23 growing Staph aureus  Suspected endocarditis, hasn't had TTE yet (plan for 1/16)  L elbow cellulitis and drainage s/p I&D 1/9  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 1/9  Neck pain  MRI c-spine 1/10/23 showing R C4-5 synovitis with marrow edema, possibly septic arthritis / osteomyelitis  MRI t-spine 1/13/23 showing LLL cavitary lung lesion, possible pulmonary abscess  Has not yet had CT chest    Has been noted to have septic embolic to feet today  [place] lesions on feet previously noted by ID, c/w SBE  Hgb/Hct also noted to be dropping over past 24 hrs  Hgb/Hct 7 2 L / 21 3 at 12:03pm, down from 8 8 / 25 8 at 9:47am, down from 12 1/34 6 at 4:40am, and being transfused pRBCs  Of note, he has been tachycardic (to 110s) and hypotensive to low of 85/44 this morning  Not a candidate for IV thrombolytics because of likely endocarditis with systemic embolism (strokes due to endocarditis are very high risk for hemorrhagic conversion with lytic agents) and severe anemia requiring blood transfusion today (and not clear source for anemia identified yet) with tachycardia and hypotension  Recommended CTA head and neck in addition to the NCHCT to assess for LVO as well as to assess for mycotic aneurysm  --> CTA shows focal L mid-M1 occlusion as well as IJV DVT  I spoke to radiologist who confirmed my impression of L M1 occlusion and I contacted patient access center at 13:43 to connect me to neurosurgery / Chance Rios (Dr Марина Villa) and I recommended transfer to [facility] for CTP and potential thrombectomy   Convetional cerebral angiogram would also be helpful to r/o cerebral mycotic aneurysms  Maria Del Rosario Mcgowan      --------------------------    I discussed patient again with both Dr Alene Bloch of ICU team and with Dr Grupo Terrell of [address] [place] [address]  In agreement for transfer to hospitals for mechanical thrombectomy for L M1 occlusion; there is flow past the occlusion  I suspect may not need CTP given <6 hrs from LKW time  Per my discussion with Dr Alene Bloch, on TTE today (not yet formally read) there is concern for aortic valve vegetation       Maria Del Rosario Mcgowan

## 2023-01-14 NOTE — H&P
H&P Exam - Critical Care   Deepti Sommer 58 y o  male MRN: [id number]  Unit/Bed#: ICU OVR Encounter: [id number]      -------------------------------------------------------------------------------------------------------------  Chief Complaint: Left MCA M1 thrombus    History of Present Illness     Deepti Sommer is a 58 y o  male with PMH of left TKA, T2DM, hypertension, hyperlipidemia who presented to [facility] on 1/7 for concern of persistent malaise and generalized weakness since end of December  Initially evaluated by urgent care and PCP and ultimately treated with 1 week of Keflex for possible left-elbow cellulitis  With ongoing symptoms, he presented to the ED for evaluation, and admitted for management of cellulitis with concern for central infection such as endocarditis and/or bacteremia  Blood cultures ultimately positive for MSSA and ID was consulted for further evaluation  Patient initially on cefazolin and then transitioned to Nafcillin for neuro penetration on 1/14 with new onset neuro symptoms  Patient's hospitalization also complicated by L elbow cell;ulitis and abscess s/p I&D, 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 and polyechange on 1/9, C4-C5 septic arthritis vs  Osteomyelitis, splenic infarcts, pulmonary cavitary lesions, and most recently (AM of transfer) echo demonstrating aortic valve vegetations  On afternoon 1/14, rapid response was called due to acute change in neuro status with right facial droop, right-sided weakness, expressive/receptive aphasia  CTH negative  CTA HN with L MCA M1 occlusion  Endovascular alert called and patient priority transferred to [facility] for neurosurg/IR intervention and post procedural monitoring in ICU  Imaging also noted nonocclusive right IJ DVT, however, with concern for septic emboli and new anemia patient not a candidate for anticoagulation per neurology  Of note, patient with recent travel to [facility] prior to presentation   Per chart review he had injury to R elbow by rock during kayaking trip  Initially had pain, but after returning home he noticed redness and swelling to the area  Also noted that he had pustular lesions on R foot  History obtained from chart review  At time of my evaluation, patient intubated and sedated  Not following commands  S/p thrombectomy with IR with restoration of TICI 2b flow   Repeat CTH read pending     -------------------------------------------------------------------------------------------------------------  Assessment and Plan:    Neuro:   • Diagnosis: L MCA M1 Thrombus  o Assessment:  - Initial NIH at 13:59 - 23  - Repeat NIH on arrival to endovascular suite 29  - 1/14 CTH: no acute intracranial abnormality  - 1/14 CTA HN: left MCA M1 thromboembolism, incidental nonocclusive DVT in the high cervical right IJ vein  o Plan:  - Status post IR thrombectomy  - Per IR RN documentation 3 passes with restoration of TICI 2b flow  - SBP <140  - Levophed to maintain MAP >65  - Stroke pathway - MRI ordered  - Hold AC/AP in concern for septic emboli and newly diagnosed anemia  • Diagnosis: Sedation and analgesia  o Assessment:  - Patient maintained on gabapentin 100mg Q8 and PRN flexeril  o Plan:  - Propofol  - PRN fentanyl      CV:   • Diagnosis: Aortic Valve Endocarditis  o Assessment:  - Noted on repeat echocardiogram completed 1/14 -aortic valve with thickening and densities noted on the tips of the cusps highly suspicious for vegetations with associated moderate aortic valve regurgitation, no evidence of abscess, trace pericardial effusion  - Compared to previous echocardiogram completed in 1/9/2020 2 aortic valve vegetations described as new  o Plan:  - Continue Nafcillin  - Consider KALINA  - ID following  - Continuous telemetry  • Diagnosis: Hypotension  o Assessment:  - Possibly 2/2 infection vs  sedation  o Plan:  - Continue levophed with MAP goal >65  • Diagnosis: Hypertension  o Assessment:  - Home meds: lisinopril-HCTZ 20-25, metoprolol succinate 100mg daily  o Plan:  - Holding home medications in setting of hypotension  • Diagnosis: Hyperlipidemia  o Plan:  - Resume home crestor, fenofibrate when tolerating PO  - Holding home Lovaza      Pulm:  • Diagnosis: Mechanical Ventilation  o Plan:  - Continue ventilator support  - Current settings: 60/520/22/6  • Diagnosis: Pulmonary Cavitary Lesion  o Assessment  - Noted on CTA HN - suspicious for septic emboli  o Plan:  - Continue to monitor respiratory status  - Antibiotics as detailed below  - Continue mechanical ventilation as above  • Diagnosis: COPD vs  Asthma  o Assessment:  - No spirometry or PFT's in chart  - Managed outpatient on albuterol and symbicort for documented COPD v Asthma  o Plan:  - Atrovent and Xopenex      GI:   • Diagnosis: Hypodensity of spleen  o Assessment:  - 1/14 CT AP noted Smooth peripherally calcified hypodensity in the anterior spleen measuring 4 4 x 4 6 x 4 8 cm due to prior infection   o Plan:  - Consider repeat imaging with contrast       :   • Diagnosis: Abnormal Urinalysis  o Assessment:  - Pyuria with positive culture (EColi) and penile irritation  o Plan:  - ID following - believe to be asymptomatic bacteruria   - Negative STI testing (RPR, Urine GC)  - Repeat STI testing in outpatient setting  • Diagnosis: Hyponatremia  o Assessment:  - Patient with Na 129 on presentation, baseline high 130's  - Decreased to 125 on 1/13 and remains 125  o Plan:  - S/p fluid resuscitation  - Repeat labs  - Continue to follow daily BMP      F/E/N:   • Plan:  o Fluid:continue LR, follow up repeat labs and bolus as needed  o Electrolytes: hyponatremia as detailed above, monitor and replete for K >4, P >3, Mg >2  o Nutrition:NPO      Heme/Onc:   • Diagnosis: Anemia  o Assessment:  - Patient noted to have acute drop in Hgb from 12 1>8 8 with repeat 7 2  - Received 2u uncrossed blood products prior to transfer  o Plan:  - Recheck CBC post procedure  • Diagnosis: R IJ Nonocclusive DVT  o Plan:  - Not candidate for anticoagulation with septic emboli      Endo:   • Diagnosis: T2DM  o Assessment:  - Home regimen: metformin 1000mg BID, jardiance 10mg daily  o Plan:  - SSI and POC glucose checks ordered      ID:   • Diagnosis: Sepsis  o Assessment  - Etiology includes bacteremia, aortic valve endocarditis, cellulitis R elbow, L knee infection, pulmonary cavitation  - Lactate elevated to 8 9 on repeat labs at 2:00PM from 2 9 at 9:47am  o Plan:  - Continue Nafcillin per ID  - Follow up repeat blood cultures  - Tylenol scheduled for fever  - PRN ibuprofen - monitor BMP      MSK/Skin:   • Diagnosis: L total knee replacement  o Assessment  - Initially done 6/28/22  o Plan:  - S/p removal with debridement of prosthesis with poly exchange  - Inserted stimulan abx beads  - Ortho following - appreciate recommendations  • Diagnosis: R elbow cellulitis  o Plan:  - S/p I&D of R elbow  - Continue antibiotics as above  • Diagnosis: Septic Arthritis vs  Osteomyelitis  o Plan:  - Neurosurgery consulted prior to transfer  - No surgical intervention anticipated at this time  - Follow up in 4 weeks with upright XR cervical spine      Disposition: Admit to Critical Care   Code Status: Prior  --------------------------------------------------------------------------------------------------------------  Review of Systems   Unable to perform ROS: Intubated       Physical Exam  Vitals reviewed  Constitutional:       General: He is not in acute distress  Appearance: He is ill-appearing  HENT:      Head: Normocephalic and atraumatic  Right Ear: External ear normal       Left Ear: External ear normal       Nose: Nose normal       Mouth/Throat:      Mouth: Mucous membranes are moist       Pharynx: Oropharynx is clear  Eyes:      General:         Right eye: No discharge  Left eye: No discharge        Conjunctiva/sclera: Conjunctivae normal       Pupils: Pupils are equal, round, and reactive to light  Cardiovascular:      Rate and Rhythm: Normal rate and regular rhythm  Heart sounds: Murmur heard  Pulmonary:      Effort: Pulmonary effort is normal  No respiratory distress  Breath sounds: Wheezing and rhonchi present  Abdominal:      General: Bowel sounds are normal  There is distension  Palpations: Abdomen is soft  Tenderness: There is no abdominal tenderness  Skin:     General: Skin is warm and dry  Coloration: Skin is not jaundiced  Findings: Bruising and lesion (bluish purple bruising on B/L feet, black  discoloration of R 4th and 5th toes) present  Neurological:      Comments: Unable to assess 2/2 intubation and sedation   Psychiatric:      Comments: Unable to assess 2/2 intubation and sedation       --------------------------------------------------------------------------------------------------------------  Vitals: There were no vitals filed for this visit  Temp  Min: 97 1 °F (36 2 °C)  Max: 100 2 °F (37 9 °C)        There is no height or weight on file to calculate BMI      Laboratory and Diagnostics:  Results from last 7 days   Lab Units 01/14/23  1203 01/14/23  0947 01/14/23  0440 01/13/23  0656 01/12/23  0635 01/11/23  0513 01/10/23  0441 01/09/23  0555   WBC Thousand/uL  --  22 40* 19 37* 18 41* 16 86* 14 64* 11 39* 13 33*   HEMOGLOBIN g/dL 7 2* 8 8* 12 1 12 3 12 8 13 7 12 1 14 1   HEMATOCRIT % 21 3* 25 8* 34 6* 36 1* 36 8 41 0 36 6 41 3   PLATELETS Thousands/uL  --  183 194 191 221 227 227 257   NEUTROS PCT %  --   --  88* 86* 87* 84*  --   --    MONOS PCT %  --   --  5 6 6 7  --   --    MONO PCT %  --   --   --   --   --   --   --  14*     Results from last 7 days   Lab Units 01/14/23  0440 01/13/23  0656 01/12/23  0635 01/11/23  0513 01/10/23  0441 01/09/23  0555 01/08/23  0523   SODIUM mmol/L 125* 125* 130* 131* 131* 129* 131*   POTASSIUM mmol/L 4 1 3 9 4 1 4 6 4 5 4 3 3 9   CHLORIDE mmol/L 94* 95* 97 96 98 95* 95* CO2 mmol/L 22 21 21 18* 25 21 20*   ANION GAP mmol/L 9 9 12 17* 8 13 16*   BUN mg/dL 20 19 24 29* 38* 30* 26*   CREATININE mg/dL 0 90 0 79 0 86 0 92 1 10 0 98 0 92   CALCIUM mg/dL 8 9 9 1 9 3 9 2 8 7 9 6 9 7   GLUCOSE RANDOM mg/dL 195* 232* 254* 214* 227* 274* 175*   ALT U/L 17 15 14 17 22  --  19   AST U/L 51* 43 55* 59* 26  --  23   ALK PHOS U/L 110 101 98 104 87  --  120*   ALBUMIN g/dL 1 7* 1 8* 1 8* 2 0* 1 8*  --  2 3*   TOTAL BILIRUBIN mg/dL 0 73 0 49 0 54 0 59 0 58  --  0 82     Results from last 7 days   Lab Units 01/14/23  0440 01/13/23  0656 01/12/23  0635 01/11/23  0513   MAGNESIUM mg/dL 1 6 1 8 1 6 1 7      Results from last 7 days   Lab Units 01/14/23  0947   INR  1 50*          Results from last 7 days   Lab Units 01/14/23  1404 01/14/23  0947   LACTIC ACID mmol/L 8 9* 2 9*     ABG:    VBG:    Results from last 7 days   Lab Units 01/14/23  0947   PROCALCITONIN ng/ml 3 20*       Micro:  Results from last 7 days   Lab Units 01/13/23  0657 01/11/23  0728 01/09/23  1836 01/09/23  0743 01/09/23  0601 01/07/23  1554 01/07/23  1538   BLOOD CULTURE  No Growth at 24 hrs  Staphylococcus aureus*  Staphylococcus aureus*  --   --  No Growth After 5 Days  No Growth After 5 Days  --  Staphylococcus aureus*  Staphylococcus aureus*   GRAM STAIN RESULT  Gram positive cocci in clusters* Gram positive cocci in clusters*  Gram positive cocci in clusters* No Polys or Bacteria seen 3+ Polys  No bacteria seen  --   --  Gram positive cocci in clusters*  Gram positive cocci in clusters*   URINE CULTURE   --   --   --   --   --  >100,000 cfu/ml Escherichia coli ESBL*  --    BODY FLUID CULTURE, STERILE   --   --   --  No growth  --   --   --    MRSA CULTURE ONLY   --   --   --   --  No Methicillin Resistant Staphlyococcus aureus (MRSA) isolated  --   --        EKG: sinus tachycardia  Imaging: I have personally reviewed pertinent reports     and I have personally reviewed pertinent films in PACS    Historical Information Past Medical History:   Diagnosis Date   • Arthritis     L knee   for L TKR today 2022   • Atopic dermatitis    • Condition not found     Neoplasm of [place]   • Diabetes mellitus ([address] )    • Hyperlipemia    • Hypertension    • Lateral epicondylitis, left elbow    • Wears partial dentures     upper     Past Surgical History:   Procedure Laterality Date   • APPENDECTOMY     • COLONOSCOPY     • CYST REMOVAL      Neck   • INCISION AND DRAINAGE OF WOUND Left 2023    Procedure: INCISION AND DRAINAGE (I&D) EXTREMITY;  Surgeon: Zan Carter DO;  [facility]: [facility];  Service: Orthopedics   • JOINT REPLACEMENT Left 2022   • KNEE ARTHROSCOPY      menisectomy   • VT ARTHRP KNE CONDYLE&PLATU MEDIAL&LAT COMPARTMENTS Left 2022    Procedure: ARTHROPLASTY KNEE TOTAL;  Surgeon: Rafael Saldivar DO;  Location: [facility];  Service: Orthopedics   • VT RMVL PROSTH TOT KNEE PROSTH MMA W/WO INSJ SPACER Left 2023    Procedure: REMOVAL / DEBRIDEMENT PROSTHESIS KNEE W/ POLY EXCHANGE;  Surgeon: Zan Carter DO;  Location: [facility];  Service: Orthopedics     Social History   Social History     Substance and Sexual Activity   Alcohol Use Not Currently   • Alcohol/week: 2 0 standard drinks   • Types: 2 Cans of beer per week    Comment: Social     Social History     Substance and Sexual Activity   Drug Use Never     Social History     Tobacco Use   Smoking Status Former   • Packs/day: 1 00   • Years: 40 00   • Pack years: 40 00   • Types: Cigarettes   • Quit date: 10/2021   • Years since quittin 2   Smokeless Tobacco Never       Family History   Problem Relation Age of Onset   • Colon polyps Mother         Inflammatory   • Diabetes Father        Medications:  No current facility-administered medications for this encounter  Home medications:  Prior to Admission Medications   Prescriptions Last Dose Informant Patient Reported? Taking?    Ascorbic Acid (VITAMIN C PO)   Yes No   Sig: Take 1 capsule by mouth daily   Patient not taking: Reported on 1/7/2023   Blood Glucose Monitoring Suppl (OneTouch Verio Reflect) w/Device KIT   No No   Sig: Check blood sugars once daily  Please substitute with appropriate alternative as covered by patient's insurance  Dx: E11 65   Empagliflozin (Jardiance) 10 MG TABS tablet   No No   Sig: Take 1 tablet (10 mg total) by mouth daily   FOLIC ACID PO   Yes No   Sig: Take 1 capsule by mouth daily   Multiple Vitamin (multivitamin) tablet   No No   Sig: Take 1 tablet by mouth daily   OneTouch Delica Lancets 11C MISC   No No   Sig: Check blood sugars once daily  Please substitute with appropriate alternative as covered by patient's insurance  Dx: E11 65   acetaminophen (TYLENOL) 325 mg tablet   No No   Sig: Take 3 tablets (975 mg total) by mouth every 8 (eight) hours   Patient not taking: Reported on 10/25/2022   albuterol (PROAIR HFA) 90 mcg/act inhaler   No No   Sig: Inhale 2 puffs every 6 (six) hours as needed for wheezing   Patient not taking: Reported on 1/7/2023   aspirin (ECOTRIN) 325 mg EC tablet   No No   Sig: Take 1 tablet (325 mg total) by mouth 2 (two) times a day Take with food  Patient not taking: Reported on 10/25/2022   betamethasone dipropionate (DIPROSONE) 0 05 % cream   No No   Sig: Apply topically 2 (two) times a day   Patient not taking: Reported on 1/7/2023   budesonide-formoterol (Symbicort) 160-4 5 mcg/act inhaler   No No   Sig: Inhale 2 puffs 2 (two) times a day Rinse mouth after use     co-enzyme Q-10 30 mg   Yes No   Sig: Take 30 mg by mouth daily   cyclobenzaprine (FLEXERIL) 10 mg tablet   No No   Sig: Take 1 tablet (10 mg total) by mouth 3 (three) times a day as needed for muscle spasms   docusate sodium (COLACE) 100 mg capsule   No No   Sig: Take 1 capsule (100 mg total) by mouth 2 (two) times a day   Patient not taking: Reported on 10/25/2022   fenofibrate (TRICOR) 145 mg tablet   No No   Sig: Take 1 tablet (145 mg total) by mouth daily ferrous sulfate 324 (65 Fe) mg   No No   Sig: Take 1 tablet (324 mg total) by mouth in the morning   gabapentin (NEURONTIN) 100 mg capsule   No No   Sig: Take 1 capsule (100 mg total) by mouth every 8 (eight) hours   Patient not taking: Reported on 7/28/2022   glucose blood (OneTouch Verio) test strip   No No   Sig: Check blood sugars once daily  Please substitute with appropriate alternative as covered by patient's insurance  Dx: E11 65   lisinopril-hydrochlorothiazide (PRINZIDE,ZESTORETIC) 20-25 MG per tablet   No No   Sig: Take 1 tablet by mouth daily   metFORMIN (GLUCOPHAGE) 1000 MG tablet   No No   Sig: Take 1 tablet (1,000 mg total) by mouth 2 (two) times a day with meals   methylPREDNISolone 4 MG tablet therapy pack   No No   Sig: Use as directed on package   metoprolol succinate (TOPROL-XL) 100 mg 24 hr tablet   No No   Sig: Take 1 tablet (100 mg total) by mouth daily   omega-3-acid ethyl esters (LOVAZA) 1 g capsule   No No   Sig: TAKE 2 CAPSULES TWICE DAILY   Patient taking differently: Take 2 g by mouth 2 (two) times a day   rosuvastatin (CRESTOR) 10 MG tablet   No No   Sig: Take 1 tablet (10 mg total) by mouth daily   sildenafil (VIAGRA) 100 mg tablet   No No   Sig: Take one tablet daily as needed   Patient taking differently: Take 100 mg by mouth as needed Take one tablet daily as needed      Facility-Administered Medications: None     Allergies: Allergies   Allergen Reactions   • Sulfamethoxazole-Trimethoprim Nausea Only and GI Intolerance     ------------------------------------------------------------------------------------------------------------  Advance Directive and Living Will:      Power of :    POLST:    ------------------------------------------------------------------------------------------------------------  Anticipated Length of Stay is > 2 midnights      Telly Ritter,         Portions of the record may have been created with voice recognition software    Occasional wrong word or "sound a like" substitutions may have occurred due to the inherent limitations of voice recognition software    Read the chart carefully and recognize, using context, where substitutions have occurred

## 2023-01-15 NOTE — PLAN OF CARE
Problem: Prexisting or High Potential for Compromised Skin Integrity  Goal: Skin integrity is maintained or improved  Description: INTERVENTIONS:  - Identify patients at risk for skin breakdown  - Assess and monitor skin integrity  - Assess and monitor nutrition and hydration status  - Monitor labs   - Assess for incontinence   - Turn and reposition patient  - Assist with mobility/ambulation  - Relieve pressure over bony prominences  - Avoid friction and shearing  - Provide appropriate hygiene as needed including keeping skin clean and dry  - Evaluate need for skin moisturizer/barrier cream  - Collaborate with interdisciplinary team   - Patient/family teaching  - Consider wound care consult   Outcome: Progressing     Problem: MOBILITY - ADULT  Goal: Maintain or return to baseline ADL function  Description: INTERVENTIONS:  -  Assess patient's ability to carry out ADLs; assess patient's baseline for ADL function and identify physical deficits which impact ability to perform ADLs (bathing, care of mouth/teeth, toileting, grooming, dressing, etc )  - Assess/evaluate cause of self-care deficits   - Assess range of motion  - Assess patient's mobility; develop plan if impaired  - Assess patient's need for assistive devices and provide as appropriate  - Encourage maximum independence but intervene and supervise when necessary  - Involve family in performance of ADLs  - Assess for home care needs following discharge   - Consider OT consult to assist with ADL evaluation and planning for discharge  - Provide patient education as appropriate  Outcome: Progressing  Goal: Maintains/Returns to pre admission functional level  Description: INTERVENTIONS:  - Perform BMAT or MOVE assessment daily    - Set and communicate daily mobility goal to care team and patient/family/caregiver  - Collaborate with rehabilitation services on mobility goals if consulted  - Perform Range of Motion 8 times a day    - Reposition patient every 2 hours   - Record patient progress and toleration of activity level   Outcome: Progressing     Problem: Potential for Falls  Goal: Patient will remain free of falls  Description: INTERVENTIONS:  - Educate patient/family on patient safety including physical limitations  - Instruct patient to call for assistance with activity   - Consult OT/PT to assist with strengthening/mobility   - Keep Call bell within reach  - Keep bed low and locked with side rails adjusted as appropriate  - Keep care items and personal belongings within reach  - Initiate and maintain comfort rounds  - Make Fall Risk Sign visible to staff  - Offer Toileting every 2 Hours, in advance of need  - Initiate/Maintain Bed/Chair alarm  - Obtain necessary fall risk management equipment  - Apply yellow socks and bracelet for high fall risk patients  - Consider moving patient to room near nurses station  Outcome: Progressing     Problem: PAIN - ADULT  Goal: Verbalizes/displays adequate comfort level or baseline comfort level  Description: Interventions:  - Encourage patient to monitor pain and request assistance  - Assess pain using appropriate pain scale  - Administer analgesics based on type and severity of pain and evaluate response  - Implement non-pharmacological measures as appropriate and evaluate response  - Consider cultural and social influences on pain and pain management  - Notify physician/advanced practitioner if interventions unsuccessful or patient reports new pain  Outcome: Progressing     Problem: INFECTION - ADULT  Goal: Absence or prevention of progression during hospitalization  Description: INTERVENTIONS:  - Assess and monitor for signs and symptoms of infection  - Monitor lab/diagnostic results  - Monitor all insertion sites, i e  indwelling lines, tubes, and drains  - Monitor endotracheal if appropriate and nasal secretions for changes in amount and color  - [place] appropriate cooling/warming therapies per order  - Administer medications as ordered  - Instruct and encourage patient and family to use good hand hygiene technique  - Identify and instruct in appropriate isolation precautions for identified infection/condition  Outcome: Progressing  Goal: Absence of fever/infection during neutropenic period  Description: INTERVENTIONS:  - Monitor WBC    Outcome: Progressing     Problem: SAFETY ADULT  Goal: Maintain or return to baseline ADL function  Description: INTERVENTIONS:  -  Assess patient's ability to carry out ADLs; assess patient's baseline for ADL function and identify physical deficits which impact ability to perform ADLs (bathing, care of mouth/teeth, toileting, grooming, dressing, etc )  - Assess/evaluate cause of self-care deficits   - Assess range of motion  - Assess patient's mobility; develop plan if impaired  - Assess patient's need for assistive devices and provide as appropriate  - Encourage maximum independence but intervene and supervise when necessary  - Involve family in performance of ADLs  - Assess for home care needs following discharge   - Consider OT consult to assist with ADL evaluation and planning for discharge  - Provide patient education as appropriate  Outcome: Progressing  Goal: Maintains/Returns to pre admission functional level  Description: INTERVENTIONS:  - Perform BMAT or MOVE assessment daily    - Set and communicate daily mobility goal to care team and patient/family/caregiver  - Collaborate with rehabilitation services on mobility goals if consulted  - Perform Range of Motion 8 times a day  - Reposition patient every 2 hours    - Record patient progress and toleration of activity level   Outcome: Progressing  Goal: Patient will remain free of falls  Description: INTERVENTIONS:  - Educate patient/family on patient safety including physical limitations  - Instruct patient to call for assistance with activity   - Consult OT/PT to assist with strengthening/mobility   - Keep Call bell within reach  - Keep bed low and locked with side rails adjusted as appropriate  - Keep care items and personal belongings within reach  - Initiate and maintain comfort rounds  - Make Fall Risk Sign visible to staff  - Offer Toileting every 2 Hours, in advance of need  - Initiate/Maintain Bed/Chair alarm  - Obtain necessary fall risk management equipment  - Apply yellow socks and bracelet for high fall risk patients  - Consider moving patient to room near nurses station  Outcome: Progressing     Problem: DISCHARGE PLANNING  Goal: Discharge to home or other facility with appropriate resources  Description: INTERVENTIONS:  - Identify barriers to discharge w/patient and caregiver  - Arrange for needed discharge resources and transportation as appropriate  - Identify discharge learning needs (meds, wound care, etc )  - Arrange for interpretive services to assist at discharge as needed  - Refer to Case Management Department for coordinating discharge planning if the patient needs post-hospital services based on physician/advanced practitioner order or complex needs related to functional status, cognitive ability, or social support system  Outcome: Progressing     Problem: Knowledge Deficit  Goal: Patient/family/caregiver demonstrates understanding of disease process, treatment plan, medications, and discharge instructions  Description: Complete learning assessment and assess knowledge base    Interventions:  - Provide teaching at level of understanding  - Provide teaching via preferred learning methods  Outcome: Progressing     Problem: NEUROSENSORY - ADULT  Goal: Achieves stable or improved neurological status  Description: INTERVENTIONS  - Monitor and report changes in neurological status  - Monitor vital signs such as temperature, blood pressure, glucose, and any other labs ordered   - Initiate measures to prevent increased intracranial pressure  - Monitor for seizure activity and implement precautions if appropriate      Outcome: Progressing  Goal: Remains free of injury related to seizures activity  Description: INTERVENTIONS  - Maintain airway, patient safety  and administer oxygen as ordered  - Monitor patient for seizure activity, document and report duration and description of seizure to physician/advanced practitioner  - If seizure occurs,  ensure patient safety during seizure  - Reorient patient post seizure  - Seizure pads on all 4 side rails  - Instruct patient/family to notify RN of any seizure activity including if an aura is experienced  - Instruct patient/family to call for assistance with activity based on nursing assessment  - Administer anti-seizure medications if ordered    Outcome: Progressing  Goal: Achieves maximal functionality and self care  Description: INTERVENTIONS  - Monitor swallowing and airway patency with patient fatigue and changes in neurological status  - Encourage and assist patient to increase activity and self care     - Encourage visually impaired, hearing impaired and aphasic patients to use assistive/communication devices  Outcome: Progressing     Problem: CARDIOVASCULAR - ADULT  Goal: Maintains optimal cardiac output and hemodynamic stability  Description: INTERVENTIONS:  - Monitor I/O, vital signs and rhythm  - Monitor for S/S and trends of decreased cardiac output  - Administer and titrate ordered vasoactive medications to optimize hemodynamic stability  - Assess quality of pulses, skin color and temperature  - Assess for signs of decreased coronary artery perfusion  - Instruct patient to report change in severity of symptoms  Outcome: Progressing  Goal: Absence of cardiac dysrhythmias or at baseline rhythm  Description: INTERVENTIONS:  - Continuous cardiac monitoring, vital signs, obtain 12 lead EKG if ordered  - Administer antiarrhythmic and heart rate control medications as ordered  - Monitor electrolytes and administer replacement therapy as ordered  Outcome: Progressing     Problem: RESPIRATORY - ADULT  Goal: Achieves optimal ventilation and oxygenation  Description: INTERVENTIONS:  - Assess for changes in respiratory status  - Assess for changes in mentation and behavior  - Position to facilitate oxygenation and minimize respiratory effort  - Oxygen administered by appropriate delivery if ordered  - Initiate smoking cessation education as indicated  - Encourage broncho-pulmonary hygiene including cough, deep breathe, Incentive Spirometry  - Assess the need for suctioning and aspirate as needed  - Assess and instruct to report SOB or any respiratory difficulty  - Respiratory Therapy support as indicated  Outcome: Progressing     Problem: GASTROINTESTINAL - ADULT  Goal: Minimal or absence of nausea and/or vomiting  Description: INTERVENTIONS:  - Administer IV fluids if ordered to ensure adequate hydration  - Maintain NPO status until nausea and vomiting are resolved  - Nasogastric tube if ordered  - Administer ordered antiemetic medications as needed  - Provide nonpharmacologic comfort measures as appropriate  - Advance diet as tolerated, if ordered  - Consider nutrition services referral to assist patient with adequate nutrition and appropriate food choices  Outcome: Progressing  Goal: Maintains or returns to baseline bowel function  Description: INTERVENTIONS:  - Assess bowel function  - Encourage oral fluids to ensure adequate hydration  - Administer IV fluids if ordered to ensure adequate hydration  - Administer ordered medications as needed  - Encourage mobilization and activity  - Consider nutritional services referral to assist patient with adequate nutrition and appropriate food choices  Outcome: Progressing  Goal: Maintains adequate nutritional intake  Description: INTERVENTIONS:  - Monitor percentage of each meal consumed  - Identify factors contributing to decreased intake, treat as appropriate  - Assist with meals as needed  - Monitor I&O, weight, and lab values if indicated  - Obtain nutrition services referral as needed  Outcome: Progressing  Goal: Oral mucous membranes remain intact  Description: INTERVENTIONS  - Assess oral mucosa and hygiene practices  - Implement preventative oral hygiene regimen  - Implement oral medicated treatments as ordered  - Initiate Nutrition services referral as needed  Outcome: Progressing     Problem: GENITOURINARY - ADULT  Goal: Maintains or returns to baseline urinary function  Description: INTERVENTIONS:  - Assess urinary function  - Encourage oral fluids to ensure adequate hydration if ordered  - Administer IV fluids as ordered to ensure adequate hydration  - Administer ordered medications as needed  - Offer frequent toileting  - Follow urinary retention protocol if ordered  Outcome: Progressing  Goal: Absence of urinary retention  Description: INTERVENTIONS:  - Assess patient’s ability to void and empty bladder  - Monitor I/O  - Bladder scan as needed  - Discuss with physician/AP medications to alleviate retention as needed  - Discuss catheterization for long term situations as appropriate  Outcome: Progressing  Goal: Urinary catheter remains patent  Description: INTERVENTIONS:  - Assess patency of urinary catheter  - If patient has a chronic ruiz, consider changing catheter if non-functioning  - Follow guidelines for intermittent irrigation of non-functioning urinary catheter  Outcome: Progressing     Problem: METABOLIC, FLUID AND ELECTROLYTES - ADULT  Goal: Electrolytes maintained within normal limits  Description: INTERVENTIONS:  - Monitor labs and assess patient for signs and symptoms of electrolyte imbalances  - Administer electrolyte replacement as ordered  - Monitor response to electrolyte replacements, including repeat lab results as appropriate  - Instruct patient on fluid and nutrition as appropriate  Outcome: Progressing  Goal: Fluid balance maintained  Description: INTERVENTIONS:  - Monitor labs   - Monitor I/O and WT  - Instruct patient on fluid and nutrition as appropriate  - Assess for signs & symptoms of volume excess or deficit  Outcome: Progressing  Goal: Glucose maintained within target range  Description: INTERVENTIONS:  - Monitor Blood Glucose as ordered  - Assess for signs and symptoms of hyperglycemia and hypoglycemia  - Administer ordered medications to maintain glucose within target range  - Assess nutritional intake and initiate nutrition service referral as needed  Outcome: Progressing     Problem: SKIN/TISSUE INTEGRITY - ADULT  Goal: Skin Integrity remains intact(Skin Breakdown Prevention)  Description: Assess:  -Perform Hector assessment every shift  -Clean and moisturize skin every shift  -Inspect skin when repositioning, toileting, and assisting with ADLS  -Assess under medical devices every shift  -Assess extremities for adequate circulation and sensation     Bed Management:  -Have minimal linens on bed & keep smooth, unwrinkled  -Change linens as needed when moist or perspiring  -Avoid sitting or lying in one position for more than 2 hours while in bed  -Keep HOB at 30 degrees     Toileting:  -Offer bedside commode  -Assess for incontinence every 2 hours  -Use incontinent care products after each incontinent episode     Activity:  -Mobilize patient 2 times a day  -Encourage activity and walks on unit  -Encourage or provide ROM exercises   -Turn and reposition patient every 2 Hours  -Use appropriate equipment to lift or move patient in bed  -Instruct/ Assist with weight shifting every 30 minutes when out of bed in chair  -Consider limitation of chair time 3 hour intervals    Skin Care:  -Avoid use of baby powder, tape, friction and shearing, hot water or constrictive clothing  -Relieve pressure over bony prominences   -Do not massage red bony areas    Next Steps:  -Teach patient strategies to minimize risks    -Consider consults to  interdisciplinary teams   Outcome: Progressing  Goal: Incision(s), wounds(s) or drain site(s) healing without S/S of infection  Description: INTERVENTIONS  - Assess and document dressing, incision, wound bed, drain sites and surrounding tissue  - Provide patient and family education  - Perform skin care/dressing changes every shift  Outcome: Progressing  Goal: Pressure injury heals and does not worsen  Description: Interventions:  - Implement low air loss mattress or specialty surface (Criteria met)  - Apply silicone foam dressing  - Instruct/assist with weight shifting every 30 minutes when in chair   - Limit chair time to 3 hour intervals  - Use special pressure reducing interventions when in chair   - Apply fecal or urinary incontinence containment device   - Perform passive or active ROM every 2 hours  - Turn and reposition patient & offload bony prominences every 2 hours   - Utilize friction reducing device or surface for transfers   - Consider consults to  interdisciplinary teams   - Use incontinent care products after each incontinent episode   - Consider nutrition services referral as needed  Outcome: Progressing     Problem: HEMATOLOGIC - ADULT  Goal: Maintains hematologic stability  Description: INTERVENTIONS  - Assess for signs and symptoms of bleeding or hemorrhage  - Monitor labs  - Administer supportive blood products/factors as ordered and appropriate  Outcome: Progressing     Problem: MUSCULOSKELETAL - ADULT  Goal: Maintain or return mobility to safest level of function  Description: INTERVENTIONS:  - Assess patient's ability to carry out ADLs; assess patient's baseline for ADL function and identify physical deficits which impact ability to perform ADLs (bathing, care of mouth/teeth, toileting, grooming, dressing, etc )  - Assess/evaluate cause of self-care deficits   - Assess range of motion  - Assess patient's mobility  - Assess patient's need for assistive devices and provide as appropriate  - Encourage maximum independence but intervene and supervise when necessary  - Involve family in performance of ADLs  - Assess for home care needs following discharge   - Consider OT consult to assist with ADL evaluation and planning for discharge  - Provide patient education as appropriate  Outcome: Progressing  Goal: Maintain proper alignment of affected body part  Description: INTERVENTIONS:  - Support, maintain and protect limb and body alignment  - Provide patient/ family with appropriate education  Outcome: Progressing     Problem: Nutrition/Hydration-ADULT  Goal: Nutrient/Hydration intake appropriate for improving, restoring or maintaining nutritional needs  Description: Monitor and assess patient's nutrition/hydration status for malnutrition  Collaborate with interdisciplinary team and initiate plan and interventions as ordered  Monitor patient's weight and dietary intake as ordered or per policy  Utilize nutrition screening tool and intervene as necessary  Determine patient's food preferences and provide high-protein, high-caloric foods as appropriate       INTERVENTIONS:  - Monitor oral intake, urinary output, labs, and treatment plans  - Assess nutrition and hydration status and recommend course of action  - Evaluate amount of meals eaten  - Assist patient with eating if necessary   - Allow adequate time for meals  - Recommend/ encourage appropriate diets, oral nutritional supplements, and vitamin/mineral supplements  - Order, calculate, and assess calorie counts as needed  - Recommend, monitor, and adjust tube feedings and TPN/PPN based on assessed needs  - Assess need for intravenous fluids  - Provide specific nutrition/hydration education as appropriate  - Include patient/family/caregiver in decisions related to nutrition  Outcome: Progressing     Problem: SAFETY,RESTRAINT: NV/NON-SELF DESTRUCTIVE BEHAVIOR  Goal: Remains free of harm/injury (restraint for non violent/non self-detsructive behavior)  Description: INTERVENTIONS:  - Instruct patient/family regarding restraint use   - Assess and monitor physiologic and psychological status   - Provide interventions and comfort measures to meet assessed patient needs   - Identify and implement measures to help patient regain control  - Assess readiness for release of restraint   Outcome: Progressing  Goal: Returns to optimal restraint-free functioning  Description: INTERVENTIONS:  - Assess the patient's behavior and symptoms that indicate continued need for restraint  - Identify and implement measures to help patient regain control  - Assess readiness for release of restraint   Outcome: Progressing     Problem: COPING  Goal: Pt/Family able to verbalize concerns and demonstrate effective coping strategies  Description: INTERVENTIONS:  - Assist patient/family to identify coping skills, available support systems and cultural and spiritual values  - Provide emotional support, including active listening and acknowledgement of concerns of patient and caregivers  - Reduce environmental stimuli, as able  - Provide patient education  - Assess for spiritual pain/suffering and initiate spiritual care, including notification of Pastoral Care or [facility] based community as needed  - Assess effectiveness of coping strategies  Outcome: Progressing  Goal: Will report anxiety at manageable levels  Description: INTERVENTIONS:  - Administer medication as ordered  - Teach and encourage coping skills  - Provide emotional support  - Assess patient/family for anxiety and ability to cope  Outcome: Progressing

## 2023-01-15 NOTE — CONSULTS
Consultation - Cardiothoracic Surgery   Otilia Cabello 58 y o  male MRN: [id number]  Unit/Bed#: ICU 12 Encounter: [id number]    Physician Requesting Consult: Elmer Corral MD    Reason for Consult / Principal Problem: AV Endocarditis    Inpatient consult to Cardiothoracic Surgery  Consult performed by: Gisselle Rodarte PA-C  Consult ordered by: Meng Parks DO           History of Present Illness: Otilia Cabello is a 58y o  year old male with a PMH of DM2, HTN, HLD, left TKR 6/28/22 who presented to [facility] on 1/7/23 for evaluation of generalized weakness and malaise  The symptoms had been present since the end of December  Blood cultures were positive for MSSA and he was diagnosed with osteomyelitis and started on Nafcillin  During his hospitalization, he developed left elbow cellulitis with abscess, s/p I&D, left knee prosthetic join infection s/p removal and debridement of knee prosthesis with poly exchange on 1/9/23  Further workup identified splenic infarcts, cavitary lesions of the lungs and on 1/14, he developed acute facial droop, right sided weakness and AMS  Imaging demonstrated an acute CVA with L MCA M1 occlusion, s/p left MCA M1 thrombectomy on 1/14/23  [facility] care  Echocardiogram revealed AV vegetations  We have been consulted for evaluation  Overnight, the patient had worsening hypotension and fevers requiring escalation of pressors  He is currently on Norepi 3, Vaso 0 04, Precedex, Fentanyl, and Insulin infusions  He has received 3 units PRBC for Hg 6 9   GI has been consulted for hematochezia and persistent anemia  Most recent blood culture and previous blood cultures are positive for Staph aureus  Wound culture of the left knee and right foot are positive for Staph aureus  Urine culture on admission was > 100,000 E  Coli      Prior to hospitalization, the patient was recently in [facility] and sustained an injury to his left elbow, hitting it on a rock while kayaking  He wears partial dentures  Per chart review, his father has a history of coronary disease in his 63's  He quit smoking in 2021, drinks alcohol on occasion and denies drug use      Past Medical History:  Past Medical History:   Diagnosis Date   • Arthritis     L knee   for L TKR today 6/28/2022   • Atopic dermatitis    • Condition not found     Neoplasm of [place]   • Diabetes mellitus ([address] )    • Hyperlipemia    • Hypertension    • Lateral epicondylitis, left elbow    • Wears partial dentures     upper         Past Surgical History:   Past Surgical History:   Procedure Laterality Date   • APPENDECTOMY     • COLONOSCOPY     • CYST REMOVAL      Neck   • INCISION AND DRAINAGE OF WOUND Left 1/9/2023    Procedure: INCISION AND DRAINAGE (I&D) EXTREMITY;  Surgeon: Marty Sapp DO;  [facility]: [facility];  Service: Orthopedics   • JOINT REPLACEMENT Left 06/28/2022   • KNEE ARTHROSCOPY      menisectomy   • OR ARTHRP KNE CONDYLE&PLATU MEDIAL&LAT COMPARTMENTS Left 06/28/2022    Procedure: ARTHROPLASTY KNEE TOTAL;  Surgeon: Minh Alcantar DO;  [facility];  Service: Orthopedics   • OR RMVL PROSTH TOT KNEE PROSTH MMA W/WO INSJ SPACER Left 1/9/2023    Procedure: REMOVAL / [address] W/ POLY EXCHANGE;  Surgeon: Marty Sapp DO;  Location[facility];  Service: Orthopedics         Family History:  Family History   Problem Relation Age of Onset   • Colon polyps Mother         Inflammatory   • Diabetes Father          Social History:  Social History     Substance and Sexual Activity   Alcohol Use Not Currently   • Alcohol/week: 2 0 standard drinks   • Types: 2 Cans of beer per week    Comment: Social     Social History     Substance and Sexual Activity   Drug Use Never     Social History     Tobacco Use   Smoking Status Former   • Packs/day: 1 00   • Years: 40 00   • Pack years: 40 00   • Types: Cigarettes   • Quit date: 10/2021   • Years since quittin 2   Smokeless Tobacco Never     Marital Status:       Home Medications:   Prior to Admission medications    Medication Sig Start Date End Date Taking? Authorizing Provider   acetaminophen (TYLENOL) 325 mg tablet Take 3 tablets (975 mg total) by mouth every 8 (eight) hours  Patient not taking: Reported on 10/25/2022 6/29/22   Farhat Byrd PA-C   albuterol [facility] HFA) 90 mcg/act inhaler Inhale 2 puffs every 6 (six) hours as needed for wheezing  Patient not taking: Reported on 19   Talita Coelho DO   Ascorbic Acid (VITAMIN C PO) Take 1 capsule by mouth daily  Patient not taking: Reported on 2023    Historical Provider, MD   aspirin (ECOTRIN) 325 mg EC tablet Take 1 tablet (325 mg total) by mouth 2 (two) times a day Take with food  Patient not taking: Reported on 10/25/2022 6/29/22   Farhat Byrd PA-C   betamethasone dipropionate (DIPROSONE) 0 05 % cream Apply topically 2 (two) times a day  Patient not taking: Reported on 2023 3/21/22   Kelsey Stanton DO   Blood Glucose Monitoring Suppl (OneTouch Verio Reflect) w/Device KIT Check blood sugars once daily  Please substitute with appropriate alternative as covered by patient's insurance  Dx: E11 65 22   Kelsey Stanton DO   budesonide-formoterol (Symbicort) 160-4 5 mcg/act inhaler Inhale 2 puffs 2 (two) times a day Rinse mouth after use   23   Talita Coelho DO   co-enzyme Q-10 30 mg Take 30 mg by mouth daily    Historical Provider, MD   cyclobenzaprine (FLEXERIL) 10 mg tablet Take 1 tablet (10 mg total) by mouth 3 (three) times a day as needed for muscle spasms 23   JAI Bradford   docusate sodium (COLACE) 100 mg capsule Take 1 capsule (100 mg total) by mouth 2 (two) times a day  Patient not taking: Reported on 10/25/2022 6/29/22   Farhat Byrd PA-C   Empagliflozin (Jardiance) 10 MG TABS tablet Take 1 tablet (10 mg total) by mouth daily 1/4/23   Talita Coelho DO   fenofibrate (TRICOR) 145 mg tablet Take 1 tablet (145 mg total) by mouth daily 1/4/23   Jefry Staff, DO   ferrous sulfate 324 (65 Fe) mg Take 1 tablet (324 mg total) by mouth in the morning 1/17/22   Valerie Larose, DO   FOLIC ACID PO Take 1 capsule by mouth daily    Historical Provider, MD   gabapentin (NEURONTIN) 100 mg capsule Take 1 capsule (100 mg total) by mouth every 8 (eight) hours  Patient not taking: Reported on 7/28/2022 6/29/22   Katie Franklin PA-C   glucose blood (OneTouch Verio) test strip Check blood sugars once daily  Please substitute with appropriate alternative as covered by patient's insurance  Dx: E11 65 2/21/22   Stacy Kwong, DO   lisinopril-hydrochlorothiazide [facility]) 20-25 MG per tablet Take 1 tablet by mouth daily 1/4/23   Jefry Staff, DO   metFORMIN (GLUCOPHAGE) 1000 MG tablet Take 1 tablet (1,000 mg total) by mouth 2 (two) times a day with meals 11/15/22   Jefry Staff, DO   methylPREDNISolone 4 MG tablet therapy pack Use as directed on package 1/2/23   JAI Currie   metoprolol succinate (TOPROL-XL) 100 mg 24 hr tablet Take 1 tablet (100 mg total) by mouth daily 1/4/23   Jefry Staff, DO   Multiple Vitamin (multivitamin) tablet Take 1 tablet by mouth daily 1/17/22   Valerie Larose,    pbajq-9-ldow ethyl esters (LOVAZA) 1 g capsule TAKE 2 CAPSULES TWICE DAILY  Patient taking differently: Take 2 g by mouth 2 (two) times a day 5/5/22   Jefry Staff, DO   OneTouch Delica Lancets 17G MISC Check blood sugars once daily  Please substitute with appropriate alternative as covered by patient's insurance   Dx: E11 65 2/21/22   Stacy Kwong,    rosuvastatin (CRESTOR) 10 MG tablet Take 1 tablet (10 mg total) by mouth daily 1/4/23   Jefry Staff, DO   sildenafil (VIAGRA) 100 mg tablet Take one tablet daily as needed  Patient taking differently: Take 100 mg by mouth as needed Take one tablet daily as needed 6/7/22   Jefry Staff, DO Inpatient Medications:  Scheduled Meds:   Current Facility-Administered Medications   Medication Dose Route Frequency Provider Last Rate   • acetaminophen  650 mg Oral Q4H PRN [employer], DO     • chlorhexidine  15 mL Mouth/Throat Q12H [facility] & half-way [employer]is [employer], DO     • dexmedetomidine  0 1-0 7 mcg/kg/hr Intravenous Titrated Sera Hay PA-C 0 2 mcg/kg/hr (01/15/23 1831)   • fentaNYL  50 mcg/hr Intravenous Continuous JENNIFER BellC 50 mcg/hr (01/15/23 0255)   • fentanyl citrate (PF)  50 mcg Intravenous Q2H PRN [employer], DO     • heparin (porcine)  5,000 Units Subcutaneous [facility] [employer], DO     • hydrocortisone sodium succinate  50 mg Intravenous Q6H [facility] half-way Bernie Novoa PA-C     • insulin regular (HumuLIN R,NovoLIN R) infusion  0 3-21 Units/hr Intravenous Titrated Sera Hay PA-C 4 Units/hr (01/15/23 0825)   • ipratropium  0 5 mg Nebulization TID Tomi Wiseman MD     • levalbuterol  1 25 mg Nebulization TID [employer], DO     • nafcillin  2,000 mg Intravenous Q4H JAI Romano 2,000 mg (01/15/23 0456)   • norepinephrine  1-30 mcg/min Intravenous Titrated [employer], DO 3 mcg/min (01/15/23 9830)   • ondansetron  4 mg Intravenous Q6H PRN JAI Romano     • pantoprozole (PROTONIX) infusion (Continuous)  8 mg/hr Intravenous Continuous Piero Salcedo MD     • sodium chloride  40 mL/hr Intravenous Continuous [employer], DO 40 mL/hr (01/15/23 1119)   • sodium chloride  4 mL Nebulization TID Maykel Maldonado MD     • vasopressin (PITRESSIN) in 0 9 % sodium chloride 100 mL  0 04 Units/min Intravenous Continuous aHley Novoa PA-C 0 04 Units/min (01/15/23 0455)     Continuous Infusions: dexmedetomidine, 0 1-0 7 mcg/kg/hr, Last Rate: 0 2 mcg/kg/hr (01/15/23 0616)  fentaNYL, 50 mcg/hr, Last Rate: 50 mcg/hr (01/15/23 0255)  insulin regular (HumuLIN R,NovoLIN R) infusion, 0 3-21 Units/hr, Last Rate: 4 Units/hr (01/15/23 0825)  norepinephrine, 1-30 mcg/min, Last Rate: 3 mcg/min (01/15/23 0832)  pantoprozole (PROTONIX) infusion (Continuous), 8 mg/hr  sodium chloride, 40 mL/hr, Last Rate: 40 mL/hr (01/15/23 0821)  vasopressin (PITRESSIN) in 0 9 % sodium chloride 100 mL, 0 04 Units/min, Last Rate: 0 04 Units/min (01/15/23 0455)      PRN Meds:  acetaminophen, 650 mg, Q4H PRN  fentanyl citrate (PF), 50 mcg, Q2H PRN  ondansetron, 4 mg, Q6H PRN        Allergies: Allergies   Allergen Reactions   • Sulfamethoxazole-Trimethoprim Nausea Only and GI Intolerance       Review of Systems:  Review of Systems   Unable to perform ROS: Intubated       Vital Signs:     Vitals:    01/15/23 0745 01/15/23 0819 01/15/23 0829 01/15/23 0838   BP:   (!) 155/43 (!) 148/41   BP Location:   Left arm    Pulse:   86 88   Resp:   (!) 26 (!) 26   Temp:   99 3 °F (37 4 °C) 99 4 °F (37 4 °C)   TempSrc:   Oral Oral   SpO2: 100%  100% 100%   Weight:       Height:  5' 10" (1 778 m)       Invasive Devices     Central Venous Catheter Line  Duration           CVC Central Lines 01/14/23 Triple 20cm <1 day          Peripheral Intravenous Line  Duration           Peripheral IV 01/12/23 Right Antecubital 2 days    Peripheral IV 01/14/23 Left;Ventral (anterior) Forearm <1 day          Arterial Line  Duration           Arterial Line 01/14/23 Right Radial <1 day          Drain  Duration           Closed/Suction Drain Left;Lateral Knee Accordion 10 Fr  5 days    Urethral Catheter <1 day          Airway  Duration           ETT  8 mm <1 day                Physical Exam:  Physical Exam  Vitals and nursing note reviewed  Constitutional:       General: He is sleeping  He is not in acute distress  Appearance: He is well-developed and overweight  He is ill-appearing  He is not diaphoretic  Interventions: He is sedated and intubated  HENT:      Head: Normocephalic and atraumatic        Right Ear: External ear normal       Left Ear: External ear normal       Nose: Nose normal  Mouth/Throat:      Pharynx: No oropharyngeal exudate  Eyes:      General: No scleral icterus  Right eye: No discharge  Left eye: No discharge  Conjunctiva/sclera: Conjunctivae normal       Pupils: Pupils are equal, round, and reactive to light  Neck:      Vascular: No JVD  Trachea: No tracheal deviation  Cardiovascular:      Rate and Rhythm: Normal rate and regular rhythm  Heart sounds: Normal heart sounds  No murmur heard  No friction rub  Comments: UE and LE 1+ edema  Pulmonary:      Effort: Pulmonary effort is normal  No respiratory distress  He is intubated  Breath sounds: No stridor  Examination of the right-upper field reveals rhonchi  Examination of the left-upper field reveals rhonchi  Examination of the right-middle field reveals rhonchi  Examination of the left-middle field reveals rhonchi  Examination of the right-lower field reveals rhonchi  Examination of the left-lower field reveals rhonchi  Rhonchi present  No wheezing or rales  Chest:      Chest wall: No deformity or crepitus  Abdominal:      General: Bowel sounds are normal  There is no distension  Palpations: Abdomen is soft  There is no mass  Tenderness: There is no abdominal tenderness  There is no guarding  Musculoskeletal:         General: No tenderness  Normal range of motion  Right elbow: Swelling and deformity present  Arms:       Cervical back: Normal range of motion and neck supple  Right lower leg: Edema present  Left lower leg: Edema present  Right foot: Decreased capillary refill  Legs:       Comments: Left elbow with small incision on posterior aspect of arm  Left knee incision covered with acticoat  Right 4th and 5th toes with black discoloration  Multiple lesions noted of right lateral plantar aspect of foot   Skin:     General: Skin is warm and dry  Coloration: Skin is not pale  Findings: No erythema or rash  Neurological:      Mental Status: He is easily aroused  He is unresponsive  Cranial Nerves: No cranial nerve deficit  Sensory: No sensory deficit  Motor: No abnormal muscle tone  Coordination: Coordination normal       Deep Tendon Reflexes: Reflexes normal       Comments: Sedated, not following commands  Opens eyes spontaneously   Psychiatric:         Behavior: Behavior normal          Thought Content: Thought content normal          Judgment: Judgment normal          Lab Results:     Results from last 7 days   Lab Units 01/15/23  0445 01/14/23  1749 01/14/23  1605 01/14/23  1203 01/14/23  0947   WBC Thousand/uL 20 38* 27 91*  --   --  22 40*   HEMOGLOBIN g/dL 6 9* 7 9*  --    < > 8 8*   I STAT HEMOGLOBIN g/dl  --   --  8 2*  --   --    HEMATOCRIT % 20 0* 23 3*  --    < > 25 8*   HEMATOCRIT, ISTAT %  --   --  24*  --   --    PLATELETS Thousands/uL 191 180  --   --  183    < > = values in this interval not displayed  Results from last 7 days   Lab Units 01/15/23  0447 01/15/23  0025 01/14/23  1749 01/14/23  1605   POTASSIUM mmol/L 4 0 4 4 5 1  --    CHLORIDE mmol/L 114* 110* 102  --    CO2 mmol/L 17* 18* 19*  --    CO2, I-STAT mmol/L  --   --   --  18*   BUN mg/dL 50* 50* 48*  --    CREATININE mg/dL 1 24 1 44* 1 50*  --    GLUCOSE, ISTAT mg/dl  --   --   --  299*   CALCIUM mg/dL 7 5* 7 2* 7 5*  --      Results from last 7 days   Lab Units 01/14/23  1749 01/14/23  0947   INR  1 64* 1 50*   PTT seconds 33  --      Lab Results   Component Value Date    HGBA1C 6 8 (H) 01/15/2023     No results found for: CKTOTAL, CKMB, CKMBINDEX, TROPONINI    Procalcitonin 15 09    Imaging Studies:     Echocardiogram 1/14:   Left Ventricle Normal left ventricular size, mild concentric left ventricular hypertrophy, normal left ventricular systolic function and hyperdynamic wall motion  Left ventricle ejection fraction is estimated as around 65% or greater  Diastolic function was not assessed on current study  Right Ventricle Normal right ventricular size and visually normal right ventricular systolic function  Right ventricle systolic pressure could not be estimated due to inadequate tricuspid regurgitation profile  Left Atrium Normal left atrial cavity size  Intact interatrial septum  Right Atrium Normal right atrial cavity size  Aortic Valve Tricuspid aortic valve with thickening of the leaflet tips with prominent densities noted on the tips of the leaflet cusps  In comparison to the previous echocardiogram these densities are new and are highly suspicious for endocarditis vision in the clinical setting  The density is moved in cohesion with the valve  There are no definite mobile lesions and there is no evidence of abscess in the aortic root region  There is associated moderate aortic valve regurgitation  Mitral Valve Borderline mitral annular calcification  Normal mitral valve leaflet structure and mobility  Trace mitral valve regurgitation  No vegetation identified on mitral valve  Tricuspid Valve Tricuspid valve is not well visualized     Trace tricuspid valve regurgitation  No vegetation identified on tricuspid valve  Pulmonic Valve Grossly normal pulmonic valve structure  Normal leaflet mobility  No significant pulmonic valve regurgitation  No vegetation identified on pulmonic valve  Ascending Aorta Dilated aortic root and ectasia of proximal ascending aorta measuring up to 4 0 cm  IVC/SVC Inferior vena cava is normal in size and  demonstrates appropriate respiratory phasic changes in diameter  Pericardium Trace, hemodynamically insignificant circumferential pericardial effusion       Left Ventricle Measurements    Function/Volumes   A4C EF 79 %         Dimensions   LVIDd 4 5 cm         LVIDS 2 4 cm         IVSd 1 3 cm         LVPWd 1 3 cm         FS 47 %         Diastolic Filling   E wave deceleration time 210 ms         MV Peak E Jam 48 cm/s         MV Peak A Jam 0 94 m/s Right Ventricle Measurements    Dimensions   Tricuspid annular plane systolic excursion 1 5 cm               Left Atrium Measurements    Dimensions   LA size 3 5 cm               Aortic Valve Measurements    Stenosis   AV peak gradient 35 mmHg         Regurgitation   AV peak gradient 56 mmHg         AV Deceleration Time 1,043 ms         AV regurgitation pressure 1/2 time 302 ms               Mitral Valve Measurements    Stenosis   MV stenosis pressure 1/2 time 61 ms         MV valve area p 1/2 method 3 61 cm2               Aorta Measurements    Aortic Dimensions   Ao root 4 cm             Nuclear Stress 3/31/22: •  Normal pharmacologic nuclear stress test  •  Stress ECG: The stress ECG is negative for ischemia after pharmacologic stress  •  Perfusion: There are no perfusion defects  •  Stress Function: Left ventricular function post-stress is normal  Post-stress ejection fraction is 65 %  CT head 1/15: Redemonstration of large acute infarct throughout the left MCA distribution with increased mass effect and 7 mm of left-to-right shift  Petechial hemorrhage noted on MRI is difficult to appreciate  Recommend close follow-up and neurosurgical consultation  Additional small infarcts in the right cerebral hemisphere and cerebellum are better appreciated on MRI  MRI brain 1/14: Large acute left MCA distribution infarct with petechial hemorrhage  Minimal left-to-right shift  Additional small acute infarcts in the right frontal and parietal lobes as well as the cerebellum favor embolic etiology  CT CAP 1/14: 1  Very limited examination due to phase of contrast enhancement and motion  As seen on recent thoracic MRI, there is a cavitary nodule with small amount of fluid in the posterior left lower lobe measuring 1 7 x 1 8 cm  There is also a small left upper   anterior paramediastinal infiltrate  There are also multiple splenic and bilateral renal hypodense lesions as described above   Spectrum of findings may be sequela of septic emboli  Continued follow-up recommended to exclude neoplasm  2  Cholelithiasis  MRI Thoracic spine 1/13: 1  Mild spondylosis without cord compression or cord signal abnormality  2   No MR evidence of discitis/ostomy colitis in the visualized spine  3   Peripheral T2 hyperintense lesions in the spleen which may be enlarged  Differential diagnosis includes splenic mass lesions such as hemangioma or other lesion or perhaps a splenic infarct  Consider left upper quadrant ultrasound and/or   contrast-enhanced CT of the abdomen for further assessment  4   Cavitary lesion with air-fluid level in the left lower lobe, possibly infectious process such as a pulmonary abscess  Differential diagnosis includes malignancy  CT of the chest advised  I have personally reviewed pertinent reports  and I have personally reviewed pertinent films in PACS    Assessment:  Principal Problem:    Acute stroke due to embolism of left middle cerebral artery (HCC)  Active Problems:    DMII (diabetes mellitus, type 2) (HCC)    Hyperlipidemia    Primary hypertension    Murmur    Status post total knee replacement, left    Abnormal urinalysis    Septic arthritis (HCC)    Hyponatremia    Septic embolism (HCC)    Anemia    Aortic valve endocarditis    Pulmonary cavitary lesion    Aortic Valve endocarditis; Ongoing AVR workup    Plan:  Mr Victoriano Bender was seen and examined today by myself and Dr Salvador Stuart  He is critically ill due to sepsis, elbow cellulitis, knee prosthetic infection and AV endocarditis  He will most likely require aortic valve surgery eventually in the future  He is being evaluated by Neurology, Neurosurgery, GI and ID  We will continue to evaluate the patient with further recommendations as his clinical course improves  He will require eventual work up to undergo AVR  Thank you for allowing us to participate in the care of this patient       SIGNATURE: Manuel Simmons [place]  DATE: January 15, 2023  TIME: 8:42 AM    * This note was completed in part utilizing [employer] direct voice recognition software  Grammatical errors, random word insertion, spelling mistakes, and incomplete sentences may be an occasional consequence of the system secondary to software limitations, ambient noise and hardware issues  At the time of dictation, efforts were made to edit, clarify and /or correct errors  Please read the chart carefully and recognize, using context, where substitutions have occurred  If you have any questions or concerns about the context, text or information contained within the body of this dictation, please contact myself, the provider, for further clarification

## 2023-01-15 NOTE — CONSULTS
Orthopedics   Peter Scott 58 y o  male MRN: [id number]  Unit/Bed#: ICU 12-01      Chief Complaint:   Left elbow abscess and left knee prosthetic joint infection    HPI:  58 y o  male w left knee prosthetic joint infection and left elbow abscess s/p left elbow I&D and left knee I&D and poly exchange on 1/9/23 w Dr Brandon Gee  Left TKA done 6/2022 by Dr Tarah Jones  Per chart review patient traveling in [facility] sustained injury to left elbow that became infected  Left knee pain developed and aspirate showed 6000 WBC and MSSA bacteremia so underwent left TKA w poly exchange and left elbow abscess I&D with Dr Brandon Gee on 1/9/23  Patient later found to have sequelae of multiple septic emboli including stroke and was transferred to [facility] for higher level of care  Orthopaedics is consulted to follow inpatient for his left knee and left elbow surgical wounds and infections  Unable to obtain interval history or detailed physical examination due to patient intubated and sedated in ICU  Per nursing continued purulent appearing drainage from left elbow I&D wound      Review Of Systems:   · Unable to obtain    Past Medical History:   Past Medical History:   Diagnosis Date   • Arthritis     L knee   for L TKR today 6/28/2022   • Atopic dermatitis    • Condition not found     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   • Diabetes mellitus ([address] )    • Hyperlipemia    • Hypertension    • Lateral epicondylitis, left elbow    • Wears partial dentures     upper       Past Surgical History:   Past Surgical History:   Procedure Laterality Date   • APPENDECTOMY     • COLONOSCOPY     • CYST REMOVAL      Neck   • INCISION AND DRAINAGE OF WOUND Left 1/9/2023    Procedure: INCISION AND DRAINAGE (I&D) EXTREMITY;  Surgeon: Yasmani Sandy DO;  Location: [facility];  Service: Orthopedics   • JOINT REPLACEMENT Left 06/28/2022   • KNEE ARTHROSCOPY      menisectomy   • CT ARTHRP KNE CONDYLE&PLATU MEDIAL&LAT COMPARTMENTS Left 06/28/2022    Procedure: ARTHROPLASTY KNEE TOTAL;  Surgeon: Abbi Raymond DO;  Location: [facility];  Service: Orthopedics   • VT RMVL PROSTH TOT KNEE PROSTH MMA W/WO INSJ SPACER Left 2023    Procedure: REMOVAL / [address] W/ POLY EXCHANGE;  Surgeon: Richmond Barrera DO;  Location: [facility];  Service: Orthopedics       Family History:  Family history reviewed and non-contributory  Family History   Problem Relation Age of Onset   • Colon polyps Mother         Inflammatory   • Diabetes Father        Social History:  Social History     Socioeconomic History   • Marital status:      Spouse name: None   • Number of children: None   • Years of education: None   • Highest education level: None   Occupational History   • None   Tobacco Use   • Smoking status: Former     Packs/day: 1 00     Years: 40 00     Pack years: 40 00     Types: Cigarettes     Quit date: 10/2021     Years since quittin 2   • Smokeless tobacco: Never   Vaping Use   • Vaping Use: Never used   Substance and Sexual Activity   • Alcohol use: Not Currently     Alcohol/week: 2 0 standard drinks     Types: 2 Cans of beer per week     Comment: Social   • Drug use: Never   • Sexual activity: None     Comment: defer   Other Topics Concern   • None   Social History Narrative   • None     Social Determinants of Health     Financial Resource Strain: Not on file   Food Insecurity: No Food Insecurity   • Worried About Running Out of Food in the Last Year: Never true   • Ran Out of Food in the Last Year: Never true   Transportation Needs: No Transportation Needs   • Lack of Transportation (Medical): No   • Lack of Transportation (Non-Medical):  No   Physical Activity: Not on file   Stress: Not on file   Social Connections: Not on file   Intimate Partner Violence: Not on file   Housing Stability: Unknown   • Unable to Pay for Housing in the Last Year: No   • Number of Places Lived in the Last Year: Not on file   • Unstable Housing in the Last Year: No Allergies:    Allergies   Allergen Reactions   • Sulfamethoxazole-Trimethoprim Nausea Only and GI Intolerance           Labs:  0   Lab Value Date/Time    HCT 20 0 (L) 01/15/2023 0445    HCT 23 3 (L) 01/14/2023 1749    HCT 24 (L) 01/14/2023 1605    HCT 21 3 (L) 01/14/2023 1203    HCT 55 5 (H) 05/14/2016 0809    HGB 8 2 (L) 01/15/2023 1215    HGB 6 9 (LL) 01/15/2023 0445    HGB 7 9 (L) 01/14/2023 1749    HGB 8 2 (L) 01/14/2023 1605    HGB 18 3 (H) 05/14/2016 0809    INR 1 64 (H) 01/14/2023 1749    WBC 20 38 (H) 01/15/2023 0445    WBC 27 91 (H) 01/14/2023 1749    WBC 22 40 (H) 01/14/2023 0947    WBC 8 0 05/14/2016 0809    CRP 62 0 (H) 02/14/2022 0811       Meds:    Current Facility-Administered Medications:   •  acetaminophen (TYLENOL) tablet 975 mg, 975 mg, Oral, Q8H [address], Mary Ann Walls DO  •  chlorhexidine (PERIDEX) 0 12 % oral rinse 15 mL, 15 mL, Mouth/Throat, Q12H [address], Alnaa Knox DO, 15 mL at 01/14/23 2007  •  dexmedeTOMIDine (Precedex) 400 mcg in sodium chloride 0 9% 100 mL, 0 1-0 7 mcg/kg/hr, Intravenous, Titrated, Bernie ASHLEIGH Novoa PA-C, Last Rate: 5 1 mL/hr at 01/15/23 0616, 0 2 mcg/kg/hr at 01/15/23 0616  •  fentaNYL 1000 mcg in sodium chloride 0 9% 100mL infusion, 50 mcg/hr, Intravenous, Continuous, Bernie JENNIFER HiltonC, Last Rate: 5 mL/hr at 01/15/23 0255, 50 mcg/hr at 01/15/23 0255  •  fentanyl citrate (PF) 100 MCG/2ML 50 mcg, 50 mcg, Intravenous, Q2H PRN, Mary Ann Walls DO, 50 mcg at 01/14/23 1835  •  heparin (porcine) subcutaneous injection 5,000 Units, 5,000 Units, Subcutaneous, Q8H [address], Mary Ann Walls DO, 5,000 Units at 01/15/23 0505  •  [COMPLETED] hydrocortisone (Solu-CORTEF) injection 100 mg, 100 mg, Intravenous, Once, 100 mg at 01/14/23 2039 **FOLLOWED BY** hydrocortisone (Solu-CORTEF) injection 50 mg, 50 mg, Intravenous, Q8H [address], Donell Snell MD  •  insulin regular (HumuLIN R,NovoLIN R) 1 Units/mL in sodium chloride 0 9 % 100 mL infusion, 0 3-21 Units/hr, Intravenous, Titrated, Radha Alvarado PA-C, Last Rate: 3 mL/hr at 01/15/23 1206, 3 Units/hr at 01/15/23 1206  •  ipratropium (ATROVENT) 0 02 % inhalation solution 0 5 mg, 0 5 mg, Nebulization, TID, Yonny Laguna MD, 0 5 mg at 01/15/23 0745  •  levalbuterol (XOPENEX) inhalation solution 1 25 mg, 1 25 mg, Nebulization, TID, Meng Parks DO, 1 25 mg at 01/15/23 0745  •  nafcillin (NALLPEN) 2,000 mg in sodium chloride 0 9 % 100 mL IVPB, 2,000 mg, Intravenous, Q4H, ABDIFATAH RodriguezNP, Stopped at 01/15/23 1051  •  norepinephrine (LEVOPHED) 4 mg (STANDARD CONCENTRATION) IV in sodium chloride 0 9% 250 mL, 1-30 mcg/min, Intravenous, Titrated, Meng Parks DO, Stopped at 01/15/23 1006  •  ondansetron (ZOFRAN) injection 4 mg, 4 mg, Intravenous, Q6H PRN, ABDIFATAH RodriguezNP  •  pantoprazole (PROTONIX) 80 mg in sodium chloride 0 9 % 100 mL infusion, 8 mg/hr, Intravenous, Continuous, Piero Salcedo MD, Last Rate: 10 mL/hr at 01/15/23 0913, 8 mg/hr at 01/15/23 0913  •  sodium chloride (HYPERTONIC) 3 % infusion, 40 mL/hr, Intravenous, Continuous, Meng Parks DO, Last Rate: 40 mL/hr at 01/15/23 0821, 40 mL/hr at 01/15/23 3941  •  sodium chloride 3 % inhalation solution 4 mL, 4 mL, Nebulization, TID, Elmer Corral MD, 4 mL at 01/15/23 0745    Blood Culture:   Lab Results   Component Value Date    BLOODCX Received in Microbiology Lab  Culture in Progress  01/15/2023       Wound Culture:   Lab Results   Component Value Date    WOUNDCULT Growth in Broth culture only Staphylococcus aureus (A) 01/07/2023       Ins and Outs:  I/O last 24 hours: In: 9009 4 [I V :4659 4; Blood:1050; IV Piggyback:3300]  Out: 2510 [Urine:2510]          Physical Exam:   Vitals:    01/15/23 1220   BP:    Pulse: 94   Resp: (!) 23   Temp: (!) 103 3 °F (39 6 °C)   SpO2: 100%        Gen: Awake alert no acute distress  HEENT: Eyes clear, moist mucus membranes, hearing intact  Respiratory: Bilateral chest rise   No audible wheezing found  Cardiovascular: Regular Rate and Rhythm    Musculoskeletal: bilateral upper extremity  · Skin warm dry no areas of necrosis  Scattered ecchymosis and bruising bilateral upper extremities  · No erythema or warmth, area of induration left proximal forearm dorsally with 1cm skin incision, cloudy serous drainage from this wound is minimal  No expressible purulence on my examination  Dressing with modest amount of serous drainage  No other areas of erythema or induration to either upper extremity  · Full passive ROM of elbow and wrist and fingers  · Moves hand and fingers spontaneously to light touch, unable to formally test due to intubated sedated  · 2+ rad pulse, hand warm with brisk cap refill to fingers  · Special tests: none    Musculoskeletal: bilateral lower extremity  · Skin warm dry  There is incomplete dry necrosis to right 4th and 5th toes distal to MCP joint  Scattered ecchymosis bilateral lower extremities  Right knee free of significant erythema  No asymmetric warmth or swelling/induration  Mild left knee effusion  Dressings clean dry intact without significant drainage from left knee  · Unable to examine tenderness due to intubated sedated  · Moves left foot spontaneously to light touch unable to obtain detailed neurovascular exam due to patients mental status  · 2+ DP pulse, foot warm with brisk cap refill to toes  · Special tests: None    Tertiary: No tenderness or swelling noted over all other joints/long bones except as already stated  Radiology:   I personally reviewed the radiographic studies, my interpretation is as follows:  Xray left knee AP lateral demonstrates interval placement of antibiotic beads to supra-patellar pouch   No fracture or dislocation of left PS TKA     _*_*_*_*_*_*_*_*_*_*_*_*_*_*_*_*_*_*_*_*_*_*_*_*_*_*_*_*_*_*_*_*_*_*_*_*_*_*_*_*_*    Assessment:  62 y o male left elbow and left knee prosthetic joint infection s/p I&D of both sites as well as left TKA poly swap on 1/9/2023  No surgical complication noted, no laurita signs of continued infection  Elbow wound continues to drain at this time without additional/reaccumulated collections  Also with dry necrosis to 4th and 5th toes on right side possibly secondary to pressor use vs septic emboli  Plan:   · WBAT LLE and LUE, WBAT RLE  · Soft dressings to left elbow, change daily or PRN for saturation  · Continue IV abx  · Will continue to monitor left knee and left elbow surgical sites  · Continue to monitor toes on right foot for worsening of dry necrosis and possible need for future amputations  · Patient in ICU for possible thrombectomy, also with aortic valve vegetations  · No plans for additional washout at this time pending continued clinical exams  · PT/OT  · Pain control per ICU  · Medical care per ICU  · DVT ppx: lovenox or other recommended if not medically contraindicated, discretion of ICU  · Body mass index is 32 27 kg/m²  moderately obese     · Dispo: Ortho will follow, no indication for acute orthopaedic intervention at this time    Yosef Roque MD

## 2023-01-15 NOTE — PLAN OF CARE
Problem: Prexisting or High Potential for Compromised Skin Integrity  Goal: Skin integrity is maintained or improved  Description: INTERVENTIONS:  - Identify patients at risk for skin breakdown  - Assess and monitor skin integrity  - Assess and monitor nutrition and hydration status  - Monitor labs   - Assess for incontinence   - Turn and reposition patient  - Assist with mobility/ambulation  - Relieve pressure over bony prominences  - Avoid friction and shearing  - Provide appropriate hygiene as needed including keeping skin clean and dry  - Evaluate need for skin moisturizer/barrier cream  - Collaborate with interdisciplinary team   - Patient/family teaching  - Consider wound care consult   Outcome: Progressing     Problem: Potential for Falls  Goal: Patient will remain free of falls  Description: INTERVENTIONS:  - Educate patient/family on patient safety including physical limitations  - Instruct patient to call for assistance with activity   - Consult OT/PT to assist with strengthening/mobility   - Keep Call bell within reach  - Keep bed low and locked with side rails adjusted as appropriate  - Keep care items and personal belongings within reach  - Initiate and maintain comfort rounds  - Make Fall Risk Sign visible to staff  - Offer Toileting every 2 Hours, in advance of need  - Initiate/Maintain bed alarm  - Apply yellow socks and bracelet for high fall risk patients  - Consider moving patient to room near nurses station  Outcome: Progressing     Problem: INFECTION - ADULT  Goal: Absence or prevention of progression during hospitalization  Description: INTERVENTIONS:  - Assess and monitor for signs and symptoms of infection  - Monitor lab/diagnostic results  - Monitor all insertion sites, i e  indwelling lines, tubes, and drains  - Monitor endotracheal if appropriate and nasal secretions for changes in amount and color  - [place] appropriate cooling/warming therapies per order  - Administer medications as ordered  - Instruct and encourage patient and family to use good hand hygiene technique  - Identify and instruct in appropriate isolation precautions for identified infection/condition  Outcome: Progressing

## 2023-01-15 NOTE — ASSESSMENT & PLAN NOTE
Kieran Seen is a 58 y o  male with HTN, HLD, DM type II, prior tobacco use, left TKA who presented to [facility] on 1/7/2023 for sepsis work-up with suspicion for endocarditis  Patient found to have MSSA bacteremia and AV endocarditis with septic embolism (possible splenic infarcts, necrotic toe lesions/[place] lesions, cervical osteomyelitis, septic arthritis, cavitary lung lesions)  Patient had been treated as outpatient for left elbow cellulitis prior to admission  Hospital course has also been complicated by significant anemia, requiring multiple blood transfusions  Patient was a stroke alert on 1/14/2023 for acute left MCA syndrome  CT head unremarkable  CTA head and neck demonstrated left M1 occlusion and incidental finding of right IJ DVT   and was transferred to [facility] for mechanical thrombectomy, resulted in [id number] recanalization     - NIHSS on arrival to [facility]  - MRI brain with/without contrast:  · Demonstrated essentially complete left MCA territory infarction with petechial hemorrhage and minimal left to right shift, as well as bihemispheric anterior and posterior circulation punctate infarcts, likely all as result of septic emboli  - Repeat CT Head 1/15:  · Re-demonstrating large left MCA infarct with increased mass effect and 7 mm left to right shift; additional small infarcts in right cerebral and cerebellum   - Repeat CT head 1/16 :  · Evolving large left MCA and PCA infarct with petechial cortical hemorrhage with worsened cytotoxic edema, worsened left to right midline shift measuring 1 6 cm, worsening compression of left lateral ventricle, and new left uncal herniation   - Repeat CTH on 1/17:   · Evolving massive hemorrhagic L MCA infarct with associated mass effect  · Mild enlargement of the temporal horn right lateral ventricle redemonstrated with question of small amount of transependymal CSF flow adjacent to the occipital horn right lateral ventricle  · Small evolving infarctions in the right occipital and frontoparietal regions  - 2D echo with EF 65%, normal-sized atria, with aortic valve densities highly suspicious for vegetations  Early morning hours of 1/15/2023, patient noted to have right hemiplegia, some difficulty with comprehension but able to follow most commands on left  Later in the morning (1/15), he was following commands less consistently on the left  Morning of 1/16 found to have a new blown left pupil with a GCS of 3  Repeat CT head with with worsening cytotoxic edema, left to right midline shift, compression of left lateral ventricle, and new left uncal herniation  Patient went for emergent craniectomy on 1/16  Repeat CT head 1/16 demonstrated worsening hemorrhagic conversion in left cerebral cortex with cortical parenchymal hemorrhage and left basal ganglia hemorrhage  Midline shift improved, now 1 cm with minimal improvement in left uncal herniation, and slightly improved compression of left lateral ventricle with mild dilation of right lateral ventricle  Malignant left MCA infarct secondary to septic embolism  Evolving stroke with worsening CT findings and poor neurologic exam resulting in emergent hemicraniectomy, s/p left decompressive hemicraniectomy (1/16/2023)  Exam essentially unchanged   Poor prognosis per discussion with attending neurologist      Plan:  - Sodium goal > 145 per neurosurgery  - Continue to hold AP/AC given potential for high risk of hemorrhagic conversion of strokes related to septic emboli  - MAP>65 and SBP<140 as per Critical Care and Neurosurgery  - Frequent neurochecks  - CT surgery, Gastroenterology on board  - Treatment of infectious/metabolic derangements as per primary service  - PT/OT/ST when able  - Medical management and supportive care per ICU team, notify with changes

## 2023-01-15 NOTE — PROGRESS NOTES
Daily Progress Note - Critical Care   Brenton Mayers 58 y o  male MRN: [id number]  Unit/Bed#: ICU 12 Encounter: [id number]    ----------------------------------------------------------------------------------------  HPI:  Brenton Mayers is a 58 y o  male with PMH of left TKA, T2DM, hypertension, hyperlipidemia who presented to [facility] on 1/7 for concern of persistent malaise and generalized weakness since end of December  Blood cultures ultimately positive for MSSA and ID was consulted for further evaluation  Patient initially on cefazolin and then transitioned to Nafcillin for neuro penetration on 1/14 with new onset neuro symptoms  Patient's hospitalization also complicated by L elbow cellulitis and abscess s/p I&D, 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 and polyechange on 1/9, C4-C5 septic arthritis vs  Osteomyelitis, splenic infarcts, pulmonary cavitary lesions, and most recently (AM of transfer) echo demonstrating aortic valve vegetations  On afternoon 1/14, rapid response was called due to acute change in neuro status with right facial droop, right-sided weakness, expressive/receptive aphasia  CTH negative  CTA HN with L MCA M1 occlusion  Endovascular alert called and patient priority transferred to [facility] for neurosurg/IR intervention and post procedural monitoring in ICU  Imaging also noted nonocclusive right IJ DVT, however, with concern for septic emboli and new anemia patient not a candidate for anticoagulation per neurology  24hr events:   S/p L MCA M1 thrombectomy with TICI 2b revascularization  Patient developed worsening hypotension, fever, and tachycardia  CVC placed  Started on vasopressin and levophed escalated to 16mcg  Received sepsis bolus, stress dose steroids, and hypertonic saline  MRI completed with read pending  Also taken off propofol and placed on fentanyl and precedex       Vitals:  Temp:  [97 1 °F (36 2 °C)-100 7 °F (38 2 °C)] 99 1 °F (37 3 °C)  HR:  [] 82  Resp: [14-35] 24  BP: ()/(32-88) 138/50  Arterial Line BP: ()/(32-72) 174/50    I/O:    Intake/Output Summary (Last 24 hours) at 1/15/2023 0545  Last data filed at 1/15/2023 0400  Gross per 24 hour   Intake 7405 66 ml   Output 1300 ml   Net 6105 66 ml      I/O last 24 hours: In: 7405 7 [I V :3955 7; Blood:350; IV Piggyback:3100]  Out: 1300 [Urine:1300]     Drips:  dexmedetomidine, 0 1-0 7 mcg/kg/hr, Last Rate: 0 3 mcg/kg/hr (01/15/23 0316)  fentaNYL, 50 mcg/hr, Last Rate: 50 mcg/hr (01/15/23 3665)  insulin regular (HumuLIN R,NovoLIN R) infusion, 0 3-21 Units/hr, Last Rate: 2 Units/hr (01/15/23 7069)  norepinephrine, 1-30 mcg/min, Last Rate: 10 mcg/min (01/15/23 7746)  sodium chloride, 30 mL/hr  vasopressin (PITRESSIN) in 0 9 % sodium chloride 100 mL, 0 04 Units/min, Last Rate: 0 04 Units/min (01/15/23 5482)       ---------------------------------------------------------------------------------------  SUBJECTIVE  Patient seen and evaluated at bedside  Remains intubated and sedated  Review of Systems   Unable to perform ROS: Intubated     ---------------------------------------------------------------------------------------  Assessment and Plan:     Neuro:   • Diagnosis: L MCA M1 Thrombus  ? Assessment:  - Initial NIH at 13:59 - 23  - Repeat NIH on arrival to endovascular suite 29  - 1/14 CTH: no acute intracranial abnormality  - 1/14 CTA HN: left MCA M1 thromboembolism, incidental nonocclusive DVT in the high cervical right IJ vein  ? Plan:  - Status post IR thrombectomy - 3 passes with restoration of TICI 2b flow  - SBP <140  - Levophed to maintain MAP >65  - Hold AC/AP in concern for septic emboli and newly diagnosed anemia  - Follow up read on MRI brain and repeat CTH  - Initiated hypertonic saline overnight - continue with goal Na 145-155  - Neurosurgery consulted - consider hemicraniectomy  • Diagnosis: Sedation and analgesia  ?  Assessment:  - Patient maintained on gabapentin 100mg Q8 and PRN flexeril as outpatient  ? Plan:  - Propofol discontinued  - Precedex and fentanyl infusions  - PRN fentanyl        CV:   • Diagnosis: Aortic Valve Endocarditis  ? Assessment:  - Noted on repeat echocardiogram completed 1/14 -aortic valve with thickening and densities noted on the tips of the cusps highly suspicious for vegetations with associated moderate aortic valve regurgitation, no evidence of abscess, trace pericardial effusion  - Compared to previous echocardiogram completed in 1/9/2020 2 aortic valve vegetations described as new  ? Plan:  - Continue Nafcillin  - ID following  - Discuss need for KALINA with ID  - Continuous telemetry  - Follow up blood cultures  • Diagnosis: Hypotension  ? Assessment:  - Likely multifactorial - septic shock and sedation  ? Plan:  - Levophed increased to 16mcg overnight  - Vasopressin added  - Continue blood pressure support to maintain MAP >65  • Diagnosis: Hypertension  ? Assessment:  - Home meds: lisinopril-HCTZ 20-25, metoprolol succinate 100mg daily  ? Plan:  - Holding home medications in setting of hypotension  • Diagnosis: Hyperlipidemia  ? Plan:  - Resume home crestor, fenofibrate when tolerating PO  - Holding home Lovaza        Pulm:  • Diagnosis: Mechanical Ventilation  ? Plan:  - Continue ventilator support  - Current settings: 60/520/22/6  • Diagnosis: Pulmonary Cavitary Lesion  ? Assessment  - Noted on CTA HN - suspicious for septic emboli  ? Plan:  - Continue to monitor respiratory status  - Antibiotics as detailed below  - Continue mechanical ventilation as above  • Diagnosis: COPD vs  Asthma  ? Assessment:  - No spirometry or PFT's in chart  - Managed outpatient on albuterol and symbicort for documented COPD v Asthma  ? Plan:  - Atrovent and Xopenex  - Saline nebs        GI:   • Diagnosis: Hypodensity of spleen  ?  Assessment:  - 1/14 CT AP noted Smooth peripherally calcified hypodensity in the anterior spleen measuring 4 4 x 4 6 x 4 8 cm due to prior infection ? Plan:  - Consider repeat imaging with contrast         :   • Diagnosis: Abnormal Urinalysis  ? Assessment:  - Pyuria with positive culture [facility]) and penile irritation  ? Plan:  - ID following - believe to be asymptomatic bacteruria   - Negative STI testing (RPR, Urine GC)  - Repeat STI testing in outpatient setting  • Diagnosis: Hyponatremia  ? Assessment:  - Patient with Na 129 on presentation, baseline high 130's  ? Plan:  - S/p fluid resuscitation  - Continue to follow daily BMP        F/E/N:   • Plan:  ? Fluid:continue LR, follow up repeat labs and bolus as needed  ? Electrolytes: hyponatremia as detailed above, monitor and replete for K >4, P >3, Mg >2  ? Nutrition: initiate TF if not extubated today        Heme/Onc:   • Diagnosis: Anemia  ? Assessment:  - Patient noted to have acute drop in Hgb from 12 1>8 8 with repeat 7 2  - Received 2u uncrossed blood products prior to transfer  ? Plan:  - AM CBC with Hgb 6 9  - Consent for transfusion obtained from son  - GI consulted after patient had maroon bowel movement  • Diagnosis: R IJ Nonocclusive DVT  ? Plan:  - Not candidate for anticoagulation with septic emboli and anemia with possible GIB        Endo:   • Diagnosis: T2DM  ? Assessment:  - Home regimen: metformin 1000mg BID, jardiance 10mg daily  ? Plan:  - Insulin drip        ID:   • Diagnosis: Sepsis  ? Assessment  - Etiology includes bacteremia, aortic valve endocarditis, cellulitis R elbow, L knee infection, pulmonary cavitation  - Lactate elevated to 8 9 on repeat labs at 2:00PM from 2 9 at 9:47am  ? Plan:  - Continue Nafcillin per ID  - Follow up repeat blood cultures  - Tylenol scheduled for fever  - PRN ibuprofen - monitor BMP        MSK/Skin:   • Diagnosis: L total knee replacement  ? Assessment  - Initially done 6/28/22  ?  Plan:  - S/p removal with debridement of prosthesis with poly exchange  - Inserted stimulan abx beads  - Ortho following - appreciate recommendations  • Diagnosis: L elbow cellulitis  ? Plan:  - S/p I&D of L elbow  - Continue antibiotics as above  • Diagnosis: Septic Arthritis vs  Osteomyelitis  ? Plan:  - Neurosurgery consulted prior to transfer  - No surgical intervention anticipated at this time  - Follow up in 4 weeks with upright XR cervical spine      Patient appropriate for transfer out of the ICU today?: No  Disposition: Continue Critical Care   Code Status: Level 1 - Full Code  ---------------------------------------------------------------------------------------  ICU CORE MEASURES    Prophylaxis   VTE Pharmacologic Prophylaxis: Heparin  VTE Mechanical Prophylaxis: sequential compression device  Stress Ulcer Prophylaxis: Prophylaxis Not Indicated     ABCDE Protocol (if indicated)  Plan to perform spontaneous awakening trial today? Yes  Plan to perform spontaneous breathing trial today? Yes  Obvious barriers to extubation? Yes  CAM-ICU: Negative    Invasive Devices Review  Invasive Devices     Central Venous Catheter Line  Duration           CVC Central Lines 01/14/23 Triple 20cm <1 day          Peripheral Intravenous Line  Duration           Peripheral IV 01/12/23 Right Antecubital 2 days    Peripheral IV 01/14/23 Dorsal (posterior); Left Forearm <1 day    Peripheral IV 01/14/23 Left;Ventral (anterior) Forearm <1 day          Arterial Line  Duration           Arterial Line 01/14/23 Right Radial <1 day          Drain  Duration           Closed/Suction Drain Left;Lateral Knee Accordion 10 Fr  5 days    Urethral Catheter <1 day          Airway  Duration           ETT  8 mm <1 day              Can any invasive devices be discontinued today?  No  ---------------------------------------------------------------------------------------  OBJECTIVE    Vitals   Vitals:    01/15/23 0415 01/15/23 0500 01/15/23 0508 01/15/23 0534   BP:       Pulse:   68    Resp:   22    Temp:  99 1 °F (37 3 °C) 99 1 °F (37 3 °C)    TempSrc:       SpO2: 100%  100%    Weight:    102 kg (224 lb 13 9 oz) Temp (24hrs), Av 6 °F (37 6 °C), Min:97 1 °F (36 2 °C), Max:100 7 °F (38 2 °C)  Current: Temperature: 99 1 °F (37 3 °C)    Respiratory:  SpO2: SpO2: 100 %       Invasive/non-invasive ventilation settings   Respiratory    Lab Data (Last 4 hours)      01/15 0446            pH, Arterial       7 410             pCO2, Arterial       28 5             pO2, Arterial       220 7             HCO3, Arterial       17 7             Base Excess, Arterial       -5 8                  O2/Vent Data     None                Physical Exam  Vitals reviewed  Constitutional:       General: He is not in acute distress  Appearance: He is ill-appearing  HENT:      Head: Normocephalic and atraumatic  Right Ear: External ear normal       Left Ear: External ear normal       Nose: Nose normal       Mouth/Throat:      Mouth: Mucous membranes are moist       Pharynx: Oropharynx is clear  Eyes:      General:         Right eye: No discharge  Left eye: No discharge  Conjunctiva/sclera: Conjunctivae normal       Pupils: Pupils are equal, round, and reactive to light  Cardiovascular:      Rate and Rhythm: Normal rate and regular rhythm  Pulmonary:      Effort: Pulmonary effort is normal       Breath sounds: Normal breath sounds  Abdominal:      General: Bowel sounds are normal  There is distension  Palpations: Abdomen is soft  Tenderness: There is no abdominal tenderness  Musculoskeletal:         General: No swelling or tenderness  Right lower leg: No edema  Left lower leg: No edema  Skin:     General: Skin is warm and dry  Coloration: Skin is not jaundiced  Findings: Bruising and lesion present     Neurological:      Comments: R sided neglect, follows commands in LUE, opens eyes to voice   Psychiatric:      Comments: Unable to assess             Laboratory and Diagnostics:  Results from last 7 days   Lab Units 01/15/23  0445 23  1749 23  1605 23  1203 01/14/23  0947 01/14/23  0440 01/13/23  0656 01/12/23  0635 01/11/23  0513 01/10/23  0441 01/09/23  0555   WBC Thousand/uL 20 38* 27 91*  --   --  22 40* 19 37* 18 41* 16 86* 14 64*   < > 13 33*   HEMOGLOBIN g/dL 6 9* 7 9*  --  7 2* 8 8* 12 1 12 3 12 8 13 7   < > 14 1   I STAT HEMOGLOBIN g/dl  --   --  8 2*  --   --   --   --   --   --   --   --    HEMATOCRIT % 20 0* 23 3*  --  21 3* 25 8* 34 6* 36 1* 36 8 41 0   < > 41 3   HEMATOCRIT, ISTAT %  --   --  24*  --   --   --   --   --   --   --   --    PLATELETS Thousands/uL 191 180  --   --  183 194 191 221 227   < > 257   NEUTROS PCT %  --  83*  --   --   --  88* 86* 87* 84*  --   --    MONOS PCT %  --  8  --   --   --  5 6 6 7  --   --    MONO PCT %  --   --   --   --   --   --   --   --   --   --  14*    < > = values in this interval not displayed       Results from last 7 days   Lab Units 01/15/23  0025 01/14/23  1749 01/14/23  1605 01/14/23  0440 01/13/23  0656 01/12/23  0635 01/11/23  0513 01/10/23  0441   SODIUM mmol/L 136 130*  --  125* 125* 130* 131* 131*   POTASSIUM mmol/L 4 4 5 1  --  4 1 3 9 4 1 4 6 4 5   CHLORIDE mmol/L 110* 102  --  94* 95* 97 96 98   CO2 mmol/L 18* 19*  --  22 21 21 18* 25   CO2, I-STAT mmol/L  --   --  18*  --   --   --   --   --    ANION GAP mmol/L 8 9  --  9 9 12 17* 8   BUN mg/dL 50* 48*  --  20 19 24 29* 38*   CREATININE mg/dL 1 44* 1 50*  --  0 90 0 79 0 86 0 92 1 10   CALCIUM mg/dL 7 2* 7 5*  --  8 9 9 1 9 3 9 2 8 7   GLUCOSE RANDOM mg/dL 276* 284*  --  195* 232* 254* 214* 227*   ALT U/L  --  83*  --  17 15 14 17 22   AST U/L  --  212*  --  51* 43 55* 59* 26   ALK PHOS U/L  --  74  --  110 101 98 104 87   ALBUMIN g/dL  --  1 3*  --  1 7* 1 8* 1 8* 2 0* 1 8*   TOTAL BILIRUBIN mg/dL  --  0 55  --  0 73 0 49 0 54 0 59 0 58     Results from last 7 days   Lab Units 01/14/23  1749 01/14/23  0440 01/13/23  0656 01/12/23  0635 01/11/23  0513   MAGNESIUM mg/dL 2 0 1 6 1 8 1 6 1 7   PHOSPHORUS mg/dL 5 4*  --   --   --   --       Results from last 7 days   Lab Units 01/14/23  1749 01/14/23  0947   INR  1 64* 1 50*   PTT seconds 33  --           Results from last 7 days   Lab Units 01/14/23  1749 01/14/23  1404 01/14/23  0947   LACTIC ACID mmol/L 1 8 8 9* 2 9*     ABG:  Results from last 7 days   Lab Units 01/15/23  0446   PH ART  7 410   PCO2 ART mm Hg 28 5*   PO2 ART mm Hg 220 7*   HCO3 ART mmol/L 17 7*   BASE EXC ART mmol/L -5 8   ABG SOURCE  Line, Arterial     VBG:  Results from last 7 days   Lab Units 01/15/23  0446 01/15/23  0025 01/14/23 2056   PH JASON   --   --  7 313   PCO2 JASON mm Hg  --   --  36 2*   PO2 JASON mm Hg  --   --  42 0   HCO3 JASON mmol/L  --   --  17 9*   BASE EXC JASON mmol/L  --   --  -7 6   ABG SOURCE  Line, Arterial   < >  --     < > = values in this interval not displayed  Results from last 7 days   Lab Units 01/14/23  0947   PROCALCITONIN ng/ml 3 20*       Micro  Results from last 7 days   Lab Units 01/13/23  0657 01/11/23  0728 01/09/23  1836 01/09/23  0743 01/09/23  0601   BLOOD CULTURE  No Growth at 24 hrs  Staphylococcus aureus*  Staphylococcus aureus*  --   --  No Growth After 5 Days  No Growth After 5 Days  GRAM STAIN RESULT  Gram positive cocci in clusters* Gram positive cocci in clusters*  Gram positive cocci in clusters* No Polys or Bacteria seen 3+ Polys  No bacteria seen  --    BODY FLUID CULTURE, STERILE   --   --   --  No growth  --    MRSA CULTURE ONLY   --   --   --   --  No Methicillin Resistant Staphlyococcus aureus (MRSA) isolated       EKG:  EKG: normal EKG, normal sinus rhythm, unchanged from previous tracings       Imaging:  XR chest portable    (Results Pending)   MRI brain w wo contrast    (Results Pending)   CT head wo contrast    (Results Pending)        Intake and Output  I/O       01/13 0701 01/14 0700 01/14 0701  01/15 0700    I V  (mL/kg)  3955 7 (38 8)    Blood  350    IV Piggyback  3100    Total Intake(mL/kg)  7405 7 (72 6)    Urine (mL/kg/hr)  1300    Total Output  1300    Net  +6715 7 Unmeasured Urine Occurrence  1 x          Height and Weights         Body mass index is 32 27 kg/m²    Weight (last 2 days)     Date/Time Weight    01/15/23 0534 102 (224 87)    01/14/23 1825 102 (224 87)          Nutrition       Diet Orders   (From admission, onward)             Start     Ordered    01/15/23 0000  Diet NPO  Diet effective midnight        References:    Nutrtion Support Algorithm Enteral vs  Parenteral   Question:  Diet Type  Answer:  NPO    01/14/23 0494                Active Medications  Scheduled Meds:  Current Facility-Administered Medications   Medication Dose Route Frequency Provider Last Rate   • acetaminophen  650 mg Oral Q4H PRN Travis Fluke, DO     • chlorhexidine  15 mL Mouth/Throat Q12H [address] Travis Fluke, DO     • dexmedetomidine  0 1-0 7 mcg/kg/hr Intravenous Titrated Evonne Silvana, PA-C 0 3 mcg/kg/hr (01/15/23 0316)   • fentaNYL  50 mcg/hr Intravenous Continuous [place] Silvana, PA-C 50 mcg/hr (01/15/23 0255)   • fentanyl citrate (PF)  50 mcg Intravenous Q2H PRN Travis Flujessica, DO     • heparin (porcine)  5,000 Units Subcutaneous [facility] Travis Fluke, DO     • hydrocortisone sodium succinate  50 mg Intravenous Q6H [address] Bernie ASHLEIGH Novoa PA-C     • insulin regular (HumuLIN R,NovoLIN R) infusion  0 3-21 Units/hr Intravenous Titrated [place] Silvana, PA-C 2 Units/hr (01/15/23 0538)   • ipratropium  0 5 mg Nebulization TID Pattie Crawford MD     • levalbuterol  1 25 mg Nebulization TID Travis Flujessica, DO     • nafcillin  2,000 mg Intravenous Q4H Rogue Hammans, CRNP 2,000 mg (01/15/23 3860)   • norepinephrine  1-30 mcg/min Intravenous Titrated Travis Fluke, DO 10 mcg/min (01/15/23 0502)   • ondansetron  4 mg Intravenous Q6H PRN Rogue Hammans, CRNP     • sodium chloride  250 mL Intravenous Once JENNIFER SandovalC     • sodium chloride  30 mL/hr Intravenous Continuous Bernie ASHLEIGH Novoa PA-C     • sodium chloride  4 mL Nebulization TID Ana Shirley MD     • vasopressin (PITRESSIN) in 0 9 % sodium chloride 100 mL  0 04 Units/min Intravenous Continuous Ruth Novoa PA-C 0 04 Units/min (01/15/23 0455)     Continuous Infusions:  dexmedetomidine, 0 1-0 7 mcg/kg/hr, Last Rate: 0 3 mcg/kg/hr (01/15/23 0316)  fentaNYL, 50 mcg/hr, Last Rate: 50 mcg/hr (01/15/23 0255)  insulin regular (HumuLIN R,NovoLIN R) infusion, 0 3-21 Units/hr, Last Rate: 2 Units/hr (01/15/23 0514)  norepinephrine, 1-30 mcg/min, Last Rate: 10 mcg/min (01/15/23 0502)  sodium chloride, 30 mL/hr  vasopressin (PITRESSIN) in 0 9 % sodium chloride 100 mL, 0 04 Units/min, Last Rate: 0 04 Units/min (01/15/23 0455)      PRN Meds:   acetaminophen, 650 mg, Q4H PRN  fentanyl citrate (PF), 50 mcg, Q2H PRN  ondansetron, 4 mg, Q6H PRN        Allergies   Allergies   Allergen Reactions   • Sulfamethoxazole-Trimethoprim Nausea Only and GI Intolerance     ---------------------------------------------------------------------------------------  Advance Directive and Living Will:      Power of :    POLST:    ---------------------------------------------------------------------------------------    Jeffy Ward,       Portions of the record may have been created with voice recognition software  Occasional wrong word or "sound a like" substitutions may have occurred due to the inherent limitations of voice recognition software    Read the chart carefully and recognize, using context, where substitutions have occurred

## 2023-01-15 NOTE — CONSULTS
CONSULT: GASTROENTEROLOGY          Inpatient consult to gastroenterology     Performed by  Franc Rome MD     Authorized by Jamey Calderon DO            PATIENT INFORMATION      Roxanne Kovacs 58 y o  male MRN: [id number]  Unit/Bed#: ICU 12 Encounter: [id number]  PCP: Britney Abel DO  Date of Admission:  1/14/2023  Date of Consultation: 01/15/23  Requesting Physician: Elizabeth Acuna MD    ASSESSMENTS & PLAN   Roxanne Kovacs is a 58 y o  old male currently admitted with acute left MCA stroke, initially admitted to [facility] with left elbow cellulitis and abscess s/p I&D, 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 and polyethylene exchange  Currently also undergoing care for MSSA positive bacteremia and AV endocarditis, who was transferred to [facility] for L MCA Stroke  Gastroenterology team has been consulted for assistance with management of acute anemia and melena    1  GI bleeding  2  Shock, likely multifactorial  3  Stroke, likely septic emboli  4  IJ thrombosis not on anticoagulation  5  Sepsis  6   suspected endocarditis  2 episodes of dark/maroon-colored bloody bowel movement  Blood pressure overnight dropping requiring 2 pressors, now improving with blood transfusions  Has required for blood transfusion so far  Hemoglobin around 12 yesterday, dropped to 6 9, up to 7 9 and now to 8 2  OG tube output appears normal   -- Started on IV PPI, will plan for emergent EGD today  -- Resuscitation with IV fluids and PRBC  -- Further recommendations after the procedure    GI will follow  HISTORY OF PRESENT ILLNESS      Roxanne Kovacs is a 58 y o  male who is originally admitted for L MCA stroke, transferred from [facility] on 1/14/2023  GI team is consulted for GI bleeding  Patient is intubated, history available from chart review      Patient is 70-year-old male who was initially admitted at [facility] for left elbow cellulitis and abscess who is now s/p I&D, left knee prostatic joint infection who is now s/p debridement and polyethylene exchange, undergoing care for MSSA positive bacteremia and AV endocarditis, he was noticed to have acute strokelike symptoms, underwent stat imaging which showed L MCA stroke after which she was transferred to [facility]  On 1/14 he was noticed to have acute hypotension and drop in hemoglobin  During my encounter patient remains intubated  He had 2 bloody bowel movements so far  Blood pressure has been improving after blood transfusions  No blood thinners, noted on EGD and colonoscopies  REVIEW OF SYSTEMS     A thorough 12-point review of systems has been conducted  Pertinent positives and negatives are mentioned in the history of present illness       PAST MEDICAL & SURGICAL HISTORY      Past Medical History:   Diagnosis Date   • Arthritis     L knee   for L TKR today 6/28/2022   • Atopic dermatitis    • Condition not found     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   • Diabetes mellitus ([address] )    • Hyperlipemia    • Hypertension    • Lateral epicondylitis, left elbow    • Wears partial dentures     upper       Past Surgical History:   Procedure Laterality Date   • APPENDECTOMY     • COLONOSCOPY     • CYST REMOVAL      Neck   • INCISION AND DRAINAGE OF WOUND Left 1/9/2023    Procedure: INCISION AND DRAINAGE (I&D) EXTREMITY;  Surgeon: Oliver Charles DO;  Location: [facility];  Service: Orthopedics   • JOINT REPLACEMENT Left 06/28/2022   • KNEE ARTHROSCOPY      menisectomy   • TN ARTHRP KNE CONDYLE&PLATU MEDIAL&LAT COMPARTMENTS Left 06/28/2022    Procedure: ARTHROPLASTY KNEE TOTAL;  Surgeon: Alex Munoz DO;  Location: [facility];  Service: Orthopedics   • TN RMVL PROSTH TOT KNEE PROSTH MMA W/WO INSJ SPACER Left 1/9/2023    Procedure: REMOVAL / [address] W/ POLY EXCHANGE;  Surgeon: Oliver Charles DO;  Location: [facility];  Service: Orthopedics       MEDICATIONS & ALLERGIES       Medications:   Prior to Admission medications    Medication Sig Start Date End Date Taking? Authorizing Provider   acetaminophen (TYLENOL) 325 mg tablet Take 3 tablets (975 mg total) by mouth every 8 (eight) hours  Patient not taking: Reported on 10/25/2022 6/29/22   Mera Aguillon PA-C   albuterol [facility] HFA) 90 mcg/act inhaler Inhale 2 puffs every 6 (six) hours as needed for wheezing  Patient not taking: Reported on 1/7/2023 11/27/19   Monique Sutton DO   Ascorbic Acid (VITAMIN C PO) Take 1 capsule by mouth daily  Patient not taking: Reported on 1/7/2023    Historical Provider, MD   aspirin (ECOTRIN) 325 mg EC tablet Take 1 tablet (325 mg total) by mouth 2 (two) times a day Take with food  Patient not taking: Reported on 10/25/2022 6/29/22   Mera Aguillon PA-C   betamethasone dipropionate (DIPROSONE) 0 05 % cream Apply topically 2 (two) times a day  Patient not taking: Reported on 1/7/2023 3/21/22   Carlos Enrique Rivera DO   Blood Glucose Monitoring Suppl (OneTouch Verio Reflect) w/Device KIT Check blood sugars once daily  Please substitute with appropriate alternative as covered by patient's insurance  Dx: E11 65 2/21/22   Carlos Enrique Rivera DO   budesonide-formoterol (Symbicort) 160-4 5 mcg/act inhaler Inhale 2 puffs 2 (two) times a day Rinse mouth after use   1/4/23   Monique Sutton DO   co-enzyme Q-10 30 mg Take 30 mg by mouth daily    Historical Provider, MD   cyclobenzaprine (FLEXERIL) 10 mg tablet Take 1 tablet (10 mg total) by mouth 3 (three) times a day as needed for muscle spasms 1/2/23   JAI Mccord   docusate sodium (COLACE) 100 mg capsule Take 1 capsule (100 mg total) by mouth 2 (two) times a day  Patient not taking: Reported on 10/25/2022 6/29/22   Mera Aguillon PA-C   Empagliflozin (Jardiance) 10 MG TABS tablet Take 1 tablet (10 mg total) by mouth daily 1/4/23   Monique Sutton DO   fenofibrate (TRICOR) 145 mg tablet Take 1 tablet (145 mg total) by mouth daily 1/4/23   John Martin DO   ferrous sulfate 324 (65 Fe) mg Take 1 tablet (324 mg total) by mouth in the morning 1/17/22   Inetta Mandi, DO   FOLIC ACID PO Take 1 capsule by mouth daily    Historical Provider, MD   gabapentin (NEURONTIN) 100 mg capsule Take 1 capsule (100 mg total) by mouth every 8 (eight) hours  Patient not taking: Reported on 7/28/2022 6/29/22   Ekaterina Franklin PA-C   glucose blood (OneTouch Verio) test strip Check blood sugars once daily  Please substitute with appropriate alternative as covered by patient's insurance  Dx: E11 65 2/21/22   Giovanna Pulling, DO   lisinopril-hydrochlorothiazide [facility]) 20-25 MG per tablet Take 1 tablet by mouth daily 1/4/23   John Martin DO   metFORMIN (GLUCOPHAGE) 1000 MG tablet Take 1 tablet (1,000 mg total) by mouth 2 (two) times a day with meals 11/15/22   John Martin DO   methylPREDNISolone 4 MG tablet therapy pack Use as directed on package 1/2/23   JAI Simeon   metoprolol succinate (TOPROL-XL) 100 mg 24 hr tablet Take 1 tablet (100 mg total) by mouth daily 1/4/23   John Martin DO   Multiple Vitamin (multivitamin) tablet Take 1 tablet by mouth daily 1/17/22   Abbi Raymond DO   mrref-7-fmgt ethyl esters (LOVAZA) 1 g capsule TAKE 2 CAPSULES TWICE DAILY  Patient taking differently: Take 2 g by mouth 2 (two) times a day 5/5/22   Albertine Savin, DO   OneTouch Delica Lancets 45P MISC Check blood sugars once daily  Please substitute with appropriate alternative as covered by patient's insurance  Dx: E11 65 2/21/22   Giovanna Pulling, DO   rosuvastatin (CRESTOR) 10 MG tablet Take 1 tablet (10 mg total) by mouth daily 1/4/23   John Martin DO   sildenafil (VIAGRA) 100 mg tablet Take one tablet daily as needed  Patient taking differently: Take 100 mg by mouth as needed Take one tablet daily as needed 6/7/22   John Martin DO       Allergies:    Allergies   Allergen Reactions   • Sulfamethoxazole-Trimethoprim Nausea Only and GI Intolerance       SOCIAL HISTORY      Substance Use History:   Social History     Substance and Sexual Activity   Alcohol Use Not Currently   • Alcohol/week: 2 0 standard drinks   • Types: 2 Cans of beer per week    Comment: Social     Social History     Tobacco Use   Smoking Status Former   • Packs/day: 1 00   • Years: 40 00   • Pack years: 40 00   • Types: Cigarettes   • Quit date: 10/2021   • Years since quittin 2   Smokeless Tobacco Never     Social History     Substance and Sexual Activity   Drug Use Never       FAMILY HISTORY      As in the HPI  PHYSICAL EXAM     Vitals:   Blood Pressure: 138/50 (01/15/23 0600)  Pulse: 82 (01/15/23 0600)  Temperature: 99 1 °F (37 3 °C) (01/15/23 0508)  Temp Source: Oral (23 2330)  Respirations: (!) 24 (01/15/23 0600)  Weight - Scale: 102 kg (224 lb 13 9 oz) (01/15/23 0534)  SpO2: 100 % (01/15/23 0600)    Physical Exam:   GENERAL: NAD  HEENT:  NC/AT, MMM  CARDIAC:  RRR, +S1/S2, no S3/S4 heard  PULMONARY:  CTA B/L, no wheezing/rales/rhonci, non-labored breathing  ABDOMEN: Non distended  RECTAL:  Normal appearing stool  NEUROLOGIC:  Alert/oriented x3     EXTREMITIES: No swelling  SKIN:  No rashes or erythema     ADDITIONAL DATA     Lab Results:     Results from last 7 days   Lab Units 01/15/23  0445   WBC Thousand/uL 20 38*   HEMOGLOBIN g/dL 6 9*   HEMATOCRIT % 20 0*   PLATELETS Thousands/uL 191   NEUTROS PCT % 83*   LYMPHS PCT % 8*   MONOS PCT % 8   EOS PCT % 0     Results from last 7 days   Lab Units 01/15/23  0447 01/15/23  0445 23  1749 23  1605   POTASSIUM mmol/L 4 0  --    < >  --    CHLORIDE mmol/L 114*  --    < >  --    CO2 mmol/L 17*  --    < >  --    CO2, I-STAT mmol/L  --   --   --  18*   BUN mg/dL 50*  --    < >  --    CREATININE mg/dL 1 24  --    < >  --    CALCIUM mg/dL 7 5*  --    < >  --    ALK PHOS U/L  --  68   < >  --    ALT U/L  --  86*   < >  --    AST U/L  --  223*   < >  -- GLUCOSE, ISTAT mg/dl  --   --   --  299*    < > = values in this interval not displayed  Results from last 7 days   Lab Units 01/14/23  1749   INR  1 64*       Imaging:    CT abdomen pelvis wo contrast    Result Date: 1/14/2023  Narrative: CT ABDOMEN AND PELVIS WITHOUT IV CONTRAST INDICATION:   abdominal pain  COMPARISON:  None  TECHNIQUE:  CT examination of the abdomen and pelvis was performed without intravenous contrast  Axial, sagittal, and coronal 2D reformatted images were created from the source data and submitted for interpretation  Radiation dose length product (DLP) for this visit:  680 mGy-cm   This examination, like all CT scans performed in the [facility], was performed utilizing techniques to minimize radiation dose exposure, including the use of iterative reconstruction and automated exposure control  Enteric contrast was not administered  FINDINGS: ABDOMEN LOWER CHEST:  4 mm right lower lung nodule image 2/4  5 mm right middle anterior lung nodule image 3/5  Trace left effusion/pleural thickening  The cavitary left lower lung lesion seen on prior thoracic spine MRI is not in the field-of-view on this study  LIVER/BILIARY TREE:  Unremarkable  GALLBLADDER:  There are gallstone(s) within the gallbladder, without pericholecystic inflammatory changes  SPLEEN:  Smooth peripherally calcified hypodensity in the anterior spleen measuring 4 4 x 4 6 x 4 8 cm  This may be due to prior infection or hemorrhage  Additional large heterogeneous nodular foci in the spleen, but otherwise limited in evaluation without contrast  PANCREAS:  Unremarkable  ADRENAL GLANDS:  Unremarkable  KIDNEYS/URETERS:  Unremarkable  No hydronephrosis  STOMACH AND BOWEL:  Unremarkable  APPENDIX:  There are expected postoperative changes of appendectomy  ABDOMINOPELVIC CAVITY:  No ascites  No pneumoperitoneum  No lymphadenopathy  VESSELS:  Unremarkable for patient's age   PELVIS REPRODUCTIVE ORGANS:  Unremarkable for patient's age  Prostate calcifications  URINARY BLADDER:  Unremarkable  ABDOMINAL WALL/INGUINAL REGIONS:  Unremarkable  OSSEOUS STRUCTURES:  No acute fracture or destructive osseous lesion  L4 spondylolysis with mild grade 1 anterolisthesis on S1  Impression: 1  Smooth peripherally calcified hypodensity in the anterior spleen measuring 4 4 x 4 6 x 4 8 cm  This may be due to prior infection or hemorrhage  There are additional large heterogeneous nodular foci in the spleen, but otherwise limited in evaluation without contrast  Recommend further evaluation with contrast CT  2   The cavitary left lower lung lesion seen on prior thoracic spine MRI is not in the field-of-view on this study  Dedicated contrast CT chest evaluation is recommended  3  Cholelithiasis  Workstation performed: [id number]     XR spine cervical 2 or 3 vw injury    Result Date: 1/13/2023  Narrative: CERVICAL SPINE INDICATION:   C4-5 septic arthritis  COMPARISON:  MR cervical spine 1/10/2023, CT cervical spine 1/9/2023 VIEWS:  XR SPINE CERVICAL 2 OR 3 VW INJURY FINDINGS: No acute fracture or subluxation  Straightening of the usual cervical lordosis  Minimal anterolisthesis C3 on C4 and C4 on C5, stable  No radiographic findings at the right C4-C5 facet level as seen on MRI or CT  Scattered mild degenerative changes in the cervical spine  Normal prevertebral soft tissues  Clear lung apices  Impression: No acute osseous abnormality  No radiographic findings at the right C4-C5 facet level  Workstation performed: [id number]     XR knee left 1 or 2 views    Result Date: 1/10/2023  Narrative: LEFT KNEE INDICATION:   Post op left TKA  COMPARISON:  1/8/2023 VIEWS:  XR KNEE 1 OR 2 VW LEFT Images: 2 FINDINGS: There is no acute fracture or dislocation  Joint effusion appears to have resolved  Unremarkable appearance of total knee arthroplasty  No evidence of hardware complication  No lytic or blastic osseous lesion   Interval placement of antibiotic pledgets and surgical drain  Associated soft tissue swelling and gas noted  Impression: Unremarkable appearance of total knee arthroplasty  Interval postsurgical changes as above  Workstation performed: [id number]     XR knee 1 or 2 vw left    Result Date: 1/9/2023  Narrative: LEFT KNEE INDICATION:   s/p L TKA  COMPARISON:  None VIEWS:  XR KNEE 1 OR 2 VW LEFT FINDINGS: There is no acute fracture or dislocation  There is no joint effusion  Unremarkable appearance of total knee arthroplasty  No evidence of hardware complication  No lytic or blastic osseous lesion  There are atherosclerotic calcifications  Soft tissues are otherwise unremarkable  Impression: Unremarkable appearance of total knee arthroplasty  No evidence of hardware complication  Workstation performed: [id number]     CT head wo contrast    Result Date: 1/14/2023  Narrative: CT BRAIN - WITHOUT CONTRAST INDICATION:   post stroke alert  COMPARISON:  CT January 14, 2023 TECHNIQUE:  CT examination of the brain was performed  In addition to axial images, sagittal and coronal 2D reformatted images were created and submitted for interpretation  Radiation dose length product (DLP) for this visit:  916 83 mGy-cm   This examination, like all CT scans performed in the [facility], was performed utilizing techniques to minimize radiation dose exposure, including the use of iterative  reconstruction and automated exposure control  IMAGE QUALITY:  Diagnostic  FINDINGS: PARENCHYMA:  Loss of gray-white differentiation identified anterior left temporal lobe, anterior insular cortex and left frontal lobe also suspicious for recent infarct  No hemorrhage identified  Subtle effacement of sulci noted  VENTRICLES AND EXTRA-AXIAL SPACES:  Normal for the patient's age  VISUALIZED ORBITS AND PARANASAL SINUSES:  Normal visualized orbits  Normal visualized paranasal sinuses   CALVARIUM AND EXTRACRANIAL SOFT TISSUES:  Normal      Impression: Is compatible with recent infarct in the left frontal lobe, left insular and anterior temporal lobe  No hemorrhage identified  Workstation performed: [id number]     CT spine cervical w contrast    Result Date: 1/9/2023  Narrative: CT CERVICAL SPINE - WITH CONTRAST INDICATION: Infection  Bacteremia  Neck pain  COMPARISON:  None  TECHNIQUE:  Noncontrast CT examination of the cervical spine was performed  Radiation dose length product (DLP) for this visit:  609 661 045 mGy-cm   This examination, like all CT scans performed in the [facility], was performed utilizing techniques to minimize radiation dose exposure, including the use of iterative reconstruction and automated exposure control  85 mL of Omnipaque 350 was injected intravenously without immediate consequence  IMAGE QUALITY:  Diagnostic  FINDINGS: ALIGNMENT: Slight levoscoliosis of the cervical spine  Minimal anterior subluxation of C3 upon C4  OSSEOUS STRUCTURES: No fracture  Focal lucencies are seen within the right facet joint at C4-5  These are nonspecific and may represent subchondral cystic degenerative change  No signs of acute bony erosion or destruction  DEGENERATIVE CHANGES: C2-C3:  Small broad-based right paramedian disc protrusion with mild canal stenosis  No foraminal narrowing  C3-C4:  Normal disc height  As described above there is slight anterior subluxation of C3 upon C4 within normal limits  No canal stenosis or foraminal narrowing  C4-C5:  Normal disc height  No focal disc herniation identified  Slight right-sided uncinate joint hypertrophic change  As described above there is right-sided facet degenerative change including subchondral lucencies within the facets    Mild enhancement extends lateral from the facet joint on series 3 images 51 and 52, nonspecific in appearance possibly representing a small synovial cyst   Although the bony structures do not demonstrate signs of acute erosion or destruction, these changes could be seen in a subacute to chronic septic arthritis  C5-C6:  No significant degenerative change  C6-C7:  No significant degenerative change  C7-T1:  No significant degenerative change  UPPER THORACIC DISC SPACES:  No significant degenerative change  PREVERTEBRAL AND PARASPINAL SOFT TISSUES:  There are vascular calcifications of the carotid bifurcation bilaterally without evidence of significant vascular stenosis  Mild vascular calcification of the aortic arch  LUNG APICES:  Nonspecific masslike opacity within the anterior medial aspect of the left chest     Impression: No significant disc herniation, canal stenosis or foraminal narrowing  No acute fracture  Facet changes are seen on the right at C4-5 most likely representing subchondral cystic degenerative change  However, there does appear to be a small peripherally enhancing focus extending laterally from the facet joint which could represent a small, 4 mm synovial cyst, see series 3 image 51  The possibility of septic arthritis not excluded in this patient with active bacteremia  Nonspecific density within the anterior medial aspect of the left hemithorax  Differential considerations would include an infectious etiology, possibly fungal or other form of organizing pneumonia, as well as old pulmonary infarct  No previous chest imaging for comparison other than a chest x-ray from 2020  Workstation performed: [id number]     MRI cervical spine w wo contrast    Result Date: 1/11/2023  Narrative: MRI CERVICAL SPINE WITH AND WITHOUT CONTRAST INDICATION: neck stiffness concerning for infection on CT cervical spine  COMPARISON:  CT performed yesterday  TECHNIQUE:  Multiplanar, multisequence imaging of the cervical spine was performed before and after gadolinium administration     IV Contrast:  10 mL of Gadobutrol injection (SINGLE-DOSE) IMAGE QUALITY:  Diagnostic  FINDINGS: ALIGNMENT: Minimal anterolisthesis at C3-4  No compression   MARROW SIGNAL: Marrow edema in the right C4-5 facets with synovitis and mild adjacent soft tissue edema  Given concern for infection, findings may represent septic arthritis/osteomyelitis although inflammatory arthropathy is in the differential  No other  abnormal marrow signal  CERVICAL AND VISUALIZED UPPER THORACIC CORD:  Normal signal within the visualized cord  PREVERTEBRAL AND PARASPINAL SOFT TISSUES:  Mild right paraspinal edema adjacent to right C4-5 facet complex  No abscess  VISUALIZED POSTERIOR FOSSA:  The visualized posterior fossa demonstrates no abnormal signal  CERVICAL DISC SPACES:  Multilevel disc desiccation  C2-C3:  Tiny right right central protrusion  No significant canal or foraminal stenosis  C3-C4:  Small disc bulge  No canal or foraminal stenosis  C4-C5:  Small disc bulge  Facet arthropathy  No significant canal stenosis  Slight foraminal stenosis  C5-C6:  No disc herniation, canal or foraminal stenosis  Mild facet arthropathy  C6-C7:  Disc bulge  Mild facet arthropathy  Mild canal and mild left foraminal stenosis  C7-T1:  No disc herniation, canal or foraminal stenosis  UPPER THORACIC DISC SPACES:  Normal  POSTCONTRAST IMAGING:  No abnormal epidural or intrathecal enhancement  OTHER FINDINGS:  None  Impression: Right C4-5 facet joint synovitis with marrow edema could be due to septic arthritis/osteomyelitis given clinical concern for infection  Inflammatory arthropathy with reactive marrow edema is in the differential  No other evidence of infection  No epidural abscess  Workstation performed: [id number]     MRI thoracic spine w wo contrast    Result Date: 1/13/2023  Narrative: MRI THORACIC SPINE WITH AND WITHOUT CONTRAST INDICATION: looking for infection of thoracic spine  COMPARISON:  None  TECHNIQUE:  Multiplanar, multisequence imaging of the thoracic spine was performed before and after gadolinium administration     IV Contrast:  10 mL of Gadobutrol injection (SINGLE-DOSE) IMAGE QUALITY:  Diagnostic   FINDINGS: ALIGNMENT:  Normal alignment of the thoracic spine  No compression fracture  No subluxation  No evidence of scoliosis  MARROW SIGNAL:  Scattered degenerative endplate changes  No focally suspicious marrow lesions  No bone marrow edema or compression abnormality  THORACIC CORD:  Normal signal within the thoracic cord  PREVERTEBRAL AND PARASPINAL SOFT TISSUES:   Normal  THORACIC DEGENERATIVE CHANGE:  Scattered degenerative endplate changes  No focally suspicious marrow lesions  No bone marrow edema or compression abnormality  Mild spondylotic changes  No bone marrow edema or compression abnormality  POSTCONTRAST:  No abnormal enhancement  OTHER FINDINGS:  Peripheral T2 hyperintense lesions noted in the posterior aspect of the spleen series 7, images 42 and 43  Possible splenomegaly  Spleen incompletely characterized  In the posterior aspect of left lower lobe there is a 1 8 cm lesion with a air-fluid level, worrisome for cavitary lesion  Impression: 1  Mild spondylosis without cord compression or cord signal abnormality  2   No MR evidence of discitis/ostomy colitis in the visualized spine  3   Peripheral T2 hyperintense lesions in the spleen which may be enlarged  Differential diagnosis includes splenic mass lesions such as hemangioma or other lesion or perhaps a splenic infarct  Consider left upper quadrant ultrasound and/or contrast-enhanced CT of the abdomen for further assessment  4   Cavitary lesion with air-fluid level in the left lower lobe, possibly infectious process such as a pulmonary abscess  Differential diagnosis includes malignancy  CT of the chest advised  Workstation performed: [id number]     CT chest abdomen pelvis w contrast    Result Date: 1/14/2023  Narrative: CT CHEST, ABDOMEN AND PELVIS WITH IV CONTRAST INDICATION:   Neuro deficit, acute, stroke suspected recomended by radiolgoy acute right facial droop and right arm weakness   COMPARISON:  CT 1/14/2023 TECHNIQUE: CT examination of the chest, abdomen and pelvis was performed  Axial, sagittal, and coronal 2D reformatted images were created from the source data and submitted for interpretation  Radiation dose length product (DLP) for this visit:  1184 mGy-cm   This examination, like all CT scans performed in the [facility], was performed utilizing techniques to minimize radiation dose exposure, including the use of iterative reconstruction and automated exposure control  IV Contrast:  100 mL of iohexol (OMNIPAQUE) Enteric Contrast: Enteric contrast was not administered  FINDINGS: Examination is limited due to artifact from the upper extremities as well as motion and suboptimal phase of contrast enhancement  CHEST LUNGS:  As seen on recent thoracic MRI, there is a cavitary nodule with small fluid level in the posterior left lower lobe measuring 1 7 x 1 8 cm  This may be infectious in etiology  There is also a small left upper anterior mediastinal infiltrate, though better visualized on prior CT cervical spine  Significant motion otherwise limits evaluation  Lesions may be due to septic emboli  There is no tracheal or endobronchial lesion  PLEURA:  Trace left pleural fluid/pleural thickening  HEART/GREAT VESSELS: Coronary atherosclerosis  No thoracic aortic aneurysm  MEDIASTINUM AND CAROLANN:  Unremarkable  CHEST WALL AND LOWER NECK:  Unremarkable  ABDOMEN LIVER/BILIARY TREE:  Mild hepatic steatosis may be present  No CT evidence of suspicious hepatic mass  Normal hepatic contours  No biliary dilatation  GALLBLADDER:  There are gallstone(s) within the gallbladder, without pericholecystic inflammatory changes  SPLEEN:  Peripherally calcified anterior splenic nodule again visualized measuring 4 4 x 4 6 cm, possibly from old infection or hemorrhage  Multiple large hypodense lesions are again visualized   Some lesions appear relatively well demarcated, despite the  significant motion artifact, raising the possibility of splenic infarcts  Other lesions however remain indeterminate  Given the additional findings in the chest and abdomen, findings may be due to sequela of of septic emboli  Neoplasm is not excluded at  this time  PANCREAS:  Unremarkable  ADRENAL GLANDS:  Unremarkable  KIDNEYS/URETERS:  No hydronephrosis  Although images are limited due to motion and phase of contrast enhancement, there are multiple bilateral renal hypodense lesions, several of which appear wedge-shaped, suspicious for renal infarcts  Given the left lung findings, these may be infarcts from septic emboli and/or coexistent pyelonephritis  Neoplasm such as lymphoma is felt to be less likely, though not entirely excluded  STOMACH AND BOWEL:  Unremarkable  APPENDIX:  There are expected postoperative changes of appendectomy  ABDOMINOPELVIC CAVITY:  No ascites  No pneumoperitoneum  No lymphadenopathy  VESSELS:  Unremarkable for patient's age  PELVIS REPRODUCTIVE ORGANS:  Prostate calcifications  URINARY BLADDER:  Unremarkable  ABDOMINAL WALL/INGUINAL REGIONS:  Unremarkable  OSSEOUS STRUCTURES:  No acute fracture or destructive osseous lesion  L4 spondylolysis with mild grade 1 anterolisthesis on S1  Impression: 1  Very limited examination due to phase of contrast enhancement and motion  As seen on recent thoracic MRI, there is a cavitary nodule with small amount of fluid in the posterior left lower lobe measuring 1 7 x 1 8 cm  There is also a small left upper anterior paramediastinal infiltrate  There are also multiple splenic and bilateral renal hypodense lesions as described above  Spectrum of findings may be sequela of septic emboli  Continued follow-up recommended to exclude neoplasm  2  Cholelithiasis  Workstation performed: [id number]     CT stroke alert brain    Result Date: 1/14/2023  Narrative: CT BRAIN - STROKE ALERT PROTOCOL INDICATION:   Neuro deficit, acute, stroke suspected acute right facial droop and right arm weakness  COMPARISON:  None  TECHNIQUE:  CT examination of the brain was performed  In addition to axial images, coronal reformatted images were created and submitted for interpretation  Radiation dose length product (DLP) for this visit:   This examination, like all CT scans performed in the [facility], was performed utilizing techniques to minimize radiation dose exposure, including the use of iterative reconstruction  and automated exposure control  IMAGE QUALITY:  Diagnostic  FINDINGS:  PARENCHYMA:  No intracranial mass, mass effect or midline shift  No CT signs of acute infarction  No acute parenchymal hemorrhage  Minimal chronic microangiopathy  VENTRICLES AND EXTRA-AXIAL SPACES:  Normal for the patient's age  VISUALIZED ORBITS AND PARANASAL SINUSES:  Orbits are unremarkable  Paranasal sinus mucosal thickening  CALVARIUM AND EXTRACRANIAL SOFT TISSUES:   Normal      Impression: No acute intracranial abnormality  Findings were directly discussed with Olena Soto at 1:38 PM  Workstation performed: [id number]     CTA stroke alert (head/neck)    Result Date: 1/14/2023  Narrative: CTA NECK AND BRAIN WITH CONTRAST INDICATION: Neuro deficit, acute, stroke suspected stroke acute right facial droop and right arm weakness Bacteremia with suspected endocarditis  COMPARISON:   Immediately preceding head CT  TECHNIQUE:   Post contrast imaging was performed after administration of iodinated contrast through the neck and brain  Post contrast axial 0 625 mm images timed to opacify the arterial system  3D rendering was performed on an independent workstation  MIP reconstructions performed  Coronal reconstructions were performed of the noncontrast portion of the brain  Radiation dose length product (DLP) for this visit:  927 mGy-cm     This examination, like all CT scans performed in the [facility], was performed utilizing techniques to minimize radiation dose exposure, including the use of iterative reconstruction and automated exposure control  IV Contrast:  100 mL of iohexol (OMNIPAQUE)  IMAGE QUALITY:   Diagnostic FINDINGS: CERVICAL VASCULATURE AORTIC ARCH AND GREAT VESSELS:  Mild atherosclerotic disease of the arch, proximal great vessels and visualized subclavian vessels  No significant stenosis  RIGHT VERTEBRAL ARTERY CERVICAL SEGMENT:  Normal origin  The vessel is normal in caliber throughout the neck  LEFT VERTEBRAL ARTERY CERVICAL SEGMENT:  Normal origin  The vessel is normal in caliber throughout the neck  RIGHT EXTRACRANIAL CAROTID SEGMENT:  Mild atherosclerotic disease of the distal common carotid artery and proximal cervical internal carotid artery without significant stenosis compared to the more distal ICA  LEFT EXTRACRANIAL CAROTID SEGMENT:  Mild atherosclerotic disease of the distal common carotid artery and proximal cervical internal carotid artery without significant stenosis compared to the more distal ICA  NASCET criteria was used to determine the degree of internal carotid artery diameter stenosis  There is focal nonocclusive thrombus within the high cervical right internal jugular vein without intracranial extension  Remainder of the right IJ is patent  INTRACRANIAL VASCULATURE INTERNAL CAROTID ARTERIES:  Mild atherosclerotic disease without significant stenosis     Normal ophthalmic artery origins  Normal ICA terminus  ANTERIOR CIRCULATION:  Symmetric A1 segments and anterior cerebral arteries with normal enhancement  Normal anterior communicating artery  MIDDLE CEREBRAL ARTERY CIRCULATION:  There is thrombus in the left M1 segment with reconstitution distally that may be incompletely occlusive  There is focal occlusion of branch arising from the proximal left M1 segment that may represent prominent anterior temporal branch or early origin of the inferior division  Right MCA is unremarkable  DISTAL VERTEBRAL ARTERIES:  Normal distal vertebral arteries    Posterior inferior cerebellar artery origins are normal  Normal vertebral basilar junction  BASILAR ARTERY:  Basilar artery is normal in caliber  Normal superior cerebellar arteries  POSTERIOR CEREBRAL ARTERIES: Both posterior cerebral arteries arises from the basilar tip  Both arteries demonstrate normal enhancement  VENOUS STRUCTURES:  Normal  NON VASCULAR ANATOMY BONY STRUCTURES:  Stable erosive changes at the right C4-5 facet complex  See recent cervical MRI  SOFT TISSUES OF THE NECK:  Normal  Ossification of the stylohyoid ligaments is noted  THORACIC INLET:  Cavitary lesion in the medial left upper lobe measuring 3 9 x 2 5 cm is stable compared with recent CT the cervical spine  There is a 1 9 cm cavitary lesion with fluid level in the superior segment of the left lower lobe not imaged on recent studies    Findings are suspicious for septic emboli  Impression: Left M1 thromboembolism with reconstitution just distally  Focal occlusion of left MCA branch arising from the proximal segment No other intracranial stenosis or large vessel occlusion  No significant stenosis of the cervical carotid or vertebral arteries  Incidental nonocclusive DVT in the high cervical right internal jugular vein  Remainder of the right IJ is patent  No intracranial venous thrombosis  Cavitary lesions in the left lung suspicious for septic emboli  Findings were directly discussed with Pooja Mandel at 1:38 PM  Workstation performed: [id number]     Echo complete w/ contrast if indicated    Result Date: 1/9/2023  Narrative: •  Left Ventricle: Left ventricular cavity size is normal  by visual estimation  Systolic function is normal  Wall motion is normal  Diastolic function is mildly abnormal, consistent with grade I (abnormal) relaxation  •  Right Ventricle: Right ventricular cavity size is mildly dilated  Systolic function is normal  •  Left Atrium: The atrium is mildly dilated  •  Aortic Valve: There is at least mild regurgitation   There is aortic sclerosis  •  Mitral Valve: There is mild annular calcification  •  Tricuspid Valve: Pulmonary artery systolic pressures cannot be estimated due to lack of tricuspid regurgitation jet  •  Aorta: The aortic root is ectatic at 3 6 cm  The ascending aorta is ectatic at 3 9 cm  •  Compared to report from March 31, 2022, aortic valve velocities inconsistent with stenosis  There are no obvious echocardiographic indications of endocarditis  Echo follow up/limited w/ contrast if indicated    Result Date: 1/14/2023  Narrative: Technically adequate Limited transthoracic echocardiogram study  Patient had complete echocardiogram on 1/9/2022  Mild concentric left ventricle hypertrophy, normal left ventricular systolic function, left ventricle ejection fraction is greater than 65%  Normal right ventricle size and systolic function  Normal left and right atrial size by visual assessment  Tricuspid aortic valve with thickening and densities noted on the tips of the cusps highly suspicious for vegetations  There is associated moderate aortic valve regurgitation  There  are no mobile lesions and there is no evidence of abscess involving the aortic valve  Borderline mitral annular calcification  No vegetation identified on mitral valve  Trace mitral valve regurgitation  No vegetations identified on the tricuspid or pulmonic valve regurgitation  Trace tricuspid valve regurgitation noted  No obvious pulmonary hypertension  Trace, hemodynamically insignificant circumferential pericardial effusion  Compared to previous echocardiogram from 1/9/2022 the abnormalities on the aortic valve are new  There is some progression of aortic valve regurgitation  EKG, Pathology, and Other Studies Reviewed on Admission:   · EKG: Reviewed    Counseling / Coordination of Care Time: 30 total mins spent n consult  Greater than 50% of total time spent on patient counseling and coordination of care      Hetal Hernández MD   PGY-4, Department of Gastroenterology     ** Please Note: This note is constructed using a voice recognition dictation system   **

## 2023-01-15 NOTE — PROGRESS NOTES
Dr Reba Larose and Dr Marissa Mahan examined the patient  The patient's pupils remain unequal , left pupil larger than the right pupil  With the patient's sedation on hold for a neuro exam, he is able to move his left arm and leg spontaneously and opens his eyes to command

## 2023-01-15 NOTE — CONSULTS
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  - Will follow for possible hemicraniectomy  - Continue care per stroke team  - Will follow    Past Medical History:   Diagnosis Date   • Arthritis     L knee   for L TKR today 6/28/2022   • Atopic dermatitis    • Condition not found     Neoplasm of [place]   • Diabetes mellitus ([facility] [address] )    • Hyperlipemia    • Hypertension    • Lateral epicondylitis, left elbow    • Wears partial dentures     upper     Past Surgical History:   Procedure Laterality Date   • APPENDECTOMY     • COLONOSCOPY     • CYST REMOVAL      Neck   • INCISION AND DRAINAGE OF WOUND Left 1/9/2023    Procedure: INCISION AND DRAINAGE (I&D) EXTREMITY;  Surgeon: Rocio Bro DO;  Location: [facility];  Service: Orthopedics   • JOINT REPLACEMENT Left 06/28/2022   • KNEE ARTHROSCOPY      menisectomy   • LA ARTHRP KNE CONDYLE&PLATU MEDIAL&LAT COMPARTMENTS Left 06/28/2022    Procedure: ARTHROPLASTY KNEE TOTAL;  Surgeon: Arleth Eugene DO;  Location: [facility];  Service: Orthopedics   • LA RMVL PROSTH TOT KNEE PROSTH MMA W/WO INSJ SPACER Left 1/9/2023    Procedure: REMOVAL / [address] W/ POLY EXCHANGE;  Surgeon: Rocio Bro DO;  Location: [facility];  Service: Orthopedics     Current Facility-Administered Medications on File Prior to Encounter   Medication Dose Route Frequency Provider Last Rate Last Admin   • [COMPLETED] iohexol (OMNIPAQUE) 350 MG/ML injection (SINGLE-DOSE) 100 mL  100 mL Intravenous Once in [facility], DO   100 mL at 01/14/23 1350   • [COMPLETED] multi-electrolyte (PLASMALYTE-A/ISOLYTE-S PH 7 4) IV solution 1,000 mL  1,000 mL Intravenous Once [facility], DO   Stopped at 01/14/23 1202    Followed by   • [COMPLETED] multi-electrolyte (PLASMALYTE-A/ISOLYTE-S PH 7 4) IV solution 1,000 mL  1,000 mL Intravenous Once [facility], DO   Stopped at 01/14/23 1211   • [DISCONTINUED] acetaminophen (TYLENOL) tablet 975 mg  975 mg Oral [place], [place]   975 mg at 01/14/23 0534   • [DISCONTINUED] budesonide-formoterol (SYMBICORT) 160-4 5 mcg/act inhaler 2 puff  2 puff Inhalation BID Katie Toure PA-C       • [DISCONTINUED] calcium carbonate (TUMS) chewable tablet 1,000 mg  1,000 mg Oral Daily PRN Katie Toure PA-C   1,000 mg at 01/10/23 2047   • [DISCONTINUED] ceFAZolin (ANCEF) IVPB (premix in dextrose) 2,000 mg 50 mL  2,000 mg Intravenous Q8H Katie Toure PA-C 100 mL/hr at 01/14/23 0436 2,000 mg at 01/14/23 0436   • [DISCONTINUED] cyclobenzaprine (FLEXERIL) tablet 10 mg  10 mg Oral TID PRN Katie Toure PA-C       • [DISCONTINUED] docusate sodium (COLACE) capsule 100 mg  100 mg Oral BID Princess Iván Franklin PA-C   100 mg at 01/14/23 1056   • [DISCONTINUED] enoxaparin (LOVENOX) subcutaneous injection 40 mg  40 mg Subcutaneous Daily Princess Iván Franklin PA-C   40 mg at 01/14/23 0535   • [DISCONTINUED] fenofibrate (TRICOR) tablet 145 mg  145 mg Oral Daily Princess Iván Franklin PA-C   145 mg at 01/14/23 1056   • [DISCONTINUED] fish oil capsule 1,000 mg  1,000 mg Oral BID Katie Toure PA-C   1,000 mg at 01/14/23 1056   • [DISCONTINUED] insulin lispro (HumaLOG) 100 units/mL subcutaneous injection 1-5 Units  1-5 Units Subcutaneous TID AC Katie Toure PA-C   2 Units at 01/14/23 1215   • [DISCONTINUED] lactated ringers infusion  125 mL/hr Intravenous Continuous Katie Toure PA-C   Stopped at 01/10/23 8678   • [DISCONTINUED] metoprolol succinate (TOPROL-XL) 24 hr tablet 100 mg  100 mg Oral Daily Princess Iván Franklin PA-C   100 mg at 01/13/23 0901   • [DISCONTINUED] multi-electrolyte (PLASMALYTE-A/ISOLYTE-S PH 7 4) IV solution 1,000 mL  1,000 mL Intravenous Once Ezequiel S Prechtel, DO       • [DISCONTINUED] multi-electrolyte (PLASMALYTE-A/ISOLYTE-S PH 7 4) IV solution 1,000 mL  1,000 mL Intravenous Once Ezequiel S Prechtel, DO       • [DISCONTINUED] nafcillin (NALLPEN) 2,000 mg in sodium chloride 0 9 % 100 mL IVPB  2,000 mg Intravenous Q4H Oh Rodriguez MD       • [DISCONTINUED] ondansetron (ZOFRAN) injection 4 mg  4 mg Intravenous Q6H PRN Parish Carmona PA-C   4 mg at 01/10/23 1308   • [DISCONTINUED] oxyCODONE (ROXICODONE) immediate release tablet 10 mg  10 mg Oral Q4H PRN Parish Carmona PA-C   10 mg at 01/10/23 2047   • [DISCONTINUED] oxyCODONE (ROXICODONE) IR tablet 5 mg  5 mg Oral Q4H PRN Parish Carmona PA-C   5 mg at 01/11/23 0346   • [DISCONTINUED] pravastatin (PRAVACHOL) tablet 80 mg  80 mg Oral Daily With [address] SIDDHARTHA Mckeon   80 mg at 01/13/23 1700     Current Outpatient Medications on File Prior to Encounter   Medication Sig Dispense Refill   • acetaminophen (TYLENOL) 325 mg tablet Take 3 tablets (975 mg total) by mouth every 8 (eight) hours (Patient not taking: Reported on 10/25/2022)  0   • albuterol (PROAIR HFA) 90 mcg/act inhaler Inhale 2 puffs every 6 (six) hours as needed for wheezing (Patient not taking: Reported on 1/7/2023) 5 Inhaler 5   • Ascorbic Acid (VITAMIN C PO) Take 1 capsule by mouth daily (Patient not taking: Reported on 1/7/2023)     • aspirin (ECOTRIN) 325 mg EC tablet Take 1 tablet (325 mg total) by mouth 2 (two) times a day Take with food  (Patient not taking: Reported on 10/25/2022) 84 tablet 0   • betamethasone dipropionate (DIPROSONE) 0 05 % cream Apply topically 2 (two) times a day (Patient not taking: Reported on 1/7/2023) 30 g 0   • Blood Glucose Monitoring Suppl (OneTouch Verio Reflect) w/Device KIT Check blood sugars once daily  Please substitute with appropriate alternative as covered by patient's insurance  Dx: E11 65 1 kit 0   • budesonide-formoterol (Symbicort) 160-4 5 mcg/act inhaler Inhale 2 puffs 2 (two) times a day Rinse mouth after use   30 6 g 1   • co-enzyme Q-10 30 mg Take 30 mg by mouth daily     • cyclobenzaprine (FLEXERIL) 10 mg tablet Take 1 tablet (10 mg total) by mouth 3 (three) times a day as needed for muscle spasms 20 tablet 0   • docusate sodium (COLACE) 100 mg capsule Take 1 capsule (100 mg total) by mouth 2 (two) times a day (Patient not taking: Reported on 10/25/2022) 20 capsule 0   • Empagliflozin (Jardiance) 10 MG TABS tablet Take 1 tablet (10 mg total) by mouth daily 90 tablet 1   • fenofibrate (TRICOR) 145 mg tablet Take 1 tablet (145 mg total) by mouth daily 90 tablet 1   • ferrous sulfate 324 (65 Fe) mg Take 1 tablet (324 mg total) by mouth in the morning 30 tablet 1   • FOLIC ACID PO Take 1 capsule by mouth daily     • gabapentin (NEURONTIN) 100 mg capsule Take 1 capsule (100 mg total) by mouth every 8 (eight) hours (Patient not taking: Reported on 7/28/2022) 90 capsule 0   • glucose blood (OneTouch Verio) test strip Check blood sugars once daily  Please substitute with appropriate alternative as covered by patient's insurance  Dx: E11 65 100 each 3   • lisinopril-hydrochlorothiazide (PRINZIDE,ZESTORETIC) 20-25 MG per tablet Take 1 tablet by mouth daily 90 tablet 1   • metFORMIN (GLUCOPHAGE) 1000 MG tablet Take 1 tablet (1,000 mg total) by mouth 2 (two) times a day with meals 180 tablet 1   • methylPREDNISolone 4 MG tablet therapy pack Use as directed on package 1 each 0   • metoprolol succinate (TOPROL-XL) 100 mg 24 hr tablet Take 1 tablet (100 mg total) by mouth daily 90 tablet 1   • Multiple Vitamin (multivitamin) tablet Take 1 tablet by mouth daily 30 tablet 1   • omega-3-acid ethyl esters (LOVAZA) 1 g capsule TAKE 2 CAPSULES TWICE DAILY (Patient taking differently: Take 2 g by mouth 2 (two) times a day) 360 capsule 1   • OneTouch Delica Lancets 24M MISC Check blood sugars once daily  Please substitute with appropriate alternative as covered by patient's insurance   Dx: E11 65 100 each 3   • rosuvastatin (CRESTOR) 10 MG tablet Take 1 tablet (10 mg total) by mouth daily 90 tablet 3   • sildenafil (VIAGRA) 100 mg tablet Take one tablet daily as needed (Patient taking differently: Take 100 mg by mouth as needed Take one tablet daily as needed) 20 tablet 2 Allergies   Allergen Reactions   • Sulfamethoxazole-Trimethoprim Nausea Only and GI Intolerance     PE   Intubated, sedated  By report right hemiparesis as expected

## 2023-01-15 NOTE — PROGRESS NOTES
If EN planned, and when medically feasible to start EN, recommend Osmolite 1 5 start at 10 ml/hour, increasing as tolerates to goal 62 ml/hour with 1 packet Prosource Liquid Protein TID and free water flushes of 100 ml every 8 hours  Adjust free water intakes as needed per Critical Care  Goal EN will provide 138 grams protein, 2412 calories, 73 grams fat, 302 grams CHO, 1433 ml free water in total volume of 1788 ml with flushes

## 2023-01-15 NOTE — PROGRESS NOTES
Progress Note - Neurology   Kaitlynn Chavez 58 y o  male MRN: [id number]  Unit/Bed#: ICU 12 Encounter: [id number]      Assessment/Plan     * Acute stroke due to embolism of left middle cerebral artery [facility])  Assessment & Plan  59-year-old male with left TKA, T2DM, hypertension, hyperlipidemia, prior tobacco use, presents with malignant left MCA infarct due to septic embolism  Patient was admitted to [address] [place] [address] on 1/7/2023 for sepsis work-up with suspicion of endocarditis  He was found to have MSSA bacteremia and AV endocarditis with systemic embolism (e g , possible splenic infarcts, necrotic toe lesions / Joselito Chu lesions, cervical osteomyelitis, septic arthritis, cavitary lung lesions)  Patient had been treated as outpatient for left elbow cellulitis prior to admission  Hospital course has also been complicated by significant anemia, requiring multiple blood transfusions     -On 1/14/2023, patient was a stroke alert for acute left MCA syndrome  He was found to have left M1 occlusion and was transferred to [facility] for mechanical thrombectomy, which resulted in TICI2b recanalization  -NIHSS on arrival to [facility] 29   -MRI brain with/without contrast demonstrated essentially complete left MCA territory infarction with petechial hemorrhage, as well as bihemispheric anterior and posterior circulation punctate infarcts, likely all as result of septic emboli  -CTA head/neck demonstrated left M1 thromboembolism and incidental finding of right IJ DVT  -2D echo with EF 65%, normal-sized atria, with aortic valve densities highly suspicious for vegetations  In early morning hours of 1/15/2023, patient noted to have right hemiplegia, some difficulty with comprehension but able to follow most commands on left  Later in the morning, he was following commands less consistently on the left      Plan:  -As discussed with Critical Care team, recommend considering early left hemicraniectomy but defer to Neurosurgery team   -Patient currently on 3% hypertonic saline with goal sodium 145-155   -Continue to hold AP/AC given potential for high risk of hemorrhagic conversion of strokes related to septic emboli   -MAP>65 and SBP<140 as per Critical Care and Neurosurgery  -CT surgery, Gastroenterology on board   -Treatment of infectious/metabolic derangements as per primary service  -PT/OT/ST when able  Anemia  Assessment & Plan  Hemoglobin 6 9 on 1/15/2023  Hemoglobin dropped from 12 1 to 7 9 on 1/14/2023, necessitating blood transfusion  GI on board  Recommendations for outpatient neurological follow up have yet to be determined  Subjective:   Patient following most commands on the left overnight per discussion with nursing  Upon initial evaluation earlier this morning, patient was able to follow commands such as tongue protrusion, giving thumbs up on the left, and wiggling toes on the left on command  However, when evaluated later in the morning with attending, was not following commands as consistently  ROS: Unable to query due to intubation  Vitals: Blood pressure (!) 149/42, pulse 90, temperature 99 6 °F (37 6 °C), resp  rate (!) 26, height 5' 10" (1 778 m), weight 102 kg (224 lb 13 9 oz), SpO2 100 %  ,Body mass index is 32 27 kg/m²  Physical Exam:   General:  Patient is well-developed, obese BMI 32 27, and in no acute distress  HEENT:  Head normocephalic  Eyes anicteric  Cardiovascular:  With regular rhythm  Lungs: Intubated  Extremities: Bandage noted left knee  Skin: Janeway lesions mostly in right foot but left heel Janeway lesion also noted  Neurologic:  Patient is alert  Able to follow simple midline and appendicular commands on the left but unable to follow on the right due to hemiplegia  Gait deferred as patient intubated  Cranial Nerves:   II:  Cannot visualize optic fundi  III,IV,VI: Left gaze preference; able to approach midline but not cross midline    VII: Unable to assess facial symmetry within the confines of intubation  XII: Tongue midline with no atrophy or fasciculations with appropriate movement  Appeared to have full strength left upper and left lower extremities but was plegic on the right  Clonus and triple flexion noted on the right  Lab, Imaging and other studies:   CBC:   Results from last 7 days   Lab Units 01/15/23  0445 01/14/23  1749 01/14/23  1605 01/14/23  1203 01/14/23  0947   WBC Thousand/uL 20 38* 27 91*  --   --  22 40*   RBC Million/uL 2 31* 2 66*  --   --  2 84*   HEMOGLOBIN g/dL 6 9* 7 9*  --    < > 8 8*   I STAT HEMOGLOBIN g/dl  --   --  8 2*  --   --    HEMATOCRIT % 20 0* 23 3*  --    < > 25 8*   HEMATOCRIT, ISTAT %  --   --  24*  --   --    MCV fL 87 88  --   --  91   PLATELETS Thousands/uL 191 180  --   --  183    < > = values in this interval not displayed    , BMP/CMP:   Results from last 7 days   Lab Units 01/15/23  0447 01/15/23  0445 01/15/23  0025 01/14/23  1749 01/14/23  1605 01/14/23  0440   SODIUM mmol/L 140  --  136 130*  --  125*   POTASSIUM mmol/L 4 0  --  4 4 5 1  --  4 1   CHLORIDE mmol/L 114*  --  110* 102  --  94*   CO2 mmol/L 17*  --  18* 19*  --  22   CO2, I-STAT mmol/L  --   --   --   --  18*  --    BUN mg/dL 50*  --  50* 48*  --  20   CREATININE mg/dL 1 24  --  1 44* 1 50*  --  0 90   GLUCOSE, ISTAT mg/dl  --   --   --   --  299*  --    CALCIUM mg/dL 7 5*  --  7 2* 7 5*  --  8 9   AST U/L  --  223*  --  212*  --  51*   ALT U/L  --  86*  --  83*  --  17   ALK PHOS U/L  --  68  --  74  --  110   EGFR ml/min/1 73sq m 61  --  51 49  --  91   , HgBA1C:   Results from last 7 days   Lab Units 01/15/23  0446   HEMOGLOBIN A1C % 6 8*   , Lipid Profile:   Results from last 7 days   Lab Units 01/15/23  0445   HDL mg/dL 10*   TRIGLYCERIDES mg/dL 130     VTE Prophylaxis: Sequential compression device (Venodyne)     Counseling / Coordination of Care  Total time spent today 35 minutes   Greater than 50% of total time was spent with the patient and / or family counseling and / or coordination of care  A description of the counseling / coordination of care: Extensive discussion with critical care team regarding recommendation to consider early hemicraniectomy given malignant left MCA stroke  Reviewed all imaging with critical care team as well

## 2023-01-15 NOTE — SEPSIS NOTE
Sepsis Note   Vickie Beavers 58 y o  male MRN: [id number]  Unit/Bed#: ICU 12 Encounter: [id number]       qSOFA     Row Name 01/14/23 1900 01/14/23 1845 01/14/23 1830 01/14/23 1815 01/14/23 1800    Altered mental status GCS < 15 -- -- -- -- --    Respiratory Rate > / =22 1 1 1 1 0    Systolic BP < / =499 1 -- -- -- 0    Q Sofa Score 2 -- -- -- 0    Row Name 01/14/23 1745 01/14/23 1734             Altered mental status GCS < 15 -- --       Respiratory Rate > / =53 0 0       Systolic BP < / =675 -- --       Q Sofa Score -- --                  Initial Sepsis Screening     Row Name 01/14/23 1956                Is the patient's history suggestive of a new or worsening infection? Yes (Proceed)  -RR        Suspected source of infection endocarditis  -RR        Are two or more of the following signs & symptoms of infection both present and new to the patient? Yes (Proceed)  -RR        Indicate SIRS criteria Hyperthemia > 38 3C (100 9F); Tachypnea > 20 resp per min; Tachycardia > 90 bpm  -RR        If the answer is yes to both questions, suspicion of sepsis is present --        If severe sepsis is present AND tissue hypoperfusion perists in the hour after fluid resuscitation or lactate > 4, the patient meets criteria for SEPTIC SHOCK --        Are any of the following organ dysfunction criteria present within 6 hours of suspected infection and SIRS criteria that are NOT considered to be chronic conditions? Yes  -RR        Organ dysfunction MAP < 65 mmHg  -RR        Date of presentation of severe sepsis 01/14/23  -RR        Time of presentation of severe sepsis 1957  -RR        Tissue hypoperfusion persists in the hour after crystalloid fluid administration, evidenced, by either: SBP < 90 mm/Hg ( ___ mm/Hg in comment field)  -RR        Was hypotension present within one hour of the conclusion of crystalloid fluid administration?  Yes  -RR        Date of presentation of septic shock 01/14/23  -RR        Time of presentation of septic shock [address]  [facility]  (r) = Recorded By, (t) = Taken By, (c) = Cosigned By    [address] Name Provider Type    MIGUEL King PA-C Physician Assistant

## 2023-01-15 NOTE — RESPIRATORY THERAPY NOTE
RT Ventilator Management Note  Toya Herrera 58 y o  male MRN: [id number]  Unit/Bed#: ICU 12 Encounter: [id number]      Daily Screen    No data found in the last 10 encounters  Physical Exam:   Assessment Type: (P) Assess only  General Appearance: (P) Sedated  Respiratory Pattern: (P) Assisted  Chest Assessment: (P) Chest expansion symmetrical  Bilateral Breath Sounds: (P) Diminished  Cough: Productive  Suction: (P) ET Tube  O2 Device: (P) vent      Resp Comments: (P) pt remains on SCMV vent settings, oxygen dec to 50% from 60% at this time, pt sx, udn tx given, will cont to michael and assess pt per resp protocol

## 2023-01-15 NOTE — CASE MANAGEMENT
Case Management Assessment & Discharge Planning Note    Patient name Brenton Mayers  Location ICU 12/ICU 12 MRN [id number]  : 1960 Date 1/15/2023       Current Admission Date: 2023  Current Admission Diagnosis:Acute stroke due to embolism of left middle cerebral artery [facility])   Patient Active Problem List    Diagnosis Date Noted   • [facility] ) 2023   • Anemia 2023   • Acute stroke due to embolism of left middle cerebral artery ([facility] ) 2023   • Aortic valve endocarditis 2023   • Pulmonary cavitary lesion 2023   • Impetigo 2023   • Abnormal urinalysis 2023   • Septic arthritis ([facility] 75 ) 2023   • Hyponatremia 2023   • Status post total knee replacement, left 2022   • Rash 2022   • Murmur 2022   • BMI 31 0-31 9,adult 2022   • Anxiety 2022   • Mass of appendix 2017   • DMII (diabetes mellitus, type 2) ([facility] 75 ) 2013   • Hyperlipidemia 2013   • Primary hypertension 2012      LOS (days): 1  Geometric Mean LOS (GMLOS) (days):   Days to GMLOS:     OBJECTIVE:    Risk of Unplanned Readmission Score: 26 03         Current admission status: Inpatient       Preferred Pharmacy:   [id number] [place], [address] - [address]  [address]  [place] [address] [address]  Phone: [phone] Fax: [phone]    [facility] [address]aci [address]o [address] [address]  Phone: [phone] Fax: [phone]    Primary Care Provider: Lurlean Cockayne, DO    Primary Insurance: [address] [place] [address]  Secondary Insurance:     ASSESSMENT:  [address] [facility], [address] [place] [address] Representative - Son   Primary Phone: [phone] (Mobile)               Advance Directives  Does patient have a [address] [place] [address]?: No  Does patient currently have a Health Care decision maker?: Yes, please see Health Care Proxy section  Does patient have Advance Directives?: No  Primary Contact: Citlali Barkley [phone]              Patient Information  Admitted from[de-identified] Home  Mental Status: Other (Comment) (Pt currently on mechanical ventilation )  During Assessment patient was accompanied by: Not accompanied during assessment  Assessment information provided by[de-identified] Son Johnney Brunner [facility], [phone])  Primary Caregiver: Self  Support Systems: Son  What city do you live in?: [address] [facility] [address] [facility], [address] entry access options   Select all that apply : Stairs  Number of steps to enter home : 3  Do the steps have railings?: Yes  Type of Current Residence: 2 story home (+ basement)  Upon entering residence, is there a bedroom on the main floor (no further steps)?: No  A bedroom is located on the following floor levels of residence (select all that apply):: 2nd Floor  Upon entering residence, is there a bathroom on the main floor (no further steps)?: No  Indicate which floors of current residence have a bathroom (select all the apply):: 2nd Floor  Number of steps to 2nd floor from main floor: One Flight  In the last 12 months, was there a time when you were not able to pay the mortgage or rent on time?: No  In the last 12 months, was there a time when you did not have a steady place to sleep or slept in a shelter (including now)?: No  Homeless/housing insecurity resource given?: N/A  Living Arrangements: Lives w/ Son    Activities of Daily Living Prior to Admission  Functional Status: Independent  Completes ADLs independently?: Yes  Ambulates independently?: Yes  Does patient use assisted devices?: No  Does patient currently own DME?: Yes  What DME does the patient currently own?: Shalini Alfredo  Does patient have a history of Outpatient Therapy (PT/OT)?: Yes  Does the patient have a history of Short-Term Rehab?: No  Does patient have a history of HHC?: No  Does patient currently have [facility]?: No         Patient Information Continued  Income Source: Employed  Does patient have prescription coverage?: Yes  Within the past 12 months, you worried that your food would run out before you got the money to buy more : Never true  Within the past 12 months, the food you bought just didn't last and you didn't have money to get more : Never true  Food insecurity resource given?: N/A  Does patient receive dialysis treatments?: No  Does patient have a history of substance abuse?: No  Does patient have a history of Mental Health Diagnosis?: No         Means of Transportation  Means of Transport to Appts[de-identified] Drives Self  In the past 12 months, has lack of transportation kept you from medical appointments or from getting medications?: No  In the past 12 months, has lack of transportation kept you from meetings, work, or from getting things needed for daily living?: No  Was application for public transport provided?: N/A        DISCHARGE DETAILS:    Discharge planning discussed with[de-identified] Alan Delgado via phone ([phone])  Freedom of Choice: Yes     CM contacted family/caregiver?: Yes  Were Treatment Team discharge recommendations reviewed with patient/caregiver?: Yes  Did patient/caregiver verbalize understanding of patient care needs?: Yes  Were patient/caregiver advised of the risks associated with not following Treatment Team discharge recommendations?: Yes    Contacts  Patient Contacts: Son Lynn Alcala  Relationship to Patient[de-identified] Family  Contact Method: Phone  Phone Number: [phone]  Reason/Outcome: Emergency Contact, Discharge [address]         Is the patient interested in [facility] at discharge?: No         Other Referral/Resources/Interventions Provided:  Interventions: Acute Rehab  Referral Comments: CM discussed potential for placement at an acute rehab upon medical stability as he is s/p stroke -- Son confirmed understanding of same and will follow for PT/OT recommendations           Treatment Team Recommendation: Other (TBD)  Discharge Destination Plan[de-identified] Other (TBD)

## 2023-01-15 NOTE — PROCEDURES
Central Line Insertion    Date/Time: 1/14/2023 7:51 PM  Performed by: Allie Estrada MD  Authorized by: Allie Estrada MD     Patient location:  ICU  Consent:     Consent obtained:  Verbal    Consent given by:  Healthcare agent    Risks discussed:  Arterial puncture, incorrect placement, infection, bleeding, pneumothorax and nerve damage    Alternatives discussed:  No treatment and alternative treatment  Universal protocol:     Patient identity confirmed:  Arm band  Pre-procedure details:     Hand hygiene: Hand hygiene performed prior to insertion      Sterile barrier technique: All elements of maximal sterile technique followed      Skin preparation:  2% chlorhexidine    Skin preparation agent: Skin preparation agent completely dried prior to procedure    Indications:     Central line indications: medications requiring central line    Sedation:     Sedation type: Moderate (conscious) sedation  Anesthesia (see MAR for exact dosages): Anesthesia method:  Local infiltration    Local anesthetic:  Lidocaine 1% w/o epi  Procedure details:     Location:  Left internal jugular    Vessel type: vein      Laterality:  Left    Approach: percutaneous technique used      Patient position:  Reverse Trendelenburg    Catheter type:  Triple lumen 20cm    Landmarks identified: yes      Ultrasound guidance: yes      Ultrasound image availability:  Images available in PACS    Manometry confirmation: yes      Number of attempts:  1    Successful placement: yes    Post-procedure details:     Post-procedure:  Dressing applied and line sutured    Assessment:  Blood return through all ports    Patient tolerance of procedure:   Tolerated well, no immediate complications

## 2023-01-15 NOTE — PROGRESS NOTES
Critical Care Services- Interval Progress Note   Peter Scott 58 y o  male MRN: [id number]  Unit/Bed#: ICU 12 Encounter: [id number]  Assessment and Plan  Interval Events:  Patient with hypotension, fever, and tachycardia on return from IR  CVC placed, received IV fluid resuscitation, stress steroids, and added vasopressin with some improvement       • Septic Shock  o S/p 30cc/kg IVF resuscitation   o Wean levophed and vasopressin as tolerated  o Hydrocortisone, start insulin infusion 2/2 hyperglycemia   o Check VBG    • Anemia   o No overt s/sx of blood loss  o Check hemolysis smear         Upon my evaluation, this patient had a high probability of imminent or life-threatening deterioration due to shock, which required my direct attention, intervention, and personal management  I have personally provided 30 minutes (1800 to 2000) on 1/14/23 of critical care time, exclusive of procedures, teaching, family meetings, and any prior time recorded by providers other than myself  Time includes review of laboratory data, review of imaging/radiology results, monitoring for potential decompensation    Interventions were performed as documented above    -------------------------------------------------------------------------------------------------------------------------------------  Hemodynamic Monitoring:  Vital Signs:   HR: 135  BP: 80/51  MAP: 46  RR: 32  SpO2: 100 on 60 oxygen  Cardiac Monitoring:   Telemetry rhythm: sinus tach      Laboratory   Results from last 7 days   Lab Units 01/14/23  1749 01/14/23  1605 01/14/23  1203 01/14/23  0947 01/14/23  0440 01/13/23  0656 01/12/23  0635 01/11/23  0513 01/10/23  0441 01/09/23  0555   WBC Thousand/uL 27 91*  --   --  22 40* 19 37* 18 41* 16 86* 14 64* 11 39* 13 33*   HEMOGLOBIN g/dL 7 9*  --  7 2* 8 8* 12 1 12 3 12 8 13 7 12 1 14 1   I STAT HEMOGLOBIN g/dl  --  8 2*  --   --   --   --   --   --   --   --    HEMATOCRIT % 23 3*  --  21 3* 25 8* 34 6* 36 1* 36 8 41 0 36 6 41 3   HEMATOCRIT, ISTAT %  --  24*  --   --   --   --   --   --   --   --    PLATELETS Thousands/uL 180  --   --  183 194 191 221 227 227 257   NEUTROS PCT % 83*  --   --   --  88* 86* 87* 84*  --   --    MONOS PCT % 8  --   --   --  5 6 6 7  --   --    MONO PCT %  --   --   --   --   --   --   --   --   --  14*     Results from last 7 days   Lab Units 01/14/23  1749 01/14/23  1605 01/14/23  0440 01/13/23  0656 01/12/23  0635 01/11/23  0513 01/10/23  0441 01/09/23  0555 01/08/23  0523   SODIUM mmol/L 130*  --  125* 125* 130* 131* 131* 129* 131*   POTASSIUM mmol/L 5 1  --  4 1 3 9 4 1 4 6 4 5 4 3 3 9   CHLORIDE mmol/L 102  --  94* 95* 97 96 98 95* 95*   CO2 mmol/L 19*  --  22 21 21 18* 25 21 20*   CO2, I-STAT mmol/L  --  18*  --   --   --   --   --   --   --    ANION GAP mmol/L 9  --  9 9 12 17* 8 13 16*   BUN mg/dL 48*  --  20 19 24 29* 38* 30* 26*   CREATININE mg/dL 1 50*  --  0 90 0 79 0 86 0 92 1 10 0 98 0 92   CALCIUM mg/dL 7 5*  --  8 9 9 1 9 3 9 2 8 7 9 6 9 7   GLUCOSE RANDOM mg/dL 284*  --  195* 232* 254* 214* 227* 274* 175*   ALT U/L 83*  --  17 15 14 17 22  --  19   AST U/L 212*  --  51* 43 55* 59* 26  --  23   ALK PHOS U/L 74  --  110 101 98 104 87  --  120*   ALBUMIN g/dL 1 3*  --  1 7* 1 8* 1 8* 2 0* 1 8*  --  2 3*   TOTAL BILIRUBIN mg/dL 0 55  --  0 73 0 49 0 54 0 59 0 58  --  0 82     Results from last 7 days   Lab Units 01/14/23  1749 01/14/23  0440 01/13/23  0656 01/12/23  0635 01/11/23  0513   MAGNESIUM mg/dL 2 0 1 6 1 8 1 6 1 7   PHOSPHORUS mg/dL 5 4*  --   --   --   --       Results from last 7 days   Lab Units 01/14/23  1749 01/14/23  0947   INR  1 64* 1 50*   PTT seconds 33  --           Results from last 7 days   Lab Units 01/14/23  1749 01/14/23  1404 01/14/23  0947   LACTIC ACID mmol/L 1 8 8 9* 2 9*     ABG:  Results from last 7 days   Lab Units 01/14/23  1751   PH ART  7 323*   PCO2 ART mm Hg 37 5   PO2 ART mm Hg 94 5   HCO3 ART mmol/L 19 0*   BASE EXC ART mmol/L -6 4   ABG SOURCE  Line, Arterial     VBG:  Results from last 7 days   Lab Units 01/14/23  1751   ABG SOURCE  Line, Arterial     Results from last 7 days   Lab Units 01/14/23  0947   PROCALCITONIN ng/ml 3 20*       Micro  Results from last 7 days   Lab Units 01/13/23  0657 01/11/23  0728 01/09/23  1836 01/09/23  0743 01/09/23  0601   BLOOD CULTURE  No Growth at 24 hrs  Staphylococcus aureus*  Staphylococcus aureus*  --   --  No Growth After 5 Days  No Growth After 5 Days  GRAM STAIN RESULT  Gram positive cocci in clusters* Gram positive cocci in clusters*  Gram positive cocci in clusters* No Polys or Bacteria seen 3+ Polys  No bacteria seen  --    BODY FLUID CULTURE, STERILE   --   --   --  No growth  --    MRSA CULTURE ONLY   --   --   --   --  No Methicillin Resistant Staphlyococcus aureus (MRSA) isolated       Diagnostic Imaging / Data: I have personally reviewed pertinent reports     and I have personally reviewed pertinent films in PACS    Medications:  Current Facility-Administered Medications   Medication Dose Route Frequency   • acetaminophen (TYLENOL) oral suspension 650 mg  650 mg Oral Q4H PRN   • chlorhexidine (PERIDEX) 0 12 % oral rinse 15 mL  15 mL Mouth/Throat Q12H [facility]   • dexmedeTOMIDine (Precedex) 400 mcg in sodium chloride 0 9% 100 mL  0 1-0 7 mcg/kg/hr Intravenous Titrated   • fentaNYL 1000 mcg in sodium chloride 0 9% 100mL infusion  50 mcg/hr Intravenous Continuous   • fentanyl citrate (PF) 100 MCG/2ML 50 mcg  50 mcg Intravenous Q2H PRN   • heparin (porcine) subcutaneous injection 5,000 Units  5,000 Units Subcutaneous Q8H [facility]   • hydrocortisone (Solu-CORTEF) injection 100 mg  100 mg Intravenous Once    Followed by   • [START ON 1/15/2023] hydrocortisone (Solu-CORTEF) injection 50 mg  50 mg Intravenous Q6H [facility]   • insulin regular (HumuLIN R,NovoLIN R) 1 Units/mL in sodium chloride 0 9 % 100 mL infusion  0 3-21 Units/hr Intravenous Titrated   • [START ON 1/15/2023] ipratropium (ATROVENT) 0 02 % inhalation solution 0 5 mg  0 5 mg Nebulization TID   • levalbuterol (XOPENEX) inhalation solution 1 25 mg  1 25 mg Nebulization TID   • nafcillin (NALLPEN) 2,000 mg in sodium chloride 0 9 % 100 mL IVPB  2,000 mg Intravenous Q4H   • norepinephrine (LEVOPHED) 4 mg (STANDARD CONCENTRATION) IV in sodium chloride 0 9% 250 mL  1-30 mcg/min Intravenous Titrated   • ondansetron (ZOFRAN) injection 4 mg  4 mg Intravenous Q6H PRN   • sodium chloride 0 9 % bolus 3,000 mL  3,000 mL Intravenous Once   • sodium chloride 0 9 % infusion  125 mL/hr Intravenous Continuous   • sodium chloride 3 % inhalation solution 4 mL  4 mL Nebulization TID   • vasopressin (PITRESSIN) 20 Units in sodium chloride 0 9 % 100 mL infusion  0 04 Units/min Intravenous Continuous       SIGNATURE: Og Novoa PA-C    Portions of the record may have been created with voice recognition software  Occasional wrong word or "sound a like" substitutions may have occurred due to the inherent limitations of voice recognition software    Read the chart carefully and recognize, using context, where substitutions have occurred

## 2023-01-15 NOTE — UTILIZATION REVIEW
Initial Clinical Review    Admission: Date/Time/Statement:   Admission Orders (From admission, onward)     Ordered        01/14/23 1746  Inpatient Admission  Once                      Orders Placed This Encounter   Procedures   • Inpatient Admission     Standing Status:   Standing     Number of Occurrences:   1     Order Specific Question:   Level of Care     Answer:   Critical Care [15]     Order Specific Question:   Estimated length of stay     Answer:   More than 2 Midnights     Order Specific Question:   Certification     Answer:   I certify that inpatient services are medically necessary for this patient for a duration of greater than two midnights  See H&P and MD Progress Notes for additional information about the patient's course of treatment  Initial Presentation: 58 y o  male presents to [facility] as a transfer from [address] with acute L MCA stroke  Admitted in [address] 1/7 with L elbow cellulitis and abscess s/p I and D , 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 and polyethylene exchange  His repeated blood cx are positive for MSSA and he has AV endocarditis in his most recent echo  He arrives in neuro  ICU intubated and sedated on norepi gtt  On exam intubated, sedated, diaphoretic  Coarse BS bilaterally  WBC 27 91, Hgb 7 9, Hct 23 3  Sodium 130, CO2 19, BUN 48, Cr 1 50  Calcium 7 5, , ALT 83  Admit inpatient to Critical Care with:  Acute L MCA stroke s/p endovascular thrombectomy   MSSA bacteremia   Septic shock   AV endocarditis   Septic L elbow/L knee s/p I and D as well as polyethylene exchange of prosthetic left knee joint   Encephalopathy   Acute hypoxic respiratory failure   Acute blood loss anemia   AMARI   Hyperglycemia   Mod Aortic regurgitation   Lactic acidosis  Plan: sedated with propofol  MRI gain, KALINA ordered  Levophed to maintain MAP >65  Keep SBP <140  Continue bronchodilators  Continue abx, chagned to nafcillin  Cr elevated, continue IVFs and monitor  Npo, PPI  Trend hgb   Insulin gtt  Hep sq, SCDs  LIJ TLC  ID, NSx, Ortho, Cardiology, CTSx, GI consults    IR note -- Successful recan left M1 occlusion with 2b flow post intervention    Date: 1/15   Day 2:   Cardiac Sx consult -- Ultimately he will likely need aortic valve replacement  Timing TBD with the help of critical care, ID, Neurology/Neurosurgery, and Cardiology  Currently he is exhibiting active sepsis and therefore should ideally not undergo surgery until he is more stable  NSx consult -- Holohemispheric Left sided stroke in 63yo with endocarditis  Will follow for possible hemicraniectomy  Continue care per stroke team    Neuro consult -- recommend considering early left hemicraniectomy but defer to NSx team  Pt currently on 3% hypertonic saline with goal sodium 145-155  Continue to hold AP/AC given potential for high risk of hemorrhagic conversion of strokes related to septic emboli  MAP>65 and SBP<140 as per Critical Care and Neurosurgery  CT surgery, Gastroenterology on board  Treatment of infectious/metabolic derangements as per primary service  PT/OT/ST when able        ED Triage Vitals   Temperature Pulse Respirations Blood Pressure SpO2   01/14/23 1734 01/14/23 1734 01/14/23 1734 01/14/23 1800 01/14/23 1730   99 9 °F (37 7 °C) (!) 112 14 (!) 133/49 98 %      Temp Source Heart Rate Source Patient Position - Orthostatic VS BP Location FiO2 (%)   01/14/23 1734 01/15/23 0829 -- 01/15/23 0829 --   Oral Monitor  Left arm       Pain Score       01/14/23 1835       Med Not Given for Pain - for MAR use only          Wt Readings from Last 1 Encounters:   01/15/23 102 kg (224 lb 13 9 oz)     Additional Vital Signs:   Date/Time Temp Pulse Resp BP MAP (mmHg) Arterial Line BP MAP SpO2 O2 Device   01/15/23 1300 100 8 °F (38 2 °C) Abnormal  90 16 -- -- 130/46 70 mmHg 100 % Ventilator   01/15/23 1230 101 1 °F (38 4 °C) Abnormal  92 20 -- -- 128/44 68 mmHg 100 % Ventilator   01/15/23 1220 103 3 °F (39 6 °C) Abnormal  94 23 Abnormal  -- -- 132/46 70 mmHg 100 % --   01/15/23 1200 100 1 °F (37 8 °C) 96 23 Abnormal  -- -- 138/46 72 mmHg 98 % --   01/15/23 1000 -- 90 26 Abnormal  -- -- 152/46 78 mmHg 100 % Ventilator   01/15/23 0946 99 6 °F (37 6 °C) 84 25 Abnormal  149/42 Abnormal  -- -- -- -- --   01/15/23 0900 -- 82 22 -- -- 134/36 64 mmHg Abnormal  100 % Ventilator   01/15/23 0855 99 2 °F (37 3 °C) 82 22 141/41 Abnormal  -- 141/41 67 mmHg 100 % Ventilator   01/15/23 0845 -- 86 23 Abnormal  -- -- 146/38 70 mmHg 100 % --   01/15/23 0838 99 4 °F (37 4 °C) 88 26 Abnormal  148/41 Abnormal  -- 148/40 72 mmHg 100 % Ventilator   01/15/23 0829 99 3 °F (37 4 °C) 86 26 Abnormal  155/43 Abnormal  101 158/42 78 mmHg 100 % Ventilator   Temp: 100 7 is max in past 24 hours at 01/15/23 0819   01/15/23 0725 -- 72 21 -- -- 124/36 64 mmHg Abnormal  100 % --   01/15/23 0720 -- 80 24 Abnormal  124/39 Abnormal  -- -- -- 100 % --   01/15/23 0715 99 6 °F (37 6 °C) 78 23 Abnormal  129/48 Abnormal  -- 138/42 74 mmHg 100 % --   01/15/23 0710 99 6 °F (37 6 °C) 79 22 138/40 Abnormal  -- 132/40 70 mmHg 100 % --   01/15/23 0600 -- 82 24 Abnormal  138/50 -- 174/50 90 mmHg 100 % --   01/15/23 0500 99 1 °F (37 3 °C) -- -- -- -- -- -- -- --   01/15/23 0400 -- 74 21 121/56 94 150/50 82 mmHg 100 % --   01/15/23 0300 -- 82 24 Abnormal  141/32 Abnormal  97 154/42 76 mmHg 100 % --   01/15/23 0200 -- 72 22 112/46 Abnormal  70 118/34 62 mmHg Abnormal  100 % --   01/15/23 0100 -- 76 22 148/56 96 152/38 76 mmHg 100 % --   01/15/23 0030 -- -- -- 149/37 Abnormal  -- -- -- -- --   01/15/23 0000 99 °F (37 2 °C) 78 35 Abnormal  156/44 Abnormal  -- 156/44 82 mmHg 96 % --   01/14/23 2330 100 7 °F (38 2 °C) Abnormal  92 26 Abnormal  118/66 -- -- -- 100 % --   01/14/23 2300 100 7 °F (38 2 °C) Abnormal  88 22 114/50 -- -- -- 100 % --   01/14/23 2245 100 7 °F (38 2 °C) Abnormal  92 27 Abnormal  112/76 -- 158/44 82 mmHg 100 % --   01/14/23 2200 100 7 °F (38 2 °C) Abnormal  78 25 Abnormal  114/46 Abnormal  77 116/38 64 mmHg Abnormal  100 % --   01/14/23 2100 -- 92 30 Abnormal  152/59 95 170/46 84 mmHg 100 % --   01/14/23 2030 100 7 °F (38 2 °C) Abnormal  -- -- 130/88 -- -- -- -- --   01/14/23 2015 100 7 °F (38 2 °C) Abnormal  92 28 Abnormal  128/55 -- -- -- -- --   01/14/23 2000 100 7 °F (38 2 °C) Abnormal  106 Abnormal  28 Abnormal  128/55 84 110/40 64 mmHg Abnormal  100 % --   01/14/23 1900 -- 108 Abnormal  28 Abnormal  99/46 Abnormal  65 80/32 48 mmHg Abnormal  100 % --   01/14/23 1830 -- 112 Abnormal  29 Abnormal  -- -- 92/40 56 mmHg Abnormal  98 % --   01/14/23 1815 -- 112 Abnormal  23 Abnormal  -- -- 104/42 62 mmHg Abnormal  98 % --   01/14/23 1800 -- 112 Abnormal  17 133/49 Abnormal  91 130/46 72 mmHg 98 % --   01/14/23 1734 99 9 °F (37 7 °C) 112 Abnormal  14 -- -- 114/72 84 mmHg 99 % --     Pertinent Labs/Diagnostic Test Results:   CT head wo contrast   Final Result by Diana Lima MD (01/15 2360)   Redemonstration of large acute infarct throughout the left MCA distribution with increased mass effect and 7 mm of left-to-right shift  Petechial hemorrhage noted on MRI is difficult to appreciate  Recommend close follow-up and neurosurgical consultation  Additional small infarcts in the right cerebral hemisphere and cerebellum are better appreciated on MRI  MRI brain w wo contrast   Final Result by Diana Lima MD (01/15 2923)   Large acute left MCA distribution infarct with petechial hemorrhage  Minimal left-to-right shift  Additional small acute infarcts in the right frontal and parietal lobes as well as the cerebellum favor embolic etiology  XR chest portable   Final Result by Kyle Sutton MD (01/15 8311)      No acute cardiopulmonary disease  Enteric tube malposition  The tube curves on itself in the stomach and reenters the distal esophagus directed cephalad  An x-ray of the abdomen is suggested to confirm           CT head wo contrast    (Results Pending)   XR chest portable ICU    (Results Pending)   XR elbow 3+ vw left    (Results Pending)     Results from last 7 days   Lab Units 01/15/23  0826   SARS-COV-2  Negative     Results from last 7 days   Lab Units 01/15/23  1215 01/15/23  0445 01/14/23  1749 01/14/23  1605 01/14/23  1203 01/14/23  0947 01/14/23  0440   WBC Thousand/uL  --  20 38* 27 91*  --   --  22 40* 19 37*   HEMOGLOBIN g/dL 8 2* 6 9* 7 9*  --  7 2* 8 8* 12 1   I STAT HEMOGLOBIN g/dl  --   --   --  8 2*  --   --   --    HEMATOCRIT %  --  20 0* 23 3*  --  21 3* 25 8* 34 6*   HEMATOCRIT, ISTAT %  --   --   --  24*  --   --   --    PLATELETS Thousands/uL  --  191 180  --   --  183 194   NEUTROS ABS Thousands/µL  --  16 98* 23 12*  --   --   --  16 97*       Results from last 7 days   Lab Units 01/15/23  0447 01/15/23  0445 01/15/23  0025 01/14/23  1749 01/14/23  1605 01/14/23  0440 01/13/23  0656 01/12/23  0635   SODIUM mmol/L 140  --  136 130*  --  125* 125* 130*   POTASSIUM mmol/L 4 0  --  4 4 5 1  --  4 1 3 9 4 1   CHLORIDE mmol/L 114*  --  110* 102  --  94* 95* 97   CO2 mmol/L 17*  --  18* 19*  --  22 21 21   CO2, I-STAT mmol/L  --   --   --   --  18*  --   --   --    ANION GAP mmol/L 9  --  8 9  --  9 9 12   BUN mg/dL 50*  --  50* 48*  --  20 19 24   CREATININE mg/dL 1 24  --  1 44* 1 50*  --  0 90 0 79 0 86   EGFR ml/min/1 73sq m 61  --  51 49  --  91 96 92   CALCIUM mg/dL 7 5*  --  7 2* 7 5*  --  8 9 9 1 9 3   CALCIUM, IONIZED, ISTAT mmol/L  --   --   --   --  1 13  --   --   --    MAGNESIUM mg/dL  --  2 2  --  2 0  --  1 6 1 8 1 6   PHOSPHORUS mg/dL  --  3 3  --  5 4*  --   --   --   --      Results from last 7 days   Lab Units 01/15/23  0445 01/14/23  1749 01/14/23  0440 01/13/23  0656 01/12/23  0635   AST U/L 223* 212* 51* 43 55*   ALT U/L 86* 83* 17 15 14   ALK PHOS U/L 68 74 110 101 98   TOTAL PROTEIN g/dL 4 7* 4 8* 6 8 7 0 7 1   ALBUMIN g/dL 1 3* 1 3* 1 7* 1 8* 1 8*   TOTAL BILIRUBIN mg/dL 0 54 0 55 0 73 0 49 0 54   BILIRUBIN DIRECT mg/dL 0 28*  --   -- --   --      Results from last 7 days   Lab Units 01/15/23  1200 01/15/23  1100 01/15/23  1006 01/15/23  0910 01/15/23  0824 01/15/23  0717 01/15/23  0536 01/15/23  0425 01/15/23  0301 01/15/23  0159 01/15/23  0106 01/15/23  0024   POC GLUCOSE mg/dl 139 166* 190* 181* 153* 136 133 133 186* 222* 258* 283*     Results from last 7 days   Lab Units 01/15/23  0447 01/15/23  0025 01/14/23  1749 01/14/23  0440 01/13/23  0656 01/12/23  0635 01/11/23  0513 01/10/23  0441 01/09/23  0555   GLUCOSE RANDOM mg/dL 146* 276* 284* 195* 232* 254* 214* 227* 274*     Results from last 7 days   Lab Units 01/15/23  0452   OSMOLALITY, SERUM mmol/     Results from last 7 days   Lab Units 01/15/23  0446   HEMOGLOBIN A1C % 6 8*   EAG mg/dl 148     Results from last 7 days   Lab Units 01/15/23  1216 01/15/23  0446 01/15/23  0025   PH ART  7 430 7 410 7 394   PCO2 ART mm Hg 25 4* 28 5* 27 0*   PO2 ART mm Hg 132 0* 220 7* 229 1*   HCO3 ART mmol/L 16 5* 17 7* 16 1*   BASE EXC ART mmol/L -6 8 -5 8 -7 8   O2 CONTENT ART mL/dL 12 3* 16 6 11 6*   O2 HGB, ARTERIAL % 97 2* 98 5* 98 0*   ABG SOURCE  Line, Arterial Line, Arterial Line, Arterial     Results from last 7 days   Lab Units 01/14/23  2056   PH JASON  7 313   PCO2 JASON mm Hg 36 2*   PO2 JASON mm Hg 42 0   HCO3 JASON mmol/L 17 9*   BASE EXC JASON mmol/L -7 6   O2 CONTENT JASON ml/dL 7 7   O2 HGB, VENOUS % 71 0     Results from last 7 days   Lab Units 01/14/23  1605   I STAT BASE EXC mmol/L -8*   ISTAT PH ART  7 294*   I STAT ART PCO2 mm HG 35 8*   I STAT ART PO2 mm HG >400 0*   I STAT ART HCO3 mmol/L 17 3*     Results from last 7 days   Lab Units 01/14/23  1404 01/14/23  1203 01/14/23  0947   HS TNI 0HR ng/L  --   --  645*   HS TNI 2HR ng/L  --  608*  --    HSTNI D2 ng/L  --  -37  --    HS TNI 4HR ng/L 712*  --   --    HSTNI D4 ng/L 67*  --   --      Results from last 7 days   Lab Units 01/14/23  1749 01/14/23  0947   PROTIME seconds 19 7* 18 1*   INR  1 64* 1 50*   PTT seconds 33  --      Results from last 7 days   Lab Units 01/15/23  0445 01/14/23  0947   PROCALCITONIN ng/ml 15 09* 3 20*     Results from last 7 days   Lab Units 01/14/23  1749 01/14/23  1404 01/14/23  0947   LACTIC ACID mmol/L 1 8 8 9* 2 9*     Results from last 7 days   Lab Units 01/15/23  0447   FERRITIN ng/mL 8,397*     Results from last 7 days   Lab Units 01/10/23  0441   HEP B S AG  Non-reactive   HEP C AB  Non-reactive   HEP B C IGM  Non-reactive   HEP B C TOTAL AB  Non-reactive     Results from last 7 days   Lab Units 01/15/23  0452   OSMOLALITY, SERUM mmol/       Results from last 7 days   Lab Units 01/15/23  0826   INFLUENZA A PCR  Negative   INFLUENZA B PCR  Negative   RSV PCR  Positive*     Results from last 7 days   Lab Units 01/15/23  0131 01/13/23  0657 01/11/23  0728 01/09/23  1836 01/09/23  0743 01/09/23  0601   BLOOD CULTURE  Received in Microbiology Lab  Culture in Progress  No Growth at 48 hrs  Staphylococcus aureus*  Staphylococcus aureus*  --   --  No Growth After 5 Days  No Growth After 5 Days     GRAM STAIN RESULT   --  Gram positive cocci in clusters* Gram positive cocci in clusters*  Gram positive cocci in clusters* No Polys or Bacteria seen 3+ Polys  No bacteria seen  --    BODY FLUID CULTURE, STERILE   --   --   --   --  No growth  --        Past Medical History:   Diagnosis Date   • Arthritis     L knee   for L TKR today 6/28/2022   • Atopic dermatitis    • Condition not found     Neoplasm of [place]   • Diabetes mellitus ([facility] [address] )    • Hyperlipemia    • Hypertension    • Lateral epicondylitis, left elbow    • Wears partial dentures     upper     Present on Admission:  • Septic embolism ([facility])  • Septic arthritis ([facility])  • Primary hypertension  • Murmur  • Hyponatremia  • DMII (diabetes mellitus, type 2) ([facility])  • Acute stroke due to embolism of left middle cerebral artery ([facility])  • Anemia  • Abnormal urinalysis  • Hyperlipidemia  • Aortic valve endocarditis  • Pulmonary cavitary lesion      Admitting Diagnosis: Acute stroke due to embolism of left middle cerebral artery (HCC) [I63 [id number]]  Age/Sex: 58 y o  male  Admission Orders:  Scheduled Medications:  acetaminophen, 975 mg, Oral, Q8H [address]  chlorhexidine, 15 mL, Mouth/Throat, Q12H [address]  heparin (porcine), 5,000 Units, Subcutaneous, Q8H [facility]  hydrocortisone sodium succinate, 50 mg, Intravenous, Q8H [facility]  ipratropium, 0 5 mg, Nebulization, TID  levalbuterol, 1 25 mg, Nebulization, TID  nafcillin, 2,000 mg, Intravenous, Q4H  sodium chloride, 4 mL, Nebulization, TID    Continuous IV Infusions:  dexmedetomidine, 0 1-0 7 mcg/kg/hr, Intravenous, Titrated  fentaNYL, 50 mcg/hr, Intravenous, Continuous  insulin regular (HumuLIN R,NovoLIN R) infusion, 0 3-21 Units/hr, Intravenous, Titrated  norepinephrine, 1-30 mcg/min, Intravenous, Titrated  pantoprozole (PROTONIX) infusion (Continuous), 8 mg/hr, Intravenous, Continuous  sodium chloride, 40 mL/hr, Intravenous, Continuous    PRN Meds:  fentanyl citrate (PF), 50 mcg, Intravenous, Q2H PRN  ondansetron, 4 mg, Intravenous, Q6H PRN        IP CONSULT TO CASE MANAGEMENT  IP CONSULT TO NUTRITION SERVICES  IP CONSULT TO GASTROENTEROLOGY  IP CONSULT TO NEUROSURGERY  IP CONSULT TO CARDIOTHORACIC SURGERY  IP CONSULT TO CARDIOLOGY  IP CONSULT TO INFECTIOUS DISEASES  IP CONSULT TO ORTHOPEDIC SURGERY    Network Utilization Review Department  ATTENTION: Please call with any questions or concerns to [phone] and carefully listen to the prompts so that you are directed to the right person  All voicemails are confidential   Grace Chavez all requests for admission clinical reviews, approved or denied determinations and any other requests to dedicated fax number below belonging to the campus where the patient is receiving treatment   List of dedicated fax numbers for the Facilities:  FACILITY NAME UR FAX NUMBER   ADMISSION DENIALS (Administrative/Medical Necessity) [address] [facility] (Maternity/NICU/Pediatrics) [phone]   [facility] Cervantes [address] [facility] [phone]   [facility] [phone]   [address] [place] [address] [facility] Natanael Packer  [phone] [address] [facility] [place] [facility] [address] [facility] [phone]

## 2023-01-15 NOTE — ASSESSMENT & PLAN NOTE
- Hemoglobin dropped from 12 1 to 7 9 on 1/14/2023, necessitating blood transfusion  - Hemoglobin improving, 8 1 on 1/16  - GI on board

## 2023-01-15 NOTE — RESPIRATORY THERAPY NOTE
RT Ventilator Management Note  Gonsalo Rod 58 y o  male MRN: [id number]  Unit/Bed#: ICU 12 Encounter: [id number]      Daily Screen    No data found in the last 10 encounters  Physical Exam:   Assessment Type: Assess only  General Appearance: Sedated  Respiratory Pattern: Assisted  Chest Assessment: Chest expansion symmetrical  Bilateral Breath Sounds: Coarse  Cough: Productive  Suction: ET Tube  O2 Device: Ventilator      Resp Comments: (P) Pt  remains on CMV/VC mode  No changes at this time  Will continue to assess and monitor pt per protocol

## 2023-01-15 NOTE — PROGRESS NOTES
Dr Margaret De La Cruz notified the patient's pupils are unequal in size  Patient Neuro exam shows no other changes  Dr Margaret De La Cruz and Dr Henrietta Astorga will examine the patient

## 2023-01-16 NOTE — CONSULTS
Consultation - Infectious Disease   Deepti Sommer 58 y o  male MRN: [id number]  Unit/Bed#: ICU 12 Encounter: [id number]      IMPRESSION & RECOMMENDATIONS:   1   Sepsis, POA with recurrence   Noted with leukocytosis and tachycardia early in admission at outside campus   Sources are 2, 3, 4 and 5  Multiple other embolic complications as below  Developed fevers over the course of yesterday which have improved along with white count  Patient unfortunately remains critically ill  Antibiotics as below  Repeat CBC and CMP tomorrow  Repeat blood cultures ordered for tomorrow  Continue to trend fever curve/vitals  Further imaging pending goals of care discussions  Additional care as per ICU     2   MSSA bacteremia  Reddy Cord source is problem 3   Multiple other complications now below   Repeat cultures started to clear on Ancef  Patient was switched to nafcillin when he was developing hypotension  Unfortunately he clinically declined from a neurologic standpoint  Overall prognosis is poor given the severity of disease and it remains uncertain if source control can truly be achieved for clearance of his bacteremia  Continue nafcillin 2 g every 4 hours  Continue to trend fever curve/vitals  Repeat CBC and chemistry tomorrow  Repeat blood cultures ordered for tomorrow  Further imaging pending goals of care discussions  Patient will need prolonged antibiotic course given multiple complications  Additional cares per ICU     3   Left elbow cellulitis and abscess   Elbow itself appeared fairly benign   Seen by orthopedics  Underwent incision and drainage on 1/9  Patient had reported this was his [place] and subsequently developed complications below    Antibiotics as above  Orthopedic evaluation appreciated  Monitor surgical site  Continue local care     4   Left knee prosthetic joint infection   Developed knee swelling over admission   Fluid studies concerning for prosthetic joint infection given the above   Underwent debridement and polyexchange on 1/9   Cultures also with MSSA  Repeat blood cultures noted to be positive after intervention with late growth, possibly due to source control  Antibiotic as above  Ongoing follow-up by orthopedics  Monitor site clinically otherwise     5   Neck pain and C4-5 septic arthritis   Has had progressive pain over the last 2 weeks   CT imaging reviewed and suspicion is for osteomyelitis/discitis and will need to rule out epidural involvement   MRI confirms changes at C4-5  MRI T-spine negative  Patient with further complications below  Antibiotics as above  Prolonged duration of therapy  Neurosurgical evaluation appreciated  Continue to trend fever curve/WBC     6   Pyuria, positive urine culture and penile irritation   Urine culture ultimately isolated E  coli   Patient without any definitive urinary symptoms   Suspect that these represent asymptomatic bacteriuria   The patient does however report penile irritation that is been going on for the last 1 to 2 weeks   Would question if he has a superficial yeast infection based on exam   STI testing had been sent out in the setting of 8   RPR negative   Urine GC negative  Plan for repeat STI testing in 3 to 4 weeks  Consider nystatin/local cream for penile irritation  Continue antibiotics as above     7   Type 2 diabetes   Hemoglobin A1c of 7 1  Glucose management as per primary     8   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   Tender lesions most consistent with findings related to SBE   Swabs of the site negative for HSV   Cultures positive for MSSA  Lesions themselves improving on subsequent evaluation  Given multiple areas of embolization systemically suspect that the patient may have had more than 1 valve involved  Repeat 2D echo notable for aortic valve lesions    Antibiotics as above  Potential transesophageal echo depending on goals of care  Monitor site clinically  Will need prolonged duration of therapy     9  Renal and Splenic infarcts and pulmonary cavitary lesions  Findings seen on MRI  Likely complications of the above  Partially visualized on CT of the abdomen without contrast   Subnoncontrast imaging reviewed with also renal infarcts noted  Suspect multiple valves involved leading to embolization  Antibiotics as above  Continue to trend fever curve/WBC  Potential repeat imaging further into course of antibiotic      10  Left MCA stroke with multiple small embolic infarcts and midline shift  Abruptly developed deficits  Underwent IR procedure  Mental status and images further declined and patient underwent decompressive craniectomy today  Clinical status is overall poor given multiple infectious complications above  Continue nafcillin for now  Monitor mental status  Ongoing follow-up by neurosurgery  Continue to trend fever curve/WBC  Additional cares per ICU  Plan for goals of care discussions    Above plan discussed with critical care service  ID consult service will continue to follow  HISTORY OF PRESENT ILLNESS:  Reason for Consult: MSSA bacteremia with multiple complications    HPI: Vickie Beavers is a 58y o  year old male with left TKA, type 2 diabetes who is known to me from his initial presentation at [facility]  The patient had presented with worsening body aches, joint pain, chills and fatigue  He had been feeling unwell since December  Multiple viral testing was negative  There was a question of possible elbow cellulitis seen by primary care provider on 1/4 for which she was given Keflex  Due to persistent symptoms he opted to come to the emergency department for evaluation  He had recently returned on vacation in [facility] in mid December  At that time he reported an injury to his elbow which seem to have subsided but then progressive symptoms developed that also included neck pain    It was then early in admission that he was found to have MSSA bacteremia  He then also over the course of admission developed left knee swelling and pain  In the patient went to the OR with orthopedics on 1/9 and cultures returned positive from the knee with MSSA and so he was diagnosed with a prosthetic joint infection  He also underwent incision and drainage of the elbow which showed cellulitis with abscess  In terms of his neck pain he underwent MRI which showed C4-5 septic arthritis  He also had MRI of the T-spine done which did not show any osteomyelitis but did show splenic infarcts and a pulmonary cavitary lesion  The patient was ultimately recommended for further imaging  Early on in admission he was noted to have necrotic lesions on his right foot which were consistent with Janeway lesions  Overall he was felt to be at high risk/high likelihood of having endocarditis  Initial 2D echo was negative  Initial blood cultures seem to have cleared but then subsequent repeat sets returned positive  Uncertain if this may have been due to from lack of source control or recent procedures  It was on 1/14 that the patient had developed episodes of hypotension in the morning and on reevaluation he was speaking clearly and interacting but given this progressive change I opted to switch him to nafcillin  It was then over the course into the afternoon that the patient developed acute neurologic deficits and [facility]  He underwent procedure with interventional radiology on 1/14  He was ultimately admitted to the ICU  2D echo done prior to transfer concerning for small vegetations on the aortic valve  MR imaging at the time showed a large acute left MCA stroke with minimal shift  He also had multiple other small infarcts consistent with embolization  Repeat CT showed progressive midline shift  Given patient's further decline he was taken to the OR today for left decompressive hemicraniectomy    Palliative care has been consulted given the patient's overall clinical decline  GI has also been following as patient had episode of upper GI bleed and EGD was performed which showed a large duodenal ulcer  Patient with intermittent fevers over yesterday  White blood cell count has normalized  Repeat blood cultures ordered for tomorrow  We are consulted at this time for further assistance with management  Above history is gathered from chart review and based on my prior evaluations as the patient is currently intubated and sedated and does not interact on exam       REVIEW OF SYSTEMS:  Unable to obtain as patient is intubated and sedated      PAST MEDICAL HISTORY:  Past Medical History:   Diagnosis Date   • Arthritis     L knee   for L TKR today 6/28/2022   • Atopic dermatitis    • Condition not found     Neoplasm of Hughes's Dut   • Diabetes mellitus ([facility] [address] )    • Hyperlipemia    • Hypertension    • Lateral epicondylitis, left elbow    • Wears partial dentures     upper     Past Surgical History:   Procedure Laterality Date   • APPENDECTOMY     • COLONOSCOPY     • CYST REMOVAL      Neck   • INCISION AND DRAINAGE OF WOUND Left 1/9/2023    Procedure: INCISION AND DRAINAGE (I&D) EXTREMITY;  Surgeon: Dereje Walsh DO;  Location: [facility];  Service: Orthopedics   • IR STROKE ALERT  1/14/2023   • JOINT REPLACEMENT Left 06/28/2022   • KNEE ARTHROSCOPY      menisectomy   • DC ARTHRP KNE CONDYLE&PLATU MEDIAL&LAT COMPARTMENTS Left 06/28/2022    Procedure: ARTHROPLASTY KNEE TOTAL;  Surgeon: Darian Wesley DO;  Location: [facility];  Service: Orthopedics   • DC RMVL PROSTH TOT KNEE PROSTH MMA W/WO INSJ SPACER Left 1/9/2023    Procedure: REMOVAL / [address] W/ POLY EXCHANGE;  Surgeon: Dereje Walsh DO;  Location: [facility];  Service: Orthopedics       FAMILY HISTORY:  Non-contributory    SOCIAL HISTORY:  Social History   Social History     Substance and Sexual Activity   Alcohol Use Not Currently   • Alcohol/week: 2 0 standard drinks   • Types: 2 Cans of beer per week    Comment: Social     Social History     Substance and Sexual Activity   Drug Use Never     Social History     Tobacco Use   Smoking Status Former   • Packs/day: 1 00   • Years: 40 00   • Pack years: 40 00   • Types: Cigarettes   • Quit date: 10/2021   • Years since quittin 2   Smokeless Tobacco Never       ALLERGIES:  Allergies   Allergen Reactions   • Sulfamethoxazole-Trimethoprim Nausea Only and GI Intolerance       MEDICATIONS:  All current active medications have been reviewed  PHYSICAL EXAM:  Temp:  [98 6 °F (37 °C)-102 2 °F (39 °C)] 98 6 °F (37 °C)  HR:  [] 94  Resp:  [16-28] 21  BP: (127-147)/(51-59) 130/53  SpO2:  [98 %-100 %] 99 %  Temp (24hrs), Av 5 °F (38 1 °C), Min:98 6 °F (37 °C), Max:102 2 °F (39 °C)  Current: Temperature: 98 6 °F (37 °C)    Intake/Output Summary (Last 24 hours) at 2023 1643  Last data filed at 2023 1206  Gross per 24 hour   Intake 2738 39 ml   Output 4450 ml   Net -1711 61 ml       General Appearance:   Chronically ill-appearing, nontoxic, no acute distress, intubated and sedated  Head:   Surgical site currently dressed at this time, drains in place with serosanguineous drainage  Eyes:  Conjunctiva pink and sclera anicteric, both eyes   Nose: Nares normal, mucosa normal, no drainage   Throat:  ET tube in place without acute issue   Neck: Supple, symmetrical, no adenopathy, no tenderness/mass/nodules   Back:    Unable to turn patient at this time to examine his back  Lungs:    Vented breath sounds throughout, no wheezes, rales or rhonchi   Chest Wall:  No tenderness or deformity   Heart:  RRR; systolic murmur present, no rubs or gallops   Abdomen:   Soft, non-tender, non-distended, positive bowel sounds    Extremities: No cyanosis, clubbing; edema in all of his extremities   Skin: No rashes or lesions  No draining wounds noted    Previously seen lesions on his feet are essentially stable with less purulence noted in the skin  Lymph nodes: Cervical, supraclavicular nodes normal   Neurologic:  Intubated and sedated and does not interact on exam       LABS, IMAGING, & OTHER STUDIES:  In completing this consult I have performed an extensive review of the medical records in epic including review of the notes, radiographs, and laboratory results as detailed below  Lab Results:  I have personally reviewed pertinent labs  Comments/Interpretations: White blood cell count has normalized, LFTs elevated  Results from last 7 days   Lab Units 01/16/23  1212 01/16/23  1105 01/16/23  0601 01/15/23  1215 01/15/23  0445 01/14/23  1749   WBC Thousand/uL  --   --  9 71  --  20 38* 27 91*   HEMOGLOBIN g/dL 8 1* 7 9* 7 2*   < > 6 9* 7 9*   PLATELETS Thousands/uL  --   --  182  --  191 180    < > = values in this interval not displayed  Results from last 7 days   Lab Units 01/16/23  1207 01/16/23  0601 01/15/23  0447 01/15/23  0445 01/15/23  0025 01/14/23  1749 01/14/23  1605   POTASSIUM mmol/L 3 3* 3 6   < >  --    < > 5 1  --    CHLORIDE mmol/L 130* 132*   < >  --    < > 102  --    CO2 mmol/L 20* 18*   < >  --    < > 19*  --    CO2, I-STAT mmol/L  --   --   --   --   --   --  18*   BUN mg/dL 29* 34*   < >  --    < > 48*  --    CREATININE mg/dL 1 09 1 08   < >  --    < > 1 50*  --    EGFR ml/min/1 73sq m 72 73   < >  --    < > 49  --    GLUCOSE, ISTAT mg/dl  --   --   --   --   --   --  299*   CALCIUM mg/dL 8 4 8 1*   < >  --    < > 7 5*  --    AST U/L  --  250*  --  223*  --  212*  --    ALT U/L  --  118*  --  86*  --  83*  --    ALK PHOS U/L  --  73  --  68  --  74  --     < > = values in this interval not displayed  Results from last 7 days   Lab Units 01/15/23  0131 01/13/23  0657 01/11/23  0728 01/09/23  1836   BLOOD CULTURE  Staphylococcus aureus* No Growth at 72 hrs    Staphylococcus aureus* Staphylococcus aureus*  Staphylococcus aureus*  --    GRAM STAIN RESULT  Gram positive cocci in clusters* Gram positive cocci in clusters* Gram positive cocci in clusters*  Gram positive cocci in clusters* No Polys or Bacteria seen       Imaging Studies:   I have personally reviewed pertinent imaging study reports and images in PACS  Comments/Interpretations: Most recent CT image reviewed with large left MCA and PCA strokes with hemorrhagic conversion  Multiple other diffuse infarcts  CT chest/abdomen/pelvis from 1/14 with pulmonary infarcts noted along with multiple splenic and bilateral renal hypodense lesions which are likely septic emboli  Other Studies:   I have personally reviewed other pertinent reports as below  Records in [address] [place] [address] Loop: No significant culture data or lab data  Current/Prior Cultures: Repeat cultures reviewed in our system

## 2023-01-16 NOTE — ANESTHESIA POSTPROCEDURE EVALUATION
Post-Op Assessment Note    CV Status:  Stable       Post-procedure mental status: sedated  Hydration Status:  Euvolemic   PONV Controlled:  Controlled   Airway Patency:  Patent  Airway: intubated      Post Op Vitals Reviewed: Yes      Staff: CRNA         No notable events documented      BP   162/53   Temp     Pulse 97 (01/16/23 1200)   Resp  16   SpO2 98 % (01/16/23 1200)

## 2023-01-16 NOTE — CONSULTS
Consultation - Palliative and Supportive Care   Maurisio Carr 58 y o  male [id number]    Patient Active Problem List   Diagnosis   • DMII (diabetes mellitus, type 2) ([facility] [address] )   • Hyperlipidemia   • Primary hypertension   • Mass of appendix   • Murmur   • BMI 31 0-31 9,adult   • Anxiety   • Rash   • Status post total knee replacement, left   • Impetigo   • Abnormal urinalysis   • Septic arthritis (HCC)   • Hyponatremia   • Septic embolism (HCC)   • Anemia   • Acute stroke due to embolism of left middle cerebral artery (HCC)   • Aortic valve endocarditis   • Pulmonary cavitary lesion     Active issues specifically addressed today include:  Left MCA CVA status post left decompressive hemicraniectomy  Aortic valve endocarditis  Vent dependent respiratory failure  Duodenal ulcer status post EGD  Persistent MSSA bacteremia  Palliative care patient  Goals of care    Plan:  1  Symptom management -will defer symptom management to ICU at this time       2  Goals -met with patient's son and brother at bedside  Patient's son defers to brother as he feels he is too emotional to make medical decisions  We will plan for family meeting tomorrow on January 17 at 2:00 PM   I updated critical care  3  Psychosocial support -time spent providing supportive listening       4  PSC [id number] will follow       Code Status: Full- Level 1   Decisional apparatus:  Patient is not competent on my exam today  If competence is lost, patient's substitute decision maker would default to adult son, however, he defers to his uncle by [place] 169  Advance Directive / Living Will / POLST: None on file     I have reviewed the patient's controlled substance dispensing history in the Prescription Drug Monitoring Program in compliance with the [facility] regulations before prescribing any controlled substances  We appreciate the invitation to be involved in this patient's care  We will follow    Please do not hesitate to reach our on call provider through our clinic answering service at  should you have acute symptom control concerns  Eddie Guevara DO  Palliative and Supportive Care  Clinic/Answering Service: [phone]  You can find me on [employer]! IDENTIFICATION:  Inpatient consult to Palliative Care  Consult performed by: Eddie Guevara DO  Consult ordered by: Lalita Norton DO        Physician Requesting Consult: Natalya Randolph MD  Reason for Consult / Principal Problem: Goals of care  Hx and PE limited by: Patient with catastrophic CVA, history obtained from chart    HISTORY OF PRESENT ILLNESS:       Edy Flanagan is a 58 y o  male who presented on January 7 with a chief complaint of not feeling well  Patient has had a very complex hospital stay and required transfer from [address] [address] to [facility] for higher level of care  Patient was ultimately found to be bacteremic  Patient had recently had a total left knee arthroplasty and it was noted to have an effusion on examination and given concern for septic arthritis patient underwent a removal and debridement of the prosthesis with polyexchange on January 9  On January 14 patient was a stroke alert for facial droop and right arm weakness with dysarthria  Patient not a candidate for thrombolytics but went for IR thrombectomy  Patient continued to worsen and was seen by cardiac surgery on January 15 to evaluate for possible aortic valve replacement after findings of aortic valve vegetation  Unfortunately his mental status continued to worsen and today he was taken emergently to the OR with neurosurgery for hemicraniectomy  Patient is unresponsive on examination  His son and brother are at bedside  Discussed interval events  And reviewed role of palliative medicine for patient's in the ICU  We discussed having family care meeting and they are agreeable to this    We will plan for palliative care and critical care to be available to meet with family at 2 PM tomorrow to discuss goals of care and overall poor prognosis  Review of Systems   Unable to perform ROS: intubated       Past Medical History:   Diagnosis Date   • Arthritis     L knee   for L TKR today 2022   • Atopic dermatitis    • Condition not found     Neoplasm of [place]   • Diabetes mellitus ([address] )    • Hyperlipemia    • Hypertension    • Lateral epicondylitis, left elbow    • Wears partial dentures     upper     Past Surgical History:   Procedure Laterality Date   • APPENDECTOMY     • COLONOSCOPY     • CYST REMOVAL      Neck   • INCISION AND DRAINAGE OF WOUND Left 2023    Procedure: INCISION AND DRAINAGE (I&D) EXTREMITY;  Surgeon: Garland Yang DO;  [facility]: [facility];  Service: Orthopedics   • IR STROKE ALERT  2023   • JOINT REPLACEMENT Left 2022   • KNEE ARTHROSCOPY      menisectomy   • LA ARTHRP KNE CONDYLE&PLATU MEDIAL&LAT COMPARTMENTS Left 2022    Procedure: ARTHROPLASTY KNEE TOTAL;  Surgeon: Дмитрий Arriaga DO;  [facility];  Service: Orthopedics   • LA RMVL PROSTH TOT KNEE PROSTH MMA W/WO INSJ SPACER Left 2023    Procedure: REMOVAL / [address]/ POLY EXCHANGE;  Surgeon: Garland Yang DO;  [facility];  Service: Orthopedics     Social History     Socioeconomic History   • Marital status:       Spouse name: Not on file   • Number of children: Not on file   • Years of education: Not on file   • Highest education level: Not on file   Occupational History   • Not on file   Tobacco Use   • Smoking status: Former     Packs/day: 1 00     Years: 40 00     Pack years: 40 00     Types: Cigarettes     Quit date: 10/2021     Years since quittin 2   • Smokeless tobacco: Never   Vaping Use   • Vaping Use: Never used   Substance and Sexual Activity   • Alcohol use: Not Currently     Alcohol/week: 2 0 standard drinks     Types: 2 Cans of beer per week     Comment: Social   • Drug use: Never   • Sexual activity: Not on file     Comment: defer   Other Topics Concern   • Not on file   Social History Narrative   • Not on file     Social Determinants of Health     Financial Resource Strain: Not on file   Food Insecurity: No Food Insecurity   • Worried About Running Out of Food in the Last Year: Never true   • Ran Out of Food in the Last Year: Never true   Transportation Needs: No Transportation Needs   • Lack of Transportation (Medical): No   • Lack of Transportation (Non-Medical):  No   Physical Activity: Not on file   Stress: Not on file   Social Connections: Not on file   Intimate Partner Violence: Not on file   Housing Stability: Unknown   • Unable to Pay for Housing in the Last Year: No   • Number of Places Lived in the Last Year: Not on file   • Unstable Housing in the Last Year: No     Family History   Problem Relation Age of Onset   • Colon polyps Mother         Inflammatory   • Diabetes Father        MEDICATIONS / ALLERGIES:    all current active meds have been reviewed and current meds:   Current Facility-Administered Medications   Medication Dose Route Frequency   • acetaminophen (TYLENOL) oral suspension 650 mg  650 mg Oral Q6H   • ceFAZolin (ANCEF) IVPB (premix in dextrose) 1,000 mg 50 mL  1,000 mg Intravenous Q8H   • chlorhexidine (PERIDEX) 0 12 % oral rinse 15 mL  15 mL Mouth/Throat Q12H [address]   • dexmedeTOMIDine (Precedex) 400 mcg in sodium chloride 0 9% 100 mL  0 1-0 7 mcg/kg/hr Intravenous Titrated   • fentaNYL 1000 mcg in sodium chloride 0 9% 100mL infusion  50 mcg/hr Intravenous Continuous   • fentanyl citrate (PF) 100 MCG/2ML 50 mcg  50 mcg Intravenous Q2H PRN   • glycopyrrolate (ROBINUL) injection 0 6 mg  0 6 mg Intravenous Once   • insulin regular (HumuLIN R,NovoLIN R) 1 Units/mL in sodium chloride 0 9 % 100 mL infusion  0 3-21 Units/hr Intravenous Titrated   • ipratropium (ATROVENT) 0 02 % inhalation solution 0 5 mg  0 5 mg Nebulization TID   • labetalol (NORMODYNE) injection 10 mg  10 mg Intravenous Q4H PRN   • levalbuterol (XOPENEX) inhalation solution 1 25 mg  1 25 mg Nebulization TID   • levETIRAcetam (KEPPRA) tablet 500 mg  500 mg Oral Q12H [address]   • nafcillin (NALLPEN) 2,000 mg in sodium chloride 0 9 % 100 mL IVPB  2,000 mg Intravenous Q4H   • neostigmine (BLOXIVERZ) injection 3 mg  3 mg Intravenous Once   • norepinephrine (LEVOPHED) 4 mg (STANDARD CONCENTRATION) IV in sodium chloride 0 9% 250 mL  1-30 mcg/min Intravenous Titrated   • ondansetron (ZOFRAN) injection 4 mg  4 mg Intravenous Q6H PRN   • pantoprazole (PROTONIX) 80 mg in sodium chloride 0 9 % 100 mL infusion  8 mg/hr Intravenous Continuous   • sodium chloride 3 % inhalation solution 4 mL  4 mL Nebulization TID       Allergies   Allergen Reactions   • Sulfamethoxazole-Trimethoprim Nausea Only and GI Intolerance       OBJECTIVE:    Physical Exam  Physical Exam  Vitals and nursing note reviewed  Constitutional:       General: He is not in acute distress  Appearance: He is obese  He is ill-appearing  HENT:      Head: Normocephalic and atraumatic  Right Ear: External ear normal       Left Ear: External ear normal       Nose: Nose normal    Eyes:      General: No scleral icterus  Right eye: No discharge  Left eye: No discharge  Comments: Unequal pupils   Cardiovascular:      Rate and Rhythm: Normal rate and regular rhythm  Heart sounds: Murmur heard  Pulmonary:      Effort: Pulmonary effort is normal  No respiratory distress  Breath sounds: Normal breath sounds  Abdominal:      General: Bowel sounds are normal  There is no distension  Palpations: Abdomen is soft  Skin:     General: Skin is warm and dry  Coloration: Skin is pale  Findings: Bruising and lesion present     Neurological:      Comments: Some flexion noted on RUE, b/l upgoing plantars, unequal pupils with minimal reaction         Lab Results:   I have personally reviewed pertinent labs , CBC:   Lab Results   Component Value Date    WBC 9 71 01/16/2023    HGB 8 1 (L) 01/16/2023    HCT 24 3 (L) 01/16/2023    MCV 88 01/16/2023     01/16/2023    MCH 30 0 01/16/2023    MCHC 34 1 01/16/2023    RDW 16 7 (H) 01/16/2023    MPV 9 8 01/16/2023    NRBC 0 01/16/2023   , CMP:   Lab Results   Component Value Date    SODIUM 156 (H) 01/16/2023    K 3 3 (L) 01/16/2023     (H) 01/16/2023    CO2 20 (L) 01/16/2023    BUN 29 (H) 01/16/2023    CREATININE 1 09 01/16/2023    CALCIUM 8 4 01/16/2023     (H) 01/16/2023     (H) 01/16/2023    ALKPHOS 73 01/16/2023    EGFR 72 01/16/2023   , PT/PTT:No results found for: PT, PTT  Imaging Studies: Reviewed  EKG, Pathology, and Other Studies: Reviewed    Counseling / Coordination of Care    Total floor / unit time spent today 55+ minutes  Greater than 50% of total time was spent with the patient and / or family counseling and / or coordination of care  A description of the counseling / coordination of care: Chart review, medication review, discussion with neurosurgery, discussion with critical care, discussion with family regarding family meeting, supportive listening  Portions of this document may have been created using dictation software and as such some "sound alike" terms may have been generated by the system  Do not hesitate to contact me with any questions or clarifications

## 2023-01-16 NOTE — OP NOTE
Neurosurgery Operative Room Note    Mirna Failing  1/16/2023    Pre-op Diagnosis:   Acute stroke due to embolism of left middle cerebral artery (HCC) [I63 412]    Post-op Dignosis:     Post-Op Diagnosis Codes:     * Acute stroke due to embolism of left middle cerebral artery ([address] ) [I63 412]   Brain compression    Procedure:  Procedure(s):  Left decompressive hemicraniectomy    Surgeon: Surgeon(s) and Role:     * Salina Tate MD - Primary      * Samy Daniels PA-C - Assistant    Anesthesia: General    Staff:   Circulator: Leroy Meehan RN  Scrub Person: Erskin Krabbe, RN; Zackery Amos RN  No qualified Resident was available  Estimated Blood Loss: Minimal    Specimens:                Order Name Source Comment Collection Info Order Time   [id number] By: Leroy Meehan RN 1/16/2023 10:14 AM   HEMOGLOBIN AND HEMATOCRIT, BLOOD Line, Arterial  Collected By: Robyn Hernandez CRNA 1/16/2023 11:03 AM       Drains:   Closed/Suction Drain Left Other (Comment) Bulb (Active)       NG/OG/Enteral Tube Orogastric 18 Fr Center mouth (Active)   Placement Reverification Auscultation 01/15/23 2000   Site Assessment Clean;Dry; Intact 01/15/23 2000   Status Suction-low continuous 01/15/23 2000   Drainage Appearance Green;Bile 01/15/23 1600   Intake (mL) 30 mL 01/15/23 1600       Urethral Catheter (Active)   Reasons to continue Urinary Catheter  Accurate I&O assessment in critically ill patients (48 hr  max) 01/15/23 2000   Site Assessment Clean;Skin intact; Patent 01/15/23 2000   Fraser Care Done 01/15/23 2000   Collection Container Standard drainage bag 01/15/23 2000   Securement Method Securing device (Describe) 01/15/23 2000   Output (mL) 400 mL 01/16/23 0600       Ventriculostomy/Subdural Ventricular drainage catheter with ICP microsensor Left Frontal region (Active)   Drain Status Other (Comment) 01/14/23 0009       Findings:  Swollen, infarcted brain    Complications:  none    OR note:    Patient with malignant left MCA syndrome  Most recent CT scan showed extensive brain compression, midline shift, dramatically worsened from prior  Exam deteriorated such the patient was not following commands any further  Left pupil was dilated  Extensive discussion was carried out with the patient's son, discussed role for craniectomy to preserve life but not necessarily maintain any quality of life, reverse existing stroke etc   He wished to proceed  Verbal consent obtained over the phone  Patient was brought to the OR already intubated  He was transferred the OR table and left shoulder bumped  He was placed in [place] head briones/pins, head rotated right  The entire left scalp was clipped  A trauma style incision was marked and his head prepped and draped in standard fashion  Timeout was performed  Patient received antibiotics per protocol and also 25 g of mannitol with 20 mg of Lasix  The incision was infiltrated with 1% lidocaine with epinephrine  Skin was made with a skin scalpel & dissection carried down to the bone with the Bovie  The scalp was reflected as 1 layer with temporalis muscle  Skin edges were treated with Luis Carlos clips  Circumferential bur holes were made with the acorn bit, and the craniectomy flap generated with a craniotome bit  This was elevated off and the dura, which was under considerable pressure  The sphenoid wing was removed with a Leksell ronguer, as was a considerable amount of the low lateral temporal bone, to relax over the temporal lobe  There was a breach in the dura frontally and infarcted brain was visible here  Dura was opened in an 'H' fashion and stellate cuts to achieve maximum relaxation  Left frontal brain herniated through the jeremy, and this infarcted brain was resected    Epidural hemostasis as well as hemostasis within the stroke ectomy was achieved with Surgi-Melo, tamponade with patties etc   Brain wax was also used on the edges of the craniotomy  A post craniectomy ICP monitor was zeroed (reference 518), tunneled out a separate stab incision, and inserted the frontal lobe to a depth of 2-3 cm  At the skin, the 7 cm mandy was visible  Pressure before closing was 0 mm Hg  A 5 x 7 piece of dura matrix was applied as an onlay  A 7 flat Oh-Ash drain was placed epidural/subgaleal and tunneled out through separate stab incision  The galea was closed with inverted interrupted 2-0 Vicryl sutures  After this maneuver the ICP was 10 mm Hg  The skin was closed with staples  Drains were secured with drain stitches  Incision was dressed with bacitracin Telfa and staples  Patient was taken out of the [place] head briones, and cranial cap applied  All instrument counts, sponge counts, and needle counts were correct prior to closure the skin  The patient was taken directly to CT then the ICU, still intubated  No qualified resident was available  Neo Delgado PA-C, was present for the entire procedure, and provided essential assistance with with proper exposure, retraction, hemostasis, and wound closure, which was necessary secondary to the complex nature of this case             Dale De Souza MD     Date: 1/16/2023  Time: 11:11 AM

## 2023-01-16 NOTE — DISCHARGE INSTR - OTHER ORDERS
Skin care plans:  1-Hydraguard to bilateral sacrum, buttock and heels BID and PRN  2-Elevate heels to offload pressure  3-Ehob cushion in chair when out of bed  4-Moisturize skin daily with skin nourishing cream   5-Turn/reposition q2h or when medically stable for pressure re-distribution on skin  6-Cleanse left elbow wound with normal saline, apply silver alginate to wound bed, cover with Allevyn foam  Change every other day and PRN soilage/displacement

## 2023-01-16 NOTE — PROGRESS NOTES
[address] [facility]  Progress Note - Peter Scott 1960, 58 y o  male MRN: [id number]  Unit/Bed#: OR POOL Encounter: [id number]  Primary Care Provider: Tomas Reeves DO   Date and time admitted to hospital: 1/14/2023  3:16 PM    * Acute stroke due to embolism of left middle cerebral artery [facility])  Assessment & Plan  Holohemispheric left MCA stroke on 1/14 in a patient with a h/o MSSA bacteremia   · PPD 2 left MCA thrombectomy for M1 occlusion with TICI 2B revascularization (1/14, [place])  · NIHSS 29 on arrival  · Also with MSSA bacteremia, AV endocarditis, left elbow and knee septic arthritis, C5-6 osteomyelitis, pulmonary cavitary lesion, splenic infarct, [place] lesions   · Patient with acute exam change - right hemiplegia and aphasia  · CTH overnight shows worsening of cytotoxic edema with brain compression and worsening MLS  · Exam change around 8am with left blown pupil and no response to pain off sedation, GCS 3T    Imaging personally reviewed with attending:  · CT head w/o, 1/16/23: Evolving large acute infarct in left MCA and left PCA territories with petechial cortical hemorrhage, worsened cytotoxic edema, worsened rightward midline shift now measuring 1 6 cm (previously 0 7 cm), worsened compression of left lateral ventricle with dilation of right lateral ventricle, and new left uncal herniation  Known smaller acute infarcts in right frontal, right parietal, right occipital and bilateral cerebellar lobes are better evaluated on MRI brain 1/14/2023    Plan:  · Discussed today's CTH and prognosis with son and brother  Discussed that patient will need emergent [facility] but that this is life saving only and will not improve QOL  They both agreed to proceed at this time  · Case request placed, pre op orders placed  Patient NPO  2u PRBC STAT given GI bleed with resultant anemia  CCM aware     · Continue to monitor neurological exam  · STAT CT head for decline in GCS greater than 2 points in 1 hour  · NA > 145, currently 158  · SBP < 140, MAP > 65  · Neurology following for stroke management, appreciate recommendations  · Medical management per primary team  · DVT ppx: SCDs  SQH on hold for OR  Not a candidate for full AC given GI bleed  Neurosurgery will continue to follow  Plan for left [facility] with Dr Zaria King emergently this morning, consent will be obtained via phone  Patient will return to ICU  Please call with questions or concerns  Aortic valve endocarditis  Assessment & Plan  Medical management per primary team  EF 65%  CT surgery following, currently not a candidate for AVR given active sepsis but may be candidate once medically stable and improved    Anemia  Assessment & Plan  Secondary to GI bleed  Hgb this morning 7 2, 2u PRBC ordered STAT                Subjective/Objective     Subjective: Patient with deterioration in exam overnight  GI bleed with insertion of rectal tube this morning  Patient with anisocoria since stroke but new left blown pupil    Objective: Patient intubated, off sedation  New left blown pupil  No longer responding to painful stimuli; GCS 3T  Invasive Devices     Central Venous Catheter Line  Duration           CVC Central Lines 01/14/23 Triple 20cm 1 day          Peripheral Intravenous Line  Duration           Peripheral IV 01/12/23 Right Antecubital 3 days    Peripheral IV 01/14/23 Left;Ventral (anterior) Forearm 1 day          Arterial Line  Duration           Arterial Line 01/14/23 Right Radial 1 day          Drain  Duration           Urethral Catheter 1 day    NG/OG/Enteral Tube Orogastric 18 Fr Center mouth <1 day          Airway  Duration           ETT  8 mm 1 day                Vitals: Blood pressure (!) 149/42, pulse 88, temperature (!) 101 8 °F (38 8 °C), resp  rate (!) 23, height 5' 10" (1 778 m), weight 102 kg (224 lb 13 9 oz), SpO2 100 %  ,Body mass index is 32 27 kg/m²      Hemodynamic Monitoring: MAP: Arterial Line MAP (mmHg): 66 mmHg    General appearance: intubated, ill appearing  Head: Normocephalic, without obvious abnormality, atraumatic  Eyes: left pupil 5mm and nonreactive  Right pupil 2-3mm and sluggish  Neck: supple, symmetrical, trachea midline  Lungs: intubated, tachypneic  Heart: regular heart rate  Neurologic:   Mental status: GCS 3T  Cranial nerves: grossly intact (Cranial nerves II-XII)  GCS 3T, no response to painful stimuli  Lab Results: I have personally reviewed pertinent results  Results from last 7 days   Lab Units 01/16/23  0601 01/16/23  0013 01/15/23  2003 01/15/23  1215 01/15/23  0445 01/14/23  1749   WBC Thousand/uL 9 71  --   --   --  20 38* 27 91*   HEMOGLOBIN g/dL 7 2* 7 2* 7 5*   < > 6 9* 7 9*   HEMATOCRIT % 21 1* 21 9*  --   --  20 0* 23 3*   PLATELETS Thousands/uL 182  --   --   --  191 180   NEUTROS PCT % 80*  --   --   --  83* 83*   MONOS PCT % 9  --   --   --  8 8    < > = values in this interval not displayed  Results from last 7 days   Lab Units 01/16/23  0601 01/16/23  0013 01/15/23  1752 01/15/23  0447 01/15/23  0445 01/15/23  0025 01/14/23  1749 01/14/23  1605   POTASSIUM mmol/L 3 6 3 7 3 9   < >  --    < > 5 1  --    CHLORIDE mmol/L 132* 130* 126*   < >  --    < > 102  --    CO2 mmol/L 18* 18* 17*   < >  --    < > 19*  --    CO2, I-STAT mmol/L  --   --   --   --   --   --   --  18*   BUN mg/dL 34* 38* 45*   < >  --    < > 48*  --    CREATININE mg/dL 1 08 1 02 1 07   < >  --    < > 1 50*  --    CALCIUM mg/dL 8 1* 8 0* 7 7*   < >  --    < > 7 5*  --    ALK PHOS U/L 73  --   --   --  68  --  74  --    ALT U/L 118*  --   --   --  86*  --  83*  --    AST U/L 250*  --   --   --  223*  --  212*  --    GLUCOSE, ISTAT mg/dl  --   --   --   --   --   --   --  299*    < > = values in this interval not displayed       Results from last 7 days   Lab Units 01/16/23  0601 01/15/23  0445 01/14/23  1749   MAGNESIUM mg/dL 2 3 2 2 2 0     Results from last 7 days   Lab Units 01/16/23  0601 01/15/23  0445 01/14/23  1749   PHOSPHORUS mg/dL 2 6 3 3 5 4*     Results from last 7 days   Lab Units 01/14/23  1749 01/14/23  0947   INR  1 64* 1 50*   PTT seconds 33  --      No results found for: TROPONINT  ABG:  Lab Results   Component Value Date    PHART 7 461 (H) 01/16/2023    HPC5CSN 25 9 (LL) 01/16/2023    PO2ART 132 4 (H) 01/16/2023    QOV8JJE 18 0 (L) 01/16/2023    BEART -5 0 01/16/2023    SOURCE Line, Arterial 01/16/2023       Imaging Studies: I have personally reviewed pertinent reports  and I have personally reviewed pertinent films in PACS     EGD    Result Date: 1/15/2023  Narrative: Table formatting from the original result was not included  [facility] [address] [place] [address] [address] DATE OF SERVICE: 1/15/23 PHYSICIAN(S): Attending: No Staff Documented Fellow: No Staff Documented INDICATION: Anemia, unspecified type POST-OP DIAGNOSIS: See the impression below  PREPROCEDURE: Informed consent was obtained for the procedure, including sedation  Risks of perforation, hemorrhage, adverse drug reaction and aspiration were discussed  The patient was placed in the left lateral decubitus position  Patient was explained about the risks and benefits of the procedure  Risks including but not limited to bleeding, infection, and perforation were explained in detail  Also explained about less than 100% sensitivity with the exam and other alternatives  PROCEDURE: EGD DETAILS OF PROCEDURE: Patient was taken to the procedure room where a time out was performed to confirm correct patient and correct procedure  The patient underwent monitored anesthesia care, which was administered by the procedural nurse  The patient's blood pressure, heart rate, level of consciousness, respirations, oxygen and ETCO2 were monitored throughout the procedure  The scope was introduced through the mouth and advanced to the second part of the duodenum  Retroflexion was performed in the fundus   The patient experienced no blood loss  The procedure was not difficult  The patient tolerated the procedure well  There were no apparent complications  ANESTHESIA INFORMATION: ASA: ASA status cannot be found on the log  Anesthesia Type: Anesthesia type cannot be found on the log  MEDICATIONS: Totals unavailable because the procedure time range is not set FINDINGS: Single large, deep, irregular ulcer in the duodenal bulb with adherent clot (Indra IIB) Multiple superficial, benign-appearing ulcers in the stomach with clean base (Indra III) Grade C esophagitis with mucosal breaks measuring 5 mm or more, continuous between folds, covering less than 75% of the circumference in the GE junction Tube successfully placed in the stomach; scope reinserted to confirm placement  OG tube SPECIMENS: * Cannot find log *     Impression: Single large, deep and cratered ulcer in the duodenal bulb with large recurrent clot, surrounding erythematous and hypertrophied margins  Second portion of duodenum appeared normal  Multiple superficial and irregular clean-based gastric ulcers  LA grade C esophagitis  Previously existing OG tube which was reportedly called, was removed, OG tube replaced under direct visualization  RECOMMENDATION:  Schedule repeat EGD  Abnormal pathology Gastric ulcer surveillance  Continue PPI drip Frequent H&H, transfusion as needed If patient should develop any further signs of GI bleeding/hemodynamic compromise, consider IR consult, limited endoscopic options given high risk of perforation based on the location and appearance of the ulcer  Patient will need repeat EGD prior to discharge to evaluate the structures better  Avoid tube feeding through OG tube at this time  GI team will follow tomorrow No Staff Documented     CT abdomen pelvis wo contrast    Result Date: 1/14/2023  Narrative: CT ABDOMEN AND PELVIS WITHOUT IV CONTRAST INDICATION:   abdominal pain  COMPARISON:  None   TECHNIQUE:  CT examination of the abdomen and pelvis was performed without intravenous contrast  Axial, sagittal, and coronal 2D reformatted images were created from the source data and submitted for interpretation  Radiation dose length product (DLP) for this visit:  680 mGy-cm   This examination, like all CT scans performed in the [facility], was performed utilizing techniques to minimize radiation dose exposure, including the use of iterative reconstruction and automated exposure control  Enteric contrast was not administered  FINDINGS: ABDOMEN LOWER CHEST:  4 mm right lower lung nodule image 2/4  5 mm right middle anterior lung nodule image 3/5  Trace left effusion/pleural thickening  The cavitary left lower lung lesion seen on prior thoracic spine MRI is not in the field-of-view on this study  LIVER/BILIARY TREE:  Unremarkable  GALLBLADDER:  There are gallstone(s) within the gallbladder, without pericholecystic inflammatory changes  SPLEEN:  Smooth peripherally calcified hypodensity in the anterior spleen measuring 4 4 x 4 6 x 4 8 cm  This may be due to prior infection or hemorrhage  Additional large heterogeneous nodular foci in the spleen, but otherwise limited in evaluation without contrast  PANCREAS:  Unremarkable  ADRENAL GLANDS:  Unremarkable  KIDNEYS/URETERS:  Unremarkable  No hydronephrosis  STOMACH AND BOWEL:  Unremarkable  APPENDIX:  There are expected postoperative changes of appendectomy  ABDOMINOPELVIC CAVITY:  No ascites  No pneumoperitoneum  No lymphadenopathy  VESSELS:  Unremarkable for patient's age  PELVIS REPRODUCTIVE ORGANS:  Unremarkable for patient's age  Prostate calcifications  URINARY BLADDER:  Unremarkable  ABDOMINAL WALL/INGUINAL REGIONS:  Unremarkable  OSSEOUS STRUCTURES:  No acute fracture or destructive osseous lesion  L4 spondylolysis with mild grade 1 anterolisthesis on S1  Impression: 1  Smooth peripherally calcified hypodensity in the anterior spleen measuring 4 4 x 4 6 x 4 8 cm   This may be due to prior infection or hemorrhage  There are additional large heterogeneous nodular foci in the spleen, but otherwise limited in evaluation without contrast  Recommend further evaluation with contrast CT  2   The cavitary left lower lung lesion seen on prior thoracic spine MRI is not in the field-of-view on this study  Dedicated contrast CT chest evaluation is recommended  3  Cholelithiasis  Workstation performed: [id number]     XR chest portable    Result Date: 1/15/2023  Narrative: CHEST INDICATION:   CVC placement  COMPARISON:  3/20/2017 EXAM PERFORMED/VIEWS:  XR CHEST PORTABLE  AP semierect Images: 3 FINDINGS:  Endotracheal tube approximately 6 cm above the diego  Left jugular line tip at the cavoatrial junction  The endogastric tube which has been placed curves on itself in the stomach and reenters the esophagus directed cephalad with the tip and  side port in the distal esophagus  Cardiomediastinal silhouette appears unremarkable  The lungs are clear  No pneumothorax or pleural effusion  Peripherally calcified mass in the spleen noted, unchanged  Osseous structures appear within normal limits for patient age  Impression: No acute cardiopulmonary disease  Enteric tube malposition  The tube curves on itself in the stomach and reenters the distal esophagus directed cephalad  An x-ray of the abdomen is suggested to confirm  The study was marked in [facility] for immediate notification  Workstation performed: [id number]     XR spine cervical 2 or 3 vw injury    Result Date: 1/13/2023  Narrative: CERVICAL SPINE INDICATION:   C4-5 septic arthritis  COMPARISON:  MR cervical spine 1/10/2023, CT cervical spine 1/9/2023 VIEWS:  XR SPINE CERVICAL 2 OR 3 VW INJURY FINDINGS: No acute fracture or subluxation  Straightening of the usual cervical lordosis  Minimal anterolisthesis C3 on C4 and C4 on C5, stable  No radiographic findings at the right C4-C5 facet level as seen on MRI or CT   Scattered mild degenerative changes in the cervical spine  Normal prevertebral soft tissues  Clear lung apices  Impression: No acute osseous abnormality  No radiographic findings at the right C4-C5 facet level  Workstation performed: [id number]     XR knee left 1 or 2 views    Result Date: 1/10/2023  Narrative: LEFT KNEE INDICATION:   Post op left TKA  COMPARISON:  1/8/2023 VIEWS:  XR KNEE 1 OR 2 VW LEFT Images: 2 FINDINGS: There is no acute fracture or dislocation  Joint effusion appears to have resolved  Unremarkable appearance of total knee arthroplasty  No evidence of hardware complication  No lytic or blastic osseous lesion  Interval placement of antibiotic pledgets and surgical drain  Associated soft tissue swelling and gas noted  Impression: Unremarkable appearance of total knee arthroplasty  Interval postsurgical changes as above  Workstation performed: [id number]     XR knee 1 or 2 vw left    Result Date: 1/9/2023  Narrative: LEFT KNEE INDICATION:   s/p L TKA  COMPARISON:  None VIEWS:  XR KNEE 1 OR 2 VW LEFT FINDINGS: There is no acute fracture or dislocation  There is no joint effusion  Unremarkable appearance of total knee arthroplasty  No evidence of hardware complication  No lytic or blastic osseous lesion  There are atherosclerotic calcifications  Soft tissues are otherwise unremarkable  Impression: Unremarkable appearance of total knee arthroplasty  No evidence of hardware complication  Workstation performed: [id number]     CT head wo contrast    Result Date: 1/16/2023  Narrative: CT BRAIN - WITHOUT CONTRAST INDICATION:   Stroke, follow up cva follow up  COMPARISON:  CT head without contrast January 15, 2023  MRI brain with and without contrast January 14, 2023  TECHNIQUE:  CT examination of the brain was performed  In addition to axial images, sagittal and coronal 2D reformatted images were created and submitted for interpretation  Radiation dose length product (DLP) for this visit:  952 52 mGy-cm     This examination, like all CT scans performed in the [facility], was performed utilizing techniques to minimize radiation dose exposure, including the use of iterative  reconstruction and automated exposure control  IMAGE QUALITY:  Diagnostic  FINDINGS: PARENCHYMA: Evolving large acute infarct in left MCA and left PCA territories with petechial cortical hemorrhage, worsened cytotoxic edema, worsened rightward midline shift now measuring 1 6 cm (previously 0 7 cm), worsened compression of left lateral ventricle with  dilation of right lateral ventricle, and new left uncal herniation  Known smaller acute infarcts in right frontal, right parietal, right occipital and bilateral cerebellar lobes are better evaluated on MRI brain 1/14/2023  No acute parenchymal hemorrhage  No intracranial mass  VENTRICLES AND EXTRA-AXIAL SPACES:  Worsened compression of left lateral ventricle with dilation of right lateral ventricle  No acute intraventricular or extra-axial hemorrhage  VISUALIZED ORBITS AND PARANASAL SINUSES:  Normal visualized orbits  Pansinus mucosal thickening with scattered air-fluid levels and frothy secretions, severe in bilateral ethmoid and right sphenoid sinuses  CALVARIUM AND EXTRACRANIAL SOFT TISSUES: No acute calvarial or extracranial soft tissue abnormality  Small bilateral mastoid effusions  Partially imaged endotracheal and orogastric tubing  Impression: Evolving large acute infarct in left MCA and left PCA territories with petechial cortical hemorrhage, worsened cytotoxic edema, worsened rightward midline shift now measuring 1 6 cm (previously 0 7 cm), worsened compression of left lateral ventricle with  dilation of right lateral ventricle, and new left uncal herniation  Known smaller acute infarcts in right frontal, right parietal, right occipital and bilateral cerebellar lobes are better evaluated on MRI brain 1/14/2023    I personally discussed this study with Shad Herbert on 2/99/6884 at 7:45 AM  Workstation performed: [id number]     CT head wo contrast    Result Date: 1/15/2023  Narrative: CT BRAIN - WITHOUT CONTRAST INDICATION:   Stroke, follow up f/u thrombectomy  COMPARISON:  MRI performed yesterday  TECHNIQUE:  CT examination of the brain was performed  In addition to axial images, sagittal and coronal 2D reformatted images were created and submitted for interpretation  Radiation dose length product (DLP) for this visit:  930 mGy-cm   This examination, like all CT scans performed in the [facility], was performed utilizing techniques to minimize radiation dose exposure, including the use of iterative reconstruction and automated exposure control  IMAGE QUALITY:  Diagnostic  FINDINGS: PARENCHYMA:  Redemonstration of large acute infarct infarct throughout the left MCA distribution  Petechial hemorrhage noted on MRI is not well seen  There is increased mass effect with 7 mm of left-to-right shift  Additional small and tiny infarcts in the right cerebral hemisphere and cerebellum identified on MRI are difficult to appreciate  VENTRICLES AND EXTRA-AXIAL SPACES:  No hydrocephalus  VISUALIZED ORBITS AND PARANASAL SINUSES:  Orbits are unremarkable  Stable paranasal sinus disease  CALVARIUM AND EXTRACRANIAL SOFT TISSUES:  Normal      Impression: Redemonstration of large acute infarct throughout the left MCA distribution with increased mass effect and 7 mm of left-to-right shift  Petechial hemorrhage noted on MRI is difficult to appreciate  Recommend close follow-up and neurosurgical consultation  Additional small infarcts in the right cerebral hemisphere and cerebellum are better appreciated on MRI  I personally discussed this study with Pauly Graves on 1/15/2023 at 8:09 AM   Workstation performed: [id number]     CT head wo contrast    Result Date: 1/14/2023  Narrative: CT BRAIN - WITHOUT CONTRAST INDICATION:   post stroke alert   COMPARISON:  CT January 14, 2023 TECHNIQUE:  CT examination of the brain was performed  In addition to axial images, sagittal and coronal 2D reformatted images were created and submitted for interpretation  Radiation dose length product (DLP) for this visit:  916 83 mGy-cm   This examination, like all CT scans performed in the [facility], was performed utilizing techniques to minimize radiation dose exposure, including the use of iterative  reconstruction and automated exposure control  IMAGE QUALITY:  Diagnostic  FINDINGS: PARENCHYMA:  Loss of gray-white differentiation identified anterior left temporal lobe, anterior insular cortex and left frontal lobe also suspicious for recent infarct  No hemorrhage identified  Subtle effacement of sulci noted  VENTRICLES AND EXTRA-AXIAL SPACES:  Normal for the patient's age  VISUALIZED ORBITS AND PARANASAL SINUSES:  Normal visualized orbits  Normal visualized paranasal sinuses  CALVARIUM AND EXTRACRANIAL SOFT TISSUES:  Normal      Impression: Is compatible with recent infarct in the left frontal lobe, left insular and anterior temporal lobe  No hemorrhage identified  Workstation performed: [id number]     CT spine cervical w contrast    Result Date: 1/9/2023  Narrative: CT CERVICAL SPINE - WITH CONTRAST INDICATION: Infection  Bacteremia  Neck pain  COMPARISON:  None  TECHNIQUE:  Noncontrast CT examination of the cervical spine was performed  Radiation dose length product (DLP) for this visit:  609 661 045 mGy-cm   This examination, like all CT scans performed in the [facility], was performed utilizing techniques to minimize radiation dose exposure, including the use of iterative reconstruction and automated exposure control  85 mL of Omnipaque 350 was injected intravenously without immediate consequence  IMAGE QUALITY:  Diagnostic  FINDINGS: ALIGNMENT: Slight levoscoliosis of the cervical spine  Minimal anterior subluxation of C3 upon C4  OSSEOUS STRUCTURES: No fracture    Focal lucencies are seen within the right facet joint at C4-5  These are nonspecific and may represent subchondral cystic degenerative change  No signs of acute bony erosion or destruction  DEGENERATIVE CHANGES: C2-C3:  Small broad-based right paramedian disc protrusion with mild canal stenosis  No foraminal narrowing  C3-C4:  Normal disc height  As described above there is slight anterior subluxation of C3 upon C4 within normal limits  No canal stenosis or foraminal narrowing  C4-C5:  Normal disc height  No focal disc herniation identified  Slight right-sided uncinate joint hypertrophic change  As described above there is right-sided facet degenerative change including subchondral lucencies within the facets  Mild enhancement extends lateral from the facet joint on series 3 images 51 and 52, nonspecific in appearance possibly representing a small synovial cyst   Although the bony structures do not demonstrate signs of acute erosion or destruction, these changes could be seen in a subacute to chronic septic arthritis  C5-C6:  No significant degenerative change  C6-C7:  No significant degenerative change  C7-T1:  No significant degenerative change  UPPER THORACIC DISC SPACES:  No significant degenerative change  PREVERTEBRAL AND PARASPINAL SOFT TISSUES:  There are vascular calcifications of the carotid bifurcation bilaterally without evidence of significant vascular stenosis  Mild vascular calcification of the aortic arch  LUNG APICES:  Nonspecific masslike opacity within the anterior medial aspect of the left chest     Impression: No significant disc herniation, canal stenosis or foraminal narrowing  No acute fracture  Facet changes are seen on the right at C4-5 most likely representing subchondral cystic degenerative change  However, there does appear to be a small peripherally enhancing focus extending laterally from the facet joint which could represent a small, 4 mm synovial cyst, see series 3 image 51    The possibility of septic arthritis not excluded in this patient with active bacteremia  Nonspecific density within the anterior medial aspect of the left hemithorax  Differential considerations would include an infectious etiology, possibly fungal or other form of organizing pneumonia, as well as old pulmonary infarct  No previous chest imaging for comparison other than a chest x-ray from 2020  Workstation performed: [id number]     MRI brain w wo contrast    Result Date: 1/15/2023  Narrative: MRI BRAIN WITH AND WITHOUT CONTRAST INDICATION: w/wo contrast, stroke  COMPARISON:  CT from earlier the same date    TECHNIQUE: Multiplanar, multisequence imaging of the brain was performed before and after gadolinium administration  IV Contrast:  10 mL of Gadobutrol injection (SINGLE-DOSE)  IMAGE QUALITY:   Diagnostic  FINDINGS: BRAIN PARENCHYMA: There is restricted diffusion throughout the left MCA distribution consistent with acute infarct  Size of infarct is larger than seen on earlier CT  There is petechial hemorrhage  There is sulcal effacement and 3 mm of left-to-right shift  Basal cisterns are patent  There are also multiple small acute infarcts scattered in the right frontal, parietal and occipital lobes with several punctate foci in the bilateral cerebellum favoring an embolic etiology    Leptomeningeal enhancement overlying the left MCA infarct probably due to leptomeningeal collaterals  VENTRICLES:  No hydrocephalus  SELLA AND PITUITARY GLAND:  Normal  ORBITS:  Normal  PARANASAL SINUSES: Pansinus disease  VASCULATURE:  Evaluation of the major intracranial vasculature demonstrates appropriate flow voids  CALVARIUM AND SKULL BASE:  Normal  EXTRACRANIAL SOFT TISSUES:  Normal      Impression: Large acute left MCA distribution infarct with petechial hemorrhage  Minimal left-to-right shift  Additional small acute infarcts in the right frontal and parietal lobes as well as the cerebellum favor embolic etiology   The study was marked in [facility] for immediate notification  Workstation performed: [id number]     MRI cervical spine w wo contrast    Result Date: 1/11/2023  Narrative: MRI CERVICAL SPINE WITH AND WITHOUT CONTRAST INDICATION: neck stiffness concerning for infection on CT cervical spine  COMPARISON:  CT performed yesterday  TECHNIQUE:  Multiplanar, multisequence imaging of the cervical spine was performed before and after gadolinium administration     IV Contrast:  10 mL of Gadobutrol injection (SINGLE-DOSE) IMAGE QUALITY:  Diagnostic  FINDINGS: ALIGNMENT: Minimal anterolisthesis at C3-4  No compression  MARROW SIGNAL: Marrow edema in the right C4-5 facets with synovitis and mild adjacent soft tissue edema  Given concern for infection, findings may represent septic arthritis/osteomyelitis although inflammatory arthropathy is in the differential  No other  abnormal marrow signal  CERVICAL AND VISUALIZED UPPER THORACIC CORD:  Normal signal within the visualized cord  PREVERTEBRAL AND PARASPINAL SOFT TISSUES:  Mild right paraspinal edema adjacent to right C4-5 facet complex  No abscess  VISUALIZED POSTERIOR FOSSA:  The visualized posterior fossa demonstrates no abnormal signal  CERVICAL DISC SPACES:  Multilevel disc desiccation  C2-C3:  Tiny right right central protrusion  No significant canal or foraminal stenosis  C3-C4:  Small disc bulge  No canal or foraminal stenosis  C4-C5:  Small disc bulge  Facet arthropathy  No significant canal stenosis  Slight foraminal stenosis  C5-C6:  No disc herniation, canal or foraminal stenosis  Mild facet arthropathy  C6-C7:  Disc bulge  Mild facet arthropathy  Mild canal and mild left foraminal stenosis  C7-T1:  No disc herniation, canal or foraminal stenosis  UPPER THORACIC DISC SPACES:  Normal  POSTCONTRAST IMAGING:  No abnormal epidural or intrathecal enhancement  OTHER FINDINGS:  None       Impression: Right C4-5 facet joint synovitis with marrow edema could be due to septic arthritis/osteomyelitis given clinical concern for infection  Inflammatory arthropathy with reactive marrow edema is in the differential  No other evidence of infection  No epidural abscess  Workstation performed: [id number]     MRI thoracic spine w wo contrast    Result Date: 1/13/2023  Narrative: MRI THORACIC SPINE WITH AND WITHOUT CONTRAST INDICATION: looking for infection of thoracic spine  COMPARISON:  None  TECHNIQUE:  Multiplanar, multisequence imaging of the thoracic spine was performed before and after gadolinium administration     IV Contrast:  10 mL of Gadobutrol injection (SINGLE-DOSE) IMAGE QUALITY:  Diagnostic  FINDINGS: ALIGNMENT:  Normal alignment of the thoracic spine  No compression fracture  No subluxation  No evidence of scoliosis  MARROW SIGNAL:  Scattered degenerative endplate changes  No focally suspicious marrow lesions  No bone marrow edema or compression abnormality  THORACIC CORD:  Normal signal within the thoracic cord  PREVERTEBRAL AND PARASPINAL SOFT TISSUES:   Normal  THORACIC DEGENERATIVE CHANGE:  Scattered degenerative endplate changes  No focally suspicious marrow lesions  No bone marrow edema or compression abnormality  Mild spondylotic changes  No bone marrow edema or compression abnormality  POSTCONTRAST:  No abnormal enhancement  OTHER FINDINGS:  Peripheral T2 hyperintense lesions noted in the posterior aspect of the spleen series 7, images 42 and 43  Possible splenomegaly  Spleen incompletely characterized  In the posterior aspect of left lower lobe there is a 1 8 cm lesion with a air-fluid level, worrisome for cavitary lesion  Impression: 1  Mild spondylosis without cord compression or cord signal abnormality  2   No MR evidence of discitis/ostomy colitis in the visualized spine  3   Peripheral T2 hyperintense lesions in the spleen which may be enlarged    Differential diagnosis includes splenic mass lesions such as hemangioma or other lesion or perhaps a splenic infarct  Consider left upper quadrant ultrasound and/or contrast-enhanced CT of the abdomen for further assessment  4   Cavitary lesion with air-fluid level in the left lower lobe, possibly infectious process such as a pulmonary abscess  Differential diagnosis includes malignancy  CT of the chest advised  Workstation performed: [id number]     CT chest abdomen pelvis w contrast    Result Date: 1/14/2023  Narrative: CT CHEST, ABDOMEN AND PELVIS WITH IV CONTRAST INDICATION:   Neuro deficit, acute, stroke suspected recomended by radiolgoy acute right facial droop and right arm weakness  COMPARISON:  CT 1/14/2023 TECHNIQUE: CT examination of the chest, abdomen and pelvis was performed  Axial, sagittal, and coronal 2D reformatted images were created from the source data and submitted for interpretation  Radiation dose length product (DLP) for this visit:  3885 mGy-cm   This examination, like all CT scans performed in the [facility], was performed utilizing techniques to minimize radiation dose exposure, including the use of iterative reconstruction and automated exposure control  IV Contrast:  100 mL of iohexol (OMNIPAQUE) Enteric Contrast: Enteric contrast was not administered  FINDINGS: Examination is limited due to artifact from the upper extremities as well as motion and suboptimal phase of contrast enhancement  CHEST LUNGS:  As seen on recent thoracic MRI, there is a cavitary nodule with small fluid level in the posterior left lower lobe measuring 1 7 x 1 8 cm  This may be infectious in etiology  There is also a small left upper anterior mediastinal infiltrate, though better visualized on prior CT cervical spine  Significant motion otherwise limits evaluation  Lesions may be due to septic emboli  There is no tracheal or endobronchial lesion  PLEURA:  Trace left pleural fluid/pleural thickening  HEART/GREAT VESSELS: Coronary atherosclerosis  No thoracic aortic aneurysm   MEDIASTINUM AND CAROLANN: Unremarkable  CHEST WALL AND LOWER NECK:  Unremarkable  ABDOMEN LIVER/BILIARY TREE:  Mild hepatic steatosis may be present  No CT evidence of suspicious hepatic mass  Normal hepatic contours  No biliary dilatation  GALLBLADDER:  There are gallstone(s) within the gallbladder, without pericholecystic inflammatory changes  SPLEEN:  Peripherally calcified anterior splenic nodule again visualized measuring 4 4 x 4 6 cm, possibly from old infection or hemorrhage  Multiple large hypodense lesions are again visualized  Some lesions appear relatively well demarcated, despite the  significant motion artifact, raising the possibility of splenic infarcts  Other lesions however remain indeterminate  Given the additional findings in the chest and abdomen, findings may be due to sequela of of septic emboli  Neoplasm is not excluded at  this time  PANCREAS:  Unremarkable  ADRENAL GLANDS:  Unremarkable  KIDNEYS/URETERS:  No hydronephrosis  Although images are limited due to motion and phase of contrast enhancement, there are multiple bilateral renal hypodense lesions, several of which appear wedge-shaped, suspicious for renal infarcts  Given the left lung findings, these may be infarcts from septic emboli and/or coexistent pyelonephritis  Neoplasm such as lymphoma is felt to be less likely, though not entirely excluded  STOMACH AND BOWEL:  Unremarkable  APPENDIX:  There are expected postoperative changes of appendectomy  ABDOMINOPELVIC CAVITY:  No ascites  No pneumoperitoneum  No lymphadenopathy  VESSELS:  Unremarkable for patient's age  PELVIS REPRODUCTIVE ORGANS:  Prostate calcifications  URINARY BLADDER:  Unremarkable  ABDOMINAL WALL/INGUINAL REGIONS:  Unremarkable  OSSEOUS STRUCTURES:  No acute fracture or destructive osseous lesion  L4 spondylolysis with mild grade 1 anterolisthesis on S1  Impression: 1  Very limited examination due to phase of contrast enhancement and motion   As seen on recent thoracic MRI, there is a cavitary nodule with small amount of fluid in the posterior left lower lobe measuring 1 7 x 1 8 cm  There is also a small left upper anterior paramediastinal infiltrate  There are also multiple splenic and bilateral renal hypodense lesions as described above  Spectrum of findings may be sequela of septic emboli  Continued follow-up recommended to exclude neoplasm  2  Cholelithiasis  Workstation performed: [id number]     CT stroke alert brain    Result Date: 1/14/2023  Narrative: CT BRAIN - STROKE ALERT PROTOCOL INDICATION:   Neuro deficit, acute, stroke suspected acute right facial droop and right arm weakness  COMPARISON:  None  TECHNIQUE:  CT examination of the brain was performed  In addition to axial images, coronal reformatted images were created and submitted for interpretation  Radiation dose length product (DLP) for this visit:   This examination, like all CT scans performed in the [facility], was performed utilizing techniques to minimize radiation dose exposure, including the use of iterative reconstruction  and automated exposure control  IMAGE QUALITY:  Diagnostic  FINDINGS:  PARENCHYMA:  No intracranial mass, mass effect or midline shift  No CT signs of acute infarction  No acute parenchymal hemorrhage  Minimal chronic microangiopathy  VENTRICLES AND EXTRA-AXIAL SPACES:  Normal for the patient's age  VISUALIZED ORBITS AND PARANASAL SINUSES:  Orbits are unremarkable  Paranasal sinus mucosal thickening  CALVARIUM AND EXTRACRANIAL SOFT TISSUES:   Normal      Impression: No acute intracranial abnormality  Findings were directly discussed with Rachel Nix at 1:38 PM  Workstation performed: [id number]     CTA stroke alert (head/neck)    Result Date: 1/14/2023  Narrative: CTA NECK AND BRAIN WITH CONTRAST INDICATION: Neuro deficit, acute, stroke suspected stroke acute right facial droop and right arm weakness Bacteremia with suspected endocarditis   COMPARISON:   Immediately preceding head CT  TECHNIQUE:   Post contrast imaging was performed after administration of iodinated contrast through the neck and brain  Post contrast axial 0 625 mm images timed to opacify the arterial system  3D rendering was performed on an independent workstation  MIP reconstructions performed  Coronal reconstructions were performed of the noncontrast portion of the brain  Radiation dose length product (DLP) for this visit:  927 mGy-cm   This examination, like all CT scans performed in the [facility], was performed utilizing techniques to minimize radiation dose exposure, including the use of iterative reconstruction and automated exposure control  IV Contrast:  100 mL of iohexol (OMNIPAQUE)  IMAGE QUALITY:   Diagnostic FINDINGS: CERVICAL VASCULATURE AORTIC ARCH AND GREAT VESSELS:  Mild atherosclerotic disease of the arch, proximal great vessels and visualized subclavian vessels  No significant stenosis  RIGHT VERTEBRAL ARTERY CERVICAL SEGMENT:  Normal origin  The vessel is normal in caliber throughout the neck  LEFT VERTEBRAL ARTERY CERVICAL SEGMENT:  Normal origin  The vessel is normal in caliber throughout the neck  RIGHT EXTRACRANIAL CAROTID SEGMENT:  Mild atherosclerotic disease of the distal common carotid artery and proximal cervical internal carotid artery without significant stenosis compared to the more distal ICA  LEFT EXTRACRANIAL CAROTID SEGMENT:  Mild atherosclerotic disease of the distal common carotid artery and proximal cervical internal carotid artery without significant stenosis compared to the more distal ICA  NASCET criteria was used to determine the degree of internal carotid artery diameter stenosis  There is focal nonocclusive thrombus within the high cervical right internal jugular vein without intracranial extension  Remainder of the right IJ is patent  INTRACRANIAL VASCULATURE INTERNAL CAROTID ARTERIES:  Mild atherosclerotic disease without significant stenosis  Marleen Malcolm Normal ophthalmic artery origins  Normal ICA terminus  ANTERIOR CIRCULATION:  Symmetric A1 segments and anterior cerebral arteries with normal enhancement  Normal anterior communicating artery  MIDDLE CEREBRAL ARTERY CIRCULATION:  There is thrombus in the left M1 segment with reconstitution distally that may be incompletely occlusive  There is focal occlusion of branch arising from the proximal left M1 segment that may represent prominent anterior temporal branch or early origin of the inferior division  Right MCA is unremarkable  DISTAL VERTEBRAL ARTERIES:  Normal distal vertebral arteries  Posterior inferior cerebellar artery origins are normal  Normal vertebral basilar junction  BASILAR ARTERY:  Basilar artery is normal in caliber  Normal superior cerebellar arteries  POSTERIOR CEREBRAL ARTERIES: Both posterior cerebral arteries arises from the basilar tip  Both arteries demonstrate normal enhancement  VENOUS STRUCTURES:  Normal  NON VASCULAR ANATOMY BONY STRUCTURES:  Stable erosive changes at the right C4-5 facet complex  See recent cervical MRI  SOFT TISSUES OF THE NECK:  Normal  Ossification of the stylohyoid ligaments is noted  THORACIC INLET:  Cavitary lesion in the medial left upper lobe measuring 3 9 x 2 5 cm is stable compared with recent CT the cervical spine  There is a 1 9 cm cavitary lesion with fluid level in the superior segment of the left lower lobe not imaged on recent studies    Findings are suspicious for septic emboli  Impression: Left M1 thromboembolism with reconstitution just distally  Focal occlusion of left MCA branch arising from the proximal segment No other intracranial stenosis or large vessel occlusion  No significant stenosis of the cervical carotid or vertebral arteries  Incidental nonocclusive DVT in the high cervical right internal jugular vein  Remainder of the right IJ is patent  No intracranial venous thrombosis   Cavitary lesions in the left lung suspicious for septic emboli  Findings were directly discussed with Leslie Joshi at 1:38 PM  Workstation performed: [id number]     IR stroke alert    Result Date: 1/16/2023  Narrative: IR STROKE ALERT Indication:I63 9: Cerebral infarction, unspecified  Endocarditis  Acute neurologic changes this afternoon found to have left M1 occlusion  Acute anemia with hypotension  Procedure: Both groins were prepped and draped in sterile fashion  Patient arrived [place] with labored breathing, tachypnea, unable to follow commands and the procedure was therefore performed under general anesthesia  Ultrasound guidance was used to maximize opportunity for closure device  The right groin was surveyed with ultrasound to assess for vessel patency  The right common femoral artery is patent  The right common femoral artery was punctured using a 21 gauge needle and direct sonographic  guidance  A static sonographic image was saved demonstrating needle entry  An advantage Glidewire was advanced into the thoracic aorta over which a 6 Western Renay Neuron Max sheath was placed  A 5 Western Renay Berenstein catheter was advanced through the sheath  The left internal carotid artery selected was advanced into the proximal internal carotid artery  AP and lateral arteriography of the head was performed  A coaxial system containing a 71 Zoom aspiration thrombectomy catheter and marksman Manuel catheter were advanced into the left middle cerebral artery and the microcatheter was advanced into M2 branch  A 6 mm Solitaire stentriever was deployed across the occlusion and aspiration thrombectomy was performed with stentriever  2 additional passes were made using similar technique with interval arteriography in AP and lateral projections  Transorbital oblique arteriography was performed at completion   Fluoroscopy time (min) : 19 2minutes Images:319 Contrast (ml) : 65 mL Visipaque 320  Sedation (min) : N/A Findings: Initial arteriogram shows M1 occlusion of the left side   Subsequent imaging after thrombectomy shows an early bifurcation of the M1  Flow was most restored  There may be a small amount of residual thrombus within a M2 branch difficult to delineate versus focal spasm  TIMES: In dept: 1512 In room: 1512 Access: 1531 1st pass: 1543 Recan: 1547 2a 2nd pass: 1552 3rd pass: 1610 Recan: 1615 2b  Pre-TICI : 0 Post-TICI: 2b     Impression: Impression: Successful recanalization left M1 occlusion with 2B flow post intervention Workstation performed: [id number]     US bedside procedure    Result Date: 1/16/2023  Narrative: 1 2 840 [id number]  2 446 [id number] 11 1    Echo complete w/ contrast if indicated    Result Date: 1/9/2023  Narrative: •  Left Ventricle: Left ventricular cavity size is normal  by visual estimation  Systolic function is normal  Wall motion is normal  Diastolic function is mildly abnormal, consistent with grade I (abnormal) relaxation  •  Right Ventricle: Right ventricular cavity size is mildly dilated  Systolic function is normal  •  Left Atrium: The atrium is mildly dilated  •  Aortic Valve: There is at least mild regurgitation  There is aortic sclerosis  •  Mitral Valve: There is mild annular calcification  •  Tricuspid Valve: Pulmonary artery systolic pressures cannot be estimated due to lack of tricuspid regurgitation jet  •  Aorta: The aortic root is ectatic at 3 6 cm  The ascending aorta is ectatic at 3 9 cm  •  Compared to report from March 31, 2022, aortic valve velocities inconsistent with stenosis  There are no obvious echocardiographic indications of endocarditis  Echo follow up/limited w/ contrast if indicated    Result Date: 1/14/2023  Narrative: Technically adequate Limited transthoracic echocardiogram study  Patient had complete echocardiogram on 1/9/2022  Mild concentric left ventricle hypertrophy, normal left ventricular systolic function, left ventricle ejection fraction is greater than 65%   Normal right ventricle size and systolic function  Normal left and right atrial size by visual assessment  Tricuspid aortic valve with thickening and densities noted on the tips of the cusps highly suspicious for vegetations  There is associated moderate aortic valve regurgitation  There  are no mobile lesions and there is no evidence of abscess involving the aortic valve  Borderline mitral annular calcification  No vegetation identified on mitral valve  Trace mitral valve regurgitation  No vegetations identified on the tricuspid or pulmonic valve regurgitation  Trace tricuspid valve regurgitation noted  No obvious pulmonary hypertension  Trace, hemodynamically insignificant circumferential pericardial effusion  Compared to previous echocardiogram from 1/9/2022 the abnormalities on the aortic valve are new  There is some progression of aortic valve regurgitation  EKG, Pathology, and Other Studies: I have personally reviewed pertinent reports  VTE Pharmacologic Prophylaxis: Heparin - on hold for OR   Received dose at 6am     VTE Mechanical Prophylaxis: sequential compression device

## 2023-01-16 NOTE — OCCUPATIONAL THERAPY NOTE
Occupational Therapy Cancellation Note       01/16/23 0801   OT Last Visit   OT Visit Date 01/16/23   Note Type   Note type Cancelled Session   Cancel Reasons Intubated/sedated   Additional Comments Orders received, chart reviewed  Pt intubated/sedated and pending Left hemicraniectomy  OT to continue to follow and attempt evaluation as medically appropriate         [facility]

## 2023-01-16 NOTE — PLAN OF CARE
Problem: MOBILITY - ADULT  Goal: Maintain or return to baseline ADL function  Description: INTERVENTIONS:  -  Assess patient's ability to carry out ADLs; assess patient's baseline for ADL function and identify physical deficits which impact ability to perform ADLs (bathing, care of mouth/teeth, toileting, grooming, dressing, etc )  - Assess/evaluate cause of self-care deficits   - Assess range of motion  - Assess patient's mobility; develop plan if impaired  - Assess patient's need for assistive devices and provide as appropriate  - Encourage maximum independence but intervene and supervise when necessary  - Involve family in performance of ADLs  - Assess for home care needs following discharge   - Consider OT consult to assist with ADL evaluation and planning for discharge  - Provide patient education as appropriate  Outcome: Not Progressing     Problem: PAIN - ADULT  Goal: Verbalizes/displays adequate comfort level or baseline comfort level  Description: Interventions:  - Encourage patient to monitor pain and request assistance  - Assess pain using appropriate pain scale  - Administer analgesics based on type and severity of pain and evaluate response  - Implement non-pharmacological measures as appropriate and evaluate response  - Consider cultural and social influences on pain and pain management  - Notify physician/advanced practitioner if interventions unsuccessful or patient reports new pain  Outcome: Progressing     Problem: INFECTION - ADULT  Goal: Absence or prevention of progression during hospitalization  Description: INTERVENTIONS:  - Assess and monitor for signs and symptoms of infection  - Monitor lab/diagnostic results  - Monitor all insertion sites, i e  indwelling lines, tubes, and drains  - Monitor endotracheal if appropriate and nasal secretions for changes in amount and color  - Hiller appropriate cooling/warming therapies per order  - Administer medications as ordered  - Instruct and encourage patient and family to use good hand hygiene technique  - Identify and instruct in appropriate isolation precautions for identified infection/condition  Outcome: Not Progressing     Problem: SAFETY ADULT  Goal: Maintain or return to baseline ADL function  Description: INTERVENTIONS:  -  Assess patient's ability to carry out ADLs; assess patient's baseline for ADL function and identify physical deficits which impact ability to perform ADLs (bathing, care of mouth/teeth, toileting, grooming, dressing, etc )  - Assess/evaluate cause of self-care deficits   - Assess range of motion  - Assess patient's mobility; develop plan if impaired  - Assess patient's need for assistive devices and provide as appropriate  - Encourage maximum independence but intervene and supervise when necessary  - Involve family in performance of ADLs  - Assess for home care needs following discharge   - Consider OT consult to assist with ADL evaluation and planning for discharge  - Provide patient education as appropriate  Outcome: Not Progressing

## 2023-01-16 NOTE — PLAN OF CARE
Problem: Prexisting or High Potential for Compromised Skin Integrity  Goal: Skin integrity is maintained or improved  Description: INTERVENTIONS:  - Identify patients at risk for skin breakdown  - Assess and monitor skin integrity  - Assess and monitor nutrition and hydration status  - Monitor labs   - Assess for incontinence   - Turn and reposition patient  - Assist with mobility/ambulation  - Relieve pressure over bony prominences  - Avoid friction and shearing  - Provide appropriate hygiene as needed including keeping skin clean and dry  - Evaluate need for skin moisturizer/barrier cream  - Collaborate with interdisciplinary team   - Patient/family teaching  - Consider wound care consult   Outcome: Progressing     Problem: Potential for Falls  Goal: Patient will remain free of falls  Description: INTERVENTIONS:  - Educate patient/family on patient safety including physical limitations  - Instruct patient to call for assistance with activity   - Consult OT/PT to assist with strengthening/mobility   - Keep Call bell within reach  - Keep bed low and locked with side rails adjusted as appropriate  - Keep care items and personal belongings within reach  - Initiate and maintain comfort rounds  - Make Fall Risk Sign visible to staff  - Offer Toileting every 2 Hours, in advance of need  - Initiate/Maintain bed alarm  - Obtain necessary fall risk management equipment: n/a  - Apply yellow socks and bracelet for high fall risk patients  - Consider moving patient to room near nurses station  Outcome: Progressing     Problem: SAFETY ADULT  Goal: Patient will remain free of falls  Description: INTERVENTIONS:  - Educate patient/family on patient safety including physical limitations  - Instruct patient to call for assistance with activity   - Consult OT/PT to assist with strengthening/mobility   - Keep Call bell within reach  - Keep bed low and locked with side rails adjusted as appropriate  - Keep care items and personal belongings within reach  - Initiate and maintain comfort rounds  - Make Fall Risk Sign visible to staff  - Offer Toileting every 2 Hours, in advance of need  - Initiate/Maintain bed alarm  - Obtain necessary fall risk management equipment: n/a  - Apply yellow socks and bracelet for high fall risk patients  - Consider moving patient to room near nurses station  Outcome: Progressing     Problem: NEUROSENSORY - ADULT  Goal: Achieves stable or improved neurological status  Description: INTERVENTIONS  - Monitor and report changes in neurological status  - Monitor vital signs such as temperature, blood pressure, glucose, and any other labs ordered   - Initiate measures to prevent increased intracranial pressure  - Monitor for seizure activity and implement precautions if appropriate      Outcome: Progressing  Goal: Remains free of injury related to seizures activity  Description: INTERVENTIONS  - Maintain airway, patient safety  and administer oxygen as ordered  - Monitor patient for seizure activity, document and report duration and description of seizure to physician/advanced practitioner  - If seizure occurs,  ensure patient safety during seizure  - Reorient patient post seizure  - Seizure pads on all 4 side rails  - Instruct patient/family to notify RN of any seizure activity including if an aura is experienced  - Instruct patient/family to call for assistance with activity based on nursing assessment  - Administer anti-seizure medications if ordered    Outcome: Progressing     Problem: MOBILITY - ADULT  Goal: Maintain or return to baseline ADL function  Description: INTERVENTIONS:  -  Assess patient's ability to carry out ADLs; assess patient's baseline for ADL function and identify physical deficits which impact ability to perform ADLs (bathing, care of mouth/teeth, toileting, grooming, dressing, etc )  - Assess/evaluate cause of self-care deficits   - Assess range of motion  - Assess patient's mobility; develop plan if impaired  - Assess patient's need for assistive devices and provide as appropriate  - Encourage maximum independence but intervene and supervise when necessary  - Involve family in performance of ADLs  - Assess for home care needs following discharge   - Consider OT consult to assist with ADL evaluation and planning for discharge  - Provide patient education as appropriate  Outcome: Not Progressing  Goal: Maintains/Returns to pre admission functional level  Description: INTERVENTIONS:  - Perform BMAT or MOVE assessment daily    - Set and communicate daily mobility goal to care team and patient/family/caregiver  - Collaborate with rehabilitation services on mobility goals if consulted  - Perform Range of Motion 3 times a day  - Reposition patient every 2 hours    - Dangle patient 3 times a day  - Stand patient 3 times a day  - Ambulate patient 3 times a day  - Out of bed to chair 3 times a day   - Out of bed for meals 3 times a day  - Out of bed for toileting  - Record patient progress and toleration of activity level   Outcome: Not Progressing     Problem: PAIN - ADULT  Goal: Verbalizes/displays adequate comfort level or baseline comfort level  Description: Interventions:  - Encourage patient to monitor pain and request assistance  - Assess pain using appropriate pain scale  - Administer analgesics based on type and severity of pain and evaluate response  - Implement non-pharmacological measures as appropriate and evaluate response  - Consider cultural and social influences on pain and pain management  - Notify physician/advanced practitioner if interventions unsuccessful or patient reports new pain  Outcome: Not Progressing     Problem: INFECTION - ADULT  Goal: Absence or prevention of progression during hospitalization  Description: INTERVENTIONS:  - Assess and monitor for signs and symptoms of infection  - Monitor lab/diagnostic results  - Monitor all insertion sites, i e  indwelling lines, tubes, and drains  - Monitor endotracheal if appropriate and nasal secretions for changes in amount and color  - [place] appropriate cooling/warming therapies per order  - Administer medications as ordered  - Instruct and encourage patient and family to use good hand hygiene technique  - Identify and instruct in appropriate isolation precautions for identified infection/condition  Outcome: Not Progressing  Goal: Absence of fever/infection during neutropenic period  Description: INTERVENTIONS:  - Monitor WBC    Outcome: Not Progressing     Problem: SAFETY ADULT  Goal: Maintain or return to baseline ADL function  Description: INTERVENTIONS:  -  Assess patient's ability to carry out ADLs; assess patient's baseline for ADL function and identify physical deficits which impact ability to perform ADLs (bathing, care of mouth/teeth, toileting, grooming, dressing, etc )  - Assess/evaluate cause of self-care deficits   - Assess range of motion  - Assess patient's mobility; develop plan if impaired  - Assess patient's need for assistive devices and provide as appropriate  - Encourage maximum independence but intervene and supervise when necessary  - Involve family in performance of ADLs  - Assess for home care needs following discharge   - Consider OT consult to assist with ADL evaluation and planning for discharge  - Provide patient education as appropriate  Outcome: Not Progressing  Goal: Maintains/Returns to pre admission functional level  Description: INTERVENTIONS:  - Perform BMAT or MOVE assessment daily    - Set and communicate daily mobility goal to care team and patient/family/caregiver  - Collaborate with rehabilitation services on mobility goals if consulted  - Perform Range of Motion 3 times a day  - Reposition patient every 2 hours    - Dangle patient 3 times a day  - Stand patient 3 times a day  - Ambulate patient 3 times a day  - Out of bed to chair 3 times a day   - Out of bed for meals 3 times a day  - Out of bed for toileting  - Record patient progress and toleration of activity level   Outcome: Not Progressing     Problem: DISCHARGE PLANNING  Goal: Discharge to home or other facility with appropriate resources  Description: INTERVENTIONS:  - Identify barriers to discharge w/patient and caregiver  - Arrange for needed discharge resources and transportation as appropriate  - Identify discharge learning needs (meds, wound care, etc )  - Arrange for interpretive services to assist at discharge as needed  - Refer to Case Management Department for coordinating discharge planning if the patient needs post-hospital services based on physician/advanced practitioner order or complex needs related to functional status, cognitive ability, or social support system  Outcome: Not Progressing     Problem: Knowledge Deficit  Goal: Patient/family/caregiver demonstrates understanding of disease process, treatment plan, medications, and discharge instructions  Description: Complete learning assessment and assess knowledge base  Interventions:  - Provide teaching at level of understanding  - Provide teaching via preferred learning methods  Outcome: Not Progressing     Problem: NEUROSENSORY - ADULT  Goal: Achieves maximal functionality and self care  Description: INTERVENTIONS  - Monitor swallowing and airway patency with patient fatigue and changes in neurological status  - Encourage and assist patient to increase activity and self care     - Encourage visually impaired, hearing impaired and aphasic patients to use assistive/communication devices  Outcome: Not Progressing

## 2023-01-16 NOTE — PROGRESS NOTES
Progress Note - Neurology   Jazmine Davis 58 y o  male MRN: [id number]  Unit/Bed#: ICU 12 Encounter: [id number]      Assessment/Plan   * Acute stroke due to embolism of left middle cerebral artery [facility])  Assessment & Plan  Jazmine Davis is a 58 y o  male with HTN, HLD, DM type II, prior tobacco use, left TKA who presented to [facility] on 1/7/2023 for sepsis work-up with suspicion for endocarditis  Patient found to have MSSA bacteremia and AV endocarditis with septic embolism (possible splenic infarcts, necrotic toe lesions/Janeway lesions, cervical osteomyelitis, septic arthritis, cavitary lung lesions)  Patient had been treated as outpatient for left elbow cellulitis prior to admission  Hospital course has also been complicated by significant anemia, requiring multiple blood transfusions  Patient was a stroke alert on 1/14/2023 for acute left MCA syndrome  CT head unremarkable  CTA head and neck demonstrated left M1 occlusion and incidental finding of right IJ DVT  and was transferred to [facility] for mechanical thrombectomy, resulted in TICI2B recanalization     - NIHSS on arrival to [facility]  - MRI brain with/without contrast:  · Demonstrated essentially complete left MCA territory infarction with petechial hemorrhage and minimal left to right shift, as well as bihemispheric anterior and posterior circulation punctate infarcts, likely all as result of septic emboli  - Repeat CT Head 1/15:  · Re-demonstrating large left MCA infarct with increased mass effect and 7 mm left to right shift; additional small infarcts in right cerebral and cerebellum   - Repeat CT head 1/16 :  · Evolving large left MCA and PCA infarct with petechial cortical hemorrhage with worsened cytotoxic edema, worsened left to right midline shift measuring 1 6 cm, worsening compression of left lateral ventricle, and new left uncal herniation   - 2D echo with EF 65%, normal-sized atria, with aortic valve densities highly suspicious for vegetations  Early morning hours of 1/15/2023, patient noted to have right hemiplegia, some difficulty with comprehension but able to follow most commands on left  Later in the morning (1/15), he was following commands less consistently on the left  Morning of 1/16 found to have a new blown left pupil with a GCS of 3  Repeat CT head with with worsening cytotoxic edema, left to right midline shift, compression of left lateral ventricle, and new left uncal herniation  Patient went for emergent craniectomy on 1/16  Repeat CT head demonstrated worsening hemorrhagic conversion in left cerebral cortex with cortical parenchymal hemorrhage and left basal ganglia hemorrhage  Midline shift improved, now 1 cm with minimal improvement in left uncal herniation, and slightly improved compression of left lateral ventricle with mild dilation of right lateral ventricle  Malignant left MCA infarct secondary to septic embolism  Evolving stroke with worsening CT findings and poor neurologic exam resulting in emergent hemicraniectomy, s/p left decompressive hemicraniectomy (1/16/2023)    Plan:  - Sodium goal > 145 per neurosurgery  - Continue to hold AP/AC given potential for high risk of hemorrhagic conversion of strokes related to septic emboli  - MAP>65 and SBP<140 as per Critical Care and Neurosurgery  - Frequent neurochecks  - CT surgery, Gastroenterology on board  - Treatment of infectious/metabolic derangements as per primary service  - PT/OT/ST when able  - Medical management and supportive care per ICU team, notify with changes    Anemia  Assessment & Plan  - Hemoglobin dropped from 12 1 to 7 9 on 1/14/2023, necessitating blood transfusion  - Hemoglobin improving, 8 1 on 1/16  - GI on board  Recommendations for outpatient neurological follow up have yet to be determined  Subjective:   Neuro exam changed, neurosurgery reporting blown left pupil and remained GCS of 3  Patient underwent emergent hemicraniectomy      ROS:  12 point ROS limited to intubation status    Vitals: Blood pressure 130/53, pulse 94, temperature 98 6 °F (37 °C), temperature source Bladder, resp  rate 21, height 5' 10" (1 778 m), weight 102 kg (224 lb 13 9 oz), SpO2 99 %  ,Body mass index is 32 27 kg/m²  Physical Exam  Vitals and nursing note reviewed  Constitutional:       General: He is not in acute distress  Appearance: He is obese  He is ill-appearing  He is not diaphoretic  HENT:      Head:      Comments: S/p left hemicraniectomy  Eyes:      General: No scleral icterus  Right eye: No discharge  Left eye: No discharge  Conjunctiva/sclera: Conjunctivae normal    Skin:     General: Skin is warm and dry  Coloration: Skin is not jaundiced  Comments: Janeway lesions noted in bilateral feet       Neurologic Exam     Mental Status   Patient is intubated, sedated  Does not open eyes to verbal, tactile, or noxious stimuli  Slightly turns head to the left with sternal rub  Does not follow any commands  Cranial Nerves   Primary gaze midline, no gaze preference or forced gaze deviation  Conjugate gaze noted  Right pupil 2 mm, left pupil nonreactive 4 5 mm  Right corneal minimal response, left corneal brisk  Blink to threat absent bilaterally  Trace oculocephalic  Unable to assess facial symmetry due to intubation status     Motor Exam   Muscle bulk: normal  Withdraws to noxious stimuli in LUE  No withdrawal to noxious stimuli in RUE or BLE     Sensory Exam   Grimaces with noxious stim in bilateral upper and lower extremities     Gait, Coordination, and Reflexes     Tremor   Resting tremor: absent  Bilateral toes upgoing    No involuntary movements or rhythmic seizure-like activity noted throughout exam     Lab Results: I have personally reviewed pertinent reports    Recent Results (from the past 24 hour(s))   Fingerstick Glucose (POCT)    Collection Time: 01/15/23  5:48 PM   Result Value Ref Range    POC Glucose 138 65 - 140 mg/dl   Basic metabolic panel    Collection Time: 01/15/23  5:52 PM   Result Value Ref Range    Sodium 150 (H) 135 - 147 mmol/L    Potassium 3 9 3 5 - 5 3 mmol/L    Chloride 126 (H) 96 - 108 mmol/L    CO2 17 (L) 21 - 32 mmol/L    ANION GAP 7 4 - 13 mmol/L    BUN 45 (H) 5 - 25 mg/dL    Creatinine 1 07 0 60 - 1 30 mg/dL    Glucose 138 65 - 140 mg/dL    Calcium 7 7 (L) 8 3 - 10 1 mg/dL    eGFR 73 ml/min/1 73sq m   Osmolality-"If this is regarding a toxic alcohol, STOP  Test is not routinely indicated   Please consult medical  on call for further guidance "    Collection Time: 01/15/23  5:52 PM   Result Value Ref Range    Osmolality Serum 315 (H) 282 - 298 mmol/KG   Blood gas, arterial    Collection Time: 01/15/23  6:39 PM   Result Value Ref Range    pH, Arterial 7 437 7 350 - 7 450    PH ART TC 7 431 7 350 - 7 450    pCO2, Arterial 24 5 (LL) 36 0 - 44 0 mm Hg    PCO2 (TC) Arterial 24 9 (LL) 36 0 - 44 0 mm Hg    pO2, Arterial 136 8 (H) 75 0 - 129 0 mm Hg    PO2 (TC) Arterial 139 3 (H) 75 0 - 129 0 mm Hg    HCO3, Arterial 16 2 (L) 22 0 - 28 0 mmol/L    Base Excess, Arterial -7 0 mmol/L    O2 Content, Arterial 11 5 (L) 16 0 - 23 0 mL/dL    O2 HGB,Arterial  97 3 (H) 94 0 - 97 0 %    SOURCE Line, Arterial     Temperature 99 3 Degrees Fehrenheit    Vent Type- AC AC     AC Rate 16     Tidal Volume 480 ml    Inspired Air (FIO2) 40     PEEP 6    Fingerstick Glucose (POCT)    Collection Time: 01/15/23  8:02 PM   Result Value Ref Range    POC Glucose 143 (H) 65 - 140 mg/dl   Hemoglobin    Collection Time: 01/15/23  8:03 PM   Result Value Ref Range    Hemoglobin 7 5 (L) 12 0 - 17 0 g/dL   Fingerstick Glucose (POCT)    Collection Time: 01/15/23 10:39 PM   Result Value Ref Range    POC Glucose 118 65 - 140 mg/dl   Fingerstick Glucose (POCT)    Collection Time: 01/16/23 12:11 AM   Result Value Ref Range    POC Glucose 121 65 - 140 mg/dl   Blood gas, arterial    Collection Time: 01/16/23 12:12 AM   Result Value Ref Range pH, Arterial 7 451 (H) 7 350 - 7 450    PH ART TC 7 428 7 350 - 7 450    pCO2, Arterial 24 4 (LL) 36 0 - 44 0 mm Hg    PCO2 (TC) Arterial 26 1 (LL) 36 0 - 44 0 mm Hg    pO2, Arterial 117 3 75 0 - 129 0 mm Hg    PO2 (TC) Arterial 127 0 75 0 - 129 0 mm Hg    HCO3, Arterial 16 6 (L) 22 0 - 28 0 mmol/L    Base Excess, Arterial -6 1 mmol/L    O2 Content, Arterial 13 4 (L) 16 0 - 23 0 mL/dL    O2 HGB,Arterial  97 3 (H) 94 0 - 97 0 %    SOURCE Line, Arterial     Temperature 101 3 Degrees Fehrenheit    Vent Type- AC AC     AC Rate 16     Tidal Volume 480 ml    Inspired Air (FIO2) 40     PEEP 6     VENT- PS PS    Basic metabolic panel    Collection Time: 01/16/23 12:13 AM   Result Value Ref Range    Sodium 154 (H) 135 - 147 mmol/L    Potassium 3 7 3 5 - 5 3 mmol/L    Chloride 130 (H) 96 - 108 mmol/L    CO2 18 (L) 21 - 32 mmol/L    ANION GAP 6 4 - 13 mmol/L    BUN 38 (H) 5 - 25 mg/dL    Creatinine 1 02 0 60 - 1 30 mg/dL    Glucose 128 65 - 140 mg/dL    Calcium 8 0 (L) 8 3 - 10 1 mg/dL    eGFR 78 ml/min/1 73sq m   Osmolality-"If this is regarding a toxic alcohol, STOP  Test is not routinely indicated   Please consult medical  on call for further guidance "    Collection Time: 01/16/23 12:13 AM   Result Value Ref Range    Osmolality Serum 318 (H) 282 - 298 mmol/KG   Hemoglobin and hematocrit, blood    Collection Time: 01/16/23 12:13 AM   Result Value Ref Range    Hemoglobin 7 2 (L) 12 0 - 17 0 g/dL    Hematocrit 21 9 (L) 36 5 - 49 3 %   Fingerstick Glucose (POCT)    Collection Time: 01/16/23  2:07 AM   Result Value Ref Range    POC Glucose 122 65 - 140 mg/dl   Fingerstick Glucose (POCT)    Collection Time: 01/16/23  4:40 AM   Result Value Ref Range    POC Glucose 104 65 - 140 mg/dl   Prepare Leukoreduced RBC: 1 Units    Collection Time: 01/16/23  5:55 AM   Result Value Ref Range    Unit Product Code P5742H26     Unit Number [id number]     Unit ABO O     Unit RH NEG     Crossmatch Compatible     Unit Dispense Status Presumed Trans     Unit Product Volume 350 mL   Prepare Leukoreduced RBC: 2 Units, Leukoreduced    Collection Time: 01/16/23  5:55 AM   Result Value Ref Range    Unit Product Code D6251B08     Unit Number [id number]     Unit ABO O     Unit RH NEG     Crossmatch Compatible     Unit Dispense Status Presumed Trans     Unit Product Volume 350 mL   Fingerstick Glucose (POCT)    Collection Time: 01/16/23  5:59 AM   Result Value Ref Range    POC Glucose 115 65 - 140 mg/dl   Basic metabolic panel    Collection Time: 01/16/23  6:01 AM   Result Value Ref Range    Sodium 158 (H) 135 - 147 mmol/L    Potassium 3 6 3 5 - 5 3 mmol/L    Chloride 132 (H) 96 - 108 mmol/L    CO2 18 (L) 21 - 32 mmol/L    ANION GAP 8 4 - 13 mmol/L    BUN 34 (H) 5 - 25 mg/dL    Creatinine 1 08 0 60 - 1 30 mg/dL    Glucose 134 65 - 140 mg/dL    Calcium 8 1 (L) 8 3 - 10 1 mg/dL    eGFR 73 ml/min/1 73sq m   Osmolality-"If this is regarding a toxic alcohol, STOP  Test is not routinely indicated   Please consult medical  on call for further guidance "    Collection Time: 01/16/23  6:01 AM   Result Value Ref Range    Osmolality Serum 324 (H) 282 - 298 mmol/KG   CBC and differential    Collection Time: 01/16/23  6:01 AM   Result Value Ref Range    WBC 9 71 4 31 - 10 16 Thousand/uL    RBC 2 40 (L) 3 88 - 5 62 Million/uL    Hemoglobin 7 2 (L) 12 0 - 17 0 g/dL    Hematocrit 21 1 (L) 36 5 - 49 3 %    MCV 88 82 - 98 fL    MCH 30 0 26 8 - 34 3 pg    MCHC 34 1 31 4 - 37 4 g/dL    RDW 16 7 (H) 11 6 - 15 1 %    MPV 9 8 8 9 - 12 7 fL    Platelets 057 635 - 683 Thousands/uL    nRBC 0 /100 WBCs    Neutrophils Relative 80 (H) 43 - 75 %    Immat GRANS % 1 0 - 2 %    Lymphocytes Relative 10 (L) 14 - 44 %    Monocytes Relative 9 4 - 12 %    Eosinophils Relative 0 0 - 6 %    Basophils Relative 0 0 - 1 %    Neutrophils Absolute 7 77 (H) 1 85 - 7 62 Thousands/µL    Immature Grans Absolute 0 07 0 00 - 0 20 Thousand/uL    Lymphocytes Absolute 0 98 0 60 - 4 47 Thousands/µL    Monocytes Absolute 0 88 0 17 - 1 22 Thousand/µL    Eosinophils Absolute 0 00 0 00 - 0 61 Thousand/µL    Basophils Absolute 0 01 0 00 - 0 10 Thousands/µL   Magnesium    Collection Time: 01/16/23  6:01 AM   Result Value Ref Range    Magnesium 2 3 1 6 - 2 6 mg/dL   Phosphorus    Collection Time: 01/16/23  6:01 AM   Result Value Ref Range    Phosphorus 2 6 2 3 - 4 1 mg/dL   Lipid panel    Collection Time: 01/16/23  6:01 AM   Result Value Ref Range    Cholesterol 51 See Comment mg/dL    Triglycerides 201 (H) See Comment mg/dL    HDL, Direct 8 (L) >=40 mg/dL    LDL Calculated 3 0 - 100 mg/dL    Non-HDL-Chol (CHOL-HDL) 43 mg/dl   Hepatic function panel    Collection Time: 01/16/23  6:01 AM   Result Value Ref Range    Total Bilirubin 0 75 0 20 - 1 00 mg/dL    Bilirubin, Direct 0 31 (H) 0 00 - 0 20 mg/dL    Alkaline Phosphatase 73 46 - 116 U/L     (H) 5 - 45 U/L     (H) 12 - 78 U/L    Total Protein 5 0 (L) 6 4 - 8 4 g/dL    Albumin 1 3 (L) 3 5 - 5 0 g/dL   Blood gas, arterial    Collection Time: 01/16/23  6:02 AM   Result Value Ref Range    pH, Arterial 7 461 (H) 7 350 - 7 450    pCO2, Arterial 25 9 (LL) 36 0 - 44 0 mm Hg    pO2, Arterial 132 4 (H) 75 0 - 129 0 mm Hg    HCO3, Arterial 18 0 (L) 22 0 - 28 0 mmol/L    Base Excess, Arterial -5 0 mmol/L    O2 Content, Arterial 11 1 (L) 16 0 - 23 0 mL/dL    O2 HGB,Arterial  97 6 (H) 94 0 - 97 0 %    SOURCE Line, Arterial    Fingerstick Glucose (POCT)    Collection Time: 01/16/23  7:52 AM   Result Value Ref Range    POC Glucose 131 65 - 140 mg/dl   Protime-INR    Collection Time: 01/16/23 10:17 AM   Result Value Ref Range    Protime 17 9 (H) 11 6 - 14 5 seconds    INR 1 45 (H) 0 84 - 1 19   Prepare Leukoreduced RBC: 2 Units    Collection Time: 01/16/23 10:18 AM   Result Value Ref Range    Unit Product Code X7808V52     Unit Number [id number]     Unit ABO O     Unit RH NEG     Crossmatch Compatible     Unit Dispense Status Return to Inv     Unit Product Volume 350 mL    Unit Product Code C3794S44     Unit Number [id number]     Unit ABO O     Unit RH NEG     Crossmatch Compatible     Unit Dispense Status Issued     Unit Product Volume 350 mL   Prepare Leukoreduced RBC: 1 Units    Collection Time: 01/16/23 10:49 AM   Result Value Ref Range    Unit Product Code L0824M77     Unit Number [id number]     Unit ABO O     Unit RH NEG     Crossmatch Compatible     Unit Dispense Status Return to Inv     Unit Product Volume 350 mL   Hemoglobin and hematocrit, blood    Collection Time: 01/16/23 11:05 AM   Result Value Ref Range    Hemoglobin 7 9 (L) 12 0 - 17 0 g/dL    Hematocrit 24 5 (L) 36 5 - 49 3 %   Basic metabolic panel    Collection Time: 01/16/23 12:07 PM   Result Value Ref Range    Sodium 156 (H) 135 - 147 mmol/L    Potassium 3 3 (L) 3 5 - 5 3 mmol/L    Chloride 130 (H) 96 - 108 mmol/L    CO2 20 (L) 21 - 32 mmol/L    ANION GAP 6 4 - 13 mmol/L    BUN 29 (H) 5 - 25 mg/dL    Creatinine 1 09 0 60 - 1 30 mg/dL    Glucose 157 (H) 65 - 140 mg/dL    Calcium 8 4 8 3 - 10 1 mg/dL    eGFR 72 ml/min/1 73sq m   Osmolality-"If this is regarding a toxic alcohol, STOP  Test is not routinely indicated   Please consult medical  on call for further guidance "    Collection Time: 01/16/23 12:07 PM   Result Value Ref Range    Osmolality Serum 324 (H) 282 - 298 mmol/KG   Blood gas, arterial    Collection Time: 01/16/23 12:08 PM   Result Value Ref Range    pH, Arterial 7 296 (L) 7 350 - 7 450    pCO2, Arterial 41 4 36 0 - 44 0 mm Hg    pO2, Arterial 106 6 75 0 - 129 0 mm Hg    HCO3, Arterial 19 7 (L) 22 0 - 28 0 mmol/L    Base Excess, Arterial -6 3 mmol/L    O2 Content, Arterial 11 8 (L) 16 0 - 23 0 mL/dL    O2 HGB,Arterial  96 2 94 0 - 97 0 %    SOURCE Line, Arterial     Vent Type- AC AC     AC Rate 16     Tidal Volume 480 ml    Inspired Air (FIO2) 40     PEEP 6    Fingerstick Glucose (POCT)    Collection Time: 01/16/23 12:08 PM   Result Value Ref Range    POC Glucose 145 (H) 65 - 140 mg/dl   Hemoglobin and hematocrit, blood    Collection Time: 01/16/23 12:12 PM   Result Value Ref Range    Hemoglobin 8 1 (L) 12 0 - 17 0 g/dL    Hematocrit 24 3 (L) 36 5 - 49 3 %   Prepare Leukoreduced RBC: 2 Units    Collection Time: 01/16/23 12:13 PM   Result Value Ref Range    Unit Product Code O4149Z69     Unit Number [id number]     Unit ABO O     Unit RH NEG     Crossmatch Compatible     Unit Dispense Status Return to [facility]     Unit Product Volume 350 mL    Unit Product Code X2726N47     Unit Number [id number]     Unit ABO O     Unit RH NEG     Crossmatch Compatible     Unit Dispense Status Return to [facility]     Unit Product Volume 350 mL    Unit Product Code Q0412O02     Unit Number [id number]     Unit ABO O     Unit RH POS     Crossmatch Compatible     Unit Dispense Status Return to [facility]     Unit Product Volume 350 mL    Unit Product Code P0371Q09     Unit Number [id number]     Unit ABO O     Unit RH POS     Crossmatch Compatible     Unit Dispense Status Return to [facility] the [facility]     Unit Product Volume 350 mL   Fingerstick Glucose (POCT)    Collection Time: 01/16/23  2:34 PM   Result Value Ref Range    POC Glucose 200 (H) 65 - 140 mg/dl   Fingerstick Glucose (POCT)    Collection Time: 01/16/23  4:35 PM   Result Value Ref Range    POC Glucose 136 65 - 140 mg/dl   ]  Imaging Studies: I have personally reviewed pertinent reports and I have personally reviewed pertinent films in PACS  EKG, Pathology, and Other Studies: I have personally reviewed pertinent reports  VTE Prophylaxis: Heparin    Counseling / Coordination of Care  Total time spent today 28 minutes critical care time  Greater than 50% of total time was spent with the patient and/or family counseling and/or coordination of care  A description of the counseling/coordination of care:  Patient was seen and evaluated  Discussed with attending  Chart reviewed thoroughly including laboratory and imaging studies    Plan of care discussed with patient and primary team   Discussed plan with critical care team and neurosurgery  Discussed CT findings with family  Dictation voice to text software has been used in the creation of this document  Please consider this in light of any contextual or grammatical errors

## 2023-01-16 NOTE — PROGRESS NOTES
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 for L MCA Stroke  GI consulted for melena and acute anemia        1  Upper GI bleed  2  Duodenal ulcer    · Patient went EGD on 1/15 which revealed large Indra IIB ulcer in the duodenal bulb  · He had drop in hemoglobin today with melena in rectal tube  · Performed repeat EGD today which revealed ulcer had improved to [facility] and no active bleeding was seen  · If patient is to have recurrent bleeding recommend IR evaluation for GDA embolization as the ulcer appears about 2 cm in size and difficult to perform any endoscopic intervention such as placing a clip given large size of ulcer  · IV PPI can be switched to po tomorrow       3   Acute stroke    · Patient unfortunately had worsening CT head findings requiring emergent craniotomy for decompression today  · Rest of care per primary and neurosurgery      Benita Fairbanks MD   Gastroenterology Fellow   ______________________________________________________________________    Maggie Clock hr events:         Medication Administration - last 24 hours from 01/15/2023 1634 to 01/16/2023 1634       Date/Time Order Dose Route Action Action by     01/16/2023 1252 EST ondansetron (ZOFRAN) injection 4 mg -- Intravenous MAR Yusra Lobato MD     01/16/2023 0945 EST ondansetron (ZOFRAN) injection 4 mg -- Intravenous MAR Hold Automatic Transfer Provider     01/16/2023 1306 EST nafcillin (NALLPEN) 2,000 mg in sodium chloride 0 9 % 100 mL IVPB 2,000 mg Intravenous New Bag Sg Smith RN     01/16/2023 1257 EST nafcillin (NALLPEN) 2,000 mg in sodium chloride 0 9 % 100 mL IVPB 2,000 mg Intravenous Not Given Sg Smith RN     01/16/2023 1252 EST nafcillin (NALLPEN) 2,000 mg in sodium chloride 0 9 % 100 mL IVPB -- Intravenous MAR Saúl Nina MD     01/16/2023 0945 EST nafcillin (NALLPEN) 2,000 mg in sodium chloride 0 9 % 100 mL IVPB -- Intravenous MAR Hold Automatic Transfer Provider     01/16/2023 0600 EST nafcillin (NALLPEN) 2,000 mg in sodium chloride 0 9 % 100 mL IVPB 0 mg Intravenous Stopped Lazaro Rubin, BENJIE     01/16/2023 0433 EST nafcillin (NALLPEN) 2,000 mg in sodium chloride 0 9 % 100 mL IVPB 2,000 mg Intravenous New Bag Lazaro Rubin, BENJIE     01/16/2023 0200 EST nafcillin (NALLPEN) 2,000 mg in sodium chloride 0 9 % 100 mL IVPB 0 mg Intravenous Stopped Lazaro Rubin, RN     01/16/2023 0111 EST nafcillin (NALLPEN) 2,000 mg in sodium chloride 0 9 % 100 mL IVPB 2,000 mg Intravenous New Bag Lazaro Rubin, BENJIE     01/15/2023 2200 EST nafcillin (NALLPEN) 2,000 mg in sodium chloride 0 9 % 100 mL IVPB 0 mg Intravenous Stopped Lazaro Rubin, BENJIE     01/15/2023 2115 EST nafcillin (NALLPEN) 2,000 mg in sodium chloride 0 9 % 100 mL IVPB 2,000 mg Intravenous New Bag Lazaro Rubin, BENJIE     01/15/2023 1827 EST nafcillin (NALLPEN) 2,000 mg in sodium chloride 0 9 % 100 mL IVPB 0 mg Intravenous Stopped Resa Castleman, RN     01/15/2023 1727 EST nafcillin (NALLPEN) 2,000 mg in sodium chloride 0 9 % 100 mL IVPB 2,000 mg Intravenous New Bag Resa Castleman, BENJIE     01/16/2023 1322 EST levalbuterol (XOPENEX) inhalation solution 1 25 mg 1 25 mg Nebulization Given RT Gwyn     01/16/2023 1252 EST levalbuterol (XOPENEX) inhalation solution 1 25 mg -- Nebulization MAR Saúl Nina MD     01/16/2023 0911 EST levalbuterol (Karlene Cordial) inhalation solution 1 25 mg -- Nebulization MAR [facility] Automatic Transfer Provider     01/16/2023 7617 EST levalbuterol (XOPENEX) inhalation solution 1 25 mg 1 25 mg Nebulization Given Daniel Haylie, RT     01/15/2023 1849 EST levalbuterol (XOPENEX) inhalation solution 1 25 mg 1 25 mg Nebulization Given Julienne Silva, RT     01/16/2023 1306 EST chlorhexidine (PERIDEX) 0 12 % oral rinse 15 mL 15 mL Mouth/Throat Given Iza Fortune, RN     01/16/2023 1252 EST chlorhexidine (PERIDEX) 0 12 % oral rinse 15 mL -- Mouth/Throat MAR Corby Ospina MD     01/16/2023 0945 EST chlorhexidine (PERIDEX) 0 12 % oral rinse 15 mL -- Mouth/Throat MAR Hold Automatic Transfer Provider     01/15/2023 2023 EST chlorhexidine (PERIDEX) 0 12 % oral rinse 15 mL 15 mL Mouth/Throat Given Mckayla Phoenix, RN     01/16/2023 1322 EST ipratropium (ATROVENT) 0 02 % inhalation solution 0 5 mg 0 5 mg Nebulization Given Daniel Haylie, RT     01/16/2023 1252 EST ipratropium (ATROVENT) 0 02 % inhalation solution 0 5 mg -- Nebulization MAR Corby Ospina MD     01/16/2023 0945 EST ipratropium (ATROVENT) 0 02 % inhalation solution 0 5 mg -- Nebulization MAR Hold Automatic Transfer Provider     01/16/2023 0695 EST ipratropium (ATROVENT) 0 02 % inhalation solution 0 5 mg 0 5 mg Nebulization Given Daniel Haylie, RT     01/15/2023 1849 EST ipratropium (ATROVENT) 0 02 % inhalation solution 0 5 mg 0 5 mg Nebulization Given Julienne Silva, RT     01/16/2023 0604 EST heparin (porcine) subcutaneous injection 5,000 Units 5,000 Units Subcutaneous Given Mckayla Lizett, RN     01/15/2023 2222 EST heparin (porcine) subcutaneous injection 5,000 Units 5,000 Units Subcutaneous Given Mckayla Phoenix, RN     01/16/2023 1252 EST fentanyl citrate (PF) 100 MCG/2ML 50 mcg -- Intravenous MAR Corby Ospina MD     01/16/2023 0945 EST fentanyl citrate (PF) 100 MCG/2ML 50 mcg -- Intravenous MAR Hold Automatic Transfer Provider     01/16/2023 0029 EST fentanyl citrate (PF) 100 MCG/2ML 50 mcg 50 mcg Intravenous Given Mckayla Phoenix RN     01/16/2023 1512 EST fentaNYL 1000 mcg in sodium chloride 0 9% 100mL infusion 100 mcg/hr Intravenous Rate/Dose Change Kain Ricardo RN     01/16/2023 1433 EST fentaNYL 1000 mcg in sodium chloride 0 9% 100mL infusion 50 mcg/hr Intravenous [address] Kain Ricardo RN     01/16/2023 1230 EST fentaNYL 1000 mcg in sodium chloride 0 9% 100mL infusion 100 mcg/hr Intravenous Rate/Dose Change Kain Ricardo RN     01/16/2023 1043 EST fentaNYL 1000 mcg in sodium chloride 0 9% 100mL infusion 50 mcg/hr Intravenous Continue from [address], CRNA     01/16/2023 0900 EST fentaNYL 1000 mcg in sodium chloride 0 9% 100mL infusion 50 mcg/hr Intravenous Restarted Kain Ricardo RN     01/16/2023 0730 EST fentaNYL 1000 mcg in sodium chloride 0 9% 100mL infusion 0 mcg/hr Intravenous Stopped Kain Ricardo RN     01/15/2023 1721 EST fentaNYL 1000 mcg in sodium chloride 0 9% 100mL infusion 50 mcg/hr Intravenous [address] Garo Victor RN     01/16/2023 0730 EST dexmedeTOMIDine (Precedex) 400 mcg in sodium chloride 0 9% 100 mL 0 mcg/kg/hr Intravenous Stopped Kain Ricardo RN     01/15/2023 2112 EST dexmedeTOMIDine (Precedex) 400 mcg in sodium chloride 0 9% 100 mL 0 4 mcg/kg/hr Intravenous Rate/Dose Change Pranav Cheng RN     01/15/2023 2023 EST dexmedeTOMIDine (Precedex) 400 mcg in sodium chloride 0 9% 100 mL 0 2 mcg/kg/hr Intravenous New Bag Pranav Cheng RN     01/16/2023 1322 EST sodium chloride 3 % inhalation solution 4 mL 4 mL Nebulization Given Scot Lashonda, RT     01/16/2023 1252 EST sodium chloride 3 % inhalation solution 4 mL -- Nebulization KD Cook MD     01/16/2023 0945 EST sodium chloride 3 % inhalation solution 4 mL -- Nebulization MAR Hold Automatic Transfer Provider     01/16/2023 3481 EST sodium chloride 3 % inhalation solution 4 mL 4 mL Nebulization Given Scot Lashonda, RT     01/15/2023 1850 EST sodium chloride 3 % inhalation solution 4 mL 4 mL Nebulization Given Nancy Arcos, RT     01/16/2023 1439 EST insulin regular (HumuLIN R,NovoLIN R) 1 Units/mL in sodium chloride 0 9 % 100 mL infusion 6 Units/hr Intravenous Rate/Dose Change Lesly Guallpa, RN     01/16/2023 1309 EST insulin regular (HumuLIN R,NovoLIN R) 1 Units/mL in sodium chloride 0 9 % 100 mL infusion 3 Units/hr Intravenous Rate/Dose Change Lesly Guallpa, RN     01/16/2023 1040 EST insulin regular (HumuLIN R,NovoLIN R) 1 Units/mL in sodium chloride 0 9 % 100 mL infusion 2 Units/hr Intravenous Rate/Dose Change Penny Archer, CRNA     01/16/2023 0949 EST insulin regular (HumuLIN R,NovoLIN R) 1 Units/mL in sodium chloride 0 9 % 100 mL infusion 2 Units/hr Intravenous Continue from [address], CRNA     01/16/2023 0603 EST insulin regular (HumuLIN R,NovoLIN R) 1 Units/mL in sodium chloride 0 9 % 100 mL infusion 2 Units/hr Intravenous Rate/Dose Change Johnny Wilson, BENJIE     01/16/2023 0441 EST insulin regular (HumuLIN R,NovoLIN R) 1 Units/mL in sodium chloride 0 9 % 100 mL infusion 1 Units/hr Intravenous Rate/Dose Change Johnny Wilson, BENJIE     01/15/2023 2240 EST insulin regular (HumuLIN R,NovoLIN R) 1 Units/mL in sodium chloride 0 9 % 100 mL infusion 3 Units/hr Intravenous Rate/Dose Change Johnny Wilson, BENJIE     01/15/2023 2003 EST insulin regular (HumuLIN R,NovoLIN R) 1 Units/mL in sodium chloride 0 9 % 100 mL infusion 6 Units/hr Intravenous Rate/Dose Change Johnny Wilson, BENJIE     01/15/2023 1830 EST insulin regular (HumuLIN R,NovoLIN R) 1 Units/mL in sodium chloride 0 9 % 100 mL infusion 3 Units/hr Intravenous Rate/Dose Change Antonino Nolen, RN     01/16/2023 0850 EST sodium chloride (HYPERTONIC) 3 % infusion 0 mL/hr Intravenous Stopped Lesly Guallpa, RN     01/16/2023 0101 EST sodium chloride (HYPERTONIC) 3 % infusion 30 mL/hr Intravenous Rate/Dose Change Kieran Mo, RN     01/15/2023 1831 EST sodium chloride (HYPERTONIC) 3 % infusion 50 mL/hr Intravenous [address] Antonino Nolen RN     01/16/2023 1445 EST pantoprazole (PROTONIX) 80 mg in sodium chloride 0 9 % 100 mL infusion 8 mg/hr Intravenous [address] [facility]     01/16/2023 4222 EST pantoprazole (PROTONIX) 80 mg in sodium chloride 0 9 % 100 mL infusion 8 mg/hr Intravenous Continue from [address], CRNA     01/16/2023 0244 EST pantoprazole (PROTONIX) 80 mg in sodium chloride 0 9 % 100 mL infusion 8 mg/hr Intravenous New Bag Dilan Alanis, RN     01/16/2023 0604 EST hydrocortisone (Solu-CORTEF) injection 50 mg 50 mg Intravenous Given Dilan Alanis, RN     01/15/2023 2221 EST hydrocortisone (Solu-CORTEF) injection 50 mg 50 mg Intravenous Given Dilan Alanis, RN     01/15/2023 2219 EST acetaminophen (TYLENOL) tablet 975 mg 975 mg Oral Not Given Dilan Alanis, RN     01/16/2023 0850 EST norepinephrine (LEVOPHED) 4 mg (STANDARD CONCENTRATION) IV in sodium chloride 0 9% 250 mL 0 mcg/min Intravenous Stopped Jinny Miller, RN     01/16/2023 7808 EST norepinephrine (LEVOPHED) 4 mg (STANDARD CONCENTRATION) IV in sodium chloride 0 9% 250 mL 3 mcg/min Intravenous Rate/Dose Change Dilan Alanis, RN     01/16/2023 0604 EST norepinephrine (LEVOPHED) 4 mg (STANDARD CONCENTRATION) IV in sodium chloride 0 9% 250 mL 1 mcg/min Intravenous Restarted Dilan Alanis, RN     01/16/2023 0428 EST norepinephrine (LEVOPHED) 4 mg (STANDARD CONCENTRATION) IV in sodium chloride 0 9% 250 mL 0 mcg/min Intravenous Stopped Dilan Alanis, RN     01/16/2023 0236 EST norepinephrine (LEVOPHED) 4 mg (STANDARD CONCENTRATION) IV in sodium chloride 0 9% 250 mL 3 mcg/min Intravenous Rate/Dose Change Dilan Alanis, RN     01/16/2023 0110 EST norepinephrine (LEVOPHED) 4 mg (STANDARD CONCENTRATION) IV in sodium chloride 0 9% 250 mL 2 mcg/min Intravenous Restarted Dilan Alanis, RN     01/16/2023 0029 EST norepinephrine (LEVOPHED) 4 mg (STANDARD CONCENTRATION) IV in sodium chloride 0 9% 250 mL 0 mcg/min Intravenous Stopped Dilan Alanis RN     01/15/2023 2331 EST norepinephrine (LEVOPHED) 4 mg (STANDARD CONCENTRATION) IV in sodium chloride 0 9% 250 mL 7 mcg/min Intravenous Rate/Dose Change Joselin Fortune RN     01/15/2023 2323 EST norepinephrine (LEVOPHED) 4 mg (STANDARD CONCENTRATION) IV in sodium chloride 0 9% 250 mL 6 mcg/min Intravenous Rate/Dose Change Joselin Fortune RN     01/15/2023 2255 EST norepinephrine (LEVOPHED) 4 mg (STANDARD CONCENTRATION) IV in sodium chloride 0 9% 250 mL 4 mcg/min Intravenous Rate/Dose Change Joselin Fortune RN     01/15/2023 2245 EST norepinephrine (LEVOPHED) 4 mg (STANDARD CONCENTRATION) IV in sodium chloride 0 9% 250 mL 2 mcg/min Intravenous Rate/Dose Change Joselin Fortune RN     01/15/2023 2205 EST norepinephrine (LEVOPHED) 4 mg (STANDARD CONCENTRATION) IV in sodium chloride 0 9% 250 mL 1 mcg/min Intravenous Restarted Joselin Fortune RN     01/15/2023 2130 EST norepinephrine (LEVOPHED) 4 mg (STANDARD CONCENTRATION) IV in sodium chloride 0 9% 250 mL -- Intravenous Canceled Entry Joselin Fortune RN     01/15/2023 2112 EST norepinephrine (LEVOPHED) 4 mg (STANDARD CONCENTRATION) IV in sodium chloride 0 9% 250 mL 0 mcg/min Intravenous Stopped Joselin Fortune RN     01/15/2023 2039 EST norepinephrine (LEVOPHED) 4 mg (STANDARD CONCENTRATION) IV in sodium chloride 0 9% 250 mL 6 mcg/min Intravenous Rate/Dose Change Joselin Fortune RN     01/16/2023 1306 EST acetaminophen (TYLENOL) oral suspension 650 mg 650 mg Oral Given Misha Fast, BENJIE     01/16/2023 1252 EST acetaminophen (TYLENOL) oral suspension 650 mg -- Oral MAR Monique Craig MD     01/16/2023 1030 EST acetaminophen (TYLENOL) oral suspension 650 mg 650 mg Oral Not Given [place] Fast, BENJIE     01/16/2023 0945 EST acetaminophen (TYLENOL) oral suspension 650 mg -- Oral MAR Hold Automatic Transfer Provider     01/16/2023 4493 EST acetaminophen (TYLENOL) oral suspension 650 mg 650 mg Oral Given Joselin Fortune RN     01/15/2023 2222 EST acetaminophen (TYLENOL) oral suspension 650 mg 650 mg Oral Given Joselin Fortune RN     01/16/2023 0156 EST potassium chloride oral solution 20 mEq 20 mEq Oral Given [employer] Harshil Rehman RN     01/16/2023 1256 EST chlorhexidine (PERIDEX) 0 12 % oral rinse 15 mL 15 mL Swish & Spit Not Given Edgardo Terrell RN     01/16/2023 1307 EST ceFAZolin (ANCEF) IVPB (premix in dextrose) 2,000 mg 50 mL 0 mg Intravenous Stopped Edgardo Terrell RN     01/16/2023 1239 EST ceFAZolin (ANCEF) IVPB (premix in dextrose) 2,000 mg 50 mL -- Intravenous Continued from [address] [place] [address], RN     01/16/2023 1200 EST ceFAZolin (ANCEF) IVPB (premix in dextrose) 2,000 mg 50 mL 2,000 mg Intravenous Not Given Edgardo Terrell RN     01/16/2023 1257 EST neomycin-polymyxin B (NEOSPORIN-) 1 mL in sodium chloride 0 9 % 1,000 mL irrigation -- Irrigation Not Given Edgardo Terrell RN     01/16/2023 1306 EST levETIRAcetam (KEPPRA) tablet 500 mg 500 mg Oral Given Edgardo Terrell RN     01/16/2023 1514 EST neostigmine (BLOXIVERZ) injection 3 mg 3 mg Intravenous Not Given Edgardo Terrell RN     01/16/2023 1513 EST glycopyrrolate (ROBINUL) injection 0 6 mg 0 6 mg Intravenous Not Given Edgardo Terrell RN     01/16/2023 1433 EST potassium chloride oral solution 40 mEq 40 mEq Oral Given Edgardo Terrell RN     01/16/2023 1512 EST propofol (DIPRIVAN) 1000 mg in 100 mL infusion (premix) 10 mcg/kg/min Intravenous New Bag Edgardo Terrell RN          Objective:     Vitals: Blood pressure 130/53, pulse 94, temperature 98 6 °F (37 °C), temperature source Bladder, resp  rate 21, height 5' 10" (1 778 m), weight 102 kg (224 lb 13 9 oz), SpO2 99 %  ,Body mass index is 32 27 kg/m²        Intake/Output Summary (Last 24 hours) at 1/16/2023 1634  Last data filed at 1/16/2023 1206  Gross per 24 hour   Intake 2738 39 ml   Output 4450 ml   Net -1711 61 ml       Physical Exam:   General Appearance: Awake and alert, in no acute distress  Abdomen: Soft, non-tender, non-distended; bowel sounds normal; no masses or no organomegaly    Invasive Devices     Central Venous Catheter Line  Duration           CVC Central Lines 01/14/23 Triple 20cm 1 day Peripheral Intravenous Line  Duration           Peripheral IV 01/12/23 Right Antecubital 3 days    Peripheral IV 01/14/23 Left;Ventral (anterior) Forearm 2 days          Arterial Line  Duration           Arterial Line 01/14/23 Right Radial 1 day          Drain  Duration           NG/OG/Enteral Tube Orogastric 18 Fr Center mouth 1 day    Urethral Catheter 1 day    Closed/Suction Drain Left Other (Comment) Bulb <1 day    Ventriculostomy/Subdural Ventricular drainage catheter with ICP microsensor Left Frontal region <1 day          Airway  Duration           ETT  8 mm 1 day                Lab Results:  No results displayed because visit has over 200 results  Imaging Studies: I have personally reviewed pertinent imaging studies

## 2023-01-16 NOTE — ANESTHESIA PREPROCEDURE EVALUATION
Procedure:  Left decompressive hemicraniectomy (Left: Head)    Relevant Problems   CARDIO   (+) Hyperlipidemia   (+) Murmur   (+) Primary hypertension      ENDO   (+) DMII (diabetes mellitus, type 2) ([facility])      HEMATOLOGY   (+) Anemia      MUSCULOSKELETAL   (+) Septic arthritis (HCC)      NEURO/PSYCH   (+) Acute stroke due to embolism of left middle cerebral artery ([facility])   (+) Anxiety        TTE 1/14/2023  Technically adequate Limited transthoracic echocardiogram study  Patient had complete echocardiogram on 1/9/2022      1  Mild concentric left ventricle hypertrophy, normal left ventricular systolic function, left ventricle ejection fraction is greater than 65%  2  Normal right ventricle size and systolic function  3  Normal left and right atrial size by visual assessment  4  Tricuspid aortic valve with thickening and densities noted on the tips of the cusps highly suspicious for vegetations  There is associated moderate aortic valve regurgitation  There  are no mobile lesions and there is no evidence of abscess involving the aortic valve  5  Borderline mitral annular calcification  No vegetation identified on mitral valve  Trace mitral valve regurgitation  6  No vegetations identified on the tricuspid or pulmonic valve regurgitation  Trace tricuspid valve regurgitation noted  7  No obvious pulmonary hypertension  8  Trace, hemodynamically insignificant circumferential pericardial effusion      Compared to previous echocardiogram from 1/9/2022 the abnormalities on the aortic valve are new  There is some progression of aortic valve regurgitation  Physical Exam    Airway       Dental       Cardiovascular      Pulmonary      Other Findings  Intubated      Anesthesia Plan  ASA Score- 4 Emergent    Anesthesia Type- general with ASA Monitors  Additional Monitors: arterial line  Airway Plan: ETT  Plan Factors-    Chart reviewed  Existing labs reviewed   Patient summary reviewed  Patient is not a current smoker  Induction- intravenous  Postoperative Plan- Plan for postoperative opioid use  Informed Consent- Anesthetic plan and risks discussed with son  I personally reviewed this patient with the CRNA  Discussed and agreed on the Anesthesia Plan with the CRNA  Thomasina Cooks

## 2023-01-16 NOTE — RESPIRATORY THERAPY NOTE
RT Ventilator Management Note  Dave Prom 58 y o  male MRN: [id number]  Unit/Bed#: ICU 12 Encounter: [id number]      Daily Screen         1/15/2023  8625             Patient safety screen outcome[de-identified] Failed    Not Ready for Weaning due to[de-identified] Underline problem not resolved              Physical Exam:   Assessment Type: Assess only  General Appearance: Sedated  Respiratory Pattern: Assisted  Chest Assessment: Chest expansion symmetrical  Bilateral Breath Sounds: Coarse  Cough: None  Suction: ET Tube  O2 Device: Ventilator      Resp Comments: Pt  remains on CMV/VC mode  No changes throughout the night  Will continue to assess and monitor pt per protocol

## 2023-01-16 NOTE — RESPIRATORY THERAPY NOTE
RT Ventilator Management Note  Kieran Seen 58 y o  male MRN: [id number]  Unit/Bed#: ICU 12 Encounter: [id number]      Daily Screen         1/15/2023  0745 1/16/2023  0714          Patient safety screen outcome[de-identified] Failed Failed (P)       Not Ready for Weaning due to[de-identified] Underline problem not resolved Underline problem not resolved (P)                 Physical Exam:   Assessment Type: (P) During-treatment  General Appearance: (P) Sedated  Respiratory Pattern: (P) Assisted  Chest Assessment: (P) Chest expansion symmetrical  Bilateral Breath Sounds: (P) Coarse  Cough: (P) Productive  Suction: (P) ET Tube  O2 Device: (P) C3      Resp Comments: (P) pt is resting on SCMV settings, ETT @26 cm mandy moved to center of mouth, cuff pressure documented, sx via ETT for sm white secretions, tolerated well, will continue to monitor

## 2023-01-16 NOTE — PROGRESS NOTES
H&P - [facility] 58 y o  male MRN: [id number]  Unit/Bed#: ICU 12 Encounter: [id number]        ----------------------------------------------------------------------------------------  HPI: Usama Horan a 58 y  o  male with PMH of left TKA, T2DM, hypertension, hyperlipidemia who presented to [facility] on 1/7 for concern of persistent malaise and generalized weakness since end of December  Blood cultures ultimately positive for MSSA and ID was consulted for further evaluation  Patient initially on cefazolin and then transitioned to Nafcillin for neuro penetration on 1/14 with new onset neuro symptoms  Patient's hospitalization also complicated by L elbow cellulitis and abscess s/p I&D, 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 and polyechange on 1/9, C4-C5 septic arthritis vs  Osteomyelitis, splenic infarcts, pulmonary cavitary lesions, and most recently (AM of transfer) echo demonstrating aortic valve vegetations  On afternoon 1/14, rapid response was called due to acute change in neuro status with right facial droop, right-sided weakness, expressive/receptive aphasia  CTH negative  CTA HN with L MCA M1 occlusion  Endovascular alert called and patient priority transferred to [facility] [place] for neurosurg/IR intervention and post procedural monitoring in ICU  S/p L MCA M1 thrombectomy with TICI 2b revascularization on 1/14Imaging also noted nonocclusive right IJ DVT, however, with concern for septic emboli and new anemia patient not a candidate for anticoagulation per neurology  MRI on 1/14 revealed multifocal areas of ischemia in both anterior and posterior circulation  TTE with AV vegetations  CT revealed spetic embolization to spleen and evidence of embolization on imbs     24hr events: Overnight developed melanotic stools and concern of GI Bleed, Gi was consulted found to have a large duodenal ulcer   Repeat CTH on 1/16/2023 am revealed worsened rightward midline shift measuring 1 6 cm (previously 0 7 cm), worsened compression of left lateral ventricle with  dilation of right lateral ventricle, and new left uncal herniation  Emergently taken to the OR for left decompressive hemicraniectomy        Vitals:  Temp:  [99 2 °F (37 3 °C)-103 3 °F (39 6 °C)] 101 8 °F (38 8 °C)  HR:  [] 88  Resp:  [16-28] 23  BP: (124-155)/(39-48) 149/42  SpO2:  [98 %-100 %] 99 %    I&Os:  01/15 0701 - 01/16 0700  In: 3709 1 [P O :6; I V :2138 1]  Out: 3925 [Urine:3825]    Labs:  Abnormal Labs Reviewed   BLOOD CULTURE - Abnormal; Notable for the following components:       Result Value    Gram Stain Result Gram positive cocci in clusters (*)     All other components within normal limits   COVID19, INFLUENZA A/B, RSV PCR, [facility] - Abnormal; Notable for the following components:    RSV PCR Positive (*)     All other components within normal limits    Narrative:     FOR PEDIATRIC PATIENTS - copy/paste COVID Guidelines URL to browser: [email or web address] org/  [email or web address]    SARS-CoV-2 assay is a Nucleic Acid Amplification assay intended for the  qualitative detection of nucleic acid from SARS-CoV-2 in nasopharyngeal  swabs  Results are for the presumptive identification of SARS-CoV-2 RNA  Positive results are indicative of infection with SARS-CoV-2, the virus  causing COVID-19, but do not rule out bacterial infection or co-infection  with other viruses  Laboratories within the [place] and its  territories are required to report all positive results to the appropriate  public health authorities  Negative results do not preclude SARS-CoV-2  infection and should not be used as the sole basis for treatment or other  patient management decisions  Negative results must be combined with  clinical observations, patient history, and epidemiological information  This test has not been FDA cleared or approved      This test has been authorized by FDA under an Emergency Use Authorization  (EUA)  This test is only authorized for the duration of time the  declaration that circumstances exist justifying the authorization of the  emergency use of an in vitro diagnostic tests for detection of SARS-CoV-2  virus and/or diagnosis of COVID-19 infection under section 564(b)(1) of  the Act, 21 U  S C  344QZL-6(S)(4), unless the authorization is terminated  or revoked sooner  The test has been validated but independent review by FDA  and CLIA is pending  Test performed using [employer] GeneXpert: This RT-PCR assay targets N2,  a region unique to SARS-CoV-2  A conserved region in the E-gene was chosen  for pan-Sarbecovirus detection which includes SARS-CoV-2  According to CMS-2020-01-R, this platform meets the definition of high-throughput technology     CBC AND DIFFERENTIAL - Abnormal; Notable for the following components:    WBC 27 91 (*)     RBC 2 66 (*)     Hemoglobin 7 9 (*)     Hematocrit 23 3 (*)     RDW 15 5 (*)     Neutrophils Relative 83 (*)     Lymphocytes Relative 7 (*)     Neutrophils Absolute 23 12 (*)     Immature Grans Absolute >0 50 (*)     Monocytes Absolute 2 17 (*)     All other components within normal limits   COMPREHENSIVE METABOLIC PANEL - Abnormal; Notable for the following components:    Sodium 130 (*)     CO2 19 (*)     BUN 48 (*)     Creatinine 1 50 (*)     Glucose 284 (*)     Calcium 7 5 (*)      (*)     ALT 83 (*)     Total Protein 4 8 (*)     Albumin 1 3 (*)     All other components within normal limits    Narrative:     [place] guidelines for Chronic Kidney Disease (CKD):   •  Stage 1 with normal or high GFR (GFR > 90 mL/min/1 73 square meters)  •  Stage 2 Mild CKD (GFR = 60-89 mL/min/1 73 square meters)  •  Stage 3A Moderate CKD (GFR = 45-59 mL/min/1 73 square meters)  •  Stage 3B Moderate CKD (GFR = 30-44 mL/min/1 73 square meters)  •  Stage 4 Severe CKD (GFR = 15-29 mL/min/1 73 square meters)  •  Stage 5 End Stage CKD (GFR <15 mL/min/1 73 square meters)  Note: GFR calculation is accurate only with a steady state creatinine   BLOOD GAS, ARTERIAL - Abnormal; Notable for the following components:    pH, Arterial 7 323 (*)     HCO3, Arterial 19 0 (*)     O2 Content, Arterial 12 1 (*)     All other components within normal limits   PROTIME-INR - Abnormal; Notable for the following components:    Protime 19 7 (*)     INR 1 64 (*)     All other components within normal limits   PHOSPHORUS - Abnormal; Notable for the following components:    Phosphorus 5 4 (*)     All other components within normal limits   BASIC METABOLIC PANEL - Abnormal; Notable for the following components:    Chloride 110 (*)     CO2 18 (*)     BUN 50 (*)     Creatinine 1 44 (*)     Glucose 276 (*)     Calcium 7 2 (*)     All other components within normal limits    Narrative:     [place] guidelines for Chronic Kidney Disease (CKD):   •  Stage 1 with normal or high GFR (GFR > 90 mL/min/1 73 square meters)  •  Stage 2 Mild CKD (GFR = 60-89 mL/min/1 73 square meters)  •  Stage 3A Moderate CKD (GFR = 45-59 mL/min/1 73 square meters)  •  Stage 3B Moderate CKD (GFR = 30-44 mL/min/1 73 square meters)  •  Stage 4 Severe CKD (GFR = 15-29 mL/min/1 73 square meters)  •  Stage 5 End Stage CKD (GFR <15 mL/min/1 73 square meters)  Note: GFR calculation is accurate only with a steady state creatinine   BLOOD GAS, ARTERIAL - Abnormal; Notable for the following components:    pCO2, Arterial 27 0 (*)     pO2, Arterial 229 1 (*)     HCO3, Arterial 16 1 (*)     O2 Content, Arterial 11 6 (*)     O2 HGB,Arterial  98 0 (*)     All other components within normal limits   BLOOD GAS, VENOUS - Abnormal; Notable for the following components:    pCO2, Christopher 36 2 (*)     HCO3, Christopher 17 9 (*)     All other components within normal limits   CBC AND DIFFERENTIAL - Abnormal; Notable for the following components:    WBC 20 38 (*)     RBC 2 31 (*)     Hemoglobin 6 9 (*)     Hematocrit 20 0 (*)     RDW 16 0 (*)     Neutrophils Relative 83 (*)     Lymphocytes Relative 8 (*)     Neutrophils Absolute 16 98 (*)     Immature Grans Absolute 0 25 (*)     Monocytes Absolute 1 56 (*)     All other components within normal limits    Narrative: This is an appended report  These results have been appended to a previously verified report     PROCALCITONIN TEST - Abnormal; Notable for the following components:    Procalcitonin 15 09 (*)     All other components within normal limits   LIPID PANEL WITH DIRECT LDL REFLEX - Abnormal; Notable for the following components:    Cholesterol <50 (*)     HDL, Direct 10 (*)     All other components within normal limits   HEMOGLOBIN A1C - Abnormal; Notable for the following components:    Hemoglobin A1C 6 8 (*)     All other components within normal limits   BASIC METABOLIC PANEL - Abnormal; Notable for the following components:    Chloride 114 (*)     CO2 17 (*)     BUN 50 (*)     Glucose 146 (*)     Calcium 7 5 (*)     All other components within normal limits    Narrative:     [place] guidelines for Chronic Kidney Disease (CKD):   •  Stage 1 with normal or high GFR (GFR > 90 mL/min/1 73 square meters)  •  Stage 2 Mild CKD (GFR = 60-89 mL/min/1 73 square meters)  •  Stage 3A Moderate CKD (GFR = 45-59 mL/min/1 73 square meters)  •  Stage 3B Moderate CKD (GFR = 30-44 mL/min/1 73 square meters)  •  Stage 4 Severe CKD (GFR = 15-29 mL/min/1 73 square meters)  •  Stage 5 End Stage CKD (GFR <15 mL/min/1 73 square meters)  Note: GFR calculation is accurate only with a steady state creatinine   BLOOD GAS, ARTERIAL - Abnormal; Notable for the following components:    pCO2, Arterial 28 5 (*)     pO2, Arterial 220 7 (*)     HCO3, Arterial 17 7 (*)     O2 HGB,Arterial  98 5 (*)     All other components within normal limits   HEPATIC FUNCTION PANEL - Abnormal; Notable for the following components:    Bilirubin, Direct 0 28 (*)      (*)     ALT 86 (*)     Total Protein 4 7 (*)     Albumin 1 3 (*)     All other components within normal limits   VITAMIN B12 - Abnormal; Notable for the following components:    Vitamin B-12 1,863 (*)     All other components within normal limits   FOLATE - Abnormal; Notable for the following components:    Folate 18 4 (*)     All other components within normal limits   IRON SATURATION - Abnormal; Notable for the following components:    Iron Saturation 56 (*)     TIBC 146 (*)     All other components within normal limits   FERRITIN - Abnormal; Notable for the following components:    Ferritin 8,397 (*)     All other components within normal limits   BASIC METABOLIC PANEL - Abnormal; Notable for the following components:    Potassium 3 4 (*)     Chloride 119 (*)     CO2 18 (*)     BUN 50 (*)     Glucose 142 (*)     Calcium 7 7 (*)     All other components within normal limits    Narrative:     [place] guidelines for Chronic Kidney Disease (CKD):   •  Stage 1 with normal or high GFR (GFR > 90 mL/min/1 73 square meters)  •  Stage 2 Mild CKD (GFR = 60-89 mL/min/1 73 square meters)  •  Stage 3A Moderate CKD (GFR = 45-59 mL/min/1 73 square meters)  •  Stage 3B Moderate CKD (GFR = 30-44 mL/min/1 73 square meters)  •  Stage 4 Severe CKD (GFR = 15-29 mL/min/1 73 square meters)  •  Stage 5 End Stage CKD (GFR <15 mL/min/1 73 square meters)  Note: GFR calculation is accurate only with a steady state creatinine   BLOOD GAS, ARTERIAL - Abnormal; Notable for the following components:    pCO2, Arterial 25 4 (*)     PCO2 (TC) Arterial 28 5 (*)     pO2, Arterial 132 0 (*)     PO2 (TC) Arterial 149 1 (*)     HCO3, Arterial 16 5 (*)     O2 Content, Arterial 12 3 (*)     O2 HGB,Arterial  97 2 (*)     All other components within normal limits   OSMOLALITY - Abnormal; Notable for the following components:    Osmolality Serum 300 (*)     All other components within normal limits   HEMOGLOBIN - Abnormal; Notable for the following components:    Hemoglobin 8 2 (*)     All other components within normal limits   BASIC METABOLIC PANEL - Abnormal; Notable for the following components:    Sodium 150 (*)     Chloride 126 (*)     CO2 17 (*)     BUN 45 (*)     Calcium 7 7 (*)     All other components within normal limits    Narrative:     [place] guidelines for Chronic Kidney Disease (CKD):   •  Stage 1 with normal or high GFR (GFR > 90 mL/min/1 73 square meters)  •  Stage 2 Mild CKD (GFR = 60-89 mL/min/1 73 square meters)  •  Stage 3A Moderate CKD (GFR = 45-59 mL/min/1 73 square meters)  •  Stage 3B Moderate CKD (GFR = 30-44 mL/min/1 73 square meters)  •  Stage 4 Severe CKD (GFR = 15-29 mL/min/1 73 square meters)  •  Stage 5 End Stage CKD (GFR <15 mL/min/1 73 square meters)  Note: GFR calculation is accurate only with a steady state creatinine   BLOOD GAS, ARTERIAL - Abnormal; Notable for the following components:    pCO2, Arterial 24 5 (*)     PCO2 (TC) Arterial 24 9 (*)     pO2, Arterial 136 8 (*)     PO2 (TC) Arterial 139 3 (*)     HCO3, Arterial 16 2 (*)     O2 Content, Arterial 11 5 (*)     O2 HGB,Arterial  97 3 (*)     All other components within normal limits   OSMOLALITY - Abnormal; Notable for the following components:    Osmolality Serum 315 (*)     All other components within normal limits   HEMOGLOBIN - Abnormal; Notable for the following components:    Hemoglobin 7 5 (*)     All other components within normal limits   BASIC METABOLIC PANEL - Abnormal; Notable for the following components:    Sodium 154 (*)     Chloride 130 (*)     CO2 18 (*)     BUN 38 (*)     Calcium 8 0 (*)     All other components within normal limits    Narrative:     [place] guidelines for Chronic Kidney Disease (CKD):   •  Stage 1 with normal or high GFR (GFR > 90 mL/min/1 73 square meters)  •  Stage 2 Mild CKD (GFR = 60-89 mL/min/1 73 square meters)  •  Stage 3A Moderate CKD (GFR = 45-59 mL/min/1 73 square meters)  •  Stage 3B Moderate CKD (GFR = 30-44 mL/min/1 73 square meters)  •  Stage 4 Severe CKD (GFR = 15-29 mL/min/1 73 square meters)  •  Stage 5 End Stage CKD (GFR <15 mL/min/1 73 square meters)  Note: GFR calculation is accurate only with a steady state creatinine   BLOOD GAS, ARTERIAL - Abnormal; Notable for the following components:    pH, Arterial 7 451 (*)     pCO2, Arterial 24 4 (*)     PCO2 (TC) Arterial 26 1 (*)     HCO3, Arterial 16 6 (*)     O2 Content, Arterial 13 4 (*)     O2 HGB,Arterial  97 3 (*)     All other components within normal limits   OSMOLALITY - Abnormal; Notable for the following components:    Osmolality Serum 318 (*)     All other components within normal limits   HEMOGLOBIN AND HEMATOCRIT, BLOOD - Abnormal; Notable for the following components:    Hemoglobin 7 2 (*)     Hematocrit 21 9 (*)     All other components within normal limits   BLOOD GAS, ARTERIAL - Abnormal; Notable for the following components:    pH, Arterial 7 461 (*)     pCO2, Arterial 25 9 (*)     pO2, Arterial 132 4 (*)     HCO3, Arterial 18 0 (*)     O2 Content, Arterial 11 1 (*)     O2 HGB,Arterial  97 6 (*)     All other components within normal limits   CBC AND DIFFERENTIAL - Abnormal; Notable for the following components:    RBC 2 40 (*)     Hemoglobin 7 2 (*)     Hematocrit 21 1 (*)     RDW 16 7 (*)     Neutrophils Relative 80 (*)     Lymphocytes Relative 10 (*)     Neutrophils Absolute 7 77 (*)     All other components within normal limits   POCT GLUCOSE - Abnormal; Notable for the following components:    POC Glucose 252 (*)     All other components within normal limits   POCT GLUCOSE - Abnormal; Notable for the following components:    POC Glucose 287 (*)     All other components within normal limits   POCT GLUCOSE - Abnormal; Notable for the following components:    POC Glucose 283 (*)     All other components within normal limits   POCT GLUCOSE - Abnormal; Notable for the following components:    POC Glucose 258 (*)     All other components within normal limits   POCT GLUCOSE - Abnormal; Notable for the following components:    POC Glucose 222 (*)     All other components within normal limits   POCT GLUCOSE - Abnormal; Notable for the following components:    POC Glucose 186 (*)     All other components within normal limits   POCT GLUCOSE - Abnormal; Notable for the following components:    POC Glucose 153 (*)     All other components within normal limits   POCT GLUCOSE - Abnormal; Notable for the following components:    POC Glucose 181 (*)     All other components within normal limits   POCT GLUCOSE - Abnormal; Notable for the following components:    POC Glucose 190 (*)     All other components within normal limits   POCT GLUCOSE - Abnormal; Notable for the following components:    POC Glucose 166 (*)     All other components within normal limits   POCT GLUCOSE - Abnormal; Notable for the following components:    POC Glucose 143 (*)     All other components within normal limits       ---------------------------------------------------------------------------------------  SUBJECTIVE  Patient seen and examined at bedside  Continues to remain intubated and sedated  Review of Systems   Unable to perform ROS: Intubated       ---------------------------------------------------------------------------------------  Assessment and Plan:    Neuro / Psych:   • Diagnosis: L MCA M1 Thrombus  ? Assessment:  - Initial NIHSS on arrival was 23, Repeat NIH prior to thrombectomy 29  - 1/14 CTH: no acute intracranial abnormality  - 1/14 CTA HN: left MCA M1 thromboembolism, incidental nonocclusive DVT in the high cervical right IJ vein  - 1/14 MRI brain with and without: Large acute left MCA distribution infarct with petechial hemorrhage  Minimal left-to-right shift  Additional small acute infarcts in the right frontal and parietal lobes as well as the cerebellum favor embolic etiology    ? Plan:  - Status post IR thrombectomy - 3 passes with restoration of TICI 2b flow on 1/14  - SBP <160  - Levophed to maintain MAP >65  - Continue to hold AC/AP in concern for septic emboli and newly diagnosed anemia  - Hypertonic saline started on 1/15  - continue with goal Na 145-155/ osm 305-320  - Neurosurgery on board, planned to proceed to OR 1/16  - Will obtain Palliative Consult   • Diagnosis: Sedation and analgesia  ? Assessment:  - Patient maintained on gabapentin 100mg Q8 and PRN flexeril as outpatient  ? Plan:  - Propofol discontinued 1/14  - Currently maintained on Precedex and fentanyl infusions  - PRN fentanyl    CV:   • Diagnosis: Aortic Valve Endocarditis  ? Assessment:  - Noted on repeat echocardiogram completed 1/14 -aortic valve with thickening and densities noted on the tips of the cusps highly suspicious for vegetations with associated moderate aortic valve regurgitation, no evidence of abscess, trace pericardial effusion  - Compared to previous echocardiogram completed in 1/9/2020 2 aortic valve vegetations described as new  ? Plan:  - ID on board   - Continue Nafcillin  - ID following  - Hold off on KALINA   - Continuous telemetry  - Follow up blood cultures  • Diagnosis: Hypotension  ? Assessment:  - Likely multifactorial - septic shock and sedation  ? Plan:  - Levophed increased to 16mcg overnight  - Vasopressin added  - Continue blood pressure support to maintain MAP >65  • Diagnosis: Hypertension  ? Assessment:  - Home meds: lisinopril-HCTZ 20-25, metoprolol succinate 100mg daily  ? Plan:  - Holding home medications in setting of hypotension  • Diagnosis: Hyperlipidemia  ? Plan:  - Resume home crestor, fenofibrate when tolerating PO  - Holding home Lovaza         Pulm:  • Diagnosis: Mechanical Ventilation  ? Plan:  - Continue ventilator support  • Diagnosis: Pulmonary Cavitary Lesion  ? Assessment  - Noted on CTA HN - suspicious for septic emboli  ?  Plan:  - Continue to monitor respiratory status  - Antibiotics as detailed below  - Continue mechanical ventilation as above  • Diagnosis: COPD vs  Asthma  ? Assessment:  - No spirometry or PFT's in chart  - Managed outpatient on albuterol and symbicort for documented COPD v Asthma  ? Plan:  - Atrovent and Xopenex  - Saline nebs      GI:   • Diagnosis: Duodenal Ulcer   • Assessment:   •  Single large, deep, irregular ulcer in the duodenal bulb with adherent clot seen on EGD  • Plan:  • 1/15 EGD   • Transfuse as need to maintain HB > 7 and platelets >07   • Frequent H&H  • GI on board   • "If patient should develop any further signs of GI bleeding/hemodynamic compromise, consider IR consult, limited endoscopic options given high risk of perforation based on the location and appearance of the ulcer  • Diagnosis: Hypodensity of spleen  ? Assessment:  - 1/14 CT AP noted Smooth peripherally calcified hypodensity in the anterior spleen measuring 4 4 x 4 6 x 4 8 cm due to prior infection   ? Plan:  - Consider repeat imaging with contrast       Renal/:  • Diagnosis: Abnormal Urinalysis  ? Assessment:  - Pyuria with positive culture [facility]) and penile irritation  ? Plan:  - ID following - believe to be asymptomatic bacteruria   - Negative STI testing (RPR, Urine GC)  - Repeat STI testing in outpatient setting  • Diagnosis: Hypernatremia  ? Assessment:  - Patient with Na 129 on presentation, baseline high 130's  ? Plan:  - S/p fluid resuscitation  - Na+ at 158, d/c hypertonic saline 3%  - Continue to follow daily BMP      F/E/N:   • Plan:  ? Fluid:continue LR, follow up repeat labs and bolus as needed  ? Electrolytes: hyponatremia as detailed above, monitor and replete for K >4, P >3, Mg >2  ? Nutrition: NPO     Heme/Onc:   • Diagnosis: Anemia  ? Assessment:  - Previously Patient noted to have acute drop in Hgb from 12 1>8 8 with repeat 7 2  - Received 2u uncrossed blood products prior to transfer  ?  Plan:  - AM CBC with Hgb 7 9  - Consent for transfusion previously obtained from son  - GI consulted on board, plan as noted above   - Received 2 units of RBCs on 1/16  • Diagnosis: R IJ Nonocclusive DVT  ? Plan:  - Not candidate for anticoagulation with septic emboli and anemia with possible GIB      Endo:   • Diagnosis: T2DM  ? Assessment:  - Home regimen: metformin 1000mg BID, jardiance 10mg daily  ? Plan:  - Insulin drip  - Monitor BG    ID:   • Diagnosis: Sepsis, Persistent MSSA bacteremia   ? Assessment  - Etiology includes bacteremia, aortic valve endocarditis, cellulitis R elbow, L knee infection, pulmonary cavitation  - Lactate elevated to 8 9 on repeat labs at 2:00PM from 2 9 at 9:47am  ? Plan:  - ID on board   - Continue Nafcillin 1/16 (Day 3)   - Continue Ancef   - Follow up repeat blood cultures  - Tylenol scheduled for fever  - PRN ibuprofen - monitor BMP  - Follow up repeat blood cultures       MSK/Skin:   • Diagnosis: L total knee replacement  ? Assessment  - Initially done 6/28/22  ? Plan:  - S/p removal with debridement of prosthesis with poly exchange  - Inserted stimulan abx beads  - Ortho following - appreciate recommendations  • Diagnosis: L elbow cellulitis  ? Plan:  - S/p I&D of L elbow  - Continue antibiotics as above  • Diagnosis: Septic Arthritis vs  Osteomyelitis  ? Plan:  - Neurosurgery consulted prior to transfer  - No surgical intervention anticipated at this time  - Follow up in 4 weeks with upright XR cervical spine         Disposition: Continue Critical Care   Code Status: Level 1 - Full Code  ---------------------------------------------------------------------------------------  ICU CORE MEASURES    Prophylaxis   VTE Pharmacologic Prophylaxis: Pharmacologic VTE Prophylaxis contraindicated due to bleed  VTE Mechanical Prophylaxis: sequential compression device and foot pump applied  Stress Ulcer Prophylaxis: Pantoprazole IV     ABCDE Protocol (if indicated)  Plan to perform spontaneous awakening trial today? No  Plan to perform spontaneous breathing trial today? No  Obvious barriers to extubation? Yes  CAM-ICU: Negative    Invasive Devices Review  Invasive Devices     Central Venous Catheter Line  Duration           CVC Central Lines 23 Triple 20cm 1 day          Peripheral Intravenous Line  Duration           Peripheral IV 23 Right Antecubital 3 days    Peripheral IV 23 Left;Ventral (anterior) Forearm 1 day          Arterial Line  Duration           Arterial Line 23 Right Radial 1 day          Drain  Duration           Urethral Catheter 1 day    NG/OG/Enteral Tube Orogastric 18 Fr Center mouth <1 day          Airway  Duration           ETT  8 mm 1 day              Can any invasive devices be discontinued today? No  ---------------------------------------------------------------------------------------  OBJECTIVE    Vitals   Vitals:    23 0400 23 0430 23 0500 23 06   BP:       BP Location:       Pulse: 78  84 88   Resp: 22   (!) 23   Temp: (!) 102 2 °F (39 °C)  (!) 102 2 °F (39 °C) (!) 101 8 °F (38 8 °C)   TempSrc: Bladder      SpO2: 99% 99% 99% 99%   Weight:       Height:         Temp (24hrs), Av 4 °F (38 °C), Min:99 2 °F (37 3 °C), Max:103 3 °F (39 6 °C)  Current: Temperature: (!) 101 8 °F (38 8 °C)  HR: Pulse: 88 (23)  BP: Blood Pressure: (!) 149/42 (01/15/23 0946)  RR: Respirations: (!) 23 (23)  SpO2: SpO2: 99 % (23)    Respiratory:  SpO2: SpO2: 99 %, SpO2 Activity: SpO2 Activity: At Rest, SpO2 Device: O2 Device: Ventilator, Capnography:         Invasive/non-invasive ventilation settings   Respiratory    Lab Data (Last 4 hours)      602            pH, Arterial       7 461             pCO2, Arterial       25 9             pO2, Arterial       132 4             HCO3, Arterial       18 0             Base Excess, Arterial       -5 0                  O2/Vent Data     None                  Physical exam:  Vitals reviewed     Constitutional:       General: He is not in acute distress  Appearance: He is ill-appearing  HENT:      Head: Normocephalic and atraumatic  Right Ear: External ear normal       Left Ear: External ear normal       Nose: Nose normal       Mouth/Throat:      Mouth: Mucous membranes are moist       Pharynx: Oropharynx is clear  Eyes:      General:         Right eye: No discharge  Left eye: No discharge  Conjunctiva/sclera: Conjunctivae normal       Pupils: Pupils are equal, round, and reactive to light  Cardiovascular:      Rate and Rhythm: Normal rate and regular rhythm  Pulmonary:      Effort: Pulmonary effort is normal       Breath sounds: Normal breath sounds  Abdominal:      General: Bowel sounds are normal  There is distension  Palpations: Abdomen is soft  Tenderness: There is no abdominal tenderness  Musculoskeletal:         General: No swelling or tenderness  Right lower leg: No edema  Left lower leg: No edema  Skin:     General: Skin is warm and dry  Coloration: Skin is not jaundiced  Findings: Bruising and lesion present  Neurological:      Comments: Does not follow commands, No response to nox stim     Psychiatric:      Comments: Unable to assess        Laboratory and Diagnostics:  Results from last 7 days   Lab Units 01/16/23  0601 01/16/23  0013 01/15/23  2003 01/15/23  1215 01/15/23  0445 01/14/23  1749 01/14/23  1605 01/14/23  1203 01/14/23  0947 01/14/23  0440 01/13/23  0656 01/12/23  0635 01/11/23  0513   WBC Thousand/uL 9 71  --   --   --  20 38* 27 91*  --   --  22 40* 19 37* 18 41* 16 86* 14 64*   HEMOGLOBIN g/dL 7 2* 7 2* 7 5* 8 2* 6 9* 7 9*  --  7 2* 8 8* 12 1 12 3 12 8 13 7   I STAT HEMOGLOBIN g/dl  --   --   --   --   --   --  8 2*  --   --   --   --   --   --    HEMATOCRIT % 21 1* 21 9*  --   --  20 0* 23 3*  --  21 3* 25 8* 34 6* 36 1* 36 8 41 0   HEMATOCRIT, ISTAT %  --   --   --   --   --   --  24*  --   --   --   --   --   --    PLATELETS Thousands/uL 182  --   --   --  191 180  --   --  183 194 191 221 227   NEUTROS PCT % 80*  --   --   --  83* 83*  --   --   --  88* 86* 87* 84*   MONOS PCT % 9  --   --   --  8 8  --   --   --  5 6 6 7     Results from last 7 days   Lab Units 01/16/23  0013 01/15/23  1752 01/15/23  1215 01/15/23  0447 01/15/23  0445 01/15/23  0025 01/14/23  1749 01/14/23  1605 01/14/23  0440 01/13/23  0656 01/12/23  0635 01/11/23  0513 01/10/23  0441   SODIUM mmol/L 154* 150* 144 140  --  136 130*  --  125* 125* 130* 131* 131*   POTASSIUM mmol/L 3 7 3 9 3 4* 4 0  --  4 4 5 1  --  4 1 3 9 4 1 4 6 4 5   CHLORIDE mmol/L 130* 126* 119* 114*  --  110* 102  --  94* 95* 97 96 98   CO2 mmol/L 18* 17* 18* 17*  --  18* 19*  --  22 21 21 18* 25   CO2, I-STAT mmol/L  --   --   --   --   --   --   --  18*  --   --   --   --   --    ANION GAP mmol/L 6 7 7 9  --  8 9  --  9 9 12 17* 8   BUN mg/dL 38* 45* 50* 50*  --  50* 48*  --  20 19 24 29* 38*   CREATININE mg/dL 1 02 1 07 1 10 1 24  --  1 44* 1 50*  --  0 90 0 79 0 86 0 92 1 10   CALCIUM mg/dL 8 0* 7 7* 7 7* 7 5*  --  7 2* 7 5*  --  8 9 9 1 9 3 9 2 8 7   GLUCOSE RANDOM mg/dL 128 138 142* 146*  --  276* 284*  --  195* 232* 254* 214* 227*   ALT U/L  --   --   --   --  86*  --  83*  --  17 15 14 17 22   AST U/L  --   --   --   --  223*  --  212*  --  51* 43 55* 59* 26   ALK PHOS U/L  --   --   --   --  68  --  74  --  110 101 98 104 87   ALBUMIN g/dL  --   --   --   --  1 3*  --  1 3*  --  1 7* 1 8* 1 8* 2 0* 1 8*   TOTAL BILIRUBIN mg/dL  --   --   --   --  0 54  --  0 55  --  0 73 0 49 0 54 0 59 0 58     Results from last 7 days   Lab Units 01/15/23  0445 01/14/23 1749 01/14/23  0440 01/13/23  0656 01/12/23  0635 01/11/23  0513   MAGNESIUM mg/dL 2 2 2 0 1 6 1 8 1 6 1 7   PHOSPHORUS mg/dL 3 3 5 4*  --   --   --   --       Results from last 7 days   Lab Units 01/14/23 1749 01/14/23  0947   INR  1 64* 1 50*   PTT seconds 33  --           Results from last 7 days   Lab Units 01/14/23  1749 01/14/23  1404 01/14/23  0947   LACTIC ACID mmol/L 1 8 8 9* 2 9*     ABG:  Results from last 7 days   Lab Units 01/16/23  0602   PH ART  7 461*   PCO2 ART mm Hg 25 9*   PO2 ART mm Hg 132 4*   HCO3 ART mmol/L 18 0*   BASE EXC ART mmol/L -5 0   ABG SOURCE  Line, Arterial     VBG:  Results from last 7 days   Lab Units 01/16/23  0602 01/15/23  0025 01/14/23  2056   PH JASON   --   --  7 313   PCO2 JASON mm Hg  --   --  36 2*   PO2 JASON mm Hg  --   --  42 0   HCO3 JASON mmol/L  --   --  17 9*   BASE EXC JASON mmol/L  --   --  -7 6   ABG SOURCE  Line, Arterial   < >  --     < > = values in this interval not displayed  Results from last 7 days   Lab Units 01/15/23  0445 01/14/23  0947   PROCALCITONIN ng/ml 15 09* 3 20*       Micro  Results from last 7 days   Lab Units 01/15/23  0131 01/13/23  0657 01/11/23  0728 01/09/23  1836 01/09/23  0743   BLOOD CULTURE   --  No Growth at 48 hrs  Staphylococcus aureus*  Staphylococcus aureus*  --   --    GRAM STAIN RESULT  Gram positive cocci in clusters* Gram positive cocci in clusters* Gram positive cocci in clusters*  Gram positive cocci in clusters* No Polys or Bacteria seen 3+ Polys  No bacteria seen   BODY FLUID CULTURE, STERILE   --   --   --   --  No growth       EKG: No recent EKG last 24hr     Imaging:  I have personally reviewed pertinent reports  Intake and Output  I/O       01/14 0701  01/15 0700 01/15 0701 01/16 0700    P  O   6    I V  (mL/kg) 4214 3 (41 3) 2138 1 (21)    Blood 350 700    NG/GT  165    IV Piggyback 3200 700    Total Intake(mL/kg) 7764 3 (76 1) 3709 1 (36 4)    Urine (mL/kg/hr) 1550 3825 (1 6)    Emesis/NG output  100    Stool  0    Total Output 1550 3925    Net +6214 3 -216          Unmeasured Urine Occurrence 1 x     Unmeasured Stool Occurrence  2 x        Height and Weights   Height: 5' 10" (177 8 cm)  IBW (Ideal Body Weight): 73 kg  Body mass index is 32 27 kg/m²    Weight (last 2 days)     Date/Time Weight    01/15/23 0534 102 (548 87)    01/14/23 1825 102 (353 29)            Nutrition Diet Orders   (From admission, onward)             Start     Ordered    01/16/23 0001  Diet NPO; Sips with meds  Diet effective midnight        References:    Nutrtion Support Algorithm Enteral vs  Parenteral   Question Answer Comment   Diet Type NPO    NPO Except:  Sips with meds        01/15/23 2049              Diet: NPO      Active Medications  Scheduled Meds:  Current Facility-Administered Medications   Medication Dose Route Frequency Provider Last Rate   • acetaminophen  650 mg Oral Q6H Bernie R Sommer PA-C     • chlorhexidine  15 mL Mouth/Throat Q12H [facility] Telly Ritter DO     • dexmedetomidine  0 1-0 7 mcg/kg/hr Intravenous Titrated Darian Vonda Rasmuson, PA-C 0 4 mcg/kg/hr (01/15/23 2112)   • fentaNYL  50 mcg/hr Intravenous Continuous Trevon Pool, PA-C 50 mcg/hr (01/15/23 1721)   • fentanyl citrate (PF)  50 mcg Intravenous Q2H PRN Telly Ritter DO     • heparin (porcine)  5,000 Units Subcutaneous [facility] Telly Ritter DO     • hydrocortisone sodium succinate  50 mg Intravenous [facility] Du Lindsay MD     • insulin regular (HumuLIN R,NovoLIN R) infusion  0 3-21 Units/hr Intravenous Titrated Trevon Pool, PA-C 2 Units/hr (01/16/23 0603)   • ipratropium  0 5 mg Nebulization TID Tess Clinton MD     • levalbuterol  1 25 mg Nebulization TID Telly Ritter DO     • nafcillin  2,000 mg Intravenous Q4H JAI Parker Stopped (01/16/23 0600)   • norepinephrine  1-30 mcg/min Intravenous Titrated Telly Ritter DO 1 mcg/min (01/16/23 0604)   • ondansetron  4 mg Intravenous Q6H PRN JAI Parker     • pantoprozole (PROTONIX) infusion (Continuous)  8 mg/hr Intravenous Continuous Piero Salcedo MD 8 mg/hr (01/16/23 0244)   • sodium chloride  30 mL/hr Intravenous Continuous Darian Vonda Rasmuson, PA-C 30 mL/hr (01/16/23 0101)   • sodium chloride  4 mL Nebulization TID Cony Ross MD       Continuous Infusions:  dexmedetomidine, 0 1-0 7 mcg/kg/hr, Last Rate: 0 4 mcg/kg/hr (01/15/23 2112)  fentaNYL, 50 mcg/hr, Last Rate: 50 mcg/hr (01/15/23 1721)  insulin regular (HumuLIN R,NovoLIN R) infusion, 0 3-21 Units/hr, Last Rate: 2 Units/hr (01/16/23 0603)  norepinephrine, 1-30 mcg/min, Last Rate: 1 mcg/min (01/16/23 0604)  pantoprozole (PROTONIX) infusion (Continuous), 8 mg/hr, Last Rate: 8 mg/hr (01/16/23 0244)  sodium chloride, 30 mL/hr, Last Rate: 30 mL/hr (01/16/23 0101)      PRN Meds:   fentanyl citrate (PF), 50 mcg, Q2H PRN  ondansetron, 4 mg, Q6H PRN        Allergies   Allergies   Allergen Reactions   • Sulfamethoxazole-Trimethoprim Nausea Only and GI Intolerance     ---------------------------------------------------------------------------------------  Advance Directive and Living Will:      Power of :    POLST:    ---------------------------------------------------------------------------------------    Leo Robles MD      Portions of the record may have been created with voice recognition software  Occasional wrong word or "sound a like" substitutions may have occurred due to the inherent limitations of voice recognition software    Read the chart carefully and recognize, using context, where substitutions have occurred

## 2023-01-16 NOTE — ASSESSMENT & PLAN NOTE
Medical management per primary team  EF 65%  CT surgery following, currently not a candidate for AVR given active sepsis but may be candidate once medically stable and improved

## 2023-01-16 NOTE — OR NURSING
The patient's left skull bone flap was placed in the Operating Room Clean [employer] Freezer at 1120 am on 1- by Zoë Camarena

## 2023-01-16 NOTE — PHYSICAL THERAPY NOTE
Physical Therapy Cancellation Note    PT orders received chart review completed  Pt is currently intubated/sedated and not appropriate to participate in skilled PT at this time  PT will follow and eval as medically appropriate  01/16/23 1224   Note Type   Note type Cancelled Session; Evaluation   Cancel Reasons Intubated/sedated       Ida Nunez, PT

## 2023-01-16 NOTE — PROGRESS NOTES
Progress Note - Orthopedics   Tyler Pope 58 y o  male MRN: [id number]  Unit/Bed#: Cat Scan      Subjective:    62 y o male no acute events patient remains intubated sedated    On chart impression    Labs:  0   Lab Value Date/Time    HCT 21 1 (L) 01/16/2023 0601    HCT 21 9 (L) 01/16/2023 0013    HCT 20 0 (L) 01/15/2023 0445    HCT 55 5 (H) 05/14/2016 0809    HGB 7 2 (L) 01/16/2023 0601    HGB 7 2 (L) 01/16/2023 0013    HGB 7 5 (L) 01/15/2023 2003    HGB 18 3 (H) 05/14/2016 0809    INR 1 64 (H) 01/14/2023 1749    WBC 9 71 01/16/2023 0601    WBC 20 38 (H) 01/15/2023 0445    WBC 27 91 (H) 01/14/2023 1749    WBC 8 0 05/14/2016 0809    CRP 62 0 (H) 02/14/2022 0811       Meds:    Current Facility-Administered Medications:   •  acetaminophen (TYLENOL) oral suspension 650 mg, 650 mg, Oral, Q6H, Bernie R Rasmuson, PA-C, 650 mg at 01/16/23 0511  •  chlorhexidine (PERIDEX) 0 12 % oral rinse 15 mL, 15 mL, Mouth/Throat, Q12H [address], Alana Knox, DO, 15 mL at 01/15/23 2023  •  dexmedeTOMIDine (Precedex) 400 mcg in sodium chloride 0 9% 100 mL, 0 1-0 7 mcg/kg/hr, Intravenous, Titrated, Oneda Alicia Rasmuson, PA-C, Last Rate: 10 2 mL/hr at 01/15/23 2112, 0 4 mcg/kg/hr at 01/15/23 2112  •  fentaNYL 1000 mcg in sodium chloride 0 9% 100mL infusion, 50 mcg/hr, Intravenous, Continuous, Bernie R Rasmuson, PA-C, Last Rate: 5 mL/hr at 01/15/23 1721, 50 mcg/hr at 01/15/23 1721  •  fentanyl citrate (PF) 100 MCG/2ML 50 mcg, 50 mcg, Intravenous, Q2H PRN, Alyssa Lizett, DO, 50 mcg at 01/16/23 0029  •  heparin (porcine) subcutaneous injection 5,000 Units, 5,000 Units, Subcutaneous, Q8H [address], Alyssa Lizett, DO, 5,000 Units at 01/16/23 0604  •  [COMPLETED] hydrocortisone (Solu-CORTEF) injection 100 mg, 100 mg, Intravenous, Once, 100 mg at 01/14/23 2039 **FOLLOWED BY** hydrocortisone (Solu-CORTEF) injection 50 mg, 50 mg, Intravenous, Q8H [address], Brandy Choudhary MD, 50 mg at 01/16/23 0604  •  insulin regular (HumuLIN R,NovoLIN R) 1 Units/mL in sodium chloride 0 9 % 100 mL infusion, 0 3-21 Units/hr, Intravenous, Titrated, Malick Novoa PA-C, Last Rate: 2 mL/hr at 01/16/23 0603, 2 Units/hr at 01/16/23 0603  •  ipratropium (ATROVENT) 0 02 % inhalation solution 0 5 mg, 0 5 mg, Nebulization, TID, Channing Fall MD, 0 5 mg at 01/15/23 1849  •  levalbuterol (XOPENEX) inhalation solution 1 25 mg, 1 25 mg, Nebulization, TID, Abimael Alcocer DO, 1 25 mg at 01/15/23 1849  •  nafcillin (NALLPEN) 2,000 mg in sodium chloride 0 9 % 100 mL IVPB, 2,000 mg, Intravenous, Q4H, Angel January, CRNP, Stopped at 01/16/23 0600  •  norepinephrine (LEVOPHED) 4 mg (STANDARD CONCENTRATION) IV in sodium chloride 0 9% 250 mL, 1-30 mcg/min, Intravenous, Titrated, Abimael Alcocer DO, Last Rate: 3 8 mL/hr at 01/16/23 0604, 1 mcg/min at 01/16/23 0604  •  ondansetron (ZOFRAN) injection 4 mg, 4 mg, Intravenous, Q6H PRN, Angel January, CRNP  •  pantoprazole (PROTONIX) 80 mg in sodium chloride 0 9 % 100 mL infusion, 8 mg/hr, Intravenous, Continuous, Piero Salcedo MD, Last Rate: 10 mL/hr at 01/16/23 0244, 8 mg/hr at 01/16/23 0244  •  sodium chloride (HYPERTONIC) 3 % infusion, 30 mL/hr, Intravenous, Continuous, Malick Novoa PA-C, Last Rate: 30 mL/hr at 01/16/23 0101, 30 mL/hr at 01/16/23 0101  •  sodium chloride 3 % inhalation solution 4 mL, 4 mL, Nebulization, TID, Chandrakant Snider MD, 4 mL at 01/15/23 1850    Blood Culture:   Lab Results   Component Value Date    BLOODCX No Growth at 48 hrs  01/13/2023       Wound Culture:   Lab Results   Component Value Date    WOUNDCULT Growth in Broth culture only Staphylococcus aureus (A) 01/07/2023       Ins and Outs:  I/O last 24 hours:   In: 9123 3 [P O :6; I V :4352 3; Blood:700; NG/GT:165; IV Piggyback:3900]  Out: 6598 [AWUPX:9785; Emesis/NG output:100]          Physical:  Vitals:    01/16/23 0600   BP:    Pulse: 88   Resp: (!) 23   Temp: (!) 101 8 °F (38 8 °C)   SpO2: 99%     Musculoskeletal: left Upper Extremity and left lower extremity cardiology  General: Intubated sedated, ET tube in place  HEENT: Opens eyes to voice  Cardiovascular: No JVD  Respiratory: No audible wheezing, stridor, signs of respiratory distress      · Skin left elbow without erythema mild serous drainage no laurita purulence expressible no new collections  · Dressing clean intact  · TTP: Unable to assess due to intubated sedated  · Unable to assess motor or sensory exam due to intubated sedated  · 2+ radial pulse, hand warm brisk cap refill  ·     · Skin left midline knee incision well healing without erythema no drainage no induration  · Dressing clean dry intact  · Unable to assess tenderness palpation due to intubated sedated  · Unable to assess detailed neurovascular exam due to intubated sedated s  · 2+ DP pulse, foot warm brisk cap refill    Assessment:    62 y o male left total knee prosthetic joint infection and left elbow abscess status post washout probably exchange antibiotic bead placement January 9  No surgical site complication at this time  Plan:  · WBAT LLE and LUE  · Continue antibiotics  · Continue dry dressing changes to left elbow as needed basis for saturation    · Orthopedics will follow  · Dispo: Discharge and outpatient follow-up pending medical stability    Merlinda French, MD

## 2023-01-16 NOTE — WOUND OSTOMY CARE
Consult Note - Wound   Rachana Hoyos 58 y o  male MRN: [id number]  Unit/Bed#: ICU 12 Encounter: [id number]        History and Present Illness:  Patient is 59 yo male admitted to [facility] with stroke  Patient has history of DM, HLD, HTN,   Patient has indwelling ruiz catheter and internal fecal management system  Patient is dependent for all care  Patient is critically ill in ICU on critical care low air loss mattress, intubated and sedated  Patient's family having goals of care meeting tomorrow  Assessment Findings:     1  Left elbow wound-  Full thickness wound of unknown etiology, with pink tissue,  Small serous drainage but potential for large drainage due to arm edema  B/L heels and toes pictured, looks to be vascular from being on multiple pressors, recommending hydraguard to heels and monitoring  No induration, fluctuance, odor, warmth/temperature differences, redness, or purulence noted to the above noted wounds and skin areas assessed  New dressings applied per orders listed below  Patient tolerated well- no s/s of non-verbal pain or discomfort observed during the encounter  Bedside nurse aware of plan of care  See flow sheets for more detailed assessment findings  Wound care will sign off  Skin care plans:  1-Hydraguard to bilateral sacrum, buttock and heels BID and PRN  2-Elevate heels to offload pressure  3-Ehob cushion in chair when out of bed  4-Moisturize skin daily with skin nourishing cream   5-Turn/reposition q2h or when medically stable for pressure re-distribution on skin  6-Cleanse left elbow wound with normal saline, apply silver alginate to wound bed, cover with Allevyn foam  Change every other day and PRN soilage/displacement         Wounds:  Wound 01/09/23 Heel Left;Medial (Active)   Wound Image   01/16/23 1419   Wound Description MANDA 01/13/23 0730   Danisha-wound Assessment MANDA 01/13/23 0730   Wound Length (cm) 1 cm 01/16/23 1419   Wound Width (cm) 2 cm 01/16/23 1419   Wound Depth (cm) 0 cm 01/16/23 1419   Wound Surface Area (cm^2) 2 cm^2 01/16/23 1419   Wound Volume (cm^3) 0 cm^3 01/16/23 1419   Calculated Wound Volume (cm^3) 0 cm^3 01/16/23 1419   Drainage Amount None 01/13/23 0730   Treatments Ice applied 01/09/23 1945   Dressing Open to air 01/16/23 1200   Dressing Status Clean;Dry; Intact 01/13/23 0730       Wound 01/09/23 Heel Left (Active)   Wound Image   01/16/23 1418   Wound Description Other (Comment) 01/16/23 0400   Danisha-wound Assessment MANDA 01/16/23 0400   Wound Length (cm) 0 6 cm 01/16/23 1418   Wound Width (cm) 1 5 cm 01/16/23 1418   Wound Depth (cm) 0 cm 01/16/23 1418   Wound Surface Area (cm^2) 0 9 cm^2 01/16/23 1418   Wound Volume (cm^3) 0 cm^3 01/16/23 1418   Calculated Wound Volume (cm^3) 0 cm^3 01/16/23 1418   Dressing Open to air 01/16/23 1200   Wound 01/09/23 Elbow Left (Active)   Wound Image   01/16/23 1413   Wound Description Pink 01/16/23 1413   Danisha-wound Assessment Clean;Dry; Intact 01/16/23 1413   Wound Length (cm) 0 3 cm 01/16/23 1413   Wound Width (cm) 0 8 cm 01/16/23 1413   Wound Depth (cm) 0 9 cm 01/16/23 1413   Wound Surface Area (cm^2) 0 24 cm^2 01/16/23 1413   Wound Volume (cm^3) 0 216 cm^3 01/16/23 1413   Calculated Wound Volume (cm^3) 0 22 cm^3 01/16/23 1413   Tunneling 0 cm 01/16/23 1413   Tunneling in depth located at 0 01/16/23 1413   Undermining 0 01/16/23 1413   Undermining is depth extending from 0 01/16/23 1413   Wound Site Closure MANDA 01/16/23 1413   Drainage Amount Small 01/16/23 1413   Drainage Description Serous 01/16/23 1413   Non-staged Wound Description Full thickness 01/16/23 1413   Treatments Cleansed;Irrigation with NSS 01/16/23 1413   Dressing Calcium Alginate with Silver; Foam, Silicon (eg  Allevyn, etc) 01/16/23 1413   Wound packed?  No 01/16/23 1413   Packing- # removed 0 01/16/23 1413   Packing- # inserted 0 01/16/23 1413   Dressing Changed New 01/16/23 1413   Patient Tolerance Tolerated well 01/16/23 1413   Dressing Status Clean;Dry; Intact 01/16/23 1416                   Nathalia BAINSN, RN, [employer]

## 2023-01-16 NOTE — ASSESSMENT & PLAN NOTE
Holohemispheric left MCA stroke on 1/14 in a patient with a h/o MSSA bacteremia   · PPD 2 left MCA thrombectomy for M1 occlusion with TICI 2B revascularization (1/14, [place])  · NIHSS 29 on arrival  · Also with MSSA bacteremia, AV endocarditis, left elbow and knee septic arthritis, C5-6 osteomyelitis, pulmonary cavitary lesion, splenic infarct, [place] lesions   · Patient with acute exam change - right hemiplegia and aphasia  · CTH overnight shows worsening of cytotoxic edema with brain compression and worsening MLS  · Exam change around 8am with left blown pupil and no response to pain off sedation, GCS 3T    Imaging personally reviewed with attending:  · CT head w/o, 1/16/23: Evolving large acute infarct in left MCA and left PCA territories with petechial cortical hemorrhage, worsened cytotoxic edema, worsened rightward midline shift now measuring 1 6 cm (previously 0 7 cm), worsened compression of left lateral ventricle with dilation of right lateral ventricle, and new left uncal herniation  Known smaller acute infarcts in right frontal, right parietal, right occipital and bilateral cerebellar lobes are better evaluated on MRI brain 1/14/2023    Plan:  · Discussed today's CTH and prognosis with son and brother  Discussed that patient will need emergent [facility] but that this is life saving only and will not improve QOL  They both agreed to proceed at this time  · Case request placed, pre op orders placed  Patient NPO  2u PRBC STAT given GI bleed with resultant anemia  CCM aware  · Continue to monitor neurological exam  · STAT CT head for decline in GCS greater than 2 points in 1 hour  · NA > 145, currently 158  · SBP < 140, MAP > 65  · Neurology following for stroke management, appreciate recommendations  · Medical management per primary team  · DVT ppx: SCDs  SQH on hold for OR  Not a candidate for full AC given GI bleed  Neurosurgery will continue to follow   Plan for left [facility] with Dr Marquetta Gowers emergently this morning, consent will be obtained via phone  Patient will return to ICU  Please call with questions or concerns

## 2023-01-17 NOTE — PLAN OF CARE
Problem: Prexisting or High Potential for Compromised Skin Integrity  Goal: Skin integrity is maintained or improved  Description: INTERVENTIONS:  - Identify patients at risk for skin breakdown  - Assess and monitor skin integrity  - Assess and monitor nutrition and hydration status  - Monitor labs   - Assess for incontinence   - Turn and reposition patient  - Assist with mobility/ambulation  - Relieve pressure over bony prominences  - Avoid friction and shearing  - Provide appropriate hygiene as needed including keeping skin clean and dry  - Evaluate need for skin moisturizer/barrier cream  - Collaborate with interdisciplinary team   - Patient/family teaching  - Consider wound care consult   Outcome: Progressing     Problem: MOBILITY - ADULT  Goal: Maintain or return to baseline ADL function  Description: INTERVENTIONS:  -  Assess patient's ability to carry out ADLs; assess patient's baseline for ADL function and identify physical deficits which impact ability to perform ADLs (bathing, care of mouth/teeth, toileting, grooming, dressing, etc )  - Assess/evaluate cause of self-care deficits   - Assess range of motion  - Assess patient's mobility; develop plan if impaired  - Assess patient's need for assistive devices and provide as appropriate  - Encourage maximum independence but intervene and supervise when necessary  - Involve family in performance of ADLs  - Assess for home care needs following discharge   - Consider OT consult to assist with ADL evaluation and planning for discharge  - Provide patient education as appropriate  Outcome: Progressing  Goal: Maintains/Returns to pre admission functional level  Description: INTERVENTIONS:  - Perform BMAT or MOVE assessment daily    - Set and communicate daily mobility goal to care team and patient/family/caregiver  - Collaborate with rehabilitation services on mobility goals if consulted  - Perform Range of Motion 2 times a day    - Reposition patient every 2 hours   - Dangle patient 2 times a day  - Stand patient 2 times a day  - Ambulate patient 2 times a day  - Out of bed to chair 2 times a day   - Out of bed for meals 2 times a day  - Out of bed for toileting  - Record patient progress and toleration of activity level   Outcome: Progressing     Problem: Potential for Falls  Goal: Patient will remain free of falls  Description: INTERVENTIONS:  - Educate patient/family on patient safety including physical limitations  - Instruct patient to call for assistance with activity   - Consult OT/PT to assist with strengthening/mobility   - Keep Call bell within reach  - Keep bed low and locked with side rails adjusted as appropriate  - Keep care items and personal belongings within reach  - Initiate and maintain comfort rounds  - Make Fall Risk Sign visible to staff  - Offer Toileting every 2 Hours, in advance of need  - Initiate/Maintain 2alarm  - Obtain necessary fall risk management equipment: 2  - Apply yellow socks and bracelet for high fall risk patients  - Consider moving patient to room near nurses station  Outcome: Progressing     Problem: PAIN - ADULT  Goal: Verbalizes/displays adequate comfort level or baseline comfort level  Description: Interventions:  - Encourage patient to monitor pain and request assistance  - Assess pain using appropriate pain scale  - Administer analgesics based on type and severity of pain and evaluate response  - Implement non-pharmacological measures as appropriate and evaluate response  - Consider cultural and social influences on pain and pain management  - Notify physician/advanced practitioner if interventions unsuccessful or patient reports new pain  Outcome: Progressing     Problem: INFECTION - ADULT  Goal: Absence or prevention of progression during hospitalization  Description: INTERVENTIONS:  - Assess and monitor for signs and symptoms of infection  - Monitor lab/diagnostic results  - Monitor all insertion sites, i e  indwelling lines, tubes, and drains  - Monitor endotracheal if appropriate and nasal secretions for changes in amount and color  - [place] appropriate cooling/warming therapies per order  - Administer medications as ordered  - Instruct and encourage patient and family to use good hand hygiene technique  - Identify and instruct in appropriate isolation precautions for identified infection/condition  Outcome: Progressing  Goal: Absence of fever/infection during neutropenic period  Description: INTERVENTIONS:  - Monitor WBC    Outcome: Progressing     Problem: SAFETY ADULT  Goal: Maintain or return to baseline ADL function  Description: INTERVENTIONS:  -  Assess patient's ability to carry out ADLs; assess patient's baseline for ADL function and identify physical deficits which impact ability to perform ADLs (bathing, care of mouth/teeth, toileting, grooming, dressing, etc )  - Assess/evaluate cause of self-care deficits   - Assess range of motion  - Assess patient's mobility; develop plan if impaired  - Assess patient's need for assistive devices and provide as appropriate  - Encourage maximum independence but intervene and supervise when necessary  - Involve family in performance of ADLs  - Assess for home care needs following discharge   - Consider OT consult to assist with ADL evaluation and planning for discharge  - Provide patient education as appropriate  Outcome: Progressing  Goal: Maintains/Returns to pre admission functional level  Description: INTERVENTIONS:  - Perform BMAT or MOVE assessment daily    - Set and communicate daily mobility goal to care team and patient/family/caregiver  - Collaborate with rehabilitation services on mobility goals if consulted  - Perform Range of Motion 2 times a day  - Reposition patient every 2 hours    - Dangle patient 2 times a day  - Stand patient 2 times a day  - Ambulate patient 2 times a day  - Out of bed to chair 2 times a day   - Out of bed for meals 2 times a day  - Out of bed for toileting  - Record patient progress and toleration of activity level   Outcome: Progressing  Goal: Patient will remain free of falls  Description: INTERVENTIONS:  - Educate patient/family on patient safety including physical limitations  - Instruct patient to call for assistance with activity   - Consult OT/PT to assist with strengthening/mobility   - Keep Call bell within reach  - Keep bed low and locked with side rails adjusted as appropriate  - Keep care items and personal belongings within reach  - Initiate and maintain comfort rounds  - Make Fall Risk Sign visible to staff  - Offer Toileting every 2 Hours, in advance of need  - Initiate/Maintain 2alarm  - Obtain necessary fall risk management equipment: 2  - Apply yellow socks and bracelet for high fall risk patients  - Consider moving patient to room near nurses station  Outcome: Progressing     Problem: DISCHARGE PLANNING  Goal: Discharge to home or other facility with appropriate resources  Description: INTERVENTIONS:  - Identify barriers to discharge w/patient and caregiver  - Arrange for needed discharge resources and transportation as appropriate  - Identify discharge learning needs (meds, wound care, etc )  - Arrange for interpretive services to assist at discharge as needed  - Refer to Case Management Department for coordinating discharge planning if the patient needs post-hospital services based on physician/advanced practitioner order or complex needs related to functional status, cognitive ability, or social support system  Outcome: Progressing     Problem: Knowledge Deficit  Goal: Patient/family/caregiver demonstrates understanding of disease process, treatment plan, medications, and discharge instructions  Description: Complete learning assessment and assess knowledge base    Interventions:  - Provide teaching at level of understanding  - Provide teaching via preferred learning methods  Outcome: Progressing     Problem: NEUROSENSORY - ADULT  Goal: Achieves stable or improved neurological status  Description: INTERVENTIONS  - Monitor and report changes in neurological status  - Monitor vital signs such as temperature, blood pressure, glucose, and any other labs ordered   - Initiate measures to prevent increased intracranial pressure  - Monitor for seizure activity and implement precautions if appropriate      Outcome: Progressing  Goal: Remains free of injury related to seizures activity  Description: INTERVENTIONS  - Maintain airway, patient safety  and administer oxygen as ordered  - Monitor patient for seizure activity, document and report duration and description of seizure to physician/advanced practitioner  - If seizure occurs,  ensure patient safety during seizure  - Reorient patient post seizure  - Seizure pads on all 4 side rails  - Instruct patient/family to notify RN of any seizure activity including if an aura is experienced  - Instruct patient/family to call for assistance with activity based on nursing assessment  - Administer anti-seizure medications if ordered    Outcome: Progressing  Goal: Achieves maximal functionality and self care  Description: INTERVENTIONS  - Monitor swallowing and airway patency with patient fatigue and changes in neurological status  - Encourage and assist patient to increase activity and self care     - Encourage visually impaired, hearing impaired and aphasic patients to use assistive/communication devices  Outcome: Progressing     Problem: CARDIOVASCULAR - ADULT  Goal: Maintains optimal cardiac output and hemodynamic stability  Description: INTERVENTIONS:  - Monitor I/O, vital signs and rhythm  - Monitor for S/S and trends of decreased cardiac output  - Administer and titrate ordered vasoactive medications to optimize hemodynamic stability  - Assess quality of pulses, skin color and temperature  - Assess for signs of decreased coronary artery perfusion  - Instruct patient to report change in severity of symptoms  Outcome: Progressing  Goal: Absence of cardiac dysrhythmias or at baseline rhythm  Description: INTERVENTIONS:  - Continuous cardiac monitoring, vital signs, obtain 12 lead EKG if ordered  - Administer antiarrhythmic and heart rate control medications as ordered  - Monitor electrolytes and administer replacement therapy as ordered  Outcome: Progressing     Problem: RESPIRATORY - ADULT  Goal: Achieves optimal ventilation and oxygenation  Description: INTERVENTIONS:  - Assess for changes in respiratory status  - Assess for changes in mentation and behavior  - Position to facilitate oxygenation and minimize respiratory effort  - Oxygen administered by appropriate delivery if ordered  - Initiate smoking cessation education as indicated  - Encourage broncho-pulmonary hygiene including cough, deep breathe, Incentive Spirometry  - Assess the need for suctioning and aspirate as needed  - Assess and instruct to report SOB or any respiratory difficulty  - Respiratory Therapy support as indicated  Outcome: Progressing     Problem: GASTROINTESTINAL - ADULT  Goal: Minimal or absence of nausea and/or vomiting  Description: INTERVENTIONS:  - Administer IV fluids if ordered to ensure adequate hydration  - Maintain NPO status until nausea and vomiting are resolved  - Nasogastric tube if ordered  - Administer ordered antiemetic medications as needed  - Provide nonpharmacologic comfort measures as appropriate  - Advance diet as tolerated, if ordered  - Consider nutrition services referral to assist patient with adequate nutrition and appropriate food choices  Outcome: Progressing  Goal: Maintains or returns to baseline bowel function  Description: INTERVENTIONS:  - Assess bowel function  - Encourage oral fluids to ensure adequate hydration  - Administer IV fluids if ordered to ensure adequate hydration  - Administer ordered medications as needed  - Encourage mobilization and activity  - Consider nutritional services referral to assist patient with adequate nutrition and appropriate food choices  Outcome: Progressing  Goal: Maintains adequate nutritional intake  Description: INTERVENTIONS:  - Monitor percentage of each meal consumed  - Identify factors contributing to decreased intake, treat as appropriate  - Assist with meals as needed  - Monitor I&O, weight, and lab values if indicated  - Obtain nutrition services referral as needed  Outcome: Progressing  Goal: Oral mucous membranes remain intact  Description: INTERVENTIONS  - Assess oral mucosa and hygiene practices  - Implement preventative oral hygiene regimen  - Implement oral medicated treatments as ordered  - Initiate Nutrition services referral as needed  Outcome: Progressing     Problem: GENITOURINARY - ADULT  Goal: Maintains or returns to baseline urinary function  Description: INTERVENTIONS:  - Assess urinary function  - Encourage oral fluids to ensure adequate hydration if ordered  - Administer IV fluids as ordered to ensure adequate hydration  - Administer ordered medications as needed  - Offer frequent toileting  - Follow urinary retention protocol if ordered  Outcome: Progressing  Goal: Absence of urinary retention  Description: INTERVENTIONS:  - Assess patient’s ability to void and empty bladder  - Monitor I/O  - Bladder scan as needed  - Discuss with physician/AP medications to alleviate retention as needed  - Discuss catheterization for long term situations as appropriate  Outcome: Progressing  Goal: Urinary catheter remains patent  Description: INTERVENTIONS:  - Assess patency of urinary catheter  - If patient has a chronic ruiz, consider changing catheter if non-functioning  - Follow guidelines for intermittent irrigation of non-functioning urinary catheter  Outcome: Progressing     Problem: METABOLIC, FLUID AND ELECTROLYTES - ADULT  Goal: Electrolytes maintained within normal limits  Description: INTERVENTIONS:  - Monitor labs and assess patient for signs and symptoms of electrolyte imbalances  - Administer electrolyte replacement as ordered  - Monitor response to electrolyte replacements, including repeat lab results as appropriate  - Instruct patient on fluid and nutrition as appropriate  Outcome: Progressing  Goal: Fluid balance maintained  Description: INTERVENTIONS:  - Monitor labs   - Monitor I/O and WT  - Instruct patient on fluid and nutrition as appropriate  - Assess for signs & symptoms of volume excess or deficit  Outcome: Progressing  Goal: Glucose maintained within target range  Description: INTERVENTIONS:  - Monitor Blood Glucose as ordered  - Assess for signs and symptoms of hyperglycemia and hypoglycemia  - Administer ordered medications to maintain glucose within target range  - Assess nutritional intake and initiate nutrition service referral as needed  Outcome: Progressing     Problem: SKIN/TISSUE INTEGRITY - ADULT  Goal: Skin Integrity remains intact(Skin Breakdown Prevention)  Description: Assess:  -Perform Hector assessment every 2  -Clean and moisturize skin every 2  -Inspect skin when repositioning, toileting, and assisting with ADLS  -Assess under medical devices such as 2 every 2  -Assess extremities for adequate circulation and sensation     Bed Management:  -Have minimal linens on bed & keep smooth, unwrinkled  -Change linens as needed when moist or perspiring  -Avoid sitting or lying in one position for more than 2 hours while in bed  -Keep HOB at 2degrees     Toileting:  -Offer bedside commode  -Assess for incontinence every 2  -Use incontinent care products after each incontinent episode such as 2    Activity:  -Mobilize patient 2 times a day  -Encourage activity and walks on unit  -Encourage or provide ROM exercises   -Turn and reposition patient every 2 Hours  -Use appropriate equipment to lift or move patient in bed  -Instruct/ Assist with weight shifting every 2 when out of bed in chair  -Consider limitation of chair time 2 hour intervals    Skin Care:  -Avoid use of baby powder, tape, friction and shearing, hot water or constrictive clothing  -Relieve pressure over bony prominences using 2  -Do not massage red bony areas    Next Steps:  -Teach patient strategies to minimize risks such as 2   -Consider consults to  interdisciplinary teams such as 2  Outcome: Progressing  Goal: Incision(s), wounds(s) or drain site(s) healing without S/S of infection  Description: INTERVENTIONS  - Assess and document dressing, incision, wound bed, drain sites and surrounding tissue  - Provide patient and family education  - Perform skin care/dressing changes every 2  Outcome: Progressing  Goal: Pressure injury heals and does not worsen  Description: Interventions:  - Implement low air loss mattress or specialty surface (Criteria met)  - Apply silicone foam dressing  - Instruct/assist with weight shifting every 2 minutes when in chair   - Limit chair time to 2 hour intervals  - Use special pressure reducing interventions such as 2 when in chair   - Apply fecal or urinary incontinence containment device   - Perform passive or active ROM every 2  - Turn and reposition patient & offload bony prominences every 2 hours   - Utilize friction reducing device or surface for transfers   - Consider consults to  interdisciplinary teams such as 2  - Use incontinent care products after each incontinent episode such as 2  - Consider nutrition services referral as needed  Outcome: Progressing     Problem: HEMATOLOGIC - ADULT  Goal: Maintains hematologic stability  Description: INTERVENTIONS  - Assess for signs and symptoms of bleeding or hemorrhage  - Monitor labs  - Administer supportive blood products/factors as ordered and appropriate  Outcome: Progressing     Problem: MUSCULOSKELETAL - ADULT  Goal: Maintain or return mobility to safest level of function  Description: INTERVENTIONS:  - Assess patient's ability to carry out ADLs; assess patient's baseline for ADL function and identify physical deficits which impact ability to perform ADLs (bathing, care of mouth/teeth, toileting, grooming, dressing, etc )  - Assess/evaluate cause of self-care deficits   - Assess range of motion  - Assess patient's mobility  - Assess patient's need for assistive devices and provide as appropriate  - Encourage maximum independence but intervene and supervise when necessary  - Involve family in performance of ADLs  - Assess for home care needs following discharge   - Consider OT consult to assist with ADL evaluation and planning for discharge  - Provide patient education as appropriate  Outcome: Progressing  Goal: Maintain proper alignment of affected body part  Description: INTERVENTIONS:  - Support, maintain and protect limb and body alignment  - Provide patient/ family with appropriate education  Outcome: Progressing     Problem: Nutrition/Hydration-ADULT  Goal: Nutrient/Hydration intake appropriate for improving, restoring or maintaining nutritional needs  Description: Monitor and assess patient's nutrition/hydration status for malnutrition  Collaborate with interdisciplinary team and initiate plan and interventions as ordered  Monitor patient's weight and dietary intake as ordered or per policy  Utilize nutrition screening tool and intervene as necessary  Determine patient's food preferences and provide high-protein, high-caloric foods as appropriate       INTERVENTIONS:  - Monitor oral intake, urinary output, labs, and treatment plans  - Assess nutrition and hydration status and recommend course of action  - Evaluate amount of meals eaten  - Assist patient with eating if necessary   - Allow adequate time for meals  - Recommend/ encourage appropriate diets, oral nutritional supplements, and vitamin/mineral supplements  - Order, calculate, and assess calorie counts as needed  - Recommend, monitor, and adjust tube feedings and TPN/PPN based on assessed needs  - Assess need for intravenous fluids  - Provide specific nutrition/hydration education as appropriate  - Include patient/family/caregiver in decisions related to nutrition  Outcome: Progressing     Problem: SAFETY,RESTRAINT: NV/NON-SELF DESTRUCTIVE BEHAVIOR  Goal: Remains free of harm/injury (restraint for non violent/non self-detsructive behavior)  Description: INTERVENTIONS:  - Instruct patient/family regarding restraint use   - Assess and monitor physiologic and psychological status   - Provide interventions and comfort measures to meet assessed patient needs   - Identify and implement measures to help patient regain control  - Assess readiness for release of restraint   Outcome: Progressing  Goal: Returns to optimal restraint-free functioning  Description: INTERVENTIONS:  - Assess the patient's behavior and symptoms that indicate continued need for restraint  - Identify and implement measures to help patient regain control  - Assess readiness for release of restraint   Outcome: Progressing     Problem: COPING  Goal: Pt/Family able to verbalize concerns and demonstrate effective coping strategies  Description: INTERVENTIONS:  - Assist patient/family to identify coping skills, available support systems and cultural and spiritual values  - Provide emotional support, including active listening and acknowledgement of concerns of patient and caregivers  - Reduce environmental stimuli, as able  - Provide patient education  - Assess for spiritual pain/suffering and initiate spiritual care, including notification of Pastoral Care or [facility]ennie based community as needed  - Assess effectiveness of coping strategies  Outcome: Progressing  Goal: Will report anxiety at manageable levels  Description: INTERVENTIONS:  - Administer medication as ordered  - Teach and encourage coping skills  - Provide emotional support  - Assess patient/family for anxiety and ability to cope  Outcome: Progressing

## 2023-01-17 NOTE — PROGRESS NOTES
Progress Note- Ed Rolanda 58 y o  male MRN: [id number]    Unit/Bed#: ICU 12 Encounter: [id number]      Assessment and Plan: Guerrero Gipson a 58 y o  old male currently admitted with acute left MCA stroke, initially admitted to 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  Currently also undergoing care for MSSA positive bacteremia and AV endocarditis, who was transferred to [facility] for L MCA Stroke    GI consulted for melena and acute anemia          1  Upper GI bleed  2  Duodenal ulcer    She had acute on chronic anemia secondary to large bleeding duodenal ulcer seen on the initial EGD done on 1/15  At the time the ulcer had adherent clot  Repeat EGD done on 1/16 given ongoing melena and anemia which revealed ulcer with hematin spot but no active bleeding was seen  · Hemoglobin has improved, has ongoing maroon stool in the rectal bag  Given improvement in hemoglobin no concerns of active GI bleed at this time  • If patient is to have recurrent bleeding recommend IR evaluation for GDA embolization as the ulcer appears about 2 cm in size and difficult to perform any endoscopic intervention such as placing a clip given large size of ulcer  • IV PPI can be switched to po  • Continue to monitor hemoglobin        3   Acute stroke     • Patient unfortunately had worsening CT head findings requiring emergent decompressive hemicraniectomy  • Rest of care per primary and neurosurgery        Luke Kline MD   Gastroenterology Fellow     ______________________________________________________________________    Subjective:     Patient unable to participate in history    Medication Administration - last 24 hours from 01/16/2023 1106 to 01/17/2023 1106       Date/Time Order Dose Route Action Action by     01/16/2023 1252 EST ondansetron (ZOFRAN) injection 4 mg -- Intravenous KD Hutchinson MD 01/17/2023 0954 EST nafcillin (NALLPEN) 2,000 mg in sodium chloride 0 9 % 100 mL IVPB 0 mg Intravenous Stopped Jael Santamaria, BENJIE     01/17/2023 1661 EST nafcillin (NALLPEN) 2,000 mg in sodium chloride 0 9 % 100 mL IVPB 2,000 mg Intravenous [address] Jael Santamaria, BENJIE     01/17/2023 4770 EST nafcillin (NALLPEN) 2,000 mg in sodium chloride 0 9 % 100 mL IVPB 0 mg Intravenous Stopped Jael Santamaria, BENJIE     01/17/2023 0553 EST nafcillin (NALLPEN) 2,000 mg in sodium chloride 0 9 % 100 mL IVPB 2,000 mg Intravenous [address]     01/17/2023 0217 EST nafcillin (NALLPEN) 2,000 mg in sodium chloride 0 9 % 100 mL IVPB 0 mg Intravenous Stopped Ivis Mcclain RN     01/17/2023 0117 EST nafcillin (NALLPEN) 2,000 mg in sodium chloride 0 9 % 100 mL IVPB 2,000 mg Intravenous [address]     01/16/2023 2224 EST nafcillin (NALLPEN) 2,000 mg in sodium chloride 0 9 % 100 mL IVPB 0 mg Intravenous Stopped Ivis Mcclain RN     01/16/2023 2124 EST nafcillin (NALLPEN) 2,000 mg in sodium chloride 0 9 % 100 mL IVPB 2,000 mg Intravenous [address]     01/16/2023 1921 EST nafcillin (NALLPEN) 2,000 mg in sodium chloride 0 9 % 100 mL IVPB 0 mg Intravenous Stopped Ivis Mcclain RN     01/16/2023 1821 EST nafcillin (NALLPEN) 2,000 mg in sodium chloride 0 9 % 100 mL IVPB 2,000 mg Intravenous Abhi Pelaez, BENJIE     01/16/2023 1306 EST nafcillin (NALLPEN) 2,000 mg in sodium chloride 0 9 % 100 mL IVPB 2,000 mg Intravenous New Bag Zo Pelaez RN     01/16/2023 1257 EST nafcillin (NALLPEN) 2,000 mg in sodium chloride 0 9 % 100 mL IVPB 2,000 mg Intravenous Not Given Zo Pelaez RN     01/16/2023 1252 EST nafcillin (NALLPEN) 2,000 mg in sodium chloride 0 9 % 100 mL IVPB -- Intravenous MAR Catheryn Peabody, MD     01/17/2023 0740 EST levalbuterol (XOPENEX) inhalation solution 1 25 mg 1 25 mg Nebulization Given RT Alexis     01/16/2023 2011 EST levalbuterol (XOPENEX) inhalation solution 1 25 mg 1 25 mg Nebulization Given Nani Rashid, RT     01/16/2023 1322 EST levalbuterol (XOPENEX) inhalation solution 1 25 mg 1 25 mg Nebulization Given Den Hightower, RT     01/16/2023 1252 EST levalbuterol (XOPENEX) inhalation solution 1 25 mg -- Nebulization KD Davidson MD     01/17/2023 0930 EST chlorhexidine (PERIDEX) 0 12 % oral rinse 15 mL 15 mL Mouth/Throat Given Chirag Smith, RN     01/16/2023 2024 EST chlorhexidine (PERIDEX) 0 12 % oral rinse 15 mL 15 mL Mouth/Throat Given Rosamaria Braxton, RN     01/16/2023 1306 EST chlorhexidine (PERIDEX) 0 12 % oral rinse 15 mL 15 mL Mouth/Throat Given Lorenza Segal, RN     01/16/2023 1252 EST chlorhexidine (PERIDEX) 0 12 % oral rinse 15 mL -- Mouth/Throat KD Davidson MD     01/17/2023 0740 EST ipratropium (ATROVENT) 0 02 % inhalation solution 0 5 mg 0 5 mg Nebulization Given Justin Campos, RT     01/16/2023 2011 EST ipratropium (ATROVENT) 0 02 % inhalation solution 0 5 mg 0 5 mg Nebulization Given Nani Rashid, RT     01/16/2023 1322 EST ipratropium (ATROVENT) 0 02 % inhalation solution 0 5 mg 0 5 mg Nebulization Given Den Hightower, RT     01/16/2023 1252 EST ipratropium (ATROVENT) 0 02 % inhalation solution 0 5 mg -- Nebulization KD Davidson MD     01/17/2023 0237 EST fentanyl citrate (PF) 100 MCG/2ML 50 mcg 50 mcg Intravenous Given Rosamaria Braxton RN     01/16/2023 1252 EST fentanyl citrate (PF) 100 MCG/2ML 50 mcg -- Intravenous KD Davidson MD     01/17/2023 1026 EST fentaNYL 1000 mcg in sodium chloride 0 9% 100mL infusion 100 mcg/hr Intravenous [address] Beardbard Smith, RN     01/17/2023 0016 EST fentaNYL 1000 mcg in sodium chloride 0 9% 100mL infusion 100 mcg/hr Intravenous [address] [facility], [address] [facility]     01/16/2023 1512 EST fentaNYL 1000 mcg in sodium chloride 0 9% 100mL infusion 100 mcg/hr Intravenous Rate/Dose Change Lorenza Segal RN     01/16/2023 1433 EST fentaNYL 1000 mcg in sodium chloride 0 9% 100mL infusion 50 mcg/hr Intravenous New Bag Zo Pelaez RN     01/16/2023 1230 EST fentaNYL 1000 mcg in sodium chloride 0 9% 100mL infusion 100 mcg/hr Intravenous Rate/Dose Change Zo Pelaez RN     01/17/2023 0359 EST dexmedeTOMIDine (Precedex) 400 mcg in sodium chloride 0 9% 100 mL 0 3 mcg/kg/hr Intravenous [address] [facility]     01/17/2023 0740 EST sodium chloride 3 % inhalation solution 4 mL 4 mL Nebulization Given Justin Campos, RT     01/16/2023 2011 EST sodium chloride 3 % inhalation solution 4 mL 4 mL Nebulization Given Nani Rashid, RT     01/16/2023 1322 EST sodium chloride 3 % inhalation solution 4 mL 4 mL Nebulization Given Deonna Lopes, RT     01/16/2023 1252 EST sodium chloride 3 % inhalation solution 4 mL -- Nebulization MAR Catheryn Peabody, MD     01/17/2023 1031 EST insulin regular (HumuLIN R,NovoLIN R) 1 Units/mL in sodium chloride 0 9 % 100 mL infusion 1 5 Units/hr Intravenous Rate/Dose Change Jael Santamaria RN     01/17/2023 6453 EST insulin regular (HumuLIN R,NovoLIN R) 1 Units/mL in sodium chloride 0 9 % 100 mL infusion 1 5 Units/hr Intravenous Rate/Dose Change Jael Santamaria RN     01/17/2023 0602 EST insulin regular (HumuLIN R,NovoLIN R) 1 Units/mL in sodium chloride 0 9 % 100 mL infusion 0 5 Units/hr Intravenous Rate/Dose Change Ivis Mcclain RN     01/17/2023 0157 EST insulin regular (HumuLIN R,NovoLIN R) 1 Units/mL in sodium chloride 0 9 % 100 mL infusion 2 Units/hr [facility]venous [address] [facility]     01/16/2023 1819 EST insulin regular (HumuLIN R,NovoLIN R) 1 Units/mL in sodium chloride 0 9 % 100 mL infusion 2 Units/hr Intravenous Rate/Dose Verify Zo Pelaez RN     01/16/2023 1635 EST insulin regular (HumuLIN R,NovoLIN R) 1 Units/mL in sodium chloride 0 9 % 100 mL infusion 2 Units/hr Intravenous Rate/Dose Change Zo Pelaez RN     01/16/2023 8819 EST insulin regular (HumuLIN R,NovoLIN R) 1 Units/mL in sodium chloride 0 9 % 100 mL infusion 6 Units/hr Intravenous Rate/Dose Change Iza Fortune, BENJIE     01/16/2023 1309 EST insulin regular (HumuLIN R,NovoLIN R) 1 Units/mL in sodium chloride 0 9 % 100 mL infusion 3 Units/hr Intravenous Rate/Dose Change Iza Fortune, RN     01/17/2023 0156 EST pantoprazole (PROTONIX) 80 mg in sodium chloride 0 9 % 100 mL infusion 8 mg/hr Intravenous [address], [address] [facility]     01/16/2023 1445 EST pantoprazole (PROTONIX) 80 mg in sodium chloride 0 9 % 100 mL infusion 8 mg/hr Intravenous [address] Iza Fortune, RN     01/17/2023 8410 EST acetaminophen (TYLENOL) oral suspension 650 mg 650 mg Oral Given Austin Grippe, RN     01/17/2023 6335 EST acetaminophen (TYLENOL) oral suspension 650 mg 650 mg Oral Given Austin Grippe, RN     01/16/2023 1821 EST acetaminophen (TYLENOL) oral suspension 650 mg 650 mg Oral Given Iza Fortune, RN     01/16/2023 1306 EST acetaminophen (TYLENOL) oral suspension 650 mg 650 mg Oral Given Iza Fortune, RN     01/16/2023 1252 EST acetaminophen (TYLENOL) oral suspension 650 mg -- Oral KD Ospina MD     01/16/2023 1256 EST chlorhexidine (PERIDEX) 0 12 % oral rinse 15 mL 15 mL Swish & Spit Not Given Iza Fortune, BENJIE     01/16/2023 1307 EST ceFAZolin (ANCEF) IVPB (premix in dextrose) 2,000 mg 50 mL 0 mg Intravenous Stopped Iza Fortune, BENJIE     01/16/2023 1239 EST ceFAZolin (ANCEF) IVPB (premix in dextrose) 2,000 mg 50 mL -- Intravenous Continued from [facility], RN     01/16/2023 1200 EST ceFAZolin (ANCEF) IVPB (premix in dextrose) 2,000 mg 50 mL 2,000 mg Intravenous Not Given Iza Fortune, BENJIE     01/16/2023 1257 EST neomycin-polymyxin B (NEOSPORIN-) 1 mL in sodium chloride 0 9 % 1,000 mL irrigation -- Irrigation Not Given Iza Fortune RN     01/17/2023 0042 EST labetalol (NORMODYNE) injection 10 mg 10 mg Intravenous Given Jing Macias RN     01/17/2023 7951 EST ceFAZolin (ANCEF) IVPB (premix in dextrose) 1,000 mg 50 mL 0 mg Intravenous Stopped Misha Moyer, BENJIE     01/17/2023 7572 EST ceFAZolin (ANCEF) IVPB (premix in dextrose) 1,000 mg 50 mL 1,000 mg Intravenous [address] [facility]     01/16/2023 2024 EST ceFAZolin (ANCEF) IVPB (premix in dextrose) 1,000 mg 50 mL 1,000 mg Intravenous [address] [facility]     01/17/2023 0847 EST levETIRAcetam (KEPPRA) tablet 500 mg 500 mg Oral Given Almyogesh Navarro, BENJIE     01/16/2023 2024 EST levETIRAcetam (KEPPRA) tablet 500 mg 500 mg Oral Given Ivis Mcclain, BENJIE     01/16/2023 1306 EST levETIRAcetam (KEPPRA) tablet 500 mg 500 mg Oral Given Zo Pelaez, BENJIE     01/16/2023 1514 EST neostigmine (BLOXIVERZ) injection 3 mg 3 mg Intravenous Not Given Zo Pelaez RN     01/16/2023 1513 EST glycopyrrolate (ROBINUL) injection 0 6 mg 0 6 mg Intravenous Not Given Zo Pelaez RN     01/16/2023 1433 EST potassium chloride oral solution 40 mEq 40 mEq Oral Given Zo Pelaez RN     01/16/2023 1634 EST propofol (DIPRIVAN) 1000 mg in 100 mL infusion (premix) 0 mcg/kg/min Intravenous Stopped Zo Pelaez RN     01/16/2023 1512 EST propofol (DIPRIVAN) 1000 mg in 100 mL infusion (premix) 10 mcg/kg/min Intravenous [address] [facility] Zo Pelaez, BENJIE     01/16/2023 2124 EST potassium chloride oral solution 40 mEq 40 mEq Per NG Tube Given Ivis Mcclain RN     01/17/2023 0042 EST dextrose 5 % infusion 50 mL/hr Intravenous [address] [facility]     01/17/2023 0117 EST potassium chloride oral solution 40 mEq 40 mEq Per NG Tube Given Ivis Mcclain, BENJIE     01/17/2023 0238 EST labetalol (NORMODYNE) injection 10 mg 10 mg Intravenous Given Ivis Mcclain RN     01/17/2023 0500 EST HYDROmorphone (DILAUDID) injection 1 mg -- Intravenous Canceled Entry Ivis Mcclain RN          Objective:     Vitals: Blood pressure 124/56, pulse 84, temperature 100 4 °F (38 °C), temperature source Bladder, resp  rate 22, height 5' 10" (1 778 m), weight 102 kg (224 lb 13 9 oz), SpO2 93 %  ,Body mass index is 32 27 kg/m²        Intake/Output Summary (Last 24 hours) at 1/17/2023 1106  Last data filed at 1/17/2023 1030  Gross per 24 hour   Intake 2462 76 ml   Output 4115 ml   Net -1652 24 ml       Physical Exam:   General Appearance: Intubated, not responsive,  Abdomen: Soft, non-tender, non-distended; bowel sounds normal    Invasive Devices     Central Venous Catheter Line  Duration           CVC Central Lines 01/14/23 Triple 20cm 2 days          Peripheral Intravenous Line  Duration           Peripheral IV 01/12/23 Right Antecubital 4 days    Peripheral IV 01/14/23 Left;Ventral (anterior) Forearm 2 days          Arterial Line  Duration           Arterial Line 01/17/23 Radial <1 day          Drain  Duration           Urethral Catheter 2 days    Closed/Suction Drain Left Other (Comment) Bulb 1 day    NG/OG/Enteral Tube Orogastric 18 Fr Center mouth 1 day    Rectal Tube With balloon 1 day    Ventriculostomy/Subdural Ventricular drainage catheter with ICP microsensor Left Frontal region 1 day          Airway  Duration           ETT  8 mm 2 days                Lab Results:  No results displayed because visit has over 200 results  Imaging Studies: I have personally reviewed pertinent imaging studies

## 2023-01-17 NOTE — OCCUPATIONAL THERAPY NOTE
Occupational Therapy Cancel Note     01/17/23 1129   OT Last Visit   OT Visit Date 01/17/23   Note Type   Note type Cancelled Session   Cancel Reasons Intubated/sedated           Choco Granger, OT

## 2023-01-17 NOTE — RESPIRATORY THERAPY NOTE
RT Ventilator Management Note  Arlene Solo 58 y o  male MRN: [id number]  Unit/Bed#: ICU 12 Encounter: [id number]      Daily Screen         1/16/2023  1204 1/17/2023  0742          Patient safety screen outcome[de-identified] Failed Passed      Not Ready for Weaning due to[de-identified] Underline problem not resolved --      Spont breathing trial % for 30 min: -- Yes                Physical Exam:   Assessment Type: Pre-treatment  General Appearance: Sedated  Respiratory Pattern: Assisted  Chest Assessment: Chest expansion symmetrical  Bilateral Breath Sounds: Coarse  Cough: None  Suction: ET Tube  O2 Device: vent  Subjective Data: `      Resp Comments: pt found on cmv settings, sx for large amount of thick tan secretion  Pt placed on spont settings 5/6 40%

## 2023-01-17 NOTE — PROCEDURES
Arterial Line Insertion    Date/Time: 1/17/2023 2:09 AM  Performed by: JAI Morales  Authorized by: JAI Morales     Patient location:  ICU  Other Assisting Provider: No    Consent:     Consent obtained:  Emergent situation  Universal protocol:     Patient identity confirmed:  Arm band and hospital-assigned identification number  Indications:     Indications: continuous blood pressure monitoring    Pre-procedure details:     Skin preparation:  Chlorhexidine    Preparation: Patient was prepped and draped in sterile fashion    Procedure details:     Location / Tip of Catheter:  Radial    Laterality:  Right    Needle gauge:  20 G    Number of attempts:  1    Successful placement: yes      Transducer: waveform confirmed    Post-procedure details:     Post-procedure:  Biopatch applied, sutured and sterile dressing applied    CMS:  Normal    Patient tolerance of procedure:   Tolerated well, no immediate complications

## 2023-01-17 NOTE — PROGRESS NOTES
Daily Progress Note - Critical Care   Vivienne Bartlett 58 y o  male MRN: [id number]  Unit/Bed#: ICU 12 Encounter: [id number]        ----------------------------------------------------------------------------------------  HPI:  Vivienne Bartlett is a 58 y o  found to have MSSA bacteremia  It was on 1/14 that the patient had developed episodes of hypotension in the morning and on reevaluation he was speaking clearly and interacting but given this progressive change I opted to switch him to nafcillin  It was then over the course into the afternoon that the patient developed acute neurologic deficits and was transferred to [place]  He underwent procedure with interventional radiology on 1/14  He was ultimately admitted to the ICU  2D echo done prior to transfer concerning for small vegetations on the aortic valve  MR imaging at the time showed a large acute left MCA stroke with minimal shift  He also had multiple other small infarcts consistent with embolization  Repeat CT showed progressive midline shift  Given patient's further decline he was taken to the OR today for left decompressive hemicraniectomy  Palliative care has been consulted given the patient's overall clinical decline  GI has also been following as patient had episode of upper GI bleed and EGD was performed which showed a large duodenal ulcer  24hr events: Emergent OR decompression on 1/16 am  Overnight A-line was switched   Noted to be hypernatremia started on D50  Restarted on Pecedex continued on Fentanyl 100  CT head pending       Vitals:  Temp:  [98 6 °F (37 °C)-100 °F (37 8 °C)] 100 °F (37 8 °C)  HR:  [] 72  Resp:  [16-22] 22  BP: (120-157)/(48-67) 129/53  SpO2:  [90 %-100 %] 98 %    I&Os:  01/16 0701 - 01/17 0700  In: 2525 8 [I V :1615 8]  Out: 0755 [Urine:3800; Drains:125]        ---------------------------------------------------------------------------------------  SUBJECTIVE  Patient seen and examined at bedside     Review of Systems   Unable to perform ROS: Intubated     ---------------------------------------------------------------------------------------  Assessment and Plan:    Neuro / Psych:   • Diagnosis: Malignant Left MCA infarct with M1 Thrombus  ? Assessment: Likely secondary to septic embolism  - Imaging:   - 1/14 CTH: no acute intracranial abnormality  - 1/14 CTA HN: left MCA M1 thromboembolism, incidental nonocclusive DVT in the high cervical right IJ vein  - 1/14 MRI brain with and without: Large acute left MCA distribution infarct with petechial hemorrhage  Minimal left-to-right shift  Additional small acute infarcts in the right frontal and parietal lobes as well as the cerebellum favor embolic etiology   - Status post IR thrombectomy TICI 2b flow on 1/14  ? Plan:  ? Overnight: Started on D5 at 50 midnight  ? Follow up  this morning, midnight 324   - Continue with goal Na 145-155/ osm 305-320  - SBP <160  - Levophed to maintain MAP >65  - Keppra 500 mg BID for 7 days   - Continue to hold AC/AP in concern for septic emboli and newly diagnosed anemia  - Hypertonic saline started on 1/15 and d/c on 1/17  -  - POD Day 1: Emergent Decompressive hemicraniectomy    - Follow up Repeat CTH:   - Palliative on board, planned for family meeting at 2pm  • Diagnosis: Sedation and analgesia  ? Assessment:  - Patient maintained on gabapentin 100mg Q8 and PRN flexeril as outpatient  ? Plan:  - propofol discontinued 1/14   - Pecedex restarted 1/17 0400  - Currently maintained on Precedex and fentanyl infusions  - PRN fentanyl  - Frequent neuro checks q1   o Sleep/wake cycle regulation as able  o Delirium precaution  o CAM-ICU  o RASS goal -1 to -2      CV:   • Diagnosis: Aortic Valve Endocarditis  ?  Assessment: Noted on repeat echocardiogram 1/14 -aortic valve with thickening and densities noted on the tips of the cusps highly suspicious for vegetations with associated moderate aortic valve regurgitation, no evidence of abscess, trace pericardial effusion  Compared to previous echocardiogram completed in 1/9/2020 2 aortic valve vegetations described as new  ? Plan:  - ID on board   - Continue Nafcillin 2 g q4   - Continue monitoring Fever curve/WBC  - Overnight Tmax 100, WBC: 10 06  - Follow up blood cultures obtained 1/17  - Blood cultures 1/13: gram + cocci   - Hold off on KALINA and cardiology consult     - Continuous telemetry  • Diagnosis: Hypotension  ? Assessment:  - Likely multifactorial - septic shock and sedation improved   ? Plan:  ? Continue monitoring Vitals  ? Overnight: SBP: 120- 157     - Levophed on hold 1/16 0800   - Vasopressin d/c 1/15  - Continue blood pressure support to maintain MAP >65  • Diagnosis: Hypertension  ? Assessment:  - Home meds: lisinopril-HCTZ 20-25, metoprolol succinate 100mg daily  ? Plan:  - Holding home medications in setting of hypotension  • Diagnosis: Hyperlipidemia  ? Plan:  - Resume home crestor, fenofibrate when tolerating PO  - Holding home Lovaza    Pulm:  • Diagnosis: Acute Respiratory Failure   - Assessment:   o Plan: ventilator support, Pt placed on spont settings 5/6 40%  On 1/17  o Labs: ph 7 4, PCO2 31 4, Bicarb 20 1   o Overnight: Plan to reassess patient and adjust ventilator pending patient's work of breathing  He does have a metabolic acidosis with a bicarb of 20 and a sodium currently at 158  Serum osmolarity noted to be 324  • Diagnosis: Pulmonary Cavitary Lesion  ? Assessment: Noted on CTA HN - suspicious for septic emboli  ? Plan:  - Continue to monitor respiratory status  - Antibiotics as detailed below  - Continue mechanical ventilation as above  • Diagnosis: COPD vs  Asthma  ? Assessment:  - No spirometry or PFT's in chart  - Managed outpatient on albuterol and symbicort for documented COPD v Asthma  ?  Plan:  - Atrovent and Xopenex  - Saline nebs      GI:   • Diagnosis: Duodenal Ulcer   - Assessment:  Single large, deep, irregular ulcer in the duodenal bulb with adherent clot seen on EGD on 1/15, repeat EDG on 1/16 blood clot washed off, no active bleeding  - Plan:  - 1/15 EGD and 1/16 EGD  - Transfuse as need to maintain HB > 7 and platelets >38   - Last transfusion: 1/16 1 unit on 0800am  - Labs: Frequent HbH   - Hb: 7 7 at 6am, prior 7 2   - GI on board, input noted   - Given large size of ulcer not amenable to clip placement if patient is to re-bleed he will require GDA embolization by IR  - Diet can be advanced as tolerated patient can be restarted on tube feeds from GI standpoint  • CPPI  switched to po Omeprazole 40 mg BID   • Diagnosis: Hypodensity of spleen  ? Assessment: Likely complications of above emboli   - 1/14 CT AP noted Smooth peripherally calcified hypodensity in the anterior spleen measuring 4 4 x 4 6 x 4 8 cm due to prior infection   ? Plan:  - Consider repeat imaging with contrast       Renal/:  • Diagnosis: Abnormal Urinalysis; Pyuria  ? Assessment: Pyuria with positive culture isolated [facility]) and penile irritation  ? Plan:  - ID following - believe to be asymptomatic bacteruria   - Negative STI testing (RPR, Urine GC)  - Repeat STI testing in outpatient setting 3-4 weeks   • Diagnosis: Hypernatremia  ? Assessment:  - Patient with Na 129 on presentation, baseline high 130's  ? Plan:  - S/p fluid resuscitation  - Na+ at 158, d/c hypertonic saline 3%  - Continue to follow daily BMP      F/E/N:   • Plan:  ? Nutrition: NPO   • Fluid: maintain euvolemic  • Electrolytes: replete PRN to maintain goal  o Goal Na as above , Mg > 2, Phos > 3, K goal > 4  o Daily BMP    • Nutrition:  o Tube feeds   • Lines:   o Arterial line switched 1/17 overnight   o Central line   o Urethral catheter   • 01/16 0701 - 01/17 0700  In: 2525 8 [I V :1615 8]  Out: 6432 [Urine:3800; Drains:125]    Heme/Onc:   • Diagnosis: Anemia  ?  Assessment:  - Previously Patient noted to have acute drop in Hgb from 12 1>8 8 with repeat 7 2  - Received 2u uncrossed blood products prior to transfer  ? Plan:  - AM CBC with Hgb 7 7  - Consent for transfusion previously obtained from son  - GI consulted on board, plan as noted above   - Received 1 units of RBCs on 1/16  • Diagnosis: R IJ Nonocclusive DVT  ? Plan:  - Not candidate for anticoagulation with septic emboli and anemia with possible GIB      Endo:   • Diagnosis: T2DM  ? Assessment:  - Home regimen: metformin 1000mg BID, jardiance 10mg daily  ? Plan:  ? Glucose: BG () unit 2   - Maintained on Insulin drip  - Monitor BG      ID:   • Diagnosis: Sepsis, Persistent MMSA bacteremia  ? Assessment  - Etiology includes bacteremia, aortic valve endocarditis, cellulitis R elbow, L knee infection, pulmonary cavitation  ? Plan:  - ID on board   - Continue Nafcillin 2g q4  1/17 (Day 4)   - Ancef  1000 mg q8 for 3 doses ( expiring today)   - Follow up repeat blood cultures sent 1/17, 1/13 blood culutres with gram + cocci   - Tylenol scheduled for fever  - PRN ibuprofen - monitor BMP  - Follow up repeat blood cultures   - Continue to trend fever curve/vitals    • Diagnosis: No active issues  o Monitor BG  o Goal -180s in setting of acute illness      MSK/Skin:   • Diagnosis: L total knee replacement  ? Assessment  - Initially done 6/28/22  ? Plan:  - S/p removal with debridement of prosthesis with poly exchange  - Inserted stimulan abx beads  - Ortho following - appreciate recommendations  • Diagnosis: L elbow cellulitis  ? Plan:  - S/p I&D of L elbow  - Continue antibiotics as above  • Diagnosis: Septic Arthritis vs  Osteomyelitis  ?  Plan:  - Neurosurgery consulted prior to transfer  - No surgical intervention anticipated at this time  - Follow up in 4 weeks with upright XR cervical spine  • Diagnosis: pressure ulcer ppx  o Close skin surveillance  o Frequent pressure off-loading to prevent skin breakdown  o Encourage early ambulation  o PT/OT when able        Disposition: Continue Critical Care   Code Status: Level 1 - Full Code  ---------------------------------------------------------------------------------------  ICU CORE MEASURES    Prophylaxis   VTE Pharmacologic Prophylaxis: Pharmacologic VTE Prophylaxis contraindicated due to bleed  VTE Mechanical Prophylaxis: sequential compression device  Stress Ulcer Prophylaxis: Omeprazole PO    ABCDE Protocol (if indicated)  Plan to perform spontaneous awakening trial today? Yes  Plan to perform spontaneous breathing trial today? Yes    Invasive Devices Review  Invasive Devices     Central Venous Catheter Line  Duration           CVC Central Lines 23 Triple 20cm 2 days          Peripheral Intravenous Line  Duration           Peripheral IV 23 Right Antecubital 4 days    Peripheral IV 23 Left;Ventral (anterior) Forearm 2 days          Arterial Line  Duration           Arterial Line 23 Radial <1 day          Drain  Duration           Urethral Catheter 2 days    NG/OG/Enteral Tube Orogastric 18 Fr Center mouth 1 day    Rectal Tube With balloon 1 day    Closed/Suction Drain Left Other (Comment) Bulb <1 day    Ventriculostomy/Subdural Ventricular drainage catheter with ICP microsensor Left Frontal region <1 day          Airway  Duration           ETT  8 mm 2 days              Can any invasive devices be discontinued today?  No  ---------------------------------------------------------------------------------------  OBJECTIVE    Vitals   Vitals:    23 0500 23 0600 23 0740 23 0758   BP: 145/59 129/53     Pulse: 82 72     Resp: 20 22     Temp: 100 °F (37 8 °C) 100 °F (37 8 °C)  100 °F (37 8 °C)   TempSrc:       SpO2: 96% 98% 98%    Weight:       Height:         Temp (24hrs), Av 2 °F (37 3 °C), Min:98 6 °F (37 °C), Max:100 °F (37 8 °C)  Current: Temperature: 100 °F (37 8 °C)  HR: Pulse: 72 (23 0600)  BP: Blood Pressure: 129/53 (23 0600)  RR: Respirations: 22 (23 06)  SpO2: SpO2: 98 % (23 0740)    Respiratory:  SpO2: SpO2: 93 %, SpO2 Activity: SpO2 Activity: At Rest, SpO2 Device: O2 Device: Ventilator       Invasive/non-invasive ventilation settings   Respiratory    Lab Data (Last 4 hours)      01/17 0546            pH, Arterial       7 424             pCO2, Arterial       31 4             pO2, Arterial       82 4             HCO3, Arterial       20 1             Base Excess, Arterial       -3 8                  O2/Vent Data           Most Recent        Patient safety screen outcome:   Passed                    Physical exam:  Vitals reviewed  Constitutional:       General: He is not in acute distress      Appearance: He is ill-appearing  HENT:      Head: Normocephalic and atraumatic       Right Ear: External ear normal       Left Ear: External ear normal       Nose: Nose normal       Mouth/Throat:      Mouth: Mucous membranes are moist       Pharynx: Oropharynx is clear  Eyes:      General:         Right eye: No discharge          Left eye: No discharge       Conjunctiva/sclera: Conjunctivae normal       Pupils: Pupils are equal, round, and reactive to light  Cardiovascular:      Rate and Rhythm: Normal rate and regular rhythm  Pulmonary:      Effort: Pulmonary effort is normal       Breath sounds: Normal breath sounds  Abdominal:      General: Bowel sounds are normal  There is distension       Palpations: Abdomen is soft       Tenderness: There is no abdominal tenderness  Musculoskeletal:         General: No swelling or tenderness       Right lower leg: No edema       Left lower leg: No edema  Skin:     General: Skin is warm and dry       Coloration: Skin is not jaundiced       Findings: Bruising and lesion present  Neurological:      Comments: Does not follow commands, some purposeful movement on left upper extremitiy  response to nox stim on bilateral lower extremitiy          Laboratory and Diagnostics:  Results from last 7 days   Lab Units 01/17/23  0545 01/16/23  2338 01/16/23  1212 01/16/23  1105 01/16/23  0601 01/16/23  0013 01/15/23  2003 01/15/23  1215 01/15/23  0445 01/14/23  1749 01/14/23  1203 01/14/23  0947 01/14/23  0440 01/13/23  0656 01/12/23  0635   WBC Thousand/uL 10 06 9 05  --   --  9 71  --   --   --  20 38* 27 91*  --  22 40* 19 37* 18 41* 16 86*   HEMOGLOBIN g/dL 7 7* 7 2* 8 1* 7 9* 7 2* 7 2* 7 5*   < > 6 9* 7 9*   < > 8 8* 12 1 12 3 12 8   I STAT HEMOGLOBIN   --   --   --   --   --   --   --   --   --   --    < >  --   --   --   --    HEMATOCRIT % 23 9* 22 1* 24 3* 24 5* 21 1* 21 9*  --   --  20 0* 23 3*   < > 25 8* 34 6* 36 1* 36 8   HEMATOCRIT, ISTAT   --   --   --   --   --   --   --   --   --   --    < >  --   --   --   --    PLATELETS Thousands/uL 173 156  --   --  182  --   --   --  191 180  --  183 194 191 221   NEUTROS PCT %  --  78*  --   --  80*  --   --   --  83* 83*  --   --  88* 86* 87*   MONOS PCT %  --  8  --   --  9  --   --   --  8 8  --   --  5 6 6    < > = values in this interval not displayed       Results from last 7 days   Lab Units 01/17/23  0545 01/16/23  2338 01/16/23  1829 01/16/23  1207 01/16/23  0601 01/16/23  0013 01/15/23  1752 01/15/23  0447 01/15/23  0445 01/15/23  0025 01/14/23  1749 01/14/23  1605 01/14/23  0440 01/13/23  0656 01/12/23  0635 01/11/23  0513   SODIUM mmol/L 158* 158* 158* 156* 158* 154* 150*   < >  --    < > 130*  --  125* 125* 130* 131*   POTASSIUM mmol/L 3 7 3 6 3 3* 3 3* 3 6 3 7 3 9   < >  --    < > 5 1  --  4 1 3 9 4 1 4 6   CHLORIDE mmol/L 134* 133* 132* 130* 132* 130* 126*   < >  --    < > 102  --  94* 95* 97 96   CO2 mmol/L 21 21 20* 20* 18* 18* 17*   < >  --    < > 19*  --  22 21 21 18*   CO2, I-STAT   --   --   --   --   --   --   --   --   --   --   --    < >  --   --   --   --    ANION GAP mmol/L 3* 4 6 6 8 6 7   < >  --    < > 9  --  9 9 12 17*   BUN mg/dL 24 26* 30* 29* 34* 38* 45*   < >  --    < > 48*  --  20 19 24 29*   CREATININE mg/dL 1 01 1 06 1 09 1 09 1 08 1 02 1 07   < >  --    < > 1 50*  --  0 90 0 79 0 86 0 92   CALCIUM mg/dL 8 6 8 6 8 5 8 4 8 1* 8 0* 7 7*   < >  --    < > 7 5*  --  8 9 9 1 9 3 9 2   GLUCOSE RANDOM mg/dL 96 124 114 157* 134 128 138   < >  --    < > 284*  --  195* 232* 254* 214*   ALT U/L  --   --   --   --  118*  --   --   --  86*  --  83*  --  17 15 14 17   AST U/L  --   --   --   --  250*  --   --   --  223*  --  212*  --  51* 43 55* 59*   ALK PHOS U/L  --   --   --   --  73  --   --   --  68  --  74  --  110 101 98 104   ALBUMIN g/dL  --   --   --   --  1 3*  --   --   --  1 3*  --  1 3*  --  1 7* 1 8* 1 8* 2 0*   TOTAL BILIRUBIN mg/dL  --   --   --   --  0 75  --   --   --  0 54  --  0 55  --  0 73 0 49 0 54 0 59    < > = values in this interval not displayed  Results from last 7 days   Lab Units 01/16/23  0601 01/15/23  0445 01/14/23 1749 01/14/23  0440 01/13/23  0656 01/12/23  0635 01/11/23  0513   MAGNESIUM mg/dL 2 3 2 2 2 0 1 6 1 8 1 6 1 7   PHOSPHORUS mg/dL 2 6 3 3 5 4*  --   --   --   --       Results from last 7 days   Lab Units 01/17/23  0545 01/16/23  1017 01/14/23  1749 01/14/23  0947   INR  1 35* 1 45* 1 64* 1 50*   PTT seconds 28  --  33  --           Results from last 7 days   Lab Units 01/14/23  1749 01/14/23  1404 01/14/23  0947   LACTIC ACID mmol/L 1 8 8 9* 2 9*     ABG:  Results from last 7 days   Lab Units 01/17/23  0546   PH ART  7 424   PCO2 ART mm Hg 31 4*   PO2 ART mm Hg 82 4   HCO3 ART mmol/L 20 1*   BASE EXC ART mmol/L -3 8   ABG SOURCE  Line, Arterial     VBG:  Results from last 7 days   Lab Units 01/17/23  0546 01/15/23  0025 01/14/23 2056   PH JASON   --   --  7 313   PCO2 JASON mm Hg  --   --  36 2*   PO2 JASON mm Hg  --   --  42 0   HCO3 JASON mmol/L  --   --  17 9*   BASE EXC JASON mmol/L  --   --  -7 6   ABG SOURCE  Line, Arterial   < >  --     < > = values in this interval not displayed       Results from last 7 days   Lab Units 01/15/23  0445 01/14/23  0947   PROCALCITONIN ng/ml 15 09* 3 20*       Micro  Results from last 7 days   Lab Units 01/15/23  0131 01/13/23  0900 23  0728   BLOOD CULTURE  Staphylococcus aureus* Staphylococcus aureus* Staphylococcus aureus*  Staphylococcus aureus*   GRAM STAIN RESULT  Gram positive cocci in clusters* Gram positive cocci in clusters*  Gram positive cocci in clusters* Gram positive cocci in clusters*  Gram positive cocci in clusters*       EKG: No recent EKG last 24hr     Imaging:  I have personally reviewed pertinent reports  Intake and Output  I/O       01/15 07 07 0701   07 07 0700    P  O  6      I V  (mL/kg) 2138 1 (21) 1615 8 (15 8)     Blood 700 350     NG/ 110     IV Piggyback 700 300     Total Intake(mL/kg) 3709 1 (36 4) 2375 8 (23 3)     Urine (mL/kg/hr) 3825 (1 6) 3800 (1 6)     Emesis/NG output 100 200     Drains  125     Stool 0 850     Total Output 3925 4975     Net -216 -2599 2            Unmeasured Stool Occurrence 2 x 1 x           Height and Weights   Height: 5' 10" (177 8 cm)  IBW (Ideal Body Weight): 73 kg  Body mass index is 32 27 kg/m²    Weight (last 2 days)     Date/Time Weight    01/15/23 0534 102 (224 87)            Nutrition      Active Medications  Scheduled Meds:  Current Facility-Administered Medications   Medication Dose Route Frequency Provider Last Rate   • acetaminophen  650 mg Oral Q6H Phillip Adams PA-C     • cefazolin  1,000 mg [facility]venous [facility] Sto[facility] (23 0502)   • chlorhexidine  15 mL Mouth/Throat Q12H [address] Phillip Adams PA-C     • dexmedetomidine  0 1-0 7 mcg/kg/hr Intravenous Titrated Phillip Adams PA-C 0 3 mcg/kg/hr (23 0359)   • dextrose  50 mL/hr Intravenous Continuous JAI Flores 50 mL/hr (23 0042)   • fentaNYL  100 mcg/hr Intravenous Continuous Devi Lopes  mcg/hr (23 0016)   • fentanyl citrate (PF)  50 mcg Intravenous Q2H PRN Phillip Adams PA-C     • glycopyrrolate  0 6 mg Intravenous Once Cortes Reed MD     • HYDROmorphone  1 mg Intravenous Once JAI Wolfe     • insulin regular (HumuLIN R,NovoLIN R) infusion  0 3-21 Units/hr Intravenous Titrated Luis Piper PA-C 0 5 Units/hr (01/17/23 0602)   • ipratropium  0 5 mg Nebulization TID Luis Piper PA-C     • labetalol  10 mg Intravenous Q4H PRN Luis Piper PA-C     • levalbuterol  1 25 mg Nebulization TID Luis Piper PA-C     • levETIRAcetam  500 mg Oral Q12H [address] Luis Piper PA-C     • nafcillin  2,000 mg Intravenous Q4H Luis Piper PA-C 2,000 mg (01/17/23 0854)   • neostigmine  3 mg Intravenous Once Taylor Hernández MD     • norepinephrine  1-30 mcg/min Intravenous Titrated Luis Piper PA-C Stopped (01/16/23 0850)   • ondansetron  4 mg Intravenous Q6H PRN Luis Piper PA-C     • pantoprozole (PROTONIX) infusion (Continuous)  8 mg/hr Intravenous Continuous Luis Piper PA-C 8 mg/hr (01/17/23 0156)   • sodium chloride  4 mL Nebulization TID Luis Piper PA-C       Continuous Infusions:  dexmedetomidine, 0 1-0 7 mcg/kg/hr, Last Rate: 0 3 mcg/kg/hr (01/17/23 0359)  dextrose, 50 mL/hr, Last Rate: 50 mL/hr (01/17/23 0042)  fentaNYL, 100 mcg/hr, Last Rate: 100 mcg/hr (01/17/23 0016)  insulin regular (HumuLIN R,NovoLIN R) infusion, 0 3-21 Units/hr, Last Rate: 0 5 Units/hr (01/17/23 0602)  norepinephrine, 1-30 mcg/min, Last Rate: Stopped (01/16/23 0850)  pantoprozole (PROTONIX) infusion (Continuous), 8 mg/hr, Last Rate: 8 mg/hr (01/17/23 0156)      PRN Meds:   fentanyl citrate (PF), 50 mcg, Q2H PRN  labetalol, 10 mg, Q4H PRN  ondansetron, 4 mg, Q6H PRN        Allergies   Allergies   Allergen Reactions   • Sulfamethoxazole-Trimethoprim Nausea Only and GI Intolerance     ---------------------------------------------------------------------------------------  Advance Directive and Living Will:      Power of :    POLST:    ---------------------------------------------------------------------------------------  Landy Farooq, MD      Portions of the record may have been created with voice recognition software  Occasional wrong word or "sound a like" substitutions may have occurred due to the inherent limitations of voice recognition software    Read the chart carefully and recognize, using context, where substitutions have occurred

## 2023-01-17 NOTE — ASSESSMENT & PLAN NOTE
POD 1 left decompressive hemicraniectomy (1/16, HDM)  PPD 2 left MCA thrombectomy for M1 occlusion with TICI 2B revascularization (1/14, MO)  · Holohemispheric left MCA stroke on 1/14 in a patient with a h/o MSSA bacteremia   · NIHSS 29 on arrival  · Also with MSSA bacteremia, AV endocarditis, left elbow and knee septic arthritis, C5-6 osteomyelitis, pulmonary cavitary lesion, splenic infarct, [place] lesions   · Patient with acute exam change 1/14- right hemiplegia and aphasia  · [facility] 11/6 showed worsening of cytotoxic edema with brain compression and worsening MLS  · Exam change around 8am 1/16 with left blown pupil and no response to pain off sedation, GCS 3T  Taken to OR emergently [facility]    Imaging personally reviewed with attending:  · CT head w/o, 1/17/23: Evolving massive hemorrhagic infarction involving majority of the left MCA territory with associated mass effect  Mild enlargement of the temporal horn right lateral ventricle redemonstrated with question of small amount of transependymal CSF flow adjacent to the occipital horn right lateral ventricle, stable  Small evolving infarctions in the right occipital and frontoparietal regions noted  Plan:  · Continue to monitor neurological exam  · STAT CT head for decline in GCS greater than 2 points in 1 hour  · 1 ELIZABETH drain to FS, 165cc bloody drainage in bulb  · ICP monitor in place, ICP < 20  Currently 1-5  · NA > 145, currently 158  · SBP < 140, MAP > 65  · Neurology following for stroke management, appreciate recommendations  · Medical management per primary team  · Pain control per primary team  · Will need helmet once extubated, not ordered yet  · PT/OT evaluation once medically appropriate  · DVT ppx: SCDs  Hold DVT ppx today, will consider restarting tomorrow  Not a candidate for full AC given GI bleed  Neurosurgery will continue to follow  Please call with questions or concerns

## 2023-01-17 NOTE — PROGRESS NOTES
[address] [facility]  Progress Note - Lianne Lawton 1960, 58 y o  male MRN: [id number]  Unit/Bed#: ICU 12 Encounter: [id number]  Primary Care Provider: Shirin Gilman DO   Date and time admitted to hospital: 1/14/2023  3:16 PM    * Acute stroke due to embolism of left middle cerebral artery [facility])  Assessment & Plan  POD 1 left decompressive hemicraniectomy (1/16, HDM)  PPD 2 left MCA thrombectomy for M1 occlusion with TICI 2B revascularization (1/14, MO)  · Holohemispheric left MCA stroke on 1/14 in a patient with a h/o MSSA bacteremia   · NIHSS 29 on arrival  · Also with MSSA bacteremia, AV endocarditis, left elbow and knee septic arthritis, C5-6 osteomyelitis, pulmonary cavitary lesion, splenic infarct, [place] lesions   · Patient with acute exam change 1/14- right hemiplegia and aphasia  · [address] [place] [address] 11/6 showed worsening of cytotoxic edema with brain compression and worsening MLS  · Exam change around 8am 1/16 with left blown pupil and no response to pain off sedation, GCS 3T  Taken to OR emergently for [facility] [place]    Imaging personally reviewed with attending:  · CT head w/o, 1/17/23: Evolving massive hemorrhagic infarction involving majority of the left MCA territory with associated mass effect  Mild enlargement of the temporal horn right lateral ventricle redemonstrated with question of small amount of transependymal CSF flow adjacent to the occipital horn right lateral ventricle, stable  Small evolving infarctions in the right occipital and frontoparietal regions noted  Plan:  · Continue to monitor neurological exam  · STAT CT head for decline in GCS greater than 2 points in 1 hour  · 1 ELIZABETH drain to FS, 165cc bloody drainage in bulb  · ICP monitor in place, ICP < 20  Currently 1-5     · NA > 145, currently 158  · SBP < 140, MAP > 65  · Neurology following for stroke management, appreciate recommendations  · Medical management per primary team  · Pain control per primary team  · Will need helmet once extubated, not ordered yet  · PT/OT evaluation once medically appropriate  · DVT ppx: SCDs  Hold DVT ppx today, will consider restarting tomorrow  Not a candidate for full AC given GI bleed  Neurosurgery will continue to follow  Please call with questions or concerns  Aortic valve endocarditis  Assessment & Plan  Medical management per primary team  EF 65%  CT surgery following, currently not a candidate for AVR given active sepsis but may be candidate once medically stable and improved    Anemia  Assessment & Plan  Secondary to GI bleed  Hgb this morning 7 7, s/p 1u PRBC 1/16 prior to surgery              Subjective/Objective     Subjective: Patient hypernatremic overnight, started on D50  Otherwise stable from a neurological standpoint  ELIZABETH drain to FS, ICP monitor in place with low ICP  Exam improved  Objective: Patient intubated, sedated       Intake/Output                 01/17/23 0701 - 01/18/23 0700     [id number] Total              Intake    I V   313 5  -- 313 5    NG/GT  80  -- 80    IV Piggyback  100  -- 100    Total Intake 493 5 -- 493 5       Output    Urine  400  -- 400    Output (mL) (Urethral Catheter) 400 -- 400    Drains  40  -- 40    Output (mL) (Closed/Suction Drain Left Other (Comment) Bulb) 40 -- 40    Total Output 440 -- 440       Net I/O     53 5 -- 53 5          Invasive Devices     Central Venous Catheter Line  Duration           CVC Central Lines 01/14/23 Triple 20cm 2 days          Peripheral Intravenous Line  Duration           Peripheral IV 01/12/23 Right Antecubital 4 days    Peripheral IV 01/14/23 Left;Ventral (anterior) Forearm 2 days          Arterial Line  Duration           Arterial Line 01/17/23 Radial <1 day          Drain  Duration           Urethral Catheter 2 days    Closed/Suction Drain Left Other (Comment) Bulb 1 day    NG/OG/Enteral Tube Orogastric 18 Fr Center mouth 1 day    Rectal Tube With balloon 1 day    Ventriculostomy/Subdural Ventricular drainage catheter with ICP microsensor Left Frontal region 1 day          Airway  Duration           ETT  8 mm 2 days                Vitals: Blood pressure 124/56, pulse 84, temperature 100 4 °F (38 °C), temperature source Bladder, resp  rate 22, height 5' 10" (1 778 m), weight 102 kg (224 lb 13 9 oz), SpO2 93 %  ,Body mass index is 32 27 kg/m²  Hemodynamic Monitoring: MAP: Arterial Line MAP (mmHg): 81 mmHg, ICP Mean: ICP Mean (mmHg): 1 mmHg    General appearance: intubated, ill appearing  Head: left craniectomy flap full but soft  Head wrap removed  ELIZABETH drain to FS with bloody output  ICP monitor in place with low ICP  Eyes: L 4mm and nonreactive  R 2mm and sluggish  Neck: supple, symmetrical, trachea midline   Lungs: intubated  Heart: regular heart rate  Neurologic:   Mental status: GCS 6T (E1, V1t, M4)  Cranial nerves: grossly intact (Cranial nerves II-XII)  Sensory: responds to crude touch everywhere but RUE  Motor: w/d to PS LUE and BLE  No response to pain RUE  Lab Results: I have personally reviewed pertinent results  Results from last 7 days   Lab Units 01/17/23  0545 01/16/23  2338 01/16/23  1212 01/16/23  1105 01/16/23  0601 01/15/23  1215 01/15/23  0445   WBC Thousand/uL 10 06 9 05  --   --  9 71  --  20 38*   HEMOGLOBIN g/dL 7 7* 7 2* 8 1*   < > 7 2*   < > 6 9*   HEMATOCRIT % 23 9* 22 1* 24 3*   < > 21 1*   < > 20 0*   PLATELETS Thousands/uL 173 156  --   --  182  --  191   NEUTROS PCT %  --  78*  --   --  80*  --  83*   MONOS PCT %  --  8  --   --  9  --  8    < > = values in this interval not displayed       Results from last 7 days   Lab Units 01/17/23  0545 01/16/23  2338 01/16/23  1829 01/16/23  1207 01/16/23  0601 01/15/23  0447 01/15/23  0445 01/15/23  0025 01/14/23  1749 01/14/23  1605   POTASSIUM mmol/L 3 7 3 6 3 3*   < > 3 6   < >  --    < > 5 1  --    CHLORIDE mmol/L 134* 133* 132*   < > 132*   < >  --    < > 102  --    CO2 mmol/L 21 21 20*   < > 18*   < >  --    < > 19* --    CO2, I-STAT mmol/L  --   --   --   --   --   --   --   --   --  18*   BUN mg/dL 24 26* 30*   < > 34*   < >  --    < > 48*  --    CREATININE mg/dL 1 01 1 06 1 09   < > 1 08   < >  --    < > 1 50*  --    CALCIUM mg/dL 8 6 8 6 8 5   < > 8 1*   < >  --    < > 7 5*  --    ALK PHOS U/L  --   --   --   --  73  --  68  --  74  --    ALT U/L  --   --   --   --  118*  --  86*  --  83*  --    AST U/L  --   --   --   --  250*  --  223*  --  212*  --    GLUCOSE, ISTAT mg/dl  --   --   --   --   --   --   --   --   --  299*    < > = values in this interval not displayed  Results from last 7 days   Lab Units 01/16/23  0601 01/15/23  0445 01/14/23  1749   MAGNESIUM mg/dL 2 3 2 2 2 0     Results from last 7 days   Lab Units 01/16/23  0601 01/15/23  0445 01/14/23  1749   PHOSPHORUS mg/dL 2 6 3 3 5 4*     Results from last 7 days   Lab Units 01/17/23  0545 01/16/23  1017 01/14/23  1749   INR  1 35* 1 45* 1 64*   PTT seconds 28  --  33     No results found for: TROPONINT  ABG:  Lab Results   Component Value Date    PHART 7 424 01/17/2023    SYQ4VDP 31 4 (L) 01/17/2023    PO2ART 82 4 01/17/2023    BZL5KQI 20 1 (L) 01/17/2023    BEART -3 8 01/17/2023    SOURCE Line, Arterial 01/17/2023       Imaging Studies: I have personally reviewed pertinent reports  and I have personally reviewed pertinent films in PACS     EGD    Result Date: 1/16/2023  Narrative: Table formatting from the original result was not included  [facility] [address] [facility] [address] [facility] [address] DATE OF SERVICE: 1/16/23 PHYSICIAN(S): Attending: No Staff Documented Fellow: Kasandra Nichole DO INDICATION: Anemia, unspecified type POST-OP DIAGNOSIS: See the impression below  PREPROCEDURE: Informed consent was obtained for the procedure, including sedation  Risks of perforation, hemorrhage, adverse drug reaction and aspiration were discussed  The patient was placed in the left lateral decubitus position   Patient was explained about the risks and benefits of the procedure  Risks including but not limited to bleeding, infection, and perforation were explained in detail  Also explained about less than 100% sensitivity with the exam and other alternatives  PROCEDURE: EGD DETAILS OF PROCEDURE: Patient was taken to the procedure room where a time out was performed to confirm correct patient and correct procedure  The patient underwent monitored anesthesia care, which was administered by an anesthesia professional  The patient's blood pressure, heart rate, level of consciousness, respirations, oxygen, ECG and ETCO2 were monitored throughout the procedure  The scope was advanced to the second part of the duodenum  Retroflexion was performed in the fundus  The patient experienced no blood loss  The procedure was not difficult  The patient tolerated the procedure well  There were no apparent complications  ANESTHESIA INFORMATION: ASA: ASA status not filed in the log  Anesthesia Type: Anesthesia type not filed in the log  MEDICATIONS: propofol (DIPRIVAN) 1000 mg in 100 mL infusion (premix) Cannot be calculated*  *Total user-documented volume 62 11 mL may contain volume from other administrations (Totals for administrations occurring from 1528 to 1645 on 01/16/23) FINDINGS: The esophagus appeared normal  Ulcer in the prepyloric region and pylorus with clean base (Indra III) Large deep ulcer in the duodenal bulb with flat pigmented spot (Indra IIC) The 2nd part of the duodenum appeared normal  SPECIMENS: * No specimens in log *     Impression: Previously seen large deep ulcer evaluated again, adherent blood clot had been washed off and now patient had flat pigmented spot  No active bleeding was visualized     Small prepyloric ulcer RECOMMENDATION: Continue PPI drip overnight Given large size of ulcer not amenable to clip placement if patient is to re-bleed he will require GDA embolization by IR Diet can be advanced as tolerated patient can be restarted on tube feeds from GI standpoint  No Staff Documented     EGD    Result Date: 1/15/2023  Narrative: Table formatting from the original result was not included  [address] [address] [address] DATE OF SERVICE: 1/15/23 PHYSICIAN(S): Attending: No Staff Documented Fellow: No Staff Documented INDICATION: Anemia, unspecified type POST-OP DIAGNOSIS: See the impression below  PREPROCEDURE: Informed consent was obtained for the procedure, including sedation  Risks of perforation, hemorrhage, adverse drug reaction and aspiration were discussed  The patient was placed in the left lateral decubitus position  Patient was explained about the risks and benefits of the procedure  Risks including but not limited to bleeding, infection, and perforation were explained in detail  Also explained about less than 100% sensitivity with the exam and other alternatives  PROCEDURE: EGD DETAILS OF PROCEDURE: Patient was taken to the procedure room where a time out was performed to confirm correct patient and correct procedure  The patient underwent monitored anesthesia care, which was administered by the procedural nurse  The patient's blood pressure, heart rate, level of consciousness, respirations, oxygen and ETCO2 were monitored throughout the procedure  The scope was introduced through the mouth and advanced to the second part of the duodenum  Retroflexion was performed in the fundus  The patient experienced no blood loss  The procedure was not difficult  The patient tolerated the procedure well  There were no apparent complications  ANESTHESIA INFORMATION: ASA: ASA status cannot be found on the log  Anesthesia Type: Anesthesia type cannot be found on the log   MEDICATIONS: Totals unavailable because the procedure time range is not set FINDINGS: Single large, deep, irregular ulcer in the duodenal bulb with adherent clot (Indra IIB) Multiple superficial, benign-appearing ulcers in the stomach with clean base (Indra III) Grade C esophagitis with mucosal breaks measuring 5 mm or more, continuous between folds, covering less than 75% of the circumference in the GE junction Tube successfully placed in the stomach; scope reinserted to confirm placement  OG tube SPECIMENS: * Cannot find log *     Impression: Single large, deep and cratered ulcer in the duodenal bulb with large recurrent clot, surrounding erythematous and hypertrophied margins  Second portion of duodenum appeared normal  Multiple superficial and irregular clean-based gastric ulcers  LA grade C esophagitis  Previously existing OG tube which was reportedly called, was removed, OG tube replaced under direct visualization  RECOMMENDATION:  Schedule repeat EGD  Abnormal pathology Gastric ulcer surveillance  Continue PPI drip Frequent H&H, transfusion as needed If patient should develop any further signs of GI bleeding/hemodynamic compromise, consider IR consult, limited endoscopic options given high risk of perforation based on the location and appearance of the ulcer  Patient will need repeat EGD prior to discharge to evaluate the structures better  Avoid tube feeding through OG tube at this time  GI team will follow tomorrow No Staff Documented     CT abdomen pelvis wo contrast    Result Date: 1/14/2023  Narrative: CT ABDOMEN AND PELVIS WITHOUT IV CONTRAST INDICATION:   abdominal pain  COMPARISON:  None  TECHNIQUE:  CT examination of the abdomen and pelvis was performed without intravenous contrast  Axial, sagittal, and coronal 2D reformatted images were created from the source data and submitted for interpretation  Radiation dose length product (DLP) for this visit:  680 mGy-cm   This examination, like all CT scans performed in the [facility], was performed utilizing techniques to minimize radiation dose exposure, including the use of iterative reconstruction and automated exposure control   Enteric contrast was not administered  FINDINGS: ABDOMEN LOWER CHEST:  4 mm right lower lung nodule image 2/4  5 mm right middle anterior lung nodule image 3/5  Trace left effusion/pleural thickening  The cavitary left lower lung lesion seen on prior thoracic spine MRI is not in the field-of-view on this study  LIVER/BILIARY TREE:  Unremarkable  GALLBLADDER:  There are gallstone(s) within the gallbladder, without pericholecystic inflammatory changes  SPLEEN:  Smooth peripherally calcified hypodensity in the anterior spleen measuring 4 4 x 4 6 x 4 8 cm  This may be due to prior infection or hemorrhage  Additional large heterogeneous nodular foci in the spleen, but otherwise limited in evaluation without contrast  PANCREAS:  Unremarkable  ADRENAL GLANDS:  Unremarkable  KIDNEYS/URETERS:  Unremarkable  No hydronephrosis  STOMACH AND BOWEL:  Unremarkable  APPENDIX:  There are expected postoperative changes of appendectomy  ABDOMINOPELVIC CAVITY:  No ascites  No pneumoperitoneum  No lymphadenopathy  VESSELS:  Unremarkable for patient's age  PELVIS REPRODUCTIVE ORGANS:  Unremarkable for patient's age  Prostate calcifications  URINARY BLADDER:  Unremarkable  ABDOMINAL WALL/INGUINAL REGIONS:  Unremarkable  OSSEOUS STRUCTURES:  No acute fracture or destructive osseous lesion  L4 spondylolysis with mild grade 1 anterolisthesis on S1  Impression: 1  Smooth peripherally calcified hypodensity in the anterior spleen measuring 4 4 x 4 6 x 4 8 cm  This may be due to prior infection or hemorrhage  There are additional large heterogeneous nodular foci in the spleen, but otherwise limited in evaluation without contrast  Recommend further evaluation with contrast CT  2   The cavitary left lower lung lesion seen on prior thoracic spine MRI is not in the field-of-view on this study  Dedicated contrast CT chest evaluation is recommended  3  Cholelithiasis   Workstation performed: [id number]     XR chest portable    Result Date: 1/15/2023  Narrative: CHEST INDICATION:   CVC placement  COMPARISON:  3/20/2017 EXAM PERFORMED/VIEWS:  XR CHEST PORTABLE  AP semierect Images: 3 FINDINGS:  Endotracheal tube approximately 6 cm above the diego  Left jugular line tip at the cavoatrial junction  The endogastric tube which has been placed curves on itself in the stomach and reenters the esophagus directed cephalad with the tip and  side port in the distal esophagus  Cardiomediastinal silhouette appears unremarkable  The lungs are clear  No pneumothorax or pleural effusion  Peripherally calcified mass in the spleen noted, unchanged  Osseous structures appear within normal limits for patient age  Impression: No acute cardiopulmonary disease  Enteric tube malposition  The tube curves on itself in the stomach and reenters the distal esophagus directed cephalad  An x-ray of the abdomen is suggested to confirm  The study was marked in [facility] for immediate notification  Workstation performed: [id number]     XR spine cervical 2 or 3 vw injury    Result Date: 1/13/2023  Narrative: CERVICAL SPINE INDICATION:   C4-5 septic arthritis  COMPARISON:  MR cervical spine 1/10/2023, CT cervical spine 1/9/2023 VIEWS:  XR SPINE CERVICAL 2 OR 3 VW INJURY FINDINGS: No acute fracture or subluxation  Straightening of the usual cervical lordosis  Minimal anterolisthesis C3 on C4 and C4 on C5, stable  No radiographic findings at the right C4-C5 facet level as seen on MRI or CT  Scattered mild degenerative changes in the cervical spine  Normal prevertebral soft tissues  Clear lung apices  Impression: No acute osseous abnormality  No radiographic findings at the right C4-C5 facet level  Workstation performed: [id number]     XR elbow 3+ vw left    Result Date: 1/16/2023  Narrative: LEFT ELBOW INDICATION:   Left elbow swelling and pain  COMPARISON:  None VIEWS:  XR ELBOW 3+ VW LEFT FINDINGS: There is no acute fracture or dislocation  There is no joint effusion   No significant degenerative changes  No lytic or blastic osseous lesion  Soft tissues are unremarkable  Impression: No acute osseous abnormality  Workstation performed: [id number]     XR knee left 1 or 2 views    Result Date: 1/10/2023  Narrative: LEFT KNEE INDICATION:   Post op left TKA  COMPARISON:  1/8/2023 VIEWS:  XR KNEE 1 OR 2 VW LEFT Images: 2 FINDINGS: There is no acute fracture or dislocation  Joint effusion appears to have resolved  Unremarkable appearance of total knee arthroplasty  No evidence of hardware complication  No lytic or blastic osseous lesion  Interval placement of antibiotic pledgets and surgical drain  Associated soft tissue swelling and gas noted  Impression: Unremarkable appearance of total knee arthroplasty  Interval postsurgical changes as above  Workstation performed: [id number]     XR knee 1 or 2 vw left    Result Date: 1/9/2023  Narrative: LEFT KNEE INDICATION:   s/p L TKA  COMPARISON:  None VIEWS:  XR KNEE 1 OR 2 VW LEFT FINDINGS: There is no acute fracture or dislocation  There is no joint effusion  Unremarkable appearance of total knee arthroplasty  No evidence of hardware complication  No lytic or blastic osseous lesion  There are atherosclerotic calcifications  Soft tissues are otherwise unremarkable  Impression: Unremarkable appearance of total knee arthroplasty  No evidence of hardware complication  Workstation performed: [id number]     CT head wo contrast    Result Date: 1/17/2023  Narrative: CT BRAIN - WITHOUT CONTRAST INDICATION:   Stroke, follow up s/p craniectomy and stroke  COMPARISON:  1/16/2023 TECHNIQUE:  CT examination of the brain was performed  In addition to axial images, sagittal and coronal 2D reformatted images were created and submitted for interpretation  Radiation dose length product (DLP) for this visit:  1011 2 mGy-cm     This examination, like all CT scans performed in the [facility], was performed utilizing techniques to minimize radiation dose exposure, including the use of iterative  reconstruction and automated exposure control  IMAGE QUALITY:  Diagnostic  FINDINGS: PARENCHYMA:  Evolving cytotoxic edema with superimposed hemorrhagic conversion in the left cerebral hemisphere in entire portion of the left MCA territory noted  Edematous hemorrhagic brain tissue should include decompressive which has a superficial scalp drainage catheter  There is effacement of the left lateral ventricle with approximately 6 mm of rightward subfalcine herniation  Small wedge-shaped hypodensity in the right occipital lobe, series 2, image 24, stable from the prior study  Additionally subtle areas of loss of gray-white matter differentiation in the right frontoparietal junction, exemplified on series 2, image 38 correlates diffusion abnormality from recent brain MRI  VENTRICLES AND EXTRA-AXIAL SPACES:  Sulcal effacement throughout the left cerebral hemisphere noted  There is effacement of left lateral ventricle  Mild dilatation of the temporal horn of the right lateral ventricle with questionable small amount of transependymal CSF flow noted  VISUALIZED ORBITS AND PARANASAL SINUSES:  Normal visualized orbits  Moderate air-fluid levels in the maxillary sinuses  Moderate sphenoidal sinus opacity  Moderate bilateral ethmoidal and frontal sinus disease  CALVARIUM AND EXTRACRANIAL SOFT TISSUES:  Scalp soft tissue swelling noted with edematous herniated brain tissue protruding through the craniectomy defect  Impression: 1  Evolving massive hemorrhagic infarction involving majority of the left MCA territory with associated mass effect  2   Mild enlargement of the temporal horn right lateral ventricle redemonstrated with question of small amount of transependymal CSF flow adjacent to the occipital horn right lateral ventricle, stable  3   Small evolving infarctions in the right occipital and frontoparietal regions noted   Workstation performed: [id number]     CT head wo contrast    Addendum Date: 1/16/2023 Addendum:   ADDENDUM:  I personally discussed this study with Denise Tavo on 1/16/2023 at 1:12 PM      Result Date: 1/16/2023  Narrative: CT BRAIN - WITHOUT CONTRAST INDICATION:   Craniotomy, post-op s/p craniectomy  COMPARISON:  CT head without contrast done earlier today  CT head without contrast 1/15/2023  MRI brain with and without contrast 1/14/2023  TECHNIQUE:  CT examination of the brain was performed  In addition to axial images, sagittal and coronal 2D reformatted images were created and submitted for interpretation  Radiation dose length product (DLP) for this visit:  986 14 mGy-cm   This examination, like all CT scans performed in the [facility], was performed utilizing techniques to minimize radiation dose exposure, including the use of iterative  reconstruction and automated exposure control  IMAGE QUALITY:  Diagnostic  FINDINGS: PARENCHYMA, VENTRICLES AND EXTRA-AXIAL SPACES: New postsurgical changes changes of left decompressive hemicraniectomy  Large left MCA and left PCA territory infarctions with worsened hemorrhagic conversion particularly in left cerebral cortex where there is cortical parenchymal hemorrhage (previously petechial hemorrhage) and left basal ganglia hemorrhage, scattered small-volume pneumocephalus, and worsened cytotoxic edema with cerebral sulcal effacement  Evolving smaller acute infarcts in right parietal and right occipital lobes  Known smaller infarcts in right frontal and bilateral cerebellar lobes are not well seen as seen on MRI brain 1/14/2023  1 0 cm rightward midline shift (2:30), previously 1 6 cm  Minimal improvement of left uncal herniation  Slightly improved persistent compression of left lateral ventricle with mild dilation of right lateral ventricle  No acute intraventricular hemorrhage  Small-volume pneumocephalus, compatible with recent surgical change    No intracranial mass  Arterial calcifications of carotid siphons  VISUALIZED ORBITS AND PARANASAL SINUSES:  Normal visualized orbits  Moderate-to-severe pansinus mucosal thickening (worse in ethmoid sinuses) with scattered air-fluid levels and frothy secretions  Fluid-filled nasopharynx  CALVARIUM AND EXTRACRANIAL SOFT TISSUES:  Left decompressive hemicraniotomy  Extradural drain tip in left frontal region  Left surgical skin staples  Partially imaged endotracheal tube  Impression: Large left MCA and left PCA territory infarctions with worsened hemorrhagic conversion particularly in left cerebral cortex where there is cortical parenchymal hemorrhage (previously petechial hemorrhage) and left basal ganglia hemorrhage, scattered small-volume pneumocephalus, and worsened cytotoxic edema with cerebral sulcal effacement status post interval left decompressive craniectomy  1 0 cm rightward midline shift (previously 1 6 cm), minimal improvement of left uncal herniation, slightly improved persistent compression of left lateral ventricle with mild dilation of right lateral ventricle  Evolving smaller acute infarcts in right parietal and right occipital lobes  Known smaller infarcts in right frontal and bilateral cerebellar lobes are not well seen as seen on MRI brain 1/14/2023  Workstation performed: [id number]     CT head wo contrast    Result Date: 1/16/2023  Narrative: CT BRAIN - WITHOUT CONTRAST INDICATION:   Stroke, follow up cva follow up  COMPARISON:  CT head without contrast January 15, 2023  MRI brain with and without contrast January 14, 2023  TECHNIQUE:  CT examination of the brain was performed  In addition to axial images, sagittal and coronal 2D reformatted images were created and submitted for interpretation  Radiation dose length product (DLP) for this visit:  952 52 mGy-cm     This examination, like all CT scans performed in the [facility], was performed utilizing techniques to minimize radiation dose exposure, including the use of iterative  reconstruction and automated exposure control  IMAGE QUALITY:  Diagnostic  FINDINGS: PARENCHYMA: Evolving large acute infarct in left MCA and left PCA territories with petechial cortical hemorrhage, worsened cytotoxic edema, worsened rightward midline shift now measuring 1 6 cm (previously 0 7 cm), worsened compression of left lateral ventricle with  dilation of right lateral ventricle, and new left uncal herniation  Known smaller acute infarcts in right frontal, right parietal, right occipital and bilateral cerebellar lobes are better evaluated on MRI brain 1/14/2023  No acute parenchymal hemorrhage  No intracranial mass  VENTRICLES AND EXTRA-AXIAL SPACES:  Worsened compression of left lateral ventricle with dilation of right lateral ventricle  No acute intraventricular or extra-axial hemorrhage  VISUALIZED ORBITS AND PARANASAL SINUSES:  Normal visualized orbits  Pansinus mucosal thickening with scattered air-fluid levels and frothy secretions, severe in bilateral ethmoid and right sphenoid sinuses  CALVARIUM AND EXTRACRANIAL SOFT TISSUES: No acute calvarial or extracranial soft tissue abnormality  Small bilateral mastoid effusions  Partially imaged endotracheal and orogastric tubing  Impression: Evolving large acute infarct in left MCA and left PCA territories with petechial cortical hemorrhage, worsened cytotoxic edema, worsened rightward midline shift now measuring 1 6 cm (previously 0 7 cm), worsened compression of left lateral ventricle with  dilation of right lateral ventricle, and new left uncal herniation  Known smaller acute infarcts in right frontal, right parietal, right occipital and bilateral cerebellar lobes are better evaluated on MRI brain 1/14/2023    I personally discussed this study with Shad Herbert on 7/46/6036 at 7:45 AM  Workstation performed: [id number]     CT head wo contrast    Result Date: 1/15/2023  Narrative: CT BRAIN - WITHOUT CONTRAST INDICATION:   Stroke, follow up f/u thrombectomy  COMPARISON:  MRI performed yesterday  TECHNIQUE:  CT examination of the brain was performed  In addition to axial images, sagittal and coronal 2D reformatted images were created and submitted for interpretation  Radiation dose length product (DLP) for this visit:  930 mGy-cm   This examination, like all CT scans performed in the [facility], was performed utilizing techniques to minimize radiation dose exposure, including the use of iterative reconstruction and automated exposure control  IMAGE QUALITY:  Diagnostic  FINDINGS: PARENCHYMA:  Redemonstration of large acute infarct infarct throughout the left MCA distribution  Petechial hemorrhage noted on MRI is not well seen  There is increased mass effect with 7 mm of left-to-right shift  Additional small and tiny infarcts in the right cerebral hemisphere and cerebellum identified on MRI are difficult to appreciate  VENTRICLES AND EXTRA-AXIAL SPACES:  No hydrocephalus  VISUALIZED ORBITS AND PARANASAL SINUSES:  Orbits are unremarkable  Stable paranasal sinus disease  CALVARIUM AND EXTRACRANIAL SOFT TISSUES:  Normal      Impression: Redemonstration of large acute infarct throughout the left MCA distribution with increased mass effect and 7 mm of left-to-right shift  Petechial hemorrhage noted on MRI is difficult to appreciate  Recommend close follow-up and neurosurgical consultation  Additional small infarcts in the right cerebral hemisphere and cerebellum are better appreciated on MRI  I personally discussed this study with Jordyn Mancilla on 1/15/2023 at 8:09 AM   Workstation performed: [id number]     CT head wo contrast    Result Date: 1/14/2023  Narrative: CT BRAIN - WITHOUT CONTRAST INDICATION:   post stroke alert  COMPARISON:  CT January 14, 2023 TECHNIQUE:  CT examination of the brain was performed    In addition to axial images, sagittal and coronal 2D reformatted images were created and submitted for interpretation  Radiation dose length product (DLP) for this visit:  916 83 mGy-cm   This examination, like all CT scans performed in the [facility], was performed utilizing techniques to minimize radiation dose exposure, including the use of iterative  reconstruction and automated exposure control  IMAGE QUALITY:  Diagnostic  FINDINGS: PARENCHYMA:  Loss of gray-white differentiation identified anterior left temporal lobe, anterior insular cortex and left frontal lobe also suspicious for recent infarct  No hemorrhage identified  Subtle effacement of sulci noted  VENTRICLES AND EXTRA-AXIAL SPACES:  Normal for the patient's age  VISUALIZED ORBITS AND PARANASAL SINUSES:  Normal visualized orbits  Normal visualized paranasal sinuses  CALVARIUM AND EXTRACRANIAL SOFT TISSUES:  Normal      Impression: Is compatible with recent infarct in the left frontal lobe, left insular and anterior temporal lobe  No hemorrhage identified  Workstation performed: [id number]     CT spine cervical w contrast    Result Date: 1/9/2023  Narrative: CT CERVICAL SPINE - WITH CONTRAST INDICATION: Infection  Bacteremia  Neck pain  COMPARISON:  None  TECHNIQUE:  Noncontrast CT examination of the cervical spine was performed  Radiation dose length product (DLP) for this visit:  609 661 045 mGy-cm   This examination, like all CT scans performed in the [facility], was performed utilizing techniques to minimize radiation dose exposure, including the use of iterative reconstruction and automated exposure control  85 mL of Omnipaque 350 was injected intravenously without immediate consequence  IMAGE QUALITY:  Diagnostic  FINDINGS: ALIGNMENT: Slight levoscoliosis of the cervical spine  Minimal anterior subluxation of C3 upon C4  OSSEOUS STRUCTURES: No fracture  Focal lucencies are seen within the right facet joint at C4-5    These are nonspecific and may represent subchondral cystic degenerative change  No signs of acute bony erosion or destruction  DEGENERATIVE CHANGES: C2-C3:  Small broad-based right paramedian disc protrusion with mild canal stenosis  No foraminal narrowing  C3-C4:  Normal disc height  As described above there is slight anterior subluxation of C3 upon C4 within normal limits  No canal stenosis or foraminal narrowing  C4-C5:  Normal disc height  No focal disc herniation identified  Slight right-sided uncinate joint hypertrophic change  As described above there is right-sided facet degenerative change including subchondral lucencies within the facets  Mild enhancement extends lateral from the facet joint on series 3 images 51 and 52, nonspecific in appearance possibly representing a small synovial cyst   Although the bony structures do not demonstrate signs of acute erosion or destruction, these changes could be seen in a subacute to chronic septic arthritis  C5-C6:  No significant degenerative change  C6-C7:  No significant degenerative change  C7-T1:  No significant degenerative change  UPPER THORACIC DISC SPACES:  No significant degenerative change  PREVERTEBRAL AND PARASPINAL SOFT TISSUES:  There are vascular calcifications of the carotid bifurcation bilaterally without evidence of significant vascular stenosis  Mild vascular calcification of the aortic arch  LUNG APICES:  Nonspecific masslike opacity within the anterior medial aspect of the left chest     Impression: No significant disc herniation, canal stenosis or foraminal narrowing  No acute fracture  Facet changes are seen on the right at C4-5 most likely representing subchondral cystic degenerative change  However, there does appear to be a small peripherally enhancing focus extending laterally from the facet joint which could represent a small, 4 mm synovial cyst, see series 3 image 51  The possibility of septic arthritis not excluded in this patient with active bacteremia   Nonspecific density within the anterior medial aspect of the left hemithorax  Differential considerations would include an infectious etiology, possibly fungal or other form of organizing pneumonia, as well as old pulmonary infarct  No previous chest imaging for comparison other than a chest x-ray from 2020  Workstation performed: [id number]     MRI brain w wo contrast    Result Date: 1/15/2023  Narrative: MRI BRAIN WITH AND WITHOUT CONTRAST INDICATION: w/wo contrast, stroke  COMPARISON:  CT from earlier the same date    TECHNIQUE: Multiplanar, multisequence imaging of the brain was performed before and after gadolinium administration  IV Contrast:  10 mL of Gadobutrol injection (SINGLE-DOSE)  IMAGE QUALITY:   Diagnostic  FINDINGS: BRAIN PARENCHYMA: There is restricted diffusion throughout the left MCA distribution consistent with acute infarct  Size of infarct is larger than seen on earlier CT  There is petechial hemorrhage  There is sulcal effacement and 3 mm of left-to-right shift  Basal cisterns are patent  There are also multiple small acute infarcts scattered in the right frontal, parietal and occipital lobes with several punctate foci in the bilateral cerebellum favoring an embolic etiology    Leptomeningeal enhancement overlying the left MCA infarct probably due to leptomeningeal collaterals  VENTRICLES:  No hydrocephalus  SELLA AND PITUITARY GLAND:  Normal  ORBITS:  Normal  PARANASAL SINUSES: Pansinus disease  VASCULATURE:  Evaluation of the major intracranial vasculature demonstrates appropriate flow voids  CALVARIUM AND SKULL BASE:  Normal  EXTRACRANIAL SOFT TISSUES:  Normal      Impression: Large acute left MCA distribution infarct with petechial hemorrhage  Minimal left-to-right shift  Additional small acute infarcts in the right frontal and parietal lobes as well as the cerebellum favor embolic etiology  The study was marked in [facility] for immediate notification   Workstation performed: [id number]     MRI cervical spine w wo contrast    Result Date: 1/11/2023  Narrative: MRI CERVICAL SPINE WITH AND WITHOUT CONTRAST INDICATION: neck stiffness concerning for infection on CT cervical spine  COMPARISON:  CT performed yesterday  TECHNIQUE:  Multiplanar, multisequence imaging of the cervical spine was performed before and after gadolinium administration     IV Contrast:  10 mL of Gadobutrol injection (SINGLE-DOSE) IMAGE QUALITY:  Diagnostic  FINDINGS: ALIGNMENT: Minimal anterolisthesis at C3-4  No compression  MARROW SIGNAL: Marrow edema in the right C4-5 facets with synovitis and mild adjacent soft tissue edema  Given concern for infection, findings may represent septic arthritis/osteomyelitis although inflammatory arthropathy is in the differential  No other  abnormal marrow signal  CERVICAL AND VISUALIZED UPPER THORACIC CORD:  Normal signal within the visualized cord  PREVERTEBRAL AND PARASPINAL SOFT TISSUES:  Mild right paraspinal edema adjacent to right C4-5 facet complex  No abscess  VISUALIZED POSTERIOR FOSSA:  The visualized posterior fossa demonstrates no abnormal signal  CERVICAL DISC SPACES:  Multilevel disc desiccation  C2-C3:  Tiny right right central protrusion  No significant canal or foraminal stenosis  C3-C4:  Small disc bulge  No canal or foraminal stenosis  C4-C5:  Small disc bulge  Facet arthropathy  No significant canal stenosis  Slight foraminal stenosis  C5-C6:  No disc herniation, canal or foraminal stenosis  Mild facet arthropathy  C6-C7:  Disc bulge  Mild facet arthropathy  Mild canal and mild left foraminal stenosis  C7-T1:  No disc herniation, canal or foraminal stenosis  UPPER THORACIC DISC SPACES:  Normal  POSTCONTRAST IMAGING:  No abnormal epidural or intrathecal enhancement  OTHER FINDINGS:  None  Impression: Right C4-5 facet joint synovitis with marrow edema could be due to septic arthritis/osteomyelitis given clinical concern for infection   Inflammatory arthropathy with reactive marrow edema is in the differential  No other evidence of infection  No epidural abscess  Workstation performed: [id number]     MRI thoracic spine w wo contrast    Result Date: 1/13/2023  Narrative: MRI THORACIC SPINE WITH AND WITHOUT CONTRAST INDICATION: looking for infection of thoracic spine  COMPARISON:  None  TECHNIQUE:  Multiplanar, multisequence imaging of the thoracic spine was performed before and after gadolinium administration     IV Contrast:  10 mL of Gadobutrol injection (SINGLE-DOSE) IMAGE QUALITY:  Diagnostic  FINDINGS: ALIGNMENT:  Normal alignment of the thoracic spine  No compression fracture  No subluxation  No evidence of scoliosis  MARROW SIGNAL:  Scattered degenerative endplate changes  No focally suspicious marrow lesions  No bone marrow edema or compression abnormality  THORACIC CORD:  Normal signal within the thoracic cord  PREVERTEBRAL AND PARASPINAL SOFT TISSUES:   Normal  THORACIC DEGENERATIVE CHANGE:  Scattered degenerative endplate changes  No focally suspicious marrow lesions  No bone marrow edema or compression abnormality  Mild spondylotic changes  No bone marrow edema or compression abnormality  POSTCONTRAST:  No abnormal enhancement  OTHER FINDINGS:  Peripheral T2 hyperintense lesions noted in the posterior aspect of the spleen series 7, images 42 and 43  Possible splenomegaly  Spleen incompletely characterized  In the posterior aspect of left lower lobe there is a 1 8 cm lesion with a air-fluid level, worrisome for cavitary lesion  Impression: 1  Mild spondylosis without cord compression or cord signal abnormality  2   No MR evidence of discitis/ostomy colitis in the visualized spine  3   Peripheral T2 hyperintense lesions in the spleen which may be enlarged  Differential diagnosis includes splenic mass lesions such as hemangioma or other lesion or perhaps a splenic infarct  Consider left upper quadrant ultrasound and/or contrast-enhanced CT of the abdomen for further assessment   4  Cavitary lesion with air-fluid level in the left lower lobe, possibly infectious process such as a pulmonary abscess  Differential diagnosis includes malignancy  CT of the chest advised  Workstation performed: [id number]     CT chest abdomen pelvis w contrast    Result Date: 1/14/2023  Narrative: CT CHEST, ABDOMEN AND PELVIS WITH IV CONTRAST INDICATION:   Neuro deficit, acute, stroke suspected recomended by radiolgoy acute right facial droop and right arm weakness  COMPARISON:  CT 1/14/2023 TECHNIQUE: CT examination of the chest, abdomen and pelvis was performed  Axial, sagittal, and coronal 2D reformatted images were created from the source data and submitted for interpretation  Radiation dose length product (DLP) for this visit:  5595 mGy-cm   This examination, like all CT scans performed in the [facility], was performed utilizing techniques to minimize radiation dose exposure, including the use of iterative reconstruction and automated exposure control  IV Contrast:  100 mL of iohexol (OMNIPAQUE) Enteric Contrast: Enteric contrast was not administered  FINDINGS: Examination is limited due to artifact from the upper extremities as well as motion and suboptimal phase of contrast enhancement  CHEST LUNGS:  As seen on recent thoracic MRI, there is a cavitary nodule with small fluid level in the posterior left lower lobe measuring 1 7 x 1 8 cm  This may be infectious in etiology  There is also a small left upper anterior mediastinal infiltrate, though better visualized on prior CT cervical spine  Significant motion otherwise limits evaluation  Lesions may be due to septic emboli  There is no tracheal or endobronchial lesion  PLEURA:  Trace left pleural fluid/pleural thickening  HEART/GREAT VESSELS: Coronary atherosclerosis  No thoracic aortic aneurysm  MEDIASTINUM AND CAROLANN:  Unremarkable  CHEST WALL AND LOWER NECK:  Unremarkable  ABDOMEN LIVER/BILIARY TREE:  Mild hepatic steatosis may be present  No CT evidence of suspicious hepatic mass  Normal hepatic contours  No biliary dilatation  GALLBLADDER:  There are gallstone(s) within the gallbladder, without pericholecystic inflammatory changes  SPLEEN:  Peripherally calcified anterior splenic nodule again visualized measuring 4 4 x 4 6 cm, possibly from old infection or hemorrhage  Multiple large hypodense lesions are again visualized  Some lesions appear relatively well demarcated, despite the  significant motion artifact, raising the possibility of splenic infarcts  Other lesions however remain indeterminate  Given the additional findings in the chest and abdomen, findings may be due to sequela of of septic emboli  Neoplasm is not excluded at  this time  PANCREAS:  Unremarkable  ADRENAL GLANDS:  Unremarkable  KIDNEYS/URETERS:  No hydronephrosis  Although images are limited due to motion and phase of contrast enhancement, there are multiple bilateral renal hypodense lesions, several of which appear wedge-shaped, suspicious for renal infarcts  Given the left lung findings, these may be infarcts from septic emboli and/or coexistent pyelonephritis  Neoplasm such as lymphoma is felt to be less likely, though not entirely excluded  STOMACH AND BOWEL:  Unremarkable  APPENDIX:  There are expected postoperative changes of appendectomy  ABDOMINOPELVIC CAVITY:  No ascites  No pneumoperitoneum  No lymphadenopathy  VESSELS:  Unremarkable for patient's age  PELVIS REPRODUCTIVE ORGANS:  Prostate calcifications  URINARY BLADDER:  Unremarkable  ABDOMINAL WALL/INGUINAL REGIONS:  Unremarkable  OSSEOUS STRUCTURES:  No acute fracture or destructive osseous lesion  L4 spondylolysis with mild grade 1 anterolisthesis on S1  Impression: 1  Very limited examination due to phase of contrast enhancement and motion  As seen on recent thoracic MRI, there is a cavitary nodule with small amount of fluid in the posterior left lower lobe measuring 1 7 x 1 8 cm   There is also a small left upper anterior paramediastinal infiltrate  There are also multiple splenic and bilateral renal hypodense lesions as described above  Spectrum of findings may be sequela of septic emboli  Continued follow-up recommended to exclude neoplasm  2  Cholelithiasis  Workstation performed: [id number]     CT stroke alert brain    Result Date: 1/14/2023  Narrative: CT BRAIN - STROKE ALERT PROTOCOL INDICATION:   Neuro deficit, acute, stroke suspected acute right facial droop and right arm weakness  COMPARISON:  None  TECHNIQUE:  CT examination of the brain was performed  In addition to axial images, coronal reformatted images were created and submitted for interpretation  Radiation dose length product (DLP) for this visit:   This examination, like all CT scans performed in the [facility], was performed utilizing techniques to minimize radiation dose exposure, including the use of iterative reconstruction  and automated exposure control  IMAGE QUALITY:  Diagnostic  FINDINGS:  PARENCHYMA:  No intracranial mass, mass effect or midline shift  No CT signs of acute infarction  No acute parenchymal hemorrhage  Minimal chronic microangiopathy  VENTRICLES AND EXTRA-AXIAL SPACES:  Normal for the patient's age  VISUALIZED ORBITS AND PARANASAL SINUSES:  Orbits are unremarkable  Paranasal sinus mucosal thickening  CALVARIUM AND EXTRACRANIAL SOFT TISSUES:   Normal      Impression: No acute intracranial abnormality  Findings were directly discussed with Ness Salgado at 1:38 PM  Workstation performed: [id number]     XR chest portable ICU    Result Date: 1/16/2023  Narrative: CHEST INDICATION:   intubated, f/u  COMPARISON:  1/14/2023 EXAM PERFORMED/VIEWS:  XR CHEST PORTABLE ICU FINDINGS:  Endotracheal tube tip is 4 cm proximal to the diego  Left jugular central venous catheter tip overlies the SVC  Cardiomediastinal silhouette appears unremarkable  The lungs are clear  No pneumothorax or pleural effusion   Osseous structures appear within normal limits for patient age  Impression: No acute cardiopulmonary disease  Workstation performed: [id number]     CTA stroke alert (head/neck)    Result Date: 1/14/2023  Narrative: CTA NECK AND BRAIN WITH CONTRAST INDICATION: Neuro deficit, acute, stroke suspected stroke acute right facial droop and right arm weakness Bacteremia with suspected endocarditis  COMPARISON:   Immediately preceding head CT  TECHNIQUE:   Post contrast imaging was performed after administration of iodinated contrast through the neck and brain  Post contrast axial 0 625 mm images timed to opacify the arterial system  3D rendering was performed on an independent workstation  MIP reconstructions performed  Coronal reconstructions were performed of the noncontrast portion of the brain  Radiation dose length product (DLP) for this visit:  927 mGy-cm   This examination, like all CT scans performed in the [facility], was performed utilizing techniques to minimize radiation dose exposure, including the use of iterative reconstruction and automated exposure control  IV Contrast:  100 mL of iohexol (OMNIPAQUE)  IMAGE QUALITY:   Diagnostic FINDINGS: CERVICAL VASCULATURE AORTIC ARCH AND GREAT VESSELS:  Mild atherosclerotic disease of the arch, proximal great vessels and visualized subclavian vessels  No significant stenosis  RIGHT VERTEBRAL ARTERY CERVICAL SEGMENT:  Normal origin  The vessel is normal in caliber throughout the neck  LEFT VERTEBRAL ARTERY CERVICAL SEGMENT:  Normal origin  The vessel is normal in caliber throughout the neck  RIGHT EXTRACRANIAL CAROTID SEGMENT:  Mild atherosclerotic disease of the distal common carotid artery and proximal cervical internal carotid artery without significant stenosis compared to the more distal ICA   LEFT EXTRACRANIAL CAROTID SEGMENT:  Mild atherosclerotic disease of the distal common carotid artery and proximal cervical internal carotid artery without significant stenosis compared to the more distal ICA  NASCET criteria was used to determine the degree of internal carotid artery diameter stenosis  There is focal nonocclusive thrombus within the high cervical right internal jugular vein without intracranial extension  Remainder of the right IJ is patent  INTRACRANIAL VASCULATURE INTERNAL CAROTID ARTERIES:  Mild atherosclerotic disease without significant stenosis     Normal ophthalmic artery origins  Normal ICA terminus  ANTERIOR CIRCULATION:  Symmetric A1 segments and anterior cerebral arteries with normal enhancement  Normal anterior communicating artery  MIDDLE CEREBRAL ARTERY CIRCULATION:  There is thrombus in the left M1 segment with reconstitution distally that may be incompletely occlusive  There is focal occlusion of branch arising from the proximal left M1 segment that may represent prominent anterior temporal branch or early origin of the inferior division  Right MCA is unremarkable  DISTAL VERTEBRAL ARTERIES:  Normal distal vertebral arteries  Posterior inferior cerebellar artery origins are normal  Normal vertebral basilar junction  BASILAR ARTERY:  Basilar artery is normal in caliber  Normal superior cerebellar arteries  POSTERIOR CEREBRAL ARTERIES: Both posterior cerebral arteries arises from the basilar tip  Both arteries demonstrate normal enhancement  VENOUS STRUCTURES:  Normal  NON VASCULAR ANATOMY BONY STRUCTURES:  Stable erosive changes at the right C4-5 facet complex  See recent cervical MRI  SOFT TISSUES OF THE NECK:  Normal  Ossification of the stylohyoid ligaments is noted  THORACIC INLET:  Cavitary lesion in the medial left upper lobe measuring 3 9 x 2 5 cm is stable compared with recent CT the cervical spine  There is a 1 9 cm cavitary lesion with fluid level in the superior segment of the left lower lobe not imaged on recent studies    Findings are suspicious for septic emboli       Impression: Left M1 thromboembolism with reconstitution just distally  Focal occlusion of left MCA branch arising from the proximal segment No other intracranial stenosis or large vessel occlusion  No significant stenosis of the cervical carotid or vertebral arteries  Incidental nonocclusive DVT in the high cervical right internal jugular vein  Remainder of the right IJ is patent  No intracranial venous thrombosis  Cavitary lesions in the left lung suspicious for septic emboli  Findings were directly discussed with Danisha Tyson at 1:38 PM  Workstation performed: [id number]     IR stroke alert    Result Date: 1/16/2023  Narrative: IR STROKE ALERT Indication:I63 9: Cerebral infarction, unspecified  Endocarditis  Acute neurologic changes this afternoon found to have left M1 occlusion  Acute anemia with hypotension  Procedure: Both groins were prepped and draped in sterile fashion  Patient arrived [place] with labored breathing, tachypnea, unable to follow commands and the procedure was therefore performed under general anesthesia  Ultrasound guidance was used to maximize opportunity for closure device  The right groin was surveyed with ultrasound to assess for vessel patency  The right common femoral artery is patent  The right common femoral artery was punctured using a 21 gauge needle and direct sonographic  guidance  A static sonographic image was saved demonstrating needle entry  An advantage Glidewire was advanced into the thoracic aorta over which a 6 Western Renay Neuron Max sheath was placed  A 5 Western Renay Berenstein catheter was advanced through the sheath  The left internal carotid artery selected was advanced into the proximal internal carotid artery  AP and lateral arteriography of the head was performed  A coaxial system containing a 71 Zoom aspiration thrombectomy catheter and dangeloman Manuel catheter were advanced into the left middle cerebral artery and the microcatheter was advanced into M2 branch    A 6 mm Solitaire stentriever was deployed across the occlusion and aspiration thrombectomy was performed with stentriever  2 additional passes were made using similar technique with interval arteriography in AP and lateral projections  Transorbital oblique arteriography was performed at completion  Fluoroscopy time (min) : 19 2minutes Images:319 Contrast (ml) : 65 mL Visipaque 320  Sedation (min) : N/A Findings: Initial arteriogram shows M1 occlusion of the left side  Subsequent imaging after thrombectomy shows an early bifurcation of the M1  Flow was most restored  There may be a small amount of residual thrombus within a M2 branch difficult to delineate versus focal spasm  TIMES: In dept: 1512 In room: 1512 Access: 1531 1st pass: 1543 Recan: 1547 2a 2nd pass: 1552 3rd pass: 1610 Recan: 1615 2b  Pre-TICI : 0 Post-TICI: 2b     Impression: Impression: Successful recanalization left M1 occlusion with 2B flow post intervention Workstation performed: [id number]     US bedside procedure    Result Date: 1/16/2023  Narrative: 1 2 840 [id number]  2 446 4649 [id number] 11 1    Echo complete w/ contrast if indicated    Result Date: 1/9/2023  Narrative: •  Left Ventricle: Left ventricular cavity size is normal  by visual estimation  Systolic function is normal  Wall motion is normal  Diastolic function is mildly abnormal, consistent with grade I (abnormal) relaxation  •  Right Ventricle: Right ventricular cavity size is mildly dilated  Systolic function is normal  •  Left Atrium: The atrium is mildly dilated  •  Aortic Valve: There is at least mild regurgitation  There is aortic sclerosis  •  Mitral Valve: There is mild annular calcification  •  Tricuspid Valve: Pulmonary artery systolic pressures cannot be estimated due to lack of tricuspid regurgitation jet  •  Aorta: The aortic root is ectatic at 3 6 cm  The ascending aorta is ectatic at 3 9 cm  •  Compared to report from March 31, 2022, aortic valve velocities inconsistent with stenosis   There are no obvious echocardiographic indications of endocarditis  Echo follow up/limited w/ contrast if indicated    Result Date: 1/14/2023  Narrative: Technically adequate Limited transthoracic echocardiogram study  Patient had complete echocardiogram on 1/9/2022  Mild concentric left ventricle hypertrophy, normal left ventricular systolic function, left ventricle ejection fraction is greater than 65%  Normal right ventricle size and systolic function  Normal left and right atrial size by visual assessment  Tricuspid aortic valve with thickening and densities noted on the tips of the cusps highly suspicious for vegetations  There is associated moderate aortic valve regurgitation  There  are no mobile lesions and there is no evidence of abscess involving the aortic valve  Borderline mitral annular calcification  No vegetation identified on mitral valve  Trace mitral valve regurgitation  No vegetations identified on the tricuspid or pulmonic valve regurgitation  Trace tricuspid valve regurgitation noted  No obvious pulmonary hypertension  Trace, hemodynamically insignificant circumferential pericardial effusion  Compared to previous echocardiogram from 1/9/2022 the abnormalities on the aortic valve are new  There is some progression of aortic valve regurgitation  EKG, Pathology, and Other Studies: I have personally reviewed pertinent reports        VTE Pharmacologic Prophylaxis: Reason for no pharmacologic prophylaxis post op hemorrhage    VTE Mechanical Prophylaxis: sequential compression device

## 2023-01-17 NOTE — RESPIRATORY THERAPY NOTE
01/17/23 0305   Respiratory Assessment   Respiratory Pattern Assisted   Chest Assessment Chest expansion symmetrical   Bilateral Breath Sounds Diminished   Cough None   Resp Comments No adverse events to report  Patient was transported to CT scan overnight     Vent Information   Vent type [place] C3   [place] C3/G5 Vent Mode (S)CMV   $ Vent Daily Charge-Subsequent Yes   $ Pulse Oximetry Spot Check Charge Completed   (S)CMV Settings   Resp Rate (BPM) 16 BPM   VT (mL) 480 mL   FIO2 (%) 40 %   PEEP (cmH2O) 6 cmH2O   Insp Time (%) 0 8 %   Flow Trigger (LPM) 3   Humidification Heater   Heater Temperature (Set) 95 °F (35 °C)   (S)CMV Actuals   Resp Rate (BPM) 22 BPM   VT (mL) 516   MV 11 4   MAP (cmH2O) 5 9 cmH2O   Peak Pressure (cmH2O) 12 cmH2O   (S)CMV Alarms   High Peak Pressure (cmH2O) 50   Low Pressure (cmH2O) 5 cm H2O   High Resp Rate (BPM) 40 BPM   Low Resp Rate (BPM) 5 BPM   High MV (L/min) 24 L/min   Low MV (L/min) 4 L/min   Apnea Time (s) 20 S   Maintenance   Water bag changed No   Circuit changed No

## 2023-01-17 NOTE — ACP (ADVANCE CARE PLANNING)
Record of Family Meeting - Palliative and Supportive Care   Rachana Hoyos 58 y o  male [id number]      Recommendations and Plan:    Plan is for family to meet tonight and discuss transition to comfort  If so, will likely extubate to comfort tomorrow or thursday        A family meeting was held for Jenna  This meeting was necessary for determine the appropriate course of treatment  Time of Meetin pm  Meeting Location: ICU conference room  Participants: patient's son Nakita Kunz and brother Gayle Delarosa, ICU Dr Aron Daly, Dr Marques Bray  [facility] Dr Makayla Victor, resident Estefanía Valdez, medical student Stan Chandler MD     Patient Participation: no    Patient Support System: son and brother    Advanced Directive of POLST available: no    Topics of Discussion:    Reviewed patient's condition at present (ICU team)    Discussed how we got to this point (ICU team)    Son expressed and all family agree that his father is an active and vibrant person and would not wish to be kept alive on machines, that would not be QOL for him    His stated goal is to make his father comfortable    Time Involved in Meetin hr, beginning at approximately  2 pm  and ending at approximately 3 pm      Luis Dominguez MD   Palliative and Supportive Care  Clinic/Answering Service: [phone]  You can find me on [employer]!

## 2023-01-17 NOTE — PROGRESS NOTES
Patient evaluated on evening rounds exam appears to be consistent with day team signout  Patient will withdrawal with his left lower extremity and squeezes with his left upper extremity, no movement of the right upper extremity appreciated  No further bleeding events reported  Patient's ABG reviewed at noon today with pH noted to be 7 296 and a PCO2 of 41  [facility] with a tidal volume of 480 and a respiratory rate of 16  Repeat ABG at 6:30 PM with a PCO2 of 27 3 and a pH of 7 435, [facility] ventilation with a rate of 18 and a tidal volume of 480  Plan to reassess patient and adjust ventilator pending patient's work of breathing  He does have a metabolic acidosis with a bicarb of 20 and a sodium currently at 158  Serum osmolarity noted to be 324  ICPs were 4 and patient underwent decompression  Attempt to correct metabolic acidosis to avoid hypocapnia with significant brain event  Critical care time 48 minutes, critical care time does not include procedures or family update

## 2023-01-17 NOTE — PROGRESS NOTES
Pastoral Care Progress Note    2023  Patient: Tono Tinoco : 1960  Admission Date & Time: 2023 1516  MRN: [id number] [facility]: [id number]        This  met w pt's son Latha Santos), son's gf Kristen Herring), and brother Vishal Anguiano)  Son is quite bereft by pt's condition and unable to enter into pt's room  Son states his mother  of cancer less than 2 years ago and his father was "totally fine at [facility], I can't believe it " Pt's brother is also quite saddened by pt's state of being  Pt's son spoke openly about grieving his mother, feeling overwhelmed by pt's medical situation, and feeling unable to process what is happening  This  offered grief mgmt, emotional support, spiritual support, active listening, kindness, hospitality, and prayer  Spiritual care will follow               Chaplaincy Interventions Utilized:   Empowerment: Clarified, confirmed, or reviewed information from treatment team , Encouraged focus on present, Encouraged self-care, Facilitated group experience, Normalized experience of patient/family, Provided anticipatory guidance, Provided anxiety containment, and Provided grief counseling    Exploration: Explored hope, Explored emotional needs & resources, Explored relational needs & resources, Explored spiritual needs & resources, Facilitated life review, and Facilitated story telling    Collaboration: Advocated for patient/family and Consulted with interdisciplinary team    Relationship Building: Cultivated a relationship of care and support, Listened empathically, Mediated conflict, Hospitality, Provided relationship counseling, and Provided silent and supportive presence    Ritual: Provided prayer      Chaplaincy Outcomes Achieved:  Catharsis, Distress reduced, Emotional resources utilized, Expressed gratitude, Identified priorities, Improved communication, Tearfully processed emotions, and Verbally processed emotions          Spiritual Coping Strategies Utilized: Connectedness and Spiritual comfort

## 2023-01-17 NOTE — PROGRESS NOTES
Progress Note - Infectious Disease   Lianne Lawton 58 y o  male MRN: [id number]  Unit/Bed#: ICU 12 Encounter: [id number]      Impression/Plan:  1   Sepsis, POA with recurrence   Noted with leukocytosis and tachycardia early in [facility] at outside campus   Sources are 2, 3, 4 and 5    Multiple other embolic complications as below  Developed fevers since transfer which are intermittent  White count has normalized  Patient unfortunately remains critically ill  Antibiotics as below  Repeat CBC and CMP tomorrow  Follow-up pending blood cultures  Collected repeat blood cultures on 1/19  Continue to trend fever curve/vitals  Ongoing goals of care discussions  Additional care as per ICU     2   MSSA bacteremia  Cosme Moreau source is problem 3   Multiple other complications now below   Repeat cultures started to clear on Ancef  Patient was switched to nafcillin when he was developing hypotension  Unfortunately he clinically declined from a neurologic standpoint  Overall prognosis is poor given the severity of disease and it remains uncertain if source control can truly be achieved for clearance of his bacteremia  Continue nafcillin 2 g every 4 hours  Continue to trend fever curve/vitals  Repeat CBC and chemistry tomorrow  Follow-up pending blood cultures  Eventual/potential KALINA  Goals of care discussions as per primary  Patient will need prolonged antibiotic course given multiple complications  Additional cares per ICU     3   Left elbow cellulitis and abscess   Elbow itself appeared fairly benign   Seen by orthopedics  Underwent incision and drainage on 1/9   Patient had reported [place] was [place] and subsequently developed complications below    Antibiotics as above  Orthopedic evaluation appreciated  Monitor surgical site  Continue local care     4   Left knee prosthetic joint infection   Developed knee swelling over admission   Fluid studies concerning for prosthetic joint infection given the above   Underwent debridement and polyexchange on 1/9   Cultures also with MSSA   Repeat blood cultures noted to be positive after intervention with late growth, possibly due to source control    Antibiotic as above  Ongoing follow-up by orthopedics  Monitor site clinically otherwise  Eventual/potential addition of rifampin     5   Neck pain and C4-5 septic arthritis   Has had progressive pain over the last 2 weeks   CT imaging reviewed and suspicion is for osteomyelitis/discitis and will need to rule out epidural involvement   MRI confirms changes at C4-5   MRI T-spine negative   Patient with further complications below  Antibiotics as above  Prolonged duration of therapy  Neurosurgical evaluation appreciated  Continue to trend fever curve/WBC     6   Pyuria, positive urine culture and penile irritation   Urine culture ultimately isolated E  coli   Patient without any definitive urinary symptoms   Suspect that these represent asymptomatic bacteriuria   The patient does however report penile irritation that is been going on for the last 1 to 2 weeks   Would question if he has a superficial yeast infection based on exam   STI testing had been sent out in the setting of 8   RPR negative   Urine GC negative  Plan for repeat STI testing in 3 to 4 weeks  Consider nystatin/local cream for penile irritation  Continue antibiotics as above     7   Type 2 diabetes   Hemoglobin A1c of 7 1   Glucose management as per primary     8   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   Tender lesions most consistent with findings related to SBE   Swabs of the site negative for HSV   Cultures positive for MSSA  Lesions themselves improving on subsequent evaluation  Given multiple areas of embolization systemically suspect that the patient may have had more than 1 valve involved  Repeat 2D echo notable for aortic valve lesions    Antibiotics as above  Potential transesophageal echo depending on goals of care  Monitor site clinically  Will need prolonged duration of therapy     9  Renal and Splenic infarcts and pulmonary cavitary lesions   Findings seen on MRI   Likely complications of the above   Partially visualized on CT of the abdomen without contrast     Subsequent contrast imaging reviewed with also renal infarcts noted  Suspect multiple valves involved leading to embolization  Antibiotics as above  Continue to trend fever curve/WBC  Potential repeat imaging further into course of antibiotic      10  Left MCA stroke with multiple small embolic infarcts and midline shift   Abruptly developed deficits  Underwent IR procedure  Mental status and images further declined and patient underwent decompressive craniectomy   Clinical status is overall poor given multiple infectious complications above  Continue nafcillin for now  Monitor mental status  Ongoing follow-up by neurosurgery  Ongoing follow-up by neurology  Continue to trend fever curve/WBC  Additional cares per ICU  Goals of care discussions as per primary    Above plan discussed with patient's family at bedside and with primary service resident and neurosurgical attending  ID consult service will continue to follow  Antibiotics:  Nafcillin 4  Total antibiotic 11    24 Hour events:  Yesterday and overnight notes reviewed and adjustments to medications noted    Subjective: Intubated and does not interact on exam   Family present at bedside  Reviewed briefly current antibiotics      Objective:  Vitals:  Temp:  [98 6 °F (37 °C)-101 6 °F (38 7 °C)] 100 °F (37 8 °C)  HR:  [] 72  Resp:  [16-27] 22  BP: (120-157)/(48-67) 129/53  SpO2:  [90 %-100 %] 98 %  Temp (24hrs), Av 3 °F (37 4 °C), Min:98 6 °F (37 °C), Max:101 6 °F (38 7 °C)  Current: Temperature: 100 °F (37 8 °C)    Physical Exam:   General Appearance:   Intubated and does not interact on exam    Throat:  ET tube in place without acute issue   Lungs:    Vented breath sounds throughout, no wheezes, rales or rhonchi  Heart:  RRR; systolic murmur present, no rubs or gallops   Abdomen:   Soft, non-tender, non-distended, hypoactive bowel sounds  Extremities: No clubbing, cyanosis; anasarca in all of his extremities   Skin: No new rashes or lesions  No new draining wounds noted  Peripheral embolic lesions remain stable on the right foot  Labs, Imaging, & Other studies:   All pertinent labs and imaging studies were personally reviewed as below  Results from last 7 days   Lab Units 01/17/23  0545 01/16/23  2338 01/16/23  1212 01/16/23  1105 01/16/23  0601   WBC Thousand/uL 10 06 9 05  --   --  9 71   HEMOGLOBIN g/dL 7 7* 7 2* 8 1*   < > 7 2*   PLATELETS Thousands/uL 173 156  --   --  182    < > = values in this interval not displayed  Results from last 7 days   Lab Units 01/17/23  0545 01/16/23  1207 01/16/23  0601 01/15/23  0447 01/15/23  0445 01/15/23  0025 01/14/23  1749 01/14/23  1605   POTASSIUM mmol/L 3 7   < > 3 6   < >  --    < > 5 1  --    CHLORIDE mmol/L 134*   < > 132*   < >  --    < > 102  --    CO2 mmol/L 21   < > 18*   < >  --    < > 19*  --    CO2, I-STAT mmol/L  --   --   --   --   --   --   --  18*   BUN mg/dL 24   < > 34*   < >  --    < > 48*  --    CREATININE mg/dL 1 01   < > 1 08   < >  --    < > 1 50*  --    EGFR ml/min/1 73sq m 79   < > 73   < >  --    < > 49  --    GLUCOSE, ISTAT mg/dl  --   --   --   --   --   --   --  299*   CALCIUM mg/dL 8 6   < > 8 1*   < >  --    < > 7 5*  --    AST U/L  --   --  250*  --  223*  --  212*  --    ALT U/L  --   --  118*  --  86*  --  83*  --    ALK PHOS U/L  --   --  73  --  68  --  74  --     < > = values in this interval not displayed  Results from last 7 days   Lab Units 01/15/23  0131 01/13/23  0657 01/11/23  0728   BLOOD CULTURE  Staphylococcus aureus* No Growth at 72 hrs    Staphylococcus aureus* Staphylococcus aureus*  Staphylococcus aureus*   GRAM STAIN RESULT  Gram positive cocci in clusters* Gram positive cocci in clusters* Gram positive cocci in clusters*  Gram positive cocci in clusters*       Lab interpretation/comments: White blood cell count remains within normal limits  Creatinine stable  LFTs relatively stable  Imaging interpretation/comments: Reviewed CT of the head with neurosurgical attending and noted to have successful decompression however with progressing evolving infarct    Culture data: Multiple repeat sets of cultures remain positive with MSSA  Repeat cultures collected today

## 2023-01-17 NOTE — PHYSICAL THERAPY NOTE
Physical Therapy Cancellation Note    PT orders received chart review completed  Pt is currently intubated/sedated and not appropriate to participate in skilled PT at this time  PT will sign off please re-consult as medically appropriate       01/17/23 5883   Note Type   Note type Cancelled Session   Cancel Reasons Intubated/sedated       Nakita Cornejo, PT

## 2023-01-17 NOTE — PROGRESS NOTES
Progress Note - Neurology   Gonsalo Rod 58 y o  male MRN: [id number]  Unit/Bed#: ICU 12 Encounter: [id number]      Assessment/Plan   * Acute stroke due to embolism of left middle cerebral artery [facility])  Assessment & Plan  Gonsalo Rod is a 58 y o  male with HTN, HLD, DM type II, prior tobacco use, left TKA who presented to [facility] on 1/7/2023 for sepsis work-up with suspicion for endocarditis  Patient found to have MSSA bacteremia and AV endocarditis with septic embolism (possible splenic infarcts, necrotic toe lesions/Janeway lesions, cervical osteomyelitis, septic arthritis, cavitary lung lesions)  Patient had been treated as outpatient for left elbow cellulitis prior to admission  Hospital course has also been complicated by significant anemia, requiring multiple blood transfusions  Patient was a stroke alert on 1/14/2023 for acute left MCA syndrome  CT head unremarkable  CTA head and neck demonstrated left M1 occlusion and incidental finding of right IJ DVT   and was transferred to [facility] for mechanical thrombectomy, resulted in TICI2B recanalization     - NIHSS on arrival to [facility] 29  - MRI brain with/without contrast:  · Demonstrated essentially complete left MCA territory infarction with petechial hemorrhage and minimal left to right shift, as well as bihemispheric anterior and posterior circulation punctate infarcts, likely all as result of septic emboli  - Repeat CT Head 1/15:  · Re-demonstrating large left MCA infarct with increased mass effect and 7 mm left to right shift; additional small infarcts in right cerebral and cerebellum   - Repeat CT head 1/16 :  · Evolving large left MCA and PCA infarct with petechial cortical hemorrhage with worsened cytotoxic edema, worsened left to right midline shift measuring 1 6 cm, worsening compression of left lateral ventricle, and new left uncal herniation   - Repeat CTH on 1/17:   · Evolving massive hemorrhagic L MCA infarct with associated mass effect  · Mild enlargement of the temporal horn right lateral ventricle redemonstrated with question of small amount of transependymal CSF flow adjacent to the occipital horn right lateral ventricle  · Small evolving infarctions in the right occipital and frontoparietal regions  - 2D echo with EF 65%, normal-sized atria, with aortic valve densities highly suspicious for vegetations  Early morning hours of 1/15/2023, patient noted to have right hemiplegia, some difficulty with comprehension but able to follow most commands on left  Later in the morning (1/15), he was following commands less consistently on the left  Morning of 1/16 found to have a new blown left pupil with a GCS of 3  Repeat CT head with with worsening cytotoxic edema, left to right midline shift, compression of left lateral ventricle, and new left uncal herniation  Patient went for emergent craniectomy on 1/16  Repeat CT head 1/16 demonstrated worsening hemorrhagic conversion in left cerebral cortex with cortical parenchymal hemorrhage and left basal ganglia hemorrhage  Midline shift improved, now 1 cm with minimal improvement in left uncal herniation, and slightly improved compression of left lateral ventricle with mild dilation of right lateral ventricle  Malignant left MCA infarct secondary to septic embolism  Evolving stroke with worsening CT findings and poor neurologic exam resulting in emergent hemicraniectomy, s/p left decompressive hemicraniectomy (1/16/2023)  Exam essentially unchanged   Poor prognosis per discussion with attending neurologist      Plan:  - Sodium goal > 145 per neurosurgery  - Continue to hold AP/AC given potential for high risk of hemorrhagic conversion of strokes related to septic emboli  - MAP>65 and SBP<140 as per Critical Care and Neurosurgery  - Frequent neurochecks  - CT surgery, Gastroenterology on board  - Treatment of infectious/metabolic derangements as per primary service  - PT/OT/ST when able  - Medical management and supportive care per ICU team, notify with changes    Anemia  Assessment & Plan  - Hemoglobin dropped from 12 1 to 7 9 on 1/14/2023, necessitating blood transfusion  - Hemoglobin improving, 8 1 on 1/16  - GI on board  Roxanne Kovacs will need follow up in in 4 weeks with neurovascular attending or advance practitioner  He will not require outpatient neurological testing  Subjective:   Exam unchanged per discussion with ICU team    ROS:  12 point ROS limited to unresponsiveness    Vitals: Blood pressure 134/55, pulse 92, temperature (!) 100 8 °F (38 2 °C), temperature source Bladder, resp  rate (!) 24, height 5' 10" (1 778 m), weight 102 kg (224 lb 13 9 oz), SpO2 97 %  ,Body mass index is 32 27 kg/m²  Physical Exam  Vitals and nursing note reviewed  Constitutional:       General: He is not in acute distress  Appearance: He is ill-appearing  He is not toxic-appearing or diaphoretic  HENT:      Head:      Comments: S/p left dallin-craniectomy  Eyes:      General: No scleral icterus  Right eye: No discharge  Left eye: No discharge  Conjunctiva/sclera: Conjunctivae normal    Pulmonary:      Comments: Intubated   Skin:     General: Skin is warm and dry  Comments: Ischemic toes in Right foot  Janeway lesions noted in bilateral feet       Neurologic Exam     Mental Status   Patient is lying in bed, intubated  Does not open eyes to verbal, tactile or noxious stimuli  Slightly grimaces with sternal rub  Does not follow commands        Cranial Nerves   Primary gaze midline   Right pupil 2 mm, round, reactive  Left pupil 3 mm, round, poorly reactive   Blink to threat absent   Corneals intact bilaterally   Facial asymmetry testing limited to intubation status      Motor Exam   No withdrawal in RUE with distal and proximal noxious stimuli   Triple flexion noted in RLE with distal noxious stimuli     Delayed finger flexion noted in left hand with distal noxious stimuli   Delayed withdrawal with minimal dorsiflexion with distal noxious stimuli      Sensory Exam   Subtly grimaces to sternal rub and noxious stimuli in BUE     Gait, Coordination, and Reflexes     Tremor   Resting tremor: absent  No involuntary movements or rhythmic seizure like activity noted throughout exam      Lab Results: I have personally reviewed pertinent reports  Recent Results (from the past 24 hour(s))   Fingerstick Glucose (POCT)    Collection Time: 01/16/23  2:34 PM   Result Value Ref Range    POC Glucose 200 (H) 65 - 140 mg/dl   Fingerstick Glucose (POCT)    Collection Time: 01/16/23  4:35 PM   Result Value Ref Range    POC Glucose 136 65 - 140 mg/dl   Fingerstick Glucose (POCT)    Collection Time: 01/16/23  5:48 PM   Result Value Ref Range    POC Glucose 119 65 - 140 mg/dl   Blood gas, arterial    Collection Time: 01/16/23  6:28 PM   Result Value Ref Range    pH, Arterial 7 435 7 350 - 7 450    pCO2, Arterial 27 3 (LL) 36 0 - 44 0 mm Hg    pO2, Arterial 151 3 (H) 75 0 - 129 0 mm Hg    HCO3, Arterial 17 9 (L) 22 0 - 28 0 mmol/L    Base Excess, Arterial -5 3 mmol/L    O2 Content, Arterial 13 7 (L) 16 0 - 23 0 mL/dL    O2 HGB,Arterial  98 1 (H) 94 0 - 97 0 %    SOURCE Line, Arterial     Vent Type- AC AC     AC Rate 18     Tidal Volume 480 ml    Inspired Air (FIO2) 40     PEEP 6    Basic metabolic panel    Collection Time: 01/16/23  6:29 PM   Result Value Ref Range    Sodium 158 (H) 135 - 147 mmol/L    Potassium 3 3 (L) 3 5 - 5 3 mmol/L    Chloride 132 (H) 96 - 108 mmol/L    CO2 20 (L) 21 - 32 mmol/L    ANION GAP 6 4 - 13 mmol/L    BUN 30 (H) 5 - 25 mg/dL    Creatinine 1 09 0 60 - 1 30 mg/dL    Glucose 114 65 - 140 mg/dL    Calcium 8 5 8 3 - 10 1 mg/dL    eGFR 72 ml/min/1 73sq m   Osmolality-"If this is regarding a toxic alcohol, STOP  Test is not routinely indicated   Please consult medical  on call for further guidance "    Collection Time: 01/16/23  6:29 PM   Result Value Ref Range    Osmolality Serum 324 (H) 282 - 298 mmol/KG   Fingerstick Glucose (POCT)    Collection Time: 01/16/23  8:33 PM   Result Value Ref Range    POC Glucose 133 65 - 140 mg/dl   Fingerstick Glucose (POCT)    Collection Time: 01/16/23 10:09 PM   Result Value Ref Range    POC Glucose 119 65 - 140 mg/dl   CBC and differential    Collection Time: 01/16/23 11:38 PM   Result Value Ref Range    WBC 9 05 4 31 - 10 16 Thousand/uL    RBC 2 45 (L) 3 88 - 5 62 Million/uL    Hemoglobin 7 2 (L) 12 0 - 17 0 g/dL    Hematocrit 22 1 (L) 36 5 - 49 3 %    MCV 90 82 - 98 fL    MCH 29 4 26 8 - 34 3 pg    MCHC 32 6 31 4 - 37 4 g/dL    RDW 17 3 (H) 11 6 - 15 1 %    MPV 10 0 8 9 - 12 7 fL    Platelets 791 738 - 731 Thousands/uL    nRBC 0 /100 WBCs    Neutrophils Relative 78 (H) 43 - 75 %    Immat GRANS % 1 0 - 2 %    Lymphocytes Relative 13 (L) 14 - 44 %    Monocytes Relative 8 4 - 12 %    Eosinophils Relative 0 0 - 6 %    Basophils Relative 0 0 - 1 %    Neutrophils Absolute 7 11 1 85 - 7 62 Thousands/µL    Immature Grans Absolute 0 06 0 00 - 0 20 Thousand/uL    Lymphocytes Absolute 1 14 0 60 - 4 47 Thousands/µL    Monocytes Absolute 0 69 0 17 - 1 22 Thousand/µL    Eosinophils Absolute 0 03 0 00 - 0 61 Thousand/µL    Basophils Absolute 0 02 0 00 - 0 10 Thousands/µL   Basic metabolic panel    Collection Time: 01/16/23 11:38 PM   Result Value Ref Range    Sodium 158 (H) 135 - 147 mmol/L    Potassium 3 6 3 5 - 5 3 mmol/L    Chloride 133 (H) 96 - 108 mmol/L    CO2 21 21 - 32 mmol/L    ANION GAP 4 4 - 13 mmol/L    BUN 26 (H) 5 - 25 mg/dL    Creatinine 1 06 0 60 - 1 30 mg/dL    Glucose 124 65 - 140 mg/dL    Calcium 8 6 8 3 - 10 1 mg/dL    eGFR 74 ml/min/1 73sq m   Osmolality-"If this is regarding a toxic alcohol, STOP  Test is not routinely indicated   Please consult medical  on call for further guidance "    Collection Time: 01/16/23 11:38 PM   Result Value Ref Range    Osmolality Serum 324 (H) 282 - 298 mmol/KG   Blood gas, arterial    Collection Time: 01/16/23 11:39 PM   Result Value Ref Range    pH, Arterial 7 452 (H) 7 350 - 7 450    pCO2, Arterial 28 3 (LL) 36 0 - 44 0 mm Hg    pO2, Arterial 107 0 75 0 - 129 0 mm Hg    HCO3, Arterial 19 3 (L) 22 0 - 28 0 mmol/L    Base Excess, Arterial -4 2 mmol/L    O2 Content, Arterial 8 4 (L) 16 0 - 23 0 mL/dL    O2 HGB,Arterial  96 4 94 0 - 97 0 %    SOURCE Line, Arterial    Fingerstick Glucose (POCT)    Collection Time: 01/16/23 11:41 PM   Result Value Ref Range    POC Glucose 116 65 - 140 mg/dl   Fingerstick Glucose (POCT)    Collection Time: 01/17/23  2:14 AM   Result Value Ref Range    POC Glucose 136 65 - 140 mg/dl   Fingerstick Glucose (POCT)    Collection Time: 01/17/23  3:52 AM   Result Value Ref Range    POC Glucose 116 65 - 140 mg/dl   Basic metabolic panel    Collection Time: 01/17/23  5:45 AM   Result Value Ref Range    Sodium 158 (H) 135 - 147 mmol/L    Potassium 3 7 3 5 - 5 3 mmol/L    Chloride 134 (H) 96 - 108 mmol/L    CO2 21 21 - 32 mmol/L    ANION GAP 3 (L) 4 - 13 mmol/L    BUN 24 5 - 25 mg/dL    Creatinine 1 01 0 60 - 1 30 mg/dL    Glucose 96 65 - 140 mg/dL    Calcium 8 6 8 3 - 10 1 mg/dL    eGFR 79 ml/min/1 73sq m   Osmolality-"If this is regarding a toxic alcohol, STOP  Test is not routinely indicated   Please consult medical  on call for further guidance "    Collection Time: 01/17/23  5:45 AM   Result Value Ref Range    Osmolality Serum 321 (H) 282 - 298 mmol/KG   APTT    Collection Time: 01/17/23  5:45 AM   Result Value Ref Range    PTT 28 23 - 37 seconds   Protime-INR    Collection Time: 01/17/23  5:45 AM   Result Value Ref Range    Protime 17 0 (H) 11 6 - 14 5 seconds    INR 1 35 (H) 0 84 - 1 19   CBC    Collection Time: 01/17/23  5:45 AM   Result Value Ref Range    WBC 10 06 4 31 - 10 16 Thousand/uL    RBC 2 61 (L) 3 88 - 5 62 Million/uL    Hemoglobin 7 7 (L) 12 0 - 17 0 g/dL    Hematocrit 23 9 (L) 36 5 - 49 3 %    MCV 92 82 - 98 fL    MCH 29 5 26 8 - 34 3 pg    MCHC 32 2 31 4 - 37 4 g/dL    RDW 17 5 (H) 11 6 - 15 1 %    Platelets 925 981 - 840 Thousands/uL    MPV 10 0 8 9 - 12 7 fL   Blood gas, arterial    Collection Time: 01/17/23  5:46 AM   Result Value Ref Range    pH, Arterial 7 424 7 350 - 7 450    pCO2, Arterial 31 4 (L) 36 0 - 44 0 mm Hg    pO2, Arterial 82 4 75 0 - 129 0 mm Hg    HCO3, Arterial 20 1 (L) 22 0 - 28 0 mmol/L    Base Excess, Arterial -3 8 mmol/L    O2 Content, Arterial 10 9 (L) 16 0 - 23 0 mL/dL    O2 HGB,Arterial  95 4 94 0 - 97 0 %    SOURCE Line, Arterial     Vent Type- AC AC     AC Rate 16     Tidal Volume 480 ml    Inspired Air (FIO2) 40     PEEP 6    Blood culture    Collection Time: 01/17/23  5:46 AM    Specimen: Arm, Left; Blood   Result Value Ref Range    Blood Culture Received in Microbiology Lab  Culture in Progress  Blood culture    Collection Time: 01/17/23  5:46 AM    Specimen: Hand, Right; Blood   Result Value Ref Range    Blood Culture Received in Microbiology Lab  Culture in Progress      Prepare Leukoreduced RBC: 2 Units    Collection Time: 01/17/23  5:55 AM   Result Value Ref Range    Unit Product Code F2432R02     Unit Number [id number]     Unit ABO O     Unit RH NEG     Crossmatch Compatible     Unit Dispense Status Return to [facility]     Unit Product Volume 350 mL    Unit Product Code Y0511N73     Unit Number [id number]     Unit ABO O     Unit RH NEG     Crossmatch Compatible     Unit Dispense Status Presumed Trans     Unit Product Volume 350 mL   Fingerstick Glucose (POCT)    Collection Time: 01/17/23  6:01 AM   Result Value Ref Range    POC Glucose 93 65 - 140 mg/dl   Fingerstick Glucose (POCT)    Collection Time: 01/17/23  9:22 AM   Result Value Ref Range    POC Glucose 122 65 - 140 mg/dl   Fingerstick Glucose (POCT)    Collection Time: 01/17/23 10:30 AM   Result Value Ref Range    POC Glucose 119 65 - 140 mg/dl   Blood gas, arterial    Collection Time: 01/17/23 10:47 AM   Result Value Ref Range    pH, Arterial 7 474 (H) 7 350 - 7 450    PH ART TC 7 457 (H) 7 350 - 7 450    pCO2, Arterial 28 4 (LL) 36 0 - 44 0 mm Hg    PCO2 (TC) Arterial 29 8 (LL) 36 0 - 44 0 mm Hg    pO2, Arterial 60 1 (L) 75 0 - 129 0 mm Hg    PO2 (TC) Arterial 64 9 (L) 75 0 - 129 0 mm Hg    HCO3, Arterial 20 4 (L) 22 0 - 28 0 mmol/L    Base Excess, Arterial -2 7 mmol/L    O2 Content, Arterial 9 8 (L) 16 0 - 23 0 mL/dL    O2 HGB,Arterial  92 0 (L) 94 0 - 97 0 %    SOURCE Line, Arterial     Temperature 100 6 Degrees Fehrenheit    VENT- PS PS     PS PEEP 6     PS VENT FIO2 40     PS CM H2O 5    Fingerstick Glucose (POCT)    Collection Time: 01/17/23 12:04 PM   Result Value Ref Range    POC Glucose 117 65 - 140 mg/dl   Basic metabolic panel    Collection Time: 01/17/23 12:19 PM   Result Value Ref Range    Sodium 158 (H) 135 - 147 mmol/L    Potassium 3 5 3 5 - 5 3 mmol/L    Chloride 132 (H) 96 - 108 mmol/L    CO2 21 21 - 32 mmol/L    ANION GAP 5 4 - 13 mmol/L    BUN 21 5 - 25 mg/dL    Creatinine 1 04 0 60 - 1 30 mg/dL    Glucose 127 65 - 140 mg/dL    Calcium 8 6 8 3 - 10 1 mg/dL    eGFR 76 ml/min/1 73sq m   Osmolality-"If this is regarding a toxic alcohol, STOP  Test is not routinely indicated  Please consult medical  on call for further guidance "    Collection Time: 01/17/23 12:19 PM   Result Value Ref Range    Osmolality Serum 320 (H) 282 - 298 mmol/KG   ]  Imaging Studies: I have personally reviewed pertinent reports and I have personally reviewed pertinent films in PACS  EKG, Pathology, and Other Studies: I have personally reviewed pertinent reports  VTE Prophylaxis: Sequential compression device (Venodyne)     Counseling / Coordination of Care  Total time spent today 25 minutes  Greater than 50% of total time was spent with the patient and/or family counseling and/or coordination of care  A description of the counseling/coordination of care:  Patient was seen and evaluated  Discussed with attending    Chart reviewed thoroughly including laboratory and imaging studies  Plan of care discussed with patient and primary team  Discussed exam findings with critical care team and plan for family meeting today at 2 PM     Dictation voice to text software has been used in the creation of this document  Please consider this in light of any contextual or grammatical errors

## 2023-01-18 NOTE — OCCUPATIONAL THERAPY NOTE
Occupational Therapy Cancel Note         01/18/23 0754   OT Last Visit   OT Visit Date 01/18/23   Note Type   Note type Cancelled Session   Cancel Reasons Intubated/sedated   Additional Comments OT orders recieved, chart reviewed, Pt is currently pending family meeting and goals of care, OT will sign off at this time - please re-consult if needed       Kisha Ballard, OT

## 2023-01-18 NOTE — ACP (ADVANCE CARE PLANNING)
Record of Family Meeting - Palliative and Supportive Care   Gonsalo Rod 58 y o  male [id number]      Recommendations and Plan:    Plan is to arrange for extubation to comfort, likely tomorrow afternoon    Mario Alberto Bird will bring his father's parents to say goodbye tomorrow    Mario Alberto Bird and Jonatan Garcia have already discussed extubation to comfort with Carlton's parents and they are in agreement    We did not set an exact time for the extubation bc it depends on Carlton's parents and when they are able to get here, but ICU and [facility] teams will be available all day    Hospice consult agreed upon - Roxanne Townsend would be the level of care if he survives long enough        A family meeting was held for [employer]  This meeting was necessary for determine the appropriate course of treatment  Time of Meetin:45  Meeting Location: conference room - ICU  Participants: ICU Dr Terra Blackwood and Dr Nika Couch and Aleida Darling MD     Patient Participation: no    Patient Support System: Commutable    Advanced Directive of POLST available: no    Topics of Discussion:    Medical update from ICU team    Discussed hospice and extubation to comfort    Planned for tomorrow    Discussed anticipatory grief and complex grief      Time Involved in Meetin min, beginning at approximately  1:45  and ending at approximately 2:15  Virgie Parr MD   Palliative and Supportive Care  Clinic/Answering Service: [phone]  You can find me on [employer]!

## 2023-01-18 NOTE — RESPIRATORY THERAPY NOTE
RT Ventilator Management Note  Peter Scott 58 y o  male MRN: [id number]  Unit/Bed#: ICU 12 Encounter: [id number]      Daily Screen         1/16/2023  1204 1/17/2023  0742          Patient safety screen outcome[de-identified] Failed Passed      Not Ready for Weaning due to[de-identified] Underline problem not resolved --      Spont breathing trial % for 30 min: -- Yes                Physical Exam:   Assessment Type: (P) During-treatment  General Appearance: (P) Sedated  Respiratory Pattern: (P) Assisted  Chest Assessment: (P) Chest expansion symmetrical  Bilateral Breath Sounds: (P) Rhonchi, Coarse  Cough: (P) Weak  Suction: (P) ET Tube  O2 Device: (P) vent      Resp Comments: (P) pt found on pcv settings, placed onto spont 6/8 40%

## 2023-01-18 NOTE — ARC ADMISSION
Referral received for consideration of patient for ARC placement  Reviewed with ARC physician this am and ARC will continue to follow for medical stability  CM updated

## 2023-01-18 NOTE — PROGRESS NOTES
[facility] [facility]  Progress Note - Edy Flanagan 1960, 58 y o  male MRN: [id number]  Unit/Bed#: ICU 12 Encounter: [id number]  Primary Care Provider: Mary Garcias DO   Date and time admitted to hospital: 1/14/2023  3:16 PM    * Acute stroke due to embolism of left middle cerebral artery [facility])  Assessment & Plan  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, pulmonary cavitary lesion, splenic infarct, [place] lesions   · Patient with acute exam change 1/14- right hemiplegia and aphasia  · [facility] 11/6 showed worsening of cytotoxic edema with brain compression and worsening MLS  · Exam change around 8am 1/16 with left blown pupil and no response to pain off sedation, GCS 3T  Taken to OR emergently for [facility]    Imaging personally reviewed with attending:  · CT head w/o, 1/17/23: Evolving massive hemorrhagic infarction involving majority of the left MCA territory with associated mass effect  Mild enlargement of the temporal horn right lateral ventricle redemonstrated with question of small amount of transependymal CSF flow adjacent to the occipital horn right lateral ventricle, stable  Small evolving infarctions in the right occipital and frontoparietal regions noted  Plan:  · Continue to monitor neurological exam  · STAT CT head for decline in GCS greater than 2 points in 1 hour  · 1 ELIZABETH drain to FS, 140cc SS drainage in bulb  Removed today without difficulty, drain site secured with suture  · ICP monitor in place, ICP < 20  Currently 1  Removed today, suture placed with no CSF drainage from drain site     · NA > 145, currently 155  · SBP < 140, MAP > 65  · Neurology following for stroke management, appreciate recommendations  · Medical management per primary team  · Pain control per primary team  · Will need helmet once extubated, not ordered yet  · PT/OT evaluation once medically appropriate  · DVT ppx: SCDs  OK for DVT ppx 1/18  Neurosurgery will sign off at this time  Family meeting today, patient will likely transition to comfort measures  No further follow up from our service unless family decides AGAINST comfort measures  Please call with questions or concerns  Aortic valve endocarditis  Assessment & Plan  Medical management per primary team  EF 65%  CT surgery following, currently not a candidate for AVR given active sepsis but may be candidate once medically stable and improved    Anemia  Assessment & Plan  Secondary to GI bleed  Hgb this morning 6 8, s/p 1u PRBC 1/16 prior to surgery                Subjective/Objective     Subjective: Patient with decline in exam overnight / this morning, no longer w/d to painful stimuli  Continues to be febrile  Hgb dropped to 6 8 this morning, no plans for transfusion at this time  Family meeting today to transition to comfort care  Objective: Patient intubated, sedated  ELIZABETH drain and ICP monitor removed       Intake/Output                 01/18/23 0701 - 01/19/23 0700     [id number] 5743-0044 Total              Intake    I V   571 8  -- 571 8    IV Piggyback  100  -- 100    Feedings  90  -- 90    Total Intake 761 8 -- 761 8       Output    Urine  200  -- 200    Output (mL) (Urethral Catheter) 200 -- 200    Stool  0  -- 0    Rectal Tube Output (Rectal Tube With balloon) 0 -- 0    Total Output 200 -- 200       Net I/O     561 8 -- 561 8          Invasive Devices     Central Venous Catheter Line  Duration           CVC Central Lines 01/14/23 Triple 20cm 3 days          Peripheral Intravenous Line  Duration           Peripheral IV 01/12/23 Right Antecubital 5 days    Peripheral IV 01/14/23 Left;Ventral (anterior) Forearm 3 days Arterial Line  Duration           Arterial Line 01/17/23 Radial 1 day          Drain  Duration           Urethral Catheter 3 days    NG/OG/Enteral Tube Orogastric 18 Fr Center mouth 2 days    Rectal Tube With balloon 2 days          Airway  Duration           ETT  8 mm 3 days                Vitals: Blood pressure (!) 104/46, pulse 72, temperature (!) 101 5 °F (38 6 °C), resp  rate 21, height 5' 10" (1 778 m), weight 102 kg (224 lb 13 9 oz), SpO2 100 %  ,Body mass index is 32 27 kg/m²  Hemodynamic Monitoring: MAP: Arterial Line MAP (mmHg): 66 mmHg, ICP Mean: ICP Mean (mmHg): 0 mmHg    General appearance: intubated, ill appearing  Head: left craniectomy flap full but soft  Incision CDI, dressing removed  ELIZABETH drain and ICP monitor removed; both sites secured with suture  Eyes: L 4mm and nonreactive  R 2mm and sluggish  Neck: supple, symmetrical, trachea midline   Lungs: intubated  Heart: regular heart rate  Neurologic:   Mental status: GCS 3t  Cranial nerves: grossly intact (Cranial nerves II-XII)  Motor: No response to PS in any extremity  Spontaneous movement left thumb    Lab Results: I have personally reviewed pertinent results  Results from last 7 days   Lab Units 01/18/23  0603 01/17/23  0545 01/16/23  2338 01/16/23  1002 01/16/23  0601   WBC Thousand/uL 8 52 10 06 9 05  --  9 71   HEMOGLOBIN g/dL 6 8* 7 7* 7 2*   < > 7 2*   I STAT HEMOGLOBIN   --   --   --    < >  --    HEMATOCRIT % 21 9* 23 9* 22 1*   < > 21 1*   HEMATOCRIT, ISTAT   --   --   --    < >  --    PLATELETS Thousands/uL 139* 173 156  --  182   NEUTROS PCT % 78*  --  78*  --  80*   MONOS PCT % 5  --  8  --  9    < > = values in this interval not displayed       Results from last 7 days   Lab Units 01/18/23  0603 01/18/23  0014 01/17/23  1756 01/16/23  1207 01/16/23  1058 01/16/23  1002 01/16/23  0601 01/15/23  0447 01/15/23  0445 01/14/23  1749 01/14/23  1605   POTASSIUM mmol/L 3 7 3 5 3 9   < >  --   --  3 6   < >  --    < >  --    CHLORIDE mmol/L 128* 132* 132*   < >  --   --  132*   < >  --    < >  --    CO2 mmol/L 22 22 22   < >  --   --  18*   < >  --    < >  --    CO2, I-STAT mmol/L  --   --   --   --  19* 15*  --   --   --   --  18*   BUN mg/dL 17 18 19   < >  --   --  34*   < >  --    < >  --    CREATININE mg/dL 1 11 1 04 1 05   < >  --   --  1 08   < >  --    < >  --    CALCIUM mg/dL 8 0* 8 2* 8 5   < >  --   --  8 1*   < >  --    < >  --    ALK PHOS U/L 77  --   --   --   --   --  73  --  68   < >  --    ALT U/L 63  --   --   --   --   --  118*  --  86*   < >  --    AST U/L 67*  --   --   --   --   --  250*  --  223*   < >  --    GLUCOSE, ISTAT mg/dl  --   --   --   --  144* 119  --   --   --   --  299*    < > = values in this interval not displayed  Results from last 7 days   Lab Units 01/16/23  0601 01/15/23  0445 01/14/23  1749   MAGNESIUM mg/dL 2 3 2 2 2 0     Results from last 7 days   Lab Units 01/16/23  0601 01/15/23  0445 01/14/23  1749   PHOSPHORUS mg/dL 2 6 3 3 5 4*     Results from last 7 days   Lab Units 01/17/23  0545 01/16/23  1017 01/14/23  1749   INR  1 35* 1 45* 1 64*   PTT seconds 28  --  33     No results found for: TROPONINT  ABG:  Lab Results   Component Value Date    PHART 7 475 (H) 01/18/2023    SQS3ALU 29 8 (LL) 01/18/2023    PO2ART 97 1 01/18/2023    CYK9SZU 21 4 (L) 01/18/2023    BEART -1 8 01/18/2023    SOURCE Line, Arterial 01/18/2023       Imaging Studies: I have personally reviewed pertinent reports  and I have personally reviewed pertinent films in PACS     EGD    Result Date: 1/16/2023  Narrative: Table formatting from the original result was not included  [facility] [address] [facility] [address] DATE OF SERVICE: 1/16/23 PHYSICIAN(S): Attending: No Staff Documented Fellow: Rafael Marcus DO INDICATION: Anemia, unspecified type POST-OP DIAGNOSIS: See the impression below  PREPROCEDURE: Informed consent was obtained for the procedure, including sedation  Risks of perforation, hemorrhage, adverse drug reaction and aspiration were discussed  The patient was placed in the left lateral decubitus position  Patient was explained about the risks and benefits of the procedure  Risks including but not limited to bleeding, infection, and perforation were explained in detail  Also explained about less than 100% sensitivity with the exam and other alternatives  PROCEDURE: EGD DETAILS OF PROCEDURE: Patient was taken to the procedure room where a time out was performed to confirm correct patient and correct procedure  The patient underwent monitored anesthesia care, which was administered by an anesthesia professional  The patient's blood pressure, heart rate, level of consciousness, respirations, oxygen, ECG and ETCO2 were monitored throughout the procedure  The scope was advanced to the second part of the duodenum  Retroflexion was performed in the fundus  The patient experienced no blood loss  The procedure was not difficult  The patient tolerated the procedure well  There were no apparent complications  ANESTHESIA INFORMATION: ASA: ASA status not filed in the log  Anesthesia Type: Anesthesia type not filed in the log  MEDICATIONS: propofol (DIPRIVAN) 1000 mg in 100 mL infusion (premix) Cannot be calculated*  *Total user-documented volume 62 11 mL may contain volume from other administrations (Totals for administrations occurring from 1528 to 1645 on 01/16/23) FINDINGS: The esophagus appeared normal  Ulcer in the prepyloric region and pylorus with clean base (Indra III) Large deep ulcer in the duodenal bulb with flat pigmented spot (Indra IIC) The 2nd part of the duodenum appeared normal  SPECIMENS: * No specimens in log *     Impression: Previously seen large deep ulcer evaluated again, adherent blood clot had been washed off and now patient had flat pigmented spot  No active bleeding was visualized     Small prepyloric ulcer RECOMMENDATION: Continue PPI drip overnight Given large size of ulcer not amenable to clip placement if patient is to re-bleed he will require GDA embolization by IR Diet can be advanced as tolerated patient can be restarted on tube feeds from GI standpoint  No Staff Documented     EGD    Result Date: 1/15/2023  Narrative: Table formatting from the original result was not included  [address] End[facility] [id number] [address] DATE OF SERVICE: 1/15/23 PHYSICIAN(S): Attending: No Staff Documented Fellow: No Staff Documented INDICATION: Anemia, unspecified type POST-OP DIAGNOSIS: See the impression below  PREPROCEDURE: Informed consent was obtained for the procedure, including sedation  Risks of perforation, hemorrhage, adverse drug reaction and aspiration were discussed  The patient was placed in the left lateral decubitus position  Patient was explained about the risks and benefits of the procedure  Risks including but not limited to bleeding, infection, and perforation were explained in detail  Also explained about less than 100% sensitivity with the exam and other alternatives  PROCEDURE: EGD DETAILS OF PROCEDURE: Patient was taken to the procedure room where a time out was performed to confirm correct patient and correct procedure  The patient underwent monitored anesthesia care, which was administered by the procedural nurse  The patient's blood pressure, heart rate, level of consciousness, respirations, oxygen and ETCO2 were monitored throughout the procedure  The scope was introduced through the mouth and advanced to the second part of the duodenum  Retroflexion was performed in the fundus  The patient experienced no blood loss  The procedure was not difficult  The patient tolerated the procedure well  There were no apparent complications  ANESTHESIA INFORMATION: ASA: ASA status cannot be found on the log  Anesthesia Type: Anesthesia type cannot be found on the log   MEDICATIONS: Totals unavailable because the procedure time range is not set FINDINGS: Single large, deep, irregular ulcer in the duodenal bulb with adherent clot (Indra IIB) Multiple superficial, benign-appearing ulcers in the stomach with clean base (Indra III) Grade C esophagitis with mucosal breaks measuring 5 mm or more, continuous between folds, covering less than 75% of the circumference in the GE junction Tube successfully placed in the stomach; scope reinserted to confirm placement  OG tube SPECIMENS: * Cannot find log *     Impression: Single large, deep and cratered ulcer in the duodenal bulb with large recurrent clot, surrounding erythematous and hypertrophied margins  Second portion of duodenum appeared normal  Multiple superficial and irregular clean-based gastric ulcers  LA grade C esophagitis  Previously existing OG tube which was reportedly called, was removed, OG tube replaced under direct visualization  RECOMMENDATION:  Schedule repeat EGD  Abnormal pathology Gastric ulcer surveillance  Continue PPI drip Frequent H&H, transfusion as needed If patient should develop any further signs of GI bleeding/hemodynamic compromise, consider IR consult, limited endoscopic options given high risk of perforation based on the location and appearance of the ulcer  Patient will need repeat EGD prior to discharge to evaluate the structures better  Avoid tube feeding through OG tube at this time  GI team will follow tomorrow No Staff Documented     CT abdomen pelvis wo contrast    Result Date: 1/14/2023  Narrative: CT ABDOMEN AND PELVIS WITHOUT IV CONTRAST INDICATION:   abdominal pain  COMPARISON:  None  TECHNIQUE:  CT examination of the abdomen and pelvis was performed without intravenous contrast  Axial, sagittal, and coronal 2D reformatted images were created from the source data and submitted for interpretation  Radiation dose length product (DLP) for this visit:  680 mGy-cm     This examination, like all CT scans performed in the [facility], was performed utilizing techniques to minimize radiation dose exposure, including the use of iterative reconstruction and automated exposure control  Enteric contrast was not administered  FINDINGS: ABDOMEN LOWER CHEST:  4 mm right lower lung nodule image 2/4  5 mm right middle anterior lung nodule image 3/5  Trace left effusion/pleural thickening  The cavitary left lower lung lesion seen on prior thoracic spine MRI is not in the field-of-view on this study  LIVER/BILIARY TREE:  Unremarkable  GALLBLADDER:  There are gallstone(s) within the gallbladder, without pericholecystic inflammatory changes  SPLEEN:  Smooth peripherally calcified hypodensity in the anterior spleen measuring 4 4 x 4 6 x 4 8 cm  This may be due to prior infection or hemorrhage  Additional large heterogeneous nodular foci in the spleen, but otherwise limited in evaluation without contrast  PANCREAS:  Unremarkable  ADRENAL GLANDS:  Unremarkable  KIDNEYS/URETERS:  Unremarkable  No hydronephrosis  STOMACH AND BOWEL:  Unremarkable  APPENDIX:  There are expected postoperative changes of appendectomy  ABDOMINOPELVIC CAVITY:  No ascites  No pneumoperitoneum  No lymphadenopathy  VESSELS:  Unremarkable for patient's age  PELVIS REPRODUCTIVE ORGANS:  Unremarkable for patient's age  Prostate calcifications  URINARY BLADDER:  Unremarkable  ABDOMINAL WALL/INGUINAL REGIONS:  Unremarkable  OSSEOUS STRUCTURES:  No acute fracture or destructive osseous lesion  L4 spondylolysis with mild grade 1 anterolisthesis on S1  Impression: 1  Smooth peripherally calcified hypodensity in the anterior spleen measuring 4 4 x 4 6 x 4 8 cm  This may be due to prior infection or hemorrhage  There are additional large heterogeneous nodular foci in the spleen, but otherwise limited in evaluation without contrast  Recommend further evaluation with contrast CT   2   The cavitary left lower lung lesion seen on prior thoracic spine MRI is not in the field-of-view on this study  Dedicated contrast CT chest evaluation is recommended  3  Cholelithiasis  Workstation performed: [id number]     XR chest portable    Result Date: 1/15/2023  Narrative: CHEST INDICATION:   CVC placement  COMPARISON:  3/20/2017 EXAM PERFORMED/VIEWS:  XR CHEST PORTABLE  AP semierect Images: 3 FINDINGS:  Endotracheal tube approximately 6 cm above the diego  Left jugular line tip at the cavoatrial junction  The endogastric tube which has been placed curves on itself in the stomach and reenters the esophagus directed cephalad with the tip and  side port in the distal esophagus  Cardiomediastinal silhouette appears unremarkable  The lungs are clear  No pneumothorax or pleural effusion  Peripherally calcified mass in the spleen noted, unchanged  Osseous structures appear within normal limits for patient age  Impression: No acute cardiopulmonary disease  Enteric tube malposition  The tube curves on itself in the stomach and reenters the distal esophagus directed cephalad  An x-ray of the abdomen is suggested to confirm  The study was marked in [facility] for immediate notification  Workstation performed: [id number]     XR spine cervical 2 or 3 vw injury    Result Date: 1/13/2023  Narrative: CERVICAL SPINE INDICATION:   C4-5 septic arthritis  COMPARISON:  MR cervical spine 1/10/2023, CT cervical spine 1/9/2023 VIEWS:  XR SPINE CERVICAL 2 OR 3 VW INJURY FINDINGS: No acute fracture or subluxation  Straightening of the usual cervical lordosis  Minimal anterolisthesis C3 on C4 and C4 on C5, stable  No radiographic findings at the right C4-C5 facet level as seen on MRI or CT  Scattered mild degenerative changes in the cervical spine  Normal prevertebral soft tissues  Clear lung apices  Impression: No acute osseous abnormality  No radiographic findings at the right C4-C5 facet level   Workstation performed: [id number]     XR elbow 3+ vw left    Result Date: 1/16/2023  Narrative: LEFT ELBOW INDICATION:   Left elbow swelling and pain  COMPARISON:  None VIEWS:  XR ELBOW 3+ VW LEFT FINDINGS: There is no acute fracture or dislocation  There is no joint effusion  No significant degenerative changes  No lytic or blastic osseous lesion  Soft tissues are unremarkable  Impression: No acute osseous abnormality  Workstation performed: [id number]     XR knee left 1 or 2 views    Result Date: 1/10/2023  Narrative: LEFT KNEE INDICATION:   Post op left TKA  COMPARISON:  1/8/2023 VIEWS:  XR KNEE 1 OR 2 VW LEFT Images: 2 FINDINGS: There is no acute fracture or dislocation  Joint effusion appears to have resolved  Unremarkable appearance of total knee arthroplasty  No evidence of hardware complication  No lytic or blastic osseous lesion  Interval placement of antibiotic pledgets and surgical drain  Associated soft tissue swelling and gas noted  Impression: Unremarkable appearance of total knee arthroplasty  Interval postsurgical changes as above  Workstation performed: [id number]     XR knee 1 or 2 vw left    Result Date: 1/9/2023  Narrative: LEFT KNEE INDICATION:   s/p L TKA  COMPARISON:  None VIEWS:  XR KNEE 1 OR 2 VW LEFT FINDINGS: There is no acute fracture or dislocation  There is no joint effusion  Unremarkable appearance of total knee arthroplasty  No evidence of hardware complication  No lytic or blastic osseous lesion  There are atherosclerotic calcifications  Soft tissues are otherwise unremarkable  Impression: Unremarkable appearance of total knee arthroplasty  No evidence of hardware complication  Workstation performed: [id number]     CT head wo contrast    Result Date: 1/17/2023  Narrative: CT BRAIN - WITHOUT CONTRAST INDICATION:   Stroke, follow up s/p craniectomy and stroke  COMPARISON:  1/16/2023 TECHNIQUE:  CT examination of the brain was performed  In addition to axial images, sagittal and coronal 2D reformatted images were created and submitted for interpretation   Radiation dose length product (DLP) for this visit:  1011 2 mGy-cm   This examination, like all CT scans performed in the [facility], was performed utilizing techniques to minimize radiation dose exposure, including the use of iterative  reconstruction and automated exposure control  IMAGE QUALITY:  Diagnostic  FINDINGS: PARENCHYMA:  Evolving cytotoxic edema with superimposed hemorrhagic conversion in the left cerebral hemisphere in entire portion of the left MCA territory noted  Edematous hemorrhagic brain tissue should include decompressive which has a superficial scalp drainage catheter  There is effacement of the left lateral ventricle with approximately 6 mm of rightward subfalcine herniation  Small wedge-shaped hypodensity in the right occipital lobe, series 2, image 24, stable from the prior study  Additionally subtle areas of loss of gray-white matter differentiation in the right frontoparietal junction, exemplified on series 2, image 38 correlates diffusion abnormality from recent brain MRI  VENTRICLES AND EXTRA-AXIAL SPACES:  Sulcal effacement throughout the left cerebral hemisphere noted  There is effacement of left lateral ventricle  Mild dilatation of the temporal horn of the right lateral ventricle with questionable small amount of transependymal CSF flow noted  VISUALIZED ORBITS AND PARANASAL SINUSES:  Normal visualized orbits  Moderate air-fluid levels in the maxillary sinuses  Moderate sphenoidal sinus opacity  Moderate bilateral ethmoidal and frontal sinus disease  CALVARIUM AND EXTRACRANIAL SOFT TISSUES:  Scalp soft tissue swelling noted with edematous herniated brain tissue protruding through the craniectomy defect  Impression: 1  Evolving massive hemorrhagic infarction involving majority of the left MCA territory with associated mass effect    2   Mild enlargement of the temporal horn right lateral ventricle redemonstrated with question of small amount of transependymal CSF flow adjacent to the occipital horn right lateral ventricle, stable  3   Small evolving infarctions in the right occipital and frontoparietal regions noted  Workstation performed: [id number]     CT head wo contrast    Addendum Date: 1/16/2023 Addendum:   ADDENDUM:  I personally discussed this study with Buffy Chowdhury on 1/16/2023 at 1:12 PM      Result Date: 1/16/2023  Narrative: CT BRAIN - WITHOUT CONTRAST INDICATION:   Craniotomy, post-op s/p craniectomy  COMPARISON:  CT head without contrast done earlier today  CT head without contrast 1/15/2023  MRI brain with and without contrast 1/14/2023  TECHNIQUE:  CT examination of the brain was performed  In addition to axial images, sagittal and coronal 2D reformatted images were created and submitted for interpretation  Radiation dose length product (DLP) for this visit:  986 14 mGy-cm   This examination, like all CT scans performed in the [facility], was performed utilizing techniques to minimize radiation dose exposure, including the use of iterative  reconstruction and automated exposure control  IMAGE QUALITY:  Diagnostic  FINDINGS: PARENCHYMA, VENTRICLES AND EXTRA-AXIAL SPACES: New postsurgical changes changes of left decompressive hemicraniectomy  Large left MCA and left PCA territory infarctions with worsened hemorrhagic conversion particularly in left cerebral cortex where there is cortical parenchymal hemorrhage (previously petechial hemorrhage) and left basal ganglia hemorrhage, scattered small-volume pneumocephalus, and worsened cytotoxic edema with cerebral sulcal effacement  Evolving smaller acute infarcts in right parietal and right occipital lobes  Known smaller infarcts in right frontal and bilateral cerebellar lobes are not well seen as seen on MRI brain 1/14/2023  1 0 cm rightward midline shift (2:30), previously 1 6 cm  Minimal improvement of left uncal herniation   Slightly improved persistent compression of left lateral ventricle with mild dilation of right lateral ventricle  No acute intraventricular hemorrhage  Small-volume pneumocephalus, compatible with recent surgical change  No intracranial mass  Arterial calcifications of carotid siphons  VISUALIZED ORBITS AND PARANASAL SINUSES:  Normal visualized orbits  Moderate-to-severe pansinus mucosal thickening (worse in ethmoid sinuses) with scattered air-fluid levels and frothy secretions  Fluid-filled nasopharynx  CALVARIUM AND EXTRACRANIAL SOFT TISSUES:  Left decompressive hemicraniotomy  Extradural drain tip in left frontal region  Left surgical skin staples  Partially imaged endotracheal tube  Impression: Large left MCA and left PCA territory infarctions with worsened hemorrhagic conversion particularly in left cerebral cortex where there is cortical parenchymal hemorrhage (previously petechial hemorrhage) and left basal ganglia hemorrhage, scattered small-volume pneumocephalus, and worsened cytotoxic edema with cerebral sulcal effacement status post interval left decompressive craniectomy  1 0 cm rightward midline shift (previously 1 6 cm), minimal improvement of left uncal herniation, slightly improved persistent compression of left lateral ventricle with mild dilation of right lateral ventricle  Evolving smaller acute infarcts in right parietal and right occipital lobes  Known smaller infarcts in right frontal and bilateral cerebellar lobes are not well seen as seen on MRI brain 1/14/2023  Workstation performed: [id number]     CT head wo contrast    Result Date: 1/16/2023  Narrative: CT BRAIN - WITHOUT CONTRAST INDICATION:   Stroke, follow up cva follow up  COMPARISON:  CT head without contrast January 15, 2023  MRI brain with and without contrast January 14, 2023  TECHNIQUE:  CT examination of the brain was performed  In addition to axial images, sagittal and coronal 2D reformatted images were created and submitted for interpretation   Radiation dose length product (DLP) for this visit:  952 52 mGy-cm   This examination, like all CT scans performed in the [facility], was performed utilizing techniques to minimize radiation dose exposure, including the use of iterative  reconstruction and automated exposure control  IMAGE QUALITY:  Diagnostic  FINDINGS: PARENCHYMA: Evolving large acute infarct in left MCA and left PCA territories with petechial cortical hemorrhage, worsened cytotoxic edema, worsened rightward midline shift now measuring 1 6 cm (previously 0 7 cm), worsened compression of left lateral ventricle with  dilation of right lateral ventricle, and new left uncal herniation  Known smaller acute infarcts in right frontal, right parietal, right occipital and bilateral cerebellar lobes are better evaluated on MRI brain 1/14/2023  No acute parenchymal hemorrhage  No intracranial mass  VENTRICLES AND EXTRA-AXIAL SPACES:  Worsened compression of left lateral ventricle with dilation of right lateral ventricle  No acute intraventricular or extra-axial hemorrhage  VISUALIZED ORBITS AND PARANASAL SINUSES:  Normal visualized orbits  Pansinus mucosal thickening with scattered air-fluid levels and frothy secretions, severe in bilateral ethmoid and right sphenoid sinuses  CALVARIUM AND EXTRACRANIAL SOFT TISSUES: No acute calvarial or extracranial soft tissue abnormality  Small bilateral mastoid effusions  Partially imaged endotracheal and orogastric tubing  Impression: Evolving large acute infarct in left MCA and left PCA territories with petechial cortical hemorrhage, worsened cytotoxic edema, worsened rightward midline shift now measuring 1 6 cm (previously 0 7 cm), worsened compression of left lateral ventricle with  dilation of right lateral ventricle, and new left uncal herniation  Known smaller acute infarcts in right frontal, right parietal, right occipital and bilateral cerebellar lobes are better evaluated on MRI brain 1/14/2023    I personally discussed this study with [facility] Ramón on 1/16/2023 at 7:45 AM  Workstation performed: [id number]     CT head wo contrast    Result Date: 1/15/2023  Narrative: CT BRAIN - WITHOUT CONTRAST INDICATION:   Stroke, follow up f/u thrombectomy  COMPARISON:  MRI performed yesterday  TECHNIQUE:  CT examination of the brain was performed  In addition to axial images, sagittal and coronal 2D reformatted images were created and submitted for interpretation  Radiation dose length product (DLP) for this visit:  930 mGy-cm   This examination, like all CT scans performed in the [facility], was performed utilizing techniques to minimize radiation dose exposure, including the use of iterative reconstruction and automated exposure control  IMAGE QUALITY:  Diagnostic  FINDINGS: PARENCHYMA:  Redemonstration of large acute infarct infarct throughout the left MCA distribution  Petechial hemorrhage noted on MRI is not well seen  There is increased mass effect with 7 mm of left-to-right shift  Additional small and tiny infarcts in the right cerebral hemisphere and cerebellum identified on MRI are difficult to appreciate  VENTRICLES AND EXTRA-AXIAL SPACES:  No hydrocephalus  VISUALIZED ORBITS AND PARANASAL SINUSES:  Orbits are unremarkable  Stable paranasal sinus disease  CALVARIUM AND EXTRACRANIAL SOFT TISSUES:  Normal      Impression: Redemonstration of large acute infarct throughout the left MCA distribution with increased mass effect and 7 mm of left-to-right shift  Petechial hemorrhage noted on MRI is difficult to appreciate  Recommend close follow-up and neurosurgical consultation  Additional small infarcts in the right cerebral hemisphere and cerebellum are better appreciated on MRI  I personally discussed this study with Elizabeth Acuna on 1/15/2023 at 8:09 AM   Workstation performed: [id number]     CT head wo contrast    Result Date: 1/14/2023  Narrative: CT BRAIN - WITHOUT CONTRAST INDICATION:   post stroke alert   COMPARISON:  CT January 14, 2023 TECHNIQUE:  CT examination of the brain was performed  In addition to axial images, sagittal and coronal 2D reformatted images were created and submitted for interpretation  Radiation dose length product (DLP) for this visit:  916 83 mGy-cm   This examination, like all CT scans performed in the [facility], was performed utilizing techniques to minimize radiation dose exposure, including the use of iterative  reconstruction and automated exposure control  IMAGE QUALITY:  Diagnostic  FINDINGS: PARENCHYMA:  Loss of gray-white differentiation identified anterior left temporal lobe, anterior insular cortex and left frontal lobe also suspicious for recent infarct  No hemorrhage identified  Subtle effacement of sulci noted  VENTRICLES AND EXTRA-AXIAL SPACES:  Normal for the patient's age  VISUALIZED ORBITS AND PARANASAL SINUSES:  Normal visualized orbits  Normal visualized paranasal sinuses  CALVARIUM AND EXTRACRANIAL SOFT TISSUES:  Normal      Impression: Is compatible with recent infarct in the left frontal lobe, left insular and anterior temporal lobe  No hemorrhage identified  Workstation performed: [id number]     CT spine cervical w contrast    Result Date: 1/9/2023  Narrative: CT CERVICAL SPINE - WITH CONTRAST INDICATION: Infection  Bacteremia  Neck pain  COMPARISON:  None  TECHNIQUE:  Noncontrast CT examination of the cervical spine was performed  Radiation dose length product (DLP) for this visit:  609 661 045 mGy-cm   This examination, like all CT scans performed in the [facility], was performed utilizing techniques to minimize radiation dose exposure, including the use of iterative reconstruction and automated exposure control  85 mL of Omnipaque 350 was injected intravenously without immediate consequence  IMAGE QUALITY:  Diagnostic  FINDINGS: ALIGNMENT: Slight levoscoliosis of the cervical spine    Minimal anterior subluxation of C3 upon C4  OSSEOUS STRUCTURES: No fracture  Focal lucencies are seen within the right facet joint at C4-5  These are nonspecific and may represent subchondral cystic degenerative change  No signs of acute bony erosion or destruction  DEGENERATIVE CHANGES: C2-C3:  Small broad-based right paramedian disc protrusion with mild canal stenosis  No foraminal narrowing  C3-C4:  Normal disc height  As described above there is slight anterior subluxation of C3 upon C4 within normal limits  No canal stenosis or foraminal narrowing  C4-C5:  Normal disc height  No focal disc herniation identified  Slight right-sided uncinate joint hypertrophic change  As described above there is right-sided facet degenerative change including subchondral lucencies within the facets  Mild enhancement extends lateral from the facet joint on series 3 images 51 and 52, nonspecific in appearance possibly representing a small synovial cyst   Although the bony structures do not demonstrate signs of acute erosion or destruction, these changes could be seen in a subacute to chronic septic arthritis  C5-C6:  No significant degenerative change  C6-C7:  No significant degenerative change  C7-T1:  No significant degenerative change  UPPER THORACIC DISC SPACES:  No significant degenerative change  PREVERTEBRAL AND PARASPINAL SOFT TISSUES:  There are vascular calcifications of the carotid bifurcation bilaterally without evidence of significant vascular stenosis  Mild vascular calcification of the aortic arch  LUNG APICES:  Nonspecific masslike opacity within the anterior medial aspect of the left chest     Impression: No significant disc herniation, canal stenosis or foraminal narrowing  No acute fracture  Facet changes are seen on the right at C4-5 most likely representing subchondral cystic degenerative change    However, there does appear to be a small peripherally enhancing focus extending laterally from the facet joint which could represent a small, 4 mm synovial cyst, see series 3 image 51  The possibility of septic arthritis not excluded in this patient with active bacteremia  Nonspecific density within the anterior medial aspect of the left hemithorax  Differential considerations would include an infectious etiology, possibly fungal or other form of organizing pneumonia, as well as old pulmonary infarct  No previous chest imaging for comparison other than a chest x-ray from 2020  Workstation performed: [id number]     MRI brain w wo contrast    Result Date: 1/15/2023  Narrative: MRI BRAIN WITH AND WITHOUT CONTRAST INDICATION: w/wo contrast, stroke  COMPARISON:  CT from earlier the same date    TECHNIQUE: Multiplanar, multisequence imaging of the brain was performed before and after gadolinium administration  IV Contrast:  10 mL of Gadobutrol injection (SINGLE-DOSE)  IMAGE QUALITY:   Diagnostic  FINDINGS: BRAIN PARENCHYMA: There is restricted diffusion throughout the left MCA distribution consistent with acute infarct  Size of infarct is larger than seen on earlier CT  There is petechial hemorrhage  There is sulcal effacement and 3 mm of left-to-right shift  Basal cisterns are patent  There are also multiple small acute infarcts scattered in the right frontal, parietal and occipital lobes with several punctate foci in the bilateral cerebellum favoring an embolic etiology    Leptomeningeal enhancement overlying the left MCA infarct probably due to leptomeningeal collaterals  VENTRICLES:  No hydrocephalus  SELLA AND PITUITARY GLAND:  Normal  ORBITS:  Normal  PARANASAL SINUSES: Pansinus disease  VASCULATURE:  Evaluation of the major intracranial vasculature demonstrates appropriate flow voids  CALVARIUM AND SKULL BASE:  Normal  EXTRACRANIAL SOFT TISSUES:  Normal      Impression: Large acute left MCA distribution infarct with petechial hemorrhage  Minimal left-to-right shift   Additional small acute infarcts in the right frontal and parietal lobes as well as the cerebellum favor embolic etiology  The study was marked in [facility] for immediate notification  Workstation performed: [id number]     MRI cervical spine w wo contrast    Result Date: 1/11/2023  Narrative: MRI CERVICAL SPINE WITH AND WITHOUT CONTRAST INDICATION: neck stiffness concerning for infection on CT cervical spine  COMPARISON:  CT performed yesterday  TECHNIQUE:  Multiplanar, multisequence imaging of the cervical spine was performed before and after gadolinium administration     IV Contrast:  10 mL of Gadobutrol injection (SINGLE-DOSE) IMAGE QUALITY:  Diagnostic  FINDINGS: ALIGNMENT: Minimal anterolisthesis at C3-4  No compression  MARROW SIGNAL: Marrow edema in the right C4-5 facets with synovitis and mild adjacent soft tissue edema  Given concern for infection, findings may represent septic arthritis/osteomyelitis although inflammatory arthropathy is in the differential  No other  abnormal marrow signal  CERVICAL AND VISUALIZED UPPER THORACIC CORD:  Normal signal within the visualized cord  PREVERTEBRAL AND PARASPINAL SOFT TISSUES:  Mild right paraspinal edema adjacent to right C4-5 facet complex  No abscess  VISUALIZED POSTERIOR FOSSA:  The visualized posterior fossa demonstrates no abnormal signal  CERVICAL DISC SPACES:  Multilevel disc desiccation  C2-C3:  Tiny right right central protrusion  No significant canal or foraminal stenosis  C3-C4:  Small disc bulge  No canal or foraminal stenosis  C4-C5:  Small disc bulge  Facet arthropathy  No significant canal stenosis  Slight foraminal stenosis  C5-C6:  No disc herniation, canal or foraminal stenosis  Mild facet arthropathy  C6-C7:  Disc bulge  Mild facet arthropathy  Mild canal and mild left foraminal stenosis  C7-T1:  No disc herniation, canal or foraminal stenosis  UPPER THORACIC DISC SPACES:  Normal  POSTCONTRAST IMAGING:  No abnormal epidural or intrathecal enhancement  OTHER FINDINGS:  None       Impression: Right C4-5 facet joint synovitis with marrow edema could be due to septic arthritis/osteomyelitis given clinical concern for infection  Inflammatory arthropathy with reactive marrow edema is in the differential  No other evidence of infection  No epidural abscess  Workstation performed: [id number]     MRI thoracic spine w wo contrast    Result Date: 1/13/2023  Narrative: MRI THORACIC SPINE WITH AND WITHOUT CONTRAST INDICATION: looking for infection of thoracic spine  COMPARISON:  None  TECHNIQUE:  Multiplanar, multisequence imaging of the thoracic spine was performed before and after gadolinium administration     IV Contrast:  10 mL of Gadobutrol injection (SINGLE-DOSE) IMAGE QUALITY:  Diagnostic  FINDINGS: ALIGNMENT:  Normal alignment of the thoracic spine  No compression fracture  No subluxation  No evidence of scoliosis  MARROW SIGNAL:  Scattered degenerative endplate changes  No focally suspicious marrow lesions  No bone marrow edema or compression abnormality  THORACIC CORD:  Normal signal within the thoracic cord  PREVERTEBRAL AND PARASPINAL SOFT TISSUES:   Normal  THORACIC DEGENERATIVE CHANGE:  Scattered degenerative endplate changes  No focally suspicious marrow lesions  No bone marrow edema or compression abnormality  Mild spondylotic changes  No bone marrow edema or compression abnormality  POSTCONTRAST:  No abnormal enhancement  OTHER FINDINGS:  Peripheral T2 hyperintense lesions noted in the posterior aspect of the spleen series 7, images 42 and 43  Possible splenomegaly  Spleen incompletely characterized  In the posterior aspect of left lower lobe there is a 1 8 cm lesion with a air-fluid level, worrisome for cavitary lesion  Impression: 1  Mild spondylosis without cord compression or cord signal abnormality  2   No MR evidence of discitis/ostomy colitis in the visualized spine  3   Peripheral T2 hyperintense lesions in the spleen which may be enlarged    Differential diagnosis includes splenic mass lesions such as hemangioma or other lesion or perhaps a splenic infarct  Consider left upper quadrant ultrasound and/or contrast-enhanced CT of the abdomen for further assessment  4   Cavitary lesion with air-fluid level in the left lower lobe, possibly infectious process such as a pulmonary abscess  Differential diagnosis includes malignancy  CT of the chest advised  Workstation performed: [id number]     CT chest abdomen pelvis w contrast    Result Date: 1/14/2023  Narrative: CT CHEST, ABDOMEN AND PELVIS WITH IV CONTRAST INDICATION:   Neuro deficit, acute, stroke suspected recomended by radiolgoy acute right facial droop and right arm weakness  COMPARISON:  CT 1/14/2023 TECHNIQUE: CT examination of the chest, abdomen and pelvis was performed  Axial, sagittal, and coronal 2D reformatted images were created from the source data and submitted for interpretation  Radiation dose length product (DLP) for this visit:  2272 mGy-cm   This examination, like all CT scans performed in the [facility], was performed utilizing techniques to minimize radiation dose exposure, including the use of iterative reconstruction and automated exposure control  IV Contrast:  100 mL of iohexol (OMNIPAQUE) Enteric Contrast: Enteric contrast was not administered  FINDINGS: Examination is limited due to artifact from the upper extremities as well as motion and suboptimal phase of contrast enhancement  CHEST LUNGS:  As seen on recent thoracic MRI, there is a cavitary nodule with small fluid level in the posterior left lower lobe measuring 1 7 x 1 8 cm  This may be infectious in etiology  There is also a small left upper anterior mediastinal infiltrate, though better visualized on prior CT cervical spine  Significant motion otherwise limits evaluation  Lesions may be due to septic emboli  There is no tracheal or endobronchial lesion  PLEURA:  Trace left pleural fluid/pleural thickening  HEART/GREAT VESSELS: Coronary atherosclerosis  No thoracic aortic aneurysm   MEDIASTINUM AND CAROLANN:  Unremarkable  CHEST WALL AND LOWER NECK:  Unremarkable  ABDOMEN LIVER/BILIARY TREE:  Mild hepatic steatosis may be present  No CT evidence of suspicious hepatic mass  Normal hepatic contours  No biliary dilatation  GALLBLADDER:  There are gallstone(s) within the gallbladder, without pericholecystic inflammatory changes  SPLEEN:  Peripherally calcified anterior splenic nodule again visualized measuring 4 4 x 4 6 cm, possibly from old infection or hemorrhage  Multiple large hypodense lesions are again visualized  Some lesions appear relatively well demarcated, despite the  significant motion artifact, raising the possibility of splenic infarcts  Other lesions however remain indeterminate  Given the additional findings in the chest and abdomen, findings may be due to sequela of of septic emboli  Neoplasm is not excluded at  this time  PANCREAS:  Unremarkable  ADRENAL GLANDS:  Unremarkable  KIDNEYS/URETERS:  No hydronephrosis  Although images are limited due to motion and phase of contrast enhancement, there are multiple bilateral renal hypodense lesions, several of which appear wedge-shaped, suspicious for renal infarcts  Given the left lung findings, these may be infarcts from septic emboli and/or coexistent pyelonephritis  Neoplasm such as lymphoma is felt to be less likely, though not entirely excluded  STOMACH AND BOWEL:  Unremarkable  APPENDIX:  There are expected postoperative changes of appendectomy  ABDOMINOPELVIC CAVITY:  No ascites  No pneumoperitoneum  No lymphadenopathy  VESSELS:  Unremarkable for patient's age  PELVIS REPRODUCTIVE ORGANS:  Prostate calcifications  URINARY BLADDER:  Unremarkable  ABDOMINAL WALL/INGUINAL REGIONS:  Unremarkable  OSSEOUS STRUCTURES:  No acute fracture or destructive osseous lesion  L4 spondylolysis with mild grade 1 anterolisthesis on S1  Impression: 1  Very limited examination due to phase of contrast enhancement and motion   As seen on recent thoracic MRI, there is a cavitary nodule with small amount of fluid in the posterior left lower lobe measuring 1 7 x 1 8 cm  There is also a small left upper anterior paramediastinal infiltrate  There are also multiple splenic and bilateral renal hypodense lesions as described above  Spectrum of findings may be sequela of septic emboli  Continued follow-up recommended to exclude neoplasm  2  Cholelithiasis  Workstation performed: [id number]     CT stroke alert brain    Result Date: 1/14/2023  Narrative: CT BRAIN - STROKE ALERT PROTOCOL INDICATION:   Neuro deficit, acute, stroke suspected acute right facial droop and right arm weakness  COMPARISON:  None  TECHNIQUE:  CT examination of the brain was performed  In addition to axial images, coronal reformatted images were created and submitted for interpretation  Radiation dose length product (DLP) for this visit:   This examination, like all CT scans performed in the [facility], was performed utilizing techniques to minimize radiation dose exposure, including the use of iterative reconstruction  and automated exposure control  IMAGE QUALITY:  Diagnostic  FINDINGS:  PARENCHYMA:  No intracranial mass, mass effect or midline shift  No CT signs of acute infarction  No acute parenchymal hemorrhage  Minimal chronic microangiopathy  VENTRICLES AND EXTRA-AXIAL SPACES:  Normal for the patient's age  VISUALIZED ORBITS AND PARANASAL SINUSES:  Orbits are unremarkable  Paranasal sinus mucosal thickening  CALVARIUM AND EXTRACRANIAL SOFT TISSUES:   Normal      Impression: No acute intracranial abnormality  Findings were directly discussed with Lenore Ortiz at 1:38 PM  Workstation performed: [id number]     XR chest portable ICU    Result Date: 1/16/2023  Narrative: CHEST INDICATION:   intubated, f/u  COMPARISON:  1/14/2023 EXAM PERFORMED/VIEWS:  XR CHEST PORTABLE ICU FINDINGS:  Endotracheal tube tip is 4 cm proximal to the diego    Left jugular central venous catheter tip overlies the SVC  Cardiomediastinal silhouette appears unremarkable  The lungs are clear  No pneumothorax or pleural effusion  Osseous structures appear within normal limits for patient age  Impression: No acute cardiopulmonary disease  Workstation performed: [id number]     CTA stroke alert (head/neck)    Result Date: 1/14/2023  Narrative: CTA NECK AND BRAIN WITH CONTRAST INDICATION: Neuro deficit, acute, stroke suspected stroke acute right facial droop and right arm weakness Bacteremia with suspected endocarditis  COMPARISON:   Immediately preceding head CT  TECHNIQUE:   Post contrast imaging was performed after administration of iodinated contrast through the neck and brain  Post contrast axial 0 625 mm images timed to opacify the arterial system  3D rendering was performed on an independent workstation  MIP reconstructions performed  Coronal reconstructions were performed of the noncontrast portion of the brain  Radiation dose length product (DLP) for this visit:  927 mGy-cm   This examination, like all CT scans performed in the [facility], was performed utilizing techniques to minimize radiation dose exposure, including the use of iterative reconstruction and automated exposure control  IV Contrast:  100 mL of iohexol (OMNIPAQUE)  IMAGE QUALITY:   Diagnostic FINDINGS: CERVICAL VASCULATURE AORTIC ARCH AND GREAT VESSELS:  Mild atherosclerotic disease of the arch, proximal great vessels and visualized subclavian vessels  No significant stenosis  RIGHT VERTEBRAL ARTERY CERVICAL SEGMENT:  Normal origin  The vessel is normal in caliber throughout the neck  LEFT VERTEBRAL ARTERY CERVICAL SEGMENT:  Normal origin  The vessel is normal in caliber throughout the neck  RIGHT EXTRACRANIAL CAROTID SEGMENT:  Mild atherosclerotic disease of the distal common carotid artery and proximal cervical internal carotid artery without significant stenosis compared to the more distal ICA   LEFT EXTRACRANIAL CAROTID SEGMENT:  Mild atherosclerotic disease of the distal common carotid artery and proximal cervical internal carotid artery without significant stenosis compared to the more distal ICA  NASCET criteria was used to determine the degree of internal carotid artery diameter stenosis  There is focal nonocclusive thrombus within the high cervical right internal jugular vein without intracranial extension  Remainder of the right IJ is patent  INTRACRANIAL VASCULATURE INTERNAL CAROTID ARTERIES:  Mild atherosclerotic disease without significant stenosis     Normal ophthalmic artery origins  Normal ICA terminus  ANTERIOR CIRCULATION:  Symmetric A1 segments and anterior cerebral arteries with normal enhancement  Normal anterior communicating artery  MIDDLE CEREBRAL ARTERY CIRCULATION:  There is thrombus in the left M1 segment with reconstitution distally that may be incompletely occlusive  There is focal occlusion of branch arising from the proximal left M1 segment that may represent prominent anterior temporal branch or early origin of the inferior division  Right MCA is unremarkable  DISTAL VERTEBRAL ARTERIES:  Normal distal vertebral arteries  Posterior inferior cerebellar artery origins are normal  Normal vertebral basilar junction  BASILAR ARTERY:  Basilar artery is normal in caliber  Normal superior cerebellar arteries  POSTERIOR CEREBRAL ARTERIES: Both posterior cerebral arteries arises from the basilar tip  Both arteries demonstrate normal enhancement  VENOUS STRUCTURES:  Normal  NON VASCULAR ANATOMY BONY STRUCTURES:  Stable erosive changes at the right C4-5 facet complex  See recent cervical MRI  SOFT TISSUES OF THE NECK:  Normal  Ossification of the stylohyoid ligaments is noted  THORACIC INLET:  Cavitary lesion in the medial left upper lobe measuring 3 9 x 2 5 cm is stable compared with recent CT the cervical spine   There is a 1 9 cm cavitary lesion with fluid level in the superior segment of the left lower lobe not imaged on recent studies    Findings are suspicious for septic emboli  Impression: Left M1 thromboembolism with reconstitution just distally  Focal occlusion of left MCA branch arising from the proximal segment No other intracranial stenosis or large vessel occlusion  No significant stenosis of the cervical carotid or vertebral arteries  Incidental nonocclusive DVT in the high cervical right internal jugular vein  Remainder of the right IJ is patent  No intracranial venous thrombosis  Cavitary lesions in the left lung suspicious for septic emboli  Findings were directly discussed with Antonia Andre at 1:38 PM  Workstation performed: [id number]     IR stroke alert    Result Date: 1/16/2023  Narrative: IR STROKE ALERT Indication:I63 9: Cerebral infarction, unspecified  Endocarditis  Acute neurologic changes this afternoon found to have left M1 occlusion  Acute anemia with hypotension  Procedure: Both groins were prepped and draped in sterile fashion  Patient arrived [place] with labored breathing, tachypnea, unable to follow commands and the procedure was therefore performed under general anesthesia  Ultrasound guidance was used to maximize opportunity for closure device  The right groin was surveyed with ultrasound to assess for vessel patency  The right common femoral artery is patent  The right common femoral artery was punctured using a 21 gauge needle and direct sonographic  guidance  A static sonographic image was saved demonstrating needle entry  An advantage Glidewire was advanced into the thoracic aorta over which a 6 Western Renay Neuron Max sheath was placed  A 5 Western Renay Berenstein catheter was advanced through the sheath  The left internal carotid artery selected was advanced into the proximal internal carotid artery  AP and lateral arteriography of the head was performed    A coaxial system containing a 71 Zoom aspiration thrombectomy catheter and marksman Manuel catheter were advanced into the left middle cerebral artery and the microcatheter was advanced into M2 branch  A 6 mm Solitaire stentriever was deployed across the occlusion and aspiration thrombectomy was performed with stentriever  2 additional passes were made using similar technique with interval arteriography in AP and lateral projections  Transorbital oblique arteriography was performed at completion  Fluoroscopy time (min) : 19 2minutes Images:319 Contrast (ml) : 65 mL Visipaque 320  Sedation (min) : N/A Findings: Initial arteriogram shows M1 occlusion of the left side  Subsequent imaging after thrombectomy shows an early bifurcation of the M1  Flow was most restored  There may be a small amount of residual thrombus within a M2 branch difficult to delineate versus focal spasm  TIMES: In dept: 1512 In room: 1512 Access: 1531 1st pass: 1543 Recan: 1547 2a 2nd pass: 1552 3rd pass: 1610 Recan: 1615 2b  Pre-TICI : 0 Post-TICI: 2b     Impression: Impression: Successful recanalization left M1 occlusion with 2B flow post intervention Workstation performed: [id number]      bedside procedure    Result Date: 1/16/2023  Narrative: 1 2 840 [id number]  2 446 [id number] 11 1    Echo complete w/ contrast if indicated    Result Date: 1/9/2023  Narrative: •  Left Ventricle: Left ventricular cavity size is normal  by visual estimation  Systolic function is normal  Wall motion is normal  Diastolic function is mildly abnormal, consistent with grade I (abnormal) relaxation  •  Right Ventricle: Right ventricular cavity size is mildly dilated  Systolic function is normal  •  Left Atrium: The atrium is mildly dilated  •  Aortic Valve: There is at least mild regurgitation  There is aortic sclerosis  •  Mitral Valve: There is mild annular calcification  •  Tricuspid Valve: Pulmonary artery systolic pressures cannot be estimated due to lack of tricuspid regurgitation jet  •  Aorta: The aortic root is ectatic at 3 6 cm   The ascending aorta is ectatic at 3 9 cm  •  Compared to report from March 31, 2022, aortic valve velocities inconsistent with stenosis  There are no obvious echocardiographic indications of endocarditis  Echo follow up/limited w/ contrast if indicated    Result Date: 1/14/2023  Narrative: Technically adequate Limited transthoracic echocardiogram study  Patient had complete echocardiogram on 1/9/2022  Mild concentric left ventricle hypertrophy, normal left ventricular systolic function, left ventricle ejection fraction is greater than 65%  Normal right ventricle size and systolic function  Normal left and right atrial size by visual assessment  Tricuspid aortic valve with thickening and densities noted on the tips of the cusps highly suspicious for vegetations  There is associated moderate aortic valve regurgitation  There  are no mobile lesions and there is no evidence of abscess involving the aortic valve  Borderline mitral annular calcification  No vegetation identified on mitral valve  Trace mitral valve regurgitation  No vegetations identified on the tricuspid or pulmonic valve regurgitation  Trace tricuspid valve regurgitation noted  No obvious pulmonary hypertension  Trace, hemodynamically insignificant circumferential pericardial effusion  Compared to previous echocardiogram from 1/9/2022 the abnormalities on the aortic valve are new  There is some progression of aortic valve regurgitation  EKG, Pathology, and Other Studies: I have personally reviewed pertinent reports  VTE Pharmacologic Prophylaxis: Reason for no pharmacologic prophylaxis post op [facility], ok to start tomorrow      VTE Mechanical Prophylaxis: sequential compression device

## 2023-01-18 NOTE — RESPIRATORY THERAPY NOTE
01/18/23 0419   Respiratory Assessment   Resp Comments Patient transitioned to PCV earlier with improved patient/ventilator synchrony  No adverse events to report     Vent Information   Vent ID [id number]   Vent type [place] C3   [place] C3/G5 Vent Mode P-CMV/PCV+   $ Vent Daily Charge-Subsequent Yes   $ Pulse Oximetry Spot Check Charge Completed   P-CMV/PCV+ Settings   Resp Rate (BPM) 16 BPM   Pressure Control/Pinsp (cmH20) 14 cmH20   Insp Time (S) 0 75 sec   FiO2 (%) 40 %   PEEP (cmH2O) 8 cmH2O   I:E Ratio 1:4   Insp Resistance 10   P-ramp (ms) 100 (ms)   Flow Trigger (LPM) 5 LPM   Humidification Heater   Heater Temp 95 °F (35 °C)   P-CMV/PCV+ Actuals   Resp Rate (BPM) 20 BPM   VT (mL) 690 mL   MV 14 9   MAP (cmH2O) 13 cmH2O   Peak Pressure (cmH2O) 27 cmH2O   I:E Ratio (Obs) 1:3   Static Compliance (mL/cmH2O) 43 8 mL/cmH2O   Heater Temperature (Obs) 95 °F (35 °C)   P-CMV/PCV+ Alarms    High Peak Pressure (cmH2O) 40 cmH2O   Low Pressure (cmH2O) 5 cm H2O   High Resp Rate (BPM) 40 BPM   Low Resp Rate (BPM) 8 BPM   High MV (L/min) 20 L/min   Low MV (L/min) 4 L/min   High Spont VTE (mL) 1200 mL   Low Spont VTE (mL) 300 mL

## 2023-01-18 NOTE — PROGRESS NOTES
Pastoral Care Progress Note    2023  Patient: Rachana Hoyos : 1960  Admission Date & Time: 2023 1516  MRN: [id number] CSN: [id number]      On call  Yuridia Thurston responded to consult request from bedside RN Denita at 26  Pt was reclined and unconscious in bed  Brother, son, son's girlfriend, mother and father of pt were present   facilitated conversation with those present regarding their emotional state   facilitated conversation regarding the decision to extubate and move pt to comfort care   provided empathetic listening, normalized their anticipatory grief, provided prayer upon request   Family shared stories about the pt and what they will carry forward into their lives that they gained from him   assesses family is tightly knit and will rely on each other   assesses that family believes they are giving the pt his wish by placing him on comfort care  Chaplains remain available                 Chaplaincy Interventions Utilized:   Empowerment: Encouraged assertiveness, Encouraged focus on present, and Facilitated group experience    Exploration: Explored emotional needs & resources and Facilitated life review    Collaboration: Consulted with interdisciplinary team        Ritual: Provided prayer      Chaplaincy Outcomes Achieved:  Catharsis and Expressed humor      Spiritual Coping Strategies Utilized:   Alexandre       23 1700   Clinical Encounter Type   Visited With Patient and family together   Crisis Visit Patient actively dying   Referral From Nurse   Referral To    Holiness Encounters   Holiness Needs Prayer

## 2023-01-18 NOTE — QUICK NOTE
GI quick note  -----------------    · Patient noted to have drop in hemoglobin  · He has ongoing goals of care discussion, family leaning towards comfort care measures    If plan for comfort care measures would not recommend any aggressive measures including embolization by IR

## 2023-01-18 NOTE — ASSESSMENT & PLAN NOTE
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, pulmonary cavitary lesion, splenic infarct, [place] lesions   · Patient with acute exam change 1/14- right hemiplegia and aphasia  · [facility] 11/6 showed worsening of cytotoxic edema with brain compression and worsening MLS  · Exam change around 8am 1/16 with left blown pupil and no response to pain off sedation, GCS 3T  Taken to OR emergently for [facility]    Imaging personally reviewed with attending:  · CT head w/o, 1/17/23: Evolving massive hemorrhagic infarction involving majority of the left MCA territory with associated mass effect  Mild enlargement of the temporal horn right lateral ventricle redemonstrated with question of small amount of transependymal CSF flow adjacent to the occipital horn right lateral ventricle, stable  Small evolving infarctions in the right occipital and frontoparietal regions noted  Plan:  · Continue to monitor neurological exam  · STAT CT head for decline in GCS greater than 2 points in 1 hour  · 1 ELIZABETH drain to FS, 140cc SS drainage in bulb  Removed today without difficulty, drain site secured with suture  · ICP monitor in place, ICP < 20  Currently 1  Removed today, suture placed with no CSF drainage from drain site  · NA > 145, currently 155  · SBP < 140, MAP > 65  · Neurology following for stroke management, appreciate recommendations  · Medical management per primary team  · Pain control per primary team  · Will need helmet once extubated, not ordered yet  · PT/OT evaluation once medically appropriate  · DVT ppx: SCDs  OK for DVT ppx 1/18  Neurosurgery will sign off at this time   Family meeting today, patient will likely transition to comfort measures  No further follow up from our service unless family decides AGAINST comfort measures  Please call with questions or concerns

## 2023-01-18 NOTE — PLAN OF CARE
Problem: Prexisting or High Potential for Compromised Skin Integrity  Goal: Skin integrity is maintained or improved  Description: INTERVENTIONS:  - Identify patients at risk for skin breakdown  - Assess and monitor skin integrity  - Assess and monitor nutrition and hydration status  - Monitor labs   - Assess for incontinence   - Turn and reposition patient  - Assist with mobility/ambulation  - Relieve pressure over bony prominences  - Avoid friction and shearing  - Provide appropriate hygiene as needed including keeping skin clean and dry  - Evaluate need for skin moisturizer/barrier cream  - Collaborate with interdisciplinary team   - Patient/family teaching  - Consider wound care consult   Outcome: Progressing     Problem: MOBILITY - ADULT  Goal: Maintain or return to baseline ADL function  Description: INTERVENTIONS:  -  Assess patient's ability to carry out ADLs; assess patient's baseline for ADL function and identify physical deficits which impact ability to perform ADLs (bathing, care of mouth/teeth, toileting, grooming, dressing, etc )  - Assess/evaluate cause of self-care deficits   - Assess range of motion  - Assess patient's mobility; develop plan if impaired  - Assess patient's need for assistive devices and provide as appropriate  - Encourage maximum independence but intervene and supervise when necessary  - Involve family in performance of ADLs  - Assess for home care needs following discharge   - Consider OT consult to assist with ADL evaluation and planning for discharge  - Provide patient education as appropriate  Outcome: Progressing  Goal: Maintains/Returns to pre admission functional level  Description: INTERVENTIONS:  - Perform BMAT or MOVE assessment daily    - Set and communicate daily mobility goal to care team and patient/family/caregiver  - Collaborate with rehabilitation services on mobility goals if consulted  - Perform Range of Motion 2 times a day    - Reposition patient every 2 hours   - Dangle patient 2 times a day  - Stand patient 2 times a day  - Ambulate patient 2 times a day  - Out of bed to chair 2 times a day   - Out of bed for meals 2 times a day  - Out of bed for toileting  - Record patient progress and toleration of activity level   Outcome: Progressing     Problem: Potential for Falls  Goal: Patient will remain free of falls  Description: INTERVENTIONS:  - Educate patient/family on patient safety including physical limitations  - Instruct patient to call for assistance with activity   - Consult OT/PT to assist with strengthening/mobility   - Keep Call bell within reach  - Keep bed low and locked with side rails adjusted as appropriate  - Keep care items and personal belongings within reach  - Initiate and maintain comfort rounds  - Make Fall Risk Sign visible to staff  - Offer Toileting every 2 Hours, in advance of need  - Initiate/Maintain 2alarm  - Obtain necessary fall risk management equipment: 2  - Apply yellow socks and bracelet for high fall risk patients  - Consider moving patient to room near nurses station  Outcome: Progressing     Problem: PAIN - ADULT  Goal: Verbalizes/displays adequate comfort level or baseline comfort level  Description: Interventions:  - Encourage patient to monitor pain and request assistance  - Assess pain using appropriate pain scale  - Administer analgesics based on type and severity of pain and evaluate response  - Implement non-pharmacological measures as appropriate and evaluate response  - Consider cultural and social influences on pain and pain management  - Notify physician/advanced practitioner if interventions unsuccessful or patient reports new pain  Outcome: Progressing     Problem: INFECTION - ADULT  Goal: Absence or prevention of progression during hospitalization  Description: INTERVENTIONS:  - Assess and monitor for signs and symptoms of infection  - Monitor lab/diagnostic results  - Monitor all insertion sites, i e  indwelling lines, tubes, and drains  - Monitor endotracheal if appropriate and nasal secretions for changes in amount and color  - Clubb appropriate cooling/warming therapies per order  - Administer medications as ordered  - Instruct and encourage patient and family to use good hand hygiene technique  - Identify and instruct in appropriate isolation precautions for identified infection/condition  Outcome: Progressing  Goal: Absence of fever/infection during neutropenic period  Description: INTERVENTIONS:  - Monitor WBC    Outcome: Progressing     Problem: SAFETY ADULT  Goal: Maintain or return to baseline ADL function  Description: INTERVENTIONS:  -  Assess patient's ability to carry out ADLs; assess patient's baseline for ADL function and identify physical deficits which impact ability to perform ADLs (bathing, care of mouth/teeth, toileting, grooming, dressing, etc )  - Assess/evaluate cause of self-care deficits   - Assess range of motion  - Assess patient's mobility; develop plan if impaired  - Assess patient's need for assistive devices and provide as appropriate  - Encourage maximum independence but intervene and supervise when necessary  - Involve family in performance of ADLs  - Assess for home care needs following discharge   - Consider OT consult to assist with ADL evaluation and planning for discharge  - Provide patient education as appropriate  Outcome: Progressing  Goal: Maintains/Returns to pre admission functional level  Description: INTERVENTIONS:  - Perform BMAT or MOVE assessment daily    - Set and communicate daily mobility goal to care team and patient/family/caregiver  - Collaborate with rehabilitation services on mobility goals if consulted  - Perform Range of Motion 2 times a day  - Reposition patient every 2 hours    - Dangle patient 2 times a day  - Stand patient 2 times a day  - Ambulate patient 2 times a day  - Out of bed to chair 2 times a day   - Out of bed for meals 2 times a day  - Out of bed for toileting  - Record patient progress and toleration of activity level   Outcome: Progressing  Goal: Patient will remain free of falls  Description: INTERVENTIONS:  - Educate patient/family on patient safety including physical limitations  - Instruct patient to call for assistance with activity   - Consult OT/PT to assist with strengthening/mobility   - Keep Call bell within reach  - Keep bed low and locked with side rails adjusted as appropriate  - Keep care items and personal belongings within reach  - Initiate and maintain comfort rounds  - Make Fall Risk Sign visible to staff  - Offer Toileting every 2 Hours, in advance of need  - Initiate/Maintain 2alarm  - Obtain necessary fall risk management equipment: 2  - Apply yellow socks and bracelet for high fall risk patients  - Consider moving patient to room near nurses station  Outcome: Progressing     Problem: DISCHARGE PLANNING  Goal: Discharge to home or other facility with appropriate resources  Description: INTERVENTIONS:  - Identify barriers to discharge w/patient and caregiver  - Arrange for needed discharge resources and transportation as appropriate  - Identify discharge learning needs (meds, wound care, etc )  - Arrange for interpretive services to assist at discharge as needed  - Refer to Case Management Department for coordinating discharge planning if the patient needs post-hospital services based on physician/advanced practitioner order or complex needs related to functional status, cognitive ability, or social support system  Outcome: Progressing     Problem: Knowledge Deficit  Goal: Patient/family/caregiver demonstrates understanding of disease process, treatment plan, medications, and discharge instructions  Description: Complete learning assessment and assess knowledge base    Interventions:  - Provide teaching at level of understanding  - Provide teaching via preferred learning methods  Outcome: Progressing     Problem: NEUROSENSORY - ADULT  Goal: Achieves stable or improved neurological status  Description: INTERVENTIONS  - Monitor and report changes in neurological status  - Monitor vital signs such as temperature, blood pressure, glucose, and any other labs ordered   - Initiate measures to prevent increased intracranial pressure  - Monitor for seizure activity and implement precautions if appropriate      Outcome: Progressing  Goal: Remains free of injury related to seizures activity  Description: INTERVENTIONS  - Maintain airway, patient safety  and administer oxygen as ordered  - Monitor patient for seizure activity, document and report duration and description of seizure to physician/advanced practitioner  - If seizure occurs,  ensure patient safety during seizure  - Reorient patient post seizure  - Seizure pads on all 4 side rails  - Instruct patient/family to notify RN of any seizure activity including if an aura is experienced  - Instruct patient/family to call for assistance with activity based on nursing assessment  - Administer anti-seizure medications if ordered    Outcome: Progressing  Goal: Achieves maximal functionality and self care  Description: INTERVENTIONS  - Monitor swallowing and airway patency with patient fatigue and changes in neurological status  - Encourage and assist patient to increase activity and self care     - Encourage visually impaired, hearing impaired and aphasic patients to use assistive/communication devices  Outcome: Progressing     Problem: CARDIOVASCULAR - ADULT  Goal: Maintains optimal cardiac output and hemodynamic stability  Description: INTERVENTIONS:  - Monitor I/O, vital signs and rhythm  - Monitor for S/S and trends of decreased cardiac output  - Administer and titrate ordered vasoactive medications to optimize hemodynamic stability  - Assess quality of pulses, skin color and temperature  - Assess for signs of decreased coronary artery perfusion  - Instruct patient to report change in severity of symptoms  Outcome: Progressing  Goal: Absence of cardiac dysrhythmias or at baseline rhythm  Description: INTERVENTIONS:  - Continuous cardiac monitoring, vital signs, obtain 12 lead EKG if ordered  - Administer antiarrhythmic and heart rate control medications as ordered  - Monitor electrolytes and administer replacement therapy as ordered  Outcome: Progressing     Problem: RESPIRATORY - ADULT  Goal: Achieves optimal ventilation and oxygenation  Description: INTERVENTIONS:  - Assess for changes in respiratory status  - Assess for changes in mentation and behavior  - Position to facilitate oxygenation and minimize respiratory effort  - Oxygen administered by appropriate delivery if ordered  - Initiate smoking cessation education as indicated  - Encourage broncho-pulmonary hygiene including cough, deep breathe, Incentive Spirometry  - Assess the need for suctioning and aspirate as needed  - Assess and instruct to report SOB or any respiratory difficulty  - Respiratory Therapy support as indicated  Outcome: Progressing     Problem: GASTROINTESTINAL - ADULT  Goal: Minimal or absence of nausea and/or vomiting  Description: INTERVENTIONS:  - Administer IV fluids if ordered to ensure adequate hydration  - Maintain NPO status until nausea and vomiting are resolved  - Nasogastric tube if ordered  - Administer ordered antiemetic medications as needed  - Provide nonpharmacologic comfort measures as appropriate  - Advance diet as tolerated, if ordered  - Consider nutrition services referral to assist patient with adequate nutrition and appropriate food choices  Outcome: Progressing  Goal: Maintains or returns to baseline bowel function  Description: INTERVENTIONS:  - Assess bowel function  - Encourage oral fluids to ensure adequate hydration  - Administer IV fluids if ordered to ensure adequate hydration  - Administer ordered medications as needed  - Encourage mobilization and activity  - Consider nutritional services referral to assist patient with adequate nutrition and appropriate food choices  Outcome: Progressing  Goal: Maintains adequate nutritional intake  Description: INTERVENTIONS:  - Monitor percentage of each meal consumed  - Identify factors contributing to decreased intake, treat as appropriate  - Assist with meals as needed  - Monitor I&O, weight, and lab values if indicated  - Obtain nutrition services referral as needed  Outcome: Progressing  Goal: Oral mucous membranes remain intact  Description: INTERVENTIONS  - Assess oral mucosa and hygiene practices  - Implement preventative oral hygiene regimen  - Implement oral medicated treatments as ordered  - Initiate Nutrition services referral as needed  Outcome: Progressing     Problem: GENITOURINARY - ADULT  Goal: Maintains or returns to baseline urinary function  Description: INTERVENTIONS:  - Assess urinary function  - Encourage oral fluids to ensure adequate hydration if ordered  - Administer IV fluids as ordered to ensure adequate hydration  - Administer ordered medications as needed  - Offer frequent toileting  - Follow urinary retention protocol if ordered  Outcome: Progressing  Goal: Absence of urinary retention  Description: INTERVENTIONS:  - Assess patient’s ability to void and empty bladder  - Monitor I/O  - Bladder scan as needed  - Discuss with physician/AP medications to alleviate retention as needed  - Discuss catheterization for long term situations as appropriate  Outcome: Progressing  Goal: Urinary catheter remains patent  Description: INTERVENTIONS:  - Assess patency of urinary catheter  - If patient has a chronic ruiz, consider changing catheter if non-functioning  - Follow guidelines for intermittent irrigation of non-functioning urinary catheter  Outcome: Progressing     Problem: METABOLIC, FLUID AND ELECTROLYTES - ADULT  Goal: Electrolytes maintained within normal limits  Description: INTERVENTIONS:  - Monitor labs and assess patient for signs and symptoms of electrolyte imbalances  - Administer electrolyte replacement as ordered  - Monitor response to electrolyte replacements, including repeat lab results as appropriate  - Instruct patient on fluid and nutrition as appropriate  Outcome: Progressing  Goal: Fluid balance maintained  Description: INTERVENTIONS:  - Monitor labs   - Monitor I/O and WT  - Instruct patient on fluid and nutrition as appropriate  - Assess for signs & symptoms of volume excess or deficit  Outcome: Progressing  Goal: Glucose maintained within target range  Description: INTERVENTIONS:  - Monitor Blood Glucose as ordered  - Assess for signs and symptoms of hyperglycemia and hypoglycemia  - Administer ordered medications to maintain glucose within target range  - Assess nutritional intake and initiate nutrition service referral as needed  Outcome: Progressing     Problem: SKIN/TISSUE INTEGRITY - ADULT  Goal: Skin Integrity remains intact(Skin Breakdown Prevention)  Description: Assess:  -Perform Hector assessment every 2  -Clean and moisturize skin every 2  -Inspect skin when repositioning, toileting, and assisting with ADLS  -Assess under medical devices such as 2 every 2  -Assess extremities for adequate circulation and sensation     Bed Management:  -Have minimal linens on bed & keep smooth, unwrinkled  -Change linens as needed when moist or perspiring  -Avoid sitting or lying in one position for more than 2 hours while in bed  -Keep HOB at 2degrees     Toileting:  -Offer bedside commode  -Assess for incontinence every 2  -Use incontinent care products after each incontinent episode such as 2    Activity:  -Mobilize patient 2 times a day  -Encourage activity and walks on unit  -Encourage or provide ROM exercises   -Turn and reposition patient every 2 Hours  -Use appropriate equipment to lift or move patient in bed  -Instruct/ Assist with weight shifting every 2 when out of bed in chair  -Consider limitation of chair time 2 hour intervals    Skin Care:  -Avoid use of baby powder, tape, friction and shearing, hot water or constrictive clothing  -Relieve pressure over bony prominences using 2  -Do not massage red bony areas    Next Steps:  -Teach patient strategies to minimize risks such as 22   -Consider consults to  interdisciplinary teams such as 22  Outcome: Progressing  Goal: Incision(s), wounds(s) or drain site(s) healing without S/S of infection  Description: INTERVENTIONS  - Assess and document dressing, incision, wound bed, drain sites and surrounding tissue  - Provide patient and family education  - Perform skin care/dressing changes every 2  Outcome: Progressing  Goal: Pressure injury heals and does not worsen  Description: Interventions:  - Implement low air loss mattress or specialty surface (Criteria met)  - Apply silicone foam dressing  - Instruct/assist with weight shifting every 2 minutes when in chair   - Limit chair time to 2 hour intervals  - Use special pressure reducing interventions such as 2 when in chair   - Apply fecal or urinary incontinence containment device   - Perform passive or active ROM every 2  - Turn and reposition patient & offload bony prominences every 2 hours   - Utilize friction reducing device or surface for transfers   - Consider consults to  interdisciplinary teams such as 2  - Use incontinent care products after each incontinent episode such as 22  - Consider nutrition services referral as needed  Outcome: Progressing     Problem: HEMATOLOGIC - ADULT  Goal: Maintains hematologic stability  Description: INTERVENTIONS  - Assess for signs and symptoms of bleeding or hemorrhage  - Monitor labs  - Administer supportive blood products/factors as ordered and appropriate  Outcome: Progressing     Problem: MUSCULOSKELETAL - ADULT  Goal: Maintain or return mobility to safest level of function  Description: INTERVENTIONS:  - Assess patient's ability to carry out ADLs; assess patient's baseline for ADL function and identify physical deficits which impact ability to perform ADLs (bathing, care of mouth/teeth, toileting, grooming, dressing, etc )  - Assess/evaluate cause of self-care deficits   - Assess range of motion  - Assess patient's mobility  - Assess patient's need for assistive devices and provide as appropriate  - Encourage maximum independence but intervene and supervise when necessary  - Involve family in performance of ADLs  - Assess for home care needs following discharge   - Consider OT consult to assist with ADL evaluation and planning for discharge  - Provide patient education as appropriate  Outcome: Progressing  Goal: Maintain proper alignment of affected body part  Description: INTERVENTIONS:  - Support, maintain and protect limb and body alignment  - Provide patient/ family with appropriate education  Outcome: Progressing     Problem: Nutrition/Hydration-ADULT  Goal: Nutrient/Hydration intake appropriate for improving, restoring or maintaining nutritional needs  Description: Monitor and assess patient's nutrition/hydration status for malnutrition  Collaborate with interdisciplinary team and initiate plan and interventions as ordered  Monitor patient's weight and dietary intake as ordered or per policy  Utilize nutrition screening tool and intervene as necessary  Determine patient's food preferences and provide high-protein, high-caloric foods as appropriate       INTERVENTIONS:  - Monitor oral intake, urinary output, labs, and treatment plans  - Assess nutrition and hydration status and recommend course of action  - Evaluate amount of meals eaten  - Assist patient with eating if necessary   - Allow adequate time for meals  - Recommend/ encourage appropriate diets, oral nutritional supplements, and vitamin/mineral supplements  - Order, calculate, and assess calorie counts as needed  - Recommend, monitor, and adjust tube feedings and TPN/PPN based on assessed needs  - Assess need for intravenous fluids  - Provide specific nutrition/hydration education as appropriate  - Include patient/family/caregiver in decisions related to nutrition  Outcome: Progressing     Problem: SAFETY,RESTRAINT: NV/NON-SELF DESTRUCTIVE BEHAVIOR  Goal: Remains free of harm/injury (restraint for non violent/non self-detsructive behavior)  Description: INTERVENTIONS:  - Instruct patient/family regarding restraint use   - Assess and monitor physiologic and psychological status   - Provide interventions and comfort measures to meet assessed patient needs   - Identify and implement measures to help patient regain control  - Assess readiness for release of restraint   Outcome: Progressing  Goal: Returns to optimal restraint-free functioning  Description: INTERVENTIONS:  - Assess the patient's behavior and symptoms that indicate continued need for restraint  - Identify and implement measures to help patient regain control  - Assess readiness for release of restraint   Outcome: Progressing     Problem: COPING  Goal: Pt/Family able to verbalize concerns and demonstrate effective coping strategies  Description: INTERVENTIONS:  - Assist patient/family to identify coping skills, available support systems and cultural and spiritual values  - Provide emotional support, including active listening and acknowledgement of concerns of patient and caregivers  - Reduce environmental stimuli, as able  - Provide patient education  - Assess for spiritual pain/suffering and initiate spiritual care, including notification of Pastoral Care or [facility]ennie based community as needed  - Assess effectiveness of coping strategies  Outcome: Progressing  Goal: Will report anxiety at manageable levels  Description: INTERVENTIONS:  - Administer medication as ordered  - Teach and encourage coping skills  - Provide emotional support  - Assess patient/family for anxiety and ability to cope  Outcome: Progressing

## 2023-01-18 NOTE — PROGRESS NOTES
Daily Progress Note - Critical Care   Gonsalo Rod 58 y o  male MRN: [id number]  Unit/Bed#: ICU 12 Encounter: [id number]        ----------------------------------------------------------------------------------------  HPI: 58 y o  Male found to have MSSA bacteremia  On 1/14 the patient had developed episodes of hypotension and developed acute neurologic deficits and was transferred to [facility] underwent thrombectomy with IR on 1/14   2D echo done prior to transfer concerning for small vegetations on the aortic valve   MR showed a large acute left MCA stroke with minimal shift    Repeat CT showed progressive midline shift on 1/16 and he was taken to the OR emergently for left decompressive hemicraniectomy  Rachel Leigh has also been following as patient had episode of upper GI bleed and EGD was performed which showed a large duodenal ulcer  24hr events:   Overnight patient was noted to not be withdrawing  His sodium was elevated to 158 and osm of 319  He was continued on D5 at 100mL/h  He appeared uncomfortable on the ventilator with RR of 30  [facility] [place], operated by [facility] ventilator with a TV of 480  Noted to be febrile to 101 7  Ventilator was adjusted to Pressure control to provide better comfort and synchrony  ABG ordered for the morning resulted with a PCO2 of 29 8  Palliative meeting on 1/17 goal is for likely transition to comfort care  Spoke with family on the phone, brother and son plan to come to bedside at 1:30 pm      Vitals:  Temp:  [100 °F (37 8 °C)-101 8 °F (38 8 °C)] 101 5 °F (38 6 °C)  HR:  [] 74  Resp:  [18-33] 20  BP: ()/(42-73) 99/47  SpO2:  [86 %-100 %] 99 %    I&Os:  01/17 0701 - 01/18 0700  In: 3901 2 [I V :2531 2]  Out: 9113 [Urine:1785; Drains:140]    ---------------------------------------------------------------------------------------  SUBJECTIVE  Patient seen and examined at bedside       Review of Systems   Unable to perform ROS: Intubated ---------------------------------------------------------------------------------------  Assessment and Plan:    Neuro / Psych:   • Diagnosis: Malignant Left MCA infarct with M1 Thrombus  ? Assessment: Likely secondary to septic embolism  - Imaging:   - 1/14 CTH: no acute intracranial abnormality  - 1/14 CTA HN: left MCA M1 thromboembolism, incidental nonocclusive DVT in the high cervical right IJ vein  - 1/14 MRI brain with and without: Large acute left MCA distribution infarct with petechial hemorrhage  Minimal left-to-right shift  Additional small acute infarcts in the right frontal and parietal lobes as well as the cerebellum favor embolic etiology   - Status post IR thrombectomy TICI 2b flow on 1/14  ? Plan:  - Continue with goal Na 145-155/ osm 305-320  - SBP <160  - Levophed to maintain MAP >65  - Keppra 500 mg BID for 7 days   - Continue to hold AC/AP in concern for septic emboli and newly diagnosed anemia  - Hypertonic saline started on 1/15 and d/c on 1/17  -  - POD Day 2: Emergent Decompressive hemicraniectomy  - Day 4 s/p TICI 2B    • Diagnosis: Sedation and analgesia  ? Assessment:  - Patient maintained on gabapentin 100mg Q8 and PRN flexeril as outpatient  ? Plan:  - propofol discontinued 1/14   - Pecedex restarted 1/17 0400, currently set at  7  - Currently maintained on Precedex and fentanyl 100 infusions  - Started on D5 on 1/17, currently at 100  - PRN fentanyl 50   - Frequent neuro checks q4  ? Sleep/wake cycle regulation as able  ? Delirium precaution  ? CAM-ICU  ? RASS goal -1 to -2      CV:   • Diagnosis: Aortic Valve Endocarditis  ? Assessment: Noted on repeat echocardiogram 1/14 -aortic valve with thickening and densities noted on the tips of the cusps highly suspicious for vegetations with associated moderate aortic valve regurgitation, no evidence of abscess, trace pericardial effusion   Compared to previous echocardiogram completed in 1/9/2020 2 aortic valve vegetations described as new  ? Plan:  - Event:   - Temp: 101 5 tmax of 101 8, started on cooling blanket   - Blood cultures 1/13: gram + cocc, pending blood cultures obtained on 1/16  - ID on board   - Continue Nafcillin 2 g q4 Day 5 1/18  - Total Antibiotic day 11   - Follow up blood cultures obtained 1/17  - ID recommending repeat blood cultures on 1/19  - Continue monitoring Fever curve/WBC  - Hold off on KALINA and cardiology consult     - Continuous telemetry  • Diagnosis: Hypotension  ? Assessment:  - Likely multifactorial - septic shock and sedation improved   ? Plan:  - Events:   - Overnight: SBP: () no Prn received overnight (has labetalol )   -  Levophed on hold 1/16 0800 Vasopressin d/c 1/15  - Continue blood pressure support to maintain MAP >65  - Continue monitoring Vitals  • Diagnosis: Hypertension  ? Assessment:  - Home meds: lisinopril-HCTZ 20-25, metoprolol succinate 100mg daily  ? Plan:  - Holding home medications in setting of hypotension  • Diagnosis: Hyperlipidemia  ? Plan:  - Resume home crestor, fenofibrate when tolerating PO  - Holding home Lovaza      Pulm:  • Diagnosis: Acute Respiratory Failure   - Assessment:   ? Plan:  ? Continue Respiratory protocol   ? ABG as needed   ? BMP ordered q6  • Diagnosis: Pulmonary Cavitary Lesion  ? Assessment: Noted on CTA HN - suspicious for septic emboli  ? Plan:  - Continue to monitor respiratory status  - Antibiotics as detailed below  - Continue mechanical ventilation as above  • Diagnosis: COPD vs  Asthma  ? Assessment:  - No spirometry or PFT's in chart  - Managed outpatient on albuterol and symbicort for documented COPD v Asthma  ?  Plan:  - Atrovent and Xopenex  - Saline nebs    GI:   • Diagnosis: Duodenal Ulcer   - Assessment:  Single large, deep, irregular ulcer in the duodenal bulb with adherent clot seen on EGD on 1/15, repeat EDG on 1/16 blood clot washed off, no active bleeding  - Plan:  - 1/15 EGD and 1/16 EGD  - Transfuse as need to maintain HB > 7 and platelets >50   - Last transfusion: 1/16 1 unit on 0800am  - Labs: CBC daily   - GI on board, input noted   - Given large size of ulcer not amenable to clip placement if patient is to re-bleed he will require GDA embolization by IR  - Diet restarted on tube feeds okay from GI standpoint  - PPI  switched to IV Protonix 40 mg BID   - Maroon colored putput from rectal tube: likely old blood   • Diagnosis: Hypodensity of spleen  ? Assessment: Likely complications of above emboli   - 1/14 CT AP noted Smooth peripherally calcified hypodensity in the anterior spleen measuring 4 4 x 4 6 x 4 8 cm due to prior infection   ? Plan:  - Consider repeat imaging with contrast       Renal/:  • Diagnosis: Abnormal Urinalysis; Pyuria  ? Assessment: Pyuria with positive culture isolated [facility]) and penile irritation  ? Plan:  - ID following - believe to be asymptomatic bacteruria   - Negative STI testing (RPR, Urine GC)  - Repeat STI testing in outpatient setting 3-4 weeks   • Diagnosis: Hypernatremia  ? Assessment:  - Patient with Na 129 on presentation, baseline high 130's  ? Plan:  - Na+ at 155 this am chloride 128, d/c hypertonic saline 3%  - Continue to follow q6 BMP      F/E/N:   • Plan:  ? Nutrition: Diet tube feeds    • Fluid: maintain euvolemic  • I/O: 3 9 I/2 5O: Net +1 3L  • Electrolytes: replete PRN to maintain goal  ? Goal Na as above , Mg > 2, Phos > 3, K goal > 4  ? q6 BMP    • Nutrition:  ? Tube feeds   • Lines:   ? Arterial line switched 1/17 overnight   ? Central line   ? Urethral catheter       Heme/Onc:   • Diagnosis: Anemia  ? Assessment:  - Previously Patient noted to have acute drop in Hgb from 12 1>8 8 with repeat 7 2  - Received 2u uncrossed blood products prior to transfer  ?  Plan:  - AM CBC with Hgb 6 8, holding off transfusion as plan is to transition to comfort   - Consent for transfusion previously obtained from son  - GI consulted on board, plan as noted above   - Received 1 units of RBCs on 1/16  • Diagnosis: R IJ Nonocclusive DVT  ? Plan:  - Not candidate for anticoagulation with septic emboli and anemia with possible GIB    • Diagnosis: T2DM  ? Assessment:  - Home regimen: metformin 1000mg BID, jardiance 10mg daily  ? Plan:  - Glucose: BG (168-223) unit 3-5   - Maintained on Insulin drip  - Monitor BG      ID:   • Diagnosis: Sepsis, Persistent MMSA bacteremia  ? Assessment  - Etiology includes bacteremia, aortic valve endocarditis, cellulitis R elbow, L knee infection, pulmonary cavitation  ? Plan:  - ID on board   - Continue Nafcillin 2g q4  1/18 (Day 5)   - Ancef  1000 mg q8 for 3 doses ( completed on 1/17)   - Follow up repeat blood cultures sent 1/17, 1/13 blood culutres with gram + cocci   - Tylenol scheduled for fever  - PRN ibuprofen - monitor BMP  - Follow up repeat blood cultures   - Continue to trend fever curve/vitals     • Diagnosis: No active issues  ? Monitor BG  ? Goal -180s in setting of acute illness        MSK/Skin:   • Diagnosis: L total knee replacement  ? Assessment  - Initially done 6/28/22  ? Plan:  - S/p removal with debridement of prosthesis with poly exchange  - Inserted stimulan abx beads  - Ortho following - appreciate recommendations  • Diagnosis: L elbow cellulitis  ? Plan:  - S/p I&D of L elbow  - Continue antibiotics as above  • Diagnosis: Septic Arthritis vs  Osteomyelitis  ? Plan:  - Neurosurgery consulted prior to transfer  - No surgical intervention anticipated at this time  - Follow up in 4 weeks with upright XR cervical spine  • Diagnosis: pressure ulcer ppx  ? Close skin surveillance  ? Frequent pressure off-loading to prevent skin breakdown  ? Encourage early ambulation  ?  PT/OT when able      Disposition: Continue Critical Care   Code Status: Level 2 - DNAR: but accepts endotracheal intubation  ---------------------------------------------------------------------------------------  ICU CORE MEASURES    Prophylaxis   VTE Pharmacologic Prophylaxis: Pharmacologic VTE Prophylaxis contraindicated due to GI bleed  VTE Mechanical Prophylaxis: sequential compression device  Stress Ulcer Prophylaxis: Pantoprazole IV     ABCDE Protocol (if indicated)  Plan to perform spontaneous awakening trial today? No  Plan to perform spontaneous breathing trial today? No  Obvious barriers to extubation? No      Invasive Devices Review  Invasive Devices     Central Venous Catheter Line  Duration           CVC Central Lines 23 Triple 20cm 3 days          Peripheral Intravenous Line  Duration           Peripheral IV 23 Right Antecubital 5 days    Peripheral IV 23 Left;Ventral (anterior) Forearm 3 days          Arterial Line  Duration           Arterial Line 23 Radial 1 day          Drain  Duration           Urethral Catheter 3 days    NG/OG/Enteral Tube Orogastric 18 Fr Center mouth 2 days    Closed/Suction Drain Left Other (Comment) Bulb 1 day    Rectal Tube With balloon 1 day    Ventriculostomy/Subdural Ventricular drainage catheter with ICP microsensor Left Frontal region 1 day          Airway  Duration           ETT  8 mm 3 days              Can any invasive devices be discontinued today?  No  ---------------------------------------------------------------------------------------  OBJECTIVE    Vitals   Vitals:    23 0300 23 0400 23 0500 23 0600   BP: (!) 97/46 (!) 99/49 (!) 100/46 (!) 99/47   Pulse: 76 74 78 74   Resp: 22 20 20 20   Temp: (!) 101 5 °F (38 6 °C) (!) 101 5 °F (38 6 °C) (!) 101 5 °F (38 6 °C) (!) 101 5 °F (38 6 °C)   TempSrc:       SpO2: 100% 100% 99% 99%   Weight:       Height:         Temp (24hrs), Av °F (38 3 °C), Min:100 °F (37 8 °C), Max:101 8 °F (38 8 °C)  Current: Temperature: (!) 101 5 °F (38 6 °C)  HR: Pulse: 74 (23 0600)  BP: Blood Pressure: (!) 99/47 (23)  RR: Respirations: 20 (23)  SpO2: SpO2: 99 % (23)    Respiratory:  SpO2: SpO2: 99 %, SpO2 Activity: SpO2 Activity: At Rest, SpO2 Device: O2 Device: Ventilator, Capnography:         Invasive/non-invasive ventilation settings   Respiratory    Lab Data (Last 4 hours)      01/18 0603            pH, Arterial       7 475             pCO2, Arterial       29 8             pO2, Arterial       97 1             HCO3, Arterial       21 4             Base Excess, Arterial       -1 8                  O2/Vent Data     None                Physical exam:  Vitals reviewed  Constitutional:       General: He is not in acute distress      Appearance: He is ill-appearing  HENT:      Head: Normocephalic and atraumatic       Right Ear: External ear normal       Left Ear: External ear normal       Nose: Nose normal       Mouth/Throat:      Mouth: Mucous membranes are moist       Pharynx: Oropharynx is clear  Eyes:      General:         Right eye: No discharge          Left eye: No discharge       Conjunctiva/sclera: Conjunctivae normal       Pupils: Pupils are equal, round, and reactive to light  Cardiovascular:      Rate and Rhythm: Normal rate and regular rhythm  Pulmonary:      Effort: Pulmonary effort is normal       Breath sounds: Normal breath sounds  Abdominal:      General: Bowel sounds are normal   Musculoskeletal:         General: No swelling or tenderness       Right lower leg: No edema       Left lower leg: No edema  Skin:     General: Skin is warm and dry  Neurological:      Comments: Does not follow commands   No  response to nox stim on bilateral lower extremitiy      Laboratory and Diagnostics:  Results from last 7 days   Lab Units 01/17/23  0545 01/16/23  2338 01/16/23  1212 01/16/23  1105 01/16/23  1058 01/16/23  1002 01/16/23  0601 01/15/23  1215 01/15/23  0445 01/14/23  1749 01/14/23  1203 01/14/23  0947 01/14/23  0440 01/13/23  0656 01/12/23  0635   WBC Thousand/uL 10 06 9 05  --   --   --   --  9 71  --  20 38* 27 91*  --  22 40* 19 37* 18 41* 16 86*   HEMOGLOBIN g/dL 7 7* 7 2* 8 1* 7 9*  --   --  7 2*   < > 6 9* 7 9*   < > 8 8* 12 1 12 3 12 8   I STAT HEMOGLOBIN g/dl  --   --   --   --  6 8* 5 4*  --   --   --   --    < >  --   --   --   --    HEMATOCRIT % 23 9* 22 1* 24 3* 24 5*  --   --  21 1*   < > 20 0* 23 3*   < > 25 8* 34 6* 36 1* 36 8   HEMATOCRIT, ISTAT %  --   --   --   --  20* 16*  --   --   --   --    < >  --   --   --   --    PLATELETS Thousands/uL 173 156  --   --   --   --  182  --  191 180  --  183 194 191 221   NEUTROS PCT %  --  78*  --   --   --   --  80*  --  83* 83*  --   --  88* 86* 87*   MONOS PCT %  --  8  --   --   --   --  9  --  8 8  --   --  5 6 6    < > = values in this interval not displayed       Results from last 7 days   Lab Units 01/18/23  0014 01/17/23  1756 01/17/23  1219 01/17/23  0545 01/16/23  2338 01/16/23  1829 01/16/23  1207 01/16/23  1002 01/16/23  0601 01/15/23  0447 01/15/23  0445 01/15/23  0025 01/14/23  1749 01/14/23  1605 01/14/23  0440 01/13/23  0656 01/12/23  0635   SODIUM mmol/L 158* 158* 158* 158* 158* 158* 156*  --  158*   < >  --    < > 130*  --  125* 125* 130*   POTASSIUM mmol/L 3 5 3 9 3 5 3 7 3 6 3 3* 3 3*  --  3 6   < >  --    < > 5 1  --  4 1 3 9 4 1   CHLORIDE mmol/L 132* 132* 132* 134* 133* 132* 130*  --  132*   < >  --    < > 102  --  94* 95* 97   CO2 mmol/L 22 22 21 21 21 20* 20*  --  18*   < >  --    < > 19*  --  22 21 21   CO2, I-STAT   --   --   --   --   --   --   --    < >  --   --   --   --   --    < >  --   --   --    ANION GAP mmol/L 4 4 5 3* 4 6 6  --  8   < >  --    < > 9  --  9 9 12   BUN mg/dL 18 19 21 24 26* 30* 29*  --  34*   < >  --    < > 48*  --  20 19 24   CREATININE mg/dL 1 04 1 05 1 04 1 01 1 06 1 09 1 09  --  1 08   < >  --    < > 1 50*  --  0 90 0 79 0 86   CALCIUM mg/dL 8 2* 8 5 8 6 8 6 8 6 8 5 8 4  --  8 1*   < >  --    < > 7 5*  --  8 9 9 1 9 3   GLUCOSE RANDOM mg/dL 117 136 127 96 124 114 157*  --  134   < >  --    < > 284*  --  195* 232* 254*   ALT U/L  --   --   --   --   --   --   --   --  118*  --  86*  --  83*  --  17 15 14   AST U/L  --   --   -- --   --   --   --   --  250*  --  223*  --  212*  --  51* 43 55*   ALK PHOS U/L  --   --   --   --   --   --   --   --  73  --  68  --  74  --  110 101 98   ALBUMIN g/dL  --   --   --   --   --   --   --   --  1 3*  --  1 3*  --  1 3*  --  1 7* 1 8* 1 8*   TOTAL BILIRUBIN mg/dL  --   --   --   --   --   --   --   --  0 75  --  0 54  --  0 55  --  0 73 0 49 0 54    < > = values in this interval not displayed  Results from last 7 days   Lab Units 01/16/23  0601 01/15/23  0445 01/14/23  1749 01/14/23  0440 01/13/23  0656 01/12/23  0635   MAGNESIUM mg/dL 2 3 2 2 2 0 1 6 1 8 1 6   PHOSPHORUS mg/dL 2 6 3 3 5 4*  --   --   --       Results from last 7 days   Lab Units 01/17/23  0545 01/16/23  1017 01/14/23  1749 01/14/23  0947   INR  1 35* 1 45* 1 64* 1 50*   PTT seconds 28  --  33  --           Results from last 7 days   Lab Units 01/14/23  1749 01/14/23  1404 01/14/23  0947   LACTIC ACID mmol/L 1 8 8 9* 2 9*     ABG:  Results from last 7 days   Lab Units 01/18/23  0603   PH ART  7 475*   PCO2 ART mm Hg 29 8*   PO2 ART mm Hg 97 1   HCO3 ART mmol/L 21 4*   BASE EXC ART mmol/L -1 8   ABG SOURCE  Line, Arterial     VBG:  Results from last 7 days   Lab Units 01/18/23  0603 01/15/23  0025 01/14/23 2056   PH JASON   --   --  7 313   PCO2 JASON mm Hg  --   --  36 2*   PO2 JASON mm Hg  --   --  42 0   HCO3 JASON mmol/L  --   --  17 9*   BASE EXC JASON mmol/L  --   --  -7 6   ABG SOURCE  Line, Arterial   < >  --     < > = values in this interval not displayed  Results from last 7 days   Lab Units 01/15/23  0445 01/14/23  0947   PROCALCITONIN ng/ml 15 09* 3 20*       Micro  Results from last 7 days   Lab Units 01/17/23  0546 01/15/23  0131 01/13/23  0657 01/11/23  0728   BLOOD CULTURE  Received in Microbiology Lab  Culture in Progress  Received in Microbiology Lab  Culture in Progress   Staphylococcus aureus* Staphylococcus aureus*  Staphylococcus aureus* Staphylococcus aureus*  Staphylococcus aureus*   GRAM STAIN RESULT   -- Gram positive cocci in clusters* Gram positive cocci in clusters*  Gram positive cocci in clusters* Gram positive cocci in clusters*  Gram positive cocci in clusters*       EKG: No recent EKG last 24hr     Imaging:  I have personally reviewed pertinent reports  Intake and Output  I/O       01/16 0701  01/17 0700 01/17 0701  01/18 0700    I V  (mL/kg) 1615 8 (15 8) 2301 8 (22 6)    Blood 350     NG/ 850    IV Piggyback 450 350    Feedings  110    Total Intake(mL/kg) 2525 8 (24 8) 3611 8 (35 4)    Urine (mL/kg/hr) 3800 (1 6) 1660 (0 7)    Emesis/NG output 200 350    Drains 125 90    Stool 850 75    Total Output 4975 2175    Net -2449 2 +1436 8          Unmeasured Stool Occurrence 1 x           Height and Weights   Height: 5' 10" (177 8 cm)  IBW (Ideal Body Weight): 73 kg  Body mass index is 32 27 kg/m²  Weight (last 2 days)     None            Nutrition       Diet Orders   (From admission, onward)             Start     Ordered    01/17/23 1039  Diet Enteral/Parenteral; Tube Feeding No Oral Diet; Jevity 1 2 Fabio; Continuous; 70; 250; Water; Every 6 hours  Diet effective now        Comments: Begin tube feeds at 10mL per hour, increased by 10mL/hour to goal of 70mL/hour  Please alert physician for intolerance/vomiting  References:    Nutrtion Support Algorithm Enteral vs  Parenteral   Question Answer Comment   Diet Type Enteral/Parenteral    Enteral/Parenteral Tube Feeding No Oral Diet    Tube Feeding Formula: Jevity 1 2 Fabio    Bolus/Cyclic/Continuous Continuous    Tube Feeding Goal Rate (mL/hr): 70    Tube Feeding flush (mL): 250    Water Flush type: Water    Water flush frequency: Every 6 hours    RD to adjust diet per protocol?  Yes        01/17/23 1052                  Active Medications  Scheduled Meds:  Current Facility-Administered Medications   Medication Dose Route Frequency Provider Last Rate   • acetaminophen  650 mg Oral Q6H Addi Alaniz PA-C     • chlorhexidine  15 mL Mouth/Throat Q12H [address] Clifton Diaz PA-C     • dexmedetomidine  0 1-1 mcg/kg/hr Intravenous Titrated Ada Go, DO 0 7 mcg/kg/hr (01/18/23 9789)   • dextrose  100 mL/hr Intravenous Continuous Kelsey Severance,  mL/hr (01/18/23 0238)   • fentaNYL  100 mcg/hr Intravenous Continuous Amada Loera  mcg/hr (01/17/23 2043)   • fentanyl citrate (PF)  50 mcg Intravenous Q2H PRN Catracho Mccormick MD     • insulin regular (HumuLIN R,NovoLIN R) infusion  0 3-21 Units/hr Intravenous Titrated Clifton Diaz PA-C 3 Units/hr (01/18/23 0604)   • ipratropium  0 5 mg Nebulization TID Clifton Diaz PA-C     • labetalol  10 mg Intravenous Q4H PRN Clifton Diaz PA-C     • levalbuterol  1 25 mg Nebulization TID Clifton Diaz PA-C     • levETIRAcetam  500 mg Oral Q12H [address] Clifton Diaz PA-C     • nafcillin  2,000 mg Intravenous Q4H JENNIFER AlmeidaC 2,000 mg (01/18/23 0537)   • neostigmine  3 mg Intravenous Once Kelsey Severance, MD     • ondansetron  4 mg Intravenous Q6H PRN Clifton Diaz PA-C     • pantoprazole  40 mg Intravenous Q12H 135 [facility]     • sodium chloride  4 mL Nebulization TID Clifton Diaz PA-C       Continuous Infusions:  dexmedetomidine, 0 1-1 mcg/kg/hr, Last Rate: 0 7 mcg/kg/hr (01/18/23 0209)  dextrose, 100 mL/hr, Last Rate: 100 mL/hr (01/18/23 0238)  fentaNYL, 100 mcg/hr, Last Rate: 100 mcg/hr (01/17/23 2043)  insulin regular (HumuLIN R,NovoLIN R) infusion, 0 3-21 Units/hr, Last Rate: 3 Units/hr (01/18/23 0604)      PRN Meds:   fentanyl citrate (PF), 50 mcg, Q2H PRN  labetalol, 10 mg, Q4H PRN  ondansetron, 4 mg, Q6H PRN        Allergies   Allergies   Allergen Reactions   • Sulfamethoxazole-Trimethoprim Nausea Only and GI Intolerance     ---------------------------------------------------------------------------------------  Advance Directive and Living Will:      Power of :    POLST: ---------------------------------------------------------------------------------------    Rodri Britton MD      Portions of the record may have been created with voice recognition software  Occasional wrong word or "sound a like" substitutions may have occurred due to the inherent limitations of voice recognition software    Read the chart carefully and recognize, using context, where substitutions have occurred

## 2023-01-18 NOTE — ACP (ADVANCE CARE PLANNING)
Pt neuro exam worsening  Persistently febrile  Now hypotensive and requiring norepi for hemodynamic support  Asked Jalil Anderson to bring Carlton's parents in from [place] due to my concern for acute decompensation over the next 24hrs  Discussed with family bedside  They understand the clinical course  All family members were in agreement that Carlton's definition of quality of life was very clear  Pt maintained a garden, he loved to travel, hike and hunt  If he were deprived of these activities and required trach, PEG, 24-hr care, bed-bound and unable to express himself then he would absolutely not want that  Currently, that would be his prognosis if he survived this hospitalization with no meaningful recovery  NSGY and neuro agree  Transition to comfort  All questions and concerns addressed  Tressa requested by the family  Palliative service will be updated      ACP time 20min

## 2023-01-18 NOTE — QUICK NOTE
Chart reviewed and goals of care discussions are ongoing at this time  Patient's clinical status remains overall poor  At this time would continue the patient on nafcillin until/if patient is transitioned to comfort care  We will plan to evaluate the patient tomorrow if he is ultimately not transitioned to comfort care  Please contact ID attending on call if questions in the interim

## 2023-01-18 NOTE — PROGRESS NOTES
Patient evaluated overnight, exam remains unchanged  Patient still has elevated sodium of 158 and osmolarity of 319  Currently patient is on D5 at 100 mL/h  He appears to be uncomfortable on the ventilator with respiratory rate of approximately 30  He is on assist control ventilation with a tidal volume of 480  He also has a fever of 101 7  Currently his ICP is monitoring at 3  Ventilator was adjusted to pressure control ventilation to provide better comfort and synchrony to the patient  Plan to recheck ABG in a m  Continue with current supportive measures  Ongoing family discussion regarding goals of care  Critical care time 48 minutes, critical care time does not include family update or procedures

## 2023-01-19 NOTE — QUICK NOTE
1/19/2023 2:29 PM -  Caitlin Uriarte's chart and case were reviewed by Mariela Auguste MD   Mode of review included electronic chart check, and brief in-person visit  Family was not at the bedside at the time of my evaluation  Communicated with hospice team as well as with ICU team  Patient is comfortable and family would like him to go to [facility], but pt insurance will not cover  Pt to be transferred to med/surg floor  Palliative care will return on 1/20/23     For urgent issues or any questions/concerns, please notify on-call provider via [facility]  You may also call our answering service 24/7 at   Mariela Auguste MD  Palliative and Supportive Care  Clinic/Answering Service: [phone]  You can find me on [employer]!

## 2023-01-19 NOTE — CASE MANAGEMENT
Case Management Discharge Planning Note    Patient name Deepti Sommer  Location ICU 12/ICU 12 MRN [id number]  : 1960 Date 2023       Current Admission Date: 2023  Current Admission Diagnosis:Acute stroke due to embolism of left middle cerebral artery [facility])   Patient Active Problem List    Diagnosis Date Noted   • [facility] ) 2023   • Anemia 2023   • Acute stroke due to embolism of left middle cerebral artery ([facility] ) 2023   • Aortic valve endocarditis 2023   • Pulmonary cavitary lesion 2023   • Impetigo 2023   • Abnormal urinalysis 2023   • Septic arthritis ([facility] ) 2023   • Hyponatremia 2023   • Status post total knee replacement, left 2022   • Rash 2022   • Murmur 2022   • BMI 31 0-31 9,adult 2022   • Anxiety 2022   • Mass of appendix 2017   • DMII (diabetes mellitus, type 2) ([facility] ) 2013   • Hyperlipidemia 2013   • Primary hypertension 2012      LOS (days): 5  Geometric Mean LOS (GMLOS) (days):   Days to GMLOS:     OBJECTIVE:  Risk of Unplanned Readmission Score: 21 44         Current admission status: Inpatient   Preferred Pharmacy:   [facility]/pharmacy #[id number], [place] - [facility] [address]  [address]  [address] [facility]  Phone: [phone] Fax: [phone]    [place] [address]  Phone: [phone] Fax: [phone]    Primary Care Provider: Claude Bis, DO    Primary Insurance: [facility]  Secondary Insurance:     DISCHARGE DETAILS:    Discharge planning discussed with[de-identified] Dr Faye Smith and Dr Sonny Bailey have spoken with son Juan Berry and he would like hospice after pt  is extubated   Hospice [facility] referral placed  Freedom of Choice: Yes                                  Other Referral/Resources/Interventions Provided:  Referral Comments: ICU team discussed hospice with son Luke  He plans to bring pt's parents here today and then plan extubation  Referral received and sent to [address] [place] [address] at son's request     Would you like to participate in our [address] service program?  : No - Declined    Treatment Team Recommendation: Hospice                                            Additional Comments: Call received from  inpt hospice, PT HAS NO INPT [facility] 7027  Dr Jose Weiss informed

## 2023-01-19 NOTE — CASE MANAGEMENT
Case Management Discharge Planning Note    Patient name Deepti Sommer  Location ICU 12/ICU 12 MRN [id number]  : 1960 Date 2023       Current Admission Date: 2023  Current Admission Diagnosis:Acute stroke due to embolism of left middle cerebral artery [facility])   Patient Active Problem List    Diagnosis Date Noted   • Septic embolism ([facility] ) 2023   • Anemia 2023   • Acute stroke due to embolism of left middle cerebral artery ([facility] ) 2023   • Aortic valve endocarditis 2023   • Pulmonary cavitary lesion 2023   • Impetigo 2023   • Abnormal urinalysis 2023   • Septic arthritis ([facility] ) 2023   • Hyponatremia 2023   • Status post total knee replacement, left 2022   • Rash 2022   • Murmur 2022   • BMI 31 0-31 9,adult 2022   • Anxiety 2022   • Mass of appendix 2017   • DMII (diabetes mellitus, type 2) ([facility] ) 2013   • Hyperlipidemia 2013   • Primary hypertension 2012      LOS (days): 5  Geometric Mean LOS (GMLOS) (days):   Days to GMLOS:     OBJECTIVE:  Risk of Unplanned Readmission Score: 21 44         Current admission status: Inpatient   Preferred Pharmacy:   [facility]/pharmacy #[id number], [place] - [facility] [address]  [address]  [address] [facility]  Phone: [phone] Fax: [phone]    [place] [address]  Phone: [phone] Fax: [phone]    Primary Care Provider: Claude Bis, DO    Primary Insurance: [address] [place] [address]  Secondary Insurance:     DISCHARGE DETAILS:    Discharge planning discussed with[de-identified] Dr Faye Smith and Dr Sonny Bailey have spoken with son Juan Berry and he would like hospice after pt  is extubated   Hospice [facility] referral placed  Freedom of Choice: Yes                                  Other Referral/Resources/Interventions Provided:  Referral Comments: ICU team discussed hospice with son Luke  He plans to bring pt's parents here today and then plan extubation  Referral received and sent to [facility] hospice at son's request     Would you like to participate in our [address] service program?  : No - Declined    Treatment Team Recommendation: Hospice                                            Additional Comments: 63yo male with acute embolic stroke, in ICU  Condition deteriorating, plan is to extubate today after his parents arrive, son Debra Rascon is bringing them  Luke agreeable to hospice  Referral sent to [id number] [place] [address]

## 2023-01-19 NOTE — PROGRESS NOTES
ICU Transfer Note    Tyler Pope 58 y o  male MRN: [id number]  [facility]   Unit/Bed#: ICU 38 Encounter: [id number]    Code Status: Level 4 - Comfort Care    Reason for ICU/Stepdown admission: Left MCA     Active problems: Comfort Care     * Acute stroke due to embolism of left middle cerebral artery ([address] )  Assessment & Plan  · Transitioned to Level 4  · Continue comfort care measures      Consultants during ICU   · Neurosurgery  · Neurology  · ID  · Palliative  ·     History of Present Illness/Summary of clinical course:     58 y o  Male found to have MSSA bacteremia  On 1/14 the patient had developed episodes of hypotension and developed acute neurologic deficits and was transferred to [facility] underwent thrombectomy with IR on 1/14   2D echo done prior to transfer concerning for small vegetations on the aortic valve   MR showed a large acute left MCA stroke with minimal shift    Repeat CT showed progressive midline shift on 1/16 and he was taken to the OR emergently for left decompressive hemicraniectomy  Ashley Martina has also been following as patient had episode of upper GI bleed and EGD was performed which showed a large duodenal ulcer  Patient  Continued to have worsening of overall exam requiring hemodynamic support  After discussing with family plan was to transition to comfort care level 4 as patient was not likely not to have any meaningful recovery          Please refer to today's progress note for further clinical details  Recent or scheduled procedures: none pending    Outstanding/pending diagnostics: none pending        Mobilization Plan: Comfort care     Nutrition Plan: Joshua Lacy         [  ] Family aware of transfer out of critical care: yes   [  ] Home medications that are not reordered and reason why: [address] transition       Spoke with Dr Austen Diaz regarding transfer  Patient accepted to their service      Christina Pena MD

## 2023-01-19 NOTE — PROGRESS NOTES
[facility]  Progress Note - Vivienne Bartlett 1960, 58 y o  male MRN: [id number]  Unit/Bed#: ICU 12 Encounter: [id number]  Primary Care Provider: Thompson Edwards DO   Date and time admitted to hospital: 2023  3:16 PM    * Acute stroke due to embolism of left middle cerebral artery [facility])  Assessment & Plan  · Continue comfort care measures        ----------------------------------------------------------------------------------------  HPI/24hr events: Transitioned to comfort care yesterday    Patient appropriate for transfer out of the ICU today?: Patient does not meet criteria for referral to the ICU Follow-Up Clinic; referral has not been made  Disposition: Transfer to Med-Surg   Code Status: Level 4 - Comfort Care  ---------------------------------------------------------------------------------------  SUBJECTIVE      Review of Systems   Unable to perform ROS: Mental status change     Review of systems was unable to be performed secondary to AMS   ---------------------------------------------------------------------------------------  OBJECTIVE    Vitals   Vitals:    23 2300 23 0000 23 0100 23 0200   BP:       BP Location:       Pulse: 74 70 68 68   Resp: (!) 25 (!) 25 (!) 24 (!) 24   Temp:       TempSrc:       SpO2: 98% 96% 97% 96%   Weight:       Height:         Temp (24hrs), Av 3 °F (38 5 °C), Min:100 5 °F (38 1 °C), Max:101 8 °F (38 8 °C)  Current: Temperature: (!) 100 8 °F (38 2 °C)  Arterial Line BP: 138/42  Arterial Line MAP (mmHg): 68 mmHg    Respiratory:  SpO2: SpO2: 96 %       Invasive/non-invasive ventilation settings   Respiratory    Lab Data (Last 4 hours)    None         O2/Vent Data (Last 4 hours)    None                Physical Exam  Vitals and nursing note reviewed  Constitutional:       Appearance: He is ill-appearing  HENT:      Head: Normocephalic        Right Ear: External ear normal       Left Ear: External ear normal       Nose: Nose normal       Mouth/Throat:      Mouth: Mucous membranes are dry  Cardiovascular:      Rate and Rhythm: Normal rate  Pulses: Normal pulses  Pulmonary:      Breath sounds: Rhonchi present  Abdominal:      Palpations: Abdomen is soft  Skin:     General: Skin is warm  Neurological:      Comments: Appears comfortable             Laboratory and Diagnostics:  Results from last 7 days   Lab Units 01/18/23  0603 01/17/23  0545 01/16/23  2338 01/16/23  1212 01/16/23  1105 01/16/23  1058 01/16/23  1002 01/16/23  0601 01/15/23  1215 01/15/23  0445 01/14/23  1749 01/14/23  1203 01/14/23  0947 01/14/23  0440 01/13/23  0656   WBC Thousand/uL 8 52 10 06 9 05  --   --   --   --  9 71  --  20 38* 27 91*  --  22 40* 19 37* 18 41*   HEMOGLOBIN g/dL 6 8* 7 7* 7 2* 8 1* 7 9*  --   --  7 2*   < > 6 9* 7 9*   < > 8 8* 12 1 12 3   I STAT HEMOGLOBIN g/dl  --   --   --   --   --  6 8* 5 4*  --   --   --   --    < >  --   --   --    HEMATOCRIT % 21 9* 23 9* 22 1* 24 3* 24 5*  --   --  21 1*   < > 20 0* 23 3*   < > 25 8* 34 6* 36 1*   HEMATOCRIT, ISTAT %  --   --   --   --   --  20* 16*  --   --   --   --    < >  --   --   --    PLATELETS Thousands/uL 139* 173 156  --   --   --   --  182  --  191 180  --  183 194 191   NEUTROS PCT % 78*  --  78*  --   --   --   --  80*  --  83* 83*  --   --  88* 86*   MONOS PCT % 5  --  8  --   --   --   --  9  --  8 8  --   --  5 6    < > = values in this interval not displayed       Results from last 7 days   Lab Units 01/18/23  1224 01/18/23  0603 01/18/23  0014 01/17/23  1756 01/17/23  1219 01/17/23  0545 01/16/23  2338 01/16/23  1002 01/16/23  0601 01/15/23  0447 01/15/23  0445 01/15/23  0025 01/14/23  1749 01/14/23  1605 01/14/23  0440 01/13/23  0656 01/12/23  0635   SODIUM mmol/L 155* 155* 158* 158* 158* 158* 158*   < > 158*   < >  --    < > 130*  --  125* 125* 130*   POTASSIUM mmol/L 3 5 3 7 3 5 3 9 3 5 3 7 3 6   < > 3 6   < >  --    < > 5 1  --  4 1 3 9 4 1 CHLORIDE mmol/L 127* 128* 132* 132* 132* 134* 133*   < > 132*   < >  --    < > 102  --  94* 95* 97   CO2 mmol/L 20* 22 22 22 21 21 21   < > 18*   < >  --    < > 19*  --  22 21 21   CO2, I-STAT   --   --   --   --   --   --   --    < >  --   --   --   --   --    < >  --   --   --    ANION GAP mmol/L 8 5 4 4 5 3* 4   < > 8   < >  --    < > 9  --  9 9 12   BUN mg/dL 17 17 18 19 21 24 26*   < > 34*   < >  --    < > 48*  --  20 19 24   CREATININE mg/dL 1 08 1 11 1 04 1 05 1 04 1 01 1 06   < > 1 08   < >  --    < > 1 50*  --  0 90 0 79 0 86   CALCIUM mg/dL 8 2* 8 0* 8 2* 8 5 8 6 8 6 8 6   < > 8 1*   < >  --    < > 7 5*  --  8 9 9 1 9 3   GLUCOSE RANDOM mg/dL 177* 168* 117 136 127 96 124   < > 134   < >  --    < > 284*  --  195* 232* 254*   ALT U/L  --  63  --   --   --   --   --   --  118*  --  86*  --  83*  --  17 15 14   AST U/L  --  67*  --   --   --   --   --   --  250*  --  223*  --  212*  --  51* 43 55*   ALK PHOS U/L  --  77  --   --   --   --   --   --  73  --  68  --  74  --  110 101 98   ALBUMIN g/dL  --  1 3*  --   --   --   --   --   --  1 3*  --  1 3*  --  1 3*  --  1 7* 1 8* 1 8*   TOTAL BILIRUBIN mg/dL  --  0 60  --   --   --   --   --   --  0 75  --  0 54  --  0 55  --  0 73 0 49 0 54    < > = values in this interval not displayed       Results from last 7 days   Lab Units 01/16/23  0601 01/15/23  0445 01/14/23  1749 01/14/23  0440 01/13/23  0656 01/12/23  0635   MAGNESIUM mg/dL 2 3 2 2 2 0 1 6 1 8 1 6   PHOSPHORUS mg/dL 2 6 3 3 5 4*  --   --   --       Results from last 7 days   Lab Units 01/17/23  0545 01/16/23  1017 01/14/23  1749 01/14/23  0947   INR  1 35* 1 45* 1 64* 1 50*   PTT seconds 28  --  33  --           Results from last 7 days   Lab Units 01/14/23  1749 01/14/23  1404 01/14/23  0947   LACTIC ACID mmol/L 1 8 8 9* 2 9*     ABG:  Results from last 7 days   Lab Units 01/18/23  0603   PH ART  7 475*   PCO2 ART mm Hg 29 8*   PO2 ART mm Hg 97 1   HCO3 ART mmol/L 21 4*   BASE EXC ART mmol/L -1 8 ABG SOURCE  Line, Arterial     VBG:  Results from last 7 days   Lab Units 01/18/23  0603 01/15/23  0025 01/14/23  2056   PH JASON   --   --  7 313   PCO2 JASON mm Hg  --   --  36 2*   PO2 JASON mm Hg  --   --  42 0   HCO3 JASON mmol/L  --   --  17 9*   BASE EXC JASON mmol/L  --   --  -7 6   ABG SOURCE  Line, Arterial   < >  --     < > = values in this interval not displayed  Results from last 7 days   Lab Units 01/15/23  0445 01/14/23  0947   PROCALCITONIN ng/ml 15 09* 3 20*       Micro  Results from last 7 days   Lab Units 01/17/23  0546 01/15/23  0131 01/13/23  0657   BLOOD CULTURE  No Growth at 24 hrs  No Growth at 24 hrs  Staphylococcus aureus* Staphylococcus aureus*  Staphylococcus aureus*   GRAM STAIN RESULT   --  Gram positive cocci in clusters* Gram positive cocci in clusters*  Gram positive cocci in clusters*       EKG:   Imaging: I have personally reviewed pertinent reports  Intake and Output  I/O       01/17 0701  01/18 0700 01/18 0701  01/19 0700    I V  (mL/kg) 2531 2 (24 8) 1760 6 (17 3)    NG/ 270    IV Piggyback 350 200    Feedings 170 430    Total Intake(mL/kg) 3901 2 (38 2) 2660 6 (26 1)    Urine (mL/kg/hr) 1785 (0 7) 1435 (0 6)    Emesis/NG output 350     Drains 140     Stool 300 200    Total Output 2575 1635    Net +1326 2 +1025 6                Height and Weights   Height: 5' 10" (177 8 cm)  IBW (Ideal Body Weight): 73 kg  Body mass index is 32 27 kg/m²  Weight (last 2 days)     None            Nutrition       Diet Orders   (From admission, onward)             Start     Ordered    01/18/23 1624  Diet Regular; Pleasure Feed  Diet effective now        References:    Nutrtion Support Algorithm Enteral vs  Parenteral   Question Answer Comment   Diet Type Regular    Regular Pleasure Feed    RD to adjust diet per protocol?  No        01/18/23 1624                  Active Medications  Scheduled Meds:  Current Facility-Administered Medications   Medication Dose Route Frequency Provider Last Rate   • chlorhexidine  15 mL Mouth/Throat Q12H [facility] Ketan Suresh PA-C     • dexmedetomidine  0 1-1 mcg/kg/hr Intravenous Titrated Kimberli Fortune DO 0 5 mcg/kg/hr (01/18/23 1649)   • fentaNYL  100 mcg/hr Intravenous Continuous Molly Underwood  mcg/hr (01/19/23 0118)   • fentanyl citrate (PF)  50 mcg Intravenous Q2H PRN Alban Bhatia MD     • glycopyrrolate  0 1 mg Intravenous Q4H PRN Molly Underwood MD     • LORazepam  1 mg Intravenous Q10 Min PRN Molly Underwood MD     • ondansetron  4 mg Intravenous Q6H PRN Ketan Suresh PA-C       Continuous Infusions:  dexmedetomidine, 0 1-1 mcg/kg/hr, Last Rate: 0 5 mcg/kg/hr (01/18/23 1649)  fentaNYL, 100 mcg/hr, Last Rate: 100 mcg/hr (01/19/23 0118)      PRN Meds:   fentanyl citrate (PF), 50 mcg, Q2H PRN  glycopyrrolate, 0 1 mg, Q4H PRN  LORazepam, 1 mg, Q10 Min PRN  ondansetron, 4 mg, Q6H PRN        Invasive Devices Review  Invasive Devices     Central Venous Catheter Line  Duration           CVC Central Lines 01/14/23 Triple 20cm 4 days          Peripheral Intravenous Line  Duration           Peripheral IV 01/12/23 Right Antecubital 6 days    Peripheral IV 01/14/23 Left;Ventral (anterior) Forearm 4 days          Arterial Line  Duration           Arterial Line 01/17/23 Radial 2 days          Drain  Duration           Urethral Catheter 4 days    Rectal Tube With balloon 2 days                Rationale for remaining devices: comfort   ---------------------------------------------------------------------------------------  Advance Directive and Living Will:      Power of :    POLST:    ---------------------------------------------------------------------------------------  Care Time Delivered:   No Critical Care time spent       JAI Hamm      Portions of the record may have been created with voice recognition software    Occasional wrong word or "sound a like" substitutions may have occurred due to the inherent limitations of voice recognition software    Read the chart carefully and recognize, using context, where substitutions have occurred

## 2023-01-20 NOTE — DEATH NOTE
PRONOUNCEMENT OF DEATH     DOA: 23    DOD: 2023    TOD: 6708    CODE STATUS AT TOD: Level 4 Comfort Care    PATIENT ON HOSPICE?: Yes    FAMILY NOTIFIED?: Yes    AUTOPSY DESIRED?: No    SUSPECTED COD: Acute CVA d/t embolism of L MCA    HOSPITAL PROBLEM LIST:   Patient Active Problem List   Diagnosis   • DMII (diabetes mellitus, type 2) ([facility] )   • Hyperlipidemia   • Primary hypertension   • Mass of appendix   • Murmur   • BMI 31 0-31 9,adult   • Anxiety   • Rash   • Status post total knee replacement, left   • Impetigo   • Abnormal urinalysis   • Septic arthritis (HCC)   • Hyponatremia   • Septic embolism (HCC)   • Anemia   • Acute stroke due to embolism of left middle cerebral artery (HCC)   • Aortic valve endocarditis   • Pulmonary cavitary lesion       PRONOUNCEMENT OF DEATH    I was contacted by nursing staff to evaluate the patient found without vital signs  Patient was identified visually and identification was confirmed by hospital ID bracelet  Patient was found laying in bed with no one at bedside  Personally examined the patient without heart sounds auscultated on exam nor were pulses detected x 2  No breath sounds were appreciated after 2 minutes of auscultation  Pupils were fixed and non-reactive to light  Patient was pronounced dead at this date and time   home is [facility]  Phone number is [phone]  Please kindly review hospital chart for all details of this hospitalization and circumstances leading to death  Death certificate will be signed my attending physician at a later time

## 2023-01-22 LAB
BACTERIA BLD CULT: NORMAL
BACTERIA BLD CULT: NORMAL

## 2023-01-23 NOTE — UTILIZATION REVIEW
NOTIFICATION OF ADMISSION DISCHARGE   This is a Notification of Discharge from [facility]  Please be advised that this patient has been discharge from our facility  Below you will find the admission and discharge date and time including the patient’s disposition  UTILIZATION REVIEW CONTACT:  Jessica Gordillo  Utilization   Network Utilization Review Department  Phone: [phone] x carefully listen to the prompts  All voicemails are confidential   Email: [email or web address]     ADMISSION INFORMATION  PRESENTATION DATE: 2023  3:16 PM  OBERVATION ADMISSION DATE:   INPATIENT ADMISSION DATE: 23  3:16 PM   DISCHARGE DATE: 2023 10:05 AM   DISPOSITION:    IMPORTANT INFORMATION:  Send all requests for admission clinical reviews, approved or denied determinations and any other requests to dedicated fax number below belonging to the campus where the patient is receiving treatment   List of dedicated fax numbers:  [address] DENIALS (Administrative/Medical Necessity) [phone]   [address] (Maternity/NICU/Pediatrics) [phone]   [facility] [phone]   [place] [address] [phone]   [address] [phone]   220 [facility] [phone]   90 [facility] [phone]   [address] [place], [place] [address] [address] [phone]   [facility]  [phone]   [facility] [phone]   [facility] [phone]

## 2023-01-25 ENCOUNTER — TELEPHONE (OUTPATIENT)
Dept: FAMILY MEDICINE CLINIC | Facility: CLINIC | Age: 63
End: 2023-01-25

## 2023-01-25 NOTE — TELEPHONE ENCOUNTER
Please notify patient's son that unused medications can be sealed up in bags and disposed of in the trash  Do not recommend flushing them down the toilet

## 2023-01-25 NOTE — TELEPHONE ENCOUNTER
Patient's son, Adarsh Woodward, called to inform us that his father, Jan Hartman, has passed away on 01/20, however, his medications were still delivered to their house  He is wondering how he should proceed/what he should do with them

## 2023-01-27 NOTE — TELEPHONE ENCOUNTER
Son notified he could either dispose of them in the garbage, or if he feels more comfortable, he could bring them into the office for us to dispose of  He states he may bring them in for us to dispose of in biohazard bag

## 2023-01-30 LAB — FUNGUS SPEC CULT: NORMAL

## 2023-02-13 LAB — FUNGUS SPEC CULT: NORMAL

## 2023-04-27 NOTE — ASSESSMENT & PLAN NOTE
Well controlled on lisinopril hydrochlorothiazide    Continue current dosing Cephalexin Pregnancy And Lactation Text: This medication is Pregnancy Category B and considered safe during pregnancy.  It is also excreted in breast milk but can be used safely for shorter doses.

## (undated) DEVICE — SPECIMEN CONTAINER STERILE PEEL PACK

## (undated) DEVICE — SPONGE PVP SCRUB WING STERILE

## (undated) DEVICE — FRAZIER SUCTION INSTRUMENT 18 FR W/OBTURATOR, NO CONTROL VENT: Brand: FRAZIER

## (undated) DEVICE — SKIN MARKER DUAL TIP WITH RULER CAP, FLEXIBLE RULER AND LABELS: Brand: DEVON

## (undated) DEVICE — SUT STRATAFIX SPIRAL PDS PLUS 1 CTX 18 IN SXPP1A400

## (undated) DEVICE — SURGICAL GOWN, XL SMARTSLEEVE: Brand: CONVERTORS

## (undated) DEVICE — PLUMEPEN PRO 10FT

## (undated) DEVICE — 3M™ STERI-STRIP™ REINFORCED ADHESIVE SKIN CLOSURES, R1547, 1/2 IN X 4 IN (12 MM X 100 MM), 6 STRIPS/ENVELOPE: Brand: 3M™ STERI-STRIP™

## (undated) DEVICE — INTENDED FOR TISSUE SEPARATION, AND OTHER PROCEDURES THAT REQUIRE A SHARP SURGICAL BLADE TO PUNCTURE OR CUT.: Brand: BARD-PARKER ® CARBON RIB-BACK BLADES

## (undated) DEVICE — TIBURON HIP DRAPE WITH POUCHES: Brand: CONVERTORS

## (undated) DEVICE — HEMOSTATIC MATRIX SURGIFLO 8ML W/THROMBIN

## (undated) DEVICE — DRAPE INTESTINAL ISOLATION BAG

## (undated) DEVICE — 3M™ IOBAN™ 2 ANTIMICROBIAL INCISE DRAPE 6648EZ: Brand: IOBAN™ 2

## (undated) DEVICE — BLADE OSCILLATOR 86.0 X 19.5MM

## (undated) DEVICE — 3M™ STERI-DRAPE™ U-DRAPE 1015: Brand: STERI-DRAPE™

## (undated) DEVICE — ACRA CLIP SINGLE CARTRIDGE

## (undated) DEVICE — GLOVE SRG BIOGEL PI ORTHOPEDIC 7

## (undated) DEVICE — NEEDLE 18 G X 1 1/2

## (undated) DEVICE — SCD SEQUENTIAL COMPRESSION COMFORT SLEEVE MEDIUM KNEE LENGTH: Brand: KENDALL SCD

## (undated) DEVICE — GLOVE INDICATOR PI UNDERGLOVE SZ 8 BLUE

## (undated) DEVICE — HOOD: Brand: FLYTE, SURGICOOL

## (undated) DEVICE — LIGHT HANDLE COVER SLEEVE DISP BLUE STELLAR

## (undated) DEVICE — SPONGE LAP 18 X 18 IN STRL RFD

## (undated) DEVICE — TUBING SUCTION 5MM X 12 FT

## (undated) DEVICE — SYRINGE 50ML LL

## (undated) DEVICE — SUT VICRYL 2-0 CT-1 27 IN J259H

## (undated) DEVICE — TOOL MR8-9MH30 MR8 9CM MATCH 3MM: Brand: MIDAS REX MR8

## (undated) DEVICE — COBAN 6 IN STERILE

## (undated) DEVICE — IMPERVIOUS STOCKINETTE: Brand: DEROYAL

## (undated) DEVICE — TRAY FOLEY 16FR URIMETER SURESTEP

## (undated) DEVICE — TELFA NON-ADHERENT ABSORBENT DRESSING: Brand: TELFA

## (undated) DEVICE — THE SIMPULSE SOLO SYSTEM WITH ULTREX RETRACTABLE SPLASH SHIELD TIP: Brand: SIMPULSE SOLO

## (undated) DEVICE — ICP MICROSENSOR KIT BASIC MR CONDITIONAL

## (undated) DEVICE — INTENDED FOR TISSUE SEPARATION, AND OTHER PROCEDURES THAT REQUIRE A SHARP SURGICAL BLADE TO PUNCTURE OR CUT.: Brand: BARD-PARKER SAFETY BLADES SIZE 10, STERILE

## (undated) DEVICE — SUT STRATAFIX SPIRAL PLUS 3-0 PS-2 30 X 30 CM SXMP2B408

## (undated) DEVICE — SUT VICRYL 1 CT-1 27 IN J261H

## (undated) DEVICE — 3M™ IOBAN™ 2 ANTIMICROBIAL INCISE DRAPE 6650EZ: Brand: IOBAN™ 2

## (undated) DEVICE — SUT MONOCRYL 4-0 PS-2 27 IN Y426H

## (undated) DEVICE — SYRINGE 30ML LL

## (undated) DEVICE — TOOL MR8-F2/7TA23 MR8 F2/7CM TAPER 2.3MM: Brand: MIDAS REX MR8

## (undated) DEVICE — BLADE OSCILLATING SAW 1/2

## (undated) DEVICE — GLOVE SRG BIOGEL ORTHOPEDIC 8.5

## (undated) DEVICE — GLOVE SRG BIOGEL ECLIPSE 8

## (undated) DEVICE — PROXIMATE PLUS MD MULTI-DIRECTIONAL RELEASE SKIN STAPLERS CONTAINS 35 STAINLESS STEEL STAPLES APPROXIMATE CLOSED DIMENSIONS: 6.9MM X 3.9MM WIDE: Brand: PROXIMATE

## (undated) DEVICE — GLOVE SRG BIOGEL 6.5

## (undated) DEVICE — BETHLEHEM UNIV TOTAL KNEE, KIT: Brand: CARDINAL HEALTH

## (undated) DEVICE — SUT VICRYL 1 CTX 36 IN J977H

## (undated) DEVICE — SAW BLADE OSCILLATING BRAZOL 167

## (undated) DEVICE — 450 ML BOTTLE OF 0.05% CHLORHEXIDINE GLUCONATE IN 99.95% STERILE WATER FOR IRRIGATION, USP AND APPLICATOR.: Brand: IRRISEPT ANTIMICROBIAL WOUND LAVAGE

## (undated) DEVICE — BULB SYRINGE,IRRIGATION WITH PROTECTIVE CAP: Brand: DOVER

## (undated) DEVICE — GLOVE INDICATOR PI UNDERGLOVE SZ 6.5 BLUE

## (undated) DEVICE — CUFF TOURNIQUET 30 X 4 IN QUICK CONNECT DISP 1BLA

## (undated) DEVICE — 10FR FRAZIER SUCTION HANDLE: Brand: CARDINAL HEALTH

## (undated) DEVICE — PREP SURGICAL PURPREP 26ML

## (undated) DEVICE — ANTIBACTERIAL VIOLET BRAIDED (POLYGLACTIN 910), SYNTHETIC ABSORBABLE SUTURE: Brand: COATED VICRYL

## (undated) DEVICE — PAD GROUNDING ADULT

## (undated) DEVICE — DRESSING MEPILEX AG BORDER POST-OP 4 X 12 IN

## (undated) DEVICE — WEBRIL 6 IN UNSTERILE

## (undated) DEVICE — U-DRAPE: Brand: CONVERTORS

## (undated) DEVICE — BAG WOUND LAVAGE 1L BACTISURE

## (undated) DEVICE — GLOVE SRG BIOGEL ORTHOPEDIC 6.5

## (undated) DEVICE — SIGMOIDOSCOPIC SUCTION INSTRUMENT 18 FR W/WINGED CAP CONTROL AND 6 FOOT (1.8M) TUBING: Brand: SIGMOIDOSCOPIC

## (undated) DEVICE — ADHESIVE SKIN CLSR DERMABOND NX

## (undated) DEVICE — GLOVE INDICATOR PI UNDERGLOVE SZ 8.5 BLUE

## (undated) DEVICE — CEMENT MIXING CARTRIDGE PRISM II

## (undated) DEVICE — SUT ETHILON 3-0 FS-1 18 IN 663G

## (undated) DEVICE — CAPIT KNEE ATTUNE FB CEMENT -DEPUY

## (undated) DEVICE — 4-PORT MANIFOLD: Brand: NEPTUNE 2

## (undated) DEVICE — SUT VICRYL 2-0 CT-1 36 IN J945H

## (undated) DEVICE — SUT NUROLON 4-0 TF CR/8 18 IN C584D

## (undated) DEVICE — SUT VICRYL 0 CT-1 36 IN J946H

## (undated) DEVICE — BETHLEHEM TOTAL HIP, KIT: Brand: CARDINAL HEALTH

## (undated) DEVICE — PACK CRANIOTOMY PBDS RF